# Patient Record
Sex: MALE | Race: WHITE | NOT HISPANIC OR LATINO | Employment: OTHER | URBAN - METROPOLITAN AREA
[De-identification: names, ages, dates, MRNs, and addresses within clinical notes are randomized per-mention and may not be internally consistent; named-entity substitution may affect disease eponyms.]

---

## 2017-01-13 ENCOUNTER — CLINICAL SUPPORT (OUTPATIENT)
Dept: INTERNAL MEDICINE | Facility: CLINIC | Age: 72
End: 2017-01-13
Payer: MEDICARE

## 2017-01-13 DIAGNOSIS — E29.1 HYPOGONADISM IN MALE: ICD-10-CM

## 2017-01-13 DIAGNOSIS — R79.89 LOW TESTOSTERONE: Primary | ICD-10-CM

## 2017-01-13 PROCEDURE — 96372 THER/PROPH/DIAG INJ SC/IM: CPT | Mod: PBBFAC,PO

## 2017-01-13 RX ORDER — TESTOSTERONE CYPIONATE 200 MG/ML
100 INJECTION, SOLUTION INTRAMUSCULAR
Status: COMPLETED | OUTPATIENT
Start: 2017-01-13 | End: 2017-01-13

## 2017-01-13 RX ORDER — TESTOSTERONE CYPIONATE 200 MG/ML
150 INJECTION, SOLUTION INTRAMUSCULAR
Status: DISCONTINUED | OUTPATIENT
Start: 2017-01-13 | End: 2017-01-13

## 2017-01-13 RX ADMIN — TESTOSTERONE CYPIONATE 100 MG: 200 INJECTION, SOLUTION INTRAMUSCULAR at 08:01

## 2017-01-13 NOTE — PROGRESS NOTES
Patient identified by name and  with verbal feedback. Depo-Testosterone 100mg administered IM to Right upper outer quadrant using aseptic technique. Patient tolerated well, no adverse reactions noted/reported. Next injection due 17 and has been scheduled. /mp

## 2017-01-17 ENCOUNTER — TELEPHONE (OUTPATIENT)
Dept: ENDOCRINOLOGY | Facility: CLINIC | Age: 72
End: 2017-01-17

## 2017-01-17 DIAGNOSIS — E29.1 HYPOGONADISM IN MALE: Primary | ICD-10-CM

## 2017-01-17 RX ORDER — TESTOSTERONE CYPIONATE 200 MG/ML
100 INJECTION, SOLUTION INTRAMUSCULAR
Status: COMPLETED | OUTPATIENT
Start: 2017-01-18 | End: 2017-04-21

## 2017-01-17 NOTE — TELEPHONE ENCOUNTER
Patient needs testosterone injection clinic administer entered in the MAR. u He take 100 mg every 14 days

## 2017-01-20 ENCOUNTER — HOSPITAL ENCOUNTER (OUTPATIENT)
Dept: RADIOLOGY | Facility: CLINIC | Age: 72
Discharge: HOME | End: 2017-01-20
Attending: INTERNAL MEDICINE
Payer: MEDICARE

## 2017-01-20 DIAGNOSIS — E04.2 MULTINODULAR GOITER: ICD-10-CM

## 2017-01-20 DIAGNOSIS — I10 ESSENTIAL HYPERTENSION: ICD-10-CM

## 2017-01-20 DIAGNOSIS — E55.9 HYPOVITAMINOSIS D: ICD-10-CM

## 2017-01-20 PROCEDURE — 76536 US EXAM OF HEAD AND NECK: CPT | Mod: 26,,, | Performed by: RADIOLOGY

## 2017-01-20 PROCEDURE — 76536 US EXAM OF HEAD AND NECK: CPT | Mod: TC,PO

## 2017-01-27 ENCOUNTER — CLINICAL SUPPORT (OUTPATIENT)
Dept: INTERNAL MEDICINE | Facility: CLINIC | Age: 72
End: 2017-01-27
Payer: MEDICARE

## 2017-01-27 DIAGNOSIS — E29.1 HYPOGONADISM IN MALE: Primary | ICD-10-CM

## 2017-01-27 PROCEDURE — 96372 THER/PROPH/DIAG INJ SC/IM: CPT | Mod: PBBFAC,PO

## 2017-01-27 RX ADMIN — TESTOSTERONE CYPIONATE 100 MG: 200 INJECTION, SOLUTION INTRAMUSCULAR at 09:01

## 2017-01-27 NOTE — PROGRESS NOTES
Patient identified by name and  with verbal feedback. Depo-Testosterone 100mg administered IM to left upper outer quadrant using aseptic technique. Patient tolerated well, no adverse reactions noted/reported. Next injection due 2/10/17 and has been scheduled./mp

## 2017-02-10 ENCOUNTER — CLINICAL SUPPORT (OUTPATIENT)
Dept: INTERNAL MEDICINE | Facility: CLINIC | Age: 72
End: 2017-02-10
Payer: MEDICARE

## 2017-02-10 DIAGNOSIS — E29.1 HYPOGONADISM IN MALE: Primary | ICD-10-CM

## 2017-02-10 PROCEDURE — 96372 THER/PROPH/DIAG INJ SC/IM: CPT | Mod: PBBFAC,PO

## 2017-02-10 RX ADMIN — TESTOSTERONE CYPIONATE 100 MG: 200 INJECTION, SOLUTION INTRAMUSCULAR at 08:02

## 2017-02-24 ENCOUNTER — CLINICAL SUPPORT (OUTPATIENT)
Dept: INTERNAL MEDICINE | Facility: CLINIC | Age: 72
End: 2017-02-24
Payer: MEDICARE

## 2017-02-24 DIAGNOSIS — E29.1 HYPOGONADISM IN MALE: Primary | ICD-10-CM

## 2017-02-24 PROCEDURE — 96372 THER/PROPH/DIAG INJ SC/IM: CPT | Mod: PBBFAC,PO

## 2017-02-24 RX ADMIN — TESTOSTERONE CYPIONATE 100 MG: 200 INJECTION, SOLUTION INTRAMUSCULAR at 08:02

## 2017-02-24 NOTE — PROGRESS NOTES
Patient identified by name and  with verbal feedback. Depo-Testosterone 100 mg administered IM to left upper outer quadrant using aseptic technique. Patient tolerated well, no adverse reactions noted/reported. Next injection due 3/10/2017 and has been scheduled./mp

## 2017-02-26 RX ORDER — EZETIMIBE AND SIMVASTATIN 10; 40 MG/1; MG/1
TABLET ORAL
Qty: 90 TABLET | Refills: 2 | Status: SHIPPED | OUTPATIENT
Start: 2017-02-26 | End: 2018-07-18

## 2017-03-10 ENCOUNTER — CLINICAL SUPPORT (OUTPATIENT)
Dept: INTERNAL MEDICINE | Facility: CLINIC | Age: 72
End: 2017-03-10
Payer: MEDICARE

## 2017-03-10 DIAGNOSIS — E29.1 HYPOGONADISM IN MALE: Primary | ICD-10-CM

## 2017-03-10 PROCEDURE — 96372 THER/PROPH/DIAG INJ SC/IM: CPT | Mod: PBBFAC,PO

## 2017-03-10 RX ADMIN — TESTOSTERONE CYPIONATE 100 MG: 200 INJECTION, SOLUTION INTRAMUSCULAR at 08:03

## 2017-03-10 NOTE — PROGRESS NOTES
Patient identified by name and  with verbal feedback. Depo-Testosterone 100mg administered IM to left upper outer quadrant using aseptic technique. Patient tolerated well, no adverse reactions noted/reported. Next injection due 3/24 and has been scheduled./mp

## 2017-03-20 ENCOUNTER — TELEPHONE (OUTPATIENT)
Dept: FAMILY MEDICINE | Facility: CLINIC | Age: 72
End: 2017-03-20

## 2017-03-20 DIAGNOSIS — R79.89 ELEVATED LFTS: Primary | ICD-10-CM

## 2017-03-20 NOTE — TELEPHONE ENCOUNTER
Called pt to reschedule appt with Dr. Meier on 4/6/17 (Dr. Meier will not be in clinic). Rescheduled to Ms. Ofe PA-C for 4/5/17 at 940 am. Pt also had a question on wether or not he is to re-start Vytorin? Pt states that he was told to be off of it by Dr. Winter (there is also a telephone encounter from Dr. Meier from 12/12/16 stating he was to hold it and repeat CMP in 1 month.) Please advise. Thanks, Stephanie

## 2017-03-21 NOTE — TELEPHONE ENCOUNTER
Repeat lfts done in January demonstrated that one of his LFTs was still elevated, I wanted to recheck this in 3 months. please schedule CMP for before his visit with trisha so that she will have the results. If this has normalized we can add back low dose vytorin or another statin with close monitoring of LFTs. He is following up with Dr. Winter next week. Maybe he can have it drawn that day, does not need to be fasting.

## 2017-03-24 ENCOUNTER — CLINICAL SUPPORT (OUTPATIENT)
Dept: INTERNAL MEDICINE | Facility: CLINIC | Age: 72
End: 2017-03-24
Payer: MEDICARE

## 2017-03-24 PROCEDURE — 96372 THER/PROPH/DIAG INJ SC/IM: CPT | Mod: PBBFAC,PO

## 2017-03-24 RX ADMIN — TESTOSTERONE CYPIONATE 100 MG: 200 INJECTION, SOLUTION INTRAMUSCULAR at 08:03

## 2017-03-24 NOTE — PROGRESS NOTES
Patient identified by name and  with verbal feedback. Depo-Testosterone 100mg administered IM to left upper outer quadrant using aseptic technique. Patient tolerated well, no adverse reactions noted/reported. Next injection due 2017 and has been scheduled./mp

## 2017-03-28 ENCOUNTER — OFFICE VISIT (OUTPATIENT)
Dept: ENDOCRINOLOGY | Facility: CLINIC | Age: 72
End: 2017-03-28
Payer: MEDICARE

## 2017-03-28 VITALS
HEART RATE: 69 BPM | RESPIRATION RATE: 18 BRPM | HEIGHT: 72 IN | BODY MASS INDEX: 29.89 KG/M2 | SYSTOLIC BLOOD PRESSURE: 149 MMHG | WEIGHT: 220.69 LBS | DIASTOLIC BLOOD PRESSURE: 81 MMHG

## 2017-03-28 DIAGNOSIS — E83.52 FHH (FAMILIAL HYPOCALCIURIC HYPERCALCEMIA): ICD-10-CM

## 2017-03-28 DIAGNOSIS — E78.5 HYPERLIPIDEMIA, UNSPECIFIED HYPERLIPIDEMIA TYPE: ICD-10-CM

## 2017-03-28 DIAGNOSIS — E55.9 HYPOVITAMINOSIS D: ICD-10-CM

## 2017-03-28 DIAGNOSIS — I10 ESSENTIAL HYPERTENSION: ICD-10-CM

## 2017-03-28 DIAGNOSIS — K21.9 GASTROESOPHAGEAL REFLUX DISEASE WITHOUT ESOPHAGITIS: ICD-10-CM

## 2017-03-28 DIAGNOSIS — R79.89 LOW TESTOSTERONE: ICD-10-CM

## 2017-03-28 DIAGNOSIS — E83.52 HYPERCALCEMIA: Primary | ICD-10-CM

## 2017-03-28 DIAGNOSIS — E78.00 HYPERCHOLESTEROLEMIA: ICD-10-CM

## 2017-03-28 DIAGNOSIS — N40.0 BENIGN PROSTATIC HYPERPLASIA, PRESENCE OF LOWER URINARY TRACT SYMPTOMS UNSPECIFIED, UNSPECIFIED MORPHOLOGY: ICD-10-CM

## 2017-03-28 DIAGNOSIS — E04.2 MULTINODULAR GOITER: ICD-10-CM

## 2017-03-28 PROCEDURE — 99213 OFFICE O/P EST LOW 20 MIN: CPT | Mod: PBBFAC,PO | Performed by: INTERNAL MEDICINE

## 2017-03-28 PROCEDURE — 99214 OFFICE O/P EST MOD 30 MIN: CPT | Mod: S$PBB,,, | Performed by: INTERNAL MEDICINE

## 2017-03-28 PROCEDURE — 99999 PR PBB SHADOW E&M-EST. PATIENT-LVL III: CPT | Mod: PBBFAC,,, | Performed by: INTERNAL MEDICINE

## 2017-03-28 RX ORDER — ATORVASTATIN CALCIUM 10 MG/1
10 TABLET, FILM COATED ORAL DAILY
Qty: 90 TABLET | Refills: 3 | Status: SHIPPED | OUTPATIENT
Start: 2017-03-28 | End: 2017-09-02 | Stop reason: SDUPTHER

## 2017-03-28 NOTE — MR AVS SNAPSHOT
[place] [facility]  [address]  [place] [address]  Phone: [phone]                  Papito Hutton   3/28/2017 4:30 PM   Office Visit    Description:  Male : 1945   Provider:  Nadir Winter MD   Department:  [facility]           Diagnoses this Visit        Comments    Hypercalcemia    -  Primary     Hypercholesterolemia         Hyperlipidemia, unspecified hyperlipidemia type         Hypovitaminosis D         FHH (familial hypocalciuric hypercalcemia)         Gastroesophageal reflux disease without esophagitis         Multinodular goiter         Essential hypertension         Low testosterone         Benign prostatic hyperplasia, presence of lower urinary tract symptoms unspecified, unspecified morphology                To Do List           Future Appointments        Provider Department Dept Phone    3/29/2017 10:00 AM LABJOVANNY [facility] [phone]    2017 9:40 AM MELLISSA Jaffe [place] [facility] [phone]    2017 9:00 AM SLIDEKAILYN, INT MED INJ [place] [facility] [phone]    2017 9:00 AM SLIDEKAILYN, INT MED INJ [place] [facility] [phone]      Goals (5 Years of Data)     None      Follow-Up and Disposition     Return in about 4 months (around 2017).    Follow-up and Disposition History       These Medications        Disp Refills Start End    atorvastatin (LIPITOR) 10 MG tablet 90 tablet 3 3/28/2017 3/28/2018    Take 1 tablet (10 mg total) by mouth once daily. - Oral    Pharmacy: [facility] - [address] #: [phone]         [facility] On Call     [facility] On Call Nurse Care Line -  Assistance  Registered nurses in the [facility] provide clinical advisement, health education, appointment booking, and other advisory services.  Call for this free service at [phone].             Medications           Message regarding  Medications     Verify the changes and/or additions to your medication regime listed below are the same as discussed with your clinician today.  If any of these changes or additions are incorrect, please notify your healthcare provider.        START taking these NEW medications        Refills    atorvastatin (LIPITOR) 10 MG tablet 3    Sig: Take 1 tablet (10 mg total) by mouth once daily.    Class: Normal    Route: Oral           Verify that the below list of medications is an accurate representation of the medications you are currently taking.  If none reported, the list may be blank. If incorrect, please contact your healthcare provider. Carry this list with you in case of emergency.           Current Medications     amlodipine-benazepril (LOTREL) 10-40 mg per capsule TAKE 1 CAPSULE DAILY    atorvastatin (LIPITOR) 10 MG tablet Take 1 tablet (10 mg total) by mouth once daily.    CHOLECALCIFEROL, VITAMIN D3, (VITAMIN D3 ORAL) Take 1 capsule by mouth once daily.    cyanocobalamin (VITAMIN B-12) 1000 MCG tablet Take 100 mcg by mouth once daily.    diclofenac (VOLTAREN) 75 MG EC tablet Take 1 tablet (75 mg total) by mouth 2 (two) times daily as needed.    NEXIUM 40 mg capsule TAKE 1 CAPSULE BEFORE BREAKFAST    testosterone cypionate (DEPOTESTOTERONE CYPIONATE) 100 mg/mL injection Inject 1.5 mLs (150 mg total) into the muscle every 14 (fourteen) days.    TOPROL XL 25 mg 24 hr tablet TAKE 1 TABLET DAILY    VYTORIN 10-40 10-40 mg per tablet TAKE 1 TABLET EVERY EVENING           Clinical Reference Information           Your Vitals Were     BP Pulse Resp Height Weight BMI    149/81 69 18 6' (1.829 m) 100.1 kg (220 lb 10.9 oz) 29.93 kg/m2      Blood Pressure          Most Recent Value    BP  (!)  149/81      Allergies as of 3/28/2017     Vytorin 10-10 [Ezetimibe-simvastatin]      Immunizations Administered on Date of Encounter - 3/28/2017     None      Orders Placed During Today's Visit     Future Labs/Procedures Expected by  Expires    Calcitriol  3/28/2017 5/27/2018    Calcium, ionized  3/28/2017 5/27/2018    Calcium, urine, random  3/28/2017 5/27/2018    CBC auto differential  3/28/2017 5/27/2018    Comprehensive metabolic panel  3/28/2017 5/27/2018    Creatinine, urine, random  3/28/2017 5/27/2018    PSA, total and free  3/28/2017 5/27/2018    PTH, intact  3/28/2017 5/27/2018    T3  3/28/2017 5/27/2018    T4, free  3/28/2017 5/27/2018    Testosterone Panel  3/28/2017 5/27/2018    Testosterone Panel  3/28/2017 5/27/2018    Testosterone Panel  3/28/2017 5/27/2018    TSH  3/28/2017 5/27/2018    US Soft Tissue Head Neck Thyroid  3/28/2017 3/28/2018    Vitamin D  3/28/2017 5/27/2018    Comprehensive metabolic panel  4/28/2017 (Approximate) 5/27/2018    Phosphorus, urine, random  As directed 3/28/2018      Language Assistance Services     ATTENTION: Language assistance services are available, free of charge. Please call [phone].      ATENCIÓN: Si habla español, tiene a suarez disposición servicios gratuitos de asistencia lingüística. Llame al [phone].     CHÚ Ý: N?u b?n nói Ti?ng Vi?t, có các d?ch v? h? tr? ngôn ng? mi?n phí dành cho b?n. G?i s? [phone].         [facility] complies with applicable Federal civil rights laws and does not discriminate on the basis of race, color, national origin, age, disability, or sex.

## 2017-03-28 NOTE — PROGRESS NOTES
Subjective:      Patient ID: Papito Hutton is a 71 y.o. male.    Chief Complaint:    71 yr old gentleman with hypogonadism and hypercalcemia presumed secondary to FHH seen in [facility] today.    History of Present Illness    Patient is a 71yr old gentleman with hypercalcemia and hypogonadism seen in [facility] today. He in addition has background comorbidities of hypertension, GERD, Vit B12 deficiency and hyperlipidemia.   The work up on account of the hypercalcemia suggests that this is due to FHH.   The patient was also found to have a multinodular goiter as noted on his most recent thyroid Uss from 01/16 though all the identified nodules are subcm in size with no worrisome sonographic features. His next [facility] USS to re-evaluate the nodules should for for ~ 01/17.  He was recently found to have asymptomatic transaminitis of unclear etiology.  Patients baseline Solomon score is 6. Patient had a sleep study in the past prior to have a nasal deviated septum fixed.  No major snoring problems.  Patient had been on testosterone injections ; testosterone cypionate 150mg every 14 days for androgen repletion. Patient apparently had a brain MRI ~ 2 yrs ago which was reportedly normal.  Patient reports that he had always had high calcium levels while he was in the [employer] since over 20 yrs ago.  Patient has never had kidney stones but he does have GERD.No known family history of hypercalcemia, no history of severe dyspepsia nor severe peptic ulcer disease.  He is on Vytorin 10/40mg Qd for hyperlipidemia with hypercholesterolemia.  Patient has never had a fracture of note and his screening DEXA was essentially -ve.  He is in excellent spirits today and has no fresh complaints    Review of Systems   Constitutional: Negative for diaphoresis and fatigue.   HENT: Negative for facial swelling and trouble swallowing.    Eyes: Negative for visual disturbance.   Respiratory: Negative for cough and shortness of breath.    Cardiovascular:  Negative for chest pain and palpitations.   Gastrointestinal: Negative for abdominal distention, abdominal pain, diarrhea and vomiting.   Endocrine: Negative for polyuria.   Genitourinary: Negative for dysuria and frequency.   Musculoskeletal: Negative for arthralgias and back pain.   Skin: Negative for color change, pallor and rash.   Neurological: Negative for dizziness, light-headedness and headaches.   Hematological: Does not bruise/bleed easily.   Psychiatric/Behavioral: Negative for confusion. The patient is not nervous/anxious.        Objective:  BP (!) 149/81  Pulse 69  Resp 18  Ht 6' (1.829 m)  Wt 100.1 kg (220 lb 10.9 oz)  BMI 29.93 kg/m2     Physical Exam   Constitutional: He is oriented to person, place, and time. He appears well-developed and well-nourished. No distress.   Pleasant elderly gentleman. Clinically comfortable. Not in any apparent distress. Not pale, anicteric and afebrile. Patient is in excellent spirits and looks younger than stated chronologic age.  Well hydrated.   HENT:   Head: Normocephalic and atraumatic.   Eyes: Conjunctivae and EOM are normal. Pupils are equal, round, and reactive to light.   Neck: Normal range of motion. Neck supple. No JVD present.   Cardiovascular: Normal rate, regular rhythm and normal heart sounds.    Pulmonary/Chest: Effort normal and breath sounds normal. No respiratory distress. He has no wheezes.   Abdominal: Soft. There is no tenderness.   Musculoskeletal: Normal range of motion. He exhibits no tenderness.   Neurological: He is alert and oriented to person, place, and time. No cranial nerve deficit.   Skin: Skin is warm and dry. No rash noted. He is not diaphoretic. No erythema. No pallor.   Psychiatric: He has a normal mood and affect. His behavior is normal. Judgment and thought content normal.   Vitals reviewed.      Lab Review:     Results for DEEDEE THOMAS (MRN [id number]) as of 3/28/2017 17:08   Ref. Range 11/29/2016 09:13 1/20/2017 08:06  1/20/2017 08:35   Vitamin B-12 Latest Ref Range: 210 - 950 pg/mL  547    Sodium Latest Ref Range: 136 - 145 mmol/L 140 138    Potassium Latest Ref Range: 3.5 - 5.1 mmol/L 4.3 4.3    Chloride Latest Ref Range: 95 - 110 mmol/L 108 105    CO2 Latest Ref Range: 23 - 29 mmol/L 22 (L) 26    Anion Gap Latest Ref Range: 8 - 16 mmol/L 10 7 (L)    BUN, Bld Latest Ref Range: 8 - 23 mg/dL 17 17    Creatinine Latest Ref Range: 0.5 - 1.4 mg/dL 0.9 1.0    eGFR if non African American Latest Ref Range: >60 mL/min/1.73 m^2 >60.0 >60.0    eGFR if African American Latest Ref Range: >60 mL/min/1.73 m^2 >60.0 >60.0    Glucose Latest Ref Range: 70 - 110 mg/dL 110 115 (H)    Calcium Latest Ref Range: 8.7 - 10.5 mg/dL 10.4 10.4    Alkaline Phosphatase Latest Ref Range: 55 - 135 U/L 107 104    Total Protein Latest Ref Range: 6.0 - 8.4 g/dL 7.4 7.1    Albumin Latest Ref Range: 3.5 - 5.2 g/dL 3.9 3.7    Total Bilirubin Latest Ref Range: 0.1 - 1.0 mg/dL 0.5 0.6    AST Latest Ref Range: 10 - 40 U/L 39 35    ALT Latest Ref Range: 10 - 44 U/L 103 (H) 66 (H)    GGT Latest Ref Range: 8 - 55 U/L 357 (H)     Hep A IgM Unknown Negative     Hep B C IgM Unknown Negative     Hepatitis B Surface Ag Unknown Negative     Hepatitis C Ab Unknown Negative     US SOFT TISSUE HEAD NECK THYROID Unknown   Rpt   Hepatitis A Antibody IgG Unknown Negative         Assessment:     1. Hypercalcemia  Calcium, ionized    Calcitriol    PTH, intact    Vitamin D    Calcium, urine, random    Creatinine, urine, random    Phosphorus, urine, random   2. Hypercholesterolemia  Comprehensive metabolic panel    atorvastatin (LIPITOR) 10 MG tablet    Comprehensive metabolic panel   3. Hyperlipidemia, unspecified hyperlipidemia type  Comprehensive metabolic panel    atorvastatin (LIPITOR) 10 MG tablet    Comprehensive metabolic panel   4. Hypovitaminosis D  Vitamin D    Calcium, urine, random    Creatinine, urine, random    Phosphorus, urine, random   5. FHH (familial hypocalciuric  hypercalcemia)  Calcium, ionized    Vitamin D    Calcium, urine, random    Creatinine, urine, random   6. Gastroesophageal reflux disease without esophagitis     7. Multinodular goiter  TSH    T4, free    T3    US Soft Tissue Head Neck Thyroid   8. Essential hypertension     9. Low testosterone  PSA, total and free    Testosterone Panel    Testosterone Panel    Testosterone Panel    CBC auto differential   10. Benign prostatic hyperplasia, presence of lower urinary tract symptoms unspecified, unspecified morphology  PSA, total and free        Regarding hypercalcemia. This appears due to [facility]. Will continue to monitor calcium trends but no need for any active intervaentiojn at the present time. To continue to encourage copious free water intake.  Regarding hypogonadism; to continue testosterone injections; testosterone cypionate 150mg every 14 days. Will recheck peak and trough levels as well as mid cycle levels  Regarding hypertension; BP profile well controlled. To continue present antihypertensive regimen and to continue serial BP tracking.  Regarding hyperlipidemia with hypercholesterolemia; to commence trial of low dose lipitor 10mg QD and will recheck CMP in ~ 1mth after starting to see if transaminitis relapses. If it does then it would mean he has a class sensitivity to statins and we will then have to change him to a non statin antilipidemic.  Regarding hypovitaminosis D; will recheck 25 OH vit D levels today.  Regarding multinodular goiter; to have repeat USS of thyroid ~ 01/17.  Regarding GERD; symptomatically stable. To continue PPI therapy as before    Plan:     FFup in ~ 4mths

## 2017-03-29 ENCOUNTER — LAB VISIT (OUTPATIENT)
Dept: LAB | Facility: HOSPITAL | Age: 72
End: 2017-03-29
Attending: FAMILY MEDICINE
Payer: MEDICARE

## 2017-03-29 DIAGNOSIS — E83.52 FHH (FAMILIAL HYPOCALCIURIC HYPERCALCEMIA): ICD-10-CM

## 2017-03-29 DIAGNOSIS — E55.9 HYPOVITAMINOSIS D: ICD-10-CM

## 2017-03-29 DIAGNOSIS — E83.52 HYPERCALCEMIA: ICD-10-CM

## 2017-03-29 DIAGNOSIS — E04.2 MULTINODULAR GOITER: ICD-10-CM

## 2017-03-29 DIAGNOSIS — R79.89 ELEVATED LFTS: ICD-10-CM

## 2017-03-29 DIAGNOSIS — N40.0 BENIGN PROSTATIC HYPERPLASIA, PRESENCE OF LOWER URINARY TRACT SYMPTOMS UNSPECIFIED, UNSPECIFIED MORPHOLOGY: ICD-10-CM

## 2017-03-29 DIAGNOSIS — R79.89 LOW TESTOSTERONE: ICD-10-CM

## 2017-03-29 LAB
25(OH)D3+25(OH)D2 SERPL-MCNC: 32 NG/ML
ALBUMIN SERPL BCP-MCNC: 3.7 G/DL
ALP SERPL-CCNC: 85 U/L
ALT SERPL W/O P-5'-P-CCNC: 45 U/L
ANION GAP SERPL CALC-SCNC: 8 MMOL/L
AST SERPL-CCNC: 31 U/L
BASOPHILS # BLD AUTO: 0.04 K/UL
BASOPHILS NFR BLD: 0.7 %
BILIRUB SERPL-MCNC: 0.6 MG/DL
BUN SERPL-MCNC: 10 MG/DL
CA-I BLDV-SCNC: 1.44 MMOL/L
CALCIUM SERPL-MCNC: 10.4 MG/DL
CHLORIDE SERPL-SCNC: 106 MMOL/L
CO2 SERPL-SCNC: 25 MMOL/L
CREAT SERPL-MCNC: 1 MG/DL
DIFFERENTIAL METHOD: ABNORMAL
EOSINOPHIL # BLD AUTO: 0.1 K/UL
EOSINOPHIL NFR BLD: 1.8 %
ERYTHROCYTE [DISTWIDTH] IN BLOOD BY AUTOMATED COUNT: 13.9 %
EST. GFR  (AFRICAN AMERICAN): >60 ML/MIN/1.73 M^2
EST. GFR  (NON AFRICAN AMERICAN): >60 ML/MIN/1.73 M^2
GLUCOSE SERPL-MCNC: 133 MG/DL
HCT VFR BLD AUTO: 46.5 %
HGB BLD-MCNC: 16.1 G/DL
LYMPHOCYTES # BLD AUTO: 2.9 K/UL
LYMPHOCYTES NFR BLD: 52.6 %
MCH RBC QN AUTO: 30.4 PG
MCHC RBC AUTO-ENTMCNC: 34.6 %
MCV RBC AUTO: 88 FL
MONOCYTES # BLD AUTO: 0.3 K/UL
MONOCYTES NFR BLD: 6 %
NEUTROPHILS # BLD AUTO: 2.1 K/UL
NEUTROPHILS NFR BLD: 38.7 %
PLATELET # BLD AUTO: 269 K/UL
PMV BLD AUTO: 10.7 FL
POTASSIUM SERPL-SCNC: 4.3 MMOL/L
PROSTATE SPECIFIC ANTIGEN, TOTAL: 0.85 NG/ML
PROT SERPL-MCNC: 7.2 G/DL
PSA FREE MFR SERPL: 24.71 %
PSA FREE SERPL-MCNC: 0.21 NG/ML
PTH-INTACT SERPL-MCNC: 116 PG/ML
RBC # BLD AUTO: 5.3 M/UL
SODIUM SERPL-SCNC: 139 MMOL/L
T3 SERPL-MCNC: 76 NG/DL
T4 FREE SERPL-MCNC: 1.14 NG/DL
TSH SERPL DL<=0.005 MIU/L-ACNC: 0.61 UIU/ML
WBC # BLD AUTO: 5.49 K/UL

## 2017-03-29 PROCEDURE — 82652 VIT D 1 25-DIHYDROXY: CPT

## 2017-03-29 PROCEDURE — 84439 ASSAY OF FREE THYROXINE: CPT

## 2017-03-29 PROCEDURE — 84153 ASSAY OF PSA TOTAL: CPT

## 2017-03-29 PROCEDURE — 84443 ASSAY THYROID STIM HORMONE: CPT

## 2017-03-29 PROCEDURE — 80053 COMPREHEN METABOLIC PANEL: CPT

## 2017-03-29 PROCEDURE — 84270 ASSAY OF SEX HORMONE GLOBUL: CPT

## 2017-03-29 PROCEDURE — 84480 ASSAY TRIIODOTHYRONINE (T3): CPT

## 2017-03-29 PROCEDURE — 85025 COMPLETE CBC W/AUTO DIFF WBC: CPT

## 2017-03-29 PROCEDURE — 82306 VITAMIN D 25 HYDROXY: CPT

## 2017-03-29 PROCEDURE — 83970 ASSAY OF PARATHORMONE: CPT

## 2017-03-29 PROCEDURE — 82330 ASSAY OF CALCIUM: CPT

## 2017-03-29 PROCEDURE — 36415 COLL VENOUS BLD VENIPUNCTURE: CPT | Mod: PO

## 2017-03-31 LAB — 1,25(OH)2D3 SERPL-MCNC: 85 PG/ML

## 2017-04-05 ENCOUNTER — DOCUMENTATION ONLY (OUTPATIENT)
Dept: FAMILY MEDICINE | Facility: CLINIC | Age: 72
End: 2017-04-05

## 2017-04-05 ENCOUNTER — OFFICE VISIT (OUTPATIENT)
Dept: FAMILY MEDICINE | Facility: CLINIC | Age: 72
End: 2017-04-05
Payer: MEDICARE

## 2017-04-05 VITALS
HEIGHT: 72 IN | DIASTOLIC BLOOD PRESSURE: 60 MMHG | BODY MASS INDEX: 29.35 KG/M2 | TEMPERATURE: 98 F | HEART RATE: 60 BPM | SYSTOLIC BLOOD PRESSURE: 114 MMHG | WEIGHT: 216.69 LBS | OXYGEN SATURATION: 95 %

## 2017-04-05 DIAGNOSIS — R79.89 ELEVATED LIVER FUNCTION TESTS: ICD-10-CM

## 2017-04-05 DIAGNOSIS — R73.09 ELEVATED GLUCOSE: ICD-10-CM

## 2017-04-05 DIAGNOSIS — I10 ESSENTIAL HYPERTENSION: Primary | ICD-10-CM

## 2017-04-05 DIAGNOSIS — E78.5 HYPERLIPIDEMIA, UNSPECIFIED HYPERLIPIDEMIA TYPE: ICD-10-CM

## 2017-04-05 PROCEDURE — 99213 OFFICE O/P EST LOW 20 MIN: CPT | Mod: S$PBB,,, | Performed by: PHYSICIAN ASSISTANT

## 2017-04-05 PROCEDURE — 99999 PR PBB SHADOW E&M-EST. PATIENT-LVL III: CPT | Mod: PBBFAC,,, | Performed by: PHYSICIAN ASSISTANT

## 2017-04-05 PROCEDURE — 99213 OFFICE O/P EST LOW 20 MIN: CPT | Mod: PBBFAC,PO | Performed by: PHYSICIAN ASSISTANT

## 2017-04-05 RX ORDER — DICLOFENAC SODIUM 75 MG/1
75 TABLET, DELAYED RELEASE ORAL 2 TIMES DAILY PRN
Qty: 120 TABLET | Refills: 1 | Status: SHIPPED | OUTPATIENT
Start: 2017-04-05 | End: 2017-07-16 | Stop reason: SDUPTHER

## 2017-04-05 NOTE — PROGRESS NOTES
Subjective:       Patient ID: Papito Hutton is a 71 y.o. male.    Chief Complaint: follow up hypertension    HPI   Patient is a 71 year old  male HTN, HLD presenting to the clinic for routine 6 month f/u. He sees Dr. Winter whom recently started him back on atorvastatin which patient is happy with. We will a CMP in 3 months to check on his liver function numbers with starting this medication. He reports he is doing well. He is wanting to increased dose of voltaren 75mg, but reports he has only been taking it once daily vs. Twice daily. He will try this. HTN is well controlled on current medication.   Review of Systems   Constitutional: Negative for activity change, appetite change, chills, fatigue and fever.   HENT: Negative for congestion, ear pain, postnasal drip, rhinorrhea and sinus pressure.    Respiratory: Negative for cough, shortness of breath and wheezing.    Cardiovascular: Negative for chest pain and palpitations.   Gastrointestinal: Negative for abdominal pain, constipation, diarrhea, nausea and vomiting.   Genitourinary: Negative for frequency, hematuria and urgency.   Musculoskeletal: Negative for back pain and gait problem.   Skin: Negative for rash.   Neurological: Negative for syncope, weakness and headaches.   Psychiatric/Behavioral: Negative for agitation and confusion.       Objective:      Physical Exam   Constitutional: Vital signs are normal. He appears well-developed and well-nourished. No distress.   Cardiovascular: Normal rate, regular rhythm, S1 normal, S2 normal and normal heart sounds.  Exam reveals no gallop.    No murmur heard.  Pulses:       Radial pulses are 2+ on the right side, and 2+ on the left side.   <2sec cap refill fingers bilat     Pulmonary/Chest: Effort normal and breath sounds normal. No respiratory distress. He has no wheezes. He has no rhonchi.   Skin: Skin is warm and dry. He is not diaphoretic.   Appropriate skin turgor   Psychiatric: He has a normal  mood and affect. His speech is normal and behavior is normal. Judgment and thought content normal. Cognition and memory are normal.       Assessment:       1. Essential hypertension    2. Elevated glucose    3. Elevated liver function tests    4. Hyperlipidemia, unspecified hyperlipidemia type        Plan:       Papito was seen today for follow up hypertension.    Diagnoses and all orders for this visit:    Essential hypertension  Well controlled;  Continue current blood pressure medications    Elevated glucose  -     Hemoglobin A1c; Future    Elevated liver function tests  Resolving; Recently restarting atorvastin; recheck cmp in 3 months  -     Comprehensive metabolic panel; Future    Hyperlipidemia, unspecified hyperlipidemia type  Recently restarted on atorvastatin 10mg daily    Other orders  -     diclofenac (VOLTAREN) 75 MG EC tablet; Take 1 tablet (75 mg total) by mouth 2 (two) times daily as needed.      Patient readiness: acceptance and barriers:none    During the course of the visit the patient was educated and counseled about the following:     Hypertension:   Medication: no change.    Goals: Hypertension: Reduce Blood Pressure    Did patient meet goals/outcomes: No    The following self management tools provided: declined    Patient Instructions (the written plan) was given to the patient/family.     Time spent with patient: 20 minutes

## 2017-04-05 NOTE — PROGRESS NOTES
Pre-Visit Chart Review  For Appointment Scheduled on 4/5/17    Health Maintenance Due   Topic Date Due    TETANUS VACCINE  08/04/1964    Colonoscopy  08/04/1996

## 2017-04-05 NOTE — MR AVS SNAPSHOT
[facility]  [address]  [place] [address]  Phone: [phone]  Fax: [phone]                  Papito Hutton   2017 9:40 AM   Office Visit    Description:  Male : 1945   Provider:  MELLISSA Jaffe   Department:  [facility]           Reason for Visit     follow up hypertension           Diagnoses this Visit        Comments    Elevated glucose    -  Primary     Elevated liver function tests                To Do List           Future Appointments        Provider Department Dept Phone    2017 9:00 AM [facility], INT MED [facility] [phone]    2017 10:30 AM LAB, [facility] [phone]    2017 9:00 AM [facility], INT MED [facility] [phone]    2017 8:00 AM LAB, [facility] [phone]    2017 8:00 AM Nadir Winter MD [facility] [phone]      Goals (5 Years of Data)     None      Follow-Up and Disposition     Return for a1c friday; CMP in 3 months.       These Medications        Disp Refills Start End    diclofenac (VOLTAREN) 75 MG EC tablet 120 tablet 1 2017     Take 1 tablet (75 mg total) by mouth 2 (two) times daily as needed. - Oral    Pharmacy: [facility] - [address] Ph #: [phone]         [facility]     [facility] -  Assistance  Unless otherwise directed by your provider, please contact [facility], our nurse care line that is available for  assistance.     Registered nurses in the [facility] provide: appointment scheduling, clinical advisement, health education, and other advisory services.  Call: [phone] (toll free)               Medications           Message regarding Medications     Verify the changes and/or additions to your medication regime listed below are the same as discussed with your  clinician today.  If any of these changes or additions are incorrect, please notify your healthcare provider.             Verify that the below list of medications is an accurate representation of the medications you are currently taking.  If none reported, the list may be blank. If incorrect, please contact your healthcare provider. Carry this list with you in case of emergency.           Current Medications     amlodipine-benazepril (LOTREL) 10-40 mg per capsule TAKE 1 CAPSULE DAILY    atorvastatin (LIPITOR) 10 MG tablet Take 1 tablet (10 mg total) by mouth once daily.    CHOLECALCIFEROL, VITAMIN D3, (VITAMIN D3 ORAL) Take 1 capsule by mouth once daily.    cyanocobalamin (VITAMIN B-12) 1000 MCG tablet Take 100 mcg by mouth once daily.    diclofenac (VOLTAREN) 75 MG EC tablet Take 1 tablet (75 mg total) by mouth 2 (two) times daily as needed.    NEXIUM 40 mg capsule TAKE 1 CAPSULE BEFORE BREAKFAST    testosterone cypionate (DEPOTESTOTERONE CYPIONATE) 100 mg/mL injection Inject 1.5 mLs (150 mg total) into the muscle every 14 (fourteen) days.    TOPROL XL 25 mg 24 hr tablet TAKE 1 TABLET DAILY    VYTORIN 10-40 10-40 mg per tablet TAKE 1 TABLET EVERY EVENING           Clinical Reference Information           Your Vitals Were     BP Pulse Temp Height Weight SpO2    114/60 (BP Location: Right arm, Patient Position: Sitting, BP Method: Automatic) 60 98 °F (36.7 °C) (Oral) 6' (1.829 m) 98.3 kg (216 lb 11.4 oz) 95%    BMI                29.39 kg/m2          Blood Pressure          Most Recent Value    BP  114/60      Allergies as of 4/5/2017     Vytorin 10-10 [Ezetimibe-simvastatin]      Immunizations Administered on Date of Encounter - 4/5/2017     None      Orders Placed During Today's Visit     Future Labs/Procedures Expected by Expires    Comprehensive metabolic panel  4/5/2017 6/4/2018    Hemoglobin A1c  4/5/2017 6/4/2018      Instructions      Established High Blood Pressure    High blood pressure (hypertension) is a  chronic disease. Often health care providers dont know what causes it. But it can be caused by certain health conditions and medicines.  If you have high blood pressure, you may not have any symptoms. If you do have symptoms, they may include headache, dizziness, changes in your vision, chest pain, and shortness of breath. But even without symptoms, high blood pressure thats not treated raises your risk for heart attack and stroke. High blood pressure is a serious health risk and shouldnt be ignored.  A blood pressure reading is made up of two numbers: a higher number over a lower number. The top number is the systolic pressure. The bottom number is the diastolic pressure. A normal blood pressure is less than 120 over less than 80.  High blood pressure is when either the top number is 140 or higher, or the bottom number is 90 or higher. This must be the result when taking your blood pressure a number of times. The blood pressures between normal and high are called prehypertension.  Home care  If you have high blood pressure, you should do what is listed below to lower your blood pressure. If you are taking medicines for high blood pressure, these methods may reduce or end your need for medicines in the future.  · Begin a weight-loss program if you are overweight.  · Cut back on how much salt you get in your diet. Heres how to do this:  ¨ Dont eat foods that have a lot of salt. These include olives, pickles, smoked meats, and salted potato chips.  ¨ Dont add salt to your food at the table.  ¨ Use only small amounts of salt when cooking.  · Begin an exercise program. Talk with your health care provider about the type of exercise program that would be best for you. It doesn't have to be hard. Even brisk walking for 20 minutes 3 times a week is a good form of exercise.  · Dont take medicines that have heart stimulants. This includes many cold and sinus decongestant pills and sprays, as well as diet pills. Check  the warnings about hypertension on the label. Stimulants such as amphetamine or cocaine could be lethal for someone with high blood pressure. Never take these.  · Limit how much caffeine you get in your diet. Switch to caffeine-free products.  · Stop smoking. If you are a long-time smoker, this can be hard. Enroll in a stop-smoking program to make it more likely that you will quit for good.  · Learn how to handle stress. This is an important part of any program to lower blood pressure. Learn about relaxation methods like meditation, yoga, or biofeedback.  · If your provider prescribed medicines, take them exactly as directed. Missing doses may cause your blood pressure get out of control.  · Consider buying an automatic blood pressure machine. You can get one of these at most pharmacies. Use this to watch your blood pressure at home. Give the results to your provider.  Follow-up care  You will need to make regular visits to your health care provider. This is to check your blood pressure and to make changes to your medicines. Make a follow-up appointment as directed.  When to seek medical advice  Call your health care provider right away if any of these occur:  · Chest pain or shortness of breath  · Severe headache  · Throbbing or rushing sound in the ears  · Nosebleed  · Sudden severe pain in your belly (abdomen)  · Extreme drowsiness, confusion, or fainting  · Dizziness or dizziness with a spinning sensation (vertigo)  · Weakness of an arm or leg or one side of the face  · You have problems speaking or seeing   Date Last Reviewed: 11/25/2014  © 7742-6819 [employer]. [address] [place] [address], [place] [address]. All rights reserved. This information is not intended as a substitute for professional medical care. Always follow your healthcare professional's instructions.             Language Assistance Services     ATTENTION: Language assistance services are available, free of charge. Please call  [phone].      ATENCIÓN: Si habla español, tiene a suarez disposición servicios gratuitos de asistencia lingüística. Llame al [phone].     CHÚ Ý: N?u b?n nói Ti?ng Vi?t, có các d?ch v? h? tr? ngôn ng? mi?n phí dành cho b?n. G?i s? [phone].         [facility] complies with applicable Federal civil rights laws and does not discriminate on the basis of race, color, national origin, age, disability, or sex.

## 2017-04-05 NOTE — PATIENT INSTRUCTIONS
Established High Blood Pressure    High blood pressure (hypertension) is a chronic disease. Often health care providers dont know what causes it. But it can be caused by certain health conditions and medicines.  If you have high blood pressure, you may not have any symptoms. If you do have symptoms, they may include headache, dizziness, changes in your vision, chest pain, and shortness of breath. But even without symptoms, high blood pressure thats not treated raises your risk for heart attack and stroke. High blood pressure is a serious health risk and shouldnt be ignored.  A blood pressure reading is made up of two numbers: a higher number over a lower number. The top number is the systolic pressure. The bottom number is the diastolic pressure. A normal blood pressure is less than 120 over less than 80.  High blood pressure is when either the top number is 140 or higher, or the bottom number is 90 or higher. This must be the result when taking your blood pressure a number of times. The blood pressures between normal and high are called prehypertension.  Home care  If you have high blood pressure, you should do what is listed below to lower your blood pressure. If you are taking medicines for high blood pressure, these methods may reduce or end your need for medicines in the future.  · Begin a weight-loss program if you are overweight.  · Cut back on how much salt you get in your diet. Heres how to do this:  ¨ Dont eat foods that have a lot of salt. These include olives, pickles, smoked meats, and salted potato chips.  ¨ Dont add salt to your food at the table.  ¨ Use only small amounts of salt when cooking.  · Begin an exercise program. Talk with your health care provider about the type of exercise program that would be best for you. It doesn't have to be hard. Even brisk walking for 20 minutes 3 times a week is a good form of exercise.  · Dont take medicines that have heart stimulants. This includes many  cold and sinus decongestant pills and sprays, as well as diet pills. Check the warnings about hypertension on the label. Stimulants such as amphetamine or cocaine could be lethal for someone with high blood pressure. Never take these.  · Limit how much caffeine you get in your diet. Switch to caffeine-free products.  · Stop smoking. If you are a long-time smoker, this can be hard. Enroll in a stop-smoking program to make it more likely that you will quit for good.  · Learn how to handle stress. This is an important part of any program to lower blood pressure. Learn about relaxation methods like meditation, yoga, or biofeedback.  · If your provider prescribed medicines, take them exactly as directed. Missing doses may cause your blood pressure get out of control.  · Consider buying an automatic blood pressure machine. You can get one of these at most pharmacies. Use this to watch your blood pressure at home. Give the results to your provider.  Follow-up care  You will need to make regular visits to your health care provider. This is to check your blood pressure and to make changes to your medicines. Make a follow-up appointment as directed.  When to seek medical advice  Call your health care provider right away if any of these occur:  · Chest pain or shortness of breath  · Severe headache  · Throbbing or rushing sound in the ears  · Nosebleed  · Sudden severe pain in your belly (abdomen)  · Extreme drowsiness, confusion, or fainting  · Dizziness or dizziness with a spinning sensation (vertigo)  · Weakness of an arm or leg or one side of the face  · You have problems speaking or seeing   Date Last Reviewed: 11/25/2014  © 8104-1812 [employer]. [address] [place] [address], [place] [address]. All rights reserved. This information is not intended as a substitute for professional medical care. Always follow your healthcare professional's instructions.

## 2017-04-06 LAB
ALBUMIN SERPL-MCNC: 4.4 G/DL (ref 3.6–5.1)
SHBG SERPL-SCNC: 30 NMOL/L (ref 22–77)
TESTOST FREE SERPL-MCNC: 143.5 PG/ML (ref 6–73)
TESTOST SERPL-MCNC: 864 NG/DL (ref 250–1100)
TESTOSTERONE.FREE+WB SERPL-MCNC: 288.8 NG/DL (ref 15–150)

## 2017-04-07 ENCOUNTER — CLINICAL SUPPORT (OUTPATIENT)
Dept: INTERNAL MEDICINE | Facility: CLINIC | Age: 72
End: 2017-04-07
Payer: MEDICARE

## 2017-04-07 ENCOUNTER — LAB VISIT (OUTPATIENT)
Dept: LAB | Facility: HOSPITAL | Age: 72
End: 2017-04-07
Attending: FAMILY MEDICINE
Payer: MEDICARE

## 2017-04-07 DIAGNOSIS — R73.09 ELEVATED GLUCOSE: ICD-10-CM

## 2017-04-07 DIAGNOSIS — E29.1 HYPOGONADISM IN MALE: ICD-10-CM

## 2017-04-07 LAB
ESTIMATED AVG GLUCOSE: 117 MG/DL
HBA1C MFR BLD HPLC: 5.7 %

## 2017-04-07 PROCEDURE — 83036 HEMOGLOBIN GLYCOSYLATED A1C: CPT

## 2017-04-07 PROCEDURE — 36415 COLL VENOUS BLD VENIPUNCTURE: CPT | Mod: PO

## 2017-04-07 RX ORDER — TESTOSTERONE CYPIONATE 1000 MG/10ML
100 INJECTION, SOLUTION INTRAMUSCULAR
Qty: 6 ML | Refills: 3 | Status: SHIPPED | OUTPATIENT
Start: 2017-04-07 | End: 2017-07-28 | Stop reason: SDUPTHER

## 2017-04-07 RX ADMIN — TESTOSTERONE CYPIONATE 100 MG: 200 INJECTION, SOLUTION INTRAMUSCULAR at 08:04

## 2017-04-07 NOTE — PROGRESS NOTES
Patient identified by name and  with verbal feedback. Depo-Testosterone 100 mg administered IM to right upper outer quadrant using aseptic technique. Patient tolerated well, no adverse reactions noted/reported. Next injection due 2017 /and has been scheduled. /mp

## 2017-04-21 ENCOUNTER — CLINICAL SUPPORT (OUTPATIENT)
Dept: INTERNAL MEDICINE | Facility: CLINIC | Age: 72
End: 2017-04-21
Payer: MEDICARE

## 2017-04-21 DIAGNOSIS — R79.89 LOW TESTOSTERONE: Primary | ICD-10-CM

## 2017-04-21 PROCEDURE — 96372 THER/PROPH/DIAG INJ SC/IM: CPT | Mod: PBBFAC,PO

## 2017-04-21 RX ADMIN — TESTOSTERONE CYPIONATE 100 MG: 200 INJECTION, SOLUTION INTRAMUSCULAR at 08:04

## 2017-04-21 NOTE — PROGRESS NOTES
Administered 100 mg Testosterone IM. Patient tolerated well. No bleeding at insertion site noted. Pain scale 0/10 with injection. 2 patient identifiers used (name, ), aseptic technique maintained.

## 2017-05-05 ENCOUNTER — CLINICAL SUPPORT (OUTPATIENT)
Dept: INTERNAL MEDICINE | Facility: CLINIC | Age: 72
End: 2017-05-05
Payer: MEDICARE

## 2017-05-05 ENCOUNTER — TELEPHONE (OUTPATIENT)
Dept: ENDOCRINOLOGY | Facility: CLINIC | Age: 72
End: 2017-05-05

## 2017-05-05 DIAGNOSIS — R79.89 LOW TESTOSTERONE: Primary | ICD-10-CM

## 2017-05-05 DIAGNOSIS — R79.89 LOW TESTOSTERONE: ICD-10-CM

## 2017-05-05 PROCEDURE — 96372 THER/PROPH/DIAG INJ SC/IM: CPT | Mod: PBBFAC,PO

## 2017-05-05 RX ORDER — TESTOSTERONE CYPIONATE 200 MG/ML
100 INJECTION, SOLUTION INTRAMUSCULAR
Status: DISCONTINUED | OUTPATIENT
Start: 2017-05-06 | End: 2017-06-16

## 2017-05-05 RX ORDER — TESTOSTERONE CYPIONATE 200 MG/ML
100 INJECTION, SOLUTION INTRAMUSCULAR
Status: COMPLETED | OUTPATIENT
Start: 2017-05-05 | End: 2017-05-05

## 2017-05-05 RX ADMIN — TESTOSTERONE CYPIONATE 100 MG: 200 INJECTION, SOLUTION INTRAMUSCULAR at 08:05

## 2017-05-05 NOTE — PROGRESS NOTES
Patient identified by name and  with verbal feedback. Depo-Testosterone 100 mg administered IM to right upper outer quadrant using aseptic technique. Patient tolerated well, no adverse reactions noted/reported. Next injection due 17 /and has been scheduled. /mp   Patient will need orders/mp

## 2017-05-19 ENCOUNTER — CLINICAL SUPPORT (OUTPATIENT)
Dept: INTERNAL MEDICINE | Facility: CLINIC | Age: 72
End: 2017-05-19
Payer: MEDICARE

## 2017-05-19 DIAGNOSIS — R79.89 LOW TESTOSTERONE: ICD-10-CM

## 2017-05-19 PROCEDURE — 96372 THER/PROPH/DIAG INJ SC/IM: CPT | Mod: PBBFAC,PO

## 2017-05-19 RX ADMIN — TESTOSTERONE CYPIONATE 100 MG: 200 INJECTION, SOLUTION INTRAMUSCULAR at 08:05

## 2017-05-19 NOTE — PROGRESS NOTES
Patient identified by name and  with verbal feedback. Depo-Testosterone 100mg administered IM to left upper outer quadrant using aseptic technique. Patient tolerated well, no adverse reactions noted/reported. Next injection due 17 and has been scheduled.

## 2017-06-01 RX ORDER — AMLODIPINE AND BENAZEPRIL HYDROCHLORIDE 10; 40 MG/1; MG/1
CAPSULE ORAL
Qty: 90 CAPSULE | Refills: 3 | Status: SHIPPED | OUTPATIENT
Start: 2017-06-01 | End: 2018-05-27 | Stop reason: SDUPTHER

## 2017-06-02 ENCOUNTER — CLINICAL SUPPORT (OUTPATIENT)
Dept: INTERNAL MEDICINE | Facility: CLINIC | Age: 72
End: 2017-06-02
Payer: MEDICARE

## 2017-06-02 DIAGNOSIS — R79.89 LOW TESTOSTERONE: ICD-10-CM

## 2017-06-02 PROCEDURE — 96372 THER/PROPH/DIAG INJ SC/IM: CPT | Mod: PBBFAC,PO

## 2017-06-02 RX ADMIN — TESTOSTERONE CYPIONATE 100 MG: 200 INJECTION, SOLUTION INTRAMUSCULAR at 08:06

## 2017-06-16 ENCOUNTER — CLINICAL SUPPORT (OUTPATIENT)
Dept: INTERNAL MEDICINE | Facility: CLINIC | Age: 72
End: 2017-06-16
Payer: MEDICARE

## 2017-06-16 DIAGNOSIS — R79.89 LOW TESTOSTERONE: ICD-10-CM

## 2017-06-16 PROCEDURE — 96372 THER/PROPH/DIAG INJ SC/IM: CPT | Mod: PBBFAC,PO

## 2017-06-16 RX ORDER — TESTOSTERONE CYPIONATE 200 MG/ML
100 INJECTION, SOLUTION INTRAMUSCULAR
Status: COMPLETED | OUTPATIENT
Start: 2017-06-16 | End: 2017-07-14

## 2017-06-16 RX ADMIN — TESTOSTERONE CYPIONATE 100 MG: 200 INJECTION, SOLUTION INTRAMUSCULAR at 08:06

## 2017-06-16 NOTE — PROGRESS NOTES
Patient identified by name and  with verbal feedback. Depo-Testosterone 100 mg administered IM to left upper outer quadrant using aseptic technique. Patient tolerated well, no adverse reactions noted/reported. Next injection due 17 and has been scheduled./mp

## 2017-06-26 RX ORDER — METOPROLOL SUCCINATE 25 MG/1
25 TABLET, EXTENDED RELEASE ORAL DAILY
Qty: 90 TABLET | Refills: 3 | Status: SHIPPED | OUTPATIENT
Start: 2017-06-26 | End: 2018-06-24 | Stop reason: SDUPTHER

## 2017-06-30 ENCOUNTER — CLINICAL SUPPORT (OUTPATIENT)
Dept: INTERNAL MEDICINE | Facility: CLINIC | Age: 72
End: 2017-06-30
Payer: MEDICARE

## 2017-06-30 DIAGNOSIS — R79.89 LOW TESTOSTERONE: ICD-10-CM

## 2017-06-30 PROCEDURE — 96372 THER/PROPH/DIAG INJ SC/IM: CPT | Mod: PBBFAC,PO

## 2017-06-30 RX ADMIN — TESTOSTERONE CYPIONATE 100 MG: 200 INJECTION, SOLUTION INTRAMUSCULAR at 08:06

## 2017-07-07 ENCOUNTER — LAB VISIT (OUTPATIENT)
Dept: LAB | Facility: HOSPITAL | Age: 72
End: 2017-07-07
Attending: FAMILY MEDICINE
Payer: MEDICARE

## 2017-07-07 DIAGNOSIS — R79.89 ELEVATED LIVER FUNCTION TESTS: ICD-10-CM

## 2017-07-07 DIAGNOSIS — E83.52 FHH (FAMILIAL HYPOCALCIURIC HYPERCALCEMIA): ICD-10-CM

## 2017-07-07 DIAGNOSIS — R74.8 ELEVATED LIVER ENZYMES: Primary | ICD-10-CM

## 2017-07-07 DIAGNOSIS — E29.1 HYPOGONADISM IN MALE: ICD-10-CM

## 2017-07-07 DIAGNOSIS — R74.01 TRANSAMINITIS: ICD-10-CM

## 2017-07-07 DIAGNOSIS — E78.5 HYPERLIPIDEMIA, UNSPECIFIED HYPERLIPIDEMIA TYPE: ICD-10-CM

## 2017-07-07 LAB
ALBUMIN SERPL BCP-MCNC: 3.7 G/DL
ALP SERPL-CCNC: 95 U/L
ALT SERPL W/O P-5'-P-CCNC: 57 U/L
ANION GAP SERPL CALC-SCNC: 8 MMOL/L
AST SERPL-CCNC: 30 U/L
BILIRUB SERPL-MCNC: 0.7 MG/DL
BUN SERPL-MCNC: 21 MG/DL
CALCIUM SERPL-MCNC: 10.3 MG/DL
CHLORIDE SERPL-SCNC: 104 MMOL/L
CO2 SERPL-SCNC: 27 MMOL/L
CREAT SERPL-MCNC: 1 MG/DL
EST. GFR  (AFRICAN AMERICAN): >60 ML/MIN/1.73 M^2
EST. GFR  (NON AFRICAN AMERICAN): >60 ML/MIN/1.73 M^2
GLUCOSE SERPL-MCNC: 118 MG/DL
POTASSIUM SERPL-SCNC: 4.4 MMOL/L
PROT SERPL-MCNC: 7 G/DL
SODIUM SERPL-SCNC: 139 MMOL/L

## 2017-07-07 PROCEDURE — 36415 COLL VENOUS BLD VENIPUNCTURE: CPT | Mod: PO

## 2017-07-07 PROCEDURE — 80053 COMPREHEN METABOLIC PANEL: CPT

## 2017-07-10 RX ORDER — ESOMEPRAZOLE MAGNESIUM 40 MG/1
40 CAPSULE, DELAYED RELEASE ORAL
Qty: 90 CAPSULE | Refills: 3 | Status: SHIPPED | OUTPATIENT
Start: 2017-07-10 | End: 2017-07-17

## 2017-07-13 ENCOUNTER — TELEPHONE (OUTPATIENT)
Dept: FAMILY MEDICINE | Facility: CLINIC | Age: 72
End: 2017-07-13

## 2017-07-13 ENCOUNTER — PATIENT MESSAGE (OUTPATIENT)
Dept: FAMILY MEDICINE | Facility: CLINIC | Age: 72
End: 2017-07-13

## 2017-07-13 NOTE — TELEPHONE ENCOUNTER
----- Message from Marva Chowdary sent at 7/13/2017  8:28 AM CDT -----  Contact: [facility] [phone]   Ref# [id number]  The patient has had a formulary change, so the Nexium requires a prior auth.  Will you do that or prescribe an alternative?  Please call.  Thank you!

## 2017-07-14 ENCOUNTER — CLINICAL SUPPORT (OUTPATIENT)
Dept: INTERNAL MEDICINE | Facility: CLINIC | Age: 72
End: 2017-07-14
Payer: MEDICARE

## 2017-07-14 DIAGNOSIS — R79.89 LOW TESTOSTERONE: ICD-10-CM

## 2017-07-14 PROCEDURE — 96372 THER/PROPH/DIAG INJ SC/IM: CPT | Mod: PBBFAC,PO

## 2017-07-14 RX ADMIN — TESTOSTERONE CYPIONATE 100 MG: 200 INJECTION, SOLUTION INTRAMUSCULAR at 08:07

## 2017-07-14 NOTE — PROGRESS NOTES
Patient identified by name and  with verbal feedback. Depo-Testosterone 100 mg administered IM to left upper outer quadrant using aseptic technique. Patient tolerated well, no adverse reactions noted/reported. Next injection due 17 and has been scheduled.

## 2017-07-17 ENCOUNTER — TELEPHONE (OUTPATIENT)
Dept: FAMILY MEDICINE | Facility: CLINIC | Age: 72
End: 2017-07-17

## 2017-07-17 ENCOUNTER — HOSPITAL ENCOUNTER (OUTPATIENT)
Dept: RADIOLOGY | Facility: HOSPITAL | Age: 72
Discharge: HOME | End: 2017-07-17
Attending: FAMILY MEDICINE
Payer: MEDICARE

## 2017-07-17 DIAGNOSIS — R74.8 ELEVATED LIVER ENZYMES: ICD-10-CM

## 2017-07-17 PROCEDURE — 76705 ECHO EXAM OF ABDOMEN: CPT | Mod: 26,,, | Performed by: RADIOLOGY

## 2017-07-17 PROCEDURE — 76705 ECHO EXAM OF ABDOMEN: CPT | Mod: TC

## 2017-07-17 RX ORDER — DICLOFENAC SODIUM 75 MG/1
TABLET, DELAYED RELEASE ORAL
Qty: 120 TABLET | Refills: 0 | Status: SHIPPED | OUTPATIENT
Start: 2017-07-17 | End: 2017-09-15 | Stop reason: SDUPTHER

## 2017-07-17 RX ORDER — PANTOPRAZOLE SODIUM 40 MG/1
40 TABLET, DELAYED RELEASE ORAL DAILY
Qty: 90 TABLET | Refills: 3 | Status: SHIPPED | OUTPATIENT
Start: 2017-07-17 | End: 2018-01-12

## 2017-07-17 NOTE — TELEPHONE ENCOUNTER
Nexium coverage denied. Need to change to alternative-either Prilosec, Protonix, or Aciphex. Please advise. ThanksStephanie

## 2017-07-27 ENCOUNTER — TELEPHONE (OUTPATIENT)
Dept: FAMILY MEDICINE | Facility: CLINIC | Age: 72
End: 2017-07-27

## 2017-07-27 NOTE — TELEPHONE ENCOUNTER
Patient has testosterone 100mg injection for today7/28.   Patient  Has no   orders.    Thanks. /maritza

## 2017-07-28 ENCOUNTER — CLINICAL SUPPORT (OUTPATIENT)
Dept: INTERNAL MEDICINE | Facility: CLINIC | Age: 72
End: 2017-07-28
Payer: MEDICARE

## 2017-07-28 ENCOUNTER — TELEPHONE (OUTPATIENT)
Dept: ENDOCRINOLOGY | Facility: CLINIC | Age: 72
End: 2017-07-28

## 2017-07-28 DIAGNOSIS — R79.89 LOW TESTOSTERONE: ICD-10-CM

## 2017-07-28 PROCEDURE — 96372 THER/PROPH/DIAG INJ SC/IM: CPT | Mod: PBBFAC,PO

## 2017-07-28 RX ORDER — TESTOSTERONE CYPIONATE 200 MG/ML
100 INJECTION, SOLUTION INTRAMUSCULAR
Status: COMPLETED | OUTPATIENT
Start: 2017-07-28 | End: 2017-07-28

## 2017-07-28 RX ORDER — TESTOSTERONE CYPIONATE 200 MG/ML
100 INJECTION, SOLUTION INTRAMUSCULAR
Status: SHIPPED | OUTPATIENT
Start: 2017-07-29 | End: 2017-11-04

## 2017-07-28 RX ORDER — TESTOSTERONE CYPIONATE 1000 MG/10ML
100 INJECTION, SOLUTION INTRAMUSCULAR
Qty: 6 ML | Refills: 3 | Status: SHIPPED | OUTPATIENT
Start: 2017-07-28 | End: 2018-07-18

## 2017-07-28 RX ADMIN — TESTOSTERONE CYPIONATE 100 MG: 200 INJECTION, SOLUTION INTRAMUSCULAR at 08:07

## 2017-07-28 NOTE — PROGRESS NOTES
Patient identified by name and  with verbal feedback. Depo-Testosterone 100 mg administered IM to Right upper outer quadrant using aseptic technique. Patient tolerated well, no adverse reactions noted/reported. Next injection due 17 and has been scheduled. Patient needs new orders/mp

## 2017-07-28 NOTE — TELEPHONE ENCOUNTER
Dr guzmán has placed a one time order for this patient to get his injection today.    Dr linares can you please place a standing order for this patient next injection for the next 14 days and any labs if need. Thanks genera

## 2017-08-15 ENCOUNTER — TELEPHONE (OUTPATIENT)
Dept: PRIMARY CARE CLINIC | Facility: CLINIC | Age: 72
End: 2017-08-15

## 2017-08-15 ENCOUNTER — TELEPHONE (OUTPATIENT)
Dept: FAMILY MEDICINE | Facility: CLINIC | Age: 72
End: 2017-08-15

## 2017-08-15 NOTE — TELEPHONE ENCOUNTER
----- Message from José Miguel Duggan sent at 8/15/2017  8:57 AM CDT -----  Contact: Self  Calling to get injection scheduled for 08/18. Please call him back [phone] (home)   Thanks!

## 2017-08-15 NOTE — TELEPHONE ENCOUNTER
appt sched for Friday 18th @10:00 am.  Needs new orders for Testosterone 100mg every 14 days to get in clinic./  Thanks.

## 2017-08-15 NOTE — TELEPHONE ENCOUNTER
----- Message from José Miguel Duggan sent at 8/15/2017  8:48 AM CDT -----  Contact: Papito Menchaca and through will not a be a true reading because he missed a week. He thinks it may be in September. Please call him with any concerns [phone] (home)   Thanks!

## 2017-08-18 ENCOUNTER — CLINICAL SUPPORT (OUTPATIENT)
Dept: INTERNAL MEDICINE | Facility: CLINIC | Age: 72
End: 2017-08-18
Payer: MEDICARE

## 2017-08-18 DIAGNOSIS — R79.89 LOW TESTOSTERONE: ICD-10-CM

## 2017-08-18 PROCEDURE — 96372 THER/PROPH/DIAG INJ SC/IM: CPT | Mod: PBBFAC,PO

## 2017-08-18 RX ADMIN — TESTOSTERONE CYPIONATE 100 MG: 200 INJECTION, SOLUTION INTRAMUSCULAR at 08:08

## 2017-08-18 NOTE — PROGRESS NOTES
Patient identified by name and  with verbal feedback. Depo-Testosterone 100mg administered IM to left upper outer quadrant using aseptic technique. Patient tolerated well, no adverse reactions noted/reported. Next injection due 17 and has been scheduled. /mp

## 2017-08-30 ENCOUNTER — TELEPHONE (OUTPATIENT)
Dept: ENDOCRINOLOGY | Facility: CLINIC | Age: 72
End: 2017-08-30

## 2017-09-01 ENCOUNTER — CLINICAL SUPPORT (OUTPATIENT)
Dept: INTERNAL MEDICINE | Facility: CLINIC | Age: 72
End: 2017-09-01
Payer: MEDICARE

## 2017-09-01 ENCOUNTER — LAB VISIT (OUTPATIENT)
Dept: LAB | Facility: HOSPITAL | Age: 72
End: 2017-09-01
Attending: INTERNAL MEDICINE
Payer: MEDICARE

## 2017-09-01 DIAGNOSIS — R74.01 TRANSAMINITIS: ICD-10-CM

## 2017-09-01 DIAGNOSIS — R74.8 ELEVATED LIVER ENZYMES: ICD-10-CM

## 2017-09-01 DIAGNOSIS — E78.5 HYPERLIPIDEMIA, UNSPECIFIED HYPERLIPIDEMIA TYPE: ICD-10-CM

## 2017-09-01 DIAGNOSIS — E83.52 FHH (FAMILIAL HYPOCALCIURIC HYPERCALCEMIA): ICD-10-CM

## 2017-09-01 DIAGNOSIS — E29.1 HYPOGONADISM IN MALE: ICD-10-CM

## 2017-09-01 DIAGNOSIS — R79.89 LOW TESTOSTERONE: ICD-10-CM

## 2017-09-01 LAB
25(OH)D3+25(OH)D2 SERPL-MCNC: 37 NG/ML
ALBUMIN SERPL BCP-MCNC: 3.6 G/DL
ALP SERPL-CCNC: 88 U/L
ALT SERPL W/O P-5'-P-CCNC: 44 U/L
ANION GAP SERPL CALC-SCNC: 6 MMOL/L
AST SERPL-CCNC: 26 U/L
BILIRUB SERPL-MCNC: 0.7 MG/DL
BUN SERPL-MCNC: 15 MG/DL
CA-I BLDV-SCNC: 1.47 MMOL/L
CALCIUM SERPL-MCNC: 10.6 MG/DL
CHLORIDE SERPL-SCNC: 107 MMOL/L
CHOLEST SERPL-MCNC: 238 MG/DL
CHOLEST/HDLC SERPL: 5 {RATIO}
CO2 SERPL-SCNC: 27 MMOL/L
CREAT SERPL-MCNC: 0.9 MG/DL
EST. GFR  (AFRICAN AMERICAN): >60 ML/MIN/1.73 M^2
EST. GFR  (NON AFRICAN AMERICAN): >60 ML/MIN/1.73 M^2
GLUCOSE SERPL-MCNC: 110 MG/DL
HDLC SERPL-MCNC: 48 MG/DL
HDLC SERPL: 20.2 %
LDLC SERPL CALC-MCNC: 140.2 MG/DL
NONHDLC SERPL-MCNC: 190 MG/DL
POTASSIUM SERPL-SCNC: 4.2 MMOL/L
PROT SERPL-MCNC: 7 G/DL
PTH-INTACT SERPL-MCNC: 90 PG/ML
SODIUM SERPL-SCNC: 140 MMOL/L
TRIGL SERPL-MCNC: 249 MG/DL

## 2017-09-01 PROCEDURE — 96372 THER/PROPH/DIAG INJ SC/IM: CPT | Mod: PBBFAC,PO

## 2017-09-01 PROCEDURE — 82330 ASSAY OF CALCIUM: CPT

## 2017-09-01 PROCEDURE — 84270 ASSAY OF SEX HORMONE GLOBUL: CPT

## 2017-09-01 PROCEDURE — 80061 LIPID PANEL: CPT

## 2017-09-01 PROCEDURE — 83970 ASSAY OF PARATHORMONE: CPT

## 2017-09-01 PROCEDURE — 82306 VITAMIN D 25 HYDROXY: CPT

## 2017-09-01 PROCEDURE — 36415 COLL VENOUS BLD VENIPUNCTURE: CPT | Mod: PO

## 2017-09-01 PROCEDURE — 80053 COMPREHEN METABOLIC PANEL: CPT

## 2017-09-01 RX ADMIN — TESTOSTERONE CYPIONATE 100 MG: 200 INJECTION, SOLUTION INTRAMUSCULAR at 08:09

## 2017-09-01 NOTE — PROGRESS NOTES
Patient identified by name and  with verbal feedback. Depo-Testosterone 100mg administered IM to right upper outer quadrant using aseptic technique. Patient tolerated well, no adverse reactions noted/reported. Next injection due 9/15/17 and has been scheduled.

## 2017-09-02 DIAGNOSIS — E78.00 HYPERCHOLESTEROLEMIA: ICD-10-CM

## 2017-09-02 DIAGNOSIS — E78.5 HYPERLIPIDEMIA, UNSPECIFIED HYPERLIPIDEMIA TYPE: ICD-10-CM

## 2017-09-02 RX ORDER — ATORVASTATIN CALCIUM 20 MG/1
20 TABLET, FILM COATED ORAL DAILY
Qty: 90 TABLET | Refills: 0 | Status: SHIPPED | OUTPATIENT
Start: 2017-09-02 | End: 2017-11-13 | Stop reason: SDUPTHER

## 2017-09-05 ENCOUNTER — LAB VISIT (OUTPATIENT)
Dept: LAB | Facility: HOSPITAL | Age: 72
End: 2017-09-05
Attending: INTERNAL MEDICINE
Payer: MEDICARE

## 2017-09-05 DIAGNOSIS — R79.89 LOW TESTOSTERONE: ICD-10-CM

## 2017-09-05 PROCEDURE — 84270 ASSAY OF SEX HORMONE GLOBUL: CPT

## 2017-09-05 PROCEDURE — 36415 COLL VENOUS BLD VENIPUNCTURE: CPT | Mod: PO

## 2017-09-07 LAB
ALBUMIN SERPL-MCNC: 4.2 G/DL (ref 3.6–5.1)
ALBUMIN SERPL-MCNC: 4.2 G/DL (ref 3.6–5.1)
SHBG SERPL-SCNC: 29 NMOL/L (ref 22–77)
SHBG SERPL-SCNC: 29 NMOL/L (ref 22–77)
TESTOST FREE SERPL-MCNC: 30.4 PG/ML (ref 6–73)
TESTOST FREE SERPL-MCNC: 30.4 PG/ML (ref 6–73)
TESTOST SERPL-MCNC: 218 NG/DL (ref 250–1100)
TESTOST SERPL-MCNC: 218 NG/DL (ref 250–1100)
TESTOSTERONE.FREE+WB SERPL-MCNC: 58.5 NG/DL (ref 15–150)
TESTOSTERONE.FREE+WB SERPL-MCNC: 58.5 NG/DL (ref 15–150)

## 2017-09-09 LAB
ALBUMIN SERPL-MCNC: 4.1 G/DL (ref 3.6–5.1)
ALBUMIN SERPL-MCNC: 4.1 G/DL (ref 3.6–5.1)
SHBG SERPL-SCNC: 26 NMOL/L (ref 22–77)
SHBG SERPL-SCNC: 26 NMOL/L (ref 22–77)
TESTOST FREE SERPL-MCNC: 138.6 PG/ML (ref 6–73)
TESTOST FREE SERPL-MCNC: 138.6 PG/ML (ref 6–73)
TESTOST SERPL-MCNC: 748 NG/DL (ref 250–1100)
TESTOST SERPL-MCNC: 748 NG/DL (ref 250–1100)
TESTOSTERONE.FREE+WB SERPL-MCNC: 261 NG/DL (ref 15–150)
TESTOSTERONE.FREE+WB SERPL-MCNC: 261 NG/DL (ref 15–150)

## 2017-09-10 DIAGNOSIS — E29.1 HYPOGONADISM IN MALE: Primary | ICD-10-CM

## 2017-09-15 ENCOUNTER — CLINICAL SUPPORT (OUTPATIENT)
Dept: INTERNAL MEDICINE | Facility: CLINIC | Age: 72
End: 2017-09-15
Payer: MEDICARE

## 2017-09-15 DIAGNOSIS — R79.89 LOW TESTOSTERONE: ICD-10-CM

## 2017-09-15 PROCEDURE — 96372 THER/PROPH/DIAG INJ SC/IM: CPT | Mod: PBBFAC,PO

## 2017-09-15 RX ORDER — DICLOFENAC SODIUM 75 MG/1
TABLET, DELAYED RELEASE ORAL
Qty: 120 TABLET | Refills: 0 | Status: SHIPPED | OUTPATIENT
Start: 2017-09-15 | End: 2017-11-14 | Stop reason: SDUPTHER

## 2017-09-15 RX ADMIN — TESTOSTERONE CYPIONATE 100 MG: 200 INJECTION, SOLUTION INTRAMUSCULAR at 08:09

## 2017-09-16 ENCOUNTER — PATIENT MESSAGE (OUTPATIENT)
Dept: ENDOCRINOLOGY | Facility: CLINIC | Age: 72
End: 2017-09-16

## 2017-09-16 ENCOUNTER — PATIENT MESSAGE (OUTPATIENT)
Dept: FAMILY MEDICINE | Facility: CLINIC | Age: 72
End: 2017-09-16

## 2017-09-29 ENCOUNTER — CLINICAL SUPPORT (OUTPATIENT)
Dept: INTERNAL MEDICINE | Facility: CLINIC | Age: 72
End: 2017-09-29
Payer: MEDICARE

## 2017-09-29 DIAGNOSIS — E29.1 HYPOGONADISM IN MALE: ICD-10-CM

## 2017-09-29 PROCEDURE — 96372 THER/PROPH/DIAG INJ SC/IM: CPT | Mod: PBBFAC,PO

## 2017-09-29 RX ADMIN — TESTOSTERONE CYPIONATE 100 MG: 200 INJECTION, SOLUTION INTRAMUSCULAR at 08:09

## 2017-09-29 NOTE — PROGRESS NOTES
Patient identified by name and  with verbal feedback. Depo-Testosterone 100 mg administered IM to Right upper outer quadrant using aseptic technique. Patient tolerated well, no adverse reactions noted/reported. Next injection due 10/13/17 and has been scheduled.

## 2017-10-13 ENCOUNTER — CLINICAL SUPPORT (OUTPATIENT)
Dept: INTERNAL MEDICINE | Facility: CLINIC | Age: 72
End: 2017-10-13
Payer: MEDICARE

## 2017-10-13 DIAGNOSIS — R79.89 LOW TESTOSTERONE: ICD-10-CM

## 2017-10-13 PROCEDURE — 96372 THER/PROPH/DIAG INJ SC/IM: CPT | Mod: PBBFAC,PO

## 2017-10-13 RX ADMIN — TESTOSTERONE CYPIONATE 100 MG: 200 INJECTION, SOLUTION INTRAMUSCULAR at 08:10

## 2017-11-10 ENCOUNTER — CLINICAL SUPPORT (OUTPATIENT)
Dept: INTERNAL MEDICINE | Facility: CLINIC | Age: 72
End: 2017-11-10
Payer: MEDICARE

## 2017-11-10 DIAGNOSIS — R79.89 LOW TESTOSTERONE: ICD-10-CM

## 2017-11-10 PROCEDURE — 96372 THER/PROPH/DIAG INJ SC/IM: CPT | Mod: PBBFAC,PO

## 2017-11-10 RX ORDER — TESTOSTERONE CYPIONATE 200 MG/ML
75 INJECTION, SOLUTION INTRAMUSCULAR
Status: SHIPPED | OUTPATIENT
Start: 2017-11-10 | End: 2018-04-27

## 2017-11-10 RX ADMIN — TESTOSTERONE CYPIONATE 76 MG: 200 INJECTION, SOLUTION INTRAMUSCULAR at 09:11

## 2017-11-10 NOTE — PROGRESS NOTES
Patient identified by name and  with verbal feedback. Depo-Testosterone 76mg (.38ml) administered IM to Right upper outer quadrant using aseptic technique. Patient tolerated well, no adverse reactions noted/reported. Next injection due 2017 and has been scheduled.

## 2017-11-13 DIAGNOSIS — E78.00 HYPERCHOLESTEROLEMIA: ICD-10-CM

## 2017-11-13 DIAGNOSIS — E78.5 HYPERLIPIDEMIA, UNSPECIFIED HYPERLIPIDEMIA TYPE: ICD-10-CM

## 2017-11-13 RX ORDER — ATORVASTATIN CALCIUM 20 MG/1
TABLET, FILM COATED ORAL
Qty: 90 TABLET | Refills: 0 | Status: SHIPPED | OUTPATIENT
Start: 2017-11-13 | End: 2018-01-24 | Stop reason: SDUPTHER

## 2017-11-14 RX ORDER — DICLOFENAC SODIUM 75 MG/1
TABLET, DELAYED RELEASE ORAL
Qty: 120 TABLET | Refills: 0 | Status: SHIPPED | OUTPATIENT
Start: 2017-11-14 | End: 2019-01-10 | Stop reason: SDUPTHER

## 2017-11-27 ENCOUNTER — CLINICAL SUPPORT (OUTPATIENT)
Dept: INTERNAL MEDICINE | Facility: CLINIC | Age: 72
End: 2017-11-27
Payer: MEDICARE

## 2017-11-27 DIAGNOSIS — R79.89 LOW TESTOSTERONE: ICD-10-CM

## 2017-11-27 PROCEDURE — 96372 THER/PROPH/DIAG INJ SC/IM: CPT | Mod: PBBFAC,PO

## 2017-11-27 RX ADMIN — TESTOSTERONE CYPIONATE 76 MG: 200 INJECTION, SOLUTION INTRAMUSCULAR at 09:11

## 2017-11-27 NOTE — PROGRESS NOTES
Patient identified by name and  with verbal feedback. Depo-Testosterone 76mg administered IM to right upper outer quadrant using aseptic technique. Patient tolerated well, no adverse reactions noted/reported. Next injection due 2017and has been scheduled.

## 2017-12-11 ENCOUNTER — CLINICAL SUPPORT (OUTPATIENT)
Dept: INTERNAL MEDICINE | Facility: CLINIC | Age: 72
End: 2017-12-11
Payer: MEDICARE

## 2017-12-11 DIAGNOSIS — R79.89 LOW TESTOSTERONE: ICD-10-CM

## 2017-12-11 PROCEDURE — 96372 THER/PROPH/DIAG INJ SC/IM: CPT | Mod: PBBFAC,PO

## 2017-12-11 RX ADMIN — TESTOSTERONE CYPIONATE 76 MG: 200 INJECTION, SOLUTION INTRAMUSCULAR at 08:12

## 2017-12-11 NOTE — PROGRESS NOTES
Patient identified by name and  with verbal feedback. Depo-Testosterone 76mg mg administered IM to Right upper outer quadrant using aseptic technique. Patient tolerated well, no adverse reactions noted/reported. Next injection due 17 and has been scheduled.

## 2018-01-02 ENCOUNTER — CLINICAL SUPPORT (OUTPATIENT)
Dept: INTERNAL MEDICINE | Facility: CLINIC | Age: 73
End: 2018-01-02
Payer: MEDICARE

## 2018-01-02 DIAGNOSIS — R79.89 LOW TESTOSTERONE: Primary | ICD-10-CM

## 2018-01-02 PROCEDURE — 99211 OFF/OP EST MAY X REQ PHY/QHP: CPT | Mod: PBBFAC,PO

## 2018-01-02 PROCEDURE — 96372 THER/PROPH/DIAG INJ SC/IM: CPT | Mod: PBBFAC,PO

## 2018-01-02 PROCEDURE — 99999 PR PBB SHADOW E&M-EST. PATIENT-LVL I: CPT | Mod: PBBFAC,,,

## 2018-01-02 RX ADMIN — TESTOSTERONE CYPIONATE 76 MG: 200 INJECTION, SOLUTION INTRAMUSCULAR at 02:01

## 2018-01-02 NOTE — PROGRESS NOTES
Administered 76 mg Testosterone IM. Patient tolerated well. No bleeding at insertion site noted. Pain scale 0/10 with injection. 2 patient identifiers used (name, ), aseptic technique maintained.

## 2018-01-12 RX ORDER — DICLOFENAC SODIUM 75 MG/1
TABLET, DELAYED RELEASE ORAL
Qty: 120 TABLET | Refills: 1 | Status: SHIPPED | OUTPATIENT
Start: 2018-01-12 | End: 2018-07-17

## 2018-01-12 RX ORDER — ESOMEPRAZOLE MAGNESIUM 40 MG/1
40 CAPSULE, DELAYED RELEASE ORAL
Qty: 90 CAPSULE | Refills: 3 | Status: SHIPPED | OUTPATIENT
Start: 2018-01-12 | End: 2018-12-21 | Stop reason: SDUPTHER

## 2018-01-12 NOTE — TELEPHONE ENCOUNTER
----- Message from Noel Charles sent at 1/12/2018  9:58 AM CST -----  Contact: same  Patient called in and asked if Dr. Meier would resend a Rx to [facility] for his Nexium 40 mg (brand only as generic did not work).  Patient stated he has already spoken to [facility] and [facility].  This is a PA for the brand name.   Patient stated that he has listed a phone number because he was informed if Dr. Meier called he could get his Rx at a lower cost.    Phone number to call is: [phone] [facility].  Patient stated that Dr. Meier just needs to state that patient needs brand as generic did not work for him.    Patient call back number is [phone]

## 2018-01-12 NOTE — TELEPHONE ENCOUNTER
Unfortunately the prior authorization was denied again, it was also denied six months ago.  He can take generic nexium, pay out of pocket for brand name nexium, or can try one of the other PPIs other than protonix that his insurance will cover.

## 2018-01-12 NOTE — TELEPHONE ENCOUNTER
Called [facility] for prior auth on Nexium.  Per Sheri the generic has been approved case ID [id number] from 12/13/17 to 12/31/2099.  Brand name has been denied case ID [id number].  Patient has been notified.  He states he will contact [employer].    He states what should he do next?

## 2018-01-12 NOTE — TELEPHONE ENCOUNTER
Called pt regarding below message. Pt states this morning another nurse handled his PA for Nexium. The PA has been approved however pt is requesting we call [facility] again for a co pay reduction. Pt provided number. Informed pt I will contact [facility] and start the process. Pt verbalized understanding with no further questions.     ----- Message from Brissa Mcfarlane sent at 1/12/2018  1:19 PM CST -----  Patient states that he was speaking to office earlier regarding [facility], (he forgot the name of the person of who he was speaking to) he states he has straightened it out & to please call [phone] (home)

## 2018-01-15 ENCOUNTER — CLINICAL SUPPORT (OUTPATIENT)
Dept: INTERNAL MEDICINE | Facility: CLINIC | Age: 73
End: 2018-01-15
Payer: MEDICARE

## 2018-01-15 PROCEDURE — 96372 THER/PROPH/DIAG INJ SC/IM: CPT | Mod: PBBFAC,PO

## 2018-01-15 RX ADMIN — TESTOSTERONE CYPIONATE 76 MG: 200 INJECTION, SOLUTION INTRAMUSCULAR at 08:01

## 2018-01-24 DIAGNOSIS — E78.00 HYPERCHOLESTEROLEMIA: ICD-10-CM

## 2018-01-24 DIAGNOSIS — E78.5 HYPERLIPIDEMIA, UNSPECIFIED HYPERLIPIDEMIA TYPE: ICD-10-CM

## 2018-01-24 RX ORDER — ATORVASTATIN CALCIUM 20 MG/1
TABLET, FILM COATED ORAL
Qty: 90 TABLET | Refills: 3 | Status: SHIPPED | OUTPATIENT
Start: 2018-01-24 | End: 2019-01-19 | Stop reason: SDUPTHER

## 2018-01-29 ENCOUNTER — CLINICAL SUPPORT (OUTPATIENT)
Dept: INTERNAL MEDICINE | Facility: CLINIC | Age: 73
End: 2018-01-29
Payer: MEDICARE

## 2018-01-29 DIAGNOSIS — R79.89 LOW TESTOSTERONE: ICD-10-CM

## 2018-01-29 PROCEDURE — 96372 THER/PROPH/DIAG INJ SC/IM: CPT | Mod: PBBFAC,PO

## 2018-01-29 RX ADMIN — TESTOSTERONE CYPIONATE 76 MG: 200 INJECTION, SOLUTION INTRAMUSCULAR at 08:01

## 2018-02-09 ENCOUNTER — LAB VISIT (OUTPATIENT)
Dept: LAB | Facility: HOSPITAL | Age: 73
End: 2018-02-09
Attending: FAMILY MEDICINE
Payer: MEDICARE

## 2018-02-09 ENCOUNTER — OFFICE VISIT (OUTPATIENT)
Dept: ENDOCRINOLOGY | Facility: CLINIC | Age: 73
End: 2018-02-09
Payer: MEDICARE

## 2018-02-09 VITALS
SYSTOLIC BLOOD PRESSURE: 158 MMHG | DIASTOLIC BLOOD PRESSURE: 78 MMHG | HEART RATE: 65 BPM | RESPIRATION RATE: 20 BRPM | HEIGHT: 72 IN | WEIGHT: 220.44 LBS | BODY MASS INDEX: 29.86 KG/M2

## 2018-02-09 DIAGNOSIS — E83.52 FHH (FAMILIAL HYPOCALCIURIC HYPERCALCEMIA): ICD-10-CM

## 2018-02-09 DIAGNOSIS — E55.9 HYPOVITAMINOSIS D: ICD-10-CM

## 2018-02-09 DIAGNOSIS — E83.52 HYPERCALCEMIA: ICD-10-CM

## 2018-02-09 DIAGNOSIS — E04.2 MULTINODULAR GOITER: ICD-10-CM

## 2018-02-09 DIAGNOSIS — E78.5 HYPERLIPIDEMIA, UNSPECIFIED HYPERLIPIDEMIA TYPE: ICD-10-CM

## 2018-02-09 DIAGNOSIS — R79.89 LOW TESTOSTERONE: ICD-10-CM

## 2018-02-09 DIAGNOSIS — N40.0 BENIGN PROSTATIC HYPERPLASIA WITHOUT LOWER URINARY TRACT SYMPTOMS: ICD-10-CM

## 2018-02-09 DIAGNOSIS — I10 ESSENTIAL HYPERTENSION: ICD-10-CM

## 2018-02-09 DIAGNOSIS — K21.9 GASTROESOPHAGEAL REFLUX DISEASE WITHOUT ESOPHAGITIS: ICD-10-CM

## 2018-02-09 DIAGNOSIS — E78.00 HYPERCHOLESTEROLEMIA: ICD-10-CM

## 2018-02-09 DIAGNOSIS — R79.89 LOW TESTOSTERONE: Primary | ICD-10-CM

## 2018-02-09 LAB
25(OH)D3+25(OH)D2 SERPL-MCNC: 31 NG/ML
ALBUMIN SERPL BCP-MCNC: 4 G/DL
ALP SERPL-CCNC: 102 U/L
ALT SERPL W/O P-5'-P-CCNC: 57 U/L
ANION GAP SERPL CALC-SCNC: 6 MMOL/L
AST SERPL-CCNC: 39 U/L
BILIRUB SERPL-MCNC: 0.6 MG/DL
BUN SERPL-MCNC: 17 MG/DL
CA-I BLDV-SCNC: 1.49 MMOL/L
CALCIUM SERPL-MCNC: 10.7 MG/DL
CHLORIDE SERPL-SCNC: 105 MMOL/L
CHOLEST SERPL-MCNC: 214 MG/DL
CHOLEST/HDLC SERPL: 3.8 {RATIO}
CO2 SERPL-SCNC: 26 MMOL/L
CREAT SERPL-MCNC: 1 MG/DL
EST. GFR  (AFRICAN AMERICAN): >60 ML/MIN/1.73 M^2
EST. GFR  (NON AFRICAN AMERICAN): >60 ML/MIN/1.73 M^2
GLUCOSE SERPL-MCNC: 102 MG/DL
HDLC SERPL-MCNC: 57 MG/DL
HDLC SERPL: 26.6 %
LDLC SERPL CALC-MCNC: 125.4 MG/DL
NONHDLC SERPL-MCNC: 157 MG/DL
POTASSIUM SERPL-SCNC: 4.6 MMOL/L
PROSTATE SPECIFIC ANTIGEN, TOTAL: 1.4 NG/ML
PROT SERPL-MCNC: 7.9 G/DL
PSA FREE MFR SERPL: 25 %
PSA FREE SERPL-MCNC: 0.35 NG/ML
PTH-INTACT SERPL-MCNC: 119 PG/ML
SODIUM SERPL-SCNC: 137 MMOL/L
T3 SERPL-MCNC: 52 NG/DL
T4 FREE SERPL-MCNC: 1.09 NG/DL
TRIGL SERPL-MCNC: 158 MG/DL
TSH SERPL DL<=0.005 MIU/L-ACNC: 0.56 UIU/ML
URATE SERPL-MCNC: 7.6 MG/DL

## 2018-02-09 PROCEDURE — 84439 ASSAY OF FREE THYROXINE: CPT

## 2018-02-09 PROCEDURE — 84480 ASSAY TRIIODOTHYRONINE (T3): CPT

## 2018-02-09 PROCEDURE — 99999 PR PBB SHADOW E&M-EST. PATIENT-LVL IV: CPT | Mod: PBBFAC,,, | Performed by: INTERNAL MEDICINE

## 2018-02-09 PROCEDURE — 84443 ASSAY THYROID STIM HORMONE: CPT

## 2018-02-09 PROCEDURE — 99214 OFFICE O/P EST MOD 30 MIN: CPT | Mod: PBBFAC,PO | Performed by: INTERNAL MEDICINE

## 2018-02-09 PROCEDURE — 82306 VITAMIN D 25 HYDROXY: CPT

## 2018-02-09 PROCEDURE — 86316 IMMUNOASSAY TUMOR OTHER: CPT

## 2018-02-09 PROCEDURE — 82040 ASSAY OF SERUM ALBUMIN: CPT

## 2018-02-09 PROCEDURE — 82330 ASSAY OF CALCIUM: CPT

## 2018-02-09 PROCEDURE — 1159F MED LIST DOCD IN RCRD: CPT | Mod: ,,, | Performed by: INTERNAL MEDICINE

## 2018-02-09 PROCEDURE — 80053 COMPREHEN METABOLIC PANEL: CPT

## 2018-02-09 PROCEDURE — 83970 ASSAY OF PARATHORMONE: CPT

## 2018-02-09 PROCEDURE — 36415 COLL VENOUS BLD VENIPUNCTURE: CPT | Mod: PO

## 2018-02-09 PROCEDURE — 86800 THYROGLOBULIN ANTIBODY: CPT

## 2018-02-09 PROCEDURE — 80061 LIPID PANEL: CPT

## 2018-02-09 PROCEDURE — 1126F AMNT PAIN NOTED NONE PRSNT: CPT | Mod: ,,, | Performed by: INTERNAL MEDICINE

## 2018-02-09 PROCEDURE — 84154 ASSAY OF PSA FREE: CPT

## 2018-02-09 PROCEDURE — 84550 ASSAY OF BLOOD/URIC ACID: CPT

## 2018-02-09 PROCEDURE — 99214 OFFICE O/P EST MOD 30 MIN: CPT | Mod: S$PBB,,, | Performed by: INTERNAL MEDICINE

## 2018-02-09 RX ORDER — TESTOSTERONE 16.2 MG/G
20.25 GEL TRANSDERMAL DAILY
Qty: 3 BOTTLE | Refills: 3 | Status: SHIPPED | OUTPATIENT
Start: 2018-02-09 | End: 2018-05-10

## 2018-02-10 LAB — CALCIT SERPL-MCNC: 7.3 PG/ML

## 2018-02-12 ENCOUNTER — CLINICAL SUPPORT (OUTPATIENT)
Dept: INTERNAL MEDICINE | Facility: CLINIC | Age: 73
End: 2018-02-12
Payer: MEDICARE

## 2018-02-12 DIAGNOSIS — R79.89 LOW TESTOSTERONE: ICD-10-CM

## 2018-02-12 LAB
IODINE SERPL-MCNC: 66 NG/ML (ref 40–92)
THRYOGLOBULIN INTERPRETATION: ABNORMAL
THYROGLOB AB SERPL-ACNC: <1.8 IU/ML
THYROGLOB SERPL-MCNC: 15 NG/ML

## 2018-02-12 PROCEDURE — 96372 THER/PROPH/DIAG INJ SC/IM: CPT | Mod: PBBFAC,PO

## 2018-02-12 RX ADMIN — TESTOSTERONE CYPIONATE 76 MG: 200 INJECTION, SOLUTION INTRAMUSCULAR at 09:02

## 2018-02-12 NOTE — PROGRESS NOTES
Patient identified by name and  with verbal feedback. Depo-Testosterone 76 mg administered IM to left upper outer quadrant using aseptic technique. Patient tolerated well, no adverse reactions noted/reported. Next injection due two weeks.  States might start Androgel in a couple of weeks, instead of injections.   Will call to cancel injections if need too./mp

## 2018-02-14 ENCOUNTER — TELEPHONE (OUTPATIENT)
Dept: ENDOCRINOLOGY | Facility: CLINIC | Age: 73
End: 2018-02-14

## 2018-02-14 LAB — CGA SERPL-MCNC: 1704 NG/ML (ref 0–95)

## 2018-02-14 NOTE — TELEPHONE ENCOUNTER
----- Message from Quinten Puga sent at 2/14/2018  3:06 PM CST -----  Contact: pt  Pt is calling to see if he can get his PA called into [facility]  Call Back#[phone]  Thanks

## 2018-02-15 ENCOUNTER — HOSPITAL ENCOUNTER (OUTPATIENT)
Dept: RADIOLOGY | Facility: CLINIC | Age: 73
Discharge: HOME | End: 2018-02-15
Attending: INTERNAL MEDICINE
Payer: MEDICARE

## 2018-02-15 DIAGNOSIS — E04.2 MULTINODULAR GOITER: ICD-10-CM

## 2018-02-15 DIAGNOSIS — R68.89 ABNORMAL ENDOCRINE LABORATORY TEST FINDING: Primary | ICD-10-CM

## 2018-02-15 DIAGNOSIS — R73.03 PREDIABETES: ICD-10-CM

## 2018-02-15 PROCEDURE — 76536 US EXAM OF HEAD AND NECK: CPT | Mod: TC,PO

## 2018-02-15 PROCEDURE — 76536 US EXAM OF HEAD AND NECK: CPT | Mod: 26,,, | Performed by: RADIOLOGY

## 2018-02-16 LAB
ALBUMIN SERPL-MCNC: 4.7 G/DL (ref 3.6–5.1)
ALBUMIN SERPL-MCNC: 4.7 G/DL (ref 3.6–5.1)
SHBG SERPL-SCNC: 31 NMOL/L (ref 22–77)
SHBG SERPL-SCNC: 31 NMOL/L (ref 22–77)
TESTOST FREE SERPL-MCNC: 35.2 PG/ML (ref 6–73)
TESTOST FREE SERPL-MCNC: 35.2 PG/ML (ref 6–73)
TESTOST SERPL-MCNC: 270 NG/DL (ref 250–1100)
TESTOST SERPL-MCNC: 270 NG/DL (ref 250–1100)
TESTOSTERONE.FREE+WB SERPL-MCNC: 75.5 NG/DL (ref 15–150)
TESTOSTERONE.FREE+WB SERPL-MCNC: 75.5 NG/DL (ref 15–150)

## 2018-02-20 ENCOUNTER — TELEPHONE (OUTPATIENT)
Dept: ENDOCRINOLOGY | Facility: CLINIC | Age: 73
End: 2018-02-20

## 2018-02-20 NOTE — TELEPHONE ENCOUNTER
----- Message from Juliane Santos sent at 2/20/2018 11:18 AM CST -----  Contact: Self  Patient requesting a call back to get his prescriptions ordered.  Please call back at [phone] (axgz).  Thank you!

## 2018-02-23 RX ORDER — TESTOSTERONE 10 MG/.5G
10 GEL, METERED TOPICAL DAILY
Qty: 90 BOTTLE | Refills: 3 | Status: SHIPPED | OUTPATIENT
Start: 2018-02-23 | End: 2018-05-30 | Stop reason: SDUPTHER

## 2018-02-26 ENCOUNTER — TELEPHONE (OUTPATIENT)
Dept: ENDOCRINOLOGY | Facility: CLINIC | Age: 73
End: 2018-02-26

## 2018-02-26 NOTE — TELEPHONE ENCOUNTER
Advised testosterone is denied and fortesta is covered and [facility].. Verbalized understanding

## 2018-02-26 NOTE — TELEPHONE ENCOUNTER
----- Message from Katy Alcala sent at 2/26/2018 10:50 AM CST -----  Contact: Patient  Papito [phone], Calling again about Rx ANDROGEL 20.25 mg/1.25 gram (1.62 %) [facility] and Loni's approval. Please advise, thanks.

## 2018-03-05 ENCOUNTER — PATIENT MESSAGE (OUTPATIENT)
Dept: ENDOCRINOLOGY | Facility: CLINIC | Age: 73
End: 2018-03-05

## 2018-03-05 ENCOUNTER — TELEPHONE (OUTPATIENT)
Dept: ENDOCRINOLOGY | Facility: CLINIC | Age: 73
End: 2018-03-05

## 2018-03-05 NOTE — TELEPHONE ENCOUNTER
----- Message from Everardo Sawant sent at 3/5/2018  4:02 PM CST -----  Contact: self   Patient want to know why his androgel rx is being denied please call back at [phone]

## 2018-03-16 ENCOUNTER — PATIENT MESSAGE (OUTPATIENT)
Dept: ENDOCRINOLOGY | Facility: CLINIC | Age: 73
End: 2018-03-16

## 2018-03-16 DIAGNOSIS — E29.1 HYPOGONADISM IN MALE: Primary | ICD-10-CM

## 2018-04-15 RX ORDER — DICLOFENAC SODIUM 75 MG/1
TABLET, DELAYED RELEASE ORAL
Qty: 120 TABLET | Refills: 0 | OUTPATIENT
Start: 2018-04-15

## 2018-04-23 ENCOUNTER — PATIENT MESSAGE (OUTPATIENT)
Dept: ENDOCRINOLOGY | Facility: CLINIC | Age: 73
End: 2018-04-23

## 2018-04-29 ENCOUNTER — PATIENT MESSAGE (OUTPATIENT)
Dept: ENDOCRINOLOGY | Facility: CLINIC | Age: 73
End: 2018-04-29

## 2018-05-25 ENCOUNTER — LAB VISIT (OUTPATIENT)
Dept: LAB | Facility: HOSPITAL | Age: 73
End: 2018-05-25
Attending: INTERNAL MEDICINE
Payer: MEDICARE

## 2018-05-25 DIAGNOSIS — E55.9 HYPOVITAMINOSIS D: ICD-10-CM

## 2018-05-25 DIAGNOSIS — E83.52 HYPERCALCEMIA: ICD-10-CM

## 2018-05-25 DIAGNOSIS — E83.52 FHH (FAMILIAL HYPOCALCIURIC HYPERCALCEMIA): ICD-10-CM

## 2018-05-25 LAB
CALCIUM UR-MCNC: 7.6 MG/DL
CREAT UR-MCNC: >450 MG/DL

## 2018-05-25 PROCEDURE — 82340 ASSAY OF CALCIUM IN URINE: CPT

## 2018-05-25 PROCEDURE — 82570 ASSAY OF URINE CREATININE: CPT

## 2018-05-28 RX ORDER — AMLODIPINE AND BENAZEPRIL HYDROCHLORIDE 10; 40 MG/1; MG/1
CAPSULE ORAL
Qty: 90 CAPSULE | Refills: 3 | Status: SHIPPED | OUTPATIENT
Start: 2018-05-28 | End: 2019-05-22 | Stop reason: SDUPTHER

## 2018-05-30 ENCOUNTER — PATIENT MESSAGE (OUTPATIENT)
Dept: ENDOCRINOLOGY | Facility: CLINIC | Age: 73
End: 2018-05-30

## 2018-05-30 RX ORDER — TESTOSTERONE 10 MG/.5G
10 GEL, METERED TOPICAL DAILY
Qty: 90 BOTTLE | Refills: 3 | Status: SHIPPED | OUTPATIENT
Start: 2018-05-30 | End: 2018-10-08 | Stop reason: SDUPTHER

## 2018-06-11 ENCOUNTER — PATIENT MESSAGE (OUTPATIENT)
Dept: ENDOCRINOLOGY | Facility: CLINIC | Age: 73
End: 2018-06-11

## 2018-06-25 RX ORDER — METOPROLOL SUCCINATE 25 MG/1
TABLET, EXTENDED RELEASE ORAL
Qty: 90 TABLET | Refills: 3 | Status: SHIPPED | OUTPATIENT
Start: 2018-06-25 | End: 2019-06-19 | Stop reason: SDUPTHER

## 2018-07-17 ENCOUNTER — LAB VISIT (OUTPATIENT)
Dept: LAB | Facility: HOSPITAL | Age: 73
End: 2018-07-17
Attending: INTERNAL MEDICINE
Payer: MEDICARE

## 2018-07-17 ENCOUNTER — OFFICE VISIT (OUTPATIENT)
Dept: ENDOCRINOLOGY | Facility: CLINIC | Age: 73
End: 2018-07-17
Payer: MEDICARE

## 2018-07-17 VITALS
WEIGHT: 218.94 LBS | BODY MASS INDEX: 29.65 KG/M2 | DIASTOLIC BLOOD PRESSURE: 79 MMHG | SYSTOLIC BLOOD PRESSURE: 179 MMHG | HEIGHT: 72 IN | HEART RATE: 59 BPM

## 2018-07-17 DIAGNOSIS — E55.9 HYPOVITAMINOSIS D: ICD-10-CM

## 2018-07-17 DIAGNOSIS — K21.9 GASTROESOPHAGEAL REFLUX DISEASE WITHOUT ESOPHAGITIS: ICD-10-CM

## 2018-07-17 DIAGNOSIS — R73.03 PREDIABETES: ICD-10-CM

## 2018-07-17 DIAGNOSIS — R79.89 LOW TESTOSTERONE: ICD-10-CM

## 2018-07-17 DIAGNOSIS — E83.52 HYPERCALCEMIA: ICD-10-CM

## 2018-07-17 DIAGNOSIS — E78.00 HYPERCHOLESTEROLEMIA: ICD-10-CM

## 2018-07-17 DIAGNOSIS — I10 ESSENTIAL HYPERTENSION: ICD-10-CM

## 2018-07-17 DIAGNOSIS — E78.5 HYPERLIPIDEMIA, UNSPECIFIED HYPERLIPIDEMIA TYPE: ICD-10-CM

## 2018-07-17 DIAGNOSIS — E21.3 HYPERPARATHYROIDISM: Primary | ICD-10-CM

## 2018-07-17 DIAGNOSIS — E04.2 MULTINODULAR GOITER: ICD-10-CM

## 2018-07-17 DIAGNOSIS — E53.8 B12 DEFICIENCY: ICD-10-CM

## 2018-07-17 DIAGNOSIS — E21.3 HYPERPARATHYROIDISM: ICD-10-CM

## 2018-07-17 LAB
25(OH)D3+25(OH)D2 SERPL-MCNC: 32 NG/ML
CA-I BLDV-SCNC: 1.46 MMOL/L
ESTIMATED AVG GLUCOSE: 114 MG/DL
HBA1C MFR BLD HPLC: 5.6 %
MAGNESIUM SERPL-MCNC: 2.1 MG/DL
PHOSPHATE SERPL-MCNC: 2.5 MG/DL
PTH-INTACT SERPL-MCNC: 124 PG/ML
T3 SERPL-MCNC: 77 NG/DL
T4 FREE SERPL-MCNC: 1.02 NG/DL
TSH SERPL DL<=0.005 MIU/L-ACNC: 0.83 UIU/ML
URATE SERPL-MCNC: 5.5 MG/DL

## 2018-07-17 PROCEDURE — 83036 HEMOGLOBIN GLYCOSYLATED A1C: CPT

## 2018-07-17 PROCEDURE — 86316 IMMUNOASSAY TUMOR OTHER: CPT

## 2018-07-17 PROCEDURE — 82306 VITAMIN D 25 HYDROXY: CPT

## 2018-07-17 PROCEDURE — 84443 ASSAY THYROID STIM HORMONE: CPT

## 2018-07-17 PROCEDURE — 99214 OFFICE O/P EST MOD 30 MIN: CPT | Mod: S$PBB,,, | Performed by: INTERNAL MEDICINE

## 2018-07-17 PROCEDURE — 99999 PR PBB SHADOW E&M-EST. PATIENT-LVL III: CPT | Mod: PBBFAC,,, | Performed by: INTERNAL MEDICINE

## 2018-07-17 PROCEDURE — 82040 ASSAY OF SERUM ALBUMIN: CPT

## 2018-07-17 PROCEDURE — 84270 ASSAY OF SEX HORMONE GLOBUL: CPT

## 2018-07-17 PROCEDURE — 84100 ASSAY OF PHOSPHORUS: CPT

## 2018-07-17 PROCEDURE — 83735 ASSAY OF MAGNESIUM: CPT

## 2018-07-17 PROCEDURE — 36415 COLL VENOUS BLD VENIPUNCTURE: CPT | Mod: PO

## 2018-07-17 PROCEDURE — 82330 ASSAY OF CALCIUM: CPT

## 2018-07-17 PROCEDURE — 84480 ASSAY TRIIODOTHYRONINE (T3): CPT

## 2018-07-17 PROCEDURE — 84550 ASSAY OF BLOOD/URIC ACID: CPT

## 2018-07-17 PROCEDURE — 84439 ASSAY OF FREE THYROXINE: CPT

## 2018-07-17 PROCEDURE — 99213 OFFICE O/P EST LOW 20 MIN: CPT | Mod: PBBFAC,PO | Performed by: INTERNAL MEDICINE

## 2018-07-17 PROCEDURE — 83970 ASSAY OF PARATHORMONE: CPT

## 2018-07-17 PROCEDURE — 84432 ASSAY OF THYROGLOBULIN: CPT

## 2018-07-17 NOTE — PROGRESS NOTES
Subjective:      Patient ID: Papito Hutton is a 72 y.o. male.    Chief Complaint:    72 yr old gentleman with hypogonadism and hypercalcemia presumed secondary to mild primary hyperparathyroidism seen in [facility] today.    History of Present Illness    Patient is a 72yr old gentleman with hypercalcemia and hypogonadism seen in [facility] today. He in addition has background comorbidities of hypertension, GERD, Vit B12 deficiency and hyperlipidemia.   The work up on account of the hypercalcemia suggests that this is likely due to mild primary hyperparathyroidsm  The patient was also found to have a multinodular goiter as noted on his initial thyroid Uss from 01/16 though all the identified nodules are subcm in size with no worrisome sonographic features. His most recent USS from 02/18 showed no significant intervel change in the findin gs and his next USS to re-evaluate the nodules should for for ~ 02/19.  He was recently found to have asymptomatic transaminitis of unclear etiology.  Patients baseline Raleigh score is 6. Patient had a sleep study in the past prior to have a nasal deviated septum fixed.  No major snoring problems.  Patient had been on testosterone injections ; testosterone cypionate 150mg every 14 days for androgen repletion but at last visit was switched to toipcal testosterone; 01mg Qd of fortesta. Patient apparently had a brain MRI ~ 2 yrs ago which was reportedly normal.  Patient reports that he had always had high calcium levels while he was in the [employer] since over 20 yrs ago.  Patient has never had kidney stones but he does have GERD.No known family history of hypercalcemia, no history of severe dyspepsia nor severe peptic ulcer disease.  He is on Vytorin 10/40mg Qd for hyperlipidemia with hypercholesterolemia.  Patient has never had a fracture of note and his screening DEXA was essentially -ve.  He is in excellent spirits today and has no fresh complaints.    Review of Systems   Constitutional:  Negative for diaphoresis and fatigue.   HENT: Negative for facial swelling and trouble swallowing.    Eyes: Negative for visual disturbance.   Respiratory: Negative for cough and shortness of breath.    Cardiovascular: Negative for chest pain and palpitations.   Gastrointestinal: Negative for abdominal distention, abdominal pain, diarrhea and vomiting.   Endocrine: Negative for polyuria.   Genitourinary: Negative for dysuria and frequency.   Musculoskeletal: Negative for arthralgias and back pain.   Skin: Negative for color change, pallor and rash.   Neurological: Negative for dizziness, light-headedness and headaches.   Hematological: Does not bruise/bleed easily.   Psychiatric/Behavioral: Negative for confusion. The patient is not nervous/anxious.        Objective: BP (!) 179/79 (BP Location: Right arm, Patient Position: Sitting, BP Method: Large (Automatic))   Pulse (!) 59   Ht 6' (1.829 m)   Wt 99.3 kg (218 lb 14.7 oz)   BMI 29.69 kg/m²  Body surface area is 2.25 meters squared.         Physical Exam   Constitutional: He is oriented to person, place, and time. He appears well-developed and well-nourished. No distress.   Pleasant elderly gentleman. Clinically comfortable. Not in any apparent distress. Not pale, anicteric and afebrile. Patient is in excellent spirits and looks younger than stated chronologic age.  Well hydrated.   HENT:   Head: Normocephalic and atraumatic.   Eyes: Conjunctivae and EOM are normal. Pupils are equal, round, and reactive to light.   Neck: Normal range of motion. Neck supple. No JVD present.   Cardiovascular: Normal rate, regular rhythm and normal heart sounds.    No murmur heard.  Pulmonary/Chest: Effort normal and breath sounds normal. No respiratory distress. He has no wheezes. He has no rales.   Abdominal: Soft. There is no tenderness.   Musculoskeletal: Normal range of motion. He exhibits edema (Mild 1+ bipedal edema in ankles. no calf swelling nor tenderness). He exhibits no  tenderness.   Neurological: He is alert and oriented to person, place, and time. No cranial nerve deficit.   Skin: Skin is warm and dry. No rash noted. He is not diaphoretic. No erythema. No pallor.   Psychiatric: He has a normal mood and affect. His behavior is normal. Judgment and thought content normal.   Vitals reviewed.      Lab Review:     Results for DEEDEE THOMAS (MRN [id number]) as of 7/17/2018 15:54   Ref. Range 2/15/2018 07:10 2/15/2018 08:01 5/25/2018 09:42 5/25/2018 09:48 5/25/2018 09:48   Sodium Latest Ref Range: 136 - 145 mmol/L 139       Potassium Latest Ref Range: 3.5 - 5.1 mmol/L 4.0       Chloride Latest Ref Range: 95 - 110 mmol/L 108       CO2 Latest Ref Range: 23 - 29 mmol/L 25       Anion Gap Latest Ref Range: 8 - 16 mmol/L 6 (L)       BUN, Bld Latest Ref Range: 8 - 23 mg/dL 18       Creatinine Latest Ref Range: 0.5 - 1.4 mg/dL 0.9       eGFR if non African American Latest Ref Range: >60 mL/min/1.73 m^2 >60.0       eGFR if African American Latest Ref Range: >60 mL/min/1.73 m^2 >60.0       Glucose Latest Ref Range: 70 - 110 mg/dL 130 (H)       Calcium Latest Ref Range: 8.7 - 10.5 mg/dL 10.3       Alkaline Phosphatase Latest Ref Range: 55 - 135 U/L 82       Total Protein Latest Ref Range: 6.0 - 8.4 g/dL 6.6       Albumin Latest Ref Range: 3.5 - 5.2 g/dL 3.4 (L)       Total Bilirubin Latest Ref Range: 0.1 - 1.0 mg/dL 0.9       AST Latest Ref Range: 10 - 40 U/L 25       ALT Latest Ref Range: 10 - 44 U/L 38       Pancreastatin Latest Ref Range:  pg/mL     See Result Under ...    Hemoglobin A1C Latest Ref Range: 4.0 - 5.6 %    5.6    Estimated Avg Glucose Latest Ref Range: 68 - 131 mg/dL    114    Chromogranin A Latest Ref Range: 0 - 95 ng/mL    1985 (H)    Testosterone Latest Ref Range: 250 - 1100 ng/dL    259 259   Testosterone, Free Latest Ref Range: 6.0 - 73.0 pg/mL    38.2 38.2   Testosterone Testosterone Latest Ref Range: 15.0 - 150.0 ng/dL    77.0 77.0   Sex Hormone Binding Globulin  Latest Ref Range: 22 - 77 nmol/L    27 27   Albumin Latest Ref Range: 3.6 - 5.1 g/dL    4.4 4.4   Calcium, Ur Latest Ref Range: 0.0 - 15.0 mg/dL   7.6     US SOFT TISSUE HEAD NECK THYROID Unknown  Rpt      Creatinine, Random Ur Latest Ref Range: 23.0 - 375.0 mg/dL   >450.0 (H)         Assessment:     1. Hyperparathyroidism  Vitamin D    Calcium, ionized    PTH, intact    Phosphorus    Magnesium   2. Hypercalcemia  Vitamin D    Calcium, ionized    PTH, intact    Phosphorus    Magnesium   3. Hypovitaminosis D  Vitamin D    Calcium, ionized    PTH, intact    Phosphorus    Magnesium   4. Essential hypertension  Uric acid   5. Hyperlipidemia, unspecified hyperlipidemia type     6. Hypercholesterolemia     7. Prediabetes  Hemoglobin A1c   8. B12 deficiency     9. Gastroesophageal reflux disease without esophagitis  Calcitonin    Chromogranin A   10. Multinodular goiter  Calcitonin    Chromogranin A    Iodine, Serum    TSH    T4, free    Thyroglobulin    T3   11. Low testosterone  Testosterone Panel        Regarding hypercalcemia. This appears due to mild primary hyperparathyroidism. Will continue to monitor calcium trends but no need for any active intervaentiojn at the present time. To continue to encourage copious free water intake.  Regarding hypogonadism; Discussed with patient the pros and cons of alternative testosterone repletion strategies and nbased on his current age will switch to topical preparations. To continue androgel 20.25mg QD  And will recheck androgen levels today and adjust dose ad needed to get testosterone levels into the mid normal range.  Regarding hypertension; BP profile well controlled. To continue present antihypertensive regimen and to continue serial BP tracking.  Regarding hyperlipidemia with hypercholesterolemia; to commence trial of low dose lipitor 10mg QD and will recheck CMP in ~ 1mth after starting to see if transaminitis relapses. If it does then it would mean he has a class  sensitivity to statins and we will then have to change him to a non statin antilipidemic.  Regarding hypovitaminosis D; will recheck 25 OH vit D levels today.  Regarding multinodular goiter; to have repeat USS of thyroid ~ 02/19  Regarding GERD; symptomatically stable. To continue PPI therapy as before    Plan:       FFup in ~ 4mths

## 2018-07-18 ENCOUNTER — PATIENT MESSAGE (OUTPATIENT)
Dept: ENDOCRINOLOGY | Facility: CLINIC | Age: 73
End: 2018-07-18

## 2018-07-19 LAB
CALCIT SERPL-MCNC: 8.2 PG/ML
THRYOGLOBULIN INTERPRETATION: ABNORMAL
THYROGLOB AB SERPL-ACNC: <1.8 IU/ML
THYROGLOB SERPL-MCNC: 17 NG/ML

## 2018-07-20 LAB — IODINE SERPL-MCNC: 85 NG/ML (ref 40–92)

## 2018-07-21 LAB — CGA SERPL-MCNC: 58 NG/ML (ref 0–95)

## 2018-07-22 LAB
ALBUMIN SERPL-MCNC: 4.4 G/DL (ref 3.6–5.1)
SHBG SERPL-SCNC: 38 NMOL/L (ref 22–77)
TESTOST FREE SERPL-MCNC: 32.4 PG/ML (ref 6–73)
TESTOST SERPL-MCNC: 287 NG/DL (ref 250–1100)
TESTOSTERONE.FREE+WB SERPL-MCNC: 65.2 NG/DL (ref 15–150)

## 2018-10-08 RX ORDER — TESTOSTERONE 10 MG/.5G
10 GEL, METERED TOPICAL DAILY
Qty: 90 BOTTLE | Refills: 3 | Status: SHIPPED | OUTPATIENT
Start: 2018-10-08 | End: 2018-12-13 | Stop reason: SDUPTHER

## 2018-10-22 ENCOUNTER — PATIENT MESSAGE (OUTPATIENT)
Dept: ENDOCRINOLOGY | Facility: CLINIC | Age: 73
End: 2018-10-22

## 2018-10-29 ENCOUNTER — TELEPHONE (OUTPATIENT)
Dept: ENDOCRINOLOGY | Facility: CLINIC | Age: 73
End: 2018-10-29

## 2018-11-19 ENCOUNTER — OFFICE VISIT (OUTPATIENT)
Dept: ENDOCRINOLOGY | Facility: CLINIC | Age: 73
End: 2018-11-19
Payer: MEDICARE

## 2018-11-19 ENCOUNTER — LAB VISIT (OUTPATIENT)
Dept: LAB | Facility: HOSPITAL | Age: 73
End: 2018-11-19
Attending: PHYSICIAN ASSISTANT
Payer: MEDICARE

## 2018-11-19 VITALS
DIASTOLIC BLOOD PRESSURE: 82 MMHG | WEIGHT: 222.44 LBS | TEMPERATURE: 98 F | BODY MASS INDEX: 30.13 KG/M2 | HEIGHT: 72 IN | SYSTOLIC BLOOD PRESSURE: 180 MMHG | HEART RATE: 58 BPM

## 2018-11-19 DIAGNOSIS — E04.2 MULTINODULAR GOITER: ICD-10-CM

## 2018-11-19 DIAGNOSIS — E78.5 HYPERLIPIDEMIA, UNSPECIFIED HYPERLIPIDEMIA TYPE: ICD-10-CM

## 2018-11-19 DIAGNOSIS — E53.8 B12 DEFICIENCY: ICD-10-CM

## 2018-11-19 DIAGNOSIS — E29.1 HYPOGONADISM IN MALE: ICD-10-CM

## 2018-11-19 DIAGNOSIS — R73.03 PRE-DIABETES: ICD-10-CM

## 2018-11-19 DIAGNOSIS — E55.9 HYPOVITAMINOSIS D: ICD-10-CM

## 2018-11-19 DIAGNOSIS — E83.52 HYPERCALCEMIA: ICD-10-CM

## 2018-11-19 DIAGNOSIS — E83.52 HYPERCALCEMIA: Primary | ICD-10-CM

## 2018-11-19 DIAGNOSIS — I10 ESSENTIAL HYPERTENSION: ICD-10-CM

## 2018-11-19 LAB
25(OH)D3+25(OH)D2 SERPL-MCNC: 39 NG/ML
ALBUMIN SERPL BCP-MCNC: 3.8 G/DL
ALP SERPL-CCNC: 104 U/L
ALT SERPL W/O P-5'-P-CCNC: 47 U/L
ANION GAP SERPL CALC-SCNC: 10 MMOL/L
AST SERPL-CCNC: 28 U/L
BASOPHILS # BLD AUTO: 0.08 K/UL
BASOPHILS NFR BLD: 1 %
BILIRUB SERPL-MCNC: 0.5 MG/DL
BUN SERPL-MCNC: 17 MG/DL
CA-I BLDV-SCNC: 1.43 MMOL/L
CALCIUM SERPL-MCNC: 10.7 MG/DL
CHLORIDE SERPL-SCNC: 108 MMOL/L
CO2 SERPL-SCNC: 23 MMOL/L
CREAT SERPL-MCNC: 0.9 MG/DL
DIFFERENTIAL METHOD: ABNORMAL
EOSINOPHIL # BLD AUTO: 0.1 K/UL
EOSINOPHIL NFR BLD: 1.8 %
ERYTHROCYTE [DISTWIDTH] IN BLOOD BY AUTOMATED COUNT: 13.2 %
EST. GFR  (AFRICAN AMERICAN): >60 ML/MIN/1.73 M^2
EST. GFR  (NON AFRICAN AMERICAN): >60 ML/MIN/1.73 M^2
ESTIMATED AVG GLUCOSE: 111 MG/DL
GLUCOSE SERPL-MCNC: 110 MG/DL
HBA1C MFR BLD HPLC: 5.5 %
HCT VFR BLD AUTO: 40.3 %
HGB BLD-MCNC: 13.7 G/DL
IMM GRANULOCYTES # BLD AUTO: 0.01 K/UL
IMM GRANULOCYTES NFR BLD AUTO: 0.1 %
LYMPHOCYTES # BLD AUTO: 2.6 K/UL
LYMPHOCYTES NFR BLD: 33.5 %
MAGNESIUM SERPL-MCNC: 1.9 MG/DL
MCH RBC QN AUTO: 30 PG
MCHC RBC AUTO-ENTMCNC: 34 G/DL
MCV RBC AUTO: 88 FL
MONOCYTES # BLD AUTO: 0.5 K/UL
MONOCYTES NFR BLD: 6.1 %
NEUTROPHILS # BLD AUTO: 4.4 K/UL
NEUTROPHILS NFR BLD: 57.5 %
NRBC BLD-RTO: 0 /100 WBC
PHOSPHATE SERPL-MCNC: 2.5 MG/DL
PLATELET # BLD AUTO: 225 K/UL
PMV BLD AUTO: 11 FL
POTASSIUM SERPL-SCNC: 4 MMOL/L
PROT SERPL-MCNC: 6.9 G/DL
PTH-INTACT SERPL-MCNC: 108 PG/ML
RBC # BLD AUTO: 4.57 M/UL
SODIUM SERPL-SCNC: 141 MMOL/L
VIT B12 SERPL-MCNC: 578 PG/ML
WBC # BLD AUTO: 7.7 K/UL

## 2018-11-19 PROCEDURE — 82330 ASSAY OF CALCIUM: CPT

## 2018-11-19 PROCEDURE — 84270 ASSAY OF SEX HORMONE GLOBUL: CPT

## 2018-11-19 PROCEDURE — 85025 COMPLETE CBC W/AUTO DIFF WBC: CPT

## 2018-11-19 PROCEDURE — 80053 COMPREHEN METABOLIC PANEL: CPT

## 2018-11-19 PROCEDURE — 83735 ASSAY OF MAGNESIUM: CPT

## 2018-11-19 PROCEDURE — 99213 OFFICE O/P EST LOW 20 MIN: CPT | Mod: PBBFAC,PO | Performed by: PHYSICIAN ASSISTANT

## 2018-11-19 PROCEDURE — 99999 PR PBB SHADOW E&M-EST. PATIENT-LVL III: CPT | Mod: PBBFAC,,, | Performed by: PHYSICIAN ASSISTANT

## 2018-11-19 PROCEDURE — 83036 HEMOGLOBIN GLYCOSYLATED A1C: CPT

## 2018-11-19 PROCEDURE — 84100 ASSAY OF PHOSPHORUS: CPT

## 2018-11-19 PROCEDURE — 99213 OFFICE O/P EST LOW 20 MIN: CPT | Mod: S$PBB,,, | Performed by: PHYSICIAN ASSISTANT

## 2018-11-19 PROCEDURE — 82607 VITAMIN B-12: CPT

## 2018-11-19 PROCEDURE — 83970 ASSAY OF PARATHORMONE: CPT

## 2018-11-19 PROCEDURE — 82306 VITAMIN D 25 HYDROXY: CPT

## 2018-11-19 NOTE — PROGRESS NOTES
Subjective:      Patient ID: Papito Hutton is a 73 y.o. male.    Chief Complaint:  Hypogonadism and hypercalcemia presumed secondary to mild primary hyperparathyroidism seen in [facility] today.    History of Present Illness    Papito Hutton is a 73 y.o. male here for  with hypercalcemia and hypogonadism seen in [facility] today. No fhx of DM or  thyroid disease.     Has multinodular goiter as noted on his initial thyroid Uss from 01/16 though all the identified nodules are subcm in size with no worrisome sonographic features. His most recent USS from 02/18 showed no significant intervel change in the findin gs and his next USS to re-evaluate the nodules should for for ~ 02/19. No sob, dysphagia or voice changes.    He was recently found to have asymptomatic transaminitis of unclear etiology.    Using topical testosterone Fortesta 10 mg daily.  No breast tenderness, changes in muscle mass or sexual function.     Brain MRI ~ 2 yrs ago which was reportedly normal.    Reports that he had always had high calcium levels while he was in the [employer] since over 20 yrs ago.    Never had kidney stones. No n/v or abdominal pain or muscle weakness. Avoids calcium supplements. He has never had a fracture of note and his screening DEXA was essentially -ve.    Social Hx: He drinks 2 beers daily.    Review of Systems   Constitutional: Negative for diaphoresis and fatigue.   HENT: Negative for facial swelling and trouble swallowing.    Eyes: Negative for visual disturbance.   Respiratory: Negative for cough and shortness of breath.    Cardiovascular: Negative for chest pain and palpitations.   Gastrointestinal: Negative for abdominal distention, abdominal pain, diarrhea and vomiting.   Endocrine: Negative for polyuria.   Genitourinary: Negative for dysuria and frequency.   Musculoskeletal: Negative for arthralgias and back pain.   Skin: Negative for color change, pallor and rash.   Neurological: Negative for dizziness, light-headedness  and headaches.   Hematological: Does not bruise/bleed easily.   Psychiatric/Behavioral: Negative for confusion. The patient is not nervous/anxious.        Objective: BP (!) 180/82 (BP Location: Left arm, Patient Position: Sitting, BP Method: Large (Manual))   Pulse (!) 58   Temp 98.4 °F (36.9 °C) (Oral)   Ht 6' (1.829 m)   Wt 100.9 kg (222 lb 7.1 oz)   BMI 30.17 kg/m²  Body surface area is 2.26 meters squared.         Physical Exam   Constitutional: He is oriented to person, place, and time. He appears well-developed and well-nourished. No distress.   Elderly gentleman. Clinically comfortable. Not in any apparent distress. Not pale, anicteric and afebrile. Well hydrated.   HENT:   Head: Normocephalic and atraumatic.   Eyes: Conjunctivae and EOM are normal. Pupils are equal, round, and reactive to light.   Neck: Normal range of motion. Neck supple. No JVD present.   Cardiovascular: Normal rate, regular rhythm and normal heart sounds.   No murmur heard.  Pulmonary/Chest: Effort normal and breath sounds normal. No respiratory distress. He has no wheezes. He has no rales.   Abdominal: Soft. There is no tenderness.   Musculoskeletal: Normal range of motion. He exhibits edema (Mild 1+ bipedal edema in ankles. no calf swelling nor tenderness). He exhibits no tenderness.   Neurological: He is alert and oriented to person, place, and time. No cranial nerve deficit.   Skin: Skin is warm and dry. No rash noted. He is not diaphoretic. No erythema. No pallor.   Psychiatric: He has a normal mood and affect. His behavior is normal. Judgment and thought content normal.   Vitals reviewed.      Lab Review:   Personally reviewed labs below:  No visits with results within 4 Month(s) from this visit.   Latest known visit with results is:   Lab Visit on 07/17/2018   Component Date Value Ref Range Status    Testosterone 07/17/2018 287  250 - 1100 ng/dL Final    Comment: Adult Reference Ranges:  Testosterone, Total  Males:  > or = 18  years    250-1100 ng/dL  Females:  > or = 18 years      2-45 ng/dL  Males: Men with clinically significant hypogonadal  symptoms and testosterone values repeatedly in the  range of the 200-300 ng/dL or less, may benefit from  testosterone treatment after adequate risk and  benefits counseling.  Pediatric Reference Ranges:  Testosterone, Total  Age               Males             Females  (ng/dL)           (ng/dL)  **Cord Blood       17-61             16-44  **1-10 days        187 or less       24 or less  **1-3 months                   17 or less  **3-5 months       201 or less       12 or less  **5-7 months       59 or less        13 or less  **7-12 months      16 or less        11 or less  1-5.9 years        5 or less         8 or less  6-7.9 years        25 or less        20 or less  8-10.9 years       42 or less        35 or less  11-11.9 years      260 or less       40 or less  12-13.9 years      420 or less                                  40 or less  14-17.9 years      1000 or less      40 or less  **Data from J Clin Invest 1974 53:819-828 and  J Clin Endocrinol Metab 1973 36:2232-4820.  Pediatric Reference Ranges by Pubertal Stage  Testosterone, Total  Jesus Stage      Males             Females  (ng/dL)           (ng/dL)  Stage I           5 or less         8 or less  Stage II          167 or less       24 or less  Stage III                     28 or less  Stage IV                      31 or less  Stage V           110-975           33 or less  This test was developed and its analytical performance   characteristics have been determined by [facility]. It has not been cleared or approved by the [place]  Food and Drug Administration. This assay has been validated   pursuant to the CLIA regulations and is used for clinical   purposes.      Testosterone, Free 07/17/2018 32.4  6.0 - 73.0 pg/mL Final    Comment: Adult Reference Ranges:  Testosterone, Free  (Calculated)  Age               Males             Females  (pg/mL)           (pg/mL)  18-69 years       46.0-224.0        0.2-5.0  70-89 years       6.0-73.0          0.3-5.0  Pediatric Reference Ranges:  Testosterone, Free - Calculated  Age               Males             Females  (pg/mL)           (pg/mL)  <1 year                Not Established  1-10.9 year       1.3 or less       1.5 or less  11-11.9 years     1.3 or less       1.5 or less  12-13.9 years    64.0 or less       1.5 or less  14-17.9 years     4.0-100.0         3.6 or less      Testosterone, Bioavailable 07/17/2018 65.2  15.0 - 150.0 ng/dL Final    Comment: Adult Reference Ranges:  Bio-available Testosterone (Calculated)  Age               Males             Females  (ng/dL)           (ng/dL)  18-69 years       110.0-575.0       0.5-8.5  70-89 years        15.0-150.0       0.5-8.8  Pediatric Reference Ranges  Bio-available Testosterone (Calculated)  Age               Males             Females  (ng/dL)           (ng/dL)  <1 year                Not Established  1-11.9 years      5.4 or less       3.4 or less  12-13.9 years     140.0 or less     3.4 or less  14-17.9 years     8.0-210.0         7.8 or less      Sex Hormone Binding Globulin 07/17/2018 38  22 - 77 nmol/L Final    Comment: Jesus Stages      Male (nmol/L)     Female (nmol/L)  (7-17 Years)  Jesus I                            Jesus II                           Jesus III                          Jesus IV             21-79             30-86  Jesus V               9-49                   Albumin 07/17/2018 4.4  3.6 - 5.1 g/dL Final    Comment: @ Test Performed By:  [facility]  Wilfrido Will M.D., Ph.D.,   [address] [place] [address]  [place]  [id number]      Vit D, 25-Hydroxy 07/17/2018 32  30 - 96 ng/mL Final    Comment: Vitamin D deficiency.........<10 ng/mL                               Vitamin D insufficiency......10-29 ng/mL       Vitamin D sufficiency........> or equal to 30 ng/mL  Vitamin D toxicity............>100 ng/mL      Uric Acid 07/17/2018 5.5  3.4 - 7.0 mg/dL Final    Calcium, Ion 07/17/2018 1.46* 1.06 - 1.42 mmol/L Final    PTH, Intact 07/17/2018 124.0* 9.0 - 77.0 pg/mL Final    Hemoglobin A1C 07/17/2018 5.6  4.0 - 5.6 % Final    Comment: ADA Screening Guidelines:  5.7-6.4%  Consistent with prediabetes  >or=6.5%  Consistent with diabetes  High levels of fetal hemoglobin interfere with the HbA1C  assay. Heterozygous hemoglobin variants (HbS, HgC, etc)do  not significantly interfere with this assay.   However, presence of multiple variants may affect accuracy.      Estimated Avg Glucose 07/17/2018 114  68 - 131 mg/dL Final    Phosphorus 07/17/2018 2.5* 2.7 - 4.5 mg/dL Final    Magnesium 07/17/2018 2.1  1.6 - 2.6 mg/dL Final    Calcitonin 07/17/2018 8.2  <=14.3 pg/mL Final    Comment: -------------------ADDITIONAL INFORMATION-------------------  The testing method is an electrochemiluminescence   assay manufactured by [employer] and   performed on the [employer] system.   Values obtained with different assay methods or kits   may be different and cannot be used interchangeably.  Test results cannot be interpreted as absolute evidence   for the presence or absence of malignant disease.  Test Performed by:  [facility]  [address] [place] [address]      Chromogranin A 07/17/2018 58  0 - 95 ng/mL Final    Comment: INTERPRETIVE INFORMATION:  Chromogranin A  This test is performed using the [employer] [id number] kit.   Results obtained with different methods or kits cannot be   used interchangeably.  See Compliance Statement D: [email or web address]  Performed by [facility],                              [address] [place] [address] [phone]                    [email or web address], Neeraj Cardona MD - Lab. Director       Iodine, Serum 07/17/2018 85  40 - 92 ng/mL Final    Comment: -------------------ADDITIONAL INFORMATION-------------------  This test was developed and its performance characteristics   determined by [facility] in a manner consistent with CLIA   requirements. This test has not been cleared or approved by   the U.S. Food and Drug Administration.  Test Performed by:  [facility]  [address] [place] [address]      TSH 07/17/2018 0.828  0.400 - 4.000 uIU/mL Final    Free T4 07/17/2018 1.02  0.71 - 1.51 ng/dL Final    Thyroglobulin, Tumor Marker 07/17/2018 17* ng/mL Final    Comment: -------------------REFERENCE VALUE--------------------------  Athyrotic <0.1   Intact Thyroid <=33      Thyroglobulin Antibody Screen 07/17/2018 <1.8  <4.0 IU/mL Final    Thyroglobulin Interpretation 07/17/2018 SEE BELOW   Final    Comment: Thyroglobulin (Tg) levels must be interpreted in the context  of TSH levels, serial Tg measurements and radioiodine   ablation status.  Tg levels of > or = 10 ng/mL in athyrotic  individuals on suppressive therapy indicate a significant   (>25%) risk of clinically detectable recurrent   papillary/follicular thyroid cancer.  -------------------ADDITIONAL INFORMATION-------------------  PLEASE NOTE: Thyroglobulin flagging is based on athyrotic  reference values.  The thyroglobulin and thyroglobulin antibody testing   methods are immunoenzymatic assays manufactured by [employer] and performed on the [employer] 800.  Values obtained from different assay methods or kits  may be different and cannot be used interchangeably.  The results cannot be interpreted as absolute evidence  for the presence or absence of malignant disease.  Test Performed by:  [facility]  [address] [place] [address]      T3, Total 07/17/2018 77  60 - 180 ng/dL Final      Assessment:     1. Hypercalcemia   Magnesium    Phosphorus    Calcium, ionized    PTH, intact    Comprehensive metabolic panel   2. Hypogonadism in male  CBC auto differential    Testosterone Panel   3. Essential hypertension     4. Hyperlipidemia, unspecified hyperlipidemia type     5. Hypovitaminosis D  Vitamin D   6. Multinodular goiter     7. Pre-diabetes  Hemoglobin A1c   8. B12 deficiency  Vitamin B12        Hypercalcemia- labs today. Avoid calcium supplements. Increase water intake.  Hypogonadism-continue Fortesta.   Hypertension-elevated-send log in one week. Continue meds.  HLD with hypercholesterolemia-coninue lipitor 10mg QD   Hypovitaminosis D-continue vd  Multinodular goiter-repeat USS of thyroid ~ 02/19    Plan:   FFup in ~ 4 mths

## 2018-11-24 LAB
ALBUMIN SERPL-MCNC: 4.3 G/DL (ref 3.6–5.1)
SHBG SERPL-SCNC: 35 NMOL/L (ref 22–77)
TESTOST FREE SERPL-MCNC: 24.2 PG/ML (ref 6–73)
TESTOST SERPL-MCNC: 204 NG/DL (ref 250–1100)
TESTOSTERONE.FREE+WB SERPL-MCNC: 47.7 NG/DL (ref 15–150)

## 2018-12-13 DIAGNOSIS — E29.1 HYPOGONADISM IN MALE: Primary | ICD-10-CM

## 2018-12-14 RX ORDER — TESTOSTERONE 10 MG/.5G
20 GEL, METERED TOPICAL DAILY
Qty: 120 BOTTLE | Refills: 3 | Status: SHIPPED | OUTPATIENT
Start: 2018-12-14 | End: 2019-01-11 | Stop reason: SDUPTHER

## 2018-12-21 RX ORDER — ESOMEPRAZOLE MAGNESIUM 40 MG/1
CAPSULE, DELAYED RELEASE ORAL
Qty: 90 CAPSULE | Refills: 3 | Status: SHIPPED | OUTPATIENT
Start: 2018-12-21 | End: 2019-01-29

## 2019-01-10 ENCOUNTER — PATIENT MESSAGE (OUTPATIENT)
Dept: ENDOCRINOLOGY | Facility: CLINIC | Age: 74
End: 2019-01-10

## 2019-01-10 ENCOUNTER — PATIENT MESSAGE (OUTPATIENT)
Dept: FAMILY MEDICINE | Facility: CLINIC | Age: 74
End: 2019-01-10

## 2019-01-10 RX ORDER — DICLOFENAC SODIUM 75 MG/1
75 TABLET, DELAYED RELEASE ORAL 2 TIMES DAILY
Qty: 120 TABLET | Refills: 0 | Status: SHIPPED | OUTPATIENT
Start: 2019-01-10 | End: 2019-03-11 | Stop reason: SDUPTHER

## 2019-01-10 NOTE — TELEPHONE ENCOUNTER
Patient requesting refill on diclofenac via patient portal last seen 4/2017 notified patient he will need f/u in clinic

## 2019-01-11 ENCOUNTER — PATIENT MESSAGE (OUTPATIENT)
Dept: ENDOCRINOLOGY | Facility: CLINIC | Age: 74
End: 2019-01-11

## 2019-01-11 RX ORDER — TESTOSTERONE 10 MG/.5G
20 GEL, METERED TOPICAL DAILY
Qty: 120 BOTTLE | Refills: 3 | Status: SHIPPED | OUTPATIENT
Start: 2019-01-11 | End: 2019-08-16 | Stop reason: SDUPTHER

## 2019-01-19 DIAGNOSIS — E78.00 HYPERCHOLESTEROLEMIA: ICD-10-CM

## 2019-01-19 DIAGNOSIS — E78.5 HYPERLIPIDEMIA, UNSPECIFIED HYPERLIPIDEMIA TYPE: ICD-10-CM

## 2019-01-19 RX ORDER — ATORVASTATIN CALCIUM 20 MG/1
TABLET, FILM COATED ORAL
Qty: 90 TABLET | Refills: 3 | Status: SHIPPED | OUTPATIENT
Start: 2019-01-19 | End: 2019-05-10

## 2019-01-28 ENCOUNTER — DOCUMENTATION ONLY (OUTPATIENT)
Dept: FAMILY MEDICINE | Facility: CLINIC | Age: 74
End: 2019-01-28

## 2019-01-28 NOTE — PROGRESS NOTES
Pre-Visit Chart Review  For Appointment Scheduled on 01/29/19    Health Maintenance Due   Topic Date Due    TETANUS VACCINE  08/04/1963    Colonoscopy  06/24/2018    Influenza Vaccine  08/01/2018

## 2019-01-29 ENCOUNTER — LAB VISIT (OUTPATIENT)
Dept: LAB | Facility: HOSPITAL | Age: 74
End: 2019-01-29
Attending: PHYSICIAN ASSISTANT
Payer: MEDICARE

## 2019-01-29 ENCOUNTER — OFFICE VISIT (OUTPATIENT)
Dept: FAMILY MEDICINE | Facility: CLINIC | Age: 74
End: 2019-01-29
Payer: MEDICARE

## 2019-01-29 VITALS
BODY MASS INDEX: 30.01 KG/M2 | HEART RATE: 56 BPM | SYSTOLIC BLOOD PRESSURE: 171 MMHG | WEIGHT: 221.56 LBS | DIASTOLIC BLOOD PRESSURE: 74 MMHG | TEMPERATURE: 97 F | HEIGHT: 72 IN

## 2019-01-29 DIAGNOSIS — E21.3 HYPERPARATHYROIDISM: ICD-10-CM

## 2019-01-29 DIAGNOSIS — Z12.83 SKIN CANCER SCREENING: ICD-10-CM

## 2019-01-29 DIAGNOSIS — I10 ESSENTIAL HYPERTENSION: Primary | ICD-10-CM

## 2019-01-29 DIAGNOSIS — M19.90 ARTHRITIS: ICD-10-CM

## 2019-01-29 DIAGNOSIS — E78.5 HYPERLIPIDEMIA, UNSPECIFIED HYPERLIPIDEMIA TYPE: ICD-10-CM

## 2019-01-29 DIAGNOSIS — E83.52 HYPERCALCEMIA: ICD-10-CM

## 2019-01-29 DIAGNOSIS — Z12.5 PROSTATE CANCER SCREENING: ICD-10-CM

## 2019-01-29 DIAGNOSIS — K21.9 GASTROESOPHAGEAL REFLUX DISEASE WITHOUT ESOPHAGITIS: ICD-10-CM

## 2019-01-29 LAB
CHOLEST SERPL-MCNC: 229 MG/DL
CHOLEST/HDLC SERPL: 4 {RATIO}
COMPLEXED PSA SERPL-MCNC: 0.4 NG/ML
HDLC SERPL-MCNC: 57 MG/DL
HDLC SERPL: 24.9 %
LDLC SERPL CALC-MCNC: 111.2 MG/DL
NONHDLC SERPL-MCNC: 172 MG/DL
TRIGL SERPL-MCNC: 304 MG/DL

## 2019-01-29 PROCEDURE — 99999 PR PBB SHADOW E&M-EST. PATIENT-LVL IV: ICD-10-PCS | Mod: PBBFAC,,, | Performed by: PHYSICIAN ASSISTANT

## 2019-01-29 PROCEDURE — 99214 PR OFFICE/OUTPT VISIT, EST, LEVL IV, 30-39 MIN: ICD-10-PCS | Mod: S$PBB,,, | Performed by: PHYSICIAN ASSISTANT

## 2019-01-29 PROCEDURE — 99999 PR PBB SHADOW E&M-EST. PATIENT-LVL IV: CPT | Mod: PBBFAC,,, | Performed by: PHYSICIAN ASSISTANT

## 2019-01-29 PROCEDURE — 84153 ASSAY OF PSA TOTAL: CPT

## 2019-01-29 PROCEDURE — 99214 OFFICE O/P EST MOD 30 MIN: CPT | Mod: S$PBB,,, | Performed by: PHYSICIAN ASSISTANT

## 2019-01-29 PROCEDURE — 80061 LIPID PANEL: CPT

## 2019-01-29 PROCEDURE — 36415 COLL VENOUS BLD VENIPUNCTURE: CPT | Mod: PO

## 2019-01-29 PROCEDURE — 99214 OFFICE O/P EST MOD 30 MIN: CPT | Mod: PBBFAC,PO | Performed by: PHYSICIAN ASSISTANT

## 2019-01-29 RX ORDER — HYDROCHLOROTHIAZIDE 12.5 MG/1
12.5 TABLET ORAL DAILY
Qty: 90 TABLET | Refills: 0 | Status: SHIPPED | OUTPATIENT
Start: 2019-01-29 | End: 2019-04-11 | Stop reason: SDUPTHER

## 2019-01-29 RX ORDER — FLUTICASONE PROPIONATE 50 MCG
2 SPRAY, SUSPENSION (ML) NASAL DAILY
Qty: 48 G | Refills: 1 | Status: SHIPPED | OUTPATIENT
Start: 2019-01-29 | End: 2019-07-10 | Stop reason: SDUPTHER

## 2019-01-29 RX ORDER — PANTOPRAZOLE SODIUM 40 MG/1
40 TABLET, DELAYED RELEASE ORAL DAILY
Qty: 90 TABLET | Refills: 3 | Status: SHIPPED | OUTPATIENT
Start: 2019-01-29 | End: 2020-01-09 | Stop reason: SDUPTHER

## 2019-01-29 NOTE — PROGRESS NOTES
"Subjective:       Patient ID: Papito Hutton is a 73 y.o. male.    Chief Complaint: No chief complaint on file.    HPI     This is a 73 year old male who presents for a regular follow up. He is being treated for arthritis, HLD, and HTN. His States his arthritic pains in bilateral knees is well-controlled on his diclofenac. His BP today is 171/74 and 160/68 at recheck at the end of the visit. He is currently taking Lotrel 10-40mg and Toprol XL 25mg and is still not controlled. He is on atorvastatin 20mg for his cholesterol with no complaints of side effects.   Of note, he is due for a lipid panel and PSA today. He is also due for a colonoscopy but would not like a referral at this time. He wants to email us over portal when he knows his schedule.    Review of Systems   Constitutional: Negative for activity change, appetite change, chills, fatigue and fever.   HENT: Positive for congestion. Negative for ear pain, postnasal drip, rhinorrhea and sinus pressure.         Seasonal congestion 2/2 allergies   Respiratory: Negative for cough, shortness of breath and wheezing.    Cardiovascular: Negative for chest pain and palpitations.   Gastrointestinal: Negative for abdominal pain, constipation, diarrhea, nausea and vomiting.   Genitourinary: Negative for frequency, hematuria and urgency.   Musculoskeletal: Positive for arthralgias. Negative for back pain and gait problem.        Chronic joint pain in bilateral knees 2/2 arthritis   Skin: Negative for rash.        Red "bump" over the left hip   Neurological: Negative for syncope, weakness and headaches.   Psychiatric/Behavioral: Negative for agitation and confusion.       Objective:      Physical Exam   Constitutional: He is oriented to person, place, and time. Vital signs are normal. He appears well-developed and well-nourished. No distress.   HENT:   Head: Normocephalic and atraumatic.   Right Ear: External ear normal.   Left Ear: External ear normal.   Mouth/Throat: " No oropharyngeal exudate.   Eyes: Conjunctivae and EOM are normal. Pupils are equal, round, and reactive to light.   Neck: Normal range of motion. Neck supple. No thyromegaly present.   Cardiovascular: Normal rate, regular rhythm, S1 normal, S2 normal and normal heart sounds. Exam reveals no gallop and no friction rub.   No murmur heard.  Pulses:       Radial pulses are 2+ on the right side, and 2+ on the left side.   <2sec cap refill fingers bilat     Pulmonary/Chest: Effort normal and breath sounds normal. No respiratory distress. He has no wheezes. He has no rhonchi.   Abdominal: Soft. Bowel sounds are normal. He exhibits no distension and no mass. There is no tenderness. There is no rebound and no guarding. No hernia.   Musculoskeletal: He exhibits no edema, tenderness or deformity.   Lymphadenopathy:     He has no cervical adenopathy.   Neurological: He is alert and oriented to person, place, and time.   Skin: Skin is warm and dry. He is not diaphoretic. No pallor.        Appropriate skin turgor.    Psychiatric: He has a normal mood and affect. His speech is normal and behavior is normal. Judgment and thought content normal. Cognition and memory are normal.       Assessment:       1. Essential hypertension    2. Hyperlipidemia, unspecified hyperlipidemia type    3. BMI 30.0-30.9,adult    4. Gastroesophageal reflux disease without esophagitis    5. Arthritis    6. Hypercalcemia    7. Hyperparathyroidism    8. Prostate cancer screening    9. Skin cancer screening        Plan:       Diagnoses and all orders for this visit:    Essential hypertension    - Blood pressure at the beginning of visit was 171/74. Repeat with manual cuff is 160/68 at the end of the visit. Currently on Lotrel 10-40mg and Toprol XL 25mg 24hr.   - Continue Lotrel and Toprol XL. Begin taking Hydrodiuril 12.5mg by mouth once daily.    - Return to clinic in 2-4 weeks for a nurse blood pressure check.    - Return to clinic or ER if you experience  any HA, visual changes, or chest pain.     Hyperlipidemia, unspecified hyperlipidemia type  -     Lipid panel; Future  -     Controlled on atorvastatin 20mg. Will recheck lipid panel today.    BMI 30.0-30.9,adult   - Uncontrolled. Encouraged diet and exercise.     Gastroesophageal reflux disease without esophagitis   - Controlled on nexium 40mg at this time. Patient states that his insurance does not cover Nexium. He has tried protonix in the past which his insurance does cover. He wishes to try protonix again.    - Take Protonix 40mg by mouth once daily. Return to clinic if you experience any worsening or change in symptoms.    Arthritis   - Patient reports his bilateral knee arthalgias are well controlled on diclofenace.   - Continue diclofenac as prescribed.     Hypercalcemia   - Last calcium level 11/19/18 was slightly elevated at 1.43.    - This is controlled and followed by endocrinology. Continue regimen.     Hyperparathyroidism   - Last PTH lefel 11/19/19 was 108.    - This is followed by endocrinology. Continue regimen.    Prostate cancer screening  -     PSA, Screening; Future\  -     Last PSA 2/9/18 was 1.4    Skin cancer screening  -     Ambulatory referral to Dermatology  -     Patient with red scaly circular lesion to the left hip which is irritated by his belt. Referred to dermatology of skin cancer screening.    Other orders  -     fluticasone (FLONASE) 50 mcg/actuation nasal spray; 2 sprays (100 mcg total) by Each Nare route once daily.  -     hydroCHLOROthiazide (HYDRODIURIL) 12.5 MG Tab; Take 1 tablet (12.5 mg total) by mouth once daily.  -     pantoprazole (PROTONIX) 40 MG tablet; Take 1 tablet (40 mg total) by mouth once daily.      Patient readiness: acceptance and barriers:none    During the course of the visit the patient was educated and counseled about the following:     Hypertension:   Medication: begin HCTZ 12.5.  Obesity:   General weight loss/lifestyle modification strategies discussed  (elicit support from others; identify saboteurs; non-food rewards, etc).    Goals: Hypertension: Reduce Blood Pressure and Obesity: Reduce calorie intake and BMI    Did patient meet goals/outcomes: No    The following self management tools provided: blood pressure log  excercise log    Patient Instructions (the written plan) was given to the patient/family.     Time spent with patient: 45 minutes    Barriers to medications present (no )    Adverse reactions to current medications (no)    Over the counter medications reviewed (Yes)

## 2019-02-06 ENCOUNTER — PATIENT MESSAGE (OUTPATIENT)
Dept: FAMILY MEDICINE | Facility: CLINIC | Age: 74
End: 2019-02-06

## 2019-02-07 NOTE — PROGRESS NOTES
Physician Notification of Admission      To: YAJAIRA Forde    [address] [place] [id number]    From: Bindu Henderson M.D.    Re: Ethan Nicole, 2013    Admitted on: 2/7/2019  1:36 PM    Admitting Diagnosis:    Respiratory Failure  Bronchiolitis  Wheezing in pediatric patient over one year of age    Dear YAJAIRA Forde,      Our records indicate that we have admitted a patient to [facility] department who has listed you as their primary care provider, and we wanted to make sure you were aware of this admission. We strive to improve patient care by facilitating active communication with our medical colleagues from around the region.    To speak with a member of the patients care team, please contact the [facility] Pediatric department at [phone].   Thank you for allowing us to participate in the care of your patient.      Patient identified by name and  with verbal feedback. Depo-Testosterone 200 mg administered IM to left upper outer quadrant using aseptic technique. Patient tolerated well, no adverse reactions noted/reported. Next injection due 18 and has been scheduled.

## 2019-02-13 ENCOUNTER — INITIAL CONSULT (OUTPATIENT)
Dept: DERMATOLOGY | Facility: CLINIC | Age: 74
End: 2019-02-13
Payer: MEDICARE

## 2019-02-13 DIAGNOSIS — L82.0 INFLAMED SEBORRHEIC KERATOSIS: Primary | ICD-10-CM

## 2019-02-13 PROCEDURE — 99999 PR PBB SHADOW E&M-EST. PATIENT-LVL II: ICD-10-PCS | Mod: PBBFAC,,, | Performed by: DERMATOLOGY

## 2019-02-13 PROCEDURE — 99202 OFFICE O/P NEW SF 15 MIN: CPT | Mod: S$PBB,,, | Performed by: DERMATOLOGY

## 2019-02-13 PROCEDURE — 99202 PR OFFICE/OUTPT VISIT, NEW, LEVL II, 15-29 MIN: ICD-10-PCS | Mod: S$PBB,,, | Performed by: DERMATOLOGY

## 2019-02-13 PROCEDURE — 99212 OFFICE O/P EST SF 10 MIN: CPT | Mod: PBBFAC,PO | Performed by: DERMATOLOGY

## 2019-02-13 PROCEDURE — 99999 PR PBB SHADOW E&M-EST. PATIENT-LVL II: CPT | Mod: PBBFAC,,, | Performed by: DERMATOLOGY

## 2019-02-13 NOTE — PROGRESS NOTES
Subjective:       Patient ID:  Papito Hutton is a 73 y.o. male who presents for   Chief Complaint   Patient presents with    Skin Check     Present for initial visit. Desires UBSE  Lesion to left hip x years. Will occasionally itch. Not treating.     NO phx or fhx of skin cancer    Past Medical History:  No date: Arthritis  No date: B12 deficiency  No date: GERD (gastroesophageal reflux disease)  No date: Hyperlipidemia  No date: Hypertension  No date: Low testosterone      declines UBSE    Review of Systems   Constitutional: Negative for fever and chills.   HENT: Negative for sore throat.    Respiratory: Negative for cough.    Gastrointestinal: Negative for nausea and vomiting.   Skin: Positive for dry skin, activity-related sunscreen use and wears hat. Negative for itching and rash.        Objective:    Physical Exam   Skin:                 Diagram Legend     Erythematous scaling macule/papule c/w actinic keratosis       Vascular papule c/w angioma      Pigmented verrucoid papule/plaque c/w seborrheic keratosis      Yellow umbilicated papule c/w sebaceous hyperplasia      Irregularly shaped tan macule c/w lentigo     1-2 mm smooth white papules consistent with Milia      Movable subcutaneous cyst with punctum c/w epidermal inclusion cyst      Subcutaneous movable cyst c/w pilar cyst      Firm pink to brown papule c/w dermatofibroma      Pedunculated fleshy papule(s) c/w skin tag(s)      Evenly pigmented macule c/w junctional nevus     Mildly variegated pigmented, slightly irregular-bordered macule c/w mildly atypical nevus      Flesh colored to evenly pigmented papule c/w intradermal nevus       Pink pearly papule/plaque c/w basal cell carcinoma      Erythematous hyperkeratotic cursted plaque c/w SCC      Surgical scar with no sign of skin cancer recurrence      Open and closed comedones      Inflammatory papules and pustules      Verrucoid papule consistent consistent with wart     Erythematous  eczematous patches and plaques     Dystrophic onycholytic nail with subungual debris c/w onychomycosis     Umbilicated papule    Erythematous-base heme-crusted tan verrucoid plaque consistent with inflamed seborrheic keratosis     Erythematous Silvery Scaling Plaque c/w Psoriasis     See annotation      Assessment / Plan:        Inflamed seborrheic keratosis    These are benign inherited growths without a malignant potential. Reassurance given to patient. No treatment is necessary.   Declines UBSE         Follow-up in about 1 year (around 2/13/2020).

## 2019-02-13 NOTE — LETTER
February 13, 2019      MELLISSA Marin  [address]  [place] [address]           [place] [facility]  [address]  [place] [address]  Phone: [phone]          Patient: Papito Hutton   MR Number: [id number]   YOB: 1945   Date of Visit: 2/13/2019       Dear Melissa Hilton:    Thank you for referring Papito Hutton to me for evaluation. Attached you will find relevant portions of my assessment and plan of care.    If you have questions, please do not hesitate to call me. I look forward to following Papito Hutton along with you.    Sincerely,    Paulette Ferrera MD    Enclosure  CC:  No Recipients    If you would like to receive this communication electronically, please contact [email or web address] or [phone] to request more information on [employer] access.    For providers and/or their staff who would like to refer a patient to [facility], please contact us through our one-stop-shop provider referral line, [facility], at [phone].    If you feel you have received this communication in error or would no longer like to receive these types of communications, please e-mail [email or web address]

## 2019-02-26 ENCOUNTER — TELEPHONE (OUTPATIENT)
Dept: FAMILY MEDICINE | Facility: CLINIC | Age: 74
End: 2019-02-26

## 2019-02-26 NOTE — TELEPHONE ENCOUNTER
Patient's appointment letter stated Wednesday 3/6 but appt is actually Thursday 3/7 and patient said that day works fine for him.

## 2019-02-26 NOTE — TELEPHONE ENCOUNTER
----- Message from Maria Luisa Golden sent at 2/26/2019  9:17 AM CST -----  Contact: Patient  Type: Needs Medical Advice    Who Called: Patient  Best Call Back Number:   Additional Information: Calling to reschedule his bp check Nurse visit on 3/7/19. He would like to come on 3/8/19 in the morning, if possible. He will be out of town on 3/7/19. Please advise.

## 2019-03-07 ENCOUNTER — CLINICAL SUPPORT (OUTPATIENT)
Dept: FAMILY MEDICINE | Facility: CLINIC | Age: 74
End: 2019-03-07
Payer: MEDICARE

## 2019-03-07 VITALS — DIASTOLIC BLOOD PRESSURE: 62 MMHG | HEART RATE: 75 BPM | SYSTOLIC BLOOD PRESSURE: 118 MMHG

## 2019-03-07 PROCEDURE — 99999 PR PBB SHADOW E&M-EST. PATIENT-LVL I: ICD-10-PCS | Mod: PBBFAC,,, | Performed by: PHYSICIAN ASSISTANT

## 2019-03-07 PROCEDURE — 99211 OFF/OP EST MAY X REQ PHY/QHP: CPT | Mod: PBBFAC,PO | Performed by: PHYSICIAN ASSISTANT

## 2019-03-07 PROCEDURE — 99999 PR PBB SHADOW E&M-EST. PATIENT-LVL I: CPT | Mod: PBBFAC,,, | Performed by: PHYSICIAN ASSISTANT

## 2019-03-11 RX ORDER — DICLOFENAC SODIUM 75 MG/1
TABLET, DELAYED RELEASE ORAL
Qty: 120 TABLET | Refills: 0 | Status: SHIPPED | OUTPATIENT
Start: 2019-03-11 | End: 2019-05-10 | Stop reason: SDUPTHER

## 2019-04-11 RX ORDER — HYDROCHLOROTHIAZIDE 12.5 MG/1
TABLET ORAL
Qty: 90 TABLET | Refills: 3 | Status: SHIPPED | OUTPATIENT
Start: 2019-04-11 | End: 2020-04-06 | Stop reason: SDUPTHER

## 2019-05-10 ENCOUNTER — OFFICE VISIT (OUTPATIENT)
Dept: ENDOCRINOLOGY | Facility: CLINIC | Age: 74
End: 2019-05-10
Payer: MEDICARE

## 2019-05-10 VITALS
WEIGHT: 219.81 LBS | SYSTOLIC BLOOD PRESSURE: 119 MMHG | RESPIRATION RATE: 18 BRPM | TEMPERATURE: 98 F | BODY MASS INDEX: 29.77 KG/M2 | DIASTOLIC BLOOD PRESSURE: 67 MMHG | HEIGHT: 72 IN | HEART RATE: 55 BPM

## 2019-05-10 DIAGNOSIS — R79.89 LOW TESTOSTERONE: ICD-10-CM

## 2019-05-10 DIAGNOSIS — N40.0 BENIGN PROSTATIC HYPERPLASIA, UNSPECIFIED WHETHER LOWER URINARY TRACT SYMPTOMS PRESENT: ICD-10-CM

## 2019-05-10 DIAGNOSIS — E78.00 HYPERCHOLESTEROLEMIA: ICD-10-CM

## 2019-05-10 DIAGNOSIS — R73.03 PREDIABETES: ICD-10-CM

## 2019-05-10 DIAGNOSIS — E04.2 MULTINODULAR GOITER: ICD-10-CM

## 2019-05-10 DIAGNOSIS — E88.810 DYSMETABOLIC SYNDROME: ICD-10-CM

## 2019-05-10 DIAGNOSIS — E55.9 HYPOVITAMINOSIS D: ICD-10-CM

## 2019-05-10 DIAGNOSIS — E78.5 HYPERLIPIDEMIA, UNSPECIFIED HYPERLIPIDEMIA TYPE: ICD-10-CM

## 2019-05-10 DIAGNOSIS — E83.52 FHH (FAMILIAL HYPOCALCIURIC HYPERCALCEMIA): ICD-10-CM

## 2019-05-10 DIAGNOSIS — E83.52 HYPERCALCEMIA: Primary | ICD-10-CM

## 2019-05-10 DIAGNOSIS — E21.3 HYPERPARATHYROIDISM: ICD-10-CM

## 2019-05-10 DIAGNOSIS — E53.8 B12 DEFICIENCY: ICD-10-CM

## 2019-05-10 DIAGNOSIS — K21.9 GASTROESOPHAGEAL REFLUX DISEASE WITHOUT ESOPHAGITIS: ICD-10-CM

## 2019-05-10 DIAGNOSIS — E29.1 HYPOGONADISM IN MALE: ICD-10-CM

## 2019-05-10 DIAGNOSIS — I10 ESSENTIAL HYPERTENSION: ICD-10-CM

## 2019-05-10 PROCEDURE — 99214 OFFICE O/P EST MOD 30 MIN: CPT | Mod: S$PBB,,, | Performed by: INTERNAL MEDICINE

## 2019-05-10 PROCEDURE — 99999 PR PBB SHADOW E&M-EST. PATIENT-LVL III: ICD-10-PCS | Mod: PBBFAC,,, | Performed by: INTERNAL MEDICINE

## 2019-05-10 PROCEDURE — 99214 PR OFFICE/OUTPT VISIT, EST, LEVL IV, 30-39 MIN: ICD-10-PCS | Mod: S$PBB,,, | Performed by: INTERNAL MEDICINE

## 2019-05-10 PROCEDURE — 99213 OFFICE O/P EST LOW 20 MIN: CPT | Mod: PBBFAC,PO | Performed by: INTERNAL MEDICINE

## 2019-05-10 PROCEDURE — 99999 PR PBB SHADOW E&M-EST. PATIENT-LVL III: CPT | Mod: PBBFAC,,, | Performed by: INTERNAL MEDICINE

## 2019-05-10 RX ORDER — ATORVASTATIN CALCIUM 40 MG/1
40 TABLET, FILM COATED ORAL DAILY
Qty: 90 TABLET | Refills: 3 | Status: SHIPPED | OUTPATIENT
Start: 2019-05-10 | End: 2019-08-19 | Stop reason: ALTCHOICE

## 2019-05-10 RX ORDER — DICLOFENAC SODIUM 75 MG/1
TABLET, DELAYED RELEASE ORAL
Qty: 120 TABLET | Refills: 0 | Status: SHIPPED | OUTPATIENT
Start: 2019-05-10 | End: 2019-07-10 | Stop reason: SDUPTHER

## 2019-05-10 NOTE — PROGRESS NOTES
Subjective:      Patient ID: Papito Hutton is a 73 y.o. male.    Chief Complaint:  Hyperparathyroidism and Hypogonadism    73 yr old gentleman with hypogonadism and hypercalcemia presumed secondary to mild primary hyperparathyroidism seen in [facility] today.        History of Present Illness    Patient is a 73yr old gentleman with hypercalcemia and hypogonadism seen in [facility] today. He in addition has background comorbidities of hypertension, GERD, Vit B12 deficiency and hyperlipidemia.   The work up on account of the hypercalcemia suggests that this is likely due to mild primary hyperparathyroidsm  The patient was also found to have a multinodular goiter as noted on his initial thyroid Uss from 01/16 though all the identified nodules are subcm in size with no worrisome sonographic features. His most recent USS from 02/18 showed no significant intervel change in the findin gs and his next USS to re-evaluate the nodules should for for ~ 02/19.  He was recently found to have asymptomatic transaminitis of unclear etiology.  Patients baseline Pierpont score is 6. Patient had a sleep study in the past prior to have a nasal deviated septum fixed.  No major snoring problems.  Patient had been on testosterone injections ; testosterone cypionate 150mg every 14 days for androgen repletion but at last visit was switched to toipcal testosterone; 20mg Qd of fortesta. Patient apparently had a brain MRI ~ 2 yrs ago which was reportedly normal.  Patient reports that he had always had high calcium levels while he was in the [employer] since over 20 yrs ago.  Patient has never had kidney stones but he does have GERD.No known family history of hypercalcemia, no history of severe dyspepsia nor severe peptic ulcer disease.  He is on Vytorin 10/40mg Qd for hyperlipidemia with hypercholesterolemia.  Patient has never had a fracture of note and his screening DEXA was essentially -ve.  He is in excellent spirits today and has no fresh  complaints.           Review of Systems   Constitutional: Negative for diaphoresis and fatigue.   HENT: Negative for facial swelling and trouble swallowing.    Eyes: Negative for visual disturbance.   Respiratory: Negative for cough and shortness of breath.    Cardiovascular: Negative for chest pain and palpitations.   Gastrointestinal: Negative for abdominal distention, abdominal pain, diarrhea and vomiting.   Endocrine: Negative for polyuria.   Genitourinary: Negative for dysuria and frequency.   Musculoskeletal: Negative for arthralgias and back pain.   Skin: Negative for color change, pallor and rash.   Neurological: Negative for dizziness, light-headedness and headaches.   Hematological: Does not bruise/bleed easily.   Psychiatric/Behavioral: Negative for confusion. The patient is not nervous/anxious.        Objective: /67 (BP Location: Right arm, Patient Position: Sitting, BP Method: Large (Automatic))   Pulse (!) 55   Temp 97.7 °F (36.5 °C) (Oral)   Resp 18   Ht 6' (1.829 m)   Wt 99.7 kg (219 lb 12.8 oz)   BMI 29.81 kg/m²  Body surface area is 2.25 meters squared.         Physical Exam   Constitutional: He is oriented to person, place, and time. He appears well-developed and well-nourished. No distress.   Elderly gentleman. Clinically comfortable. Not in any apparent distress. Not pale, anicteric and afebrile. Well hydrated.   HENT:   Head: Normocephalic and atraumatic.   Eyes: Pupils are equal, round, and reactive to light. Conjunctivae and EOM are normal. No scleral icterus.   Neck: Normal range of motion. Neck supple. No JVD present. No tracheal deviation present. No thyromegaly present.   Cardiovascular: Normal rate, regular rhythm and normal heart sounds.   No murmur heard.  Pulmonary/Chest: Effort normal and breath sounds normal. No stridor. No respiratory distress. He has no wheezes. He has no rales.   Abdominal: Soft. He exhibits no distension. There is no tenderness.   Musculoskeletal:  Normal range of motion. He exhibits edema (Mild 1+ bipedal edema in ankles. no calf swelling nor tenderness). He exhibits no tenderness.   Neurological: He is alert and oriented to person, place, and time. No cranial nerve deficit.   Skin: Skin is warm and dry. No rash noted. He is not diaphoretic. No erythema. No pallor.   Psychiatric: He has a normal mood and affect. His behavior is normal. Judgment and thought content normal.   Vitals reviewed.      Lab Review:     Results for ROBBIE THOMAS (MRN [id number]) as of 5/10/2019 11:10   Ref. Range 7/17/2018 16:27 11/19/2018 17:00 1/29/2019 11:40   WBC Latest Ref Range: 3.90 - 12.70 K/uL  7.70    RBC Latest Ref Range: 4.60 - 6.20 M/uL  4.57 (L)    Hemoglobin Latest Ref Range: 14.0 - 18.0 g/dL  13.7 (L)    Hematocrit Latest Ref Range: 40.0 - 54.0 %  40.3    MCV Latest Ref Range: 82 - 98 fL  88    MCH Latest Ref Range: 27.0 - 31.0 pg  30.0    MCHC Latest Ref Range: 32.0 - 36.0 g/dL  34.0    RDW Latest Ref Range: 11.5 - 14.5 %  13.2    Platelets Latest Ref Range: 150 - 350 K/uL  225    MPV Latest Ref Range: 9.2 - 12.9 fL  11.0    Gran% Latest Ref Range: 38.0 - 73.0 %  57.5    Gran # (ANC) Latest Ref Range: 1.8 - 7.7 K/uL  4.4    Lymph% Latest Ref Range: 18.0 - 48.0 %  33.5    Lymph # Latest Ref Range: 1.0 - 4.8 K/uL  2.6    Mono% Latest Ref Range: 4.0 - 15.0 %  6.1    Mono # Latest Ref Range: 0.3 - 1.0 K/uL  0.5    Eosinophil% Latest Ref Range: 0.0 - 8.0 %  1.8    Eos # Latest Ref Range: 0.0 - 0.5 K/uL  0.1    Basophil% Latest Ref Range: 0.0 - 1.9 %  1.0    Baso # Latest Ref Range: 0.00 - 0.20 K/uL  0.08    nRBC Latest Ref Range: 0 /100 WBC  0    Differential Method Unknown  Automated    Immature Grans (Abs) Latest Ref Range: 0.00 - 0.04 K/uL  0.01    Immature Granulocytes Latest Ref Range: 0.0 - 0.5 %  0.1    Vitamin B-12 Latest Ref Range: 210 - 950 pg/mL  578    Sodium Latest Ref Range: 136 - 145 mmol/L  141    Potassium Latest Ref Range: 3.5 - 5.1 mmol/L  4.0     Chloride Latest Ref Range: 95 - 110 mmol/L  108    CO2 Latest Ref Range: 23 - 29 mmol/L  23    Anion Gap Latest Ref Range: 8 - 16 mmol/L  10    BUN, Bld Latest Ref Range: 8 - 23 mg/dL  17    Creatinine Latest Ref Range: 0.5 - 1.4 mg/dL  0.9    eGFR if non African American Latest Ref Range: >60 mL/min/1.73 m^2  >60.0    eGFR if African American Latest Ref Range: >60 mL/min/1.73 m^2  >60.0    Glucose Latest Ref Range: 70 - 110 mg/dL  110    Calcium Latest Ref Range: 8.7 - 10.5 mg/dL  10.7 (H)    Calcium, Ion Latest Ref Range: 1.06 - 1.42 mmol/L 1.46 (H) 1.43 (H)    Phosphorus Latest Ref Range: 2.7 - 4.5 mg/dL 2.5 (L) 2.5 (L)    Magnesium Latest Ref Range: 1.6 - 2.6 mg/dL 2.1 1.9    Alkaline Phosphatase Latest Ref Range: 55 - 135 U/L  104    PROTEIN TOTAL Latest Ref Range: 6.0 - 8.4 g/dL  6.9    Albumin Latest Ref Range: 3.5 - 5.2 g/dL  3.8    Uric Acid Latest Ref Range: 3.4 - 7.0 mg/dL 5.5     BILIRUBIN TOTAL Latest Ref Range: 0.1 - 1.0 mg/dL  0.5    AST Latest Ref Range: 10 - 40 U/L  28    ALT Latest Ref Range: 10 - 44 U/L  47 (H)    Triglycerides Latest Ref Range: 30 - 150 mg/dL   304 (H)   Cholesterol Latest Ref Range: 120 - 199 mg/dL   229 (H)   HDL Latest Ref Range: 40 - 75 mg/dL   57   Hdl/Cholesterol Ratio Latest Ref Range: 20.0 - 50.0 %   24.9   LDL Cholesterol Latest Ref Range: 63.0 - 159.0 mg/dL   111.2   Non-HDL Cholesterol Latest Units: mg/dL   172   Total Cholesterol/HDL Ratio Latest Ref Range: 2.0 - 5.0    4.0   Iodine, Serum Latest Ref Range: 40 - 92 ng/mL 85     Vit D, 25-Hydroxy Latest Ref Range: 30 - 96 ng/mL 32 39    Hemoglobin A1C External Latest Ref Range: 4.0 - 5.6 % 5.6 5.5    Estimated Avg Glucose Latest Ref Range: 68 - 131 mg/dL 114 111    TSH Latest Ref Range: 0.400 - 4.000 uIU/mL 0.828     T3, Total Latest Ref Range: 60 - 180 ng/dL 77     Free T4 Latest Ref Range: 0.71 - 1.51 ng/dL 1.02     Thyroglobulin Interpretation Unknown SEE BELOW     Thyroglobulin Antibody Screen Latest Ref Range:  <4.0 IU/mL <1.8     Thyroglobulin, Tumor Marker Latest Units: ng/mL 17 (H)     PTH Latest Ref Range: 9.0 - 77.0 pg/mL 124.0 (H) 108.0 (H)    Calcitonin Latest Ref Range: <=14.3 pg/mL 8.2     Chromogranin A Latest Ref Range: 0 - 95 ng/mL 58     PSA, SCREEN Latest Ref Range: 0.00 - 4.00 ng/mL   0.40   Albumin Latest Ref Range: 3.6 - 5.1 g/dL 4.4 4.3    Sex Hormone Binding Globulin Latest Ref Range: 22 - 77 nmol/L 38 35    Testosterone Latest Ref Range: 250 - 1100 ng/dL 287 204 (L)    Testosterone Testosterone Latest Ref Range: 15.0 - 150.0 ng/dL 65.2 47.7    Testosterone, Free Latest Ref Range: 6.0 - 73.0 pg/mL 32.4 24.2        Assessment:     1. Hypercalcemia     2. FHH (familial hypocalciuric hypercalcemia)     3. Essential hypertension  Comprehensive metabolic panel    Lipid panel    Microalbumin, Timed Urine 24 Hours    Urinalysis   4. Hyperlipidemia, unspecified hyperlipidemia type  TSH    T3    Uric acid    T4, free    Lipid panel   5. Hypercholesterolemia  TSH    T3    Uric acid    T4, free    Lipid panel   6. Prediabetes  Hemoglobin A1c    Uric acid   7. Hypogonadism in male  CBC auto differential    Testosterone Panel    Dihydrotestosterone    Estradiol    Estrogens, total   8. Low testosterone  CBC auto differential    Testosterone Panel    Dihydrotestosterone    Estradiol    Estrogens, total   9. Hypovitaminosis D  Vitamin D    PTH, intact    Calcium, ionized    Magnesium    Phosphorus   10. Multinodular goiter     11. Hyperparathyroidism     12. B12 deficiency  Vitamin B12 Deficiency Panel    Vitamin B12    Folate    Folate RBC   13. Gastroesophageal reflux disease without esophagitis     14. Dysmetabolic syndrome  Hemoglobin A1c    Comprehensive metabolic panel    Uric acid    Microalbumin, Timed Urine 24 Hours    Urinalysis   15. Benign prostatic hyperplasia, unspecified whether lower urinary tract symptoms present  PSA, total and free        Regarding hypercalcemia. This appears due to mild primary  hyperparathyroidism. Will continue to monitor calcium trends but no need for any active intervaentiojn at the present time. To continue to encourage copious free water intake.  Regarding hypogonadism;  To continue androgel 20.25mg QD  And will recheck androgen levels today and adjust dose ad needed to get testosterone levels into the mid normal range.  Regarding hypertension; BP profile well controlled. To continue present antihypertensive regimen and to continue serial BP tracking.  Regarding hyperlipidemia with hypercholesterolemia; to commence trial of low dose lipitor 10mg QD and will recheck CMP in ~ 1mth after starting to see if transaminitis relapses. If it does then it would mean he has a class sensitivity to statins and we will then have to change him to a non statin antilipidemic.  Regarding hypovitaminosis D; will recheck 25 [place] vit D levels today.  Regarding multinodular goiter; to have repeat USS of thyroid.  Regarding GERD; symptomatically stable. To continue PPI therapy as before        Plan:       FFup in ~ 4mths

## 2019-05-23 RX ORDER — AMLODIPINE AND BENAZEPRIL HYDROCHLORIDE 10; 40 MG/1; MG/1
CAPSULE ORAL
Qty: 90 CAPSULE | Refills: 3 | Status: SHIPPED | OUTPATIENT
Start: 2019-05-23 | End: 2020-06-08 | Stop reason: SDUPTHER

## 2019-07-03 RX ORDER — METOPROLOL SUCCINATE 25 MG/1
TABLET, EXTENDED RELEASE ORAL
Qty: 90 TABLET | Refills: 3 | Status: SHIPPED | OUTPATIENT
Start: 2019-07-03 | End: 2020-07-10

## 2019-07-10 RX ORDER — DICLOFENAC SODIUM 75 MG/1
TABLET, DELAYED RELEASE ORAL
Qty: 120 TABLET | Refills: 0 | Status: SHIPPED | OUTPATIENT
Start: 2019-07-10 | End: 2019-09-08 | Stop reason: SDUPTHER

## 2019-07-10 RX ORDER — FLUTICASONE PROPIONATE 50 MCG
2 SPRAY, SUSPENSION (ML) NASAL DAILY
Qty: 48 G | Refills: 0 | Status: SHIPPED | OUTPATIENT
Start: 2019-07-10 | End: 2019-10-10 | Stop reason: SDUPTHER

## 2019-07-15 ENCOUNTER — PATIENT OUTREACH (OUTPATIENT)
Dept: ADMINISTRATIVE | Facility: HOSPITAL | Age: 74
End: 2019-07-15

## 2019-07-15 NOTE — LETTER
July 23, 2019    Papito Hutton  [address]  [place] [address]             [facility]  [address]  [facility] [address]  Phone: [phone] Papito Hutton   [address]   [place] [address]         Dear, Papito Hutton       [facility].  To help you get the most out of each of your visits, we will review your information to make sure you are up to date on all of your recommended tests and/or procedures.       Dr. Meggan Ramachandran has found that your chart shows you may be due for      COLONOSCOPY     If you have had any of the above done at another facility, please bring the records or information with you so that your record at [facility] will be complete.  If you would like to schedule any of these, please contact the clinic at [phone].     If you are currently taking medication, please bring it with you to your appointment for review.     Also, if you have any type of Advanced Directives, please bring them with you to your office visit so we may scan them into your chart.     Thank You,     Your [facility] Team,   Dr Marietta TRAORE,  Panel Care Coordinator   [facility] [address]   [place] [address]   Phone [phone]   Fax [phone]

## 2019-07-29 ENCOUNTER — OFFICE VISIT (OUTPATIENT)
Dept: FAMILY MEDICINE | Facility: CLINIC | Age: 74
End: 2019-07-29
Payer: MEDICARE

## 2019-07-29 VITALS
HEIGHT: 72 IN | DIASTOLIC BLOOD PRESSURE: 58 MMHG | WEIGHT: 220.69 LBS | TEMPERATURE: 98 F | SYSTOLIC BLOOD PRESSURE: 138 MMHG | HEART RATE: 57 BPM | BODY MASS INDEX: 29.89 KG/M2 | OXYGEN SATURATION: 95 %

## 2019-07-29 DIAGNOSIS — R09.81 NASAL CONGESTION: Primary | ICD-10-CM

## 2019-07-29 PROCEDURE — 99999 PR PBB SHADOW E&M-EST. PATIENT-LVL III: CPT | Mod: PBBFAC,,, | Performed by: FAMILY MEDICINE

## 2019-07-29 PROCEDURE — 99999 PR PBB SHADOW E&M-EST. PATIENT-LVL III: ICD-10-PCS | Mod: PBBFAC,,, | Performed by: FAMILY MEDICINE

## 2019-07-29 PROCEDURE — 99213 OFFICE O/P EST LOW 20 MIN: CPT | Mod: S$PBB,,, | Performed by: FAMILY MEDICINE

## 2019-07-29 PROCEDURE — 99213 OFFICE O/P EST LOW 20 MIN: CPT | Mod: PBBFAC,PO | Performed by: FAMILY MEDICINE

## 2019-07-29 PROCEDURE — 99213 PR OFFICE/OUTPT VISIT, EST, LEVL III, 20-29 MIN: ICD-10-PCS | Mod: S$PBB,,, | Performed by: FAMILY MEDICINE

## 2019-07-29 RX ORDER — AZELASTINE 1 MG/ML
1 SPRAY, METERED NASAL 2 TIMES DAILY
Qty: 30 ML | Refills: 2 | Status: SHIPPED | OUTPATIENT
Start: 2019-07-29 | End: 2020-02-04

## 2019-07-29 NOTE — PROGRESS NOTES
Subjective:       Patient ID: Papito Hutton is a 73 y.o. male.    Chief Complaint: Establish Care    HPI    Presents to clinic to establish care.     Complains that his sinuses can act up. Was placed on flonase which works some of the times. Has had nasal surgery. Runny nose isn't constant.     [facility]      Past Medical History:   Diagnosis Date    Arthritis     B12 deficiency     GERD (gastroesophageal reflux disease)     Hyperlipidemia     Hypertension     Low testosterone        Past Surgical History:   Procedure Laterality Date    NASAL SEPTUM SURGERY      right hand surgery         Family History   Problem Relation Age of Onset    Cancer Neg Hx     Diabetes Neg Hx     Heart disease Neg Hx        Social History     Tobacco Use    Smoking status: Current Some Day Smoker     Types: Cigars    Smokeless tobacco: Former User    Tobacco comment: smoke 1 cigar every couple of months   Substance Use Topics    Alcohol use: Yes     Alcohol/week: 1.2 oz     Types: 2 Cans of beer per week    Drug use: No       Social History     Substance and Sexual Activity   Sexual Activity Yes    Partners: Female          Current Outpatient Medications:     amlodipine-benazepril (LOTREL) 10-40 mg per capsule, TAKE 1 CAPSULE DAILY, Disp: 90 capsule, Rfl: 3    atorvastatin (LIPITOR) 40 MG tablet, Take 1 tablet (40 mg total) by mouth once daily., Disp: 90 tablet, Rfl: 3    CHOLECALCIFEROL, VITAMIN D3, (VITAMIN D3 ORAL), Take 1 capsule by mouth once daily., Disp: , Rfl:     cyanocobalamin (VITAMIN B-12) 1000 MCG tablet, Take 100 mcg by mouth once daily., Disp: , Rfl:     diclofenac (VOLTAREN) 75 MG EC tablet, TAKE 1 TABLET TWICE A DAY, Disp: 120 tablet, Rfl: 0    fluticasone propionate (FLONASE) 50 mcg/actuation nasal spray, 2 sprays (100 mcg total) by Each Nare route once daily., Disp: 48 g, Rfl: 0    hydroCHLOROthiazide (HYDRODIURIL) 12.5 MG Tab, TAKE 1 TABLET DAILY, Disp: 90  tablet, Rfl: 3    pantoprazole (PROTONIX) 40 MG tablet, Take 1 tablet (40 mg total) by mouth once daily., Disp: 90 tablet, Rfl: 3    testosterone (FORTESTA) 10 mg/0.5 gram /actuation GlPm, Place 20 mg onto the skin once daily., Disp: 120 Bottle, Rfl: 3    TOPROL XL 25 mg 24 hr tablet, TAKE 1 TABLET DAILY, Disp: 90 tablet, Rfl: 3     Review of patient's allergies indicates:   Allergen Reactions    Vytorin 10-10 [ezetimibe-simvastatin] Other (See Comments)     Transaminitis            Review of Systems   Constitutional: Negative for chills and fever.   HENT: Negative for congestion and sore throat.    Eyes: Negative for visual disturbance.   Respiratory: Negative for cough and shortness of breath.    Cardiovascular: Negative for chest pain.   Gastrointestinal: Negative for abdominal pain, constipation, diarrhea, nausea and vomiting.   Genitourinary: Negative for dysuria.   Musculoskeletal: Negative for joint swelling.   Skin: Negative for rash and wound.   Neurological: Negative for dizziness and headaches.   Hematological: Does not bruise/bleed easily.           Objective:          Vitals:    07/29/19 0846   BP: (!) 138/58   Pulse: (!) 57   Temp: 98 °F (36.7 °C)   SpO2: 95%   Weight: 100.1 kg (220 lb 10.9 oz)   Height: 6' (1.829 m)       Physical Exam   Constitutional: He appears well-developed and well-nourished. He is cooperative. No distress.   HENT:   Head: Normocephalic and atraumatic.   Right Ear: Hearing normal.   Left Ear: Hearing normal.   Nose: Nose normal.   Eyes: Conjunctivae and lids are normal.   Cardiovascular: Normal rate, regular rhythm, normal heart sounds and normal pulses.   Pulmonary/Chest: Effort normal and breath sounds normal.   Abdominal: Soft. Normal appearance and bowel sounds are normal. There is no tenderness.   Musculoskeletal: Normal range of motion.   Neurological: He is alert.   Skin: Skin is warm. No rash noted. No cyanosis.   Psychiatric: He has a normal mood and affect.  His speech is normal and behavior is normal. Cognition and memory are normal.   Vitals reviewed.              Assessment/Plan     Papito was seen today for establish care.    Diagnoses and all orders for this visit:    Nasal congestion  -     azelastine (ASTELIN) 137 mcg (0.1 %) nasal spray; 1 spray (137 mcg total) by Nasal route 2 (two) times daily.      Advised to get shingles and tetanus vaccine at his local pharmacy. He v/u    Follow up in about 6 months (around 1/29/2020) for htn.    Future Appointments   Date Time Provider Department Center   9/27/2019  8:00 AM MD [facility] [place]   1/27/2020  8:40 AM MD [facility]IROSLA[facility] [place]       Patient readiness: acceptance and barriers:none    During the course of the visit the patient was educated and counseled about the following:     Hypertension:   Dietary sodium restriction.  Regular aerobic exercise.    Goals: Hypertension: Reduce Blood Pressure    Did patient meet goals/outcomes: Yes    Patient Instructions (the written plan) was given to the patient/family.     Time spent with patient: 15 minutes    Barriers to medications present (no )    Adverse reactions to current medications (no)    Over the counter medications reviewed (No)    [facility]

## 2019-07-29 NOTE — PATIENT INSTRUCTIONS
Chondroitin; Glucosamine tablets or capsules  What is this medicine?  CHONDROITIN; GLUCOSAMINE (anna DROI tin; gloo KOH shanda elliotten) is a dietary supplement. It is promoted for its ability to reduce the symptoms of osteoarthritis by maintaining healthy joint cartilage. The FDA has not approved this supplement for any medical use.  How should I use this medicine?  Take this supplement by mouth with a glass of water. Follow the directions on the package labeling, or take as directed by your health care professional. Take your medicine at regular intervals. Do not take this supplement more often than directed.  Talk to your pediatrician regarding the use of this medicine in children. Special care may be needed.  What side effects may I notice from receiving this medicine?  Side effects that you should report to your doctor or health care professional as soon as possible:  · allergic reactions like skin rash, itching or hives, swelling of the face, lips, or tongue  · breathing problems  · constipation  · diarrhea  · difficulty sleeping  · drowsiness  · hair loss  · headache  · loss of appetite  · stomach pain  · swelling of the ankles or feet  Side effects that usually do not require medical attention (report to your doctor or health care professional if they continue or are bothersome):  · gas  · nausea  · upset stomach  What may interact with this medicine?  Check with your doctor or healthcare professional if you are taking any of the following medications:  · warfarin  What if I miss a dose?  If you miss a dose, take it as soon as you can. If it is almost time for your next dose, take only that dose. Do not take double or extra doses.  Where should I keep my medicine?  Keep out of the reach of children.  Store at room temperature between 15 and 30 degrees C (59 and 86 degrees F) or as directed on the package label. Protect from moisture. Throw away any unused supplement after the expiration date.  What should I tell  my health care provider before I take this medicine?  They need to know if you have any of these conditions:  · diabetes  · heart disease  · kidney disease  · liver disease  · stomach or intestinal problems  · an unusual or allergic reaction to chondroitin, glucosamine, sulfonamides, other medicines, foods, dyes, or preservatives  · pregnant or trying to get pregnant  · breast-feeding  What should I watch for while using this medicine?  Tell your doctor or healthcare professional if your symptoms do not start to get better or if they get worse. This supplement may take several weeks to work for you.  If you are scheduled for any medical or dental procedure, tell your healthcare provider that you are taking this supplement. You may need to stop taking this supplement before the procedure.  This supplement may affect blood sugar levels. If you have diabetes, check with your doctor or health care professional before you change your diet or the dose of your diabetic medicine.  Herbal or dietary supplements are not regulated like medicines. Rigid  standards are not required for dietary supplements. The purity and strength of these products can vary. The safety and effect of this dietary supplement for a certain disease or illness is not well known. This product is not intended to diagnose, treat, cure or prevent any disease.  The Food and Drug Administration suggests the following to help consumers protect themselves:  · Always read product labels and follow directions.  · Natural does not mean a product is safe for humans to take.  · Look for products that include USP after the ingredient name. This means that the  followed the standards of the [place] Pharmacopoeia.  · Supplements made or sold by a nationally known food or drug company are more likely to be made under tight controls. You can write to the company for more information about how the product was made.  NOTE:This sheet is a summary. It  may not cover all possible information. If you have questions about this medicine, talk to your doctor, pharmacist, or health care provider. Copyright© 2017 Gold Standard      Shingles Vaccination    Shingles vaccination is the only way to protect against shingles (herpes zoster), a painful rash of blisters most often wrapping around the side of the body. It is caused by the same virus that causes chickenpox. Shingrix is the preferred vaccine to prevent shingles and its complications. You are recommended to receive this vaccine at the age of 50 years. Anyone who received the previous shingles vaccine should be revaccinated with Shingrix. It is a 2-dose series  by 2 to 6 months.

## 2019-08-16 DIAGNOSIS — E29.1 HYPOGONADISM IN MALE: ICD-10-CM

## 2019-08-16 RX ORDER — TESTOSTERONE 10 MG/.5G
20 GEL, METERED TOPICAL DAILY
Qty: 120 BOTTLE | Refills: 3 | Status: SHIPPED | OUTPATIENT
Start: 2019-08-16 | End: 2019-08-19 | Stop reason: SDUPTHER

## 2019-08-19 ENCOUNTER — OFFICE VISIT (OUTPATIENT)
Dept: ENDOCRINOLOGY | Facility: CLINIC | Age: 74
End: 2019-08-19
Payer: MEDICARE

## 2019-08-19 ENCOUNTER — LAB VISIT (OUTPATIENT)
Dept: LAB | Facility: HOSPITAL | Age: 74
End: 2019-08-19
Attending: INTERNAL MEDICINE
Payer: MEDICARE

## 2019-08-19 VITALS
HEIGHT: 72 IN | SYSTOLIC BLOOD PRESSURE: 130 MMHG | HEART RATE: 67 BPM | BODY MASS INDEX: 30.34 KG/M2 | TEMPERATURE: 98 F | WEIGHT: 224 LBS | DIASTOLIC BLOOD PRESSURE: 60 MMHG

## 2019-08-19 DIAGNOSIS — E78.5 HYPERLIPIDEMIA, UNSPECIFIED HYPERLIPIDEMIA TYPE: ICD-10-CM

## 2019-08-19 DIAGNOSIS — E79.0 HYPERURICEMIA: ICD-10-CM

## 2019-08-19 DIAGNOSIS — E04.2 MULTINODULAR GOITER: ICD-10-CM

## 2019-08-19 DIAGNOSIS — E88.810 DYSMETABOLIC SYNDROME: ICD-10-CM

## 2019-08-19 DIAGNOSIS — K21.9 GASTROESOPHAGEAL REFLUX DISEASE WITHOUT ESOPHAGITIS: ICD-10-CM

## 2019-08-19 DIAGNOSIS — E83.52 HYPERCALCEMIA: Primary | ICD-10-CM

## 2019-08-19 DIAGNOSIS — E78.00 HYPERCHOLESTEROLEMIA: ICD-10-CM

## 2019-08-19 DIAGNOSIS — E21.3 HYPERPARATHYROIDISM: ICD-10-CM

## 2019-08-19 DIAGNOSIS — Z91.89 AT RISK FOR HEART DISEASE: ICD-10-CM

## 2019-08-19 DIAGNOSIS — R79.89 LOW TESTOSTERONE: ICD-10-CM

## 2019-08-19 DIAGNOSIS — E83.52 FHH (FAMILIAL HYPOCALCIURIC HYPERCALCEMIA): ICD-10-CM

## 2019-08-19 DIAGNOSIS — E78.00 HYPERCHOLESTEROLEMIA: Primary | ICD-10-CM

## 2019-08-19 DIAGNOSIS — E29.1 HYPOGONADISM IN MALE: ICD-10-CM

## 2019-08-19 DIAGNOSIS — E66.9 OBESITY, CLASS I, BMI 30-34.9: ICD-10-CM

## 2019-08-19 DIAGNOSIS — R73.03 PREDIABETES: ICD-10-CM

## 2019-08-19 DIAGNOSIS — E83.52 HYPERCALCEMIA: ICD-10-CM

## 2019-08-19 DIAGNOSIS — E55.9 HYPOVITAMINOSIS D: ICD-10-CM

## 2019-08-19 DIAGNOSIS — I10 ESSENTIAL HYPERTENSION: ICD-10-CM

## 2019-08-19 LAB
CA-I BLDV-SCNC: 1.53 MMOL/L (ref 1.06–1.42)
CHOLEST SERPL-MCNC: 225 MG/DL (ref 120–199)
CHOLEST/HDLC SERPL: 4.8 {RATIO} (ref 2–5)
HDLC SERPL-MCNC: 47 MG/DL (ref 40–75)
HDLC SERPL: 20.9 % (ref 20–50)
LDLC SERPL CALC-MCNC: ABNORMAL MG/DL (ref 63–159)
MAGNESIUM SERPL-MCNC: 1.9 MG/DL (ref 1.6–2.6)
NONHDLC SERPL-MCNC: 178 MG/DL
PHOSPHATE SERPL-MCNC: 2.4 MG/DL (ref 2.7–4.5)
PTH-INTACT SERPL-MCNC: 105 PG/ML (ref 9–77)
T3 SERPL-MCNC: 35 NG/DL (ref 60–180)
T4 FREE SERPL-MCNC: 1.05 NG/DL (ref 0.71–1.51)
TRIGL SERPL-MCNC: 526 MG/DL (ref 30–150)
TSH SERPL DL<=0.005 MIU/L-ACNC: 0.42 UIU/ML (ref 0.4–4)
URATE SERPL-MCNC: 8.7 MG/DL (ref 3.4–7)

## 2019-08-19 PROCEDURE — 99214 OFFICE O/P EST MOD 30 MIN: CPT | Mod: S$PBB,,, | Performed by: INTERNAL MEDICINE

## 2019-08-19 PROCEDURE — 99999 PR PBB SHADOW E&M-EST. PATIENT-LVL IV: CPT | Mod: PBBFAC,,, | Performed by: INTERNAL MEDICINE

## 2019-08-19 PROCEDURE — 84439 ASSAY OF FREE THYROXINE: CPT

## 2019-08-19 PROCEDURE — 84443 ASSAY THYROID STIM HORMONE: CPT

## 2019-08-19 PROCEDURE — 82330 ASSAY OF CALCIUM: CPT

## 2019-08-19 PROCEDURE — 84550 ASSAY OF BLOOD/URIC ACID: CPT

## 2019-08-19 PROCEDURE — 82043 UR ALBUMIN QUANTITATIVE: CPT

## 2019-08-19 PROCEDURE — 80061 LIPID PANEL: CPT

## 2019-08-19 PROCEDURE — 83735 ASSAY OF MAGNESIUM: CPT

## 2019-08-19 PROCEDURE — 84480 ASSAY TRIIODOTHYRONINE (T3): CPT

## 2019-08-19 PROCEDURE — 99999 PR PBB SHADOW E&M-EST. PATIENT-LVL IV: ICD-10-PCS | Mod: PBBFAC,,, | Performed by: INTERNAL MEDICINE

## 2019-08-19 PROCEDURE — 84100 ASSAY OF PHOSPHORUS: CPT

## 2019-08-19 PROCEDURE — 86316 IMMUNOASSAY TUMOR OTHER: CPT

## 2019-08-19 PROCEDURE — 83970 ASSAY OF PARATHORMONE: CPT

## 2019-08-19 PROCEDURE — 99214 OFFICE O/P EST MOD 30 MIN: CPT | Mod: PBBFAC,PO | Performed by: INTERNAL MEDICINE

## 2019-08-19 PROCEDURE — 99214 PR OFFICE/OUTPT VISIT, EST, LEVL IV, 30-39 MIN: ICD-10-PCS | Mod: S$PBB,,, | Performed by: INTERNAL MEDICINE

## 2019-08-19 RX ORDER — ROSUVASTATIN CALCIUM 20 MG/1
20 TABLET, COATED ORAL DAILY
Qty: 90 TABLET | Refills: 3 | Status: SHIPPED | OUTPATIENT
Start: 2019-08-19 | End: 2020-07-27

## 2019-08-19 RX ORDER — TESTOSTERONE 10 MG/.5G
20 GEL, METERED TOPICAL DAILY
Qty: 120 BOTTLE | Refills: 3 | Status: SHIPPED | OUTPATIENT
Start: 2019-08-19 | End: 2019-08-27 | Stop reason: SDUPTHER

## 2019-08-19 RX ORDER — NAPROXEN SODIUM 220 MG/1
81 TABLET, FILM COATED ORAL DAILY
Refills: 0 | Status: ON HOLD | COMMUNITY
Start: 2019-08-19 | End: 2023-09-12

## 2019-08-19 NOTE — PROGRESS NOTES
Subjective:      Patient ID: Papito Hutton is a 74 y.o. male.    Chief Complaint:    74 yr old gentleman with hypogonadism and hypercalcemia presumed secondary to mild primary hyperparathyroidism seen in [facility] today.    History of Present Illness    Patient is a 74yr old gentleman with hypercalcemia and hypogonadism seen in [facility] today. He in addition has background comorbidities of hypertension, GERD, Vit B12 deficiency and hyperlipidemia.   The work up on account of the hypercalcemia suggests that this is likely due to mild primary hyperparathyroidsm  The patient was also found to have a multinodular goiter as noted on his initial thyroid Uss from 01/16 though all the identified nodules are subcm in size with no worrisome sonographic features. His most recent USS from 02/18 showed no significant intervel change in the findin gs and his next USS to re-evaluate the nodules should for for ~ 02/19.  He was recently found to have asymptomatic transaminitis of unclear etiology.  Patients baseline Washington score is 6. Patient had a sleep study in the past prior to have a nasal deviated septum fixed.  No major snoring problems.  Patient had been on testosterone injections ; testosterone cypionate 150mg every 14 days for androgen repletion but at last visit was switched to toipcal testosterone; 20mg Qd of fortesta. Patient apparently had a brain MRI ~ 2 yrs ago which was reportedly normal.  Patient reports that he had always had high calcium levels while he was in the [employer] since over 20 yrs ago.  Patient has never had kidney stones but he does have GERD.No known family history of hypercalcemia, no history of severe dyspepsia nor severe peptic ulcer disease.  He is on Vytorin 10/40mg Qd for hyperlipidemia with hypercholesterolemia.  Patient has never had a fracture of note and his screening DEXA was essentially -ve.  He is in excellent spirits today and has no fresh complaints.    The 10-year ASCVD risk score (Az DC   et al., 2013) is: 31.5%    Values used to calculate the score:      Age: 74 years      Sex: Male      Is Non- : No      Diabetic: No      Tobacco smoker: Yes      Systolic Blood Pressure: 130 mmHg      Is BP treated: Yes      HDL Cholesterol: 49 mg/dL      Total Cholesterol: 249 mg/dL    Review of Systems   Constitutional: Negative for diaphoresis and fatigue.   HENT: Negative for facial swelling and trouble swallowing.    Eyes: Negative for visual disturbance.   Respiratory: Negative for cough and shortness of breath.    Cardiovascular: Negative for chest pain and palpitations.   Gastrointestinal: Negative for abdominal distention, abdominal pain, diarrhea and vomiting.   Endocrine: Negative for polyuria.   Genitourinary: Negative for dysuria and frequency.   Musculoskeletal: Negative for arthralgias and back pain.   Skin: Negative for color change, pallor and rash.   Neurological: Negative for dizziness, light-headedness and headaches.   Hematological: Does not bruise/bleed easily.   Psychiatric/Behavioral: Negative for confusion. The patient is not nervous/anxious.        Objective:   /60 (BP Location: Right arm, Patient Position: Sitting, BP Method: Medium (Manual))   Pulse 67   Temp 97.6 °F (36.4 °C) (Oral)   Ht 6' (1.829 m)   Wt 101.6 kg (223 lb 15.8 oz)   BMI 30.38 kg/m²  Body surface area is 2.27 meters squared.         Physical Exam   Constitutional: He is oriented to person, place, and time. He appears well-developed and well-nourished. No distress.   Elderly gentleman. Clinically comfortable. Not in any apparent distress. Not pale, anicteric and afebrile. Well hydrated.   HENT:   Head: Normocephalic and atraumatic.   Eyes: Pupils are equal, round, and reactive to light. Conjunctivae and EOM are normal. No scleral icterus.   Neck: Normal range of motion. Neck supple. No JVD present. No tracheal deviation present. No thyromegaly present.   Cardiovascular: Normal rate,  regular rhythm and normal heart sounds.   No murmur heard.  Pulmonary/Chest: Effort normal and breath sounds normal. No stridor. No respiratory distress. He has no wheezes. He has no rales.   Abdominal: Soft. He exhibits no distension. There is no tenderness.   Musculoskeletal: Normal range of motion. He exhibits edema (Mild 1+ bipedal edema in ankles. no calf swelling nor tenderness). He exhibits no tenderness.   Neurological: He is alert and oriented to person, place, and time. No cranial nerve deficit.   Skin: Skin is warm and dry. No rash noted. He is not diaphoretic. No erythema. No pallor.   Psychiatric: He has a normal mood and affect. His behavior is normal. Judgment and thought content normal.   Vitals reviewed.      Lab Review:     Results for ROBBIE THOMAS (MRN [id number]) as of 8/19/2019 10:55   Ref. Range 5/10/2019 11:33 5/10/2019 11:51 5/10/2019 11:51   WBC Latest Ref Range: 3.90 - 12.70 K/uL  6.29    RBC Latest Ref Range: 4.60 - 6.20 M/uL  5.03    Hemoglobin Latest Ref Range: 14.0 - 18.0 g/dL  15.1    Hematocrit Latest Ref Range: 40.0 - 54.0 %  45.7    MCV Latest Ref Range: 82 - 98 fL  91    MCH Latest Ref Range: 27.0 - 31.0 pg  30.0    MCHC Latest Ref Range: 32.0 - 36.0 g/dL  33.0    RDW Latest Ref Range: 11.5 - 14.5 %  13.4    Platelets Latest Ref Range: 150 - 350 K/uL  276    MPV Latest Ref Range: 9.2 - 12.9 fL  11.1    Gran% Latest Ref Range: 38.0 - 73.0 %  47.8    Gran # (ANC) Latest Ref Range: 1.8 - 7.7 K/uL  3.0    Lymph% Latest Ref Range: 18.0 - 48.0 %  39.3    Lymph # Latest Ref Range: 1.0 - 4.8 K/uL  2.5    Mono% Latest Ref Range: 4.0 - 15.0 %  7.6    Mono # Latest Ref Range: 0.3 - 1.0 K/uL  0.5    Eosinophil% Latest Ref Range: 0.0 - 8.0 %  3.7    Eos # Latest Ref Range: 0.0 - 0.5 K/uL  0.2    Basophil% Latest Ref Range: 0.0 - 1.9 %  1.3    Baso # Latest Ref Range: 0.00 - 0.20 K/uL  0.08    nRBC Latest Ref Range: 0 /100 WBC  0    Differential Method Unknown  Automated    Immature Grans  (Abs) Latest Ref Range: 0.00 - 0.04 K/uL  0.02    Immature Granulocytes Latest Ref Range: 0.0 - 0.5 %  0.3    Folate Latest Ref Range: 4.0 - 24.0 ng/mL  5.8    Vitamin B-12 Latest Ref Range: 180 - 914 ng/L  1479 (H) >1400 (H)   Sodium Latest Ref Range: 136 - 145 mmol/L  141    Potassium Latest Ref Range: 3.5 - 5.1 mmol/L  4.1    Chloride Latest Ref Range: 95 - 110 mmol/L  104    CO2 Latest Ref Range: 23 - 29 mmol/L  22 (L)    Anion Gap Latest Ref Range: 8 - 16 mmol/L  15    BUN, Bld Latest Ref Range: 8 - 23 mg/dL  23    Creatinine Latest Ref Range: 0.5 - 1.4 mg/dL  1.4    eGFR if non African American Latest Ref Range: >60 mL/min/1.73 m^2  49.8 (A)    eGFR if African American Latest Ref Range: >60 mL/min/1.73 m^2  57.6 (A)    Glucose Latest Ref Range: 70 - 110 mg/dL  99    Calcium Latest Ref Range: 8.7 - 10.5 mg/dL  11.0 (H)    Calcium, Ion Latest Ref Range: 1.06 - 1.42 mmol/L  1.44 (H)    Phosphorus Latest Ref Range: 2.7 - 4.5 mg/dL  2.9    Magnesium Latest Ref Range: 1.6 - 2.6 mg/dL  2.0    Alkaline Phosphatase Latest Ref Range: 55 - 135 U/L  102    PROTEIN TOTAL Latest Ref Range: 6.0 - 8.4 g/dL  7.9    Albumin Latest Ref Range: 3.5 - 5.2 g/dL  4.2    Uric Acid Latest Ref Range: 3.4 - 7.0 mg/dL  9.4 (H)    BILIRUBIN TOTAL Latest Ref Range: 0.1 - 1.0 mg/dL  0.6    AST Latest Ref Range: 10 - 40 U/L  30    ALT Latest Ref Range: 10 - 44 U/L  42    Triglycerides Latest Ref Range: 30 - 150 mg/dL  625 (H)    Cholesterol Latest Ref Range: 120 - 199 mg/dL  249 (H)    HDL Latest Ref Range: 40 - 75 mg/dL  49    Hdl/Cholesterol Ratio Latest Ref Range: 20.0 - 50.0 %  19.7 (L)    LDL Cholesterol External Latest Ref Range: 63.0 - 159.0 mg/dL  Invalid, Trig>400.0    Non-HDL Cholesterol Latest Units: mg/dL  200    Total Cholesterol/HDL Ratio Latest Ref Range: 2.0 - 5.0   5.1 (H)    RBC Folate Latest Ref Range: 280 - 791 ng/mL  726    Vit D, 25-Hydroxy Latest Ref Range: 30 - 96 ng/mL  45    Hemoglobin A1C External Latest Ref Range:  4.0 - 5.6 %  5.9 (H)    Estimated Avg Glucose Latest Ref Range: 68 - 131 mg/dL  123    TSH Latest Ref Range: 0.400 - 4.000 uIU/mL  0.548    T3, Total Latest Ref Range: 60 - 180 ng/dL  56 (L)    Free T4 Latest Ref Range: 0.71 - 1.51 ng/dL  1.22    PTH Latest Ref Range: 9.0 - 77.0 pg/mL  141.0 (H)    PSA Total Latest Ref Range: 0.00 - 4.00 ng/mL  0.45    PSA, Free Latest Ref Range: 0.01 - 1.50 ng/mL  0.22    PSA, Free Pct Latest Ref Range: Not established %  48.89    Albumin Latest Ref Range: 3.6 - 5.1 g/dL  4.7    Dihydrotestosterone Latest Ref Range: 112 - 955 pg/mL  395    Estradiol Latest Ref Range: 11 - 44 pg/mL  19    Estrogen Latest Units: pg/mL  147    Sex Hormone Binding Globulin Latest Ref Range: 22 - 77 nmol/L  33    Testosterone Latest Ref Range: 250 - 1100 ng/dL  302    Testosterone, Bioavailable Latest Ref Range: 15.0 - 150.0 ng/dL  81.1    Testosterone, Free Latest Ref Range: 6.0 - 73.0 pg/mL  37.8    Specimen UA Unknown Urine, Clean Catch     Color, UA Latest Ref Range: Yellow, Straw, Raeann  Raeann     Appearance, UA Latest Ref Range: Clear  Hazy (A)     Specific [place], UA Latest Ref Range: 1.005 - 1.030  1.025     pH, UA Latest Ref Range: 5.0 - 8.0  5.0     Protein, UA Latest Ref Range: Negative  Negative     Glucose, UA Latest Ref Range: Negative  Negative     Ketones, UA Latest Ref Range: Negative  Trace (A)     Occult Blood UA Latest Ref Range: Negative  Negative     NITRITE UA Latest Ref Range: Negative  Negative     Bilirubin (UA) Latest Ref Range: Negative  Negative     Leukocytes, UA Latest Ref Range: Negative  Negative         Assessment:     1. Hypercalcemia  Calcium, ionized    PTH, intact   2. FHH (familial hypocalciuric hypercalcemia)  PTH, intact   3. Prediabetes     4. Dysmetabolic syndrome  Uric acid    Lipid panel   5. Obesity, Class I, BMI 30-34.9     6. Multinodular goiter  US Soft Tissue Head Neck Thyroid    T4, free    T3    TSH    Iodine, Serum    Calcitonin    Chromogranin A    7. Low testosterone     8. Hypogonadism in male  testosterone (FORTESTA) 10 mg/0.5 gram /actuation GlPm   9. Hyperparathyroidism  US Soft Tissue Head Neck Thyroid    Calcium, ionized    PTH, intact    Magnesium    Phosphorus   10. Gastroesophageal reflux disease without esophagitis     11. Hypovitaminosis D     12. Essential hypertension  Lipid panel   13. Hyperlipidemia, unspecified hyperlipidemia type  Lipid panel    LDL Cholesterol, Direct Measurement   14. Hypercholesterolemia  Lipid panel    LDL Cholesterol, Direct Measurement   15. Hyperuricemia  Uric acid   16. At risk for heart disease          Regarding hypercalcemia. This appears due to mild primary hyperparathyroidism. Will continue to monitor calcium trends but no need for any active intervaentiojn at the present time. To continue to encourage copious free water intake.  Regarding hypogonadism;  To continue fortesta 20mg QD.  Will recheck androgen levels @ nex visit and adjust dose if  needed to get testosterone levels into the mid normal range (most recent androgen profile was at desired goal).  Regarding hypertension; BP profile well controlled. To continue present antihypertensive regimen and to continue serial BP tracking.  Regarding hyperlipidemia with hypercholesterolemia; to commence trial of lipitor 40mg QD and low dose asa 81m Qd. If hyperTG is found to persists on repeat lipid profile will initiate therapy with fish oil tabs.  Regarding hypovitaminosis D; will recheck 25 OH vit D levels today.  Regarding multinodular goiter; to have repeat USS of thyroid.  Regarding GERD; symptomatically stable. To continue PPI therapy as before    Plan:       FFup in 6mths

## 2019-08-21 LAB
ALBUMIN/CREAT UR: 9.1 UG/MG (ref 0–30)
CALCIT SERPL-MCNC: 13.4 PG/ML
CREAT UR-MCNC: 219 MG/DL (ref 23–375)
IODINE SERPL-MCNC: 69 NG/ML (ref 40–92)
MICROALBUMIN UR DL<=1MG/L-MCNC: 20 UG/ML

## 2019-08-22 LAB — LDLC SERPL-MCNC: 111 MG/DL

## 2019-08-23 LAB — CGA SERPL-MCNC: 1420 NG/ML (ref 0–160)

## 2019-08-24 ENCOUNTER — PATIENT MESSAGE (OUTPATIENT)
Dept: ENDOCRINOLOGY | Facility: CLINIC | Age: 74
End: 2019-08-24

## 2019-08-27 ENCOUNTER — HOSPITAL ENCOUNTER (OUTPATIENT)
Dept: RADIOLOGY | Facility: HOSPITAL | Age: 74
Discharge: HOME | End: 2019-08-27
Attending: INTERNAL MEDICINE
Payer: MEDICARE

## 2019-08-27 DIAGNOSIS — E04.2 MULTINODULAR GOITER: ICD-10-CM

## 2019-08-27 DIAGNOSIS — E29.1 HYPOGONADISM IN MALE: ICD-10-CM

## 2019-08-27 DIAGNOSIS — E21.3 HYPERPARATHYROIDISM: ICD-10-CM

## 2019-08-27 PROCEDURE — 76536 US SOFT TISSUE HEAD NECK THYROID: ICD-10-PCS | Mod: 26,,, | Performed by: RADIOLOGY

## 2019-08-27 PROCEDURE — 76536 US EXAM OF HEAD AND NECK: CPT | Mod: TC

## 2019-08-27 PROCEDURE — 76536 US EXAM OF HEAD AND NECK: CPT | Mod: 26,,, | Performed by: RADIOLOGY

## 2019-08-27 RX ORDER — TESTOSTERONE 10 MG/.5G
20 GEL, METERED TOPICAL DAILY
Qty: 120 BOTTLE | Refills: 3 | Status: SHIPPED | OUTPATIENT
Start: 2019-08-27 | End: 2019-09-06 | Stop reason: SDUPTHER

## 2019-09-06 DIAGNOSIS — E29.1 HYPOGONADISM IN MALE: ICD-10-CM

## 2019-09-06 RX ORDER — TESTOSTERONE 10 MG/.5G
20 GEL, METERED TOPICAL DAILY
Qty: 120 BOTTLE | Refills: 3 | Status: SHIPPED | OUTPATIENT
Start: 2019-09-06 | End: 2019-09-16 | Stop reason: SDUPTHER

## 2019-09-06 RX ORDER — TESTOSTERONE 10 MG/.5G
20 GEL, METERED TOPICAL DAILY
Qty: 120 BOTTLE | Refills: 3 | Status: SHIPPED | OUTPATIENT
Start: 2019-09-06 | End: 2019-09-06 | Stop reason: SDUPTHER

## 2019-09-16 ENCOUNTER — PATIENT MESSAGE (OUTPATIENT)
Dept: ENDOCRINOLOGY | Facility: CLINIC | Age: 74
End: 2019-09-16

## 2019-09-16 DIAGNOSIS — E29.1 HYPOGONADISM IN MALE: ICD-10-CM

## 2019-09-16 RX ORDER — TESTOSTERONE 10 MG/.5G
20 GEL, METERED TOPICAL DAILY
Qty: 120 BOTTLE | Refills: 3 | Status: SHIPPED | OUTPATIENT
Start: 2019-09-16 | End: 2020-07-23 | Stop reason: SDUPTHER

## 2019-09-19 ENCOUNTER — PATIENT MESSAGE (OUTPATIENT)
Dept: FAMILY MEDICINE | Facility: CLINIC | Age: 74
End: 2019-09-19

## 2019-09-19 NOTE — TELEPHONE ENCOUNTER
Patient needs a new RX sent in for this medication if you intend to keep him on it MELLISSA Vasquez started him on this medication Diclofenac 75mg one tab BID

## 2019-09-30 ENCOUNTER — PATIENT MESSAGE (OUTPATIENT)
Dept: FAMILY MEDICINE | Facility: CLINIC | Age: 74
End: 2019-09-30

## 2019-10-01 RX ORDER — DICLOFENAC SODIUM 75 MG/1
TABLET, DELAYED RELEASE ORAL
Qty: 120 TABLET | Refills: 6 | Status: SHIPPED | OUTPATIENT
Start: 2019-10-01 | End: 2020-08-07 | Stop reason: SDUPTHER

## 2019-10-02 ENCOUNTER — PES CALL (OUTPATIENT)
Dept: ADMINISTRATIVE | Facility: CLINIC | Age: 74
End: 2019-10-02

## 2019-10-03 ENCOUNTER — PATIENT MESSAGE (OUTPATIENT)
Dept: FAMILY MEDICINE | Facility: CLINIC | Age: 74
End: 2019-10-03

## 2019-10-03 DIAGNOSIS — M25.50 ARTHRALGIA, UNSPECIFIED JOINT: Primary | ICD-10-CM

## 2019-10-04 RX ORDER — DICLOFENAC SODIUM 10 MG/G
2 GEL TOPICAL DAILY
Qty: 100 G | Refills: 1 | Status: SHIPPED | OUTPATIENT
Start: 2019-10-04 | End: 2020-02-04 | Stop reason: SDUPTHER

## 2019-10-10 RX ORDER — FLUTICASONE PROPIONATE 50 MCG
2 SPRAY, SUSPENSION (ML) NASAL DAILY
Qty: 48 G | Refills: 0 | Status: SHIPPED | OUTPATIENT
Start: 2019-10-10 | End: 2020-01-07

## 2019-10-11 RX ORDER — FLUTICASONE PROPIONATE 50 MCG
SPRAY, SUSPENSION (ML) NASAL
Qty: 48 G | Refills: 4 | OUTPATIENT
Start: 2019-10-11

## 2020-01-07 RX ORDER — FLUTICASONE PROPIONATE 50 MCG
SPRAY, SUSPENSION (ML) NASAL
Qty: 48 G | Refills: 4 | Status: SHIPPED | OUTPATIENT
Start: 2020-01-07 | End: 2022-02-02 | Stop reason: SDUPTHER

## 2020-01-13 RX ORDER — PANTOPRAZOLE SODIUM 40 MG/1
40 TABLET, DELAYED RELEASE ORAL DAILY
Qty: 90 TABLET | Refills: 1 | Status: SHIPPED | OUTPATIENT
Start: 2020-01-13 | End: 2020-02-04

## 2020-01-31 ENCOUNTER — DOCUMENTATION ONLY (OUTPATIENT)
Dept: FAMILY MEDICINE | Facility: CLINIC | Age: 75
End: 2020-01-31

## 2020-01-31 NOTE — PROGRESS NOTES
Pre-Visit Chart Review  For Appointment Scheduled on 2/4/2020    Health Maintenance Due   Topic Date Due    TETANUS VACCINE  08/04/1963    Colonoscopy  06/04/2013

## 2020-02-03 ENCOUNTER — LAB VISIT (OUTPATIENT)
Dept: LAB | Facility: HOSPITAL | Age: 75
End: 2020-02-03
Attending: INTERNAL MEDICINE
Payer: MEDICARE

## 2020-02-03 ENCOUNTER — OFFICE VISIT (OUTPATIENT)
Dept: ENDOCRINOLOGY | Facility: CLINIC | Age: 75
End: 2020-02-03
Payer: MEDICARE

## 2020-02-03 VITALS
HEART RATE: 72 BPM | BODY MASS INDEX: 30.17 KG/M2 | HEIGHT: 72 IN | WEIGHT: 222.75 LBS | SYSTOLIC BLOOD PRESSURE: 118 MMHG | TEMPERATURE: 98 F | DIASTOLIC BLOOD PRESSURE: 70 MMHG

## 2020-02-03 DIAGNOSIS — E83.52 HYPERCALCEMIA: ICD-10-CM

## 2020-02-03 DIAGNOSIS — R73.03 PREDIABETES: ICD-10-CM

## 2020-02-03 DIAGNOSIS — E21.3 HYPERPARATHYROIDISM: ICD-10-CM

## 2020-02-03 DIAGNOSIS — E53.8 B12 DEFICIENCY: ICD-10-CM

## 2020-02-03 DIAGNOSIS — E29.1 HYPOGONADISM IN MALE: ICD-10-CM

## 2020-02-03 DIAGNOSIS — E78.00 HYPERCHOLESTEROLEMIA: ICD-10-CM

## 2020-02-03 DIAGNOSIS — N40.0 BENIGN PROSTATIC HYPERPLASIA WITHOUT LOWER URINARY TRACT SYMPTOMS: ICD-10-CM

## 2020-02-03 DIAGNOSIS — E88.810 DYSMETABOLIC SYNDROME: ICD-10-CM

## 2020-02-03 DIAGNOSIS — E78.5 HYPERLIPIDEMIA, UNSPECIFIED HYPERLIPIDEMIA TYPE: ICD-10-CM

## 2020-02-03 DIAGNOSIS — E04.2 MULTINODULAR GOITER: ICD-10-CM

## 2020-02-03 DIAGNOSIS — E55.9 HYPOVITAMINOSIS D: ICD-10-CM

## 2020-02-03 DIAGNOSIS — I10 ESSENTIAL HYPERTENSION: ICD-10-CM

## 2020-02-03 DIAGNOSIS — E66.9 OBESITY, CLASS I, BMI 30-34.9: ICD-10-CM

## 2020-02-03 DIAGNOSIS — E04.2 MULTINODULAR GOITER: Primary | ICD-10-CM

## 2020-02-03 DIAGNOSIS — K21.9 GASTROESOPHAGEAL REFLUX DISEASE WITHOUT ESOPHAGITIS: ICD-10-CM

## 2020-02-03 DIAGNOSIS — R73.03 PREDIABETES: Primary | ICD-10-CM

## 2020-02-03 LAB
ALBUMIN SERPL BCP-MCNC: 4.2 G/DL (ref 3.5–5.2)
ALP SERPL-CCNC: 92 U/L (ref 55–135)
ALT SERPL W/O P-5'-P-CCNC: 50 U/L (ref 10–44)
ANION GAP SERPL CALC-SCNC: 9 MMOL/L (ref 8–16)
AST SERPL-CCNC: 30 U/L (ref 10–40)
BILIRUB SERPL-MCNC: 0.5 MG/DL (ref 0.1–1)
BUN SERPL-MCNC: 20 MG/DL (ref 8–23)
CA-I BLDV-SCNC: 1.49 MMOL/L (ref 1.06–1.42)
CALCIUM SERPL-MCNC: 11.5 MG/DL (ref 8.7–10.5)
CHLORIDE SERPL-SCNC: 107 MMOL/L (ref 95–110)
CHOLEST SERPL-MCNC: 195 MG/DL (ref 120–199)
CHOLEST/HDLC SERPL: 4.2 {RATIO} (ref 2–5)
CO2 SERPL-SCNC: 26 MMOL/L (ref 23–29)
CREAT SERPL-MCNC: 1.6 MG/DL (ref 0.5–1.4)
EST. GFR  (AFRICAN AMERICAN): 48.7 ML/MIN/1.73 M^2
EST. GFR  (NON AFRICAN AMERICAN): 42.1 ML/MIN/1.73 M^2
ESTIMATED AVG GLUCOSE: 126 MG/DL (ref 68–131)
GLUCOSE SERPL-MCNC: 123 MG/DL (ref 70–110)
HBA1C MFR BLD HPLC: 6 % (ref 4–5.6)
HDLC SERPL-MCNC: 46 MG/DL (ref 40–75)
HDLC SERPL: 23.6 % (ref 20–50)
LDLC SERPL CALC-MCNC: 87.4 MG/DL (ref 63–159)
NONHDLC SERPL-MCNC: 149 MG/DL
POTASSIUM SERPL-SCNC: 4.5 MMOL/L (ref 3.5–5.1)
PROT SERPL-MCNC: 7.7 G/DL (ref 6–8.4)
SODIUM SERPL-SCNC: 142 MMOL/L (ref 136–145)
T3 SERPL-MCNC: 59 NG/DL (ref 60–180)
T4 FREE SERPL-MCNC: 1.13 NG/DL (ref 0.71–1.51)
TRIGL SERPL-MCNC: 308 MG/DL (ref 30–150)
TSH SERPL DL<=0.005 MIU/L-ACNC: 0.68 UIU/ML (ref 0.4–4)
URATE SERPL-MCNC: 7.9 MG/DL (ref 3.4–7)

## 2020-02-03 PROCEDURE — 84550 ASSAY OF BLOOD/URIC ACID: CPT

## 2020-02-03 PROCEDURE — 82330 ASSAY OF CALCIUM: CPT

## 2020-02-03 PROCEDURE — 82746 ASSAY OF FOLIC ACID SERUM: CPT

## 2020-02-03 PROCEDURE — 82747 ASSAY OF FOLIC ACID RBC: CPT

## 2020-02-03 PROCEDURE — 99213 OFFICE O/P EST LOW 20 MIN: CPT | Mod: PBBFAC,PO | Performed by: INTERNAL MEDICINE

## 2020-02-03 PROCEDURE — 84439 ASSAY OF FREE THYROXINE: CPT

## 2020-02-03 PROCEDURE — 83036 HEMOGLOBIN GLYCOSYLATED A1C: CPT

## 2020-02-03 PROCEDURE — 99999 PR PBB SHADOW E&M-EST. PATIENT-LVL III: ICD-10-PCS | Mod: PBBFAC,,, | Performed by: INTERNAL MEDICINE

## 2020-02-03 PROCEDURE — 82306 VITAMIN D 25 HYDROXY: CPT

## 2020-02-03 PROCEDURE — 99214 OFFICE O/P EST MOD 30 MIN: CPT | Mod: S$PBB,,, | Performed by: INTERNAL MEDICINE

## 2020-02-03 PROCEDURE — 84480 ASSAY TRIIODOTHYRONINE (T3): CPT

## 2020-02-03 PROCEDURE — 84443 ASSAY THYROID STIM HORMONE: CPT

## 2020-02-03 PROCEDURE — 86316 IMMUNOASSAY TUMOR OTHER: CPT

## 2020-02-03 PROCEDURE — 83970 ASSAY OF PARATHORMONE: CPT

## 2020-02-03 PROCEDURE — 82607 VITAMIN B-12: CPT

## 2020-02-03 PROCEDURE — 80061 LIPID PANEL: CPT

## 2020-02-03 PROCEDURE — 80053 COMPREHEN METABOLIC PANEL: CPT

## 2020-02-03 PROCEDURE — 99214 PR OFFICE/OUTPT VISIT, EST, LEVL IV, 30-39 MIN: ICD-10-PCS | Mod: S$PBB,,, | Performed by: INTERNAL MEDICINE

## 2020-02-03 PROCEDURE — 84154 ASSAY OF PSA FREE: CPT

## 2020-02-03 PROCEDURE — 99999 PR PBB SHADOW E&M-EST. PATIENT-LVL III: CPT | Mod: PBBFAC,,, | Performed by: INTERNAL MEDICINE

## 2020-02-03 RX ORDER — METFORMIN HYDROCHLORIDE 500 MG/1
500 TABLET ORAL 2 TIMES DAILY WITH MEALS
Qty: 180 TABLET | Refills: 3 | Status: SHIPPED | OUTPATIENT
Start: 2020-02-03 | End: 2021-01-11

## 2020-02-03 NOTE — PROGRESS NOTES
Subjective:      Patient ID: Papito Hutton is a 74 y.o. male.    Chief Complaint:  Hyperparathyroidism    74 yr old gentleman with hypogonadism and hypercalcemia presumed secondary to mild primary hyperparathyroidism seen in [facility] today.    History of Present Illness    Patient is a 74yr old gentleman with hypercalcemia and hypogonadism seen in [facility] today. He in addition has background comorbidities of hypertension, GERD, Vit B12 deficiency and hyperlipidemia.   The work up on account of the hypercalcemia suggests that this is likely due to mild primary hyperparathyroidsm  The patient was also found to have a multinodular goiter as noted on his initial thyroid Uss from 01/16 though all the identified nodules are subcm in size with no worrisome sonographic features. His recent USS from 02/18 showed no significant intervel change in the findings and his next USS which was from 8/19 showed sonoagrphic stability of the nodules and his next neck USS should be for ~ 08/20.  He was recently found to have asymptomatic transaminitis of unclear etiology.  Patients baseline [place] score is 6. Patient had a sleep study in the past prior to have a nasal deviated septum fixed.  No major snoring problems.  Patient had been on testosterone injections ; testosterone cypionate 150mg every 14 days for androgen repletion but at last visit was switched to toipcal testosterone; 20mg Qd of fortesta. Patient apparently had a brain MRI ~ 2 yrs ago which was reportedly normal.  Patient reports that he had always had high calcium levels while he was in the [employer] since over 20 yrs ago.  Patient has never had kidney stones but he does have GERD.No known family history of hypercalcemia, no history of severe dyspepsia nor severe peptic ulcer disease.  He is on Vytorin 10/40mg Qd for hyperlipidemia with hypercholesterolemia.  Patient has never had a fracture of note and his screening DEXA was essentially -ve.  He is in excellent spirits today  and has no fresh complaints.     The 10-year ASCVD risk score ([place] GRISELDA Matson., et al., 2013) is: 31.5%    Values used to calculate the score:      Age: 74 years      Sex: Male      Is Non- : No      Diabetic: No      Tobacco smoker: Yes      Systolic Blood Pressure: 130 mmHg      Is BP treated: Yes      HDL Cholesterol: 49 mg/dL      Total Cholesterol: 249 mg/dL.  Patient has noted some skin sensitivity intermittently     Review of Systems   Constitutional: Negative for diaphoresis and fatigue.   HENT: Negative for facial swelling and trouble swallowing.    Eyes: Negative for visual disturbance.   Respiratory: Negative for cough and shortness of breath.    Cardiovascular: Negative for chest pain and palpitations.   Gastrointestinal: Negative for abdominal distention, abdominal pain, diarrhea and vomiting.   Endocrine: Negative for polyuria.   Genitourinary: Negative for dysuria and frequency.   Musculoskeletal: Negative for arthralgias and back pain.   Skin: Negative for color change, pallor and rash.   Neurological: Negative for dizziness, light-headedness and headaches.   Hematological: Does not bruise/bleed easily.   Psychiatric/Behavioral: Negative for confusion. The patient is not nervous/anxious.        Objective: /70 (BP Location: Left arm, Patient Position: Sitting, BP Method: Medium (Manual))   Pulse 72   Temp 97.6 °F (36.4 °C) (Oral)   Ht 6' (1.829 m)   Wt 101.1 kg (222 lb 12.4 oz)   BMI 30.21 kg/m²  Body surface area is 2.27 meters squared.         Physical Exam   Constitutional: He is oriented to person, place, and time. He appears well-developed and well-nourished. No distress.   Elderly gentleman. Clinically comfortable. Not in any apparent distress. Not pale, anicteric and afebrile. Well hydrated.   HENT:   Head: Normocephalic and atraumatic.   Eyes: Pupils are equal, round, and reactive to light. Conjunctivae and EOM are normal. No scleral icterus.   Neck: Normal range  of motion. Neck supple. No JVD present. No tracheal deviation present. No thyromegaly present.   Cardiovascular: Normal rate, regular rhythm and normal heart sounds.   No murmur heard.  Pulmonary/Chest: Effort normal and breath sounds normal. No stridor. No respiratory distress. He has no wheezes. He has no rales.   Abdominal: Soft. He exhibits no distension. There is no tenderness.   Musculoskeletal: Normal range of motion. He exhibits edema (Mild 1+ bipedal edema in ankles. no calf swelling nor tenderness). He exhibits no tenderness.   Neurological: He is alert and oriented to person, place, and time. No cranial nerve deficit.   Skin: Skin is warm and dry. No rash noted. He is not diaphoretic. No erythema. No pallor.   Psychiatric: He has a normal mood and affect. His behavior is normal. Judgment and thought content normal.   Vitals reviewed.      Lab Review:     Results for ROBBIE THOMAS (MRN [id number]) as of 2/3/2020 11:16   Ref. Range 5/10/2019 11:51 5/10/2019 11:51 8/19/2019 11:47 8/19/2019 17:40 8/27/2019 10:53   WBC Latest Ref Range: 3.90 - 12.70 K/uL 6.29       RBC Latest Ref Range: 4.60 - 6.20 M/uL 5.03       Hemoglobin Latest Ref Range: 14.0 - 18.0 g/dL 15.1       Hematocrit Latest Ref Range: 40.0 - 54.0 % 45.7       MCV Latest Ref Range: 82 - 98 fL 91       MCH Latest Ref Range: 27.0 - 31.0 pg 30.0       MCHC Latest Ref Range: 32.0 - 36.0 g/dL 33.0       RDW Latest Ref Range: 11.5 - 14.5 % 13.4       Platelets Latest Ref Range: 150 - 350 K/uL 276       MPV Latest Ref Range: 9.2 - 12.9 fL 11.1       Gran% Latest Ref Range: 38.0 - 73.0 % 47.8       Gran # (ANC) Latest Ref Range: 1.8 - 7.7 K/uL 3.0       Lymph% Latest Ref Range: 18.0 - 48.0 % 39.3       Lymph # Latest Ref Range: 1.0 - 4.8 K/uL 2.5       Mono% Latest Ref Range: 4.0 - 15.0 % 7.6       Mono # Latest Ref Range: 0.3 - 1.0 K/uL 0.5       Eosinophil% Latest Ref Range: 0.0 - 8.0 % 3.7       Eos # Latest Ref Range: 0.0 - 0.5 K/uL 0.2        Basophil% Latest Ref Range: 0.0 - 1.9 % 1.3       Baso # Latest Ref Range: 0.00 - 0.20 K/uL 0.08       nRBC Latest Ref Range: 0 /100 WBC 0       Differential Method Unknown Automated       Immature Grans (Abs) Latest Ref Range: 0.00 - 0.04 K/uL 0.02       Immature Granulocytes Latest Ref Range: 0.0 - 0.5 % 0.3       Folate Latest Ref Range: 4.0 - 24.0 ng/mL 5.8       Vitamin B-12 Latest Ref Range: 180 - 914 ng/L 1479 (H) >1400 (H)      Sodium Latest Ref Range: 136 - 145 mmol/L 141       Potassium Latest Ref Range: 3.5 - 5.1 mmol/L 4.1       Chloride Latest Ref Range: 95 - 110 mmol/L 104       CO2 Latest Ref Range: 23 - 29 mmol/L 22 (L)       Anion Gap Latest Ref Range: 8 - 16 mmol/L 15       BUN, Bld Latest Ref Range: 8 - 23 mg/dL 23       Creatinine Latest Ref Range: 0.5 - 1.4 mg/dL 1.4       eGFR if non African American Latest Ref Range: >60 mL/min/1.73 m^2 49.8 (A)       eGFR if African American Latest Ref Range: >60 mL/min/1.73 m^2 57.6 (A)       Glucose Latest Ref Range: 70 - 110 mg/dL 99       Calcium Latest Ref Range: 8.7 - 10.5 mg/dL 11.0 (H)       Calcium, Ion Latest Ref Range: 1.06 - 1.42 mmol/L 1.44 (H)  1.53 (H)     Phosphorus Latest Ref Range: 2.7 - 4.5 mg/dL 2.9  2.4 (L)     Magnesium Latest Ref Range: 1.6 - 2.6 mg/dL 2.0  1.9     Alkaline Phosphatase Latest Ref Range: 55 - 135 U/L 102       PROTEIN TOTAL Latest Ref Range: 6.0 - 8.4 g/dL 7.9       Albumin Latest Ref Range: 3.5 - 5.2 g/dL 4.2       Uric Acid Latest Ref Range: 3.4 - 7.0 mg/dL 9.4 (H)  8.7 (H)     BILIRUBIN TOTAL Latest Ref Range: 0.1 - 1.0 mg/dL 0.6       AST Latest Ref Range: 10 - 40 U/L 30       ALT Latest Ref Range: 10 - 44 U/L 42       Triglycerides Latest Ref Range: 30 - 150 mg/dL 625 (H)  526 (H)     Cholesterol Latest Ref Range: 120 - 199 mg/dL 249 (H)  225 (H)     HDL Latest Ref Range: 40 - 75 mg/dL 49  47     Hdl/Cholesterol Ratio Latest Ref Range: 20.0 - 50.0 % 19.7 (L)  20.9     LDL Cholesterol External Latest Ref Range:  63.0 - 159.0 mg/dL Invalid, Trig>400.0  Invalid, Trig>400.0     LDL Cholesterol (Beta Quantification), Serum Latest Units: mg/dL   111     Non-HDL Cholesterol Latest Units: mg/dL 200  178     Total Cholesterol/HDL Ratio Latest Ref Range: 2.0 - 5.0  5.1 (H)  4.8     Iodine, Serum Latest Ref Range: 40 - 92 ng/mL   69     RBC Folate Latest Ref Range: 280 - 791 ng/mL 726       Vit D, 25-Hydroxy Latest Ref Range: 30 - 96 ng/mL 45       Hemoglobin A1C External Latest Ref Range: 4.0 - 5.6 % 5.9 (H)       Estimated Avg Glucose Latest Ref Range: 68 - 131 mg/dL 123       TSH Latest Ref Range: 0.400 - 4.000 uIU/mL 0.548  0.420     T3, Total Latest Ref Range: 60 - 180 ng/dL 56 (L)  35 (L)     Free T4 Latest Ref Range: 0.71 - 1.51 ng/dL 1.22  1.05     PTH Latest Ref Range: 9.0 - 77.0 pg/mL 141.0 (H)  105.0 (H)     Calcitonin Latest Ref Range: <=14.3 pg/mL   13.4     Chromogranin A Latest Ref Range: 0 - 160 ng/mL   1420 (H)     PSA Total Latest Ref Range: 0.00 - 4.00 ng/mL 0.45       PSA, Free Latest Ref Range: 0.01 - 1.50 ng/mL 0.22       PSA, Free Pct Latest Ref Range: Not established % 48.89       Albumin Latest Ref Range: 3.6 - 5.1 g/dL 4.7       Dihydrotestosterone Latest Ref Range: 112 - 955 pg/mL 395       Estradiol Latest Ref Range: 11 - 44 pg/mL 19       Estrogen Latest Units: pg/mL 147       Sex Hormone Binding Globulin Latest Ref Range: 22 - 77 nmol/L 33       Testosterone Latest Ref Range: 250 - 1100 ng/dL 302       Testosterone, Bioavailable Latest Ref Range: 15.0 - 150.0 ng/dL 81.1       Testosterone, Free Latest Ref Range: 6.0 - 73.0 pg/mL 37.8       Microalbum.,U,Random Latest Units: ug/mL    20.0    Creatinine, Random Ur Latest Ref Range: 23.0 - 375.0 mg/dL    219.0    MICROALB/CREAT RATIO Latest Ref Range: 0.0 - 30.0 ug/mg    9.1        Assessment:     1. Multinodular goiter  T4, free    T3    Thyroglobulin    TSH    Iodine, Serum    Chromogranin A    Calcitonin   2. B12 deficiency  Vitamin B12    Vitamin B12  Deficiency Panel    Folate    Folate RBC   3. Prediabetes  Lipid panel    Hemoglobin A1c    Comprehensive metabolic panel   4. Obesity, Class I, BMI 30-34.9     5. Hypovitaminosis D  Calcium, ionized    Vitamin D    PTH, intact   6. Hypogonadism in male  Lipid panel    Comprehensive metabolic panel   7. Hyperparathyroidism  Comprehensive metabolic panel   8. Dysmetabolic syndrome  Lipid panel    Comprehensive metabolic panel    Uric acid   9. Gastroesophageal reflux disease without esophagitis     10. Hypercalcemia     11. Essential hypertension  Lipid panel    PTH, intact    Microalbumin/creatinine urine ratio    Urinalysis   12. Hyperlipidemia, unspecified hyperlipidemia type  Lipid panel   13. Hypercholesterolemia  Lipid panel   14. Benign prostatic hyperplasia without lower urinary tract symptoms  PSA, total and free        Regarding hypercalcemia. This appears due to mild primary hyperparathyroidism. Will continue to monitor calcium trends but no need for any active intervaentiojn at the present time. To continue to encourage copious free water intake.  Regarding hypogonadism;  To continue fortesta 20mg QD.  Will recheck androgen levels @ nex visit and adjust dose if  needed to get testosterone levels into the mid normal range (most recent androgen profile was at desired goal).  Regarding hypertension; BP profile well controlled. To continue present antihypertensive regimen and to continue serial BP tracking.  Regarding hyperlipidemia with hypercholesterolemia; to commence trial of lipitor 40mg QD and low dose asa 81m Qd. If hyperTG is found to persists on repeat lipid profile will initiate therapy with fish oil tabs.  Regarding hypovitaminosis D; will recheck 25 OH vit D levels today.  Regarding multinodular goiter; to have repeat USS of thyroid.  Regarding GERD; symptomatically stable. To continue PPI therapy as before    Plan:     FFup in ~ 6mths.

## 2020-02-04 ENCOUNTER — OFFICE VISIT (OUTPATIENT)
Dept: FAMILY MEDICINE | Facility: CLINIC | Age: 75
End: 2020-02-04
Payer: MEDICARE

## 2020-02-04 VITALS
HEIGHT: 72 IN | OXYGEN SATURATION: 95 % | BODY MASS INDEX: 30.07 KG/M2 | SYSTOLIC BLOOD PRESSURE: 112 MMHG | HEART RATE: 66 BPM | WEIGHT: 222 LBS | TEMPERATURE: 98 F | DIASTOLIC BLOOD PRESSURE: 54 MMHG

## 2020-02-04 DIAGNOSIS — Z76.0 MEDICATION REFILL: ICD-10-CM

## 2020-02-04 DIAGNOSIS — M25.50 ARTHRALGIA, UNSPECIFIED JOINT: ICD-10-CM

## 2020-02-04 DIAGNOSIS — Z12.11 SCREEN FOR COLON CANCER: Primary | ICD-10-CM

## 2020-02-04 PROBLEM — R80.9 PROTEINURIA: Status: ACTIVE | Noted: 2020-02-04

## 2020-02-04 LAB
25(OH)D3+25(OH)D2 SERPL-MCNC: 49 NG/ML (ref 30–96)
FOLATE SERPL-MCNC: 8.5 NG/ML (ref 4–24)
PROSTATE SPECIFIC ANTIGEN, TOTAL: 0.51 NG/ML (ref 0–4)
PSA FREE MFR SERPL: 47.06 %
PSA FREE SERPL-MCNC: 0.24 NG/ML (ref 0.01–1.5)
PTH-INTACT SERPL-MCNC: 122 PG/ML (ref 9–77)
VIT B12 SERPL-MCNC: >2000 PG/ML (ref 210–950)

## 2020-02-04 PROCEDURE — 99214 OFFICE O/P EST MOD 30 MIN: CPT | Mod: S$PBB,,, | Performed by: FAMILY MEDICINE

## 2020-02-04 PROCEDURE — 99214 PR OFFICE/OUTPT VISIT, EST, LEVL IV, 30-39 MIN: ICD-10-PCS | Mod: S$PBB,,, | Performed by: FAMILY MEDICINE

## 2020-02-04 PROCEDURE — 99213 OFFICE O/P EST LOW 20 MIN: CPT | Mod: PBBFAC,PO | Performed by: FAMILY MEDICINE

## 2020-02-04 PROCEDURE — 99999 PR PBB SHADOW E&M-EST. PATIENT-LVL III: ICD-10-PCS | Mod: PBBFAC,,, | Performed by: FAMILY MEDICINE

## 2020-02-04 PROCEDURE — 99999 PR PBB SHADOW E&M-EST. PATIENT-LVL III: CPT | Mod: PBBFAC,,, | Performed by: FAMILY MEDICINE

## 2020-02-04 RX ORDER — DICLOFENAC SODIUM 10 MG/G
2 GEL TOPICAL DAILY
Qty: 100 G | Refills: 1 | Status: SHIPPED | OUTPATIENT
Start: 2020-02-04 | End: 2022-04-13 | Stop reason: SDUPTHER

## 2020-02-04 RX ORDER — ESOMEPRAZOLE MAGNESIUM 40 MG/1
40 CAPSULE, DELAYED RELEASE ORAL
Qty: 90 CAPSULE | Refills: 1 | Status: SHIPPED | OUTPATIENT
Start: 2020-02-04 | End: 2020-06-26

## 2020-02-04 NOTE — PROGRESS NOTES
Subjective:       Patient ID: Papito Hutton is a 74 y.o. male.    Chief Complaint: Follow-up    HPI     Mr. Hutton presents to clinic for follow up. Currently has no complaints.     Past Medical History:   Diagnosis Date    Arthritis     B12 deficiency     GERD (gastroesophageal reflux disease)     Hyperlipidemia     Hypertension     Low testosterone     Personal history of colonic polyps 2008    adenomatous polyp       Past Surgical History:   Procedure Laterality Date    NASAL SEPTUM SURGERY      right hand surgery         Family History   Problem Relation Age of Onset    Cancer Neg Hx     Diabetes Neg Hx     Heart disease Neg Hx        Social History     Tobacco Use    Smoking status: Current Some Day Smoker     Types: Cigars    Smokeless tobacco: Former User    Tobacco comment: smoke 1 cigar every couple of months   Substance Use Topics    Alcohol use: Yes     Alcohol/week: 2.0 standard drinks     Types: 2 Cans of beer per week    Drug use: No       Social History     Substance and Sexual Activity   Sexual Activity Yes    Partners: Female          Current Outpatient Medications:     amlodipine-benazepril (LOTREL) 10-40 mg per capsule, TAKE 1 CAPSULE DAILY, Disp: 90 capsule, Rfl: 3    aspirin 81 MG Chew, Take 1 tablet (81 mg total) by mouth once daily., Disp: , Rfl: 0    CHOLECALCIFEROL, VITAMIN D3, (VITAMIN D3 ORAL), Take 1 capsule by mouth once daily., Disp: , Rfl:     cyanocobalamin (VITAMIN B-12) 1000 MCG tablet, Take 100 mcg by mouth once daily., Disp: , Rfl:     diclofenac (VOLTAREN) 75 MG EC tablet, TAKE 1 TABLET TWICE A DAY (Patient taking differently: Taking 1 Tablet A Day), Disp: 120 tablet, Rfl: 6    diclofenac sodium (VOLTAREN) 1 % Gel, Apply 2 g topically once daily., Disp: 100 g, Rfl: 1    fluticasone propionate (FLONASE) 50 mcg/actuation nasal spray, USE 2 SPRAYS IN EACH NOSTRIL ONCE DAILY, Disp: 48 g, Rfl: 4    hydroCHLOROthiazide (HYDRODIURIL) 12.5 MG Tab, TAKE 1  TABLET DAILY, Disp: 90 tablet, Rfl: 3    metFORMIN (GLUCOPHAGE) 500 MG tablet, Take 1 tablet (500 mg total) by mouth 2 (two) times daily with meals., Disp: 180 tablet, Rfl: 3    pantoprazole (PROTONIX) 40 MG tablet, Take 1 tablet (40 mg total) by mouth once daily., Disp: 90 tablet, Rfl: 1    rosuvastatin (CRESTOR) 20 MG tablet, Take 1 tablet (20 mg total) by mouth once daily., Disp: 90 tablet, Rfl: 3    TOPROL XL 25 mg 24 hr tablet, TAKE 1 TABLET DAILY, Disp: 90 tablet, Rfl: 3    azelastine (ASTELIN) 137 mcg (0.1 %) nasal spray, 1 spray (137 mcg total) by Nasal route 2 (two) times daily. (Patient not taking: Reported on 2/4/2020), Disp: 30 mL, Rfl: 2    testosterone (FORTESTA) 10 mg/0.5 gram /actuation GlPm, Place 20 mg onto the skin once daily. Apply 2 pump actuations topically daily., Disp: 120 Bottle, Rfl: 3     Review of patient's allergies indicates:   Allergen Reactions    Vytorin 10-10 [ezetimibe-simvastatin] Other (See Comments)     Transaminitis            Review of Systems   Constitutional: Negative for chills and fever.   HENT: Negative for congestion and sore throat.    Eyes: Negative for visual disturbance.   Respiratory: Negative for cough and shortness of breath.    Cardiovascular: Negative for chest pain.   Gastrointestinal: Negative for abdominal pain, constipation, diarrhea, nausea and vomiting.   Genitourinary: Negative for dysuria.   Musculoskeletal: Negative for joint swelling.   Skin: Negative for rash and wound.   Neurological: Negative for dizziness and headaches.   Hematological: Does not bruise/bleed easily.           Objective:          Vitals:    02/04/20 0827   BP: (!) 112/54   Pulse: 66   Temp: 98.2 °F (36.8 °C)   SpO2: 95%   Weight: 100.7 kg (222 lb 0.1 oz)   Height: 6' (1.829 m)       Physical Exam   Constitutional: He appears well-developed and well-nourished.   HENT:   Head: Normocephalic and atraumatic.   Eyes: Conjunctivae are normal.   Cardiovascular: Normal rate,  regular rhythm and normal heart sounds.   Pulmonary/Chest: Effort normal and breath sounds normal.   Abdominal: Soft. Bowel sounds are normal.   Musculoskeletal: Normal range of motion.   Neurological: He is alert.   Skin: Skin is warm.   Psychiatric: He has a normal mood and affect. His behavior is normal.   Vitals reviewed.              Assessment/Plan     Papito was seen today for follow-up.    Diagnoses and all orders for this visit:    Screen for colon cancer  -     Ambulatory referral/consult to Gastroenterology; Future    Arthralgia, unspecified joint  -     diclofenac sodium (VOLTAREN) 1 % Gel; Apply 2 g topically once daily.    Medication refill  -     esomeprazole (NEXIUM) 40 MG capsule; Take 1 capsule (40 mg total) by mouth before breakfast.          Follow up in about 6 months (around 8/4/2020) for check up.    Future Appointments   Date Time Provider Department Center   7/24/2020  8:30 AM MD [facility] [place] [facility] [place]   8/7/2020  7:20 AM MD MIROSLAVA [facility] [place]         [facility]

## 2020-02-05 LAB
FOLATE RBC-MCNC: 469 NG/ML (ref 280–791)
THRYOGLOBULIN INTERPRETATION: ABNORMAL
THYROGLOB AB SERPL-ACNC: <1.8 IU/ML
THYROGLOB SERPL-MCNC: 16 NG/ML
VIT B12 SERPL-MCNC: >1400 NG/L (ref 180–914)

## 2020-02-06 PROBLEM — E34.9 ELEVATED CALCITONIN LEVEL: Status: ACTIVE | Noted: 2020-02-06

## 2020-02-06 LAB
CALCIT SERPL-MCNC: 15.8 PG/ML
IODINE SERPL-MCNC: 61 NG/ML (ref 40–92)

## 2020-02-07 LAB — CGA SERPL-MCNC: 699 NG/ML (ref 0–160)

## 2020-03-08 ENCOUNTER — PATIENT OUTREACH (OUTPATIENT)
Dept: ADMINISTRATIVE | Facility: OTHER | Age: 75
End: 2020-03-08

## 2020-03-09 ENCOUNTER — TELEPHONE (OUTPATIENT)
Dept: GASTROENTEROLOGY | Facility: CLINIC | Age: 75
End: 2020-03-09

## 2020-03-09 ENCOUNTER — OFFICE VISIT (OUTPATIENT)
Dept: GASTROENTEROLOGY | Facility: CLINIC | Age: 75
End: 2020-03-09
Payer: MEDICARE

## 2020-03-09 VITALS
RESPIRATION RATE: 18 BRPM | BODY MASS INDEX: 30.22 KG/M2 | WEIGHT: 223.13 LBS | DIASTOLIC BLOOD PRESSURE: 60 MMHG | HEART RATE: 62 BPM | SYSTOLIC BLOOD PRESSURE: 117 MMHG | HEIGHT: 72 IN

## 2020-03-09 DIAGNOSIS — K44.9 HIATAL HERNIA: ICD-10-CM

## 2020-03-09 DIAGNOSIS — K21.00 GERD WITH ESOPHAGITIS: ICD-10-CM

## 2020-03-09 DIAGNOSIS — Z86.010 HISTORY OF COLON POLYPS: Primary | ICD-10-CM

## 2020-03-09 DIAGNOSIS — Z12.11 SCREEN FOR COLON CANCER: ICD-10-CM

## 2020-03-09 PROCEDURE — 99215 OFFICE O/P EST HI 40 MIN: CPT | Mod: PBBFAC,PO | Performed by: NURSE PRACTITIONER

## 2020-03-09 PROCEDURE — 99999 PR PBB SHADOW E&M-EST. PATIENT-LVL V: ICD-10-PCS | Mod: PBBFAC,,, | Performed by: NURSE PRACTITIONER

## 2020-03-09 PROCEDURE — 99204 PR OFFICE/OUTPT VISIT, NEW, LEVL IV, 45-59 MIN: ICD-10-PCS | Mod: S$PBB,,, | Performed by: NURSE PRACTITIONER

## 2020-03-09 PROCEDURE — 99999 PR PBB SHADOW E&M-EST. PATIENT-LVL V: CPT | Mod: PBBFAC,,, | Performed by: NURSE PRACTITIONER

## 2020-03-09 PROCEDURE — 99204 OFFICE O/P NEW MOD 45 MIN: CPT | Mod: S$PBB,,, | Performed by: NURSE PRACTITIONER

## 2020-03-09 NOTE — LETTER
March 9, 2020      Meggan Ramachandran MD  [address] [place] [address]           [facility]  [address]  [facility] [address]  Phone: [phone]          Patient: Papito Hutton   MR Number: [id number]   YOB: 1945   Date of Visit: 3/9/2020       Dear Dr. Meggan Ramachandran:    Thank you for referring Papito Hutton to me for evaluation. Attached you will find relevant portions of my assessment and plan of care.    If you have questions, please do not hesitate to call me. I look forward to following Papito Hutton along with you.    Sincerely,    Paulette Jean Baptiste, NP    Enclosure  CC:  No Recipients    If you would like to receive this communication electronically, please contact [email or web address] or [phone] to request more information on [email or web address] Link access.    For providers and/or their staff who would like to refer a patient to [facility], please contact us through our one-stop-shop provider referral line, [facility] , at [phone].    If you feel you have received this communication in error or would no longer like to receive these types of communications, please e-mail [email or web address]

## 2020-03-09 NOTE — PROGRESS NOTES
Subjective:       Patient ID: Papito Hutton is a 74 y.o. male, Body mass index is 30.26 kg/m².    Chief Complaint: Colon Cancer Screening      Patient is new to me. Former patient of Dr. Garcia.  Referred by Dr. Ramachandran for screening colonoscopy.    Here for screening colonoscopy. Last c-scope done in 2008; diminutive polyps removed at cecum. Feeling well, no complaints. Denies abdominal pain, constipation, diarrhea, hematochezia, melena, nausea, vomiting, dysphagia, heartburn, or unexpected weight loss. Hx of GERD- well controlled on Nexium 40 mg once daily. Bowel movements are once per day.    Review of Systems   Constitutional: Negative for appetite change, fever and unexpected weight change.   HENT: Negative for trouble swallowing.    Respiratory: Negative for cough and shortness of breath.    Cardiovascular: Negative for chest pain.   Gastrointestinal: Negative for blood in stool, constipation, diarrhea, nausea and vomiting.   Genitourinary: Negative for difficulty urinating and dysuria.   Skin: Negative for rash.   Neurological: Negative for speech difficulty.   Psychiatric/Behavioral: Negative for confusion.       Past Medical History:   Diagnosis Date    Arthritis     B12 deficiency     Colon polyp     GERD (gastroesophageal reflux disease)     Hyperlipidemia     Hypertension     Low testosterone     Personal history of colonic polyps 2008    adenomatous polyp      Past Surgical History:   Procedure Laterality Date    COLONOSCOPY  2008    Dr. Garcia; polyps removed    NASAL SEPTUM SURGERY      right hand surgery      UPPER GASTROINTESTINAL ENDOSCOPY  2008    hiatal hernia; esophagitis      Family History   Problem Relation Age of Onset    Cancer Neg Hx     Diabetes Neg Hx     Heart disease Neg Hx       Wt Readings from Last 10 Encounters:   03/09/20 101.2 kg (223 lb 1.7 oz)   02/04/20 100.7 kg (222 lb 0.1 oz)   02/03/20 101.1 kg (222 lb 12.4 oz)   08/19/19 101.6 kg (223 lb 15.8 oz)    07/29/19 100.1 kg (220 lb 10.9 oz)   05/10/19 99.7 kg (219 lb 12.8 oz)   01/29/19 100.5 kg (221 lb 9 oz)   11/19/18 100.9 kg (222 lb 7.1 oz)   07/17/18 99.3 kg (218 lb 14.7 oz)   02/09/18 100 kg (220 lb 7.4 oz)     Lab Results   Component Value Date    WBC 6.29 05/10/2019    HGB 15.1 05/10/2019    HCT 45.7 05/10/2019    MCV 91 05/10/2019     05/10/2019     CMP  Sodium   Date Value Ref Range Status   02/03/2020 142 136 - 145 mmol/L Final     Potassium   Date Value Ref Range Status   02/03/2020 4.5 3.5 - 5.1 mmol/L Final     Chloride   Date Value Ref Range Status   02/03/2020 107 95 - 110 mmol/L Final     CO2   Date Value Ref Range Status   02/03/2020 26 23 - 29 mmol/L Final     Glucose   Date Value Ref Range Status   02/03/2020 123 (H) 70 - 110 mg/dL Final     BUN, Bld   Date Value Ref Range Status   02/03/2020 20 8 - 23 mg/dL Final     Creatinine   Date Value Ref Range Status   02/03/2020 1.6 (H) 0.5 - 1.4 mg/dL Final     Calcium   Date Value Ref Range Status   02/03/2020 11.5 (H) 8.7 - 10.5 mg/dL Final     Total Protein   Date Value Ref Range Status   02/03/2020 7.7 6.0 - 8.4 g/dL Final     Albumin   Date Value Ref Range Status   02/03/2020 4.2 3.5 - 5.2 g/dL Final   05/10/2019 4.7 3.6 - 5.1 g/dL Final     Comment:     **Data from J Clin Invest 1974:53:819-828 and J Clin Endocrinol   Metab 1973 36:4923-1752. Men with clinically significant   hypogonadal symptoms and testosterone values repeatedly in the   range of the 200-300 ng/dL or less, may benefit from testosterone  treatment after adequate risk and benefits counseling.  For additional information, please refer to   http://education.larala.com.[employer]/faq/SKV894 (This link is   being provided for informational/ educational purposes only.)  This test was developed and its analytical performance   characteristics have been determined by [facility]. It has not been cleared or approved by the [place]  Food and Drug  Administration. This assay has been validated   pursuant to the CLIA regulations and is used for clinical   purposes.  @ Test Performed By:  [facility]  Wilfrido Will M.D., Ph.D.,   [address] [place] [address]  [facility]  [id number]       Total Bilirubin   Date Value Ref Range Status   02/03/2020 0.5 0.1 - 1.0 mg/dL Final     Comment:     For infants and newborns, interpretation of results should be based  on gestational age, weight and in agreement with clinical  observations.  Premature Infant recommended reference ranges:  Up to 24 hours.............<8.0 mg/dL  Up to 48 hours............<12.0 mg/dL  3-5 days..................<15.0 mg/dL  6-29 days.................<15.0 mg/dL       Alkaline Phosphatase   Date Value Ref Range Status   02/03/2020 92 55 - 135 U/L Final     AST   Date Value Ref Range Status   02/03/2020 30 10 - 40 U/L Final     ALT   Date Value Ref Range Status   02/03/2020 50 (H) 10 - 44 U/L Final     Anion Gap   Date Value Ref Range Status   02/03/2020 9 8 - 16 mmol/L Final     eGFR if    Date Value Ref Range Status   02/03/2020 48.7 (A) >60 mL/min/1.73 m^2 Final     eGFR if non    Date Value Ref Range Status   02/03/2020 42.1 (A) >60 mL/min/1.73 m^2 Final     Comment:     Calculation used to obtain the estimated glomerular filtration  rate (eGFR) is the CKD-EPI equation.                 Reviewed prior medical records including radiology report of US of abdomen 7/17/17 & endoscopy history (see surgical history).     Objective:      Physical Exam   Constitutional: He is oriented to person, place, and time. He appears well-developed and well-nourished.   HENT:   Head: Normocephalic.   Eyes: Pupils are equal, round, and reactive to light.   Neck: Normal range of motion.   Cardiovascular: Normal rate, regular rhythm and normal heart sounds.   No murmur heard.  Pulmonary/Chest: Breath sounds normal. No respiratory  distress. He has no wheezes.   Abdominal: Soft. Bowel sounds are normal. He exhibits no distension and no mass. There is no tenderness. There is no guarding.   Musculoskeletal: Normal range of motion.   Neurological: He is alert and oriented to person, place, and time.   Skin: Skin is warm and dry. No rash noted.   Non jaundiced   Psychiatric: He has a normal mood and affect. His speech is normal.         Assessment:       1. History of colon polyps    2. Screen for colon cancer    3. GERD with esophagitis    4. Hiatal hernia           Plan:   All diagnoses and orders for this visit:    History of colon polyps & Screen for colon cancer  - Schedule Colonoscopy, discussed procedure with the patient, including risks and benefits, patient verbalized understanding    GERD with esophagitis   - Continue Nexium 40 mg once daily   - Take PPI in the morning 30 minutes before breakfast   - Recommend to avoid large meals, avoid eating within 3 hours of bedtime, elevate head of bed if nocturnal symptoms are present, smoking cessation (if current smoker), & weight loss (if overweight).    - Recommend minimize/avoid high-fat foods, chocolate, caffeine, citrus, alcohol, & tomato products.   - Advised to avoid/limit use of NSAID's, since they can cause GI upset, bleeding, and/or ulcers. If needed, take with food.     Hiatal hernia   - Discussed diagnosis with patient & that it is usually managed by controlling reflux symptoms, surgery is an option, but usually performed if reflux is uncontrolled by medication management and lifestyle/dietary modifications; if symptoms persist despite medication management and lifestyle/dietary modifications, we can refer to general surgery to consult about surgical options, patient verbalized understanding    If no improvement in symptoms or symptoms worsen, call/follow-up at clinic or go to ER

## 2020-03-09 NOTE — PATIENT INSTRUCTIONS
Colorectal Cancer Screening    Colorectal cancer (cancer in the colon or rectum) is a leading cause of cancer deaths in the [place] But it doesnt have to be. When this cancer is found and removed early, the chances of a full recovery are very good. Because colorectal cancer rarely causes symptoms in its early stages, screening for the disease is important. Its even more crucial if you have risk factors for the disease. Learn more about colorectal cancer and its risk factors. Then talk to your healthcare provider about being screened. You could be saving your own life.  Risk factors for colorectal cancer  Your risk of having colorectal cancer increases if you:  · Are 50 years of age or older  · Have a family history or personal history of colorectal cancer or polyps  · Have a personal history of type 2 diabetes, Crohns disease, or ulcerative colitis  · Have an inherited genetic syndrome like Graff syndrome (also known as HNPCC) or familial adenomatous polyposis (FAP)  · Are very overweight  · Are not physically active  · Smoke  · Drink a lot of alcohol  · Eat a lot of red or processed meat  The colon and rectum  Waste from food you eat enters the colon from the small intestine. As it travels through the colon, the waste (stool) loses water and becomes more solid. Intestinal muscles push it toward the sigmoid--the last section of the colon. Stool then moves into the rectum, where its stored until its ready to leave the body during a bowel movement.  How cancer develops  Polyps are growths that form on the inner lining of the colon or rectum. Most are benign, which means they arent cancerous. But over time, some polyps can become cancer (malignant). This happens when cells in these polyps begin growing abnormally. In time, malignant cells invade more and more of the colon and rectum. The cancer may also spread to nearby organs or lymph nodes or to other parts of the body. Finding and removing polyps can help  prevent cancer from ever forming.  Your screening  Screening means looking for a health problem before you have symptoms. During screening for colorectal cancer, your healthcare provider will ask about your health history, examine you, and do one or more tests.  History and exam  The history and exam involve the following:  · Health history. Your healthcare provider will ask about your health history. Mention if a family member has had colon cancer or polyps. Also mention any health problems you have had in the past.  · Digital rectal exam (DAVE). During a DAVE, the healthcare provider inserts a lubricated gloved finger into the rectum. The test is painless and takes less than a minute. Healthcare providers agree that this test alone is not enough to screen for colorectal cancer.  Screening test choices  Fecal occult blood test (FOBT) or fecal immunochemical test (FIT)  These tests check for occult blood in stool (blood you cant see). Hidden blood may be a sign of colon polyps or cancer. A small sample of stool is tested for blood in a laboratory. Most often, you collect this sample at home using a kit your healthcare provider gives you. Follow the instructions carefully for using this kit. You might need to avoid certain foods and medicines before the test, as directed.  Barium enema with contrast (double-contrast barium enema)  This test uses X-rays to provide images of the entire colon and rectum. The day before this test, you will need to do a bowel prep to clean out the colon and rectum. A bowel prep is a liquid diet plus strong laxatives or enemas. You will be awake for the test, but you may be given medicine to help you relax. At the start of the test, a radiologist (a healthcare provider who specializes in imaging tests) places a soft tube into the rectum. The tube is used to fill the colon with a contrast liquid (barium) and air. This can be uncomfortable for some people. The liquid helps the colon show up  clearly on the X-rays. Because the test uses X-rays, it exposes you to a small amount of radiation.  Virtual colonoscopy  This exam is also called a CT colonography. It uses a series of X-ray photographs to create a 3-D view of the colon and rectum. The day before the test, you will need to do a bowel prep to clean out your colon. Your healthcare provider will give you instructions on how to do this. During the procedure, you will lie on a table that is part of a special X-ray machine called a CT scanner. A small tube will be placed into your rectum to fill the colon and rectum with air. This can be uncomfortable for some people. Then, the table will move into the machine and pictures will be taken of your colon and rectum. A computer will combine these photos to create a 3-D picture. Because the test uses X-rays, it exposes you to a small amount of radiation.  Scope exams  Here are two types of scope exams:  · Colonoscopy. This test can be used to find and remove polyps anywhere in the colon or rectum. The day before the test, you will do a bowel prep. This is a liquid diet plus a strong laxative solution or an enema. The bowel prep will cleanse your colon. You will be given instructions for this. Just before the test, you are given a medicine to make you sleepy. Then, a long, flexible, lighted tube called a colonoscope is gently inserted into the rectum and guided through the entire colon. Images of the colon are viewed on a video screen. Any polyps that are found are removed and sent to a lab for testing. If a polyp cant be removed, a sample of tissue is taken and the polyp might be removed later during surgery. You will need to bring someone with you to drive you home after this test.   · Sigmoidoscopy. This test is similar to colonoscopy, but focuses only on the sigmoid colon and rectum. As with colonoscopy, bowel prep must be done the day before this test. It might not need to be as complete as the bowel prep  for a colonoscopy. You are awake during the procedure, but you may be given medicine to help you relax. During the test, the healthcare provider guides a thin, flexible, lighted tube called a sigmoidoscope through your rectum and lower colon. The images are displayed on a video screen. Polyps are removed, if possible, and sent to a lab for testing.  Colonoscopy is the only screening test that lets your healthcare provider see the entire colon and rectum. This test also lets your healthcare provider remove any pieces of tissue that need to be looked at by a lab. If something suspicious is found using any other tests, you will likely need a colonoscopy.     When to call your healthcare provider after a test  Call your healthcare provider if you have any of the following after any screening test:  · Bleeding  · Fever of 100.4°F (38°C) or higher, or as directed by your healthcare provider  · Abdominal pain  · Vomiting   Date Last Reviewed: 11/4/2015  © 7245-5484 [employer]. [address] [place] [address], [place] [address]. All rights reserved. This information is not intended as a substitute for professional medical care. Always follow your healthcare professional's instructions.

## 2020-03-27 ENCOUNTER — PATIENT MESSAGE (OUTPATIENT)
Dept: ENDOCRINOLOGY | Facility: CLINIC | Age: 75
End: 2020-03-27

## 2020-04-06 ENCOUNTER — TELEPHONE (OUTPATIENT)
Dept: GASTROENTEROLOGY | Facility: CLINIC | Age: 75
End: 2020-04-06

## 2020-04-06 RX ORDER — HYDROCHLOROTHIAZIDE 12.5 MG/1
12.5 TABLET ORAL DAILY
Qty: 90 TABLET | Refills: 1 | Status: SHIPPED | OUTPATIENT
Start: 2020-04-06 | End: 2020-07-23 | Stop reason: SDUPTHER

## 2020-04-06 NOTE — TELEPHONE ENCOUNTER
----- Message from Jose Hernadez sent at 4/6/2020 10:33 AM CDT -----  Contact: pt  Type: Needs Medical Advice    Who Called:  pt    Best Call Back Number: [phone]  Additional Information: pt wanting to know if the procedure is still scheduled. Please call to advise

## 2020-05-01 ENCOUNTER — TELEPHONE (OUTPATIENT)
Dept: GASTROENTEROLOGY | Facility: CLINIC | Age: 75
End: 2020-05-01

## 2020-05-01 NOTE — TELEPHONE ENCOUNTER
----- Message from Noel Charles sent at 5/1/2020  9:03 AM CDT -----  Contact: same  Patient called in and stated the date of 6/23/2020 for his colonoscopy is great but will need the first or second appointment of the morning due to transportation.    Patient call back number is [phone]

## 2020-05-05 ENCOUNTER — PATIENT MESSAGE (OUTPATIENT)
Dept: ADMINISTRATIVE | Facility: HOSPITAL | Age: 75
End: 2020-05-05

## 2020-06-08 ENCOUNTER — PATIENT MESSAGE (OUTPATIENT)
Dept: FAMILY MEDICINE | Facility: CLINIC | Age: 75
End: 2020-06-08

## 2020-06-08 RX ORDER — AMLODIPINE AND BENAZEPRIL HYDROCHLORIDE 10; 40 MG/1; MG/1
1 CAPSULE ORAL DAILY
Qty: 90 CAPSULE | Refills: 1 | Status: SHIPPED | OUTPATIENT
Start: 2020-06-08 | End: 2020-08-26

## 2020-06-10 ENCOUNTER — PATIENT MESSAGE (OUTPATIENT)
Dept: GASTROENTEROLOGY | Facility: CLINIC | Age: 75
End: 2020-06-10

## 2020-06-15 ENCOUNTER — TELEPHONE (OUTPATIENT)
Dept: GASTROENTEROLOGY | Facility: CLINIC | Age: 75
End: 2020-06-15

## 2020-06-15 NOTE — TELEPHONE ENCOUNTER
----- Message from Amy Amado sent at 6/15/2020  8:44 AM CDT -----  Contact: Patient  Type: Needs Medical Advice  Who Called:  Patient  Best Call Back Number:   Additional Information: Calling to reschedule his colonoscopy.   \

## 2020-07-10 ENCOUNTER — LAB VISIT (OUTPATIENT)
Dept: PRIMARY CARE CLINIC | Facility: CLINIC | Age: 75
End: 2020-07-10
Payer: MEDICARE

## 2020-07-10 PROCEDURE — U0003 INFECTIOUS AGENT DETECTION BY NUCLEIC ACID (DNA OR RNA); SEVERE ACUTE RESPIRATORY SYNDROME CORONAVIRUS 2 (SARS-COV-2) (CORONAVIRUS DISEASE [COVID-19]), AMPLIFIED PROBE TECHNIQUE, MAKING USE OF HIGH THROUGHPUT TECHNOLOGIES AS DESCRIBED BY CMS-2020-01-R: HCPCS

## 2020-07-10 RX ORDER — METOPROLOL SUCCINATE 25 MG/1
TABLET, EXTENDED RELEASE ORAL
Qty: 90 TABLET | Refills: 1 | Status: SHIPPED | OUTPATIENT
Start: 2020-07-10 | End: 2021-02-24 | Stop reason: SDUPTHER

## 2020-07-11 LAB — SARS-COV-2 RNA RESP QL NAA+PROBE: NOT DETECTED

## 2020-07-13 ENCOUNTER — ANESTHESIA EVENT (OUTPATIENT)
Dept: ENDOSCOPY | Facility: HOSPITAL | Age: 75
End: 2020-07-13
Payer: MEDICARE

## 2020-07-13 ENCOUNTER — HOSPITAL ENCOUNTER (OUTPATIENT)
Facility: HOSPITAL | Age: 75
Discharge: HOME | End: 2020-07-13
Attending: INTERNAL MEDICINE | Admitting: INTERNAL MEDICINE
Payer: MEDICARE

## 2020-07-13 ENCOUNTER — TELEPHONE (OUTPATIENT)
Dept: SURGERY | Facility: CLINIC | Age: 75
End: 2020-07-13

## 2020-07-13 ENCOUNTER — ANESTHESIA (OUTPATIENT)
Dept: ENDOSCOPY | Facility: HOSPITAL | Age: 75
End: 2020-07-13
Payer: MEDICARE

## 2020-07-13 DIAGNOSIS — Z86.010 HISTORY OF COLON POLYPS: Primary | ICD-10-CM

## 2020-07-13 PROBLEM — Z86.0100 HISTORY OF COLON POLYPS: Status: ACTIVE | Noted: 2020-07-13

## 2020-07-13 PROCEDURE — 27201028 HC NEEDLE, SCLERO: Performed by: INTERNAL MEDICINE

## 2020-07-13 PROCEDURE — 88305 TISSUE EXAM BY PATHOLOGIST: ICD-10-PCS | Mod: 26,,, | Performed by: PATHOLOGY

## 2020-07-13 PROCEDURE — 45380 PR COLONOSCOPY,BIOPSY: ICD-10-PCS | Mod: PT,,, | Performed by: INTERNAL MEDICINE

## 2020-07-13 PROCEDURE — 45380 COLONOSCOPY AND BIOPSY: CPT | Mod: PT,,, | Performed by: INTERNAL MEDICINE

## 2020-07-13 PROCEDURE — 27201012 HC FORCEPS, HOT/COLD, DISP: Performed by: INTERNAL MEDICINE

## 2020-07-13 PROCEDURE — 25000003 PHARM REV CODE 250: Performed by: NURSE ANESTHETIST, CERTIFIED REGISTERED

## 2020-07-13 PROCEDURE — 45380 COLONOSCOPY AND BIOPSY: CPT | Performed by: INTERNAL MEDICINE

## 2020-07-13 PROCEDURE — 45381 PR COLONOSCPY,FLEX,W/DIR SUBMUC INJECT: ICD-10-PCS | Mod: 51,,, | Performed by: INTERNAL MEDICINE

## 2020-07-13 PROCEDURE — 45381 COLONOSCOPY SUBMUCOUS NJX: CPT | Performed by: INTERNAL MEDICINE

## 2020-07-13 PROCEDURE — 37000009 HC ANESTHESIA EA ADD 15 MINS: Performed by: INTERNAL MEDICINE

## 2020-07-13 PROCEDURE — 45381 COLONOSCOPY SUBMUCOUS NJX: CPT | Mod: 51,,, | Performed by: INTERNAL MEDICINE

## 2020-07-13 PROCEDURE — D9220A PRA ANESTHESIA: Mod: PT,CRNA,, | Performed by: NURSE ANESTHETIST, CERTIFIED REGISTERED

## 2020-07-13 PROCEDURE — 63600175 PHARM REV CODE 636 W HCPCS: Performed by: NURSE ANESTHETIST, CERTIFIED REGISTERED

## 2020-07-13 PROCEDURE — 88305 TISSUE EXAM BY PATHOLOGIST: CPT | Mod: 26,,, | Performed by: PATHOLOGY

## 2020-07-13 PROCEDURE — D9220A PRA ANESTHESIA: ICD-10-PCS | Mod: PT,ANES,, | Performed by: ANESTHESIOLOGY

## 2020-07-13 PROCEDURE — 37000008 HC ANESTHESIA 1ST 15 MINUTES: Performed by: INTERNAL MEDICINE

## 2020-07-13 PROCEDURE — D9220A PRA ANESTHESIA: ICD-10-PCS | Mod: PT,CRNA,, | Performed by: NURSE ANESTHETIST, CERTIFIED REGISTERED

## 2020-07-13 PROCEDURE — D9220A PRA ANESTHESIA: Mod: PT,ANES,, | Performed by: ANESTHESIOLOGY

## 2020-07-13 PROCEDURE — 88305 TISSUE EXAM BY PATHOLOGIST: CPT | Mod: 59 | Performed by: PATHOLOGY

## 2020-07-13 RX ORDER — LIDOCAINE HCL/PF 100 MG/5ML
SYRINGE (ML) INTRAVENOUS
Status: DISCONTINUED | OUTPATIENT
Start: 2020-07-13 | End: 2020-07-13

## 2020-07-13 RX ORDER — PROPOFOL 10 MG/ML
VIAL (ML) INTRAVENOUS
Status: DISCONTINUED | OUTPATIENT
Start: 2020-07-13 | End: 2020-07-13

## 2020-07-13 RX ORDER — SODIUM CHLORIDE 9 MG/ML
INJECTION, SOLUTION INTRAVENOUS CONTINUOUS
Status: DISCONTINUED | OUTPATIENT
Start: 2020-07-13 | End: 2020-07-13 | Stop reason: HOSPADM

## 2020-07-13 RX ADMIN — PROPOFOL 100 MG: 10 INJECTION, EMULSION INTRAVENOUS at 08:07

## 2020-07-13 RX ADMIN — PROPOFOL 50 MG: 10 INJECTION, EMULSION INTRAVENOUS at 08:07

## 2020-07-13 RX ADMIN — LIDOCAINE HYDROCHLORIDE 100 MG: 20 INJECTION INTRAVENOUS at 08:07

## 2020-07-13 NOTE — ANESTHESIA POSTPROCEDURE EVALUATION
Anesthesia Post Evaluation    Patient: Papito Hutton    Procedure(s) Performed: Procedure(s) (LRB):  COLONOSCOPY (N/A)    Final Anesthesia Type: general    Patient location during evaluation: PACU  Patient participation: Yes- Able to Participate  Level of consciousness: awake and alert and oriented  Post-procedure vital signs: reviewed and stable  Pain management: adequate  Airway patency: patent    PONV status at discharge: No PONV  Anesthetic complications: no      Cardiovascular status: blood pressure returned to baseline and stable  Respiratory status: unassisted and spontaneous ventilation  Hydration status: euvolemic  Follow-up not needed.          Vitals Value Taken Time   /62 07/13/20 0910   Temp 36.7 °C (98 °F) 07/13/20 0910   Pulse 61 07/13/20 0910   Resp 16 07/13/20 0910   SpO2 95 % 07/13/20 0910         Event Time   Out of Recovery 09:25:09         Pain/Patty Score: Patty Score: 10 (7/13/2020  9:10 AM)

## 2020-07-13 NOTE — DISCHARGE INSTRUCTIONS

## 2020-07-13 NOTE — PLAN OF CARE
Vss, ramirez po fluids, denies pain, ambulates easily. IV removed, catheter intact. Discharge instructions provided and states understanding. States ready to go home.  Discharged from facility with family per wheelchair.

## 2020-07-13 NOTE — ANESTHESIA PREPROCEDURE EVALUATION
07/13/2020  Papito Hutton is a 74 y.o., male.    Anesthesia Evaluation    I have reviewed the Patient Summary Reports.    I have reviewed the Nursing Notes.    I have reviewed the Medications.     Review of Systems  Anesthesia Hx:  No problems with previous Anesthesia    Social:  Non-Smoker    Cardiovascular:   Hypertension, well controlled    Pulmonary:  Pulmonary Normal    Renal/:  Renal/ Normal     Hepatic/GI:   GERD, well controlled    Neurological:  Neurology Normal    Endocrine:  Endocrine Normal        Physical Exam  General:  Well nourished    Airway/Jaw/Neck:  Airway Findings: Mouth Opening: Normal Tongue: Normal  General Airway Assessment: Adult  Oropharynx Findings:  Mallampati: II  Jaw/Neck Findings:  Neck ROM: Normal ROM     Eyes/Ears/Nose:  Eyes/Ears/Nose Findings:    Dental:  Dental Findings:   Chest/Lungs:  Chest/Lungs Findings: Normal Respiratory Rate     Heart/Vascular:  Heart Findings: Rate: Normal  Rhythm: Regular Rhythm        Mental Status:  Mental Status Findings:  Cooperative, Alert and Oriented         Anesthesia Plan  Type of Anesthesia, risks & benefits discussed:  Anesthesia Type:  general  Patient's Preference:   Intra-op Monitoring Plan: standard ASA monitors  Intra-op Monitoring Plan Comments:   Post Op Pain Control Plan: multimodal analgesia  Post Op Pain Control Plan Comments:   Induction:   IV  Beta Blocker:  Patient is not currently on a Beta-Blocker (No further documentation required).       Informed Consent: Patient understands risks and agrees with Anesthesia plan.  Questions answered. Anesthesia consent signed with patient.  ASA Score: 2     Day of Surgery Review of History & Physical:            Ready For Surgery From Anesthesia Perspective.

## 2020-07-13 NOTE — TRANSFER OF CARE
Anesthesia Transfer of Care Note    Patient: Papito uHtton    Procedure(s) Performed: Procedure(s) (LRB):  COLONOSCOPY (N/A)    Patient location: PACU    Anesthesia Type: general    Transport from OR: Transported from OR on 2-3 L/min O2 by NC with adequate spontaneous ventilation    Post pain: adequate analgesia    Post assessment: no apparent anesthetic complications and tolerated procedure well    Post vital signs: stable    Level of consciousness: awake, alert and oriented    Nausea/Vomiting: no nausea/vomiting    Complications: none    Transfer of care protocol was followed      Last vitals:   Visit Vitals  BP (!) 152/71 (BP Location: Left arm, Patient Position: Lying)   Pulse 61   Temp 36.5 °C (97.7 °F) (Skin)   Resp 16   Ht 6' (1.829 m)   Wt 98 kg (216 lb)   SpO2 95%   BMI 29.29 kg/m²

## 2020-07-13 NOTE — H&P
History & Physical - Short Stay  Gastroenterology      SUBJECTIVE:     Procedure: Colonoscopy    Chief Complaint/Indication for Procedure: Previous Polyps    PTA Medications   Medication Sig    amLODIPine-benazepriL (LOTREL) 10-40 mg per capsule Take 1 capsule by mouth once daily.    aspirin 81 MG Chew Take 1 tablet (81 mg total) by mouth once daily.    CHOLECALCIFEROL, VITAMIN D3, (VITAMIN D3 ORAL) Take 1 capsule by mouth once daily.    cyanocobalamin (VITAMIN B-12) 1000 MCG tablet Take 100 mcg by mouth once daily.    esomeprazole (NEXIUM) 40 MG capsule TAKE 1 CAPSULE BEFORE BREAKFAST    hydroCHLOROthiazide (HYDRODIURIL) 12.5 MG Tab Take 1 tablet (12.5 mg total) by mouth once daily.    metFORMIN (GLUCOPHAGE) 500 MG tablet Take 1 tablet (500 mg total) by mouth 2 (two) times daily with meals.    rosuvastatin (CRESTOR) 20 MG tablet Take 1 tablet (20 mg total) by mouth once daily.    TOPROL XL 25 mg 24 hr tablet TAKE 1 TABLET DAILY    diclofenac (VOLTAREN) 75 MG EC tablet TAKE 1 TABLET TWICE A DAY (Patient taking differently: Taking 1 Tablet A Day)    diclofenac sodium (VOLTAREN) 1 % Gel Apply 2 g topically once daily.    fluticasone propionate (FLONASE) 50 mcg/actuation nasal spray USE 2 SPRAYS IN EACH NOSTRIL ONCE DAILY    testosterone (FORTESTA) 10 mg/0.5 gram /actuation GlPm Place 20 mg onto the skin once daily. Apply 2 pump actuations topically daily.       Review of patient's allergies indicates:   Allergen Reactions    Vytorin 10-10 [ezetimibe-simvastatin] Other (See Comments)     Transaminitis        Past Medical History:   Diagnosis Date    Arthritis     B12 deficiency     Colon polyp     GERD (gastroesophageal reflux disease)     Hyperlipidemia     Hypertension     Low testosterone     Personal history of colonic polyps 2008    adenomatous polyp     Past Surgical History:   Procedure Laterality Date    COLONOSCOPY  2008    Dr. Garcia; polyps removed    NASAL SEPTUM SURGERY      right  hand surgery      UPPER GASTROINTESTINAL ENDOSCOPY  2008    hiatal hernia; esophagitis     Family History   Problem Relation Age of Onset    Cancer Neg Hx     Diabetes Neg Hx     Heart disease Neg Hx      Social History     Tobacco Use    Smoking status: Current Some Day Smoker     Types: Cigars    Smokeless tobacco: Former User    Tobacco comment: smoke 1 cigar every couple of months   Substance Use Topics    Alcohol use: Yes     Alcohol/week: 2.0 standard drinks     Types: 2 Cans of beer per week    Drug use: No         OBJECTIVE:     Vital Signs (Most Recent)  Temp: 97.7 °F (36.5 °C) (07/13/20 0801)  Pulse: 61 (07/13/20 0801)  Resp: 16 (07/13/20 0801)  BP: (!) 152/71 (07/13/20 0801)  SpO2: 95 % (07/13/20 0801)    Physical Exam:                                                       GENERAL:  Comfortable, in no acute distress.                                 HEENT EXAM:  Nonicteric.  No adenopathy.  Oropharynx is clear.               NECK:  Supple.                                                               LUNGS:  Clear.                                                               CARDIAC:  Regular rate and rhythm.  S1, S2.  No murmur.                      ABDOMEN:  Soft, positive bowel sounds, nontender.  No hepatosplenomegaly or masses.  No rebound or guarding.                                             EXTREMITIES:  No edema.     MENTAL STATUS:  Normal, alert and oriented.      ASSESSMENT/PLAN:     Assessment: Previous Polyps    Plan: Colonoscopy    Anesthesia Plan: General    ASA rdGrdrrdarddrderd:rd rd3rd MALLAMPATI SCORE:  II (hard and soft palate, upper portion of tonsils anduvula visible)

## 2020-07-13 NOTE — PROVATION PATIENT INSTRUCTIONS
Discharge Summary/Instructions after an Endoscopic Procedure  Patient Name: Papito Hutton  Patient MRN: [id number]  Patient YOB: 1945 Monday, July 13, 2020  Jj Fried MD  RESTRICTIONS:  During your procedure today, you received medications for sedation.  These   medications may affect your judgment, balance and coordination.  Therefore,   for 24 hours, you have the following restrictions:   - DO NOT drive a car, operate machinery, make legal/financial decisions,   sign important papers or drink alcohol.    ACTIVITY:  Today: no heavy lifting, straining or running due to procedural   sedation/anesthesia.  The following day: return to full activity including work.  DIET:  Eat and drink normally unless instructed otherwise.     TREATMENT FOR COMMON SIDE EFFECTS:  - Mild abdominal pain, nausea, belching, bloating or excessive gas:  rest,   eat lightly and use a heating pad.  - Sore Throat: treat with throat lozenges and/or gargle with warm salt   water.  - Because air was used during the procedure, expelling large amounts of air   from your rectum or belching is normal.  - If a bowel prep was taken, you may not have a bowel movement for 1-3 days.    This is normal.  SYMPTOMS TO WATCH FOR AND REPORT TO YOUR PHYSICIAN:  1. Abdominal pain or bloating, other than gas cramps.  2. Chest pain.  3. Back pain.  4. Signs of infection such as: chills or fever occurring within 24 hours   after the procedure.  5. Rectal bleeding, which would show as bright red, maroon, or black stools.   (A tablespoon of blood from the rectum is not serious, especially if   hemorrhoids are present.)  6. Vomiting.  7. Weakness or dizziness.  GO DIRECTLY TO THE NEAREST EMERGENCY ROOM IF YOU HAVE ANY OF THE FOLLOWING:      Difficulty breathing              Chills and/or fever over 101 F   Persistent vomiting and/or vomiting blood   Severe abdominal pain   Severe chest pain   Black, tarry stools   Bleeding- more than one  tablespoon   Any other symptom or condition that you feel may need urgent attention  Your doctor recommends these additional instructions:  If any biopsies were taken, your doctors clinic will contact you in 1 to 2   weeks with any results.  - Repeat colonoscopy in 1 year for surveillance. - Refer to a colo-rectal   surgeon at the next available appointment for removal of polyp  - Discharge patient to home.   - Advance diet as tolerated.   - Continue present medications.   - Await pathology results.  - Written discharge instructions were provided to the patient.  For questions, problems or results please call your physician - Jj Fried MD at Work:  [phone].  [facility], EMERGENCY ROOM PHONE NUMBER: [phone]  IF A COMPLICATION OR EMERGENCY SITUATION ARISES AND YOU ARE UNABLE TO REACH   YOUR PHYSICIAN - GO DIRECTLY TO THE EMERGENCY ROOM.  Jj Fried MD  7/13/2020 8:56:32 AM  This report has been verified and signed electronically.  [employer]ATION

## 2020-07-14 ENCOUNTER — TELEPHONE (OUTPATIENT)
Dept: ENDOCRINOLOGY | Facility: CLINIC | Age: 75
End: 2020-07-14

## 2020-07-14 VITALS
BODY MASS INDEX: 29.26 KG/M2 | SYSTOLIC BLOOD PRESSURE: 122 MMHG | RESPIRATION RATE: 16 BRPM | TEMPERATURE: 98 F | HEIGHT: 72 IN | HEART RATE: 61 BPM | WEIGHT: 216 LBS | OXYGEN SATURATION: 95 % | DIASTOLIC BLOOD PRESSURE: 62 MMHG

## 2020-07-14 DIAGNOSIS — R73.03 PREDIABETES: Primary | ICD-10-CM

## 2020-07-14 DIAGNOSIS — E34.9 ELEVATED CALCITONIN LEVEL: ICD-10-CM

## 2020-07-14 DIAGNOSIS — E29.1 HYPOGONADISM IN MALE: ICD-10-CM

## 2020-07-14 DIAGNOSIS — E55.9 HYPOVITAMINOSIS D: ICD-10-CM

## 2020-07-14 DIAGNOSIS — E83.52 FHH (FAMILIAL HYPOCALCIURIC HYPERCALCEMIA): ICD-10-CM

## 2020-07-14 DIAGNOSIS — R79.89 LOW TESTOSTERONE: ICD-10-CM

## 2020-07-14 DIAGNOSIS — E21.3 HYPERPARATHYROIDISM: ICD-10-CM

## 2020-07-14 NOTE — TELEPHONE ENCOUNTER
Patient had to r/s his f/u but only available was until couple months out, are their any labs he should get in the meantime?

## 2020-07-16 ENCOUNTER — LAB VISIT (OUTPATIENT)
Dept: LAB | Facility: HOSPITAL | Age: 75
End: 2020-07-16
Attending: INTERNAL MEDICINE
Payer: MEDICARE

## 2020-07-16 DIAGNOSIS — E55.9 HYPOVITAMINOSIS D: ICD-10-CM

## 2020-07-16 DIAGNOSIS — E83.52 FHH (FAMILIAL HYPOCALCIURIC HYPERCALCEMIA): ICD-10-CM

## 2020-07-16 DIAGNOSIS — E34.9 ELEVATED CALCITONIN LEVEL: ICD-10-CM

## 2020-07-16 DIAGNOSIS — E21.3 HYPERPARATHYROIDISM: ICD-10-CM

## 2020-07-16 DIAGNOSIS — R79.89 LOW TESTOSTERONE: ICD-10-CM

## 2020-07-16 DIAGNOSIS — E29.1 HYPOGONADISM IN MALE: ICD-10-CM

## 2020-07-16 DIAGNOSIS — R73.03 PREDIABETES: ICD-10-CM

## 2020-07-16 DIAGNOSIS — E83.39 HYPOPHOSPHATEMIA: ICD-10-CM

## 2020-07-16 DIAGNOSIS — E21.3 HYPERPARATHYROIDISM: Primary | ICD-10-CM

## 2020-07-16 LAB
25(OH)D3+25(OH)D2 SERPL-MCNC: 63 NG/ML (ref 30–96)
ALBUMIN SERPL BCP-MCNC: 4.1 G/DL (ref 3.5–5.2)
ALP SERPL-CCNC: 81 U/L (ref 55–135)
ALT SERPL W/O P-5'-P-CCNC: 40 U/L (ref 10–44)
ANION GAP SERPL CALC-SCNC: 8 MMOL/L (ref 8–16)
AST SERPL-CCNC: 33 U/L (ref 10–40)
BASOPHILS # BLD AUTO: 0.06 K/UL (ref 0–0.2)
BASOPHILS NFR BLD: 1 % (ref 0–1.9)
BILIRUB SERPL-MCNC: 0.5 MG/DL (ref 0.1–1)
BUN SERPL-MCNC: 22 MG/DL (ref 8–23)
CA-I BLDV-SCNC: 1.47 MMOL/L (ref 1.06–1.42)
CALCIUM SERPL-MCNC: 11.1 MG/DL (ref 8.7–10.5)
CHLORIDE SERPL-SCNC: 105 MMOL/L (ref 95–110)
CO2 SERPL-SCNC: 25 MMOL/L (ref 23–29)
CREAT SERPL-MCNC: 1.5 MG/DL (ref 0.5–1.4)
DIFFERENTIAL METHOD: NORMAL
EOSINOPHIL # BLD AUTO: 0.2 K/UL (ref 0–0.5)
EOSINOPHIL NFR BLD: 3.1 % (ref 0–8)
ERYTHROCYTE [DISTWIDTH] IN BLOOD BY AUTOMATED COUNT: 13.2 % (ref 11.5–14.5)
EST. GFR  (AFRICAN AMERICAN): 52.6 ML/MIN/1.73 M^2
EST. GFR  (NON AFRICAN AMERICAN): 45.5 ML/MIN/1.73 M^2
ESTIMATED AVG GLUCOSE: 123 MG/DL (ref 68–131)
ESTRADIOL SERPL-MCNC: 18 PG/ML (ref 11–44)
FINAL PATHOLOGIC DIAGNOSIS: NORMAL
GLUCOSE SERPL-MCNC: 124 MG/DL (ref 70–110)
GROSS: NORMAL
HBA1C MFR BLD HPLC: 5.9 % (ref 4–5.6)
HCT VFR BLD AUTO: 44.2 % (ref 40–54)
HGB BLD-MCNC: 14.3 G/DL (ref 14–18)
IMM GRANULOCYTES # BLD AUTO: 0.01 K/UL (ref 0–0.04)
IMM GRANULOCYTES NFR BLD AUTO: 0.2 % (ref 0–0.5)
LYMPHOCYTES # BLD AUTO: 2.5 K/UL (ref 1–4.8)
LYMPHOCYTES NFR BLD: 43.7 % (ref 18–48)
MAGNESIUM SERPL-MCNC: 1.6 MG/DL (ref 1.6–2.6)
MCH RBC QN AUTO: 28.8 PG (ref 27–31)
MCHC RBC AUTO-ENTMCNC: 32.4 G/DL (ref 32–36)
MCV RBC AUTO: 89 FL (ref 82–98)
MONOCYTES # BLD AUTO: 0.4 K/UL (ref 0.3–1)
MONOCYTES NFR BLD: 7.5 % (ref 4–15)
NEUTROPHILS # BLD AUTO: 2.6 K/UL (ref 1.8–7.7)
NEUTROPHILS NFR BLD: 44.5 % (ref 38–73)
NRBC BLD-RTO: 0 /100 WBC
PHOSPHATE SERPL-MCNC: 2.2 MG/DL (ref 2.7–4.5)
PLATELET # BLD AUTO: 262 K/UL (ref 150–350)
PMV BLD AUTO: 11.2 FL (ref 9.2–12.9)
POTASSIUM SERPL-SCNC: 4.1 MMOL/L (ref 3.5–5.1)
PROT SERPL-MCNC: 7.5 G/DL (ref 6–8.4)
PTH-INTACT SERPL-MCNC: 107 PG/ML (ref 9–77)
RBC # BLD AUTO: 4.96 M/UL (ref 4.6–6.2)
SODIUM SERPL-SCNC: 138 MMOL/L (ref 136–145)
WBC # BLD AUTO: 5.75 K/UL (ref 3.9–12.7)

## 2020-07-16 PROCEDURE — 82330 ASSAY OF CALCIUM: CPT

## 2020-07-16 PROCEDURE — 84100 ASSAY OF PHOSPHORUS: CPT

## 2020-07-16 PROCEDURE — 82306 VITAMIN D 25 HYDROXY: CPT

## 2020-07-16 PROCEDURE — 80053 COMPREHEN METABOLIC PANEL: CPT

## 2020-07-16 PROCEDURE — 82672 ASSAY OF ESTROGEN: CPT

## 2020-07-16 PROCEDURE — 82652 VIT D 1 25-DIHYDROXY: CPT

## 2020-07-16 PROCEDURE — 82040 ASSAY OF SERUM ALBUMIN: CPT | Mod: 59

## 2020-07-16 PROCEDURE — 36415 COLL VENOUS BLD VENIPUNCTURE: CPT | Mod: PO

## 2020-07-16 PROCEDURE — 83036 HEMOGLOBIN GLYCOSYLATED A1C: CPT

## 2020-07-16 PROCEDURE — 85025 COMPLETE CBC W/AUTO DIFF WBC: CPT

## 2020-07-16 PROCEDURE — 83970 ASSAY OF PARATHORMONE: CPT

## 2020-07-16 PROCEDURE — 82670 ASSAY OF TOTAL ESTRADIOL: CPT

## 2020-07-16 PROCEDURE — 83735 ASSAY OF MAGNESIUM: CPT

## 2020-07-16 RX ORDER — SODIUM,POTASSIUM PHOSPHATES 280-250MG
1 POWDER IN PACKET (EA) ORAL ONCE
Qty: 90 PACKET | Refills: 3 | Status: SHIPPED | OUTPATIENT
Start: 2020-07-16 | End: 2020-07-16

## 2020-07-17 ENCOUNTER — TELEPHONE (OUTPATIENT)
Dept: ENDOCRINOLOGY | Facility: CLINIC | Age: 75
End: 2020-07-17

## 2020-07-17 DIAGNOSIS — E34.9 ELEVATED CALCITONIN LEVEL: Primary | ICD-10-CM

## 2020-07-17 NOTE — TELEPHONE ENCOUNTER
Spoke with Ms. Ross with [facility], she stated the order for Calcitonin  was not put on ice and not aliquated when received. Pt had labs at [facility]. Please advise.

## 2020-07-17 NOTE — TELEPHONE ENCOUNTER
----- Message from Karen Orona sent at 7/17/2020  6:07 AM CDT -----  Regarding: Lab Client Services  Contact: [phone]  Good Morning,    My name is Karen Orona I work in the Lab Client Services. We had a problem with some lab work on this patient. If someone from your office could call us at [phone] or ext. [phone] that would be great. Anyone in my department can help.     Thank you

## 2020-07-19 ENCOUNTER — PATIENT OUTREACH (OUTPATIENT)
Dept: ADMINISTRATIVE | Facility: OTHER | Age: 75
End: 2020-07-19

## 2020-07-19 NOTE — PROGRESS NOTES
Requested updates within Care Everywhere.  Patient's chart was reviewed for overdue JAIR topics.  Immunizations reconciled.

## 2020-07-20 LAB
1,25(OH)2D3 SERPL-MCNC: 57 PG/ML (ref 20–79)
ESTROGEN SERPL-MCNC: 123 PG/ML

## 2020-07-21 ENCOUNTER — OFFICE VISIT (OUTPATIENT)
Dept: SURGERY | Facility: CLINIC | Age: 75
End: 2020-07-21
Payer: MEDICARE

## 2020-07-21 VITALS — HEIGHT: 72 IN | BODY MASS INDEX: 29.29 KG/M2

## 2020-07-21 DIAGNOSIS — Z01.818 PREOP EXAMINATION: ICD-10-CM

## 2020-07-21 DIAGNOSIS — D12.6 ADENOMATOUS POLYP OF COLON, UNSPECIFIED PART OF COLON: Primary | ICD-10-CM

## 2020-07-21 PROCEDURE — 99999 PR PBB SHADOW E&M-EST. PATIENT-LVL III: CPT | Mod: PBBFAC,,, | Performed by: SURGERY

## 2020-07-21 PROCEDURE — 99204 PR OFFICE/OUTPT VISIT, NEW, LEVL IV, 45-59 MIN: ICD-10-PCS | Mod: S$PBB,,, | Performed by: SURGERY

## 2020-07-21 PROCEDURE — 99999 PR PBB SHADOW E&M-EST. PATIENT-LVL III: ICD-10-PCS | Mod: PBBFAC,,, | Performed by: SURGERY

## 2020-07-21 PROCEDURE — 99204 OFFICE O/P NEW MOD 45 MIN: CPT | Mod: S$PBB,,, | Performed by: SURGERY

## 2020-07-21 PROCEDURE — 99213 OFFICE O/P EST LOW 20 MIN: CPT | Mod: PBBFAC,PO | Performed by: SURGERY

## 2020-07-21 NOTE — H&P (VIEW-ONLY)
Subjective:       Patient ID: Papito Hutton is a 74 y.o. male.    Chief Complaint: Consult (Ileocecal valve polyp)    HPI  this is a pleasant 74-year-old male who was referred to me in consultation from Dr Fried for evaluation of an ileocecal polyp.  Patient notes that he had screening colonoscopy performed approximately 1 week ago.  At colonoscopy it was discovered he had a flat polyp of the ileocecal valve.  This was biopsied and confirmed to be adenomatous.  Given the location and size of the polyp this was not amenable to endoscopic removal and he is referred to me for surgical removal.  Patient denies any abdominal pain.  He denies any changes in bowel habits.  He reports he has had no blood per rectum.  Patient has a history of hypertension hyperlipidemia.  He has had no significant abdominal surgical history.  Review of Systems   Constitutional: Negative for activity change, appetite change, diaphoresis, fever and unexpected weight change.   Respiratory: Negative for cough, chest tightness and wheezing.    Cardiovascular: Negative for chest pain and palpitations.   Gastrointestinal: Negative for abdominal distention, abdominal pain, anal bleeding, blood in stool, constipation, diarrhea, nausea, rectal pain and vomiting.   Genitourinary: Negative for difficulty urinating, dysuria and hematuria.   Musculoskeletal: Negative for back pain and gait problem.   Neurological: Negative for tremors and seizures.         Objective:      Physical Exam  Vitals signs reviewed.   Constitutional:       Appearance: He is well-developed.   HENT:      Head: Normocephalic and atraumatic.   Eyes:      General:         Right eye: No discharge.         Left eye: No discharge.      Pupils: Pupils are equal, round, and reactive to light.   Neck:      Musculoskeletal: Normal range of motion.      Thyroid: No thyromegaly.      Trachea: No tracheal deviation.   Cardiovascular:      Rate and Rhythm: Normal rate and regular  rhythm.      Heart sounds: Normal heart sounds. No murmur. No friction rub. No gallop.    Pulmonary:      Effort: Pulmonary effort is normal.      Breath sounds: Normal breath sounds. No wheezing.   Chest:      Chest wall: No tenderness.   Abdominal:      General: There is no distension.      Palpations: There is no mass.      Tenderness: There is no abdominal tenderness. There is no guarding or rebound.   Musculoskeletal: Normal range of motion.         General: No swelling or tenderness.   Skin:     General: Skin is warm.      Coloration: Skin is not jaundiced.   Neurological:      Mental Status: He is alert and oriented to person, place, and time.      Coordination: Coordination normal.   Psychiatric:         Mood and Affect: Mood normal.         Behavior: Behavior normal.                       Path: 1.  Colon, ileocecal valve, polyp, biopsy:   - Tubulovillous adenoma  Assessment:       Tubulovillous adenoma of ileocecal valve  Plan:       Lengthy discussion with the patient.  Patient has a flat villous polyp associated with the ileocecal valve.  This has been biopsied and confirmed to be adenomatous.  I agree that endoscopic removal is unlikely to be performed and would recommend surgical resection.  I have offered a laparoscopic ileocecectomy with ileocolic anastomosis.  I have had a lengthy discussion with the patient regarding the risk and the benefits of such procedure.  Risks include but are not limited to bleeding, anastomotic leak, hernia, ileostomy and need for further surgery.  Patient aware of these risks and desires to proceed with surgical intervention.

## 2020-07-21 NOTE — LETTER
July 21, 2020      Jj Fried MD  [address] [facility] [address] [place] [address]           Critical access hospital General Surgery  [address]  [facility] [address]  Phone: [phone]          Patient: Papito Hutton   MR Number: [id number]   YOB: 1945   Date of Visit: 7/21/2020       Dear Dr. Jj Fried:    Thank you for referring Papito Hutton to me for evaluation. Attached you will find relevant portions of my assessment and plan of care.    If you have questions, please do not hesitate to call me. I look forward to following Papito Hutton along with you.    Sincerely,    Noble Kaye MD    Enclosure  CC:  No Recipients    If you would like to receive this communication electronically, please contact [email or web address] or [phone] to request more information on [employer] Link access.    For providers and/or their staff who would like to refer a patient to [facility], please contact us through our one-stop-shop provider referral line, [facility] [place], at [phone].    If you feel you have received this communication in error or would no longer like to receive these types of communications, please e-mail [email or web address]

## 2020-07-21 NOTE — PATIENT INSTRUCTIONS
Surgery is scheduled for 08/10/20 arrival time will be given by the the preop nurse.  The preop nurse will call you from [phone]  Nothing to eat or drink after midnight.  Someone to drive you home.    THE PREOP NURSE WILL CALL, SOMETIMES AS LATE AS 4 or 5 PM IN THE AFTERNOON THE DAY BEFORE SURGERY.    Bathe the night before and the morning of your procedure with a Chlorhexidine wash such as Hibiclens, can be purchased at most Pharmacy's no prescription needed.    Special Instruction:  Your surgery is scheduled at the [facility] in [address] [place].    Here is instruction for your colon cleanse the day before your procedure.    Medication to purchase at your pharmacy no need for Prescription:  2 bottles of Magnesium Citrate 10 or 12 ounce bottle will do.  Dulcolax tablets you will need 4 tablets in total.    On the day before the procedure you are on clear liquids all day, no solid food.   You can take your morning medications if you are allowed by your Doctor.  Clear liquid means any nonalcoholic  liquids including Jello and popcicles, juice with no pulp, soft drinks, tea, coffee no creamer, water Gatorade, chicken and/or beef broth.    You can start taking your first laxative starting as early as 8am but try not to start later than 12:00 noon.  First dose will be two Dulcolax tabs followed by 8 ounces clear liquid.  Take 2 more Dulcolax 2 hours after the first dose followed by 8 ounces of clear liquids.  Remember that the laxatives work best when you drink plenty of fluids.    Drink your first bottle of Magnesium Citrate at 5:00pm followed by 5-8ounce glasses of liquid over a 3 hour period.  At 9:00 pm take your 2nd bottle magnesium citrate followed by 3 more 8ounce glasses of clear liquid.  After Midnight nothing else to eat or drink.    Contact the office if you have questions.

## 2020-07-23 DIAGNOSIS — E29.1 HYPOGONADISM IN MALE: ICD-10-CM

## 2020-07-23 LAB
ALBUMIN SERPL-MCNC: 4.3 G/DL (ref 3.6–5.1)
SHBG SERPL-SCNC: 42 NMOL/L (ref 22–77)
TESTOST FREE SERPL-MCNC: 28.4 PG/ML (ref 6–73)
TESTOST SERPL-MCNC: 273 NG/DL (ref 250–1100)
TESTOSTERONE.FREE+WB SERPL-MCNC: 55.9 NG/DL (ref 15–150)

## 2020-07-23 RX ORDER — TESTOSTERONE 10 MG/.5G
20 GEL, METERED TOPICAL DAILY
Qty: 120 BOTTLE | Refills: 3 | Status: SHIPPED | OUTPATIENT
Start: 2020-07-23 | End: 2020-07-24 | Stop reason: SDUPTHER

## 2020-07-23 RX ORDER — SODIUM CHLORIDE 9 MG/ML
INJECTION, SOLUTION INTRAVENOUS CONTINUOUS
Status: CANCELLED | OUTPATIENT
Start: 2020-07-23

## 2020-07-23 RX ORDER — METRONIDAZOLE 500 MG/100ML
500 INJECTION, SOLUTION INTRAVENOUS
Status: CANCELLED | OUTPATIENT
Start: 2020-07-23

## 2020-07-23 RX ORDER — HYDROCHLOROTHIAZIDE 12.5 MG/1
12.5 TABLET ORAL DAILY
Qty: 90 TABLET | Refills: 1 | Status: ON HOLD | OUTPATIENT
Start: 2020-07-23 | End: 2020-08-12 | Stop reason: SDUPTHER

## 2020-07-24 LAB — ANDROSTANOLONE SERPL-MCNC: 322 PG/ML (ref 112–955)

## 2020-08-07 ENCOUNTER — TELEPHONE (OUTPATIENT)
Dept: FAMILY MEDICINE | Facility: CLINIC | Age: 75
End: 2020-08-07

## 2020-08-07 ENCOUNTER — OFFICE VISIT (OUTPATIENT)
Dept: FAMILY MEDICINE | Facility: CLINIC | Age: 75
End: 2020-08-07
Payer: MEDICARE

## 2020-08-07 ENCOUNTER — LAB VISIT (OUTPATIENT)
Dept: PRIMARY CARE CLINIC | Facility: CLINIC | Age: 75
End: 2020-08-07
Payer: MEDICARE

## 2020-08-07 ENCOUNTER — HOSPITAL ENCOUNTER (OUTPATIENT)
Dept: PREADMISSION TESTING | Facility: HOSPITAL | Age: 75
Discharge: HOME | DRG: 331 | End: 2020-08-07
Attending: SURGERY
Payer: MEDICARE

## 2020-08-07 ENCOUNTER — ANESTHESIA EVENT (OUTPATIENT)
Dept: SURGERY | Facility: HOSPITAL | Age: 75
DRG: 331 | End: 2020-08-07
Payer: MEDICARE

## 2020-08-07 VITALS
WEIGHT: 210.75 LBS | BODY MASS INDEX: 28.54 KG/M2 | OXYGEN SATURATION: 96 % | SYSTOLIC BLOOD PRESSURE: 132 MMHG | HEIGHT: 72 IN | TEMPERATURE: 98 F | HEART RATE: 68 BPM | DIASTOLIC BLOOD PRESSURE: 62 MMHG

## 2020-08-07 DIAGNOSIS — M19.90 ARTHRITIS PAIN: ICD-10-CM

## 2020-08-07 DIAGNOSIS — Z01.818 PREOP EXAMINATION: ICD-10-CM

## 2020-08-07 DIAGNOSIS — K21.9 GASTROESOPHAGEAL REFLUX DISEASE, ESOPHAGITIS PRESENCE NOT SPECIFIED: Primary | ICD-10-CM

## 2020-08-07 DIAGNOSIS — D12.6 ADENOMATOUS POLYP OF COLON, UNSPECIFIED PART OF COLON: ICD-10-CM

## 2020-08-07 LAB
ABO + RH BLD: NORMAL
BLD GP AB SCN CELLS X3 SERPL QL: NORMAL

## 2020-08-07 PROCEDURE — 99214 PR OFFICE/OUTPT VISIT, EST, LEVL IV, 30-39 MIN: ICD-10-PCS | Mod: S$PBB,,, | Performed by: FAMILY MEDICINE

## 2020-08-07 PROCEDURE — 36415 COLL VENOUS BLD VENIPUNCTURE: CPT

## 2020-08-07 PROCEDURE — 99900104 DSU ONLY-NO CHARGE-EA ADD'L HR (STAT)

## 2020-08-07 PROCEDURE — 99213 OFFICE O/P EST LOW 20 MIN: CPT | Mod: PBBFAC,25,PO | Performed by: FAMILY MEDICINE

## 2020-08-07 PROCEDURE — 99999 PR PBB SHADOW E&M-EST. PATIENT-LVL III: CPT | Mod: PBBFAC,,, | Performed by: FAMILY MEDICINE

## 2020-08-07 PROCEDURE — 99900103 DSU ONLY-NO CHARGE-INITIAL HR (STAT)

## 2020-08-07 PROCEDURE — 99999 PR PBB SHADOW E&M-EST. PATIENT-LVL III: ICD-10-PCS | Mod: PBBFAC,,, | Performed by: FAMILY MEDICINE

## 2020-08-07 PROCEDURE — 86901 BLOOD TYPING SEROLOGIC RH(D): CPT

## 2020-08-07 PROCEDURE — 99214 OFFICE O/P EST MOD 30 MIN: CPT | Mod: S$PBB,,, | Performed by: FAMILY MEDICINE

## 2020-08-07 PROCEDURE — 93005 ELECTROCARDIOGRAM TRACING: CPT

## 2020-08-07 PROCEDURE — U0003 INFECTIOUS AGENT DETECTION BY NUCLEIC ACID (DNA OR RNA); SEVERE ACUTE RESPIRATORY SYNDROME CORONAVIRUS 2 (SARS-COV-2) (CORONAVIRUS DISEASE [COVID-19]), AMPLIFIED PROBE TECHNIQUE, MAKING USE OF HIGH THROUGHPUT TECHNOLOGIES AS DESCRIBED BY CMS-2020-01-R: HCPCS

## 2020-08-07 RX ORDER — DICLOFENAC SODIUM 75 MG/1
75 TABLET, DELAYED RELEASE ORAL DAILY PRN
Qty: 90 TABLET | Refills: 1 | Status: SHIPPED | OUTPATIENT
Start: 2020-08-07 | End: 2021-02-15

## 2020-08-07 RX ORDER — ESOMEPRAZOLE MAGNESIUM 40 MG/1
40 CAPSULE, DELAYED RELEASE ORAL
Qty: 90 CAPSULE | Refills: 1 | Status: SHIPPED | OUTPATIENT
Start: 2020-08-07 | End: 2020-08-07 | Stop reason: SDUPTHER

## 2020-08-07 RX ORDER — DICLOFENAC SODIUM 75 MG/1
75 TABLET, DELAYED RELEASE ORAL DAILY PRN
Qty: 90 TABLET | Refills: 1 | Status: SHIPPED | OUTPATIENT
Start: 2020-08-07 | End: 2020-08-07 | Stop reason: SDUPTHER

## 2020-08-07 RX ORDER — DEXTROMETHORPHAN HYDROBROMIDE, GUAIFENESIN 5; 100 MG/5ML; MG/5ML
650 LIQUID ORAL EVERY 8 HOURS PRN
Qty: 90 TABLET | Refills: 1 | Status: ON HOLD | OUTPATIENT
Start: 2020-08-07 | End: 2020-08-12 | Stop reason: HOSPADM

## 2020-08-07 RX ORDER — ESOMEPRAZOLE MAGNESIUM 40 MG/1
40 CAPSULE, DELAYED RELEASE ORAL
Qty: 90 CAPSULE | Refills: 1 | Status: SHIPPED | OUTPATIENT
Start: 2020-08-07 | End: 2021-05-04 | Stop reason: SDUPTHER

## 2020-08-07 RX ORDER — DEXTROMETHORPHAN HYDROBROMIDE, GUAIFENESIN 5; 100 MG/5ML; MG/5ML
650 LIQUID ORAL EVERY 8 HOURS PRN
Qty: 90 TABLET | Refills: 1 | Status: SHIPPED | OUTPATIENT
Start: 2020-08-07 | End: 2020-08-07 | Stop reason: SDUPTHER

## 2020-08-07 NOTE — TELEPHONE ENCOUNTER
----- Message from Chance Segundo sent at 8/7/2020  8:26 AM CDT -----  Type: Needs Medical Advice  Who Called:  Patient    Pharmacy name and phone #:   [facility] - [place] [id number]  Phone: [phone] Fax: [phone]    [id number] [place] - [address]  [address] [address]  Phone: [phone] Fax: [phone]    [facility] - [place] [address]  Phone: [phone] Fax: [phone]      Best Call Back Number: [phone]  Additional Information: Patient states that [facility] would like [facility]   Please call to advise

## 2020-08-07 NOTE — DISCHARGE INSTRUCTIONS
To confirm, Your doctor has instructed you that surgery is scheduled for:     Please report to [facility], Registration the morning of surgery. You must check-in and receive a wristband before going to your procedure.    Pre-Op will call the afternoon prior to surgery between 1:00 and 6:00 PM with the final arrival time.  Phone number: [phone]    PLEASE NOTE:  The surgery schedule has many variables which may affect the time of your surgery case.  Family members should be available if your surgery time changes.  Plan to be here the day of your procedure between 4-6 hours.    COVID  8-7-20    MEDICATIONS:  TAKE ONLY THESE MEDICATIONS WITH A SMALL SIP OF WATER THE MORNING OF YOUR PROCEDURE:  ESOMEPRAZOLE, TOPROL, FLONASE    DO NOT TAKE THESE MEDICATIONS 5-7 DAYS PRIOR to your procedure or per your surgeon's request: ASPIRIN, ALEVE, ADVIL, IBUPROFEN, FISH OIL VITAMIN E, HERBALS  (May take Tylenol)  ASPIRIN AND DICLOFENAC - LD 8-7-20  METFORMIN - HOLD DOSE THE NIGHT BEFORE SURGERY    ONLY if you are prescribed any types of blood thinners such as:  Aspirin, Coumadin, Plavix, Pradaxa, Xarelto, Aggrenox, Effient, Eliquis, Savasya, Brilinta, or any other, ask your surgeon whether you should stop taking them and how long before surgery you should stop.  You may also need to verify with the prescribing physician if it is ok to stop your medication.      INSTRUCTIONS IMPORTANT!!  · Do not eat or drink anything between midnight and the time of your procedure- this includes gum, mints, and candy.  · Do not smoke or drink alcoholic beverages 24 hours prior to your procedure.  · Shower the night before AND the morning of your procedure with a Chlorhexidine wash such as Hibiclens or Dial antibacterial soap from the neck down.  Do not get it on your face or in your eyes.  You may use your own shampoo and face wash. This helps your skin to be as bacteria free as possible.    · If you wear contact lenses,  dentures, hearing aids or glasses, bring a container to put them in during surgery and give to a family member for safe keeping.  Please leave all jewelry, piercing's and valuables at home.   · DO NOT remove hair from the surgery site.  Do not shave the incision site unless you are given specific instructions to do so.    · ONLY if you have been diagnosed with sleep apnea please bring your C-PAP machine.  · ONLY if you wear home oxygen please bring your portable oxygen tank the day of your procedure.  · ONLY if you have a history of OPEN HEART SURGERY you will need a clearance from your Cardiologist per Anesthesia.      · ONLY for patients requiring bowel prep, written instructions will be given by your doctor's office.  · ONLY if you have a neuro stimulator, please bring the controller with you the morning of surgery  · ONLY if a type and screen test is needed before surgery, please return:  · If your doctor has scheduled you for an overnight stay, bring a small overnight bag with any personal items you need.  · Make arrangements in advance for transportation home by a responsible adult.  It is not safe to drive a vehicle during the 24 hours after anesthesia.     · ONLY ONE VISITOR PER PATIENT IS ALLOWED TO COME IN THE HOSPITAL THE DAY OF PROCEDURE.   · Visiting hours are 8:00AM-6:00PM. For the safety of all patients, visitors under the age of 12 are not allowed above the first floor of the hospital.    · All [facility] facilities and properties are tobacco free.  Smoking is NOT allowed.       If you have any questions about these instructions, call Pre-Op Admit  Nursing at [phone] or the Pre-Op Day Surgery Unit at [phone].

## 2020-08-08 LAB — SARS-COV-2 RNA RESP QL NAA+PROBE: NOT DETECTED

## 2020-08-08 NOTE — PROGRESS NOTES
Subjective:       Patient ID: Papito Hutton is a 75 y.o. male.    Chief Complaint: Follow-up    HPI    Mr. Hutton presents to clinic for follow-up.    Would like a brand name nexium.  States that this medication is only thing that helps with his heartburn.    Admits that he has been using oral diclofenac for his joint pain.  Voltaren gel helps but not as much. GFR has slightly improved based on labs from July 16.  States that he does not use diclofenac twice a day. Tries to use it only once a day.  [facility]    Past Medical History:   Diagnosis Date    Arthritis     B12 deficiency     Colon polyp     Diabetes mellitus     BORDERLINE    GERD (gastroesophageal reflux disease)     [place] (hard of hearing)     BILAT AIDS    Hyperlipidemia     Hypertension     Low testosterone     Personal history of colonic polyps 2008    adenomatous polyp    Sinus disorder     Sleep apnea     DOES NOT USE MACHINE    Wears glasses        Past Surgical History:   Procedure Laterality Date    COLONOSCOPY  2008    Dr. Garcia; polyps removed    COLONOSCOPY N/A 7/13/2020    Procedure: COLONOSCOPY;  Surgeon: Jj Fried MD;  Location: [facility];  Service: Endoscopy;  Laterality: N/A;    NASAL SEPTUM SURGERY      right hand surgery      UPPER GASTROINTESTINAL ENDOSCOPY  2008    hiatal hernia; esophagitis       Family History   Problem Relation Age of Onset    Cancer Neg Hx     Diabetes Neg Hx     Heart disease Neg Hx        Social History     Tobacco Use    Smoking status: Current Some Day Smoker     Types: Cigars    Smokeless tobacco: Former User    Tobacco comment: smoke 1 cigar every couple of months   Substance Use Topics    Alcohol use: Yes     Alcohol/week: 2.0 standard drinks     Types: 2 Cans of beer per week    Drug use: No       Social History     Substance and Sexual Activity   Sexual Activity Yes    Partners: Female          Current Outpatient Medications:      amLODIPine-benazepriL (LOTREL) 10-40 mg per capsule, Take 1 capsule by mouth once daily., Disp: 90 capsule, Rfl: 1    aspirin 81 MG Chew, Take 1 tablet (81 mg total) by mouth once daily., Disp: , Rfl: 0    CHOLECALCIFEROL, VITAMIN D3, (VITAMIN D3 ORAL), Take 1 capsule by mouth once daily., Disp: , Rfl:     cyanocobalamin (VITAMIN B-12) 1000 MCG tablet, Take 100 mcg by mouth once daily., Disp: , Rfl:     diclofenac (VOLTAREN) 75 MG EC tablet, Take 1 tablet (75 mg total) by mouth daily as needed (pain)., Disp: 90 tablet, Rfl: 1    diclofenac sodium (VOLTAREN) 1 % Gel, Apply 2 g topically once daily., Disp: 100 g, Rfl: 1    fluticasone propionate (FLONASE) 50 mcg/actuation nasal spray, USE 2 SPRAYS IN EACH NOSTRIL ONCE DAILY (Patient taking differently: PRN), Disp: 48 g, Rfl: 4    hydroCHLOROthiazide (HYDRODIURIL) 12.5 MG Tab, Take 1 tablet (12.5 mg total) by mouth once daily., Disp: 90 tablet, Rfl: 1    metFORMIN (GLUCOPHAGE) 500 MG tablet, Take 1 tablet (500 mg total) by mouth 2 (two) times daily with meals., Disp: 180 tablet, Rfl: 3    rosuvastatin (CRESTOR) 20 MG tablet, TAKE 1 TABLET DAILY, Disp: 90 tablet, Rfl: 3    testosterone (FORTESTA) 10 mg/0.5 gram /actuation GlPm, Place 20 mg onto the skin once daily. Apply 2 pump actuations topically daily., Disp: 120 Bottle, Rfl: 3    TOPROL XL 25 mg 24 hr tablet, TAKE 1 TABLET DAILY, Disp: 90 tablet, Rfl: 1    acetaminophen (TYLENOL ARTHRITIS PAIN) 650 MG TbSR, Take 1 tablet (650 mg total) by mouth every 8 (eight) hours as needed., Disp: 90 tablet, Rfl: 1    esomeprazole (NEXIUM) 40 MG capsule, Take 1 capsule (40 mg total) by mouth before breakfast., Disp: 90 capsule, Rfl: 1     Review of patient's allergies indicates:   Allergen Reactions    Vytorin 10-10 [ezetimibe-simvastatin] Other (See Comments)     Transaminitis            Review of Systems   Constitutional: Negative for chills and fever.   HENT: Negative for congestion and sore throat.     Eyes: Negative for visual disturbance.   Respiratory: Negative for cough and shortness of breath.    Cardiovascular: Negative for chest pain.   Gastrointestinal: Negative for abdominal pain, constipation, diarrhea, nausea and vomiting.   Genitourinary: Negative for dysuria.   Musculoskeletal: Positive for arthralgias. Negative for joint swelling.   Skin: Negative for rash and wound.   Neurological: Negative for dizziness and headaches.   Hematological: Does not bruise/bleed easily.           Objective:          Vitals:    08/07/20 0726   BP: 132/62   Pulse: 68   Temp: 97.8 °F (36.6 °C)   SpO2: 96%   Weight: 95.6 kg (210 lb 12.2 oz)   Height: 6' (1.829 m)       Physical Exam  Vitals signs reviewed.   Constitutional:       General: He is not in acute distress.     Appearance: Normal appearance. He is well-developed.   HENT:      Head: Normocephalic and atraumatic.      Right Ear: External ear normal.      Left Ear: External ear normal.   Eyes:      Conjunctiva/sclera: Conjunctivae normal.   Cardiovascular:      Rate and Rhythm: Normal rate and regular rhythm.      Pulses: Normal pulses.      Heart sounds: Normal heart sounds.   Pulmonary:      Effort: Pulmonary effort is normal.      Breath sounds: Normal breath sounds.   Abdominal:      General: Bowel sounds are normal.      Palpations: Abdomen is soft.      Tenderness: There is no abdominal tenderness.   Musculoskeletal: Normal range of motion.   Skin:     General: Skin is warm.      Findings: No rash.   Neurological:      General: No focal deficit present.      Mental Status: He is alert.   Psychiatric:         Mood and Affect: Mood normal.         Speech: Speech normal.         Behavior: Behavior normal. Behavior is cooperative.                 Assessment/Plan     Papito was seen today for follow-up.    Diagnoses and all orders for this visit:    Gastroesophageal reflux disease, esophagitis presence not specified  -     esomeprazole (NEXIUM) 40 MG capsule; Take  1 capsule (40 mg total) by mouth before breakfast.    Arthritis pain  -     Told to try and alternate between nsaids an tylenol. He voiced understanding.   -     acetaminophen (TYLENOL ARTHRITIS PAIN) 650 MG TbSR; Take 1 tablet (650 mg total) by mouth every 8 (eight) hours as needed.  -     diclofenac (VOLTAREN) 75 MG EC tablet; Take 1 tablet (75 mg total) by mouth daily as needed (pain).          Follow up in about 6 months (around 2/7/2021) for check up.    Future Appointments   Date Time Provider Department Center   10/26/2020  4:30 PM MD MIROSLAVA [place] [facility]OCRN [place]   2/8/2021  7:20 AM MD MIROSLAVA [facility] MED [place]         [facility]

## 2020-08-10 ENCOUNTER — HOSPITAL ENCOUNTER (INPATIENT)
Facility: HOSPITAL | Age: 75
LOS: 2 days | Discharge: HOME | DRG: 331 | End: 2020-08-12
Attending: SURGERY | Admitting: SURGERY
Payer: MEDICARE

## 2020-08-10 ENCOUNTER — ANESTHESIA (OUTPATIENT)
Dept: SURGERY | Facility: HOSPITAL | Age: 75
DRG: 331 | End: 2020-08-10
Payer: MEDICARE

## 2020-08-10 DIAGNOSIS — E29.1 HYPOGONADISM IN MALE: ICD-10-CM

## 2020-08-10 DIAGNOSIS — D12.6 ADENOMATOUS POLYP OF COLON, UNSPECIFIED PART OF COLON: ICD-10-CM

## 2020-08-10 DIAGNOSIS — Z01.818 PREOP EXAMINATION: ICD-10-CM

## 2020-08-10 DIAGNOSIS — K63.5 POLYP OF COLON, UNSPECIFIED PART OF COLON, UNSPECIFIED TYPE: Primary | ICD-10-CM

## 2020-08-10 PROBLEM — E11.9 TYPE 2 DIABETES MELLITUS, WITHOUT LONG-TERM CURRENT USE OF INSULIN: Status: ACTIVE | Noted: 2020-08-10

## 2020-08-10 LAB
POCT GLUCOSE: 178 MG/DL (ref 70–110)
POCT GLUCOSE: 197 MG/DL (ref 70–110)

## 2020-08-10 PROCEDURE — 25000003 PHARM REV CODE 250: Performed by: ANESTHESIOLOGY

## 2020-08-10 PROCEDURE — 37000008 HC ANESTHESIA 1ST 15 MINUTES: Performed by: SURGERY

## 2020-08-10 PROCEDURE — 88307 TISSUE EXAM BY PATHOLOGIST: CPT | Mod: 26,,, | Performed by: PATHOLOGY

## 2020-08-10 PROCEDURE — 63600175 PHARM REV CODE 636 W HCPCS: Performed by: NURSE ANESTHETIST, CERTIFIED REGISTERED

## 2020-08-10 PROCEDURE — 88307 TISSUE EXAM BY PATHOLOGIST: CPT | Performed by: PATHOLOGY

## 2020-08-10 PROCEDURE — 36000711: Performed by: SURGERY

## 2020-08-10 PROCEDURE — 88307 PR  SURG PATH,LEVEL V: ICD-10-PCS | Mod: 26,,, | Performed by: PATHOLOGY

## 2020-08-10 PROCEDURE — D9220A PRA ANESTHESIA: Mod: ANES,,, | Performed by: ANESTHESIOLOGY

## 2020-08-10 PROCEDURE — S0030 INJECTION, METRONIDAZOLE: HCPCS | Performed by: SURGERY

## 2020-08-10 PROCEDURE — D9220A PRA ANESTHESIA: Mod: CRNA,,, | Performed by: NURSE ANESTHETIST, CERTIFIED REGISTERED

## 2020-08-10 PROCEDURE — D9220A PRA ANESTHESIA: ICD-10-PCS | Mod: ANES,,, | Performed by: ANESTHESIOLOGY

## 2020-08-10 PROCEDURE — 63600175 PHARM REV CODE 636 W HCPCS: Performed by: ANESTHESIOLOGY

## 2020-08-10 PROCEDURE — 12000002 HC ACUTE/MED SURGE SEMI-PRIVATE ROOM

## 2020-08-10 PROCEDURE — 25000003 PHARM REV CODE 250: Performed by: SURGERY

## 2020-08-10 PROCEDURE — C1765 ADHESION BARRIER: HCPCS | Performed by: SURGERY

## 2020-08-10 PROCEDURE — 36000710: Performed by: SURGERY

## 2020-08-10 PROCEDURE — 63600175 PHARM REV CODE 636 W HCPCS: Performed by: SURGERY

## 2020-08-10 PROCEDURE — D9220A PRA ANESTHESIA: ICD-10-PCS | Mod: CRNA,,, | Performed by: NURSE ANESTHETIST, CERTIFIED REGISTERED

## 2020-08-10 PROCEDURE — 71000039 HC RECOVERY, EACH ADD'L HOUR: Performed by: SURGERY

## 2020-08-10 PROCEDURE — 99900104 DSU ONLY-NO CHARGE-EA ADD'L HR (STAT): Performed by: SURGERY

## 2020-08-10 PROCEDURE — 44160 PR REMVL COLON & TERM ILEUM W/ILEOCOLOSTOMY: ICD-10-PCS | Mod: ,,, | Performed by: SURGERY

## 2020-08-10 PROCEDURE — 99900035 HC TECH TIME PER 15 MIN (STAT)

## 2020-08-10 PROCEDURE — 94799 UNLISTED PULMONARY SVC/PX: CPT

## 2020-08-10 PROCEDURE — 25000003 PHARM REV CODE 250: Performed by: NURSE PRACTITIONER

## 2020-08-10 PROCEDURE — 37000009 HC ANESTHESIA EA ADD 15 MINS: Performed by: SURGERY

## 2020-08-10 PROCEDURE — C9290 INJ, BUPIVACAINE LIPOSOME: HCPCS | Performed by: SURGERY

## 2020-08-10 PROCEDURE — 25000003 PHARM REV CODE 250: Performed by: NURSE ANESTHETIST, CERTIFIED REGISTERED

## 2020-08-10 PROCEDURE — 27201423 OPTIME MED/SURG SUP & DEVICES STERILE SUPPLY: Performed by: SURGERY

## 2020-08-10 PROCEDURE — 99900103 DSU ONLY-NO CHARGE-INITIAL HR (STAT): Performed by: SURGERY

## 2020-08-10 PROCEDURE — C1729 CATH, DRAINAGE: HCPCS | Performed by: SURGERY

## 2020-08-10 PROCEDURE — 44160 REMOVAL OF COLON: CPT | Mod: ,,, | Performed by: SURGERY

## 2020-08-10 PROCEDURE — 71000033 HC RECOVERY, INTIAL HOUR: Performed by: SURGERY

## 2020-08-10 DEVICE — MEMBRANE SEPRAFILM 5 X 6: Type: IMPLANTABLE DEVICE | Site: ABDOMEN | Status: FUNCTIONAL

## 2020-08-10 RX ORDER — ACETAMINOPHEN 10 MG/ML
INJECTION, SOLUTION INTRAVENOUS
Status: DISCONTINUED | OUTPATIENT
Start: 2020-08-10 | End: 2020-08-10

## 2020-08-10 RX ORDER — IBUPROFEN 200 MG
16 TABLET ORAL
Status: DISCONTINUED | OUTPATIENT
Start: 2020-08-10 | End: 2020-08-12 | Stop reason: HOSPADM

## 2020-08-10 RX ORDER — LORAZEPAM 2 MG/ML
0.25 INJECTION INTRAMUSCULAR EVERY 4 HOURS PRN
Status: DISCONTINUED | OUTPATIENT
Start: 2020-08-10 | End: 2020-08-12 | Stop reason: HOSPADM

## 2020-08-10 RX ORDER — ONDANSETRON 2 MG/ML
4 INJECTION INTRAMUSCULAR; INTRAVENOUS EVERY 12 HOURS PRN
Status: DISCONTINUED | OUTPATIENT
Start: 2020-08-10 | End: 2020-08-12 | Stop reason: HOSPADM

## 2020-08-10 RX ORDER — BISACODYL 5 MG
5 TABLET, DELAYED RELEASE (ENTERIC COATED) ORAL NIGHTLY
Status: DISCONTINUED | OUTPATIENT
Start: 2020-08-10 | End: 2020-08-12 | Stop reason: HOSPADM

## 2020-08-10 RX ORDER — IBUPROFEN 600 MG/1
600 TABLET ORAL 4 TIMES DAILY
Status: DISCONTINUED | OUTPATIENT
Start: 2020-08-10 | End: 2020-08-12 | Stop reason: HOSPADM

## 2020-08-10 RX ORDER — SUCCINYLCHOLINE CHLORIDE 20 MG/ML
INJECTION INTRAMUSCULAR; INTRAVENOUS
Status: DISCONTINUED | OUTPATIENT
Start: 2020-08-10 | End: 2020-08-10

## 2020-08-10 RX ORDER — GLYCOPYRROLATE 0.2 MG/ML
INJECTION INTRAMUSCULAR; INTRAVENOUS
Status: DISCONTINUED | OUTPATIENT
Start: 2020-08-10 | End: 2020-08-10

## 2020-08-10 RX ORDER — PANTOPRAZOLE SODIUM 40 MG/10ML
40 INJECTION, POWDER, LYOPHILIZED, FOR SOLUTION INTRAVENOUS DAILY
Status: DISCONTINUED | OUTPATIENT
Start: 2020-08-11 | End: 2020-08-12 | Stop reason: HOSPADM

## 2020-08-10 RX ORDER — ONDANSETRON 2 MG/ML
INJECTION INTRAMUSCULAR; INTRAVENOUS
Status: DISCONTINUED | OUTPATIENT
Start: 2020-08-10 | End: 2020-08-10

## 2020-08-10 RX ORDER — IBUPROFEN 200 MG
24 TABLET ORAL
Status: DISCONTINUED | OUTPATIENT
Start: 2020-08-10 | End: 2020-08-12 | Stop reason: HOSPADM

## 2020-08-10 RX ORDER — MUPIROCIN 20 MG/G
OINTMENT TOPICAL 2 TIMES DAILY
Status: DISCONTINUED | OUTPATIENT
Start: 2020-08-10 | End: 2020-08-12 | Stop reason: HOSPADM

## 2020-08-10 RX ORDER — PHENYLEPHRINE HYDROCHLORIDE 10 MG/ML
INJECTION INTRAVENOUS
Status: DISCONTINUED | OUTPATIENT
Start: 2020-08-10 | End: 2020-08-10

## 2020-08-10 RX ORDER — LIDOCAINE HYDROCHLORIDE 10 MG/ML
1 INJECTION, SOLUTION EPIDURAL; INFILTRATION; INTRACAUDAL; PERINEURAL ONCE
Status: DISCONTINUED | OUTPATIENT
Start: 2020-08-10 | End: 2020-08-10

## 2020-08-10 RX ORDER — DIPHENHYDRAMINE HYDROCHLORIDE 50 MG/ML
12.5 INJECTION INTRAMUSCULAR; INTRAVENOUS EVERY 6 HOURS PRN
Status: DISCONTINUED | OUTPATIENT
Start: 2020-08-10 | End: 2020-08-12 | Stop reason: HOSPADM

## 2020-08-10 RX ORDER — METOPROLOL SUCCINATE 25 MG/1
25 TABLET, EXTENDED RELEASE ORAL DAILY
Status: DISCONTINUED | OUTPATIENT
Start: 2020-08-11 | End: 2020-08-10

## 2020-08-10 RX ORDER — ONDANSETRON 2 MG/ML
4 INJECTION INTRAMUSCULAR; INTRAVENOUS ONCE AS NEEDED
Status: DISCONTINUED | OUTPATIENT
Start: 2020-08-10 | End: 2020-08-12 | Stop reason: HOSPADM

## 2020-08-10 RX ORDER — NAPROXEN SODIUM 220 MG/1
81 TABLET, FILM COATED ORAL DAILY
Status: DISCONTINUED | OUTPATIENT
Start: 2020-08-11 | End: 2020-08-12 | Stop reason: HOSPADM

## 2020-08-10 RX ORDER — CEFAZOLIN SODIUM 2 G/50ML
2 SOLUTION INTRAVENOUS
Status: COMPLETED | OUTPATIENT
Start: 2020-08-10 | End: 2020-08-11

## 2020-08-10 RX ORDER — GLUCAGON 1 MG
1 KIT INJECTION
Status: DISCONTINUED | OUTPATIENT
Start: 2020-08-10 | End: 2020-08-12 | Stop reason: HOSPADM

## 2020-08-10 RX ORDER — FENTANYL CITRATE 50 UG/ML
INJECTION, SOLUTION INTRAMUSCULAR; INTRAVENOUS
Status: DISCONTINUED | OUTPATIENT
Start: 2020-08-10 | End: 2020-08-10

## 2020-08-10 RX ORDER — DEXTROSE, SODIUM CHLORIDE, SODIUM LACTATE, POTASSIUM CHLORIDE, AND CALCIUM CHLORIDE 5; .6; .31; .03; .02 G/100ML; G/100ML; G/100ML; G/100ML; G/100ML
INJECTION, SOLUTION INTRAVENOUS CONTINUOUS
Status: DISCONTINUED | OUTPATIENT
Start: 2020-08-10 | End: 2020-08-11

## 2020-08-10 RX ORDER — PROPOFOL 10 MG/ML
VIAL (ML) INTRAVENOUS
Status: DISCONTINUED | OUTPATIENT
Start: 2020-08-10 | End: 2020-08-10

## 2020-08-10 RX ORDER — HYDROMORPHONE HYDROCHLORIDE 1 MG/ML
1 INJECTION, SOLUTION INTRAMUSCULAR; INTRAVENOUS; SUBCUTANEOUS EVERY 4 HOURS PRN
Status: DISCONTINUED | OUTPATIENT
Start: 2020-08-10 | End: 2020-08-12 | Stop reason: HOSPADM

## 2020-08-10 RX ORDER — METRONIDAZOLE 500 MG/100ML
500 INJECTION, SOLUTION INTRAVENOUS
Status: COMPLETED | OUTPATIENT
Start: 2020-08-10 | End: 2020-08-11

## 2020-08-10 RX ORDER — INSULIN ASPART 100 [IU]/ML
0-5 INJECTION, SOLUTION INTRAVENOUS; SUBCUTANEOUS
Status: DISCONTINUED | OUTPATIENT
Start: 2020-08-10 | End: 2020-08-12 | Stop reason: HOSPADM

## 2020-08-10 RX ORDER — DEXAMETHASONE SODIUM PHOSPHATE 4 MG/ML
INJECTION, SOLUTION INTRA-ARTICULAR; INTRALESIONAL; INTRAMUSCULAR; INTRAVENOUS; SOFT TISSUE
Status: DISCONTINUED | OUTPATIENT
Start: 2020-08-10 | End: 2020-08-10

## 2020-08-10 RX ORDER — TRAMADOL HYDROCHLORIDE 50 MG/1
50 TABLET ORAL EVERY 6 HOURS PRN
Status: DISCONTINUED | OUTPATIENT
Start: 2020-08-10 | End: 2020-08-12 | Stop reason: HOSPADM

## 2020-08-10 RX ORDER — GABAPENTIN 300 MG/1
300 CAPSULE ORAL 3 TIMES DAILY
Status: DISCONTINUED | OUTPATIENT
Start: 2020-08-10 | End: 2020-08-12 | Stop reason: HOSPADM

## 2020-08-10 RX ORDER — LIDOCAINE HYDROCHLORIDE 20 MG/ML
INJECTION INTRAVENOUS
Status: DISCONTINUED | OUTPATIENT
Start: 2020-08-10 | End: 2020-08-10

## 2020-08-10 RX ORDER — FENTANYL CITRATE 50 UG/ML
25 INJECTION, SOLUTION INTRAMUSCULAR; INTRAVENOUS EVERY 5 MIN PRN
Status: COMPLETED | OUTPATIENT
Start: 2020-08-10 | End: 2020-08-10

## 2020-08-10 RX ORDER — ROCURONIUM BROMIDE 10 MG/ML
INJECTION, SOLUTION INTRAVENOUS
Status: DISCONTINUED | OUTPATIENT
Start: 2020-08-10 | End: 2020-08-10

## 2020-08-10 RX ORDER — BUPIVACAINE HYDROCHLORIDE AND EPINEPHRINE 2.5; 5 MG/ML; UG/ML
INJECTION, SOLUTION EPIDURAL; INFILTRATION; INTRACAUDAL; PERINEURAL
Status: DISCONTINUED | OUTPATIENT
Start: 2020-08-10 | End: 2020-08-10 | Stop reason: HOSPADM

## 2020-08-10 RX ORDER — NEOSTIGMINE METHYLSULFATE 1 MG/ML
INJECTION, SOLUTION INTRAVENOUS
Status: DISCONTINUED | OUTPATIENT
Start: 2020-08-10 | End: 2020-08-10

## 2020-08-10 RX ORDER — ONDANSETRON 2 MG/ML
4 INJECTION INTRAMUSCULAR; INTRAVENOUS ONCE AS NEEDED
Status: DISCONTINUED | OUTPATIENT
Start: 2020-08-10 | End: 2020-08-10 | Stop reason: HOSPADM

## 2020-08-10 RX ORDER — METOPROLOL SUCCINATE 25 MG/1
25 TABLET, EXTENDED RELEASE ORAL DAILY
Status: DISCONTINUED | OUTPATIENT
Start: 2020-08-10 | End: 2020-08-12 | Stop reason: HOSPADM

## 2020-08-10 RX ORDER — FAMOTIDINE 20 MG/1
20 TABLET, FILM COATED ORAL 2 TIMES DAILY
Status: DISCONTINUED | OUTPATIENT
Start: 2020-08-10 | End: 2020-08-12 | Stop reason: HOSPADM

## 2020-08-10 RX ORDER — MIDAZOLAM HYDROCHLORIDE 1 MG/ML
INJECTION, SOLUTION INTRAMUSCULAR; INTRAVENOUS
Status: DISCONTINUED | OUTPATIENT
Start: 2020-08-10 | End: 2020-08-10

## 2020-08-10 RX ORDER — HYDROMORPHONE HYDROCHLORIDE 2 MG/ML
0.2 INJECTION, SOLUTION INTRAMUSCULAR; INTRAVENOUS; SUBCUTANEOUS EVERY 5 MIN PRN
Status: DISCONTINUED | OUTPATIENT
Start: 2020-08-10 | End: 2020-08-10 | Stop reason: HOSPADM

## 2020-08-10 RX ORDER — METRONIDAZOLE 500 MG/100ML
500 INJECTION, SOLUTION INTRAVENOUS
Status: COMPLETED | OUTPATIENT
Start: 2020-08-10 | End: 2020-08-10

## 2020-08-10 RX ORDER — OXYCODONE HYDROCHLORIDE 5 MG/1
5 TABLET ORAL ONCE AS NEEDED
Status: COMPLETED | OUTPATIENT
Start: 2020-08-10 | End: 2020-08-10

## 2020-08-10 RX ORDER — NALOXONE HCL 0.4 MG/ML
0.02 VIAL (ML) INJECTION
Status: DISCONTINUED | OUTPATIENT
Start: 2020-08-10 | End: 2020-08-12 | Stop reason: HOSPADM

## 2020-08-10 RX ORDER — ACETAMINOPHEN 10 MG/ML
1000 INJECTION, SOLUTION INTRAVENOUS EVERY 6 HOURS
Status: COMPLETED | OUTPATIENT
Start: 2020-08-10 | End: 2020-08-11

## 2020-08-10 RX ADMIN — OXYCODONE HYDROCHLORIDE 5 MG: 5 TABLET ORAL at 02:08

## 2020-08-10 RX ADMIN — METRONIDAZOLE 500 MG: 500 INJECTION, SOLUTION INTRAVENOUS at 01:08

## 2020-08-10 RX ADMIN — FENTANYL CITRATE 50 MCG: 50 INJECTION, SOLUTION INTRAMUSCULAR; INTRAVENOUS at 02:08

## 2020-08-10 RX ADMIN — ROCURONIUM BROMIDE 35 MG: 10 INJECTION, SOLUTION INTRAVENOUS at 01:08

## 2020-08-10 RX ADMIN — METOPROLOL SUCCINATE 25 MG: 25 TABLET, FILM COATED, EXTENDED RELEASE ORAL at 11:08

## 2020-08-10 RX ADMIN — GLYCOPYRROLATE 0.1 MG: 0.2 INJECTION, SOLUTION INTRAMUSCULAR; INTRAVENOUS at 01:08

## 2020-08-10 RX ADMIN — PROPOFOL 50 MG: 10 INJECTION, EMULSION INTRAVENOUS at 12:08

## 2020-08-10 RX ADMIN — IBUPROFEN 600 MG: 600 TABLET ORAL at 05:08

## 2020-08-10 RX ADMIN — TRAMADOL HYDROCHLORIDE 50 MG: 50 TABLET, FILM COATED ORAL at 08:08

## 2020-08-10 RX ADMIN — ACETAMINOPHEN 1000 MG: 10 INJECTION, SOLUTION INTRAVENOUS at 01:08

## 2020-08-10 RX ADMIN — FENTANYL CITRATE 25 MCG: 50 INJECTION, SOLUTION INTRAMUSCULAR; INTRAVENOUS at 03:08

## 2020-08-10 RX ADMIN — DEXTROSE, SODIUM CHLORIDE, SODIUM LACTATE, POTASSIUM CHLORIDE, AND CALCIUM CHLORIDE: 5; .6; .31; .03; .02 INJECTION, SOLUTION INTRAVENOUS at 03:08

## 2020-08-10 RX ADMIN — HYDROMORPHONE HYDROCHLORIDE 0.2 MG: 2 INJECTION INTRAMUSCULAR; INTRAVENOUS; SUBCUTANEOUS at 03:08

## 2020-08-10 RX ADMIN — CEFAZOLIN SODIUM 2 G: 2 SOLUTION INTRAVENOUS at 08:08

## 2020-08-10 RX ADMIN — ONDANSETRON 4 MG: 2 INJECTION, SOLUTION INTRAMUSCULAR; INTRAVENOUS at 01:08

## 2020-08-10 RX ADMIN — DEXAMETHASONE SODIUM PHOSPHATE 8 MG: 4 INJECTION, SOLUTION INTRA-ARTICULAR; INTRALESIONAL; INTRAMUSCULAR; INTRAVENOUS; SOFT TISSUE at 01:08

## 2020-08-10 RX ADMIN — ROCURONIUM BROMIDE 5 MG: 10 INJECTION, SOLUTION INTRAVENOUS at 12:08

## 2020-08-10 RX ADMIN — HYDROMORPHONE HYDROCHLORIDE 1 MG: 1 INJECTION, SOLUTION INTRAMUSCULAR; INTRAVENOUS; SUBCUTANEOUS at 11:08

## 2020-08-10 RX ADMIN — FENTANYL CITRATE 50 MCG: 50 INJECTION, SOLUTION INTRAMUSCULAR; INTRAVENOUS at 01:08

## 2020-08-10 RX ADMIN — ROCURONIUM BROMIDE 20 MG: 10 INJECTION, SOLUTION INTRAVENOUS at 01:08

## 2020-08-10 RX ADMIN — SUCCINYLCHOLINE CHLORIDE 140 MG: 20 INJECTION, SOLUTION INTRAMUSCULAR; INTRAVENOUS; PARENTERAL at 12:08

## 2020-08-10 RX ADMIN — GLYCOPYRROLATE 0.2 MG: 0.2 INJECTION, SOLUTION INTRAMUSCULAR; INTRAVENOUS at 01:08

## 2020-08-10 RX ADMIN — FENTANYL CITRATE 25 MCG: 50 INJECTION, SOLUTION INTRAMUSCULAR; INTRAVENOUS at 02:08

## 2020-08-10 RX ADMIN — SUCCINYLCHOLINE CHLORIDE 60 MG: 20 INJECTION, SOLUTION INTRAMUSCULAR; INTRAVENOUS; PARENTERAL at 12:08

## 2020-08-10 RX ADMIN — NEOSTIGMINE METHYLSULFATE 3 MG: 1 INJECTION INTRAVENOUS at 02:08

## 2020-08-10 RX ADMIN — MUPIROCIN: 20 OINTMENT TOPICAL at 09:08

## 2020-08-10 RX ADMIN — PROPOFOL 150 MG: 10 INJECTION, EMULSION INTRAVENOUS at 12:08

## 2020-08-10 RX ADMIN — LIDOCAINE HYDROCHLORIDE 100 MG: 20 INJECTION, SOLUTION INTRAVENOUS at 12:08

## 2020-08-10 RX ADMIN — ACETAMINOPHEN 1000 MG: 10 INJECTION, SOLUTION INTRAVENOUS at 08:08

## 2020-08-10 RX ADMIN — PHENYLEPHRINE HYDROCHLORIDE 100 MCG: 10 INJECTION INTRAVENOUS at 01:08

## 2020-08-10 RX ADMIN — GABAPENTIN 300 MG: 300 CAPSULE ORAL at 08:08

## 2020-08-10 RX ADMIN — FENTANYL CITRATE 100 MCG: 50 INJECTION, SOLUTION INTRAMUSCULAR; INTRAVENOUS at 12:08

## 2020-08-10 RX ADMIN — SODIUM CHLORIDE, SODIUM GLUCONATE, SODIUM ACETATE, POTASSIUM CHLORIDE, MAGNESIUM CHLORIDE, SODIUM PHOSPHATE, DIBASIC, AND POTASSIUM PHOSPHATE: .53; .5; .37; .037; .03; .012; .00082 INJECTION, SOLUTION INTRAVENOUS at 01:08

## 2020-08-10 RX ADMIN — MIDAZOLAM 2 MG: 1 INJECTION INTRAMUSCULAR; INTRAVENOUS at 12:08

## 2020-08-10 RX ADMIN — CEFTRIAXONE 2 G: 2 INJECTION, SOLUTION INTRAVENOUS at 12:08

## 2020-08-10 RX ADMIN — BISACODYL 5 MG: 5 TABLET, COATED ORAL at 08:08

## 2020-08-10 RX ADMIN — FAMOTIDINE 20 MG: 20 TABLET ORAL at 08:08

## 2020-08-10 RX ADMIN — GLYCOPYRROLATE 0.4 MG: 0.2 INJECTION, SOLUTION INTRAMUSCULAR; INTRAVENOUS at 02:08

## 2020-08-10 RX ADMIN — SODIUM CHLORIDE, SODIUM GLUCONATE, SODIUM ACETATE, POTASSIUM CHLORIDE, MAGNESIUM CHLORIDE, SODIUM PHOSPHATE, DIBASIC, AND POTASSIUM PHOSPHATE: .53; .5; .37; .037; .03; .012; .00082 INJECTION, SOLUTION INTRAVENOUS at 11:08

## 2020-08-10 RX ADMIN — METRONIDAZOLE 500 MG: 500 INJECTION, SOLUTION INTRAVENOUS at 08:08

## 2020-08-10 RX ADMIN — IBUPROFEN 600 MG: 600 TABLET ORAL at 08:08

## 2020-08-10 NOTE — OP NOTE
Surgeon:  Dr. Noble Kaye    Assistant:Kelsey Dillon RNFA    Preoperative diagnosis:  Polyp of ileocecal valve    Postoperative diagnosis:  The same    Procedure:  Laparoscopic with conversion to open right hemicolectomy.    Anesthesia:  General endotracheal anesthesia    Indication for procedure:  Pleasant 75-year-old gentleman presented for screening colonoscopy.  Was found have a large flat polyp of the ileocecal valve which was too large to be removed.  This was biopsied and confirmed to be adenomatous.  It referred to me for surgical resection.    Description of procedure:  Following signing informed consent is taken the operating room placed supine position.  General endotracheal anesthesia was administered without event.  Barton catheter was inserted.  The abdomen was prepped and draped standard fashion.  Appropriate time-out procedure was then performed.  Make a small vertical incision above the umbilicus carried down through deep dermal tissue.  I entered the abdominal cavity with an Optiview trocar after insufflation with a Veress needle.  I next evaluate for injury from the Veress needle.  No such injuries appreciated.  Patient is placed in Trendelenburg and does towards his left side.  I placed 2 5 mm trocars the left midclavicular line superiorly and inferiorly.  Cecum is identified identify ileocolic pedicle.  I dissected around the ileocolic artery and isolate this vessel.  Into the retroperitoneum the duodenum was swept posteriorly to keep this out of the way of dissection.  Having isolated the ileocolic vessel it is ligated with an EnSeal device.  I do encounter a moderate amount of bleeding during this portion of the case.  I then continued my dissection sweeping medial to laterally up to the white line of Toldt and up to the hepatic flexure.  I then used my EnSeal device to mobilize the terminal ileum and the right colon up along the line of Toldt.  Hepatic flexure is then taken down.  I next  make a vertical midline incision carried down the deep dermal tissue.  Anterior abdominal cavity I identified the mobilized colon.  I ligate the mesentery of the small bowel to a point of resection in the terminal ileum.  I stapled across the terminal ileum uneventfully.  The mobilized colon was then externalized.  I do have to ligate mesentery to fully free the hepatic flexure this is done with  ties uneventfully.  I picked a portion in the transverse colon adjacent to the takeoff of the right colic coming off the middle colic vessel.  I stapled across the transverse colon.  Specimen was sent off the field to pathology.  I irrigated sure adequate hemostasis.  I identified the ileocolic pedicle and suture ligate this with Vicryl suture.  I irrigated once again and ensure adequate hemostasis.  I next fashion create a stapled ileocolic side anastomosis in side-to-side fashion with the GI 75 device.  I stapled across the common enterotomy uneventfully.  I then used vascularized omentum to place over the anastomosis securing a vascularized flap over the anastomosis.  Seprafilm was then placed.  I inject the fascia with Exparel mixed with Marcaine.  I then closed the fascia with a running looped PDS suture.  Skin incision was then closed for Monocryl.  Patient was awakened taken recovery room stable condition.  There were no immediate complications.  Blood loss was 250 cc

## 2020-08-10 NOTE — TRANSFER OF CARE
Anesthesia Transfer of Care Note    Patient: Papito Hutton    Procedure(s) Performed: Procedure(s) (LRB):  COLECTOMY, RIGHT, LAPAROSCOPIC pooss conversion to open (Right)  HEMICOLECTOMY, RIGHT (N/A)    Patient location: PACU    Anesthesia Type: general    Transport from OR: Transported from OR on 2-3 L/min O2 by NC with adequate spontaneous ventilation    Post pain: adequate analgesia    Post assessment: no apparent anesthetic complications    Level of consciousness: awake    Nausea/Vomiting: no nausea/vomiting    Complications: none    Transfer of care protocol was followed      Last vitals:   Visit Vitals  BP (!) 167/79 (BP Location: Left arm, Patient Position: Lying)   Pulse (!) 58   Temp 36.6 °C (97.9 °F) (Temporal)   Resp 18   Ht 6' (1.829 m)   Wt 95.3 kg (210 lb)   SpO2 99%   BMI 28.48 kg/m²

## 2020-08-10 NOTE — ANESTHESIA PREPROCEDURE EVALUATION
08/10/2020  Papito Hutton is a 75 y.o., male.    Anesthesia Evaluation    I have reviewed the Patient Summary Reports.    I have reviewed the Nursing Notes. I have reviewed the NPO Status.   I have reviewed the Medications.     Review of Systems  Anesthesia Hx:  No problems with previous Anesthesia    Social:  Smoker    Hematology/Oncology:        Current/Recent Cancer. Other (see Oncology comments) Oncology Comments: Colon ca    EENT/Dental:EENT/Dental Normal   Cardiovascular:   Hypertension hyperlipidemia    Pulmonary:   Sleep Apnea    Hepatic/GI:   GERD    Musculoskeletal:   Arthritis     Neurological:  Neurology Normal    Endocrine:   Diabetes        Physical Exam  General:  Well nourished    Airway/Jaw/Neck:  Airway Findings: Mouth Opening: Normal Tongue: Normal  General Airway Assessment: Adult  Mallampati: II  TM Distance: Normal, at least 6 cm        Eyes/Ears/Nose:  EYES/EARS/NOSE FINDINGS: Normal   Dental:  Dental Findings: In tact   Chest/Lungs:  Chest/Lungs Findings: Clear to auscultation, Normal Respiratory Rate     Heart/Vascular:  Heart Findings: Rate: Normal  Rhythm: Regular Rhythm        Mental Status:  Mental Status Findings:  Cooperative, Alert and Oriented         Anesthesia Plan  Type of Anesthesia, risks & benefits discussed:  Anesthesia Type:  general  Patient's Preference:   Intra-op Monitoring Plan: standard ASA monitors  Intra-op Monitoring Plan Comments:   Post Op Pain Control Plan: multimodal analgesia and IV/PO Opioids PRN  Post Op Pain Control Plan Comments:   Induction:   IV  Beta Blocker:  Patient is on a Beta-Blocker and has received one dose within the past 24 hours (No further documentation required).       Informed Consent: Patient understands risks and agrees with Anesthesia plan.  Questions answered. Anesthesia consent signed with patient.  ASA Score: 3     Day of  Surgery Review of History & Physical: I have interviewed and examined the patient. I have reviewed the patient's H&P dated:    H&P update referred to the surgeon.         Ready For Surgery From Anesthesia Perspective.

## 2020-08-10 NOTE — H&P
[facility] Medicine  History & Physical    Patient Name: Papito Hutton  MRN: [id number]  Admission Date: 8/10/2020  Attending Physician: Gina Arriaza MD   Primary Care Provider: Meggan Ramachandran MD    Patient seen in recovery    Patient information was obtained from patient and Records available.     Subjective:     Principal Problem:Colon polyp    Chief Complaint:  Ileocecal valve polyp with confirmed adenomatous that was not amenable to endoscopic removal       HPI: This is a 75-year-old male who has a history of hypertension, hyperlipidemia, sleep apnea (does not use CPAP), low testosterone, vitamin B12 deficiency, and recently diagnosed ileocecal valve polyp with confirmed adenomatous that was not amenable to endoscopic removal. Today patient underwent surgical removal done by Dr. Kaye. Patient had a Laparoscopic Right hemicolectomy with conversion to open Right hemicolectomy with Ileocolic anastomosis performed.   Patient tolerated procedure well and currently post procedure.      Past Medical History:   Diagnosis Date    Arthritis     B12 deficiency     Colon polyp     Diabetes mellitus     BORDERLINE    GERD (gastroesophageal reflux disease)     [place] (hard of hearing)     BILAT AIDS    Hyperlipidemia     Hypertension     Low testosterone     Personal history of colonic polyps 2008    adenomatous polyp    Sinus disorder     Sleep apnea     DOES NOT USE MACHINE    Wears glasses        Past Surgical History:   Procedure Laterality Date    COLONOSCOPY  2008    Dr. Garcia; polyps removed    COLONOSCOPY N/A 7/13/2020    Procedure: COLONOSCOPY;  Surgeon: Jj Fried MD;  Location: [facility];  Service: Endoscopy;  Laterality: N/A;    NASAL SEPTUM SURGERY      right hand surgery      UPPER GASTROINTESTINAL ENDOSCOPY  2008    hiatal hernia; esophagitis       Review of patient's allergies indicates:  No Known Allergies    No current facility-administered  medications on file prior to encounter.      Current Outpatient Medications on File Prior to Encounter   Medication Sig    amLODIPine-benazepriL (LOTREL) 10-40 mg per capsule Take 1 capsule by mouth once daily.    aspirin 81 MG Chew Take 1 tablet (81 mg total) by mouth once daily.    CHOLECALCIFEROL, VITAMIN D3, (VITAMIN D3 ORAL) Take 1 capsule by mouth once daily.    cyanocobalamin (VITAMIN B-12) 1000 MCG tablet Take 100 mcg by mouth once daily.    diclofenac sodium (VOLTAREN) 1 % Gel Apply 2 g topically once daily.    fluticasone propionate (FLONASE) 50 mcg/actuation nasal spray USE 2 SPRAYS IN EACH NOSTRIL ONCE DAILY (Patient taking differently: PRN)    hydroCHLOROthiazide (HYDRODIURIL) 12.5 MG Tab Take 1 tablet (12.5 mg total) by mouth once daily.    metFORMIN (GLUCOPHAGE) 500 MG tablet Take 1 tablet (500 mg total) by mouth 2 (two) times daily with meals.    testosterone (FORTESTA) 10 mg/0.5 gram /actuation GlPm Place 20 mg onto the skin once daily. Apply 2 pump actuations topically daily.    TOPROL XL 25 mg 24 hr tablet TAKE 1 TABLET DAILY     Family History     None        Tobacco Use    Smoking status: Current Some Day Smoker     Types: Cigars    Smokeless tobacco: Former User    Tobacco comment: smoke 1 cigar every couple of months   Substance and Sexual Activity    Alcohol use: Yes     Alcohol/week: 2.0 standard drinks     Types: 2 Cans of beer per week    Drug use: No    Sexual activity: Yes     Partners: Female     Review of Systems   Constitutional: Negative for activity change, chills, fatigue and fever.   HENT: Negative for ear discharge, ear pain and facial swelling.    Eyes: Negative for pain and redness.   Respiratory: Negative for cough and shortness of breath.    Cardiovascular: Negative for chest pain, palpitations and leg swelling.   Gastrointestinal: Positive for abdominal pain. Negative for blood in stool, constipation, diarrhea, nausea and vomiting.   Endocrine: Negative for  polydipsia and polyphagia.   Genitourinary: Negative for difficulty urinating, dysuria, flank pain and hematuria.   Musculoskeletal: Negative for gait problem, neck pain and neck stiffness.   Skin: Positive for wound. Negative for color change.        Abdominal surgical wound   Allergic/Immunologic: Negative for food allergies.   Neurological: Negative for seizures, facial asymmetry, speech difficulty and weakness.   Hematological: Does not bruise/bleed easily.   Psychiatric/Behavioral: Negative for agitation, behavioral problems, confusion, hallucinations and suicidal ideas. The patient is not nervous/anxious.      Objective:     Vital Signs (Most Recent):  Temp: 98.2 °F (36.8 °C) (08/10/20 1550)  Pulse: 60 (08/10/20 1545)  Resp: 20 (08/10/20 1555)  BP: 126/62 (08/10/20 1545)  SpO2: 98 % (08/10/20 1545) Vital Signs (24h Range):  Temp:  [97.3 °F (36.3 °C)-98.2 °F (36.8 °C)] 98.2 °F (36.8 °C)  Pulse:  [58-82] 60  Resp:  [15-22] 20  SpO2:  [93 %-99 %] 98 %  BP: (123-167)/() 126/62     Weight: 95.3 kg (210 lb)  Body mass index is 28.48 kg/m².    Physical Exam  Constitutional:       General: He is not in acute distress.     Appearance: Normal appearance. He is not ill-appearing or diaphoretic.   HENT:      Head: Normocephalic and atraumatic.      Mouth/Throat:      Mouth: Mucous membranes are dry.   Eyes:      General:         Right eye: No discharge.         Left eye: No discharge.      Extraocular Movements: Extraocular movements intact.      Conjunctiva/sclera: Conjunctivae normal.      Pupils: Pupils are equal, round, and reactive to light.   Neck:      Musculoskeletal: Normal range of motion and neck supple. No neck rigidity or muscular tenderness.   Cardiovascular:      Rate and Rhythm: Normal rate and regular rhythm.      Pulses: Normal pulses.      Heart sounds: Normal heart sounds. No murmur.   Pulmonary:      Effort: Pulmonary effort is normal. No respiratory distress.      Breath sounds: Normal breath  sounds. No wheezing or rhonchi.   Abdominal:      General: There is no distension.      Tenderness: There is abdominal tenderness. There is no guarding.      Comments: BS noted   Genitourinary:     Comments: Barton draining clear mayur color urine  Musculoskeletal: Normal range of motion.         General: No swelling or tenderness.   Skin:     General: Skin is warm and dry.      Capillary Refill: Capillary refill takes less than 2 seconds.      Comments: Midline abdominal incision intact   Neurological:      General: No focal deficit present.      Mental Status: He is alert and oriented to person, place, and time. Mental status is at baseline.   Psychiatric:         Mood and Affect: Mood normal.         Behavior: Behavior normal.         Thought Content: Thought content normal.         Judgment: Judgment normal.           CRANIAL NERVES     CN III, IV, VI   Pupils are equal, round, and reactive to light.       Results for orders placed or performed during the hospital encounter of 08/07/20   Type & Screen   Result Value Ref Range    Group & Rh A POS     Indirect Yahaira NEG                Assessment/Plan:     * Colon polyp  Status post Laparoscopic Right  hemicolectomy with conversion to open Right hemicolectomy with  Ileocolic anastomosis 08/10/2020-  Orders as per surgeon- Dr. Kaye   Patient currently remains NPO  P.r.n. pain and antiemetic medication  Continue IV fluids      Type 2 diabetes mellitus, without long-term current use of insulin  Accu-Cheks with correctional sliding scale insulin  Home metformin currently on hold      GERD (gastroesophageal reflux disease)  Continue PPI-IV for now      Essential hypertension  Monitor      Hyperlipidemia  Continue home statin once tolerating p.o. intake        VTE Risk Mitigation (From admission, onward)    None           Time spent seeing patient( greater than 1/2 spent in direct contact) :  56 minutes    JOSÉ MIGUEL Marley  Department of Hospital Medicine   [facility]

## 2020-08-10 NOTE — ANESTHESIA POSTPROCEDURE EVALUATION
Anesthesia Post Evaluation    Patient: Papito Hutton    Procedure(s) Performed: Procedure(s) (LRB):  COLECTOMY, RIGHT, LAPAROSCOPIC pooss conversion to open (Right)  HEMICOLECTOMY, RIGHT (N/A)    Final Anesthesia Type: general    Patient location during evaluation: PACU  Patient participation: Yes- Able to Participate  Level of consciousness: awake and alert  Post-procedure vital signs: reviewed and stable  Pain management: adequate  Airway patency: patent    PONV status at discharge: No PONV  Anesthetic complications: no      Cardiovascular status: hemodynamically stable  Respiratory status: unassisted and room air  Hydration status: euvolemic  Follow-up not needed.          Vitals Value Taken Time   /61 08/10/20 1621   Temp 36.4 °C (97.5 °F) 08/10/20 1621   Pulse 75 08/10/20 1621   Resp 17 08/10/20 1621   SpO2 95 % 08/10/20 1621         Event Time   Out of Recovery 16:08:42         Pain/Patty Score: Pain Rating Prior to Med Admin: 6 (8/10/2020  5:55 PM)  Pain Rating Post Med Admin: 4 (8/10/2020  4:00 PM)  Patty Score: 10 (8/10/2020  4:00 PM)

## 2020-08-10 NOTE — NURSING
Arrives to room 405 S/P Right HemiColectomy,AAOx3 VSS afebrile Abdomne distended,  bandaid dressings and one Mid line 8 inch Island dressing,Seepage aresa to mid line dressing marked Bowel sounds hypoactive at this time.D5LR infusing at 125ML/HR to to 20 gauge peripheral to left hand SCD's on Bilateral.Tolerating clear liquids well.Denies pain No C/oN&V at this time

## 2020-08-10 NOTE — PLAN OF CARE
Patient is stable at this time.  VSS. Anesthesiologist at patient's bedside and is ok for patient to transfer to the floor.  Dressings marked with scant amount of drainage.  Pain is a 3/10.  No complaints of nausea or vomiting.  Patient tolerating po intake well. Barton cath remain in place and draining well.

## 2020-08-10 NOTE — INTERVAL H&P NOTE
The patient has been examined and the H&P has been reviewed:    I concur with the findings and no changes have occurred since H&P was written.    Surgery risks, benefits and alternative options discussed and understood by patient/family.          Active Hospital Problems    Diagnosis  POA    Preop examination [Z01.818]  Not Applicable      Resolved Hospital Problems   No resolved problems to display.

## 2020-08-10 NOTE — PLAN OF CARE
POC reviewed with Patient verbalizes understanding at this time Spouse present T bed side Midline abdominal dressing intact abdomen tender to touch Bowel sounds Hypoactive in all quadrants.

## 2020-08-10 NOTE — BRIEF OP NOTE
[facility]  Brief Operative Note    SUMMARY     Surgery Date: 8/10/2020     Surgeon(s) and Role:     * Noble Kaye MD - Primary    Assisting Surgeon: Kelsey JIMENEZ      Pre-op Diagnosis:  Adenomatous polyp of colon, unspecified part of colon [D12.6]    Post-op Diagnosis:  Post-Op Diagnosis Codes:     * Adenomatous polyp of colon, unspecified part of colon [D12.6]    Procedure(s) (LRB):  COLECTOMY, RIGHT, LAPAROSCOPIC pooss conversion to open (Right)  HEMICOLECTOMY, RIGHT (N/A)    Anesthesia: General    Description of Procedure: Laparoscopic R hemicolectomy with conversion to open R hemicolectomy.  Ileocolic anastomosis performed.      Description of the findings of the procedure: see above.     Estimated Blood Loss: 250 mL         Specimens:   Specimen (12h ago, onward)    None

## 2020-08-10 NOTE — HPI
This is a 75-year-old male who has a history of hypertension, hyperlipidemia, sleep apnea (does not use CPAP), low testosterone, vitamin B12 deficiency, and recently diagnosed ileocecal valve polyp with confirmed adenomatous that was not amenable to endoscopic removal. Today patient underwent surgical removal done by Dr. Kaye. Patient had a Laparoscopic Right hemicolectomy with conversion to open Right hemicolectomy with Ileocolic anastomosis performed.   Patient tolerated procedure well and currently post procedure.

## 2020-08-10 NOTE — ASSESSMENT & PLAN NOTE
Status post Laparoscopic Right  hemicolectomy with conversion to open Right hemicolectomy with  Ileocolic anastomosis 08/10/2020-  Orders as per surgeon- Dr. Kaye   Patient currently remains NPO  P.r.n. pain and antiemetic medication  Continue IV fluids

## 2020-08-10 NOTE — ANESTHESIA PROCEDURE NOTES
Intubation  Performed by: Gi Shah CRNA  Authorized by: Gi Shah CRNA     Intubation:     Induction:  Intravenous    Intubated:  Postinduction    Mask Ventilation:  Easy with oral airway    Attempts:  1    Attempted By:  Staff anesthesiologist    Method of Intubation:  Bougie    Blade:  Wallace 4    Laryngeal View Grade: Grade I - full view of chords      Difficult Airway Encountered?: No      Complications:  None    Airway Device:  Oral endotracheal tube    Airway Device Size:  7.5    Style/Cuff Inflation:  Cuffed (inflated to minimal occlusive pressure)    Tube secured:  21    Secured at:  The lips    Placement Verified By:  Capnometry    Complicating Factors:  None    Findings Post-Intubation:  BS equal bilateral and atraumatic/condition of teeth unchanged

## 2020-08-10 NOTE — SUBJECTIVE & OBJECTIVE
Past Medical History:   Diagnosis Date    Arthritis     B12 deficiency     Colon polyp     Diabetes mellitus     BORDERLINE    GERD (gastroesophageal reflux disease)     [place] (hard of hearing)     BILAT AIDS    Hyperlipidemia     Hypertension     Low testosterone     Personal history of colonic polyps 2008    adenomatous polyp    Sinus disorder     Sleep apnea     DOES NOT USE MACHINE    Wears glasses        Past Surgical History:   Procedure Laterality Date    COLONOSCOPY  2008    Dr. Garcia; polyps removed    COLONOSCOPY N/A 7/13/2020    Procedure: COLONOSCOPY;  Surgeon: Jj Fried MD;  Location: [facility];  Service: Endoscopy;  Laterality: N/A;    NASAL SEPTUM SURGERY      right hand surgery      UPPER GASTROINTESTINAL ENDOSCOPY  2008    hiatal hernia; esophagitis       Review of patient's allergies indicates:  No Known Allergies    No current facility-administered medications on file prior to encounter.      Current Outpatient Medications on File Prior to Encounter   Medication Sig    amLODIPine-benazepriL (LOTREL) 10-40 mg per capsule Take 1 capsule by mouth once daily.    aspirin 81 MG Chew Take 1 tablet (81 mg total) by mouth once daily.    CHOLECALCIFEROL, VITAMIN D3, (VITAMIN D3 ORAL) Take 1 capsule by mouth once daily.    cyanocobalamin (VITAMIN B-12) 1000 MCG tablet Take 100 mcg by mouth once daily.    diclofenac sodium (VOLTAREN) 1 % Gel Apply 2 g topically once daily.    fluticasone propionate (FLONASE) 50 mcg/actuation nasal spray USE 2 SPRAYS IN EACH NOSTRIL ONCE DAILY (Patient taking differently: PRN)    hydroCHLOROthiazide (HYDRODIURIL) 12.5 MG Tab Take 1 tablet (12.5 mg total) by mouth once daily.    metFORMIN (GLUCOPHAGE) 500 MG tablet Take 1 tablet (500 mg total) by mouth 2 (two) times daily with meals.    testosterone (FORTESTA) 10 mg/0.5 gram /actuation GlPm Place 20 mg onto the skin once daily. Apply 2 pump actuations topically daily.    TOPROL XL 25 mg 24  hr tablet TAKE 1 TABLET DAILY     Family History     None        Tobacco Use    Smoking status: Current Some Day Smoker     Types: Cigars    Smokeless tobacco: Former User    Tobacco comment: smoke 1 cigar every couple of months   Substance and Sexual Activity    Alcohol use: Yes     Alcohol/week: 2.0 standard drinks     Types: 2 Cans of beer per week    Drug use: No    Sexual activity: Yes     Partners: Female     Review of Systems   Constitutional: Negative for activity change, chills, fatigue and fever.   HENT: Negative for ear discharge, ear pain and facial swelling.    Eyes: Negative for pain and redness.   Respiratory: Negative for cough and shortness of breath.    Cardiovascular: Negative for chest pain, palpitations and leg swelling.   Gastrointestinal: Positive for abdominal pain. Negative for blood in stool, constipation, diarrhea, nausea and vomiting.   Endocrine: Negative for polydipsia and polyphagia.   Genitourinary: Negative for difficulty urinating, dysuria, flank pain and hematuria.   Musculoskeletal: Negative for gait problem, neck pain and neck stiffness.   Skin: Positive for wound. Negative for color change.        Abdominal surgical wound   Allergic/Immunologic: Negative for food allergies.   Neurological: Negative for seizures, facial asymmetry, speech difficulty and weakness.   Hematological: Does not bruise/bleed easily.   Psychiatric/Behavioral: Negative for agitation, behavioral problems, confusion, hallucinations and suicidal ideas. The patient is not nervous/anxious.      Objective:     Vital Signs (Most Recent):  Temp: 98.2 °F (36.8 °C) (08/10/20 1550)  Pulse: 60 (08/10/20 1545)  Resp: 20 (08/10/20 1555)  BP: 126/62 (08/10/20 1545)  SpO2: 98 % (08/10/20 1545) Vital Signs (24h Range):  Temp:  [97.3 °F (36.3 °C)-98.2 °F (36.8 °C)] 98.2 °F (36.8 °C)  Pulse:  [58-82] 60  Resp:  [15-22] 20  SpO2:  [93 %-99 %] 98 %  BP: (123-167)/() 126/62     Weight: 95.3 kg (210 lb)  Body mass  index is 28.48 kg/m².    Physical Exam  Constitutional:       General: He is not in acute distress.     Appearance: Normal appearance. He is not ill-appearing or diaphoretic.   HENT:      Head: Normocephalic and atraumatic.      Mouth/Throat:      Mouth: Mucous membranes are dry.   Eyes:      General:         Right eye: No discharge.         Left eye: No discharge.      Extraocular Movements: Extraocular movements intact.      Conjunctiva/sclera: Conjunctivae normal.      Pupils: Pupils are equal, round, and reactive to light.   Neck:      Musculoskeletal: Normal range of motion and neck supple. No neck rigidity or muscular tenderness.   Cardiovascular:      Rate and Rhythm: Normal rate and regular rhythm.      Pulses: Normal pulses.      Heart sounds: Normal heart sounds. No murmur.   Pulmonary:      Effort: Pulmonary effort is normal. No respiratory distress.      Breath sounds: Normal breath sounds. No wheezing or rhonchi.   Abdominal:      General: There is no distension.      Tenderness: There is abdominal tenderness. There is no guarding.      Comments: BS noted   Genitourinary:     Comments: Barton draining clear mayur color urine  Musculoskeletal: Normal range of motion.         General: No swelling or tenderness.   Skin:     General: Skin is warm and dry.      Capillary Refill: Capillary refill takes less than 2 seconds.      Comments: Midline abdominal incision intact   Neurological:      General: No focal deficit present.      Mental Status: He is alert and oriented to person, place, and time. Mental status is at baseline.   Psychiatric:         Mood and Affect: Mood normal.         Behavior: Behavior normal.         Thought Content: Thought content normal.         Judgment: Judgment normal.           CRANIAL NERVES     CN III, IV, VI   Pupils are equal, round, and reactive to light.       Results for orders placed or performed during the hospital encounter of 08/07/20   Type & Screen   Result Value Ref  Range    Group & Rh A POS     Indirect Yahaira NEG

## 2020-08-11 LAB
ANION GAP SERPL CALC-SCNC: 7 MMOL/L (ref 8–16)
BASOPHILS # BLD AUTO: 0.01 K/UL (ref 0–0.2)
BASOPHILS NFR BLD: 0.1 % (ref 0–1.9)
BUN SERPL-MCNC: 13 MG/DL (ref 8–23)
CALCIUM SERPL-MCNC: 9.2 MG/DL (ref 8.7–10.5)
CHLORIDE SERPL-SCNC: 106 MMOL/L (ref 95–110)
CO2 SERPL-SCNC: 23 MMOL/L (ref 23–29)
CREAT SERPL-MCNC: 0.9 MG/DL (ref 0.5–1.4)
DIFFERENTIAL METHOD: ABNORMAL
EOSINOPHIL # BLD AUTO: 0 K/UL (ref 0–0.5)
EOSINOPHIL NFR BLD: 0 % (ref 0–8)
ERYTHROCYTE [DISTWIDTH] IN BLOOD BY AUTOMATED COUNT: 13.7 % (ref 11.5–14.5)
EST. GFR  (AFRICAN AMERICAN): >60 ML/MIN/1.73 M^2
EST. GFR  (NON AFRICAN AMERICAN): >60 ML/MIN/1.73 M^2
GLUCOSE SERPL-MCNC: 187 MG/DL (ref 70–110)
HCT VFR BLD AUTO: 33.6 % (ref 40–54)
HGB BLD-MCNC: 11.1 G/DL (ref 14–18)
IMM GRANULOCYTES # BLD AUTO: 0.03 K/UL (ref 0–0.04)
IMM GRANULOCYTES NFR BLD AUTO: 0.3 % (ref 0–0.5)
LYMPHOCYTES # BLD AUTO: 0.8 K/UL (ref 1–4.8)
LYMPHOCYTES NFR BLD: 8.6 % (ref 18–48)
MAGNESIUM SERPL-MCNC: 2.1 MG/DL (ref 1.6–2.6)
MCH RBC QN AUTO: 29.8 PG (ref 27–31)
MCHC RBC AUTO-ENTMCNC: 33 G/DL (ref 32–36)
MCV RBC AUTO: 90 FL (ref 82–98)
MONOCYTES # BLD AUTO: 0.7 K/UL (ref 0.3–1)
MONOCYTES NFR BLD: 6.7 % (ref 4–15)
NEUTROPHILS # BLD AUTO: 8.3 K/UL (ref 1.8–7.7)
NEUTROPHILS NFR BLD: 84.3 % (ref 38–73)
NRBC BLD-RTO: 0 /100 WBC
PLATELET # BLD AUTO: 203 K/UL (ref 150–350)
PMV BLD AUTO: 10.4 FL (ref 9.2–12.9)
POCT GLUCOSE: 114 MG/DL (ref 70–110)
POCT GLUCOSE: 122 MG/DL (ref 70–110)
POCT GLUCOSE: 138 MG/DL (ref 70–110)
POCT GLUCOSE: 178 MG/DL (ref 70–110)
POTASSIUM SERPL-SCNC: 4.6 MMOL/L (ref 3.5–5.1)
RBC # BLD AUTO: 3.73 M/UL (ref 4.6–6.2)
SODIUM SERPL-SCNC: 136 MMOL/L (ref 136–145)
WBC # BLD AUTO: 9.81 K/UL (ref 3.9–12.7)

## 2020-08-11 PROCEDURE — 94761 N-INVAS EAR/PLS OXIMETRY MLT: CPT

## 2020-08-11 PROCEDURE — 80048 BASIC METABOLIC PNL TOTAL CA: CPT

## 2020-08-11 PROCEDURE — 25000003 PHARM REV CODE 250: Performed by: NURSE PRACTITIONER

## 2020-08-11 PROCEDURE — S0030 INJECTION, METRONIDAZOLE: HCPCS | Performed by: SURGERY

## 2020-08-11 PROCEDURE — C9113 INJ PANTOPRAZOLE SODIUM, VIA: HCPCS | Performed by: NURSE PRACTITIONER

## 2020-08-11 PROCEDURE — 85025 COMPLETE CBC W/AUTO DIFF WBC: CPT

## 2020-08-11 PROCEDURE — 63600175 PHARM REV CODE 636 W HCPCS: Performed by: NURSE PRACTITIONER

## 2020-08-11 PROCEDURE — 83735 ASSAY OF MAGNESIUM: CPT

## 2020-08-11 PROCEDURE — 94799 UNLISTED PULMONARY SVC/PX: CPT

## 2020-08-11 PROCEDURE — 25000003 PHARM REV CODE 250: Performed by: SURGERY

## 2020-08-11 PROCEDURE — 12000002 HC ACUTE/MED SURGE SEMI-PRIVATE ROOM

## 2020-08-11 PROCEDURE — 63600175 PHARM REV CODE 636 W HCPCS: Performed by: SURGERY

## 2020-08-11 RX ORDER — ACETAMINOPHEN 10 MG/ML
1000 INJECTION, SOLUTION INTRAVENOUS EVERY 8 HOURS
Status: DISPENSED | OUTPATIENT
Start: 2020-08-11 | End: 2020-08-12

## 2020-08-11 RX ORDER — METOCLOPRAMIDE HYDROCHLORIDE 5 MG/ML
10 INJECTION INTRAMUSCULAR; INTRAVENOUS EVERY 6 HOURS
Status: DISCONTINUED | OUTPATIENT
Start: 2020-08-11 | End: 2020-08-12 | Stop reason: HOSPADM

## 2020-08-11 RX ORDER — SODIUM CHLORIDE, SODIUM LACTATE, POTASSIUM CHLORIDE, CALCIUM CHLORIDE 600; 310; 30; 20 MG/100ML; MG/100ML; MG/100ML; MG/100ML
INJECTION, SOLUTION INTRAVENOUS CONTINUOUS
Status: DISCONTINUED | OUTPATIENT
Start: 2020-08-11 | End: 2020-08-12 | Stop reason: HOSPADM

## 2020-08-11 RX ADMIN — PANTOPRAZOLE SODIUM 40 MG: 40 INJECTION, POWDER, LYOPHILIZED, FOR SOLUTION INTRAVENOUS at 08:08

## 2020-08-11 RX ADMIN — MUPIROCIN: 20 OINTMENT TOPICAL at 08:08

## 2020-08-11 RX ADMIN — METOPROLOL SUCCINATE 25 MG: 25 TABLET, FILM COATED, EXTENDED RELEASE ORAL at 08:08

## 2020-08-11 RX ADMIN — IBUPROFEN 600 MG: 600 TABLET ORAL at 08:08

## 2020-08-11 RX ADMIN — ASPIRIN 81 MG 81 MG: 81 TABLET ORAL at 08:08

## 2020-08-11 RX ADMIN — MUPIROCIN: 20 OINTMENT TOPICAL at 09:08

## 2020-08-11 RX ADMIN — METOCLOPRAMIDE HYDROCHLORIDE 10 MG: 10 INJECTION, SOLUTION INTRAMUSCULAR; INTRAVENOUS at 11:08

## 2020-08-11 RX ADMIN — METOCLOPRAMIDE HYDROCHLORIDE 10 MG: 10 INJECTION, SOLUTION INTRAMUSCULAR; INTRAVENOUS at 06:08

## 2020-08-11 RX ADMIN — GABAPENTIN 300 MG: 300 CAPSULE ORAL at 08:08

## 2020-08-11 RX ADMIN — ACETAMINOPHEN 1000 MG: 10 INJECTION, SOLUTION INTRAVENOUS at 07:08

## 2020-08-11 RX ADMIN — DEXTROSE, SODIUM CHLORIDE, SODIUM LACTATE, POTASSIUM CHLORIDE, AND CALCIUM CHLORIDE: 5; .6; .31; .03; .02 INJECTION, SOLUTION INTRAVENOUS at 01:08

## 2020-08-11 RX ADMIN — CEFAZOLIN SODIUM 2 G: 2 SOLUTION INTRAVENOUS at 05:08

## 2020-08-11 RX ADMIN — TRAMADOL HYDROCHLORIDE 50 MG: 50 TABLET, FILM COATED ORAL at 05:08

## 2020-08-11 RX ADMIN — ACETAMINOPHEN 1000 MG: 10 INJECTION, SOLUTION INTRAVENOUS at 09:08

## 2020-08-11 RX ADMIN — IBUPROFEN 600 MG: 600 TABLET ORAL at 01:08

## 2020-08-11 RX ADMIN — FAMOTIDINE 20 MG: 20 TABLET ORAL at 09:08

## 2020-08-11 RX ADMIN — FAMOTIDINE 20 MG: 20 TABLET ORAL at 08:08

## 2020-08-11 RX ADMIN — METRONIDAZOLE 500 MG: 500 INJECTION, SOLUTION INTRAVENOUS at 05:08

## 2020-08-11 RX ADMIN — IBUPROFEN 600 MG: 600 TABLET ORAL at 04:08

## 2020-08-11 RX ADMIN — ACETAMINOPHEN 1000 MG: 10 INJECTION, SOLUTION INTRAVENOUS at 01:08

## 2020-08-11 RX ADMIN — METOCLOPRAMIDE HYDROCHLORIDE 10 MG: 10 INJECTION, SOLUTION INTRAMUSCULAR; INTRAVENOUS at 01:08

## 2020-08-11 RX ADMIN — IBUPROFEN 600 MG: 600 TABLET ORAL at 09:08

## 2020-08-11 RX ADMIN — SODIUM CHLORIDE, SODIUM LACTATE, POTASSIUM CHLORIDE, AND CALCIUM CHLORIDE: .6; .31; .03; .02 INJECTION, SOLUTION INTRAVENOUS at 01:08

## 2020-08-11 NOTE — PROGRESS NOTES
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 Today patient underwent surgical removal done by Dr. Kaye. Patient had a Laparoscopic Right hemicolectomy with conversion to open Right hemicolectomy with Ileocolic anastomosis performed.   Patient tolerated procedure well and currently post procedure.         Overview/Hospital Course:  The patient was monitored closely during his stay.  He was placed on clear liquid diet.  Patient was encouraged to increase activity as tolerated.    Interval History:  Stable.  Tolerating clear liquid diet well     Review of Systems   Constitutional: Negative for activity change, chills, fatigue and fever.   HENT: Negative for ear discharge, ear pain and facial swelling.    Eyes: Negative for pain and redness.   Respiratory: Negative for cough and shortness of breath.    Cardiovascular: Negative for chest pain, palpitations and leg swelling.   Gastrointestinal: Positive for abdominal pain. Negative for blood in stool, constipation, diarrhea, nausea and vomiting.   Endocrine: Negative for polydipsia and polyphagia.   Genitourinary: Negative for difficulty urinating, dysuria, flank pain and hematuria.   Musculoskeletal: Negative for gait problem, neck pain and neck stiffness.   Skin: Positive for wound. Negative for color change.        Abdominal surgical wound   Allergic/Immunologic: Negative for food  allergies.   Neurological: Negative for seizures, facial asymmetry, speech difficulty and weakness.   Hematological: Does not bruise/bleed easily.   Psychiatric/Behavioral: Negative for agitation, behavioral problems, confusion, hallucinations and suicidal ideas. The patient is not nervous/anxious.      Objective:     Vital Signs (Most Recent):  Temp: 97.6 °F (36.4 °C) (08/11/20 1603)  Pulse: (!) 56 (08/11/20 1603)  Resp: 20 (08/11/20 1603)  BP: (!) 123/56 (08/11/20 1603)  SpO2: 97 % (08/11/20 1603) Vital Signs (24h Range):  Temp:  [96.3 °F (35.7 °C)-98.7 °F (37.1 °C)] 97.6 °F (36.4 °C)  Pulse:  [56-89] 56  Resp:  [16-20] 20  SpO2:  [90 %-97 %] 97 %  BP: (123-194)/(56-84) 123/56     Weight: 95.3 kg (210 lb)  Body mass index is 28.48 kg/m².    Physical Exam  Constitutional:       General: He is not in acute distress.     Appearance: Normal appearance. He is not ill-appearing or diaphoretic.   HENT:      Head: Normocephalic and atraumatic.      Mouth/Throat:      Mouth: Mucous membranes are moist.   Eyes:      General:         Right eye: No discharge.         Left eye: No discharge.      Extraocular Movements: Extraocular movements intact.      Conjunctiva/sclera: Conjunctivae normal.      Pupils: Pupils are equal, round, and reactive to light.   Neck:      Musculoskeletal: Normal range of motion and neck supple. No neck rigidity or muscular tenderness.   Cardiovascular:      Rate and Rhythm: Normal rate and regular rhythm.      Pulses: Normal pulses.      Heart sounds: Normal heart sounds. No murmur.   Pulmonary:      Effort: Pulmonary effort is normal. No respiratory distress.      Breath sounds: Normal breath sounds. No wheezing or rhonchi.   Abdominal:      General: There is no distension.      Tenderness: There is abdominal tenderness. There is no guarding.      Comments: BS noted   Genitourinary:     Comments:  Not examined  Musculoskeletal: Normal range of motion.         General: No swelling or tenderness.    Skin:     General: Skin is warm and dry.      Capillary Refill: Capillary refill takes less than 2 seconds.      Comments: Midline abdominal incision intact   Neurological:      General: No focal deficit present.      Mental Status: He is alert and oriented to person, place, and time. Mental status is at baseline.   Psychiatric:         Mood and Affect: Mood normal.         Behavior: Behavior normal.         Thought Content: Thought content normal.         Judgment: Judgment normal.           CRANIAL NERVES     CN III, IV, VI   Pupils are equal, round, and reactive to light.     Labs reviewed      Assessment/Plan:      * Colon polyp  Status post Laparoscopic Right  hemicolectomy with conversion to open Right hemicolectomy with  Ileocolic anastomosis 08/10/2020-  Orders as per surgeon- Dr. Kaye   Patient currently on clear liquid diet  P.r.n. pain and antiemetic medication  Continue IV fluids      Type 2 diabetes mellitus, without long-term current use of insulin  Accu-Cheks with correctional sliding scale insulin  Home metformin currently on hold  Blood sugars running 138-197      GERD (gastroesophageal reflux disease)  Continue PPI-IV for now      Essential hypertension  Monitor      Hyperlipidemia  Continue home statin once tolerating p.o. intake        VTE Risk Mitigation (From admission, onward)         Ordered     Place BEKA hose  Until discontinued      08/10/20 6394              Time spent seeing patient( greater than 1/2 spent in direct contact) :  34 minutes    JOSÉ MIGUEL Marley  Department of Hospital Medicine   [facility]

## 2020-08-11 NOTE — CARE UPDATE
08/11/20 1110   PRE-TX-O2   O2 Device (Oxygen Therapy) room air   SpO2 (!) 90 %   Pulse Oximetry Type Intermittent   $ Pulse Oximetry - Multiple Charge Pulse Oximetry - Multiple   Incentive Spirometer   $ Incentive Spirometer Charges done with encouragement   Administration (IS) instruction provided, follow-up   Number of Repetitions (IS) 10   Level Incentive Spirometer (mL) 1250   Patient Tolerance (IS) good

## 2020-08-11 NOTE — HOSPITAL COURSE
The patient was monitored closely during his stay.  He was placed on clear liquid diet.  Patient was encouraged to increase activity as tolerated.  The patient tolerated clear Liquid diet well and his diet was advanced as tolerated. His overall condition remained stable and improved and he was discharge to home once cleared by surgeon.

## 2020-08-11 NOTE — PROGRESS NOTES
POD 1 s/p R jcaky  Pt resting comfortably.   NOtes pain he had last night has improved  Deneis n/v.  No fever/chills    .lastvitlas    Wt Readings from Last 3 Encounters:   08/10/20 95.3 kg (210 lb)   08/07/20 95.6 kg (210 lb 12.2 oz)   07/13/20 98 kg (216 lb)     Temp Readings from Last 3 Encounters:   08/11/20 98.2 °F (36.8 °C)   08/07/20 97.8 °F (36.6 °C)   07/13/20 98 °F (36.7 °C)     BP Readings from Last 3 Encounters:   08/11/20 (!) 168/76   08/07/20 132/62   07/13/20 122/62     Pulse Readings from Last 3 Encounters:   08/11/20 69   08/07/20 68   07/13/20 61     AAox3  CTA B  Sinus  Soft/nd/nt  2+ pulses B    Lab Results   Component Value Date    WBC 9.81 08/11/2020    HGB 11.1 (L) 08/11/2020    HCT 33.6 (L) 08/11/2020    MCV 90 08/11/2020     08/11/2020       BMP  Lab Results   Component Value Date     08/11/2020    K 4.6 08/11/2020     08/11/2020    CO2 23 08/11/2020    BUN 13 08/11/2020    CREATININE 0.9 08/11/2020    CALCIUM 9.2 08/11/2020    ANIONGAP 7 (L) 08/11/2020    ESTGFRAFRICA >60 08/11/2020    EGFRNONAA >60 08/11/2020     A/P: s/p R jacky  Ambulate  Full liquid diet  Await bowel function

## 2020-08-11 NOTE — PLAN OF CARE
Problem: Adult Inpatient Plan of Care  Goal: Plan of Care Review  Outcome: Ongoing, Progressing  Goal: Patient-Specific Goal (Individualization)  Outcome: Ongoing, Progressing  Goal: Absence of Hospital-Acquired Illness or Injury  Outcome: Ongoing, Progressing  Goal: Optimal Comfort and Wellbeing  Outcome: Ongoing, Progressing  Goal: Readiness for Transition of Care  Outcome: Ongoing, Progressing  Goal: Rounds/Family Conference  Outcome: Ongoing, Progressing     Problem: Fall Injury Risk  Goal: Absence of Fall and Fall-Related Injury  Outcome: Ongoing, Progressing  Intervention: Identify and Manage Contributors to Fall Injury Risk  Flowsheets (Taken 8/10/2020 2239)  Self-Care Promotion: BADL personal objects within reach  Medication Review/Management: medications reviewed  Intervention: Promote Injury-Free Environment  Flowsheets (Taken 8/10/2020 2239)  Safety Promotion/Fall Prevention:   assistive device/personal item within reach   bed alarm set  Environmental Safety Modification:   assistive device/personal items within reach   clutter free environment maintained     Problem: Infection  Goal: Infection Symptom Resolution  Outcome: Ongoing, Progressing  Intervention: Prevent or Manage Infection  Flowsheets (Taken 8/10/2020 2239)  Fever Reduction/Comfort Measures: lightweight bedding  Infection Management: aseptic technique maintained     Problem: Diabetes Comorbidity  Goal: Blood Glucose Level Within Desired Range  Outcome: Ongoing, Progressing  Intervention: Maintain Glycemic Control  Flowsheets (Taken 8/10/2020 2239)  Glycemic Management: blood glucose monitoring     Problem: Bleeding (Bowel Resection)  Goal: Absence of Bleeding  Outcome: Ongoing, Progressing  Intervention: Monitor and Manage Bleeding  Flowsheets (Taken 8/10/2020 2239)  Bleeding Management: dressing monitored     Problem: Bowel Motility Impaired (Bowel Resection)  Goal: Effective Bowel Motility and Elimination  Outcome: Ongoing,  Progressing  Intervention: Enhance Bowel Motility and Elimination  Flowsheets (Taken 8/10/2020 2239)  Bowel Motility Enhancement:   ambulation promoted   fluid intake encouraged     Problem: Infection (Bowel Resection)  Goal: Absence of Infection Signs/Symptoms  Outcome: Ongoing, Progressing  Intervention: Prevent or Manage Infection  Flowsheets (Taken 8/10/2020 2239)  Fever Reduction/Comfort Measures: lightweight bedding  Infection Management: aseptic technique maintained     Problem: Malabsorption (Bowel Resection)  Goal: Fluid and Electrolyte Balance  Outcome: Ongoing, Progressing     Problem: Ongoing Anesthesia Effects (Bowel Resection)  Goal: Anesthesia/Sedation Recovery  Outcome: Ongoing, Progressing     Problem: Pain (Bowel Resection)  Goal: Acceptable Pain Control  Outcome: Ongoing, Progressing  Intervention: Prevent or Manage Pain  Flowsheets (Taken 8/10/2020 2239)  Diversional Activities: television  Pain Management Interventions:   care clustered   pain management plan reviewed with patient/caregiver   position adjusted   quiet environment facilitated     Problem: Postoperative Nausea and Vomiting (Bowel Resection)  Goal: Nausea and Vomiting Relief  Outcome: Ongoing, Progressing     Problem: Postoperative Urinary Retention (Bowel Resection)  Goal: Effective Urinary Elimination  Outcome: Ongoing, Progressing     Problem: Respiratory Compromise (Bowel Resection)  Goal: Effective Oxygenation and Ventilation  Outcome: Ongoing, Progressing

## 2020-08-11 NOTE — CARE UPDATE
"   08/10/20 1900   Patient Assessment/Suction   Level of Consciousness (AVPU) alert   Respiratory Effort Unlabored   Expansion/Accessory Muscles/Retractions no use of accessory muscles;no retractions   All Lung Fields Breath Sounds clear   Rhythm/Pattern, Respiratory shallow  ("belly" pain)   PRE-TX-O2   O2 Device (Oxygen Therapy) room air   SpO2 95 %   Pulse 72   Resp 18   Incentive Spirometer   $ Incentive Spirometer Charges done with encouragement;postop instruction;ready for self-administration;proper technique demonstrated   Incentive Spirometer Predicted Level (mL) 2500   Administration (IS) instruction provided, initial;mouthpiece;proper technique demonstrated   Number of Repetitions (IS) 10   Level Incentive Spirometer (mL) 1750   Patient Tolerance (IS) good     "

## 2020-08-11 NOTE — PLAN OF CARE
Purposeful rounding every two hours this shift to ensure patient comfort and safety.Remains alert and oriented. Declines SCDs, does have sánchez hose on. Up to chair x2 hours this shift. Tolerating liquid diet

## 2020-08-11 NOTE — SUBJECTIVE & OBJECTIVE
Interval History:  Stable.  Tolerating clear liquid diet well     Review of Systems   Constitutional: Negative for activity change, chills, fatigue and fever.   HENT: Negative for ear discharge, ear pain and facial swelling.    Eyes: Negative for pain and redness.   Respiratory: Negative for cough and shortness of breath.    Cardiovascular: Negative for chest pain, palpitations and leg swelling.   Gastrointestinal: Positive for abdominal pain. Negative for blood in stool, constipation, diarrhea, nausea and vomiting.   Endocrine: Negative for polydipsia and polyphagia.   Genitourinary: Negative for difficulty urinating, dysuria, flank pain and hematuria.   Musculoskeletal: Negative for gait problem, neck pain and neck stiffness.   Skin: Positive for wound. Negative for color change.        Abdominal surgical wound   Allergic/Immunologic: Negative for food allergies.   Neurological: Negative for seizures, facial asymmetry, speech difficulty and weakness.   Hematological: Does not bruise/bleed easily.   Psychiatric/Behavioral: Negative for agitation, behavioral problems, confusion, hallucinations and suicidal ideas. The patient is not nervous/anxious.      Objective:     Vital Signs (Most Recent):  Temp: 97.6 °F (36.4 °C) (08/11/20 1603)  Pulse: (!) 56 (08/11/20 1603)  Resp: 20 (08/11/20 1603)  BP: (!) 123/56 (08/11/20 1603)  SpO2: 97 % (08/11/20 1603) Vital Signs (24h Range):  Temp:  [96.3 °F (35.7 °C)-98.7 °F (37.1 °C)] 97.6 °F (36.4 °C)  Pulse:  [56-89] 56  Resp:  [16-20] 20  SpO2:  [90 %-97 %] 97 %  BP: (123-194)/(56-84) 123/56     Weight: 95.3 kg (210 lb)  Body mass index is 28.48 kg/m².    Physical Exam  Constitutional:       General: He is not in acute distress.     Appearance: Normal appearance. He is not ill-appearing or diaphoretic.   HENT:      Head: Normocephalic and atraumatic.      Mouth/Throat:      Mouth: Mucous membranes are moist.   Eyes:      General:         Right eye: No discharge.         Left  eye: No discharge.      Extraocular Movements: Extraocular movements intact.      Conjunctiva/sclera: Conjunctivae normal.      Pupils: Pupils are equal, round, and reactive to light.   Neck:      Musculoskeletal: Normal range of motion and neck supple. No neck rigidity or muscular tenderness.   Cardiovascular:      Rate and Rhythm: Normal rate and regular rhythm.      Pulses: Normal pulses.      Heart sounds: Normal heart sounds. No murmur.   Pulmonary:      Effort: Pulmonary effort is normal. No respiratory distress.      Breath sounds: Normal breath sounds. No wheezing or rhonchi.   Abdominal:      General: There is no distension.      Tenderness: There is abdominal tenderness. There is no guarding.      Comments: BS noted   Genitourinary:     Comments:  Not examined  Musculoskeletal: Normal range of motion.         General: No swelling or tenderness.   Skin:     General: Skin is warm and dry.      Capillary Refill: Capillary refill takes less than 2 seconds.      Comments: Midline abdominal incision intact   Neurological:      General: No focal deficit present.      Mental Status: He is alert and oriented to person, place, and time. Mental status is at baseline.   Psychiatric:         Mood and Affect: Mood normal.         Behavior: Behavior normal.         Thought Content: Thought content normal.         Judgment: Judgment normal.           CRANIAL NERVES     CN III, IV, VI   Pupils are equal, round, and reactive to light.     Labs reviewed

## 2020-08-11 NOTE — NURSING
Patient alert and oriented x4. Pain managed effectively with PRN Tramadol and dilaudid. Midline abdominal incision and two lap sites intact, minimal strike through noted. Refusing SCDs, agreed to BEKA stockings. Barton in place draining clear yellow urine, removed at 0600. Due to void by 1200.  L PIV intact and patent. BP high at 194/84, NP notified, first dose metoprolol administered overnight. Now 164/70

## 2020-08-11 NOTE — PROGRESS NOTES
Patient alert and oriented throughout shift. Denies n/v, pain managed with PO pain meds. Voided x three this shift. Tolerating full liquid diet. Ambulated in hallway with assistance, tolerated well.

## 2020-08-11 NOTE — PLAN OF CARE
SW met with patient at bedside to complete discharge planning assessment.  Patient alert and oriented xs 4.  Patient verified all demographic information on facesheet is correct.  Patient verified PCP is Dr. Meggan Ramachandran.  Patient verified primary health insurance is Medicare and secondary is .  Patient with NO home health or DME.  Patient with POA (Papito Hutton, son) and Living Will.  Patient not on dialysis or medication coumadin.  Patient with no 30 day admission.  Patient with no financial issues at this time.  Patient family will provide transportation upon discharge from facility.  Patient independent with ADLs, live with significant other Anny Knott, drives self.         08/11/20 4457   Discharge Assessment   Assessment Type Discharge Planning Assessment   Confirmed/corrected address and phone number on facesheet? Yes   Assessment information obtained from? Patient   Communicated expected length of stay with patient/caregiver yes   Prior to hospitilization cognitive status: Alert/Oriented   Prior to hospitalization functional status: Independent   Current cognitive status: Alert/Oriented   Current Functional Status: Independent   Lives With significant other   Able to Return to Prior Arrangements yes   Is patient able to care for self after discharge? Yes   Patient's perception of discharge disposition home or selfcare   Readmission Within the Last 30 Days no previous admission in last 30 days   Patient currently being followed by outpatient case management? No   Patient currently receives any other outside agency services? No   Equipment Currently Used at Home none   Do you have any problems affording any of your prescribed medications? No   Does the patient have transportation home? Yes   Does the patient receive services at the [facility]? No   Discharge Plan A Home   Discharge Plan B Home with family   DME Needed Upon Discharge  none   Patient/Family in Agreement with Plan yes

## 2020-08-12 ENCOUNTER — TELEPHONE (OUTPATIENT)
Dept: SURGERY | Facility: CLINIC | Age: 75
End: 2020-08-12

## 2020-08-12 VITALS
WEIGHT: 210 LBS | BODY MASS INDEX: 28.44 KG/M2 | SYSTOLIC BLOOD PRESSURE: 159 MMHG | OXYGEN SATURATION: 92 % | TEMPERATURE: 98 F | RESPIRATION RATE: 18 BRPM | HEART RATE: 67 BPM | DIASTOLIC BLOOD PRESSURE: 70 MMHG | HEIGHT: 72 IN

## 2020-08-12 LAB
ANION GAP SERPL CALC-SCNC: 8 MMOL/L (ref 8–16)
BASOPHILS # BLD AUTO: 0.04 K/UL (ref 0–0.2)
BASOPHILS NFR BLD: 0.4 % (ref 0–1.9)
BUN SERPL-MCNC: 13 MG/DL (ref 8–23)
CALCIUM SERPL-MCNC: 10.2 MG/DL (ref 8.7–10.5)
CHLORIDE SERPL-SCNC: 107 MMOL/L (ref 95–110)
CO2 SERPL-SCNC: 24 MMOL/L (ref 23–29)
CREAT SERPL-MCNC: 1 MG/DL (ref 0.5–1.4)
CRP SERPL-MCNC: 226.07 MG/L (ref 0–3.19)
DIFFERENTIAL METHOD: ABNORMAL
EOSINOPHIL # BLD AUTO: 0.1 K/UL (ref 0–0.5)
EOSINOPHIL NFR BLD: 0.5 % (ref 0–8)
ERYTHROCYTE [DISTWIDTH] IN BLOOD BY AUTOMATED COUNT: 14.5 % (ref 11.5–14.5)
EST. GFR  (AFRICAN AMERICAN): >60 ML/MIN/1.73 M^2
EST. GFR  (NON AFRICAN AMERICAN): >60 ML/MIN/1.73 M^2
GLUCOSE SERPL-MCNC: 109 MG/DL (ref 70–110)
HCT VFR BLD AUTO: 31.8 % (ref 40–54)
HGB BLD-MCNC: 10.5 G/DL (ref 14–18)
IMM GRANULOCYTES # BLD AUTO: 0.05 K/UL (ref 0–0.04)
IMM GRANULOCYTES NFR BLD AUTO: 0.5 % (ref 0–0.5)
LYMPHOCYTES # BLD AUTO: 1.7 K/UL (ref 1–4.8)
LYMPHOCYTES NFR BLD: 16.3 % (ref 18–48)
MCH RBC QN AUTO: 29.8 PG (ref 27–31)
MCHC RBC AUTO-ENTMCNC: 33 G/DL (ref 32–36)
MCV RBC AUTO: 90 FL (ref 82–98)
MONOCYTES # BLD AUTO: 0.6 K/UL (ref 0.3–1)
MONOCYTES NFR BLD: 6.2 % (ref 4–15)
NEUTROPHILS # BLD AUTO: 7.9 K/UL (ref 1.8–7.7)
NEUTROPHILS NFR BLD: 76.1 % (ref 38–73)
NRBC BLD-RTO: 0 /100 WBC
PLATELET # BLD AUTO: 195 K/UL (ref 150–350)
PMV BLD AUTO: 10.7 FL (ref 9.2–12.9)
POCT GLUCOSE: 112 MG/DL (ref 70–110)
POCT GLUCOSE: 125 MG/DL (ref 70–110)
POTASSIUM SERPL-SCNC: 4.4 MMOL/L (ref 3.5–5.1)
RBC # BLD AUTO: 3.52 M/UL (ref 4.6–6.2)
SODIUM SERPL-SCNC: 139 MMOL/L (ref 136–145)
WBC # BLD AUTO: 10.38 K/UL (ref 3.9–12.7)

## 2020-08-12 PROCEDURE — C9113 INJ PANTOPRAZOLE SODIUM, VIA: HCPCS | Performed by: NURSE PRACTITIONER

## 2020-08-12 PROCEDURE — 25000242 PHARM REV CODE 250 ALT 637 W/ HCPCS: Performed by: NURSE PRACTITIONER

## 2020-08-12 PROCEDURE — 25000003 PHARM REV CODE 250: Performed by: NURSE PRACTITIONER

## 2020-08-12 PROCEDURE — 94799 UNLISTED PULMONARY SVC/PX: CPT

## 2020-08-12 PROCEDURE — 63600175 PHARM REV CODE 636 W HCPCS: Performed by: SURGERY

## 2020-08-12 PROCEDURE — 63600175 PHARM REV CODE 636 W HCPCS: Performed by: NURSE PRACTITIONER

## 2020-08-12 PROCEDURE — 86141 C-REACTIVE PROTEIN HS: CPT

## 2020-08-12 PROCEDURE — 80048 BASIC METABOLIC PNL TOTAL CA: CPT

## 2020-08-12 PROCEDURE — 25000003 PHARM REV CODE 250: Performed by: SURGERY

## 2020-08-12 PROCEDURE — 94761 N-INVAS EAR/PLS OXIMETRY MLT: CPT

## 2020-08-12 PROCEDURE — 85025 COMPLETE CBC W/AUTO DIFF WBC: CPT

## 2020-08-12 RX ORDER — HYDROCHLOROTHIAZIDE 12.5 MG/1
12.5 TABLET ORAL EVERY OTHER DAY
Qty: 90 TABLET | Refills: 1 | Status: SHIPPED | OUTPATIENT
Start: 2020-08-12 | End: 2020-09-17 | Stop reason: ALTCHOICE

## 2020-08-12 RX ORDER — TESTOSTERONE 10 MG/.5G
20 GEL, METERED TOPICAL DAILY
Qty: 120 BOTTLE | Refills: 3
Start: 2020-08-19 | End: 2021-02-05 | Stop reason: SDUPTHER

## 2020-08-12 RX ORDER — MUPIROCIN 20 MG/G
OINTMENT TOPICAL 2 TIMES DAILY
Qty: 1 TUBE | Refills: 0 | Status: SHIPPED | OUTPATIENT
Start: 2020-08-12 | End: 2020-08-17

## 2020-08-12 RX ORDER — OXYCODONE AND ACETAMINOPHEN 5; 325 MG/1; MG/1
1 TABLET ORAL EVERY 4 HOURS PRN
Qty: 30 TABLET | Refills: 0 | Status: SHIPPED | OUTPATIENT
Start: 2020-08-12 | End: 2021-05-04

## 2020-08-12 RX ORDER — METOCLOPRAMIDE 10 MG/1
10 TABLET ORAL
Qty: 10 TABLET | Refills: 0 | Status: SHIPPED | OUTPATIENT
Start: 2020-08-12 | End: 2021-05-04

## 2020-08-12 RX ORDER — FLUTICASONE PROPIONATE 50 MCG
2 SPRAY, SUSPENSION (ML) NASAL DAILY
Status: DISCONTINUED | OUTPATIENT
Start: 2020-08-12 | End: 2020-08-12 | Stop reason: HOSPADM

## 2020-08-12 RX ORDER — FAMOTIDINE 20 MG/1
20 TABLET, FILM COATED ORAL 2 TIMES DAILY
Qty: 60 TABLET | Refills: 0 | Status: SHIPPED | OUTPATIENT
Start: 2020-08-12 | End: 2021-05-04

## 2020-08-12 RX ORDER — DEXTROMETHORPHAN HYDROBROMIDE, GUAIFENESIN 5; 100 MG/5ML; MG/5ML
650 LIQUID ORAL EVERY 8 HOURS PRN
Refills: 0
Start: 2020-08-12

## 2020-08-12 RX ORDER — IBUPROFEN 200 MG
600 TABLET ORAL EVERY 8 HOURS PRN
Start: 2020-08-12 | End: 2021-02-15

## 2020-08-12 RX ORDER — GABAPENTIN 300 MG/1
300 CAPSULE ORAL 3 TIMES DAILY
Qty: 90 CAPSULE | Refills: 0 | Status: SHIPPED | OUTPATIENT
Start: 2020-08-12 | End: 2022-07-06

## 2020-08-12 RX ADMIN — FLUTICASONE PROPIONATE 100 MCG: 50 SPRAY, METERED NASAL at 07:08

## 2020-08-12 RX ADMIN — SODIUM CHLORIDE, SODIUM LACTATE, POTASSIUM CHLORIDE, AND CALCIUM CHLORIDE: .6; .31; .03; .02 INJECTION, SOLUTION INTRAVENOUS at 05:08

## 2020-08-12 RX ADMIN — METOCLOPRAMIDE HYDROCHLORIDE 10 MG: 10 INJECTION, SOLUTION INTRAMUSCULAR; INTRAVENOUS at 05:08

## 2020-08-12 RX ADMIN — IBUPROFEN 600 MG: 600 TABLET ORAL at 12:08

## 2020-08-12 RX ADMIN — IBUPROFEN 600 MG: 600 TABLET ORAL at 08:08

## 2020-08-12 RX ADMIN — FAMOTIDINE 20 MG: 20 TABLET ORAL at 08:08

## 2020-08-12 RX ADMIN — MUPIROCIN: 20 OINTMENT TOPICAL at 08:08

## 2020-08-12 RX ADMIN — METOCLOPRAMIDE HYDROCHLORIDE 10 MG: 10 INJECTION, SOLUTION INTRAMUSCULAR; INTRAVENOUS at 12:08

## 2020-08-12 RX ADMIN — ACETAMINOPHEN 1000 MG: 10 INJECTION, SOLUTION INTRAVENOUS at 05:08

## 2020-08-12 RX ADMIN — ASPIRIN 81 MG 81 MG: 81 TABLET ORAL at 08:08

## 2020-08-12 RX ADMIN — METOPROLOL SUCCINATE 25 MG: 25 TABLET, FILM COATED, EXTENDED RELEASE ORAL at 08:08

## 2020-08-12 RX ADMIN — GABAPENTIN 300 MG: 300 CAPSULE ORAL at 08:08

## 2020-08-12 RX ADMIN — PANTOPRAZOLE SODIUM 40 MG: 40 INJECTION, POWDER, LYOPHILIZED, FOR SOLUTION INTRAVENOUS at 08:08

## 2020-08-12 NOTE — CARE UPDATE
08/11/20 1852   Patient Assessment/Suction   Level of Consciousness (AVPU) alert   PRE-TX-O2   O2 Device (Oxygen Therapy) room air   SpO2 95 %   Pulse Oximetry Type Intermittent   Pulse 60   Resp 18   Incentive Spirometer   $ Incentive Spirometer Charges done with encouragement   Administration (IS) proper technique demonstrated   Number of Repetitions (IS) 10   Level Incentive Spirometer (mL) 1250   Patient Tolerance (IS) good

## 2020-08-12 NOTE — NURSING
PIV removed, catheter intact. AVS reviewed and discussed. All questions answered.  Pt verbalized understanding. No redness, no swelling noted to surgical site. Steri strips in place. Island border placed over site. Pt reports pain 2/10 at this time. Pt ambulating in room without difficulty. No distress noted. Pt left floor via wheelchair with Jazmyn plascencia.

## 2020-08-12 NOTE — TELEPHONE ENCOUNTER
----- Message from Maria Teresa Ghosh sent at 8/12/2020 12:30 PM CDT -----  Contact: Patient  Requesting a follow up appt after surgery.    Please call and advise.    Thank You

## 2020-08-12 NOTE — PLAN OF CARE
POC discussed with pt, pt verbalized understanding. Oriented x4. PIV cdi, infusing LR @75. Incision to the abdomen moderate dried bloody drainage, no new drainage. 2 lap sites covered with bandages. Pt tolerating full liquid diet with no complaints. Teds in place, SCD's refused. Blood glucose monitored, no insulin needed. Pt states that pain remains at a 2. Pt is passing gas. Urinal within reach. Call light in reach, bed alarm set, safety maintained. No complaints or requests at this time, will continue to monitor.

## 2020-08-12 NOTE — TELEPHONE ENCOUNTER
I called patient and scheduled him on Tuesday, 8/18/20 at 1:15pm in [facility].  Juan

## 2020-08-12 NOTE — PLAN OF CARE
Pt is cleared from  for D/C.       08/12/20 2788   Final Note   Assessment Type Final Discharge Note   Anticipated Discharge Disposition Home   Hospital Follow Up  Appt(s) scheduled? Yes

## 2020-08-13 LAB
FINAL PATHOLOGIC DIAGNOSIS: NORMAL
GROSS: NORMAL

## 2020-08-13 NOTE — DISCHARGE SUMMARY
[facility] Hospital Medicine  Discharge Summary    Patient Name: Papito Hutton  MRN: [id number]  Admission Date: 8/10/2020  Hospital Length of Stay: 2 days  Discharge Date and Time: 8/12/2020  2:26 PM  Attending Physician: Nancy att. 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 Today patient underwent surgical removal done by Dr. Kaye. Patient had a Laparoscopic Right hemicolectomy with conversion to open Right hemicolectomy with Ileocolic anastomosis performed.   Patient tolerated procedure well and currently post procedure.    Procedure(s) (LRB):  COLECTOMY, RIGHT, LAPAROSCOPIC pooss conversion to open (Right)  HEMICOLECTOMY, RIGHT (N/A)      Hospital Course:   The patient was monitored closely during his stay.  He was placed on clear liquid diet.  Patient was encouraged to increase activity as tolerated.  The patient tolerated clear Liquid diet well and his diet was advanced as tolerated. His overall condition remained stable and improved and he was discharge to home once cleared by surgeon.     Consults: Surgeon- Dr. Kaye    Service: Hospital Medicine    Final Active Diagnoses:    Diagnosis Date Noted POA    PRINCIPAL PROBLEM:  Colon polyp [K63.5] 08/10/2020 Yes    Type 2 diabetes mellitus, without long-term current use of insulin [E11.9] 08/10/2020 Yes    Hyperlipidemia [E78.5]  Yes    Essential hypertension [I10]  Yes    GERD (gastroesophageal reflux disease) [K21.9]  Yes      Problems Resolved During this Admission:     Discharged Condition: stable    Disposition: Home-Health Care [facility]    Follow Up:  Follow-up Information     Noble Kaye MD In  2 weeks.    Specialties: General Surgery, Colon and Rectal Surgery, Surgery  Why: Cm spoke with Maria Teresa in scheduling. She sent a message to the nurse to schedule and notify pt of appointment  Contact information:  [address]  [address]  [place] [address] [phone]             Rosemary Curry DNP In 2 weeks.    Specialties: Family Medicine, Emergency Medicine  Why: hosp f/u apt in avs  Contact information:  [address] [place] [address] [phone]                 Patient Instructions:      Diet Cardiac   Order Comments: Soft     Notify your health care provider if you experience any of the following:  redness, tenderness, or signs of infection (pain, swelling, redness, odor or green/yellow discharge around incision site)     Notify your health care provider if you experience any of the following:  severe uncontrolled pain     Notify your health care provider if you experience any of the following:  persistent nausea and vomiting or diarrhea     Notify your health care provider if you experience any of the following:  temperature >100.4     Notify your health care provider if you experience any of the following:   Order Comments: Any decline in condition     Change dressing (specify)   Order Comments: Wound care as per surgeon     Activity as tolerated   Order Comments: No heavy lifting, no strenuous activity    No driving till cleared by Surgeon       Significant Diagnostic Studies:    CMP   Recent Labs   Lab 08/11/20  0558 08/12/20  0447    139   K 4.6 4.4    107   CO2 23 24   * 109   BUN 13 13   CREATININE 0.9 1.0   CALCIUM 9.2 10.2   ANIONGAP 7* 8   ESTGFRAFRICA >60 >60   EGFRNONAA >60 >60   CBC   Recent Labs   Lab 08/11/20  0558 08/12/20  0447   WBC 9.81 10.38   HGB 11.1* 10.5*   HCT 33.6* 31.8*    195       Pending Diagnostic Studies:     Procedure Component Value Units Date/Time    Specimen to Pathology, Surgery General Surgery [[id number]] Collected: 08/10/20 0267     Order Status: Sent Lab Status: In process Updated: 08/10/20 1782         Medications:  Reconciled Home Medications:      Medication List      START taking these medications    famotidine 20 MG tablet  Commonly known as: PEPCID  Take 1 tablet (20 mg total) by mouth 2 (two) times daily.     gabapentin 300 MG capsule  Commonly known as: NEURONTIN  Take 1 capsule (300 mg total) by mouth 3 (three) times daily.     ibuprofen 200 MG tablet  Commonly known as: ADVIL,MOTRIN  Take 3 tablets (600 mg total) by mouth every 8 (eight) hours as needed for Pain (Take with food).     metoclopramide HCl 10 MG tablet  Commonly known as: REGLAN  Take 1 tablet (10 mg total) by mouth 3 (three) times daily before meals.     mupirocin 2 % ointment  Commonly known as: BACTROBAN  by Nasal route 2 (two) times daily. for 5 days     oxyCODONE-acetaminophen 5-325 mg per tablet  Commonly known as: PERCOCET  Take 1 tablet by mouth every 4 (four) hours as needed for Pain.        CHANGE how you take these medications    acetaminophen 650 MG Tbsr  Commonly known as: TYLENOL  Take 1 tablet (650 mg total) by mouth every 8 (eight) hours as needed (do not take with any other tylenol or acetaminophen).  What changed: reasons to take this     fluticasone propionate 50 mcg/actuation nasal spray  Commonly known as: FLONASE  USE 2 SPRAYS IN EACH NOSTRIL ONCE DAILY  What changed:   · how much to take  · how to take this  · when to take this  · additional instructions     hydroCHLOROthiazide 12.5 MG Tab  Commonly known as: HYDRODIURIL  Take 1 tablet (12.5 mg total) by mouth every other day.  What changed: when to take this     testosterone 10 mg/0.5 gram /actuation Glpm  Commonly known as: FORTESTA  Place 20 mg onto the skin once daily. Apply 2 pump actuations topically daily.  Start taking on: August 19, 2020  What changed: These instructions start on August 19, 2020. If you are unsure what to do until then, ask your doctor or other care provider.         CONTINUE taking these medications    amLODIPine-benazepriL 10-40 mg per capsule  Commonly known as: LOTREL  Take 1 capsule by mouth once daily.     aspirin 81 MG Chew  Take 1 tablet (81 mg total) by mouth once daily.     * diclofenac sodium 1 % Gel  Commonly known as: VOLTAREN  Apply 2 g topically once daily.     * diclofenac 75 MG EC tablet  Commonly known as: VOLTAREN  Take 1 tablet (75 mg total) by mouth daily as needed (pain).     esomeprazole 40 MG capsule  Commonly known as: NexIUM  Take 1 capsule (40 mg total) by mouth before breakfast.     metFORMIN 500 MG tablet  Commonly known as: GLUCOPHAGE  Take 1 tablet (500 mg total) by mouth 2 (two) times daily with meals.     rosuvastatin 20 MG tablet  Commonly known as: CRESTOR  TAKE 1 TABLET DAILY     TOPROL XL 25 MG 24 hr tablet  Generic drug: metoprolol succinate  TAKE 1 TABLET DAILY     VITAMIN B-12 1000 MCG tablet  Generic drug: cyanocobalamin  Take 100 mcg by mouth once daily.     VITAMIN D3 ORAL  Take 1 capsule by mouth once daily.         * This list has 2 medication(s) that are the same as other medications prescribed for you. Read the directions carefully, and ask your doctor or other care provider to review them with you.                Indwelling Lines/Drains at time of discharge:   Lines/Drains/Airways     None                 Time spent on the discharge of patient: 54 minutes  Patient was seen and examined on the date of discharge and determined to be suitable for discharge.      Susanne Solares, JOSÉ MIGUEL  Department of Hospital Medicine  [facility]

## 2020-08-14 ENCOUNTER — HOSPITAL ENCOUNTER (INPATIENT)
Facility: HOSPITAL | Age: 75
LOS: 3 days | Discharge: HOME | DRG: 389 | End: 2020-08-17
Attending: EMERGENCY MEDICINE | Admitting: INTERNAL MEDICINE
Payer: MEDICARE

## 2020-08-14 ENCOUNTER — PATIENT OUTREACH (OUTPATIENT)
Dept: ADMINISTRATIVE | Facility: CLINIC | Age: 75
End: 2020-08-14

## 2020-08-14 ENCOUNTER — TELEPHONE (OUTPATIENT)
Dept: SURGERY | Facility: CLINIC | Age: 75
End: 2020-08-14

## 2020-08-14 DIAGNOSIS — K56.609 SBO (SMALL BOWEL OBSTRUCTION): Primary | ICD-10-CM

## 2020-08-14 LAB
ALBUMIN SERPL BCP-MCNC: 3 G/DL (ref 3.5–5.2)
ALP SERPL-CCNC: 87 U/L (ref 55–135)
ALT SERPL W/O P-5'-P-CCNC: 19 U/L (ref 10–44)
ANION GAP SERPL CALC-SCNC: 11 MMOL/L (ref 8–16)
AST SERPL-CCNC: 14 U/L (ref 10–40)
BASOPHILS # BLD AUTO: 0.04 K/UL (ref 0–0.2)
BASOPHILS NFR BLD: 0.5 % (ref 0–1.9)
BILIRUB SERPL-MCNC: 1 MG/DL (ref 0.1–1)
BUN SERPL-MCNC: 16 MG/DL (ref 8–23)
CALCIUM SERPL-MCNC: 11.2 MG/DL (ref 8.7–10.5)
CHLORIDE SERPL-SCNC: 100 MMOL/L (ref 95–110)
CO2 SERPL-SCNC: 30 MMOL/L (ref 23–29)
CREAT SERPL-MCNC: 1 MG/DL (ref 0.5–1.4)
DIFFERENTIAL METHOD: ABNORMAL
EOSINOPHIL # BLD AUTO: 0 K/UL (ref 0–0.5)
EOSINOPHIL NFR BLD: 0.1 % (ref 0–8)
ERYTHROCYTE [DISTWIDTH] IN BLOOD BY AUTOMATED COUNT: 13.6 % (ref 11.5–14.5)
EST. GFR  (AFRICAN AMERICAN): >60 ML/MIN/1.73 M^2
EST. GFR  (NON AFRICAN AMERICAN): >60 ML/MIN/1.73 M^2
GLUCOSE SERPL-MCNC: 137 MG/DL (ref 70–110)
HCT VFR BLD AUTO: 39.2 % (ref 40–54)
HGB BLD-MCNC: 12.9 G/DL (ref 14–18)
IMM GRANULOCYTES # BLD AUTO: 0.03 K/UL (ref 0–0.04)
IMM GRANULOCYTES NFR BLD AUTO: 0.3 % (ref 0–0.5)
LACTATE SERPL-SCNC: 1 MMOL/L (ref 0.5–2.2)
LIPASE SERPL-CCNC: 9 U/L (ref 4–60)
LYMPHOCYTES # BLD AUTO: 1.5 K/UL (ref 1–4.8)
LYMPHOCYTES NFR BLD: 17.4 % (ref 18–48)
MCH RBC QN AUTO: 29.4 PG (ref 27–31)
MCHC RBC AUTO-ENTMCNC: 32.9 G/DL (ref 32–36)
MCV RBC AUTO: 89 FL (ref 82–98)
MONOCYTES # BLD AUTO: 0.8 K/UL (ref 0.3–1)
MONOCYTES NFR BLD: 9 % (ref 4–15)
NEUTROPHILS # BLD AUTO: 6.3 K/UL (ref 1.8–7.7)
NEUTROPHILS NFR BLD: 72.7 % (ref 38–73)
NRBC BLD-RTO: 0 /100 WBC
PLATELET # BLD AUTO: 322 K/UL (ref 150–350)
PLATELET BLD QL SMEAR: ABNORMAL
PMV BLD AUTO: 11 FL (ref 9.2–12.9)
POCT GLUCOSE: 142 MG/DL (ref 70–110)
POTASSIUM SERPL-SCNC: 4.3 MMOL/L (ref 3.5–5.1)
PROT SERPL-MCNC: 6.6 G/DL (ref 6–8.4)
RBC # BLD AUTO: 4.39 M/UL (ref 4.6–6.2)
SARS-COV-2 RDRP RESP QL NAA+PROBE: NEGATIVE
SODIUM SERPL-SCNC: 141 MMOL/L (ref 136–145)
WBC # BLD AUTO: 8.66 K/UL (ref 3.9–12.7)

## 2020-08-14 PROCEDURE — 96374 THER/PROPH/DIAG INJ IV PUSH: CPT

## 2020-08-14 PROCEDURE — 85025 COMPLETE CBC W/AUTO DIFF WBC: CPT

## 2020-08-14 PROCEDURE — 12000002 HC ACUTE/MED SURGE SEMI-PRIVATE ROOM

## 2020-08-14 PROCEDURE — 36415 COLL VENOUS BLD VENIPUNCTURE: CPT

## 2020-08-14 PROCEDURE — 80053 COMPREHEN METABOLIC PANEL: CPT

## 2020-08-14 PROCEDURE — 25000003 PHARM REV CODE 250: Performed by: NURSE PRACTITIONER

## 2020-08-14 PROCEDURE — U0002 COVID-19 LAB TEST NON-CDC: HCPCS

## 2020-08-14 PROCEDURE — 83605 ASSAY OF LACTIC ACID: CPT

## 2020-08-14 PROCEDURE — 25000003 PHARM REV CODE 250: Performed by: EMERGENCY MEDICINE

## 2020-08-14 PROCEDURE — 83690 ASSAY OF LIPASE: CPT

## 2020-08-14 PROCEDURE — 99900035 HC TECH TIME PER 15 MIN (STAT)

## 2020-08-14 PROCEDURE — 63600175 PHARM REV CODE 636 W HCPCS: Performed by: EMERGENCY MEDICINE

## 2020-08-14 PROCEDURE — 99285 EMERGENCY DEPT VISIT HI MDM: CPT | Mod: 25

## 2020-08-14 PROCEDURE — 25500020 PHARM REV CODE 255: Performed by: EMERGENCY MEDICINE

## 2020-08-14 PROCEDURE — 63600175 PHARM REV CODE 636 W HCPCS: Performed by: NURSE PRACTITIONER

## 2020-08-14 PROCEDURE — 96376 TX/PRO/DX INJ SAME DRUG ADON: CPT

## 2020-08-14 RX ORDER — ONDANSETRON 2 MG/ML
4 INJECTION INTRAMUSCULAR; INTRAVENOUS EVERY 6 HOURS PRN
Status: DISCONTINUED | OUTPATIENT
Start: 2020-08-14 | End: 2020-08-17 | Stop reason: HOSPADM

## 2020-08-14 RX ORDER — SODIUM CHLORIDE 9 MG/ML
INJECTION, SOLUTION INTRAVENOUS CONTINUOUS
Status: DISCONTINUED | OUTPATIENT
Start: 2020-08-14 | End: 2020-08-17

## 2020-08-14 RX ORDER — ONDANSETRON 2 MG/ML
4 INJECTION INTRAMUSCULAR; INTRAVENOUS
Status: COMPLETED | OUTPATIENT
Start: 2020-08-14 | End: 2020-08-14

## 2020-08-14 RX ORDER — INSULIN ASPART 100 [IU]/ML
0-5 INJECTION, SOLUTION INTRAVENOUS; SUBCUTANEOUS
Status: DISCONTINUED | OUTPATIENT
Start: 2020-08-14 | End: 2020-08-17 | Stop reason: HOSPADM

## 2020-08-14 RX ORDER — IBUPROFEN 200 MG
24 TABLET ORAL
Status: DISCONTINUED | OUTPATIENT
Start: 2020-08-14 | End: 2020-08-17 | Stop reason: HOSPADM

## 2020-08-14 RX ORDER — ACETAMINOPHEN 325 MG/1
650 TABLET ORAL EVERY 4 HOURS PRN
Status: DISCONTINUED | OUTPATIENT
Start: 2020-08-14 | End: 2020-08-17 | Stop reason: HOSPADM

## 2020-08-14 RX ORDER — IPRATROPIUM BROMIDE AND ALBUTEROL SULFATE 2.5; .5 MG/3ML; MG/3ML
3 SOLUTION RESPIRATORY (INHALATION) EVERY 6 HOURS PRN
Status: DISCONTINUED | OUTPATIENT
Start: 2020-08-14 | End: 2020-08-17 | Stop reason: HOSPADM

## 2020-08-14 RX ORDER — SODIUM CHLORIDE 9 MG/ML
1000 INJECTION, SOLUTION INTRAVENOUS
Status: COMPLETED | OUTPATIENT
Start: 2020-08-14 | End: 2020-08-14

## 2020-08-14 RX ORDER — KETOROLAC TROMETHAMINE 30 MG/ML
15 INJECTION, SOLUTION INTRAMUSCULAR; INTRAVENOUS EVERY 6 HOURS PRN
Status: DISCONTINUED | OUTPATIENT
Start: 2020-08-14 | End: 2020-08-17 | Stop reason: HOSPADM

## 2020-08-14 RX ORDER — SODIUM CHLORIDE 0.9 % (FLUSH) 0.9 %
10 SYRINGE (ML) INJECTION
Status: DISCONTINUED | OUTPATIENT
Start: 2020-08-14 | End: 2020-08-17 | Stop reason: HOSPADM

## 2020-08-14 RX ORDER — IBUPROFEN 200 MG
16 TABLET ORAL
Status: DISCONTINUED | OUTPATIENT
Start: 2020-08-14 | End: 2020-08-17 | Stop reason: HOSPADM

## 2020-08-14 RX ORDER — GLUCAGON 1 MG
1 KIT INJECTION
Status: DISCONTINUED | OUTPATIENT
Start: 2020-08-14 | End: 2020-08-17 | Stop reason: HOSPADM

## 2020-08-14 RX ADMIN — SODIUM CHLORIDE: 0.9 INJECTION, SOLUTION INTRAVENOUS at 10:08

## 2020-08-14 RX ADMIN — ONDANSETRON 4 MG: 2 INJECTION INTRAMUSCULAR; INTRAVENOUS at 06:08

## 2020-08-14 RX ADMIN — SODIUM CHLORIDE 1000 ML: 0.9 INJECTION, SOLUTION INTRAVENOUS at 04:08

## 2020-08-14 RX ADMIN — PIPERACILLIN AND TAZOBACTAM 4.5 G: 4; .5 INJECTION, POWDER, FOR SOLUTION INTRAVENOUS at 08:08

## 2020-08-14 RX ADMIN — ONDANSETRON 4 MG: 2 INJECTION INTRAMUSCULAR; INTRAVENOUS at 04:08

## 2020-08-14 RX ADMIN — IOHEXOL 100 ML: 350 INJECTION, SOLUTION INTRAVENOUS at 05:08

## 2020-08-14 NOTE — TELEPHONE ENCOUNTER
Spoke to pt, c/o vomiting since last PM, no nausea, no fever, pain is well controlled. Has held off anything P.O.  for fear of vomiting. States he is going to try some soup this AM I instructed to take his Reglan as well. Notified Dr. Kaye.

## 2020-08-14 NOTE — PROGRESS NOTES
C3 nurse attempted to contact patient. The following occurred:   C3 nurse attempted to contact Papito for a TCC post hospital discharge follow up call. The patient is unable to conduct the call @ this time. The patient requested a callback.    The patient has a scheduled HOSFU appointment with Rosemary Curry DNP on 8/26/20 @ 10 am. Message sent to Physician staff.

## 2020-08-14 NOTE — ED PROVIDER NOTES
Encounter Date: 8/14/2020    SCRIBE #1 NOTE: I, Paulette Martínez, am scribing for, and in the presence of, Dr. Rodriguez.       History     Chief Complaint   Patient presents with    Abdominal Pain     Had right hemicolectomy Monday with Dr. Kaye    Emesis       Time seen by provider: 4:23 PM on 08/14/2020    Papito Hutton is a 75 y.o. male with PMHx of HLD, HTN, DM II and colonic polyps who presents to the ED with an onset of constant vomiting for 1 day.   Patient was last seen on 08/10/2020 for a right hemicolectomy.  He notes the swelling to his abdomen has improved since the surgery.  The patient denies diarrhea, fever, CP, coughing, being able to eat, passing gas or any other symptoms at this time.  PSHx includes right colon resection and right hemicolectomy.      The history is provided by the patient.     Review of patient's allergies indicates:  No Known Allergies  Past Medical History:   Diagnosis Date    Arthritis     B12 deficiency     Colon polyp     Diabetes mellitus     BORDERLINE    GERD (gastroesophageal reflux disease)     [place] (hard of hearing)     BILAT AIDS    Hyperlipidemia     Hypertension     Low testosterone     Personal history of colonic polyps 2008    adenomatous polyp    Sinus disorder     Sleep apnea     DOES NOT USE MACHINE    Wears glasses      Past Surgical History:   Procedure Laterality Date    COLONOSCOPY  2008    Dr. Garcia; polyps removed    COLONOSCOPY N/A 7/13/2020    Procedure: COLONOSCOPY;  Surgeon: Jj Fried MD;  [facility];  Service: Endoscopy;  Laterality: N/A;    LAPAROSCOPIC RIGHT COLON RESECTION Right 8/10/2020    Procedure: COLECTOMY, RIGHT, LAPAROSCOPIC pooss conversion to open;  Surgeon: Noble Kaye MD;  Location: [facility] OR;  Service: General;  Laterality: Right;    NASAL SEPTUM SURGERY      right hand surgery      RIGHT HEMICOLECTOMY N/A 8/10/2020    Procedure: HEMICOLECTOMY, RIGHT;  Surgeon: Noble Kaye MD;  Location:  [facility] OR;  Service: General;  Laterality: N/A;    UPPER GASTROINTESTINAL ENDOSCOPY  2008    hiatal hernia; esophagitis     Family History   Problem Relation Age of Onset    Cancer Neg Hx     Diabetes Neg Hx     Heart disease Neg Hx      Social History     Tobacco Use    Smoking status: Current Some Day Smoker     Types: Cigars    Smokeless tobacco: Former User    Tobacco comment: smoke 1 cigar every couple of months   Substance Use Topics    Alcohol use: Yes     Alcohol/week: 2.0 standard drinks     Types: 2 Cans of beer per week    Drug use: No     Review of Systems   Constitutional: Positive for appetite change. Negative for fever.   HENT: Negative for sore throat.    Respiratory: Negative for cough and shortness of breath.    Cardiovascular: Negative for chest pain.   Gastrointestinal: Positive for nausea and vomiting. Negative for diarrhea.   Genitourinary: Negative for dysuria.   Musculoskeletal: Negative for back pain.   Skin: Negative for rash.   Neurological: Negative for weakness.   Hematological: Does not bruise/bleed easily.       Physical Exam     Initial Vitals [08/14/20 1612]   BP Pulse Resp Temp SpO2   (!) 161/74 101 18 98.3 °F (36.8 °C) (!) 94 %      MAP       --         Physical Exam    Nursing note and vitals reviewed.  Constitutional: He appears well-developed and well-nourished. He is not diaphoretic. No distress.   HENT:   Head: Normocephalic and atraumatic.   Eyes: EOM are normal. Pupils are equal, round, and reactive to light.   Neck: Normal range of motion. Neck supple.   Cardiovascular: Normal rate, regular rhythm, normal heart sounds and intact distal pulses. Exam reveals no gallop and no friction rub.    No murmur heard.  Pulmonary/Chest: Breath sounds normal. No respiratory distress. He has no wheezes. He has no rhonchi. He has no rales.   Abdominal: Soft. Bowel sounds are normal. He exhibits distension. There is no abdominal tenderness. There is no rebound and no guarding.    Patient has a midline incision with ecchymosis, no drainage, non-tender.   Musculoskeletal: Normal range of motion.   Neurological: He is alert and oriented to person, place, and time.   Skin: Skin is warm.   Psychiatric: He has a normal mood and affect. His behavior is normal. Judgment and thought content normal.         ED Course   Procedures  Labs Reviewed   COMPREHENSIVE METABOLIC PANEL   CBC W/ AUTO DIFFERENTIAL   LIPASE   URINALYSIS, REFLEX TO URINE CULTURE          Imaging Results    None          Medical Decision Making:   History:   Old Medical Records: I decided to obtain old medical records.  Initial Assessment:   75-year-old male presented with vomiting and abdominal distension.  Differential Diagnosis:   My initial differential diagnoses include but are not limited to: ileus, bowel obstruction, postoperative vomiting.  Clinical Tests:   Lab Tests: Ordered and Reviewed  Radiological Study: Reviewed and Ordered  ED Management:  The patient was emergently evaluated in the emergency department, his evaluation was significant for an elderly male with a distended abdomen.  The patient's recent surgical site appears to be without infection at this time.  The patient's labs showed no acute abnormalities.  The patient's CT scan was significant for a high-grade small bowel obstruction and a possible abscess per Radiology.  I discussed the case with the patient's general surgeon, Dr. Kaye.  He recommended placing an NG tube in the patient and admitting the patient to the hospitalist service.  He reports the patient will be followed by general surgery in consultation as well.  The case was discussed with the hospitalist service.  They have accepted the patient for admission.            Scribe Attestation:   Scribe #1: I performed the above scribed service and the documentation accurately describes the services I performed. I attest to the accuracy of the note.               I, Dr. Noman Rodriguez, personally  performed the services described in this documentation. All medical record entries made by the scribe were at my direction and in my presence.  I have reviewed the chart and agree that the record reflects my personal performance and is accurate and complete. Noman Rodriguez MD.  6:48 PM 08/14/2020             Clinical Impression:       ICD-10-CM ICD-9-CM   1. SBO (small bowel obstruction)  K56.609 560.9         Disposition:   Disposition: Admitted                        Noman Rodriguez MD  08/14/20 8757

## 2020-08-14 NOTE — ED NOTES
S/p hemicolectomy on Monday. Started vomiting at 2200 last night, describes emesis yellow bile like. States he has vomited over 20 times since last night. Unable to tolerate PO. LBM 2 hrs ago, small amount, yellow in color. Abd visibly distended. Denies fever. Has midline abd incision with dressing in place. Unable to visualize at this time. States had procedure bc had a colonoscopy but they could not reach the polyp.

## 2020-08-14 NOTE — TELEPHONE ENCOUNTER
Spoke to pt, informed that Dr Kaye states if this vomiting with P.O. intake persists go to ER do not wait to f/u on Tuesday. He states that he will call me if this does not resolve or worsens and will go to ER today.

## 2020-08-15 PROBLEM — E87.6 HYPOKALEMIA: Status: ACTIVE | Noted: 2020-08-15

## 2020-08-15 LAB
ALBUMIN SERPL BCP-MCNC: 2.7 G/DL (ref 3.5–5.2)
ALP SERPL-CCNC: 96 U/L (ref 55–135)
ALT SERPL W/O P-5'-P-CCNC: 20 U/L (ref 10–44)
ANION GAP SERPL CALC-SCNC: 9 MMOL/L (ref 8–16)
AST SERPL-CCNC: 17 U/L (ref 10–40)
BACTERIA #/AREA URNS HPF: NORMAL /HPF
BASOPHILS # BLD AUTO: 0.03 K/UL (ref 0–0.2)
BASOPHILS NFR BLD: 0.5 % (ref 0–1.9)
BILIRUB SERPL-MCNC: 1.3 MG/DL (ref 0.1–1)
BILIRUB UR QL STRIP: ABNORMAL
BUN SERPL-MCNC: 19 MG/DL (ref 8–23)
CALCIUM SERPL-MCNC: 10.6 MG/DL (ref 8.7–10.5)
CHLORIDE SERPL-SCNC: 102 MMOL/L (ref 95–110)
CLARITY UR: CLEAR
CO2 SERPL-SCNC: 31 MMOL/L (ref 23–29)
COLOR UR: YELLOW
CREAT SERPL-MCNC: 1 MG/DL (ref 0.5–1.4)
DIFFERENTIAL METHOD: ABNORMAL
EOSINOPHIL # BLD AUTO: 0 K/UL (ref 0–0.5)
EOSINOPHIL NFR BLD: 0.3 % (ref 0–8)
ERYTHROCYTE [DISTWIDTH] IN BLOOD BY AUTOMATED COUNT: 13.8 % (ref 11.5–14.5)
EST. GFR  (AFRICAN AMERICAN): >60 ML/MIN/1.73 M^2
EST. GFR  (NON AFRICAN AMERICAN): >60 ML/MIN/1.73 M^2
GLUCOSE SERPL-MCNC: 148 MG/DL (ref 70–110)
GLUCOSE UR QL STRIP: NEGATIVE
HCT VFR BLD AUTO: 34.2 % (ref 40–54)
HGB BLD-MCNC: 10.7 G/DL (ref 14–18)
HGB UR QL STRIP: NEGATIVE
HYALINE CASTS #/AREA URNS LPF: 0 /LPF
IMM GRANULOCYTES # BLD AUTO: 0.01 K/UL (ref 0–0.04)
IMM GRANULOCYTES NFR BLD AUTO: 0.2 % (ref 0–0.5)
KETONES UR QL STRIP: ABNORMAL
LEUKOCYTE ESTERASE UR QL STRIP: NEGATIVE
LYMPHOCYTES # BLD AUTO: 1.1 K/UL (ref 1–4.8)
LYMPHOCYTES NFR BLD: 17.1 % (ref 18–48)
MAGNESIUM SERPL-MCNC: 2 MG/DL (ref 1.6–2.6)
MCH RBC QN AUTO: 28.5 PG (ref 27–31)
MCHC RBC AUTO-ENTMCNC: 31.3 G/DL (ref 32–36)
MCV RBC AUTO: 91 FL (ref 82–98)
MICROSCOPIC COMMENT: NORMAL
MONOCYTES # BLD AUTO: 0.8 K/UL (ref 0.3–1)
MONOCYTES NFR BLD: 12.8 % (ref 4–15)
NEUTROPHILS # BLD AUTO: 4.3 K/UL (ref 1.8–7.7)
NEUTROPHILS NFR BLD: 69.1 % (ref 38–73)
NITRITE UR QL STRIP: NEGATIVE
NRBC BLD-RTO: 0 /100 WBC
PH UR STRIP: 7 [PH] (ref 5–8)
PHOSPHATE SERPL-MCNC: 2.6 MG/DL (ref 2.7–4.5)
PLATELET # BLD AUTO: 271 K/UL (ref 150–350)
PMV BLD AUTO: 10 FL (ref 9.2–12.9)
POCT GLUCOSE: 102 MG/DL (ref 70–110)
POCT GLUCOSE: 125 MG/DL (ref 70–110)
POCT GLUCOSE: 129 MG/DL (ref 70–110)
POCT GLUCOSE: 90 MG/DL (ref 70–110)
POTASSIUM SERPL-SCNC: 3.3 MMOL/L (ref 3.5–5.1)
PROT SERPL-MCNC: 6 G/DL (ref 6–8.4)
PROT UR QL STRIP: ABNORMAL
RBC # BLD AUTO: 3.76 M/UL (ref 4.6–6.2)
RBC #/AREA URNS HPF: 1 /HPF (ref 0–4)
SODIUM SERPL-SCNC: 142 MMOL/L (ref 136–145)
SP GR UR STRIP: 1.02 (ref 1–1.03)
URN SPEC COLLECT METH UR: ABNORMAL
UROBILINOGEN UR STRIP-ACNC: NEGATIVE EU/DL
WBC # BLD AUTO: 6.25 K/UL (ref 3.9–12.7)
WBC #/AREA URNS HPF: 0 /HPF (ref 0–5)

## 2020-08-15 PROCEDURE — 85025 COMPLETE CBC W/AUTO DIFF WBC: CPT

## 2020-08-15 PROCEDURE — 36415 COLL VENOUS BLD VENIPUNCTURE: CPT

## 2020-08-15 PROCEDURE — C9113 INJ PANTOPRAZOLE SODIUM, VIA: HCPCS | Performed by: NURSE PRACTITIONER

## 2020-08-15 PROCEDURE — 27000221 HC OXYGEN, UP TO 24 HOURS

## 2020-08-15 PROCEDURE — 80053 COMPREHEN METABOLIC PANEL: CPT

## 2020-08-15 PROCEDURE — 63600175 PHARM REV CODE 636 W HCPCS: Performed by: NURSE PRACTITIONER

## 2020-08-15 PROCEDURE — 84100 ASSAY OF PHOSPHORUS: CPT

## 2020-08-15 PROCEDURE — 99900035 HC TECH TIME PER 15 MIN (STAT)

## 2020-08-15 PROCEDURE — 12000002 HC ACUTE/MED SURGE SEMI-PRIVATE ROOM

## 2020-08-15 PROCEDURE — 83735 ASSAY OF MAGNESIUM: CPT

## 2020-08-15 PROCEDURE — 94761 N-INVAS EAR/PLS OXIMETRY MLT: CPT

## 2020-08-15 PROCEDURE — 81000 URINALYSIS NONAUTO W/SCOPE: CPT

## 2020-08-15 PROCEDURE — 25000003 PHARM REV CODE 250: Performed by: NURSE PRACTITIONER

## 2020-08-15 PROCEDURE — 94799 UNLISTED PULMONARY SVC/PX: CPT

## 2020-08-15 RX ORDER — POTASSIUM CHLORIDE 7.45 MG/ML
10 INJECTION INTRAVENOUS ONCE
Status: COMPLETED | OUTPATIENT
Start: 2020-08-15 | End: 2020-08-15

## 2020-08-15 RX ORDER — PANTOPRAZOLE SODIUM 40 MG/10ML
40 INJECTION, POWDER, LYOPHILIZED, FOR SOLUTION INTRAVENOUS DAILY
Status: DISCONTINUED | OUTPATIENT
Start: 2020-08-15 | End: 2020-08-17 | Stop reason: HOSPADM

## 2020-08-15 RX ADMIN — POTASSIUM CHLORIDE 10 MEQ: 7.46 INJECTION, SOLUTION INTRAVENOUS at 01:08

## 2020-08-15 RX ADMIN — SODIUM CHLORIDE: 0.9 INJECTION, SOLUTION INTRAVENOUS at 09:08

## 2020-08-15 RX ADMIN — PIPERACILLIN AND TAZOBACTAM 4.5 G: 4; .5 INJECTION, POWDER, FOR SOLUTION INTRAVENOUS at 03:08

## 2020-08-15 RX ADMIN — PIPERACILLIN AND TAZOBACTAM 4.5 G: 4; .5 INJECTION, POWDER, FOR SOLUTION INTRAVENOUS at 09:08

## 2020-08-15 RX ADMIN — PIPERACILLIN AND TAZOBACTAM 4.5 G: 4; .5 INJECTION, POWDER, FOR SOLUTION INTRAVENOUS at 04:08

## 2020-08-15 RX ADMIN — PANTOPRAZOLE SODIUM 40 MG: 40 INJECTION, POWDER, LYOPHILIZED, FOR SOLUTION INTRAVENOUS at 09:08

## 2020-08-15 NOTE — HPI
Papito Hutton is a 75 y.o. male with a PMHx significant for HLD, HTN, type 2 DM and colonic polyps who presented to the ED with c/o vomiting x 1 day.  Pt is s/p right hemicolectomy on 8/10/20.  Pt notes the swelling to his abdomen has improved since the surgery. The patient denies fever, chest pain, SOB, diarrhea, or any other symptoms at this time.  CT abdomen/pelvis reveals findings consistent with high grade small-bowel obstruction.  The ED physician discussed the case with general surgery and recommendations are noted.  IV zosyn was initiated and an NGT was placed.  The patient will be admitted under hospital medicine for further work up and evaluation.

## 2020-08-15 NOTE — CARE UPDATE
08/15/20 0825   PRE-TX-O2   O2 Device (Oxygen Therapy) nasal cannula   $ Is the patient on Low Flow Oxygen? Yes   Flow (L/min) 2   Oxygen Concentration (%) 28   SpO2 (!) 94 %   Pulse Oximetry Type Intermittent   $ Pulse Oximetry - Multiple Charge Pulse Oximetry - Multiple   Aerosol Therapy   $ Aerosol Therapy Charges PRN treatment not required   Ready to Wean/Extubation Screen   FIO2<=50 (chart decimal) 0.28

## 2020-08-15 NOTE — ASSESSMENT & PLAN NOTE
Patient's FSGs are controlled on current hypoglycemics.   Last A1c reviewed-   Lab Results   Component Value Date    HGBA1C 5.9 (H) 07/16/2020     Most recent fingerstick glucose reviewed-   Recent Labs   Lab 08/14/20  2242   POCTGLUCOSE 142*     Current correctional scale  Low  Maintain anti-hyperglycemic dose as follows-   Antihyperglycemics (From admission, onward)    Start     Stop Route Frequency Ordered    08/14/20 2058  insulin aspart U-100 pen 0-5 Units      -- SubQ Before meals & nightly PRN 08/14/20 2047        Accuchecks AC/HS  Low dose sliding scale PRN.  Currently NPO, diabetic diet when able to tolerate.

## 2020-08-15 NOTE — PROGRESS NOTES
[facility] Hospital Medicine  Progress Note    Patient Name: Papito Hutton  MRN: [id number]  Patient Class: IP- Inpatient   Admission Date: 8/14/2020  Length of Stay: 1 days  Attending Physician: Phyllis Lerma MD  Primary Care Provider: Meggan Ramachandran MD        Subjective:     Principal Problem:SBO (small bowel obstruction)        HPI:  Papito Hutton is a 75 y.o. male with a PMHx significant for HLD, HTN, type 2 DM and colonic polyps who presented to the ED with c/o vomiting x 1 day.  Pt is s/p right hemicolectomy on 8/10/20.  Pt notes the swelling to his abdomen has improved since the surgery. The patient denies fever, chest pain, SOB, diarrhea, or any other symptoms at this time.  CT abdomen/pelvis reveals findings consistent with high grade small-bowel obstruction.  The ED physician discussed the case with general surgery and recommendations are noted.  IV zosyn was initiated and an NGT was placed.  The patient will be admitted under hospital medicine for further work up and evaluation.    Overview/Hospital Course:  No notes on file    Interval History: feeling better with NGT in place. Decreased abdominal distention and pain this am. Awaiting surgery evaluation.     Review of Systems   Constitutional: Positive for activity change and appetite change. Negative for chills and diaphoresis.   Respiratory: Negative for cough, shortness of breath and wheezing.    Cardiovascular: Negative for chest pain and palpitations.   Gastrointestinal: Positive for abdominal pain and nausea. Negative for blood in stool.   Genitourinary: Negative for dysuria and hematuria.   Musculoskeletal: Negative for arthralgias.   Neurological: Negative for syncope and light-headedness.   Psychiatric/Behavioral: Negative for agitation and confusion. The patient is not nervous/anxious.      Objective:     Vital Signs (Most Recent):  Temp: 98.1 °F (36.7 °C) (08/15/20 0752)  Pulse: 72 (08/15/20 0752)  Resp: 16  (08/15/20 0752)  BP: 135/66 (08/15/20 0752)  SpO2: (!) 94 % (08/15/20 0825) Vital Signs (24h Range):  Temp:  [96.4 °F (35.8 °C)-98.3 °F (36.8 °C)] 98.1 °F (36.7 °C)  Pulse:  [] 72  Resp:  [16-20] 16  SpO2:  [91 %-95 %] 94 %  BP: (135-167)/(65-74) 135/66     Weight: 90.3 kg (199 lb 1.6 oz)  Body mass index is 27 kg/m².    Intake/Output Summary (Last 24 hours) at 8/15/2020 1050  Last data filed at 8/15/2020 0700  Gross per 24 hour   Intake 0 ml   Output 1700 ml   Net -1700 ml      Physical Exam  Vitals signs and nursing note reviewed.   Constitutional:       Appearance: Normal appearance. He is well-developed.   HENT:      Head: Normocephalic and atraumatic.      Right Ear: External ear normal.      Left Ear: External ear normal.      Nose: Nose normal.      Mouth/Throat:      Mouth: Mucous membranes are moist.   Eyes:      Conjunctiva/sclera: Conjunctivae normal.      Pupils: Pupils are equal, round, and reactive to light.   Neck:      Musculoskeletal: Normal range of motion and neck supple.   Cardiovascular:      Rate and Rhythm: Normal rate and regular rhythm.      Heart sounds: Normal heart sounds.   Pulmonary:      Effort: Pulmonary effort is normal.      Breath sounds: Normal breath sounds. No wheezing.   Abdominal:      General: Bowel sounds are normal. There is distension (mild).      Palpations: Abdomen is soft.      Tenderness: There is abdominal tenderness (mid abdomen ).      Comments: NGT with green bile   Musculoskeletal: Normal range of motion.   Skin:     General: Skin is warm and dry.      Findings: No bruising.   Neurological:      Mental Status: He is alert and oriented to person, place, and time.   Psychiatric:         Behavior: Behavior normal.         Significant Labs:   CBC:   Recent Labs   Lab 08/14/20  1641 08/15/20  0456   WBC 8.66 6.25   HGB 12.9* 10.7*   HCT 39.2* 34.2*    271     CMP:   Recent Labs   Lab 08/14/20  1751 08/15/20  0456    142   K 4.3 3.3*    102    CO2 30* 31*   * 148*   BUN 16 19   CREATININE 1.0 1.0   CALCIUM 11.2* 10.6*   PROT 6.6 6.0   ALBUMIN 3.0* 2.7*   BILITOT 1.0 1.3*   ALKPHOS 87 96   AST 14 17   ALT 19 20   ANIONGAP 11 9   EGFRNONAA >60 >60       Significant Imaging: I have reviewed and interpreted all pertinent imaging results/findings within the past 24 hours.      Assessment/Plan:      * SBO (small bowel obstruction)  -CT abd/pelvis reviewed: Findings consistent with small-bowel obstruction appearing high-grade with severe distention of small bowel and transitional zone appearing distal small bowel near the anastomosis in this patient with right hemicolectomy.  Small amount of ascites.  Small bilateral pleural effusions and posterior bibasilar consolidation and atelectasis.  Mesenteric fat stranding which could be on a postoperative basis represent infectious/inflammatory process.  3.1 cm collection containing small amount of air right upper quadrant of the abdomen not seen to communicate with bowel and could represent small abscess or postsurgical collection.  -NPO  -Surgery consulted.  -Pain and nausea control.  -Started on IV zosyn.  -Continue IVF.  -NG tube to LIWS.    Check serial xr abdomen     Hypokalemia  Replace with IV supplementation       Type 2 diabetes mellitus, without long-term current use of insulin     Patient's FSGs are controlled on current hypoglycemics.   Last A1c reviewed-   Lab Results   Component Value Date    HGBA1C 5.9 (H) 07/16/2020     Most recent fingerstick glucose reviewed-   Recent Labs   Lab 08/14/20  2242   POCTGLUCOSE 142*     Current correctional scale  Low  Maintain anti-hyperglycemic dose as follows-   Antihyperglycemics (From admission, onward)    Start     Stop Route Frequency Ordered    08/14/20 2058  insulin aspart U-100 pen 0-5 Units      -- SubQ Before meals & nightly PRN 08/14/20 2047        Accuchecks AC/HS  Low dose sliding scale PRN.  Currently NPO, diabetic diet when able to  tolerate.    GERD (gastroesophageal reflux disease)  Chronic, stable. Continue PPI.    Essential hypertension  Chronic, controlled.  Latest blood pressure and vitals reviewed-   Temp:  [96.4 °F (35.8 °C)-98.3 °F (36.8 °C)]   Pulse:  []   Resp:  [18-20]   BP: (142-167)/(65-74)   SpO2:  [91 %-94 %] .   Home meds for hypertension were reviewed and noted below. Hospital anti-hypertensive changes were made as shown below.  Hypertension Medications             amLODIPine-benazepriL (LOTREL) 10-40 mg per capsule Take 1 capsule by mouth once daily.    hydroCHLOROthiazide (HYDRODIURIL) 12.5 MG Tab Take 1 tablet (12.5 mg total) by mouth every other day.    TOPROL XL 25 mg 24 hr tablet TAKE 1 TABLET DAILY      Patient currently NPO with SBO. Hold home anti-hypertensives and restart when clinically indicated.  Will utilize p.r.n. blood pressure medication only if patient's blood pressure greater than  180/110 and he develops symptoms such as worsening chest pain or shortness of breath.    Hyperlipidemia   Patient is chronically on statin.will not continue for now 2/2 SBO, restart when clinically indicated. Monitor clinically. Last LDL was   Lab Results   Component Value Date    LDLCALC 87.4 02/03/2020     VTE Risk Mitigation (From admission, onward)         Ordered     IP VTE HIGH RISK PATIENT  Once      08/14/20 2047     Place sequential compression device  Until discontinued      08/14/20 2047                Discharge Planning   ROCIO: 8/18/2020     Code Status: Full Code   Is the patient medically ready for discharge?:     Reason for patient still in hospital (select all that apply): Patient trending condition, Laboratory test and Treatment                     Elizabeth Lyons NP  Department of Hospital Medicine   [facility]

## 2020-08-15 NOTE — PLAN OF CARE
SW spoke with patient and significant other to complete a discharge planning assessment. Patient and Caregiver presents as alert and oriented x 4, pleasant, good eye contact, well groomed, recall good, concentration/judgement good, average intelligence, calm, communicative, cooperative, and asking and answering questions appropriately. SW verified all information on demographic sheet is correct. Patient reports living with significant other and that he is independent with ADLs at this time and does drive. Patient reports significant other will transport pt home.    Patient reports does not have home health. Patient reports that he is not receiving outside resources. Patient reports having no medical equipment. Patient reports Meggan Ramachandran MD as pt's PCP and has Medicare and [employer] as their insurance. Patient reports receiving medications from [facility] and [facility] in [place] and reports that he is able to afford medications at this time and at time of discharge. Patient reports that he is not on dialysis at this time. Patient reports that he is not receiving services with the coumadin clinic. Patient reports has been admitted to the hospital within the last 30 days.    Patient reports does have a Living Will or Healthcare Power of . Patient denies mental health issues.    Patient and Caregiver expressed no other needs at this time. SW remains available.         08/15/20 1216   Discharge Assessment   Assessment Type Discharge Planning Assessment   Confirmed/corrected address and phone number on facesheet? Yes   Assessment information obtained from? Patient;Caregiver   Communicated expected length of stay with patient/caregiver no   Prior to hospitilization cognitive status: Alert/Oriented   Prior to hospitalization functional status: Independent   Current cognitive status: Alert/Oriented   Current Functional Status: Independent   Lives With significant other   Able to Return to Prior  Arrangements yes   Is patient able to care for self after discharge? Yes   Who are your caregiver(s) and their phone number(s)? Shila Vic Blountlaurita (significant other) [phone]   Patient's perception of discharge disposition home or selfcare   Readmission Within the Last 30 Days previous discharge plan unsuccessful   If yes, most recent facility name: [facility]   Patient currently being followed by outpatient case management? No   Patient currently receives any other outside agency services? No   Equipment Currently Used at Home none   Part D Coverage Pt has [employer]   Do you have any problems affording any of your prescribed medications? No   Is the patient taking medications as prescribed? yes   Does the patient have transportation home? Yes   Transportation Anticipated car, drives self;family or friend will provide   Dialysis Name and Scheduled days N/A   Does the patient receive services at the Coumadin Clinic? No   Discharge Plan A Home;Home with family   Discharge Plan B Home;Home with family   DME Needed Upon Discharge  none   Patient/Family in Agreement with Plan yes   Readmission Questionnaire   At the time of your discharge, did someone talk to you about what your health problems were? Yes   At the time of discharge, did someone talk to you about what to watch out for regarding worsening of your health problem? Yes   At the time of discharge, did someone talk to you about what to do if you experienced worsening of your health problem? Yes   At the time of discharge, did someone talk to you about which medication to take when you left the hospital and which ones to stop taking? Yes   At the time of discharge, did someone talk to you about when and where to follow up with a doctor after you left the hospital? Yes   How often do you need to have someone help you when you read instructions, pamphlets, or other written material from your doctor or pharmacy? Rarely   Do you have problems  taking your medications as prescribed? No   Do you have any problems affording any of  your prescribed medications? No   Do you have problems obtaining/receiving your medications? No   Does the patient have transportation to healthcare appointments? Yes   Living Arrangements house   Does the patient have family/friends to help with healtcare needs after discharge? yes   Does your caregiver provide all the help you need? Yes

## 2020-08-15 NOTE — ASSESSMENT & PLAN NOTE
Chronic, controlled.  Latest blood pressure and vitals reviewed-   Temp:  [96.4 °F (35.8 °C)-98.3 °F (36.8 °C)]   Pulse:  []   Resp:  [18-20]   BP: (142-167)/(65-74)   SpO2:  [91 %-94 %] .   Home meds for hypertension were reviewed and noted below. Hospital anti-hypertensive changes were made as shown below.  Hypertension Medications             amLODIPine-benazepriL (LOTREL) 10-40 mg per capsule Take 1 capsule by mouth once daily.    hydroCHLOROthiazide (HYDRODIURIL) 12.5 MG Tab Take 1 tablet (12.5 mg total) by mouth every other day.    TOPROL XL 25 mg 24 hr tablet TAKE 1 TABLET DAILY      Patient currently NPO with SBO. Hold home anti-hypertensives and restart when clinically indicated.  Will utilize p.r.n. blood pressure medication only if patient's blood pressure greater than  180/110 and he develops symptoms such as worsening chest pain or shortness of breath.

## 2020-08-15 NOTE — PROGRESS NOTES
Patient is status post right hemicolectomy 8/10/20 by Dr. Kaye.  He went home on postop day 2.  That night he had some broth but the following day was not real hungry.  He then began having worsening reflux and started vomiting Thursday night.  He vomited throughout the night and then came to the emergency room yesterday.    Labs were relatively normal, WBC normal  No fever    CT scan showed dilated small bowel and a small fluid collection near the anastomosis, most likely postsurgical in nature, given afebrile and normal white count    NG tube was placed.  A large amount of output was gained initially and patient had almost immediate relief of his distension.  Today he feels well, denies abdominal pain.  He reports a tiny piece of stool and flatus earlier today.    AXR - dilated small bowel without significant change    I/P: POI  Continue bowel rest, NG tube, IV fluids for now  Await return of bowel function  AXR in AM    I do not see any surgical complications at this time do not think patient needs surgery.

## 2020-08-15 NOTE — PLAN OF CARE
"Plan of care review with pt, pt states "understanding," IVF infusing without difficulty, no redness/swelling noted to IV site, dressing remains dry/intact to midline incision, small amount of green bile noted in suction cannister from NG tube, hypoactive BS auscultated to all 4 quads, abdomen distented/firm, BM on today, family at bedside, will continue to monitor, observe and note any changes, safety maintain    "

## 2020-08-15 NOTE — SUBJECTIVE & OBJECTIVE
Interval History: feeling better with NGT in place. Decreased abdominal distention and pain this am. Awaiting surgery evaluation.     Review of Systems   Constitutional: Positive for activity change and appetite change. Negative for chills and diaphoresis.   Respiratory: Negative for cough, shortness of breath and wheezing.    Cardiovascular: Negative for chest pain and palpitations.   Gastrointestinal: Positive for abdominal pain and nausea. Negative for blood in stool.   Genitourinary: Negative for dysuria and hematuria.   Musculoskeletal: Negative for arthralgias.   Neurological: Negative for syncope and light-headedness.   Psychiatric/Behavioral: Negative for agitation and confusion. The patient is not nervous/anxious.      Objective:     Vital Signs (Most Recent):  Temp: 98.1 °F (36.7 °C) (08/15/20 0752)  Pulse: 72 (08/15/20 0752)  Resp: 16 (08/15/20 0752)  BP: 135/66 (08/15/20 0752)  SpO2: (!) 94 % (08/15/20 0825) Vital Signs (24h Range):  Temp:  [96.4 °F (35.8 °C)-98.3 °F (36.8 °C)] 98.1 °F (36.7 °C)  Pulse:  [] 72  Resp:  [16-20] 16  SpO2:  [91 %-95 %] 94 %  BP: (135-167)/(65-74) 135/66     Weight: 90.3 kg (199 lb 1.6 oz)  Body mass index is 27 kg/m².    Intake/Output Summary (Last 24 hours) at 8/15/2020 1050  Last data filed at 8/15/2020 0700  Gross per 24 hour   Intake 0 ml   Output 1700 ml   Net -1700 ml      Physical Exam  Vitals signs and nursing note reviewed.   Constitutional:       Appearance: Normal appearance. He is well-developed.   HENT:      Head: Normocephalic and atraumatic.      Right Ear: External ear normal.      Left Ear: External ear normal.      Nose: Nose normal.      Mouth/Throat:      Mouth: Mucous membranes are moist.   Eyes:      Conjunctiva/sclera: Conjunctivae normal.      Pupils: Pupils are equal, round, and reactive to light.   Neck:      Musculoskeletal: Normal range of motion and neck supple.   Cardiovascular:      Rate and Rhythm: Normal rate and regular rhythm.       Heart sounds: Normal heart sounds.   Pulmonary:      Effort: Pulmonary effort is normal.      Breath sounds: Normal breath sounds. No wheezing.   Abdominal:      General: Bowel sounds are normal. There is distension (mild).      Palpations: Abdomen is soft.      Tenderness: There is abdominal tenderness (mid abdomen ).      Comments: NGT with green bile   Musculoskeletal: Normal range of motion.   Skin:     General: Skin is warm and dry.      Findings: No bruising.   Neurological:      Mental Status: He is alert and oriented to person, place, and time.   Psychiatric:         Behavior: Behavior normal.         Significant Labs:   CBC:   Recent Labs   Lab 08/14/20  1641 08/15/20  0456   WBC 8.66 6.25   HGB 12.9* 10.7*   HCT 39.2* 34.2*    271     CMP:   Recent Labs   Lab 08/14/20  1751 08/15/20  0456    142   K 4.3 3.3*    102   CO2 30* 31*   * 148*   BUN 16 19   CREATININE 1.0 1.0   CALCIUM 11.2* 10.6*   PROT 6.6 6.0   ALBUMIN 3.0* 2.7*   BILITOT 1.0 1.3*   ALKPHOS 87 96   AST 14 17   ALT 19 20   ANIONGAP 11 9   EGFRNONAA >60 >60       Significant Imaging: I have reviewed and interpreted all pertinent imaging results/findings within the past 24 hours.

## 2020-08-15 NOTE — ASSESSMENT & PLAN NOTE
Patient is chronically on statin.will not continue for now 2/2 SBO, restart when clinically indicated. Monitor clinically. Last LDL was   Lab Results   Component Value Date    LDLCALC 87.4 02/03/2020

## 2020-08-15 NOTE — ASSESSMENT & PLAN NOTE
-CT abd/pelvis reviewed: Findings consistent with small-bowel obstruction appearing high-grade with severe distention of small bowel and transitional zone appearing distal small bowel near the anastomosis in this patient with right hemicolectomy.  Small amount of ascites.  Small bilateral pleural effusions and posterior bibasilar consolidation and atelectasis.  Mesenteric fat stranding which could be on a postoperative basis represent infectious/inflammatory process.  3.1 cm collection containing small amount of air right upper quadrant of the abdomen not seen to communicate with bowel and could represent small abscess or postsurgical collection.  -NPO  -Surgery consulted.  -Pain and nausea control.  -Started on IV zosyn.  -Continue IVF.  -NG tube to LIWS.

## 2020-08-15 NOTE — RESPIRATORY THERAPY
08/14/20 8433   Patient Assessment/Suction   Level of Consciousness (AVPU) responds to voice   Respiratory Effort Normal;Unlabored   Expansion/Accessory Muscles/Retractions expansion symmetric;no retractions;no use of accessory muscles   PRE-TX-O2   O2 Device (Oxygen Therapy) room air   SpO2 (!) 94 %   Aerosol Therapy   $ Aerosol Therapy Charges PRN treatment not required   Respiratory Treatment Status (SVN) PRN treatment not required

## 2020-08-15 NOTE — PLAN OF CARE
Plan of care reviewed with pt,verbalized understanding. IV infusing. NPO per order.  BG monitored. NG tube to left nares  on intermittent suction draining green liquid.  Call light kept within reach, bed in locked and lowest position, side rails up x2.

## 2020-08-15 NOTE — SUBJECTIVE & OBJECTIVE
Past Medical History:   Diagnosis Date    Arthritis     B12 deficiency     Colon polyp     Diabetes mellitus     BORDERLINE    GERD (gastroesophageal reflux disease)     [place] (hard of hearing)     BILAT AIDS    Hyperlipidemia     Hypertension     Low testosterone     Personal history of colonic polyps 2008    adenomatous polyp    Sinus disorder     Sleep apnea     DOES NOT USE MACHINE    Wears glasses        Past Surgical History:   Procedure Laterality Date    COLONOSCOPY  2008    Dr. Garcia; polyps removed    COLONOSCOPY N/A 7/13/2020    Procedure: COLONOSCOPY;  Surgeon: Jj Fried MD;  Location: [facility] ENDO;  Service: Endoscopy;  Laterality: N/A;    LAPAROSCOPIC RIGHT COLON RESECTION Right 8/10/2020    Procedure: COLECTOMY, RIGHT, LAPAROSCOPIC pooss conversion to open;  Surgeon: Noble Kaye MD;  Location: [facility] OR;  Service: General;  Laterality: Right;    NASAL SEPTUM SURGERY      right hand surgery      RIGHT HEMICOLECTOMY N/A 8/10/2020    Procedure: HEMICOLECTOMY, RIGHT;  Surgeon: Noble Kaye MD;  Location: [facility] OR;  Service: General;  Laterality: N/A;    UPPER GASTROINTESTINAL ENDOSCOPY  2008    hiatal hernia; esophagitis       Review of patient's allergies indicates:  No Known Allergies    No current facility-administered medications on file prior to encounter.      Current Outpatient Medications on File Prior to Encounter   Medication Sig    acetaminophen (TYLENOL) 650 MG TbSR Take 1 tablet (650 mg total) by mouth every 8 (eight) hours as needed (do not take with any other tylenol or acetaminophen).    amLODIPine-benazepriL (LOTREL) 10-40 mg per capsule Take 1 capsule by mouth once daily.    aspirin 81 MG Chew Take 1 tablet (81 mg total) by mouth once daily.    CHOLECALCIFEROL, VITAMIN D3, (VITAMIN D3 ORAL) Take 1 capsule by mouth once daily.    cyanocobalamin (VITAMIN B-12) 1000 MCG tablet Take 100 mcg by mouth once daily.    diclofenac (VOLTAREN) 75 MG EC tablet  Take 1 tablet (75 mg total) by mouth daily as needed (pain).    diclofenac sodium (VOLTAREN) 1 % Gel Apply 2 g topically once daily.    esomeprazole (NEXIUM) 40 MG capsule Take 1 capsule (40 mg total) by mouth before breakfast.    famotidine (PEPCID) 20 MG tablet Take 1 tablet (20 mg total) by mouth 2 (two) times daily.    fluticasone propionate (FLONASE) 50 mcg/actuation nasal spray USE 2 SPRAYS IN EACH NOSTRIL ONCE DAILY (Patient taking differently: PRN)    gabapentin (NEURONTIN) 300 MG capsule Take 1 capsule (300 mg total) by mouth 3 (three) times daily.    hydroCHLOROthiazide (HYDRODIURIL) 12.5 MG Tab Take 1 tablet (12.5 mg total) by mouth every other day.    ibuprofen (ADVIL,MOTRIN) 200 MG tablet Take 3 tablets (600 mg total) by mouth every 8 (eight) hours as needed for Pain (Take with food).    metFORMIN (GLUCOPHAGE) 500 MG tablet Take 1 tablet (500 mg total) by mouth 2 (two) times daily with meals.    metoclopramide HCl (REGLAN) 10 MG tablet Take 1 tablet (10 mg total) by mouth 3 (three) times daily before meals.    mupirocin (BACTROBAN) 2 % ointment by Nasal route 2 (two) times daily. for 5 days    oxyCODONE-acetaminophen (PERCOCET) 5-325 mg per tablet Take 1 tablet by mouth every 4 (four) hours as needed for Pain.    rosuvastatin (CRESTOR) 20 MG tablet TAKE 1 TABLET DAILY    [START ON 8/19/2020] testosterone (FORTESTA) 10 mg/0.5 gram /actuation GlPm Place 20 mg onto the skin once daily. Apply 2 pump actuations topically daily.    TOPROL XL 25 mg 24 hr tablet TAKE 1 TABLET DAILY     Family History     None        Tobacco Use    Smoking status: Current Some Day Smoker     Types: Cigars    Smokeless tobacco: Former User    Tobacco comment: smoke 1 cigar every couple of months   Substance and Sexual Activity    Alcohol use: Yes     Alcohol/week: 2.0 standard drinks     Types: 2 Cans of beer per week    Drug use: No    Sexual activity: Yes     Partners: Female     Review of Systems    Constitutional: Positive for appetite change. Negative for chills and fever.   HENT: Negative for congestion and postnasal drip.    Eyes: Negative for redness and visual disturbance.   Respiratory: Negative for cough and shortness of breath.    Cardiovascular: Negative for chest pain and palpitations.   Gastrointestinal: Positive for abdominal distention, abdominal pain, nausea and vomiting. Negative for diarrhea.   Endocrine: Negative for polyphagia and polyuria.   Genitourinary: Negative for difficulty urinating and dysuria.   Musculoskeletal: Negative for back pain and gait problem.   Skin: Negative for rash and wound.   Neurological: Negative for dizziness, weakness and headaches.   Hematological: Does not bruise/bleed easily.   Psychiatric/Behavioral: Negative for confusion. The patient is not nervous/anxious.    All other systems reviewed and are negative.    Objective:     Vital Signs (Most Recent):  Temp: 97.7 °F (36.5 °C) (08/15/20 0045)  Pulse: 78 (08/15/20 0045)  Resp: 20 (08/15/20 0045)  BP: (!) 167/74 (08/15/20 0045)  SpO2: (!) 91 % (08/15/20 0045) Vital Signs (24h Range):  Temp:  [96.4 °F (35.8 °C)-98.3 °F (36.8 °C)] 97.7 °F (36.5 °C)  Pulse:  [] 78  Resp:  [18-20] 20  SpO2:  [91 %-94 %] 91 %  BP: (142-167)/(65-74) 167/74     Weight: 90.3 kg (199 lb 1.6 oz)  Body mass index is 27 kg/m².    Physical Exam  Vitals signs and nursing note reviewed.   Constitutional:       Appearance: Normal appearance. He is well-developed.   HENT:      Head: Normocephalic and atraumatic.   Eyes:      Conjunctiva/sclera: Conjunctivae normal.      Pupils: Pupils are equal, round, and reactive to light.   Neck:      Musculoskeletal: Normal range of motion and neck supple.   Cardiovascular:      Rate and Rhythm: Normal rate and regular rhythm.      Pulses: Normal pulses.      Heart sounds: Normal heart sounds.   Pulmonary:      Effort: Pulmonary effort is normal. No respiratory distress.      Breath sounds: Normal  breath sounds.   Abdominal:      General: Bowel sounds are decreased. There is distension.      Palpations: Abdomen is soft.      Tenderness: There is abdominal tenderness. There is no guarding.   Musculoskeletal: Normal range of motion.      Right lower leg: No edema.      Left lower leg: No edema.   Skin:     General: Skin is warm and dry.      Capillary Refill: Capillary refill takes 2 to 3 seconds.   Neurological:      General: No focal deficit present.      Mental Status: He is alert and oriented to person, place, and time.   Psychiatric:         Mood and Affect: Mood normal.         Behavior: Behavior normal.         Thought Content: Thought content normal.         Judgment: Judgment normal.           CRANIAL NERVES     CN III, IV, VI   Pupils are equal, round, and reactive to light.       Significant Labs:   CBC:   Recent Labs   Lab 08/14/20  1641   WBC 8.66   HGB 12.9*   HCT 39.2*        CMP:   Recent Labs   Lab 08/14/20  1751      K 4.3      CO2 30*   *   BUN 16   CREATININE 1.0   CALCIUM 11.2*   PROT 6.6   ALBUMIN 3.0*   BILITOT 1.0   ALKPHOS 87   AST 14   ALT 19   ANIONGAP 11   EGFRNONAA >60       Significant Imaging: CT abd/pelvis: Findings consistent with small-bowel obstruction appearing high-grade with severe distention of small bowel and transitional zone appearing distal small bowel near the anastomosis in this patient with right hemicolectomy.  Small amount of ascites.  Small bilateral pleural effusions and posterior bibasilar consolidation and atelectasis.  Mesenteric fat stranding which could be on a postoperative basis represent infectious/inflammatory process.  3.1 cm collection containing small amount of air right upper quadrant of the abdomen not seen to communicate with bowel and could represent small abscess or postsurgical collection.

## 2020-08-15 NOTE — H&P
[facility] Hospital Medicine  History & Physical    Patient Name: Papito Hutton  MRN: [id number]  Admission Date: 8/14/2020  Attending Physician: Gina Arriaza MD   Primary Care Provider: Meggan Ramachandran MD         Patient information was obtained from patient, past medical records and ER records.     Subjective:     Principal Problem:SBO (small bowel obstruction)    Chief Complaint:   Chief Complaint   Patient presents with    Abdominal Pain     Had right hemicolectomy Monday with Dr. Kaye    Emesis        HPI: Papito Hutton is a 75 y.o. male with a PMHx significant for HLD, HTN, type 2 DM and colonic polyps who presented to the ED with c/o vomiting x 1 day.  Pt is s/p right hemicolectomy on 8/10/20.  Pt notes the swelling to his abdomen has improved since the surgery. The patient denies fever, chest pain, SOB, diarrhea, or any other symptoms at this time.  CT abdomen/pelvis reveals findings consistent with high grade small-bowel obstruction.  The ED physician discussed the case with general surgery and recommendations are noted.  IV zosyn was initiated and an NGT was placed.  The patient will be admitted under hospital medicine for further work up and evaluation.    Past Medical History:   Diagnosis Date    Arthritis     B12 deficiency     Colon polyp     Diabetes mellitus     BORDERLINE    GERD (gastroesophageal reflux disease)     [place] (hard of hearing)     BILAT AIDS    Hyperlipidemia     Hypertension     Low testosterone     Personal history of colonic polyps 2008    adenomatous polyp    Sinus disorder     Sleep apnea     DOES NOT USE MACHINE    Wears glasses        Past Surgical History:   Procedure Laterality Date    COLONOSCOPY  2008    Dr. Garcia; polyps removed    COLONOSCOPY N/A 7/13/2020    Procedure: COLONOSCOPY;  Surgeon: Jj Fried MD;  Location: [facility];  Service: Endoscopy;  Laterality: N/A;    LAPAROSCOPIC RIGHT COLON RESECTION Right  8/10/2020    Procedure: COLECTOMY, RIGHT, LAPAROSCOPIC pooss conversion to open;  Surgeon: Noble Kaye MD;  Location: [facility] OR;  Service: General;  Laterality: Right;    NASAL SEPTUM SURGERY      right hand surgery      RIGHT HEMICOLECTOMY N/A 8/10/2020    Procedure: HEMICOLECTOMY, RIGHT;  Surgeon: Noble Kaye MD;  Location: [facility] OR;  Service: General;  Laterality: N/A;    UPPER GASTROINTESTINAL ENDOSCOPY  2008    hiatal hernia; esophagitis       Review of patient's allergies indicates:  No Known Allergies    No current facility-administered medications on file prior to encounter.      Current Outpatient Medications on File Prior to Encounter   Medication Sig    acetaminophen (TYLENOL) 650 MG TbSR Take 1 tablet (650 mg total) by mouth every 8 (eight) hours as needed (do not take with any other tylenol or acetaminophen).    amLODIPine-benazepriL (LOTREL) 10-40 mg per capsule Take 1 capsule by mouth once daily.    aspirin 81 MG Chew Take 1 tablet (81 mg total) by mouth once daily.    CHOLECALCIFEROL, VITAMIN D3, (VITAMIN D3 ORAL) Take 1 capsule by mouth once daily.    cyanocobalamin (VITAMIN B-12) 1000 MCG tablet Take 100 mcg by mouth once daily.    diclofenac (VOLTAREN) 75 MG EC tablet Take 1 tablet (75 mg total) by mouth daily as needed (pain).    diclofenac sodium (VOLTAREN) 1 % Gel Apply 2 g topically once daily.    esomeprazole (NEXIUM) 40 MG capsule Take 1 capsule (40 mg total) by mouth before breakfast.    famotidine (PEPCID) 20 MG tablet Take 1 tablet (20 mg total) by mouth 2 (two) times daily.    fluticasone propionate (FLONASE) 50 mcg/actuation nasal spray USE 2 SPRAYS IN EACH NOSTRIL ONCE DAILY (Patient taking differently: PRN)    gabapentin (NEURONTIN) 300 MG capsule Take 1 capsule (300 mg total) by mouth 3 (three) times daily.    hydroCHLOROthiazide (HYDRODIURIL) 12.5 MG Tab Take 1 tablet (12.5 mg total) by mouth every other day.    ibuprofen (ADVIL,MOTRIN) 200 MG tablet Take 3  tablets (600 mg total) by mouth every 8 (eight) hours as needed for Pain (Take with food).    metFORMIN (GLUCOPHAGE) 500 MG tablet Take 1 tablet (500 mg total) by mouth 2 (two) times daily with meals.    metoclopramide HCl (REGLAN) 10 MG tablet Take 1 tablet (10 mg total) by mouth 3 (three) times daily before meals.    mupirocin (BACTROBAN) 2 % ointment by Nasal route 2 (two) times daily. for 5 days    oxyCODONE-acetaminophen (PERCOCET) 5-325 mg per tablet Take 1 tablet by mouth every 4 (four) hours as needed for Pain.    rosuvastatin (CRESTOR) 20 MG tablet TAKE 1 TABLET DAILY    [START ON 8/19/2020] testosterone (FORTESTA) 10 mg/0.5 gram /actuation GlPm Place 20 mg onto the skin once daily. Apply 2 pump actuations topically daily.    TOPROL XL 25 mg 24 hr tablet TAKE 1 TABLET DAILY     Family History     None        Tobacco Use    Smoking status: Current Some Day Smoker     Types: Cigars    Smokeless tobacco: Former User    Tobacco comment: smoke 1 cigar every couple of months   Substance and Sexual Activity    Alcohol use: Yes     Alcohol/week: 2.0 standard drinks     Types: 2 Cans of beer per week    Drug use: No    Sexual activity: Yes     Partners: Female     Review of Systems   Constitutional: Positive for appetite change. Negative for chills and fever.   HENT: Negative for congestion and postnasal drip.    Eyes: Negative for redness and visual disturbance.   Respiratory: Negative for cough and shortness of breath.    Cardiovascular: Negative for chest pain and palpitations.   Gastrointestinal: Positive for abdominal distention, abdominal pain, nausea and vomiting. Negative for diarrhea.   Endocrine: Negative for polyphagia and polyuria.   Genitourinary: Negative for difficulty urinating and dysuria.   Musculoskeletal: Negative for back pain and gait problem.   Skin: Negative for rash and wound.   Neurological: Negative for dizziness, weakness and headaches.   Hematological: Does not bruise/bleed  easily.   Psychiatric/Behavioral: Negative for confusion. The patient is not nervous/anxious.    All other systems reviewed and are negative.    Objective:     Vital Signs (Most Recent):  Temp: 97.7 °F (36.5 °C) (08/15/20 0045)  Pulse: 78 (08/15/20 0045)  Resp: 20 (08/15/20 0045)  BP: (!) 167/74 (08/15/20 0045)  SpO2: (!) 91 % (08/15/20 0045) Vital Signs (24h Range):  Temp:  [96.4 °F (35.8 °C)-98.3 °F (36.8 °C)] 97.7 °F (36.5 °C)  Pulse:  [] 78  Resp:  [18-20] 20  SpO2:  [91 %-94 %] 91 %  BP: (142-167)/(65-74) 167/74     Weight: 90.3 kg (199 lb 1.6 oz)  Body mass index is 27 kg/m².    Physical Exam  Vitals signs and nursing note reviewed.   Constitutional:       Appearance: Normal appearance. He is well-developed.   HENT:      Head: Normocephalic and atraumatic.   Eyes:      Conjunctiva/sclera: Conjunctivae normal.      Pupils: Pupils are equal, round, and reactive to light.   Neck:      Musculoskeletal: Normal range of motion and neck supple.   Cardiovascular:      Rate and Rhythm: Normal rate and regular rhythm.      Pulses: Normal pulses.      Heart sounds: Normal heart sounds.   Pulmonary:      Effort: Pulmonary effort is normal. No respiratory distress.      Breath sounds: Normal breath sounds.   Abdominal:      General: Bowel sounds are decreased. There is distension.      Palpations: Abdomen is soft.      Tenderness: There is abdominal tenderness. There is no guarding.   Musculoskeletal: Normal range of motion.      Right lower leg: No edema.      Left lower leg: No edema.   Skin:     General: Skin is warm and dry.      Capillary Refill: Capillary refill takes 2 to 3 seconds.   Neurological:      General: No focal deficit present.      Mental Status: He is alert and oriented to person, place, and time.   Psychiatric:         Mood and Affect: Mood normal.         Behavior: Behavior normal.         Thought Content: Thought content normal.         Judgment: Judgment normal.           CRANIAL NERVES     CN  III, IV, VI   Pupils are equal, round, and reactive to light.       Significant Labs:   CBC:   Recent Labs   Lab 08/14/20  1641   WBC 8.66   HGB 12.9*   HCT 39.2*        CMP:   Recent Labs   Lab 08/14/20  1751      K 4.3      CO2 30*   *   BUN 16   CREATININE 1.0   CALCIUM 11.2*   PROT 6.6   ALBUMIN 3.0*   BILITOT 1.0   ALKPHOS 87   AST 14   ALT 19   ANIONGAP 11   EGFRNONAA >60       Significant Imaging: CT abd/pelvis: Findings consistent with small-bowel obstruction appearing high-grade with severe distention of small bowel and transitional zone appearing distal small bowel near the anastomosis in this patient with right hemicolectomy.  Small amount of ascites.  Small bilateral pleural effusions and posterior bibasilar consolidation and atelectasis.  Mesenteric fat stranding which could be on a postoperative basis represent infectious/inflammatory process.  3.1 cm collection containing small amount of air right upper quadrant of the abdomen not seen to communicate with bowel and could represent small abscess or postsurgical collection.    Assessment/Plan:     * SBO (small bowel obstruction)  -CT abd/pelvis reviewed: Findings consistent with small-bowel obstruction appearing high-grade with severe distention of small bowel and transitional zone appearing distal small bowel near the anastomosis in this patient with right hemicolectomy.  Small amount of ascites.  Small bilateral pleural effusions and posterior bibasilar consolidation and atelectasis.  Mesenteric fat stranding which could be on a postoperative basis represent infectious/inflammatory process.  3.1 cm collection containing small amount of air right upper quadrant of the abdomen not seen to communicate with bowel and could represent small abscess or postsurgical collection.  -NPO  -Surgery consulted.  -Pain and nausea control.  -Started on IV zosyn.  -Continue IVF.  -NG tube to LIWS.    Type 2 diabetes mellitus, without long-term  current use of insulin     Patient's FSGs are controlled on current hypoglycemics.   Last A1c reviewed-   Lab Results   Component Value Date    HGBA1C 5.9 (H) 07/16/2020     Most recent fingerstick glucose reviewed-   Recent Labs   Lab 08/14/20  2242   POCTGLUCOSE 142*     Current correctional scale  Low  Maintain anti-hyperglycemic dose as follows-   Antihyperglycemics (From admission, onward)    Start     Stop Route Frequency Ordered    08/14/20 2058  insulin aspart U-100 pen 0-5 Units      -- SubQ Before meals & nightly PRN 08/14/20 2047        Accuchecks AC/HS  Low dose sliding scale PRN.  Currently NPO, diabetic diet when able to tolerate.    GERD (gastroesophageal reflux disease)  Chronic, stable. Continue PPI.    Essential hypertension  Chronic, controlled.  Latest blood pressure and vitals reviewed-   Temp:  [96.4 °F (35.8 °C)-98.3 °F (36.8 °C)]   Pulse:  []   Resp:  [18-20]   BP: (142-167)/(65-74)   SpO2:  [91 %-94 %] .   Home meds for hypertension were reviewed and noted below. Hospital anti-hypertensive changes were made as shown below.  Hypertension Medications             amLODIPine-benazepriL (LOTREL) 10-40 mg per capsule Take 1 capsule by mouth once daily.    hydroCHLOROthiazide (HYDRODIURIL) 12.5 MG Tab Take 1 tablet (12.5 mg total) by mouth every other day.    TOPROL XL 25 mg 24 hr tablet TAKE 1 TABLET DAILY      Patient currently NPO with SBO. Hold home anti-hypertensives and restart when clinically indicated.  Will utilize p.r.n. blood pressure medication only if patient's blood pressure greater than  180/110 and he develops symptoms such as worsening chest pain or shortness of breath.    Hyperlipidemia   Patient is chronically on statin.will not continue for now 2/2 SBO, restart when clinically indicated. Monitor clinically. Last LDL was   Lab Results   Component Value Date    LDLCALC 87.4 02/03/2020         VTE Risk Mitigation (From admission, onward)         Ordered     IP VTE HIGH RISK  PATIENT  Once      08/14/20 2047     Place sequential compression device  Until discontinued      08/14/20 2047                   JOSÉ MIGUEL Shell  Department of Hospital Medicine   [facility]

## 2020-08-16 LAB
ALBUMIN SERPL BCP-MCNC: 2.7 G/DL (ref 3.5–5.2)
ALP SERPL-CCNC: 115 U/L (ref 55–135)
ALT SERPL W/O P-5'-P-CCNC: 22 U/L (ref 10–44)
ANION GAP SERPL CALC-SCNC: 12 MMOL/L (ref 8–16)
AST SERPL-CCNC: 20 U/L (ref 10–40)
BASOPHILS # BLD AUTO: 0.02 K/UL (ref 0–0.2)
BASOPHILS NFR BLD: 0.3 % (ref 0–1.9)
BILIRUB SERPL-MCNC: 0.7 MG/DL (ref 0.1–1)
BUN SERPL-MCNC: 18 MG/DL (ref 8–23)
CALCIUM SERPL-MCNC: 10.7 MG/DL (ref 8.7–10.5)
CHLORIDE SERPL-SCNC: 107 MMOL/L (ref 95–110)
CO2 SERPL-SCNC: 26 MMOL/L (ref 23–29)
CREAT SERPL-MCNC: 0.9 MG/DL (ref 0.5–1.4)
DIFFERENTIAL METHOD: ABNORMAL
EOSINOPHIL # BLD AUTO: 0.1 K/UL (ref 0–0.5)
EOSINOPHIL NFR BLD: 2 % (ref 0–8)
ERYTHROCYTE [DISTWIDTH] IN BLOOD BY AUTOMATED COUNT: 13.6 % (ref 11.5–14.5)
EST. GFR  (AFRICAN AMERICAN): >60 ML/MIN/1.73 M^2
EST. GFR  (NON AFRICAN AMERICAN): >60 ML/MIN/1.73 M^2
GLUCOSE SERPL-MCNC: 88 MG/DL (ref 70–110)
HCT VFR BLD AUTO: 33.4 % (ref 40–54)
HGB BLD-MCNC: 10.7 G/DL (ref 14–18)
IMM GRANULOCYTES # BLD AUTO: 0.03 K/UL (ref 0–0.04)
IMM GRANULOCYTES NFR BLD AUTO: 0.5 % (ref 0–0.5)
LYMPHOCYTES # BLD AUTO: 1.5 K/UL (ref 1–4.8)
LYMPHOCYTES NFR BLD: 22.5 % (ref 18–48)
MAGNESIUM SERPL-MCNC: 2.2 MG/DL (ref 1.6–2.6)
MCH RBC QN AUTO: 29.9 PG (ref 27–31)
MCHC RBC AUTO-ENTMCNC: 32 G/DL (ref 32–36)
MCV RBC AUTO: 93 FL (ref 82–98)
MONOCYTES # BLD AUTO: 0.8 K/UL (ref 0.3–1)
MONOCYTES NFR BLD: 12.6 % (ref 4–15)
NEUTROPHILS # BLD AUTO: 4.1 K/UL (ref 1.8–7.7)
NEUTROPHILS NFR BLD: 62.1 % (ref 38–73)
NRBC BLD-RTO: 0 /100 WBC
PHOSPHATE SERPL-MCNC: 2.4 MG/DL (ref 2.7–4.5)
PLATELET # BLD AUTO: 266 K/UL (ref 150–350)
PMV BLD AUTO: 10.2 FL (ref 9.2–12.9)
POCT GLUCOSE: 104 MG/DL (ref 70–110)
POCT GLUCOSE: 105 MG/DL (ref 70–110)
POCT GLUCOSE: 89 MG/DL (ref 70–110)
POTASSIUM SERPL-SCNC: 3.8 MMOL/L (ref 3.5–5.1)
PROT SERPL-MCNC: 6.2 G/DL (ref 6–8.4)
RBC # BLD AUTO: 3.58 M/UL (ref 4.6–6.2)
SODIUM SERPL-SCNC: 145 MMOL/L (ref 136–145)
WBC # BLD AUTO: 6.53 K/UL (ref 3.9–12.7)

## 2020-08-16 PROCEDURE — 80053 COMPREHEN METABOLIC PANEL: CPT

## 2020-08-16 PROCEDURE — 83735 ASSAY OF MAGNESIUM: CPT

## 2020-08-16 PROCEDURE — 25000003 PHARM REV CODE 250: Performed by: NURSE PRACTITIONER

## 2020-08-16 PROCEDURE — 27000221 HC OXYGEN, UP TO 24 HOURS

## 2020-08-16 PROCEDURE — 36415 COLL VENOUS BLD VENIPUNCTURE: CPT

## 2020-08-16 PROCEDURE — 99900035 HC TECH TIME PER 15 MIN (STAT)

## 2020-08-16 PROCEDURE — 63600175 PHARM REV CODE 636 W HCPCS: Performed by: NURSE PRACTITIONER

## 2020-08-16 PROCEDURE — C9113 INJ PANTOPRAZOLE SODIUM, VIA: HCPCS | Performed by: NURSE PRACTITIONER

## 2020-08-16 PROCEDURE — 12000002 HC ACUTE/MED SURGE SEMI-PRIVATE ROOM

## 2020-08-16 PROCEDURE — 84100 ASSAY OF PHOSPHORUS: CPT

## 2020-08-16 PROCEDURE — 85025 COMPLETE CBC W/AUTO DIFF WBC: CPT

## 2020-08-16 PROCEDURE — 94761 N-INVAS EAR/PLS OXIMETRY MLT: CPT

## 2020-08-16 RX ADMIN — PIPERACILLIN AND TAZOBACTAM 4.5 G: 4; .5 INJECTION, POWDER, FOR SOLUTION INTRAVENOUS at 02:08

## 2020-08-16 RX ADMIN — PIPERACILLIN AND TAZOBACTAM 4.5 G: 4; .5 INJECTION, POWDER, FOR SOLUTION INTRAVENOUS at 03:08

## 2020-08-16 RX ADMIN — PANTOPRAZOLE SODIUM 40 MG: 40 INJECTION, POWDER, LYOPHILIZED, FOR SOLUTION INTRAVENOUS at 08:08

## 2020-08-16 RX ADMIN — PIPERACILLIN AND TAZOBACTAM 4.5 G: 4; .5 INJECTION, POWDER, FOR SOLUTION INTRAVENOUS at 08:08

## 2020-08-16 RX ADMIN — PIPERACILLIN AND TAZOBACTAM 4.5 G: 4; .5 INJECTION, POWDER, FOR SOLUTION INTRAVENOUS at 09:08

## 2020-08-16 RX ADMIN — SODIUM CHLORIDE: 0.9 INJECTION, SOLUTION INTRAVENOUS at 09:08

## 2020-08-16 NOTE — PROGRESS NOTES
Feeling better  Passing flatus and had 4 loose BMs since this AM AXR    NGT - min    Abd - S/NT/ND    AXR - improved with more gas in colon    I/P: POI, resolving  Clamp NGT  Trial of clears  If tolerates, will D/C NGT in AM

## 2020-08-16 NOTE — SUBJECTIVE & OBJECTIVE
Interval History:       NGT - 100cc      Medications:  Continuous Infusions:   sodium chloride 0.9% 100 mL/hr at 08/15/20 2121     Scheduled Meds:   pantoprazole  40 mg Intravenous Daily    piperacillin-tazobactam (ZOSYN) IVPB  4.5 g Intravenous Q6H     PRN Meds:acetaminophen, albuterol-ipratropium, dextrose 50%, dextrose 50%, glucagon (human recombinant), glucose, glucose, insulin aspart U-100, ketorolac, ondansetron, sodium chloride 0.9%     Review of patient's allergies indicates:  No Known Allergies  Objective:     Vital Signs (Most Recent):  Temp: 98.2 °F (36.8 °C) (08/16/20 0745)  Pulse: 60 (08/16/20 0745)  Resp: 18 (08/16/20 0745)  BP: (!) 196/79 (08/16/20 0745)  SpO2: 96 % (08/16/20 1017) Vital Signs (24h Range):  Temp:  [97.8 °F (36.6 °C)-98.4 °F (36.9 °C)] 98.2 °F (36.8 °C)  Pulse:  [60-68] 60  Resp:  [17-20] 18  SpO2:  [92 %-98 %] 96 %  BP: (145-196)/(64-79) 196/79     Weight: 90.3 kg (199 lb 1.6 oz)  Body mass index is 27 kg/m².    Intake/Output - Last 3 Shifts       08/14 0700 - 08/15 0659 08/15 0700 - 08/16 0659 08/16 0700 - 08/17 0659    P.O. 0 0     IV Piggyback  300     Total Intake(mL/kg) 0 (0) 300 (3.3)     Urine (mL/kg/hr)  500 (0.2)     Drains 800 1000     Stool  0     Total Output 800 1500     Net -800 -1200            Urine Occurrence 1 x 2 x 2 x    Stool Occurrence 0 x 2 x           Physical Exam    Significant Labs:  CBC:   Recent Labs   Lab 08/16/20 0347   WBC 6.53   RBC 3.58*   HGB 10.7*   HCT 33.4*      MCV 93   MCH 29.9   MCHC 32.0     BMP:   Recent Labs   Lab 08/16/20  0347   GLU 88      K 3.8      CO2 26   BUN 18   CREATININE 0.9   CALCIUM 10.7*   MG 2.2       Significant Diagnostics:  I have reviewed all pertinent imaging results/findings within the past 24 hours.       AXR -   FINDINGS:  The nasogastric tube remains in satisfactory position.  There are numerous external artifacts.  There is continued visualization of dilated small bowel loops in the upper  abdomen with air-fluid levels on the upright view.  There is more gas present distally in the colon on the current study.  There is no free air.     Impression:     Mild improvement in ileus versus partial small bowel obstruction.

## 2020-08-16 NOTE — PLAN OF CARE
A&Ox4. Pt educated to call for assistance. Plan of care discussed with patient, all questions answered. No c/o pain voiced at this time. Comfort level maintained. Pt remains free from fall/injury throughout shift. NG tube in place to (L) nare, patent on low intermittent suction putting out small amount of green/bile. (200ml noted in canister) IV fluids infusing per orders, pt tolerating well. Blood glucose levels monitored per orders. NPO status maintained per orders. Bowel sounds hypoactive x4 quads. Pt passing flatus. X1 small bowel movement during HS shift. Steri-strips to mid-line abdominal incision clean, dry and intact. 02 in place per NC, pt tolerating well. Non-skid socks in place when pt out of bed. Bed in lowest position, wheels locked, side rails up x2, call light within reach, bed alarm set and sounding. No distress noted at this time. Will Continue to observe.

## 2020-08-16 NOTE — PROGRESS NOTES
[facility] Hospital Medicine  Progress Note    Patient Name: Papito Hutton  MRN: [id number]  Patient Class: IP- Inpatient   Admission Date: 8/14/2020  Length of Stay: 2 days  Attending Physician: Phyllis Lerma MD  Primary Care Provider: Meggan Ramachandran MD        Subjective:     Principal Problem:SBO (small bowel obstruction)        HPI:  Papito Hutton is a 75 y.o. male with a PMHx significant for HLD, HTN, type 2 DM and colonic polyps who presented to the ED with c/o vomiting x 1 day.  Pt is s/p right hemicolectomy on 8/10/20.  Pt notes the swelling to his abdomen has improved since the surgery. The patient denies fever, chest pain, SOB, diarrhea, or any other symptoms at this time.  CT abdomen/pelvis reveals findings consistent with high grade small-bowel obstruction.  The ED physician discussed the case with general surgery and recommendations are noted.  IV zosyn was initiated and an NGT was placed.  The patient will be admitted under hospital medicine for further work up and evaluation.    Overview/Hospital Course:  No notes on file    Interval History: feeling better with NGT in place. Decreased abdominal distention and denies abdominal pain. + surgical pain only.  Sitting up in chair.   NPO     XR abd. Slightly improvement of SBO      Review of Systems   Constitutional: Positive for activity change and appetite change. Negative for chills and diaphoresis.   Respiratory: Negative for cough, shortness of breath and wheezing.    Cardiovascular: Negative for chest pain and palpitations.   Gastrointestinal: Positive for abdominal pain and nausea. Negative for blood in stool.   Genitourinary: Negative for dysuria and hematuria.   Musculoskeletal: Negative for arthralgias.   Skin:        Surgical site looks good.   Neurological: Negative for syncope and light-headedness.   Psychiatric/Behavioral: Negative for agitation and confusion. The patient is not nervous/anxious.       Objective:     Vital Signs (Most Recent):  Temp: 97.8 °F (36.6 °C) (08/16/20 0305)  Pulse: 60 (08/16/20 0745)  Resp: 18 (08/16/20 0745)  BP: (!) 196/79 (08/16/20 0745)  SpO2: 95 % (08/16/20 1017) Vital Signs (24h Range):  Temp:  [97.8 °F (36.6 °C)-98.6 °F (37 °C)] 97.8 °F (36.6 °C)  Pulse:  [60-68] 60  Resp:  [16-20] 18  SpO2:  [92 %-98 %] 95 %  BP: (145-196)/(64-79) 196/79     Weight: 90.3 kg (199 lb 1.6 oz)  Body mass index is 27 kg/m².    Intake/Output Summary (Last 24 hours) at 8/16/2020 1057  Last data filed at 8/15/2020 1800  Gross per 24 hour   Intake 300 ml   Output 600 ml   Net -300 ml      Physical Exam  Vitals signs and nursing note reviewed.   Constitutional:       Appearance: Normal appearance. He is well-developed.   HENT:      Head: Normocephalic and atraumatic.      Right Ear: External ear normal.      Left Ear: External ear normal.      Nose: Nose normal.      Mouth/Throat:      Mouth: Mucous membranes are moist.   Eyes:      Conjunctiva/sclera: Conjunctivae normal.      Pupils: Pupils are equal, round, and reactive to light.   Neck:      Musculoskeletal: Normal range of motion and neck supple.   Cardiovascular:      Rate and Rhythm: Normal rate and regular rhythm.      Heart sounds: Normal heart sounds.   Pulmonary:      Effort: Pulmonary effort is normal.      Breath sounds: Normal breath sounds. No wheezing.   Abdominal:      General: Bowel sounds are normal. There is distension (mild).      Palpations: Abdomen is soft.      Tenderness: There is abdominal tenderness (mid abdomen ).      Comments: NGT with green bile   Musculoskeletal: Normal range of motion.   Skin:     General: Skin is warm and dry.      Findings: No bruising.   Neurological:      Mental Status: He is alert and oriented to person, place, and time.   Psychiatric:         Behavior: Behavior normal.         Significant Labs:   CBC:   Recent Labs   Lab 08/14/20  1641 08/15/20  0456 08/16/20  0347   WBC 8.66 6.25 6.53   HGB 12.9*  10.7* 10.7*   HCT 39.2* 34.2* 33.4*    271 266     CMP:   Recent Labs   Lab 08/14/20  1751 08/15/20  0456 08/16/20  0347    142 145   K 4.3 3.3* 3.8    102 107   CO2 30* 31* 26   * 148* 88   BUN 16 19 18   CREATININE 1.0 1.0 0.9   CALCIUM 11.2* 10.6* 10.7*   PROT 6.6 6.0 6.2   ALBUMIN 3.0* 2.7* 2.7*   BILITOT 1.0 1.3* 0.7   ALKPHOS 87 96 115   AST 14 17 20   ALT 19 20 22   ANIONGAP 11 9 12   EGFRNONAA >60 >60 >60       Significant Imaging: I have reviewed and interpreted all pertinent imaging results/findings within the past 24 hours.      Assessment/Plan:      * SBO (small bowel obstruction)  -CT abd/pelvis reviewed: Findings consistent with small-bowel obstruction appearing high-grade with severe distention of small bowel and transitional zone appearing distal small bowel near the anastomosis in this patient with right hemicolectomy.  Small amount of ascites.  Small bilateral pleural effusions and posterior bibasilar consolidation and atelectasis.  Mesenteric fat stranding which could be on a postoperative basis represent infectious/inflammatory process.  3.1 cm collection containing small amount of air right upper quadrant of the abdomen not seen to communicate with bowel and could represent small abscess or postsurgical collection.  -NPO  -Surgery consulted.  -Pain and nausea control.  -Started on IV zosyn.  -Continue IVF.  -NG tube to LIWS.    Serial XR abdomen. Slowly improving.    Hypokalemia  Replace with IV supplementation. Trend K      Type 2 diabetes mellitus, without long-term current use of insulin     Patient's FSGs are controlled on current hypoglycemics.   Last A1c reviewed-   Lab Results   Component Value Date    HGBA1C 5.9 (H) 07/16/2020     Most recent fingerstick glucose reviewed-   Recent Labs   Lab 08/14/20  2242   POCTGLUCOSE 142*     Current correctional scale  Low  Maintain anti-hyperglycemic dose as follows-   Antihyperglycemics (From admission, onward)    Start      Stop Route Frequency Ordered    08/14/20 2058  insulin aspart U-100 pen 0-5 Units      -- SubQ Before meals & nightly PRN 08/14/20 2047        Accuchecks AC/HS  Low dose sliding scale PRN.  Currently NPO, diabetic diet when able to tolerate.    GERD (gastroesophageal reflux disease)  Chronic, stable. Continue PPI.    Essential hypertension  Chronic, controlled.  Latest blood pressure and vitals reviewed-   Temp:  [96.4 °F (35.8 °C)-98.3 °F (36.8 °C)]   Pulse:  []   Resp:  [18-20]   BP: (142-167)/(65-74)   SpO2:  [91 %-94 %] .   Home meds for hypertension were reviewed and noted below. Hospital anti-hypertensive changes were made as shown below.  Hypertension Medications             amLODIPine-benazepriL (LOTREL) 10-40 mg per capsule Take 1 capsule by mouth once daily.    hydroCHLOROthiazide (HYDRODIURIL) 12.5 MG Tab Take 1 tablet (12.5 mg total) by mouth every other day.    TOPROL XL 25 mg 24 hr tablet TAKE 1 TABLET DAILY      Patient currently NPO with SBO. Hold home anti-hypertensives and restart when clinically indicated.  Will utilize p.r.n. blood pressure medication only if patient's blood pressure greater than  180/110 and he develops symptoms such as worsening chest pain or shortness of breath.    Hyperlipidemia   Patient is chronically on statin.will not continue for now 2/2 SBO, restart when clinically indicated. Monitor clinically. Last LDL was   Lab Results   Component Value Date    LDLCALC 87.4 02/03/2020         VTE Risk Mitigation (From admission, onward)         Ordered     IP VTE HIGH RISK PATIENT  Once      08/14/20 2047     Place sequential compression device  Until discontinued      08/14/20 2047                Discharge Planning   ROCIO: 8/18/2020     Code Status: Full Code   Is the patient medically ready for discharge?:     Reason for patient still in hospital (select all that apply): Patient trending condition, Imaging and Consult recommendations  Discharge Plan A: Home, Home with family                   Elizabeth Lyons NP  Department of Hospital Medicine   [facility]

## 2020-08-16 NOTE — SUBJECTIVE & OBJECTIVE
Interval History: feeling better with NGT in place. Decreased abdominal distention and denies abdominal pain. + surgical pain only.  Sitting up in chair.   NPO     XR abd. Slightly improvement of SBO      Review of Systems   Constitutional: Positive for activity change and appetite change. Negative for chills and diaphoresis.   Respiratory: Negative for cough, shortness of breath and wheezing.    Cardiovascular: Negative for chest pain and palpitations.   Gastrointestinal: Positive for abdominal pain and nausea. Negative for blood in stool.   Genitourinary: Negative for dysuria and hematuria.   Musculoskeletal: Negative for arthralgias.   Skin:        Surgical site looks good.   Neurological: Negative for syncope and light-headedness.   Psychiatric/Behavioral: Negative for agitation and confusion. The patient is not nervous/anxious.      Objective:     Vital Signs (Most Recent):  Temp: 97.8 °F (36.6 °C) (08/16/20 0305)  Pulse: 60 (08/16/20 0745)  Resp: 18 (08/16/20 0745)  BP: (!) 196/79 (08/16/20 0745)  SpO2: 95 % (08/16/20 1017) Vital Signs (24h Range):  Temp:  [97.8 °F (36.6 °C)-98.6 °F (37 °C)] 97.8 °F (36.6 °C)  Pulse:  [60-68] 60  Resp:  [16-20] 18  SpO2:  [92 %-98 %] 95 %  BP: (145-196)/(64-79) 196/79     Weight: 90.3 kg (199 lb 1.6 oz)  Body mass index is 27 kg/m².    Intake/Output Summary (Last 24 hours) at 8/16/2020 1057  Last data filed at 8/15/2020 1800  Gross per 24 hour   Intake 300 ml   Output 600 ml   Net -300 ml      Physical Exam  Vitals signs and nursing note reviewed.   Constitutional:       Appearance: Normal appearance. He is well-developed.   HENT:      Head: Normocephalic and atraumatic.      Right Ear: External ear normal.      Left Ear: External ear normal.      Nose: Nose normal.      Mouth/Throat:      Mouth: Mucous membranes are moist.   Eyes:      Conjunctiva/sclera: Conjunctivae normal.      Pupils: Pupils are equal, round, and reactive to light.   Neck:      Musculoskeletal: Normal  range of motion and neck supple.   Cardiovascular:      Rate and Rhythm: Normal rate and regular rhythm.      Heart sounds: Normal heart sounds.   Pulmonary:      Effort: Pulmonary effort is normal.      Breath sounds: Normal breath sounds. No wheezing.   Abdominal:      General: Bowel sounds are normal. There is distension (mild).      Palpations: Abdomen is soft.      Tenderness: There is abdominal tenderness (mid abdomen ).      Comments: NGT with green bile   Musculoskeletal: Normal range of motion.   Skin:     General: Skin is warm and dry.      Findings: No bruising.   Neurological:      Mental Status: He is alert and oriented to person, place, and time.   Psychiatric:         Behavior: Behavior normal.         Significant Labs:   CBC:   Recent Labs   Lab 08/14/20  1641 08/15/20  0456 08/16/20  0347   WBC 8.66 6.25 6.53   HGB 12.9* 10.7* 10.7*   HCT 39.2* 34.2* 33.4*    271 266     CMP:   Recent Labs   Lab 08/14/20  1751 08/15/20  0456 08/16/20  0347    142 145   K 4.3 3.3* 3.8    102 107   CO2 30* 31* 26   * 148* 88   BUN 16 19 18   CREATININE 1.0 1.0 0.9   CALCIUM 11.2* 10.6* 10.7*   PROT 6.6 6.0 6.2   ALBUMIN 3.0* 2.7* 2.7*   BILITOT 1.0 1.3* 0.7   ALKPHOS 87 96 115   AST 14 17 20   ALT 19 20 22   ANIONGAP 11 9 12   EGFRNONAA >60 >60 >60       Significant Imaging: I have reviewed and interpreted all pertinent imaging results/findings within the past 24 hours.

## 2020-08-16 NOTE — RESPIRATORY THERAPY
08/15/20 2017   Patient Assessment/Suction   Level of Consciousness (AVPU) alert   Respiratory Effort Normal;Unlabored   Expansion/Accessory Muscles/Retractions expansion symmetric;no retractions;no use of accessory muscles   All Lung Fields Breath Sounds diminished   PRE-TX-O2   O2 Device (Oxygen Therapy) nasal cannula   Flow (L/min) 2   Oxygen Concentration (%) 28   SpO2 96 %   Aerosol Therapy   $ Aerosol Therapy Charges PRN treatment not required   Respiratory Treatment Status (SVN) PRN treatment not required   Incentive Spirometer   $ Incentive Spirometer Charges done independently per patient   Ready to Wean/Extubation Screen   FIO2<=50 (chart decimal) 0.28

## 2020-08-16 NOTE — ASSESSMENT & PLAN NOTE
-CT abd/pelvis reviewed: Findings consistent with small-bowel obstruction appearing high-grade with severe distention of small bowel and transitional zone appearing distal small bowel near the anastomosis in this patient with right hemicolectomy.  Small amount of ascites.  Small bilateral pleural effusions and posterior bibasilar consolidation and atelectasis.  Mesenteric fat stranding which could be on a postoperative basis represent infectious/inflammatory process.  3.1 cm collection containing small amount of air right upper quadrant of the abdomen not seen to communicate with bowel and could represent small abscess or postsurgical collection.  -NPO  -Surgery consulted.  -Pain and nausea control.  -Started on IV zosyn.  -Continue IVF.  -NG tube to LIWS.    Serial XR abdomen. Slowly improving.

## 2020-08-16 NOTE — NURSING
NGT clamped per Dr. James. Started clear liquid diet, educated pt to take it slow and sip the liquids so as not to cause any stomach irritation. Pt voiced understanding.

## 2020-08-16 NOTE — CARE UPDATE
08/16/20 1017   PRE-TX-O2   O2 Device (Oxygen Therapy) nasal cannula   $ Is the patient on Low Flow Oxygen? Yes   SpO2 96 %   Pulse Oximetry Type Intermittent   $ Pulse Oximetry - Multiple Charge Pulse Oximetry - Multiple   Aerosol Therapy   $ Aerosol Therapy Charges PRN treatment not required   Incentive Spirometer   $ Incentive Spirometer Charges done independently per patient;ready for self-administration;proper technique demonstrated   Administration (IS) self-administered   Number of Repetitions (IS) 10   Level Incentive Spirometer (mL) 1750   Patient Tolerance (IS) good

## 2020-08-16 NOTE — PLAN OF CARE
Pt is  AAOX4. POC reviewed with pt. Pt verbalized understanding. VSS. Remains afebrile. Pt ambulating in room and rose safely. Blood glucose monitoring per MD orders.  IVF infusing per order. IV abx infused per order. Pain controlled with PRN medications. Remains free of injury. Bed low, breaks locked, call light in reach. Will monitor.

## 2020-08-17 VITALS
RESPIRATION RATE: 18 BRPM | HEART RATE: 57 BPM | TEMPERATURE: 99 F | OXYGEN SATURATION: 96 % | BODY MASS INDEX: 26.97 KG/M2 | SYSTOLIC BLOOD PRESSURE: 155 MMHG | HEIGHT: 72 IN | WEIGHT: 199.13 LBS | DIASTOLIC BLOOD PRESSURE: 70 MMHG

## 2020-08-17 LAB
ALBUMIN SERPL BCP-MCNC: 2.5 G/DL (ref 3.5–5.2)
ALP SERPL-CCNC: 126 U/L (ref 55–135)
ALT SERPL W/O P-5'-P-CCNC: 22 U/L (ref 10–44)
ANION GAP SERPL CALC-SCNC: 13 MMOL/L (ref 8–16)
AST SERPL-CCNC: 20 U/L (ref 10–40)
BASOPHILS # BLD AUTO: 0.03 K/UL (ref 0–0.2)
BASOPHILS NFR BLD: 0.4 % (ref 0–1.9)
BILIRUB SERPL-MCNC: 0.9 MG/DL (ref 0.1–1)
BUN SERPL-MCNC: 15 MG/DL (ref 8–23)
CALCIUM SERPL-MCNC: 9.8 MG/DL (ref 8.7–10.5)
CHLORIDE SERPL-SCNC: 109 MMOL/L (ref 95–110)
CO2 SERPL-SCNC: 21 MMOL/L (ref 23–29)
CREAT SERPL-MCNC: 0.8 MG/DL (ref 0.5–1.4)
CRP SERPL-MCNC: 69.3 MG/L (ref 0–8.2)
DIFFERENTIAL METHOD: ABNORMAL
EOSINOPHIL # BLD AUTO: 0.2 K/UL (ref 0–0.5)
EOSINOPHIL NFR BLD: 2.7 % (ref 0–8)
ERYTHROCYTE [DISTWIDTH] IN BLOOD BY AUTOMATED COUNT: 13.2 % (ref 11.5–14.5)
EST. GFR  (AFRICAN AMERICAN): >60 ML/MIN/1.73 M^2
EST. GFR  (NON AFRICAN AMERICAN): >60 ML/MIN/1.73 M^2
GLUCOSE SERPL-MCNC: 93 MG/DL (ref 70–110)
HCT VFR BLD AUTO: 30.1 % (ref 40–54)
HGB BLD-MCNC: 9.8 G/DL (ref 14–18)
IMM GRANULOCYTES # BLD AUTO: 0.04 K/UL (ref 0–0.04)
IMM GRANULOCYTES NFR BLD AUTO: 0.6 % (ref 0–0.5)
LYMPHOCYTES # BLD AUTO: 1.8 K/UL (ref 1–4.8)
LYMPHOCYTES NFR BLD: 25.7 % (ref 18–48)
MAGNESIUM SERPL-MCNC: 2 MG/DL (ref 1.6–2.6)
MCH RBC QN AUTO: 29.7 PG (ref 27–31)
MCHC RBC AUTO-ENTMCNC: 32.6 G/DL (ref 32–36)
MCV RBC AUTO: 91 FL (ref 82–98)
MONOCYTES # BLD AUTO: 0.7 K/UL (ref 0.3–1)
MONOCYTES NFR BLD: 9.4 % (ref 4–15)
NEUTROPHILS # BLD AUTO: 4.4 K/UL (ref 1.8–7.7)
NEUTROPHILS NFR BLD: 61.2 % (ref 38–73)
NRBC BLD-RTO: 0 /100 WBC
PHOSPHATE SERPL-MCNC: 2.2 MG/DL (ref 2.7–4.5)
PLATELET # BLD AUTO: 280 K/UL (ref 150–350)
PMV BLD AUTO: 9.9 FL (ref 9.2–12.9)
POCT GLUCOSE: 97 MG/DL (ref 70–110)
POTASSIUM SERPL-SCNC: 3.5 MMOL/L (ref 3.5–5.1)
PROT SERPL-MCNC: 5.7 G/DL (ref 6–8.4)
RBC # BLD AUTO: 3.3 M/UL (ref 4.6–6.2)
SODIUM SERPL-SCNC: 143 MMOL/L (ref 136–145)
WBC # BLD AUTO: 7.13 K/UL (ref 3.9–12.7)

## 2020-08-17 PROCEDURE — 85025 COMPLETE CBC W/AUTO DIFF WBC: CPT

## 2020-08-17 PROCEDURE — 86140 C-REACTIVE PROTEIN: CPT

## 2020-08-17 PROCEDURE — 80053 COMPREHEN METABOLIC PANEL: CPT

## 2020-08-17 PROCEDURE — 94761 N-INVAS EAR/PLS OXIMETRY MLT: CPT

## 2020-08-17 PROCEDURE — 99900035 HC TECH TIME PER 15 MIN (STAT)

## 2020-08-17 PROCEDURE — 63600175 PHARM REV CODE 636 W HCPCS: Performed by: NURSE PRACTITIONER

## 2020-08-17 PROCEDURE — C9113 INJ PANTOPRAZOLE SODIUM, VIA: HCPCS | Performed by: NURSE PRACTITIONER

## 2020-08-17 PROCEDURE — 83735 ASSAY OF MAGNESIUM: CPT

## 2020-08-17 PROCEDURE — 84100 ASSAY OF PHOSPHORUS: CPT

## 2020-08-17 PROCEDURE — 25000003 PHARM REV CODE 250: Performed by: NURSE PRACTITIONER

## 2020-08-17 PROCEDURE — 25000242 PHARM REV CODE 250 ALT 637 W/ HCPCS: Performed by: NURSE PRACTITIONER

## 2020-08-17 PROCEDURE — 36415 COLL VENOUS BLD VENIPUNCTURE: CPT

## 2020-08-17 RX ORDER — FLUTICASONE PROPIONATE 50 MCG
2 SPRAY, SUSPENSION (ML) NASAL DAILY
Status: DISCONTINUED | OUTPATIENT
Start: 2020-08-17 | End: 2020-08-17 | Stop reason: SDUPTHER

## 2020-08-17 RX ORDER — FLUTICASONE PROPIONATE 50 MCG
2 SPRAY, SUSPENSION (ML) NASAL DAILY
Status: DISCONTINUED | OUTPATIENT
Start: 2020-08-17 | End: 2020-08-17 | Stop reason: HOSPADM

## 2020-08-17 RX ORDER — HYDRALAZINE HYDROCHLORIDE 20 MG/ML
10 INJECTION INTRAMUSCULAR; INTRAVENOUS ONCE
Status: COMPLETED | OUTPATIENT
Start: 2020-08-17 | End: 2020-08-17

## 2020-08-17 RX ORDER — AMOXICILLIN AND CLAVULANATE POTASSIUM 875; 125 MG/1; MG/1
1 TABLET, FILM COATED ORAL 2 TIMES DAILY
Qty: 14 TABLET | Refills: 0 | Status: SHIPPED | OUTPATIENT
Start: 2020-08-17 | End: 2020-08-24

## 2020-08-17 RX ADMIN — HYDRALAZINE HYDROCHLORIDE 10 MG: 20 INJECTION INTRAMUSCULAR; INTRAVENOUS at 05:08

## 2020-08-17 RX ADMIN — PANTOPRAZOLE SODIUM 40 MG: 40 INJECTION, POWDER, LYOPHILIZED, FOR SOLUTION INTRAVENOUS at 10:08

## 2020-08-17 RX ADMIN — FLUTICASONE PROPIONATE 100 MCG: 50 SPRAY, METERED NASAL at 01:08

## 2020-08-17 RX ADMIN — PIPERACILLIN AND TAZOBACTAM 4.5 G: 4; .5 INJECTION, POWDER, FOR SOLUTION INTRAVENOUS at 03:08

## 2020-08-17 RX ADMIN — PIPERACILLIN AND TAZOBACTAM 4.5 G: 4; .5 INJECTION, POWDER, FOR SOLUTION INTRAVENOUS at 10:08

## 2020-08-17 NOTE — PROGRESS NOTES
Pt seen and examinex.   Resting comfortably.  Denies abd pain.   Reports flatus and BM  Wants to go home.  Tolerating clears.  NG removed this am    Wt Readings from Last 3 Encounters:   08/14/20 90.3 kg (199 lb 1.6 oz)   08/10/20 95.3 kg (210 lb)   08/07/20 95.6 kg (210 lb 12.2 oz)     Temp Readings from Last 3 Encounters:   08/17/20 96.1 °F (35.6 °C) (Oral)   08/12/20 97.5 °F (36.4 °C) (Oral)   08/07/20 97.8 °F (36.6 °C)     BP Readings from Last 3 Encounters:   08/17/20 (!) 159/70   08/12/20 (!) 159/70   08/07/20 132/62     Pulse Readings from Last 3 Encounters:   08/17/20 (!) 57   08/12/20 67   08/07/20 68     AAox3  CTA B  Sinus  Soft/nd/nt BS present  2+ pulses B    Lab Results   Component Value Date    WBC 7.13 08/17/2020    HGB 9.8 (L) 08/17/2020    HCT 30.1 (L) 08/17/2020    MCV 91 08/17/2020     08/17/2020       BMP  Lab Results   Component Value Date     08/17/2020    K 3.5 08/17/2020     08/17/2020    CO2 21 (L) 08/17/2020    BUN 15 08/17/2020    CREATININE 0.8 08/17/2020    CALCIUM 9.8 08/17/2020    ANIONGAP 13 08/17/2020    ESTGFRAFRICA >60 08/17/2020    EGFRNONAA >60 08/17/2020     A/P: s/p R jacky  Ambulate  Adv to full liquid diet  If tolerates advancement with no n/v ok to d/c home later this evening

## 2020-08-17 NOTE — PLAN OF CARE
A&Ox4. Pt educated to call for assistance. Plan of care discussed with patient, all questions answered. No c/o pain voiced at this time. Comfort level maintained. Pt remains free from fall/injury throughout shift. NG tube in place to (L) nare and clamped. Pt tolerating well. No s/s of skin breakdown noted at this time. IV fluids infusing per orders, pt tolerating well. Blood glucose levels monitored per orders. Clear liquid diet maintained per orders. Pt tolerating well. No Nausea and/or vomiting. Steri-strips to mid-line abdominal incision clean, dry and intact. 02 in place per NC, pt tolerating well. Blood glucose levels monitored per orders. Pt continues IV ABT per orders and tolerates well, no s/s of adverse reactions noted at this time. Non-skid socks in place when pt out of bed. Bed in lowest position, wheels locked, side rails up x2, call light within reach, bed alarm set and sounding. No distress noted at this time. Will Continue to observe.

## 2020-08-17 NOTE — NURSING
Discharge instructions given to patient.  Instructed on med regimen and f/u appoint.  Iv's and tele removed per policy, caths intact.  Pt v/u instructions.  Discharged to home.  Waiting on his ride.

## 2020-08-17 NOTE — PLAN OF CARE
POC reviewed with Patient Verbalizes understanding,Denies abdominal pain no c/o nausea or emesis BM x 3 this shift tolerating po intake well VSS afebrile no acute distress noted.

## 2020-08-17 NOTE — RESPIRATORY THERAPY
08/16/20 2033   Patient Assessment/Suction   Level of Consciousness (AVPU) alert   PRE-TX-O2   O2 Device (Oxygen Therapy) nasal cannula   Flow (L/min) 2   Oxygen Concentration (%) 28   SpO2 97 %   Aerosol Therapy   $ Aerosol Therapy Charges PRN treatment not required   Respiratory Treatment Status (SVN) PRN treatment not required   Incentive Spirometer   $ Incentive Spirometer Charges done independently per patient   Ready to Wean/Extubation Screen   FIO2<=50 (chart decimal) 0.28

## 2020-08-17 NOTE — CARE UPDATE
08/17/20 0830   Patient Assessment/Suction   Level of Consciousness (AVPU) alert   PRE-TX-O2   O2 Device (Oxygen Therapy) room air   SpO2 96 %   Pulse Oximetry Type Intermittent   $ Pulse Oximetry - Multiple Charge Pulse Oximetry - Multiple   Aerosol Therapy   $ Aerosol Therapy Charges PRN treatment not required   Incentive Spirometer   $ Incentive Spirometer Charges done independently per patient

## 2020-08-17 NOTE — PLAN OF CARE
Pt is cleared form  for D/C.       08/17/20 1014   Final Note   Assessment Type Final Discharge Note   Anticipated Discharge Disposition Home   Hospital Follow Up  Appt(s) scheduled? Yes

## 2020-08-17 NOTE — NURSING
Situation-   Who are you? Who is the patient? What is going on with the patient currently?          I am Nati Draper calling Sissy Soto from 4th floor Med. Surg in regards to Papito Hutton who is a 75 y.o. year old male admitted with SBO (small bowel obstruction) who increased BP   Background- What is the patient's significant medical history (CAD, ESRD, HIV positive, history of recent surgery/chemo.transfusion) and recent labs Has a past medical history of   Past Medical History:   Diagnosis Date    Arthritis     B12 deficiency     Colon polyp     Diabetes mellitus     BORDERLINE    GERD (gastroesophageal reflux disease)     [place]     BILAT AIDS    Hyperlipidemia     Hypertension     Low testosterone     Personal history of colonic polyps 2008    adenomatous polyp    Sinus disorder     Sleep apnea     DOES NOT USE MACHINE    Wears glasses    . Most recent vitals include  Temp:  [97.2 °F (36.2 °C)-98.7 °F (37.1 °C)]   Pulse:  [55-80]   Resp:  [18]   BP: (122-196)/(57-81)   SpO2:  [92 %-98 %]  and labs          Assessment-   Has the following issues (abnormal labs, abnormal vitals, change in status, uncontrolled symptoms, patient request) Who is having increased BP. BP has been running high since admission, but seems to be trending up higher. (189/81) Pt asymptomatic.    Recommendation- What is the change in the plan of care requested? Do you think we should- start pt on home meds to tx BP or prn order with parameters in place?   Plan-  What did we decide to do? This issue is considered     Plan was discussed and is as follows: Hydralazine 10mg IV x1 dose now

## 2020-08-18 ENCOUNTER — PATIENT OUTREACH (OUTPATIENT)
Dept: ADMINISTRATIVE | Facility: CLINIC | Age: 75
End: 2020-08-18

## 2020-08-18 ENCOUNTER — PATIENT MESSAGE (OUTPATIENT)
Dept: SURGERY | Facility: CLINIC | Age: 75
End: 2020-08-18

## 2020-08-18 ENCOUNTER — PATIENT MESSAGE (OUTPATIENT)
Dept: FAMILY MEDICINE | Facility: CLINIC | Age: 75
End: 2020-08-18

## 2020-08-18 NOTE — TELEPHONE ENCOUNTER
C3 nurse attempted to contact patient. No answer.  C3 nurse attempted to contact Papito Hutton  for a TCC post hospital discharge follow up call. No answer at phone number listed and no voicemail available. The patient has a HOSFU appointment with Rosemary Curry DNP on 8/26/2020 at 10:00 AM

## 2020-08-18 NOTE — HOSPITAL COURSE
Patient monitored closely during hospitalization He was initiated on IV fluids, IV antibiotics, and general surgery consulted. He was placed NPO and NGT inserted. Serial abd Xrays obtained. He his noted to have improvement of his SBO and diet was advanced as tolerated. He is doing well and is stable for DC from surgical standpoint.     PE: sitting up in chair.   Chest CTA heart RRR

## 2020-08-18 NOTE — DISCHARGE SUMMARY
[facility] Medicine  Discharge Summary      Patient Name: Papito Hutton  MRN: [id number]  Admission Date: 8/14/2020  Hospital Length of Stay: 3 days  Discharge Date and Time: 8/17/2020  3:55 PM  Attending Physician: No att. providers found   Discharging Provider: Elizabeth Lyons NP  Primary Care Provider: Meggan Ramachandran MD      HPI:   Papito Hutton is a 75 y.o. male with a PMHx significant for HLD, HTN, type 2 DM and colonic polyps who presented to the ED with c/o vomiting x 1 day.  Pt is s/p right hemicolectomy on 8/10/20.  Pt notes the swelling to his abdomen has improved since the surgery. The patient denies fever, chest pain, SOB, diarrhea, or any other symptoms at this time.  CT abdomen/pelvis reveals findings consistent with high grade small-bowel obstruction.  The ED physician discussed the case with general surgery and recommendations are noted.  IV zosyn was initiated and an NGT was placed.  The patient will be admitted under hospital medicine for further work up and evaluation.    * No surgery found *      Hospital Course:   Patient monitored closely during hospitalization He was initiated on IV fluids, IV antibiotics, and general surgery consulted. He was placed NPO and NGT inserted. Serial abd Xrays obtained. He his noted to have improvement of his SBO and diet was advanced as tolerated. He is doing well and is stable for DC from surgical standpoint.     PE: sitting up in chair.   Chest CTA heart RRR     Consults:   Consults (From admission, onward)        Status Ordering Provider     Case Management/  Once     Provider:  (Not yet assigned)    CLAUDIA Painter new Assessment & Plan notes have been filed under this hospital service since the last note was generated.  Service: Hospital Medicine    Final Active Diagnoses:    Diagnosis Date Noted POA    PRINCIPAL PROBLEM:  SBO (small bowel obstruction) [K56.609] 08/14/2020  Yes    Hypokalemia [E87.6] 08/15/2020 No    Type 2 diabetes mellitus, without long-term current use of insulin [E11.9] 08/10/2020 Yes    Hyperlipidemia [E78.5]  Yes    Essential hypertension [I10]  Yes    GERD (gastroesophageal reflux disease) [K21.9]  Yes      Problems Resolved During this Admission:       Discharged Condition: stable    Disposition: Home or Self Care    Follow Up:  Follow-up Information     Rosemary Curry DNP On 8/26/2020.    Specialties: Family Medicine, Emergency Medicine  Why: Hop f/u in s  Contact information:  [place] [address]  [phone]                 Patient Instructions:      Diet Cardiac     Notify your health care provider if you experience any of the following:  persistent dizziness, light-headedness, or visual disturbances     Notify your health care provider if you experience any of the following:  redness, tenderness, or signs of infection (pain, swelling, redness, odor or green/yellow discharge around incision site)     Notify your health care provider if you experience any of the following:  severe uncontrolled pain     Notify your health care provider if you experience any of the following:  persistent nausea and vomiting or diarrhea     Notify your health care provider if you experience any of the following:  temperature >100.4     Activity as tolerated       Significant Diagnostic Studies: Labs:   BMP:   Recent Labs   Lab 08/17/20  0524   GLU 93      K 3.5      CO2 21*   BUN 15   CREATININE 0.8   CALCIUM 9.8   MG 2.0    and CMP   Recent Labs   Lab 08/17/20  0524      K 3.5      CO2 21*   GLU 93   BUN 15   CREATININE 0.8   CALCIUM 9.8   PROT 5.7*   ALBUMIN 2.5*   BILITOT 0.9   ALKPHOS 126   AST 20   ALT 22   ANIONGAP 13   ESTGFRAFRICA >60   EGFRNONAA >60     Radiology: X-Ray: KUB: X-Ray Abdomen AP 1 View (KUB): No results found for this visit on 08/14/20.    Pending Diagnostic Studies:     None          Medications:  Reconciled Home Medications:      Medication List      START taking these medications    amoxicillin-clavulanate 875-125mg 875-125 mg per tablet  Commonly known as: AUGMENTIN  Take 1 tablet by mouth 2 (two) times daily. for 7 days        CHANGE how you take these medications    fluticasone propionate 50 mcg/actuation nasal spray  Commonly known as: FLONASE  USE 2 SPRAYS IN EACH NOSTRIL ONCE DAILY  What changed:   · how much to take  · how to take this  · when to take this  · additional instructions        CONTINUE taking these medications    acetaminophen 650 MG Tbsr  Commonly known as: TYLENOL  Take 1 tablet (650 mg total) by mouth every 8 (eight) hours as needed (do not take with any other tylenol or acetaminophen).     amLODIPine-benazepriL 10-40 mg per capsule  Commonly known as: LOTREL  Take 1 capsule by mouth once daily.     aspirin 81 MG Chew  Take 1 tablet (81 mg total) by mouth once daily.     * diclofenac sodium 1 % Gel  Commonly known as: VOLTAREN  Apply 2 g topically once daily.     * diclofenac 75 MG EC tablet  Commonly known as: VOLTAREN  Take 1 tablet (75 mg total) by mouth daily as needed (pain).     esomeprazole 40 MG capsule  Commonly known as: NexIUM  Take 1 capsule (40 mg total) by mouth before breakfast.     famotidine 20 MG tablet  Commonly known as: PEPCID  Take 1 tablet (20 mg total) by mouth 2 (two) times daily.     gabapentin 300 MG capsule  Commonly known as: NEURONTIN  Take 1 capsule (300 mg total) by mouth 3 (three) times daily.     hydroCHLOROthiazide 12.5 MG Tab  Commonly known as: HYDRODIURIL  Take 1 tablet (12.5 mg total) by mouth every other day.     ibuprofen 200 MG tablet  Commonly known as: ADVIL,MOTRIN  Take 3 tablets (600 mg total) by mouth every 8 (eight) hours as needed for Pain (Take with food).     metFORMIN 500 MG tablet  Commonly known as: GLUCOPHAGE  Take 1 tablet (500 mg total) by mouth 2 (two) times daily with meals.     metoclopramide HCl 10 MG  tablet  Commonly known as: REGLAN  Take 1 tablet (10 mg total) by mouth 3 (three) times daily before meals.     oxyCODONE-acetaminophen 5-325 mg per tablet  Commonly known as: PERCOCET  Take 1 tablet by mouth every 4 (four) hours as needed for Pain.     rosuvastatin 20 MG tablet  Commonly known as: CRESTOR  TAKE 1 TABLET DAILY     testosterone 10 mg/0.5 gram /actuation Glpm  Commonly known as: FORTESTA  Place 20 mg onto the skin once daily. Apply 2 pump actuations topically daily.  Start taking on: August 19, 2020     TOPROL XL 25 MG 24 hr tablet  Generic drug: metoprolol succinate  TAKE 1 TABLET DAILY     VITAMIN B-12 1000 MCG tablet  Generic drug: cyanocobalamin  Take 100 mcg by mouth once daily.     VITAMIN D3 ORAL  Take 1 capsule by mouth once daily.         * This list has 2 medication(s) that are the same as other medications prescribed for you. Read the directions carefully, and ask your doctor or other care provider to review them with you.            ASK your doctor about these medications    mupirocin 2 % ointment  Commonly known as: BACTROBAN  by Nasal route 2 (two) times daily. for 5 days  Ask about: Should I take this medication?            Indwelling Lines/Drains at time of discharge:   Lines/Drains/Airways     None                 Time spent on the discharge of patient: 50 minutes  Patient was seen and examined on the date of discharge and determined to be suitable for discharge.         Elizabeth Lyons NP  Department of Hospital Medicine  [facility]

## 2020-08-19 ENCOUNTER — TELEPHONE (OUTPATIENT)
Dept: MEDSURG UNIT | Facility: HOSPITAL | Age: 75
End: 2020-08-19

## 2020-08-20 NOTE — PHYSICIAN QUERY
PT Name: Papito Hutton  MR #: [id number]     Bowel Obstruction Clarification     CDS: Grace HARDING,RN        Contact information:[email or web address]  This form is a permanent document in the medical record.     Query Date: August 20, 2020    By submitting this query, we are merely seeking further clarification of documentation to reflect the severity of illness of your patient. Please utilize your independent clinical judgment when addressing the question(s) below.    The medical record reflects the following:     Indicators   Supporting Clinical Findings Location in Medical Record   x Bowel obstruction, intestinal obstruction, LBO or SBO documented SBO (small bowel obstruction)   8/14/20 [facility] Medicine H&P   x Radiology findings Persistent visualization of dilated small bowel loops noted in the upper abdomen, particularly in the left upper quadrant.  Very little distal colonic bowel gas is present. Persistent small bowel obstruction    The nasogastric tube remains in satisfactory position.  There are numerous external artifacts.  There is continued visualization of dilated small bowel loops in the upper abdomen with air-fluid levels on the upright view.  There is more gas present distally in the colon on the current study.  There is no free air.    Mild improvement in ileus versus partial small bowel obstruction.       8/15/20 Abdomen Portable X-Ray               8/16/20 Abdomen Flat and Erect X-Ray   x Treatment/Medication Continue bowel rest, NG tube, IV fluids for now. NG tube was placed.  8/14/20 [facility] Medicine H&P    Procedure/Surgery     x Other Papito Hutton is a 75 y.o. male with a PMHx significant for HLD, HTN, type 2 DM and colonic polyps who presented to the ED with c/o vomiting x 1 day.  Pt is s/p right hemicolectomy on 8/10/20.  Pt notes the swelling to his abdomen has improved since the surgery.       CT scan showed dilated small bowel and a small fluid collection  near the anastomosis, most likely postsurgical in nature, given afebrile and normal white count       8/14/20  [facility] Medicine H&P                    8/15/20 General Surgery Progress Notes                Provider, please further specify the type and etiology of the small bowel obstruction:  [   ] Partial or incomplete intestinal obstruction, due to adhesions   [   ] Partial or incomplete intestinal obstruction, due to other (please specify): ____________   [   ] Partial or incomplete intestinal obstruction, unknown or unspecified etiology   [   ] Postoperative partial or incomplete intestinal obstruction, a complication of procedure   [   ] Postoperative partial or incomplete intestinal obstruction, not a complication of procedure   [  x ] Other intestinal condition (please specify): ____Ileus.  Do not think he had obstruction_______________   [   ]  Clinically Undetermined     Present on admission (POA) status:    [ x  ] Yes (Y)                            [   ] No (N)                                  Please document in your progress notes daily for the duration of treatment until resolved, and include in your discharge summary.

## 2020-08-25 ENCOUNTER — OFFICE VISIT (OUTPATIENT)
Dept: SURGERY | Facility: CLINIC | Age: 75
End: 2020-08-25
Payer: MEDICARE

## 2020-08-25 VITALS — WEIGHT: 190.25 LBS | BODY MASS INDEX: 25.8 KG/M2

## 2020-08-25 DIAGNOSIS — Z09 POSTOP CHECK: Primary | ICD-10-CM

## 2020-08-25 PROCEDURE — 99999 PR PBB SHADOW E&M-EST. PATIENT-LVL IV: ICD-10-PCS | Mod: PBBFAC,,, | Performed by: SURGERY

## 2020-08-25 PROCEDURE — 99999 PR PBB SHADOW E&M-EST. PATIENT-LVL IV: CPT | Mod: PBBFAC,,, | Performed by: SURGERY

## 2020-08-25 PROCEDURE — 99024 POSTOP FOLLOW-UP VISIT: CPT | Mod: POP,,, | Performed by: SURGERY

## 2020-08-25 PROCEDURE — 99214 OFFICE O/P EST MOD 30 MIN: CPT | Mod: PBBFAC,PO | Performed by: SURGERY

## 2020-08-25 PROCEDURE — 99024 PR POST-OP FOLLOW-UP VISIT: ICD-10-PCS | Mod: POP,,, | Performed by: SURGERY

## 2020-08-25 NOTE — PROGRESS NOTES
Cc: post op    HPI: 75 y.o.  male  2 weeks s/p R jacky.   Pt required hospitalization secondary to ileus.  He has been doing better.     PE: AFVSS    AAOx3  CTA  Soft/NT/nd  Inc: c/d/i        Path:   1.  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   F/U with me in 3 weeks

## 2020-08-26 ENCOUNTER — OFFICE VISIT (OUTPATIENT)
Dept: FAMILY MEDICINE | Facility: CLINIC | Age: 75
End: 2020-08-26
Payer: MEDICARE

## 2020-08-26 VITALS
SYSTOLIC BLOOD PRESSURE: 114 MMHG | BODY MASS INDEX: 25.86 KG/M2 | TEMPERATURE: 97 F | DIASTOLIC BLOOD PRESSURE: 72 MMHG | HEIGHT: 72 IN | HEART RATE: 81 BPM | WEIGHT: 190.94 LBS | OXYGEN SATURATION: 100 %

## 2020-08-26 DIAGNOSIS — E78.5 HYPERLIPIDEMIA, UNSPECIFIED HYPERLIPIDEMIA TYPE: ICD-10-CM

## 2020-08-26 DIAGNOSIS — Z09 HOSPITAL DISCHARGE FOLLOW-UP: ICD-10-CM

## 2020-08-26 DIAGNOSIS — R09.81 NASAL CONGESTION: ICD-10-CM

## 2020-08-26 DIAGNOSIS — K21.9 GASTROESOPHAGEAL REFLUX DISEASE WITHOUT ESOPHAGITIS: ICD-10-CM

## 2020-08-26 DIAGNOSIS — I10 ESSENTIAL HYPERTENSION: ICD-10-CM

## 2020-08-26 DIAGNOSIS — K56.609 SBO (SMALL BOWEL OBSTRUCTION): Primary | ICD-10-CM

## 2020-08-26 PROCEDURE — 99999 PR PBB SHADOW E&M-EST. PATIENT-LVL V: CPT | Mod: PBBFAC,,, | Performed by: NURSE PRACTITIONER

## 2020-08-26 PROCEDURE — 99215 OFFICE O/P EST HI 40 MIN: CPT | Mod: PBBFAC,PO | Performed by: NURSE PRACTITIONER

## 2020-08-26 PROCEDURE — 99495 TCM SERVICES (MODERATE COMPLEXITY): ICD-10-PCS | Mod: S$PBB,,, | Performed by: NURSE PRACTITIONER

## 2020-08-26 PROCEDURE — 99495 TRANSJ CARE MGMT MOD F2F 14D: CPT | Mod: S$PBB,,, | Performed by: NURSE PRACTITIONER

## 2020-08-26 PROCEDURE — 99999 PR PBB SHADOW E&M-EST. PATIENT-LVL V: ICD-10-PCS | Mod: PBBFAC,,, | Performed by: NURSE PRACTITIONER

## 2020-08-26 RX ORDER — AMLODIPINE AND BENAZEPRIL HYDROCHLORIDE 5; 20 MG/1; MG/1
1 CAPSULE ORAL DAILY
Qty: 90 CAPSULE | Refills: 3 | Status: SHIPPED | OUTPATIENT
Start: 2020-08-26 | End: 2020-10-02 | Stop reason: DRUGHIGH

## 2020-08-26 RX ORDER — CETIRIZINE HYDROCHLORIDE 10 MG/1
10 TABLET ORAL NIGHTLY
Qty: 90 TABLET | Refills: 3 | Status: SHIPPED | OUTPATIENT
Start: 2020-08-26 | End: 2023-08-14 | Stop reason: SDUPTHER

## 2020-08-26 NOTE — PATIENT INSTRUCTIONS
Small Bowel Obstruction     Small bowel obstruction can lead to tissue damage and even tissue death.   A small bowel obstruction occurs when part or all of the small intestine (bowel) is blocked. As a result, digestive contents cant move through the bowel properly and out of the body. Treatment is needed right away to remove the blockage. This can ease painful symptoms. It can also prevent serious problems, such as tissue death or bursting (rupture) of the small bowel. Without treatment, a small bowel obstruction can be fatal.  Causes of small bowel obstruction  A small bowel obstruction can be caused by:  · Scar tissue (adhesions). These may form after belly (abdominal) surgery or an infection.  · Hernia. A hernia is when an organ pushes through a weak spot or tear in the abdomen wall. Part of the small bowel can push out and be seen as a bulge under the belly. Hernias can also occur internally.  · Certain health problems. These include when part of the bowel slides inside another part (intussusception). Other causes include irritable bowel disease such as Crohns disease, and inflammation and sores in the intestine (ulcerative colitis).  · Abnormal tissue growths (tumors). These can form on the inside or outside of the small bowel. They are usually due to cancer.  Symptoms of small bowel obstruction  Common symptoms include:  · Belly cramping and pain  · Belly swelling and bloating  · Upset stomach (nausea) and vomiting  · Can't  pass gas  · Can't pass stool (constipation)  · Diarrhea  Diagnosing small bowel obstruction  Your provider will ask about your symptoms and health history. Youll also have a physical exam. Tests may also be done to confirm the problem. These can include:  · Imaging tests. These provide pictures of the small bowel. Common tests include X-rays and a CT scan.  · Blood tests. These check for infection and other problems, such as excess fluid loss (dehydration).  · Upper GI  (gastrointestinal) series with a small bowel follow-through. This test takes X-rays of the upper digestive tract from the mouth through the small bowel. An X-ray dye (contrast fluid) is used. The dye coats the inside of your upper digestive tract so it will show up clearly on X-rays.  Treating small bowel obstruction  Treatment takes place in a hospital. As part of your care, the following may be done:  · No food or drink is given by mouth. This allows your bowels to rest.  · An IV (intravenous) line is placed in a vein in your arm or hand. The IV line is used to give fluids. It may also be used to give medicines. These may be needed to ease pain, nausea, and other symptoms. They may also be needed to treat or prevent infections.  · A soft, thin, flexible tube (nasogastric tube) is inserted through your nose and into your stomach. The tube is used to remove extra gas and fluid in your stomach and bowels. This helps to ease symptoms such as pain and swelling.  · In severe cases, surgery is done. This may be needed if the small bowel is almost or totally blocked, or there is a hole in the bowel (bowel perforation). During surgery, the blockage is removed. Parts of the bowel may also be removed if there is tissue death. Other repair may be done as well, depending on what caused the blockage. Your healthcare provider will give you more information about surgery, if needed.  · Youll be watched closely in the hospital until your symptoms improve. Your provider will tell you when you can go home.  Long-term concerns   After treatment, most people recover with no lasting effects. If a long part of the bowel is removed, there is a greater chance for lifelong digestive problems. Bowel movements may become irregular. Work with your provider to learn the best ways to manage any symptoms you may have, and to protect your health.  When to call your healthcare provider  Call your provider right away if you have any of the  following:  · Severe pain (Call 911)  · Belly swelling or cramping that wont go away  · Cant pass stool or gas  · Nausea or vomiting (especially if the vomit looks or smells like stool)   Date Last Reviewed: 7/1/2016  © 8103-0899 [employer]. [address] [place] [address], [place] [address]. All rights reserved. This information is not intended as a substitute for professional medical care. Always follow your healthcare professional's instructions.

## 2020-08-26 NOTE — PROGRESS NOTES
Patient ID: Papito Hutton is a 75 y.o. male.    Chief Complaint:   Hospital Follow Up    HPI   Mr. Hutton presents to clinic today for a hospital follow up after being treated for a small bowel obstruction 12 days ago. Patient has a history of right colon resection on 8/10/20 as well as HTN, HLD, diabetes, and GERD. He denies nausea, vomiting, diarrhea, constipation, fever, and chills. He started eating and tolerating solid food yesterday. He is tolerating liquids fine. He states his blood pressure has been running low at home and that Dr. Kaye suggested a reduction in BP medication dosage. Patient did not bring BP logs to office visit today. No other complaints reported at this time.  Patient is new to me. Reviewed past medical and social history.    Past Medical History:   Diagnosis Date    Arthritis     B12 deficiency     Colon polyp     Diabetes mellitus     BORDERLINE    GERD (gastroesophageal reflux disease)     [place] (hard of hearing)     BILAT AIDS    Hyperlipidemia     Hypertension     Low testosterone     Personal history of colonic polyps 2008    adenomatous polyp    Sinus disorder     Sleep apnea     DOES NOT USE MACHINE    Wears glasses      Past Surgical History:   Procedure Laterality Date    COLONOSCOPY  2008    Dr. Garcia; polyps removed    COLONOSCOPY N/A 7/13/2020    Procedure: COLONOSCOPY;  Surgeon: Jj Fried MD;  Location: [facility];  Service: Endoscopy;  Laterality: N/A;    LAPAROSCOPIC RIGHT COLON RESECTION Right 8/10/2020    Procedure: COLECTOMY, RIGHT, LAPAROSCOPIC pooss conversion to open;  Surgeon: Noble Kaye MD;  Location: [facility] OR;  Service: General;  Laterality: Right;    NASAL SEPTUM SURGERY      right hand surgery      RIGHT HEMICOLECTOMY N/A 8/10/2020    Procedure: HEMICOLECTOMY, RIGHT;  Surgeon: Noble Kaye MD;  Location: [facility] OR;  Service: General;  Laterality: N/A;    UPPER GASTROINTESTINAL ENDOSCOPY  2008    hiatal hernia;  esophagitis     Current Outpatient Medications on File Prior to Visit   Medication Sig Dispense Refill    acetaminophen (TYLENOL) 650 MG TbSR Take 1 tablet (650 mg total) by mouth every 8 (eight) hours as needed (do not take with any other tylenol or acetaminophen).  0    aspirin 81 MG Chew Take 1 tablet (81 mg total) by mouth once daily.  0    CHOLECALCIFEROL, VITAMIN D3, (VITAMIN D3 ORAL) Take 1 capsule by mouth once daily.      cyanocobalamin (VITAMIN B-12) 1000 MCG tablet Take 100 mcg by mouth once daily.      diclofenac (VOLTAREN) 75 MG EC tablet Take 1 tablet (75 mg total) by mouth daily as needed (pain). 90 tablet 1    diclofenac sodium (VOLTAREN) 1 % Gel Apply 2 g topically once daily. 100 g 1    esomeprazole (NEXIUM) 40 MG capsule Take 1 capsule (40 mg total) by mouth before breakfast. 90 capsule 1    famotidine (PEPCID) 20 MG tablet Take 1 tablet (20 mg total) by mouth 2 (two) times daily. 60 tablet 0    fluticasone propionate (FLONASE) 50 mcg/actuation nasal spray USE 2 SPRAYS IN EACH NOSTRIL ONCE DAILY (Patient taking differently: PRN) 48 g 4    gabapentin (NEURONTIN) 300 MG capsule Take 1 capsule (300 mg total) by mouth 3 (three) times daily. 90 capsule 0    hydroCHLOROthiazide (HYDRODIURIL) 12.5 MG Tab Take 1 tablet (12.5 mg total) by mouth every other day. 90 tablet 1    ibuprofen (ADVIL,MOTRIN) 200 MG tablet Take 3 tablets (600 mg total) by mouth every 8 (eight) hours as needed for Pain (Take with food).      metFORMIN (GLUCOPHAGE) 500 MG tablet Take 1 tablet (500 mg total) by mouth 2 (two) times daily with meals. 180 tablet 3    metoclopramide HCl (REGLAN) 10 MG tablet Take 1 tablet (10 mg total) by mouth 3 (three) times daily before meals. 10 tablet 0    oxyCODONE-acetaminophen (PERCOCET) 5-325 mg per tablet Take 1 tablet by mouth every 4 (four) hours as needed for Pain. 30 tablet 0    rosuvastatin (CRESTOR) 20 MG tablet TAKE 1 TABLET DAILY 90 tablet 3    testosterone (FORTESTA) 10  mg/0.5 gram /actuation GlPm Place 20 mg onto the skin once daily. Apply 2 pump actuations topically daily. 120 Bottle 3    TOPROL XL 25 mg 24 hr tablet TAKE 1 TABLET DAILY 90 tablet 1    [DISCONTINUED] amLODIPine-benazepriL (LOTREL) 10-40 mg per capsule Take 1 capsule by mouth once daily. 90 capsule 1     No current facility-administered medications on file prior to visit.        Review of Systems   Constitutional: Negative for appetite change, chills, fever and unexpected weight change.   HENT: Negative for congestion, ear pain, sinus pain, sneezing and sore throat.    Eyes: Negative for pain, discharge and visual disturbance.   Respiratory: Negative for cough, shortness of breath and wheezing.    Cardiovascular: Negative for chest pain and palpitations.   Gastrointestinal: Negative for abdominal distention, abdominal pain, blood in stool, constipation, diarrhea, nausea and vomiting.   Endocrine: Negative for polydipsia, polyphagia and polyuria.   Genitourinary: Negative for difficulty urinating, hematuria, penile pain, testicular pain and urgency.   Musculoskeletal: Negative for gait problem.   Skin: Negative for rash and wound.        Healed abdominal incision   Neurological: Negative for dizziness, seizures, numbness and headaches.   Psychiatric/Behavioral: Negative for confusion, sleep disturbance and suicidal ideas.   All other systems reviewed and are negative.      Objective:      Physical Exam  Vitals signs reviewed.   Constitutional:       Appearance: Normal appearance. He is well-developed.   HENT:      Head: Normocephalic and atraumatic.      Mouth/Throat:      Pharynx: No oropharyngeal exudate.   Eyes:      Conjunctiva/sclera: Conjunctivae normal.   Neck:      Musculoskeletal: Normal range of motion.      Thyroid: No thyromegaly.      Vascular: No JVD.      Trachea: No tracheal deviation.   Cardiovascular:      Rate and Rhythm: Normal rate and regular rhythm.      Heart sounds: Normal heart sounds.    Pulmonary:      Effort: Pulmonary effort is normal.      Breath sounds: Normal breath sounds.   Abdominal:      General: A surgical scar is present. Bowel sounds are increased. There is no distension.      Palpations: Abdomen is soft.      Tenderness: There is no abdominal tenderness.   Musculoskeletal: Normal range of motion.   Skin:     General: Skin is warm and dry.      Capillary Refill: Capillary refill takes less than 2 seconds.   Neurological:      General: No focal deficit present.      Mental Status: He is alert and oriented to person, place, and time.   Psychiatric:         Mood and Affect: Mood normal.         Assessment:       1. SBO (small bowel obstruction)    2. Hospital discharge follow-up    3. Essential hypertension    4. Gastroesophageal reflux disease without esophagitis    5. Hyperlipidemia, unspecified hyperlipidemia type    6. Nasal congestion        Plan:       Papito was seen today for hospital follow up.    Diagnoses and all orders for this visit:    SBO (small bowel obstruction)    Hospital discharge follow-up    Essential hypertension  -     amlodipine-benazepril 5-20 mg (LOTREL) 5-20 mg per capsule; Take 1 capsule by mouth once daily.    Gastroesophageal reflux disease without esophagitis    Hyperlipidemia, unspecified hyperlipidemia type    Nasal congestion  -     cetirizine (ZYRTEC) 10 MG tablet; Take 1 tablet (10 mg total) by mouth every evening.      Transitional Care Note    Family and/or Caretaker present at visit?  No.  Diagnostic tests reviewed/disposition: No diagnosic tests pending after this hospitalization.  Disease/illness education: Small bowel obstruction education provided  Home health/community services discussion/referrals: Patient does not have home health established from hospital visit.  They do not need home health.  If needed, we will set up home health for the patient.   Establishment or re-establishment of referral orders for community resources: No other  necessary community resources.   Discussion with other health care providers: No discussion with other health care providers necessary.     Submit BP log to [facility] in 2 weeks  Keep follow up with Dr. Kaye as scheduled  Advance diet as tolerated  Patient education provided.  All questions and concerns addressed  RTC PRN and if symptoms worsen or fail to improve  Patient verbalizes understanding         Patient Instructions       Small Bowel Obstruction     Small bowel obstruction can lead to tissue damage and even tissue death.   A small bowel obstruction occurs when part or all of the small intestine (bowel) is blocked. As a result, digestive contents cant move through the bowel properly and out of the body. Treatment is needed right away to remove the blockage. This can ease painful symptoms. It can also prevent serious problems, such as tissue death or bursting (rupture) of the small bowel. Without treatment, a small bowel obstruction can be fatal.  Causes of small bowel obstruction  A small bowel obstruction can be caused by:  · Scar tissue (adhesions). These may form after belly (abdominal) surgery or an infection.  · Hernia. A hernia is when an organ pushes through a weak spot or tear in the abdomen wall. Part of the small bowel can push out and be seen as a bulge under the belly. Hernias can also occur internally.  · Certain health problems. These include when part of the bowel slides inside another part (intussusception). Other causes include irritable bowel disease such as Crohns disease, and inflammation and sores in the intestine (ulcerative colitis).  · Abnormal tissue growths (tumors). These can form on the inside or outside of the small bowel. They are usually due to cancer.  Symptoms of small bowel obstruction  Common symptoms include:  · Belly cramping and pain  · Belly swelling and bloating  · Upset stomach (nausea) and vomiting  · Can't  pass gas  · Can't pass stool  (constipation)  · Diarrhea  Diagnosing small bowel obstruction  Your provider will ask about your symptoms and health history. Youll also have a physical exam. Tests may also be done to confirm the problem. These can include:  · Imaging tests. These provide pictures of the small bowel. Common tests include X-rays and a CT scan.  · Blood tests. These check for infection and other problems, such as excess fluid loss (dehydration).  · Upper GI (gastrointestinal) series with a small bowel follow-through. This test takes X-rays of the upper digestive tract from the mouth through the small bowel. An X-ray dye (contrast fluid) is used. The dye coats the inside of your upper digestive tract so it will show up clearly on X-rays.  Treating small bowel obstruction  Treatment takes place in a hospital. As part of your care, the following may be done:  · No food or drink is given by mouth. This allows your bowels to rest.  · An IV (intravenous) line is placed in a vein in your arm or hand. The IV line is used to give fluids. It may also be used to give medicines. These may be needed to ease pain, nausea, and other symptoms. They may also be needed to treat or prevent infections.  · A soft, thin, flexible tube (nasogastric tube) is inserted through your nose and into your stomach. The tube is used to remove extra gas and fluid in your stomach and bowels. This helps to ease symptoms such as pain and swelling.  · In severe cases, surgery is done. This may be needed if the small bowel is almost or totally blocked, or there is a hole in the bowel (bowel perforation). During surgery, the blockage is removed. Parts of the bowel may also be removed if there is tissue death. Other repair may be done as well, depending on what caused the blockage. Your healthcare provider will give you more information about surgery, if needed.  · Youll be watched closely in the hospital until your symptoms improve. Your provider will tell you when you  can go home.  Long-term concerns   After treatment, most people recover with no lasting effects. If a long part of the bowel is removed, there is a greater chance for lifelong digestive problems. Bowel movements may become irregular. Work with your provider to learn the best ways to manage any symptoms you may have, and to protect your health.  When to call your healthcare provider  Call your provider right away if you have any of the following:  · Severe pain (Call 911)  · Belly swelling or cramping that wont go away  · Cant pass stool or gas  · Nausea or vomiting (especially if the vomit looks or smells like stool)   Date Last Reviewed: 7/1/2016  © 3139-5212 [employer]. [address] [place] [address], [place] [address]. All rights reserved. This information is not intended as a substitute for professional medical care. Always follow your healthcare professional's instructions.

## 2020-09-21 ENCOUNTER — PATIENT MESSAGE (OUTPATIENT)
Dept: FAMILY MEDICINE | Facility: CLINIC | Age: 75
End: 2020-09-21

## 2020-09-22 ENCOUNTER — OFFICE VISIT (OUTPATIENT)
Dept: SURGERY | Facility: CLINIC | Age: 75
End: 2020-09-22
Payer: MEDICARE

## 2020-09-22 VITALS
HEIGHT: 72 IN | DIASTOLIC BLOOD PRESSURE: 70 MMHG | WEIGHT: 195.75 LBS | HEART RATE: 54 BPM | BODY MASS INDEX: 26.51 KG/M2 | TEMPERATURE: 98 F | SYSTOLIC BLOOD PRESSURE: 164 MMHG

## 2020-09-22 DIAGNOSIS — Z09 POSTOP CHECK: Primary | ICD-10-CM

## 2020-09-22 PROCEDURE — 99214 OFFICE O/P EST MOD 30 MIN: CPT | Mod: PBBFAC,PO | Performed by: SURGERY

## 2020-09-22 PROCEDURE — 99999 PR PBB SHADOW E&M-EST. PATIENT-LVL IV: ICD-10-PCS | Mod: PBBFAC,,, | Performed by: SURGERY

## 2020-09-22 PROCEDURE — 99024 POSTOP FOLLOW-UP VISIT: CPT | Mod: POP,,, | Performed by: SURGERY

## 2020-09-22 PROCEDURE — 99024 PR POST-OP FOLLOW-UP VISIT: ICD-10-PCS | Mod: POP,,, | Performed by: SURGERY

## 2020-09-22 PROCEDURE — 99999 PR PBB SHADOW E&M-EST. PATIENT-LVL IV: CPT | Mod: PBBFAC,,, | Performed by: SURGERY

## 2020-09-22 NOTE — Clinical Note
Path:     1.  Right colon with attached appendix, right hemicolectomy:       -  Tubulovillous adenoma (2.2 x 1.6 cm)       -  Tubular adenoma (0.2 cm)       -  Twenty nine attached lymph nodes, all negative for metastatic   carcinoma (0/29)       -  Attached unremarkable appendix and segment of small bowel       -  Margins histologically viable and negative for dysplasia or malignancy       -  Negative for invasive carcinoma   T  A/P:   Pt doing well post surgery.   F/U ronit elliott prn.   Return to dr Fried for future endoscopy

## 2020-09-22 NOTE — PROGRESS NOTES
Cc: post op    HPI: 75 y.o.  male  6 weeks s/p lap R jacky.   Pt doing much better.  Pain controlled. Appetite much improved starting to gain weight and improving energy    PE: AFVSS    AAOx3  CTA  Soft/NT/nd  Inc: c/d/i        Path:     1.  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   F/U ronit elliott prn.   Return to dr Fried for future endoscopy

## 2020-09-25 ENCOUNTER — PATIENT MESSAGE (OUTPATIENT)
Dept: FAMILY MEDICINE | Facility: CLINIC | Age: 75
End: 2020-09-25

## 2020-09-29 ENCOUNTER — PATIENT MESSAGE (OUTPATIENT)
Dept: OTHER | Facility: OTHER | Age: 75
End: 2020-09-29

## 2020-10-02 ENCOUNTER — CLINICAL SUPPORT (OUTPATIENT)
Dept: FAMILY MEDICINE | Facility: CLINIC | Age: 75
End: 2020-10-02
Payer: MEDICARE

## 2020-10-02 ENCOUNTER — TELEPHONE (OUTPATIENT)
Dept: FAMILY MEDICINE | Facility: CLINIC | Age: 75
End: 2020-10-02

## 2020-10-02 VITALS — SYSTOLIC BLOOD PRESSURE: 138 MMHG | DIASTOLIC BLOOD PRESSURE: 64 MMHG

## 2020-10-02 DIAGNOSIS — I10 ESSENTIAL HYPERTENSION: Primary | ICD-10-CM

## 2020-10-02 PROCEDURE — 99999 PR PBB SHADOW E&M-EST. PATIENT-LVL II: CPT | Mod: PBBFAC,,,

## 2020-10-02 PROCEDURE — 99212 OFFICE O/P EST SF 10 MIN: CPT | Mod: PBBFAC,PO

## 2020-10-02 PROCEDURE — 99999 PR PBB SHADOW E&M-EST. PATIENT-LVL II: ICD-10-PCS | Mod: PBBFAC,,,

## 2020-10-02 RX ORDER — AMLODIPINE AND BENAZEPRIL HYDROCHLORIDE 10; 40 MG/1; MG/1
1 CAPSULE ORAL DAILY
Qty: 90 CAPSULE | Refills: 3 | Status: SHIPPED | OUTPATIENT
Start: 2020-10-02 | End: 2021-11-26

## 2020-10-02 RX ORDER — AMLODIPINE AND BENAZEPRIL HYDROCHLORIDE 10; 40 MG/1; MG/1
1 CAPSULE ORAL DAILY
Qty: 30 CAPSULE | Refills: 0 | Status: SHIPPED | OUTPATIENT
Start: 2020-10-02 | End: 2020-10-02 | Stop reason: HOSPADM

## 2020-10-02 NOTE — TELEPHONE ENCOUNTER
I have increased his Lotrel back to previous dose (amlodipine-benazepril 10-40 mg) and the [facility]. Please schedule another 2 week nurse visit for a blood pressure check. Ask patient to bring BP logs to nurse visit.

## 2020-10-02 NOTE — TELEPHONE ENCOUNTER
Patient came in today for a BP check.  His reading this morning was 148/70 and after sitting quietly for 5 minutes his reading was. 138/64. Patient states that he has been getting readings in the 130-140S at home.  Please advise.

## 2020-10-02 NOTE — PROGRESS NOTES
Patient came in today for a BP check.  His reading this morning was 148/70 and after sitting quietly for 5 minutes his reading was. 138/64. Patient states that he has been getting readings in the 130-140S at home.  A message has been sent to RHONDA Curry to advise of today's findings.

## 2020-10-16 ENCOUNTER — CLINICAL SUPPORT (OUTPATIENT)
Dept: FAMILY MEDICINE | Facility: CLINIC | Age: 75
End: 2020-10-16
Payer: MEDICARE

## 2020-10-16 VITALS — SYSTOLIC BLOOD PRESSURE: 136 MMHG | DIASTOLIC BLOOD PRESSURE: 70 MMHG

## 2020-10-16 DIAGNOSIS — I10 ESSENTIAL HYPERTENSION: Primary | ICD-10-CM

## 2020-10-16 PROCEDURE — 99211 OFF/OP EST MAY X REQ PHY/QHP: CPT | Mod: PBBFAC,PO

## 2020-10-16 PROCEDURE — 99999 PR PBB SHADOW E&M-EST. PATIENT-LVL I: ICD-10-PCS | Mod: PBBFAC,,,

## 2020-10-16 PROCEDURE — 99999 PR PBB SHADOW E&M-EST. PATIENT-LVL I: CPT | Mod: PBBFAC,,,

## 2020-10-16 NOTE — PROGRESS NOTES
Patient came in for a BP check following a medication increase 2 weeks ago. Lotrel was increased to 10/40 mg. He reports that he has been taking his medications daily as prescribed and has had no adverse effects from the increase in dosage.  States that his readings at home have been good but did not provide a written log.  BP reading this AM was 146/72 and after sitting quietly for 5 minutes was 136/70. Patient was advised to continue current medication regimen and keep previously scheduled f/u appointment. Patient verbalized understanding.

## 2020-10-18 ENCOUNTER — PATIENT MESSAGE (OUTPATIENT)
Dept: ENDOCRINOLOGY | Facility: CLINIC | Age: 75
End: 2020-10-18

## 2020-12-11 ENCOUNTER — PATIENT MESSAGE (OUTPATIENT)
Dept: OTHER | Facility: OTHER | Age: 75
End: 2020-12-11

## 2021-02-03 DIAGNOSIS — M19.90 ARTHRITIS PAIN: ICD-10-CM

## 2021-02-05 ENCOUNTER — PATIENT MESSAGE (OUTPATIENT)
Dept: ENDOCRINOLOGY | Facility: CLINIC | Age: 76
End: 2021-02-05

## 2021-02-05 DIAGNOSIS — E11.9 TYPE 2 DIABETES MELLITUS WITHOUT COMPLICATION: ICD-10-CM

## 2021-02-05 DIAGNOSIS — E29.1 HYPOGONADISM IN MALE: ICD-10-CM

## 2021-02-05 RX ORDER — TESTOSTERONE 10 MG/.5G
20 GEL, METERED TOPICAL DAILY
Qty: 120 BOTTLE | Refills: 3
Start: 2021-02-05 | End: 2022-02-04 | Stop reason: SDUPTHER

## 2021-02-11 ENCOUNTER — PES CALL (OUTPATIENT)
Dept: ADMINISTRATIVE | Facility: CLINIC | Age: 76
End: 2021-02-11

## 2021-02-15 RX ORDER — DICLOFENAC SODIUM 75 MG/1
TABLET, DELAYED RELEASE ORAL
Qty: 90 TABLET | Refills: 3 | Status: SHIPPED | OUTPATIENT
Start: 2021-02-15 | End: 2022-02-01

## 2021-02-19 ENCOUNTER — TELEPHONE (OUTPATIENT)
Dept: ENDOCRINOLOGY | Facility: CLINIC | Age: 76
End: 2021-02-19

## 2021-02-19 ENCOUNTER — PATIENT MESSAGE (OUTPATIENT)
Dept: ENDOCRINOLOGY | Facility: CLINIC | Age: 76
End: 2021-02-19

## 2021-02-19 DIAGNOSIS — E29.1 HYPOGONADISM IN MALE: ICD-10-CM

## 2021-02-24 RX ORDER — METOPROLOL SUCCINATE 25 MG/1
25 TABLET, EXTENDED RELEASE ORAL DAILY
Qty: 90 TABLET | Refills: 1 | Status: SHIPPED | OUTPATIENT
Start: 2021-02-24 | End: 2021-08-26

## 2021-03-25 ENCOUNTER — PATIENT MESSAGE (OUTPATIENT)
Dept: ENDOCRINOLOGY | Facility: CLINIC | Age: 76
End: 2021-03-25

## 2021-04-04 ENCOUNTER — PATIENT MESSAGE (OUTPATIENT)
Dept: ENDOCRINOLOGY | Facility: CLINIC | Age: 76
End: 2021-04-04

## 2021-04-05 ENCOUNTER — PATIENT MESSAGE (OUTPATIENT)
Dept: ADMINISTRATIVE | Facility: HOSPITAL | Age: 76
End: 2021-04-05

## 2021-04-16 ENCOUNTER — PES CALL (OUTPATIENT)
Dept: ADMINISTRATIVE | Facility: CLINIC | Age: 76
End: 2021-04-16

## 2021-05-04 ENCOUNTER — OFFICE VISIT (OUTPATIENT)
Dept: FAMILY MEDICINE | Facility: CLINIC | Age: 76
End: 2021-05-04
Payer: MEDICARE

## 2021-05-04 VITALS
RESPIRATION RATE: 18 BRPM | HEART RATE: 69 BPM | SYSTOLIC BLOOD PRESSURE: 134 MMHG | HEIGHT: 72 IN | DIASTOLIC BLOOD PRESSURE: 74 MMHG | WEIGHT: 206.13 LBS | OXYGEN SATURATION: 95 % | BODY MASS INDEX: 27.92 KG/M2

## 2021-05-04 DIAGNOSIS — E29.1 TESTOSTERONE DEFICIENCY IN MALE: ICD-10-CM

## 2021-05-04 DIAGNOSIS — M19.90 ARTHRITIS PAIN: ICD-10-CM

## 2021-05-04 DIAGNOSIS — K21.9 GASTROESOPHAGEAL REFLUX DISEASE: ICD-10-CM

## 2021-05-04 DIAGNOSIS — R73.9 HYPERGLYCEMIA: ICD-10-CM

## 2021-05-04 DIAGNOSIS — Z00.00 ROUTINE GENERAL MEDICAL EXAMINATION AT A HEALTH CARE FACILITY: Primary | ICD-10-CM

## 2021-05-04 DIAGNOSIS — E83.52 HYPERCALCEMIA: ICD-10-CM

## 2021-05-04 DIAGNOSIS — I10 ESSENTIAL HYPERTENSION: ICD-10-CM

## 2021-05-04 DIAGNOSIS — E78.5 HYPERLIPIDEMIA, UNSPECIFIED HYPERLIPIDEMIA TYPE: ICD-10-CM

## 2021-05-04 PROCEDURE — 99999 PR PBB SHADOW E&M-EST. PATIENT-LVL IV: ICD-10-PCS | Mod: PBBFAC,,, | Performed by: STUDENT IN AN ORGANIZED HEALTH CARE EDUCATION/TRAINING PROGRAM

## 2021-05-04 PROCEDURE — 99214 OFFICE O/P EST MOD 30 MIN: CPT | Mod: PBBFAC,PO | Performed by: STUDENT IN AN ORGANIZED HEALTH CARE EDUCATION/TRAINING PROGRAM

## 2021-05-04 PROCEDURE — 99214 PR OFFICE/OUTPT VISIT, EST, LEVL IV, 30-39 MIN: ICD-10-PCS | Mod: S$PBB,,, | Performed by: STUDENT IN AN ORGANIZED HEALTH CARE EDUCATION/TRAINING PROGRAM

## 2021-05-04 PROCEDURE — 99999 PR PBB SHADOW E&M-EST. PATIENT-LVL IV: CPT | Mod: PBBFAC,,, | Performed by: STUDENT IN AN ORGANIZED HEALTH CARE EDUCATION/TRAINING PROGRAM

## 2021-05-04 PROCEDURE — 99214 OFFICE O/P EST MOD 30 MIN: CPT | Mod: S$PBB,,, | Performed by: STUDENT IN AN ORGANIZED HEALTH CARE EDUCATION/TRAINING PROGRAM

## 2021-05-04 RX ORDER — ESOMEPRAZOLE MAGNESIUM 40 MG/1
40 CAPSULE, DELAYED RELEASE ORAL
Qty: 90 CAPSULE | Refills: 1 | Status: SHIPPED | OUTPATIENT
Start: 2021-05-04 | End: 2021-11-26

## 2021-07-07 ENCOUNTER — PATIENT MESSAGE (OUTPATIENT)
Dept: ADMINISTRATIVE | Facility: HOSPITAL | Age: 76
End: 2021-07-07

## 2021-07-12 ENCOUNTER — PES CALL (OUTPATIENT)
Dept: ADMINISTRATIVE | Facility: CLINIC | Age: 76
End: 2021-07-12

## 2021-07-22 ENCOUNTER — PATIENT MESSAGE (OUTPATIENT)
Dept: ENDOCRINOLOGY | Facility: CLINIC | Age: 76
End: 2021-07-22

## 2021-08-04 ENCOUNTER — PATIENT MESSAGE (OUTPATIENT)
Dept: ADMINISTRATIVE | Facility: HOSPITAL | Age: 76
End: 2021-08-04

## 2021-08-26 ENCOUNTER — PATIENT MESSAGE (OUTPATIENT)
Dept: PRIMARY CARE CLINIC | Facility: CLINIC | Age: 76
End: 2021-08-26

## 2021-08-26 RX ORDER — METOPROLOL SUCCINATE 25 MG/1
TABLET, EXTENDED RELEASE ORAL
Qty: 90 TABLET | Refills: 3 | Status: SHIPPED | OUTPATIENT
Start: 2021-08-26 | End: 2022-08-03

## 2021-09-29 ENCOUNTER — PATIENT OUTREACH (OUTPATIENT)
Dept: ADMINISTRATIVE | Facility: OTHER | Age: 76
End: 2021-09-29

## 2021-10-01 ENCOUNTER — LAB VISIT (OUTPATIENT)
Dept: LAB | Facility: HOSPITAL | Age: 76
End: 2021-10-01
Attending: INTERNAL MEDICINE
Payer: MEDICARE

## 2021-10-01 ENCOUNTER — OFFICE VISIT (OUTPATIENT)
Dept: ENDOCRINOLOGY | Facility: CLINIC | Age: 76
End: 2021-10-01
Payer: MEDICARE

## 2021-10-01 VITALS
OXYGEN SATURATION: 96 % | BODY MASS INDEX: 28.77 KG/M2 | WEIGHT: 212.44 LBS | DIASTOLIC BLOOD PRESSURE: 82 MMHG | HEART RATE: 60 BPM | TEMPERATURE: 98 F | HEIGHT: 72 IN | SYSTOLIC BLOOD PRESSURE: 130 MMHG

## 2021-10-01 DIAGNOSIS — E34.9 ELEVATED CALCITONIN LEVEL: ICD-10-CM

## 2021-10-01 DIAGNOSIS — E88.810 DYSMETABOLIC SYNDROME: ICD-10-CM

## 2021-10-01 DIAGNOSIS — E83.52 FHH (FAMILIAL HYPOCALCIURIC HYPERCALCEMIA): ICD-10-CM

## 2021-10-01 DIAGNOSIS — R73.03 PREDIABETES: ICD-10-CM

## 2021-10-01 DIAGNOSIS — Z12.5 ENCOUNTER FOR SCREENING FOR MALIGNANT NEOPLASM OF PROSTATE: ICD-10-CM

## 2021-10-01 DIAGNOSIS — E55.9 HYPOVITAMINOSIS D: ICD-10-CM

## 2021-10-01 DIAGNOSIS — Z86.010 HISTORY OF COLON POLYPS: ICD-10-CM

## 2021-10-01 DIAGNOSIS — R80.9 PROTEINURIA, UNSPECIFIED TYPE: ICD-10-CM

## 2021-10-01 DIAGNOSIS — N40.0 BENIGN PROSTATIC HYPERPLASIA, UNSPECIFIED WHETHER LOWER URINARY TRACT SYMPTOMS PRESENT: ICD-10-CM

## 2021-10-01 DIAGNOSIS — E21.3 HYPERPARATHYROIDISM: Primary | ICD-10-CM

## 2021-10-01 DIAGNOSIS — E29.1 TESTOSTERONE DEFICIENCY IN MALE: ICD-10-CM

## 2021-10-01 DIAGNOSIS — E29.1 HYPOGONADISM IN MALE: ICD-10-CM

## 2021-10-01 DIAGNOSIS — I10 ESSENTIAL HYPERTENSION: ICD-10-CM

## 2021-10-01 DIAGNOSIS — R73.9 HYPERGLYCEMIA: ICD-10-CM

## 2021-10-01 DIAGNOSIS — E21.3 HYPERPARATHYROIDISM: ICD-10-CM

## 2021-10-01 DIAGNOSIS — K21.9 GASTROESOPHAGEAL REFLUX DISEASE WITHOUT ESOPHAGITIS: ICD-10-CM

## 2021-10-01 DIAGNOSIS — E53.8 B12 DEFICIENCY: ICD-10-CM

## 2021-10-01 DIAGNOSIS — E78.00 HYPERCHOLESTEROLEMIA: ICD-10-CM

## 2021-10-01 DIAGNOSIS — E11.9 TYPE 2 DIABETES MELLITUS WITHOUT COMPLICATION: ICD-10-CM

## 2021-10-01 DIAGNOSIS — E04.2 MULTINODULAR GOITER: ICD-10-CM

## 2021-10-01 DIAGNOSIS — E83.52 HYPERCALCEMIA: ICD-10-CM

## 2021-10-01 DIAGNOSIS — E78.5 HYPERLIPIDEMIA, UNSPECIFIED HYPERLIPIDEMIA TYPE: ICD-10-CM

## 2021-10-01 PROBLEM — R74.01 TRANSAMINITIS: Status: ACTIVE | Noted: 2021-10-01

## 2021-10-01 LAB
25(OH)D3+25(OH)D2 SERPL-MCNC: 42 NG/ML (ref 30–96)
ALBUMIN SERPL BCP-MCNC: 4.1 G/DL (ref 3.5–5.2)
ALBUMIN SERPL BCP-MCNC: 4.1 G/DL (ref 3.5–5.2)
ALP SERPL-CCNC: 105 U/L (ref 55–135)
ALP SERPL-CCNC: 105 U/L (ref 55–135)
ALT SERPL W/O P-5'-P-CCNC: 68 U/L (ref 10–44)
ALT SERPL W/O P-5'-P-CCNC: 68 U/L (ref 10–44)
ANION GAP SERPL CALC-SCNC: 14 MMOL/L (ref 8–16)
ANION GAP SERPL CALC-SCNC: 14 MMOL/L (ref 8–16)
AST SERPL-CCNC: 43 U/L (ref 10–40)
AST SERPL-CCNC: 43 U/L (ref 10–40)
BASOPHILS # BLD AUTO: 0.05 K/UL (ref 0–0.2)
BASOPHILS NFR BLD: 0.9 % (ref 0–1.9)
BILIRUB SERPL-MCNC: 0.5 MG/DL (ref 0.1–1)
BILIRUB SERPL-MCNC: 0.5 MG/DL (ref 0.1–1)
BUN SERPL-MCNC: 19 MG/DL (ref 8–23)
BUN SERPL-MCNC: 19 MG/DL (ref 8–23)
CALCIUM SERPL-MCNC: 10.6 MG/DL (ref 8.7–10.5)
CALCIUM SERPL-MCNC: 10.6 MG/DL (ref 8.7–10.5)
CHLORIDE SERPL-SCNC: 106 MMOL/L (ref 95–110)
CHLORIDE SERPL-SCNC: 106 MMOL/L (ref 95–110)
CHOLEST SERPL-MCNC: 186 MG/DL (ref 120–199)
CHOLEST SERPL-MCNC: 186 MG/DL (ref 120–199)
CHOLEST/HDLC SERPL: 3.2 {RATIO} (ref 2–5)
CHOLEST/HDLC SERPL: 3.2 {RATIO} (ref 2–5)
CO2 SERPL-SCNC: 19 MMOL/L (ref 23–29)
CO2 SERPL-SCNC: 19 MMOL/L (ref 23–29)
CREAT SERPL-MCNC: 0.9 MG/DL (ref 0.5–1.4)
CREAT SERPL-MCNC: 0.9 MG/DL (ref 0.5–1.4)
DIFFERENTIAL METHOD: NORMAL
EOSINOPHIL # BLD AUTO: 0.1 K/UL (ref 0–0.5)
EOSINOPHIL NFR BLD: 2 % (ref 0–8)
ERYTHROCYTE [DISTWIDTH] IN BLOOD BY AUTOMATED COUNT: 13.5 % (ref 11.5–14.5)
EST. GFR  (AFRICAN AMERICAN): >60 ML/MIN/1.73 M^2
EST. GFR  (AFRICAN AMERICAN): >60 ML/MIN/1.73 M^2
EST. GFR  (NON AFRICAN AMERICAN): >60 ML/MIN/1.73 M^2
EST. GFR  (NON AFRICAN AMERICAN): >60 ML/MIN/1.73 M^2
ESTIMATED AVG GLUCOSE: 111 MG/DL (ref 68–131)
ESTIMATED AVG GLUCOSE: 111 MG/DL (ref 68–131)
ESTRADIOL SERPL-MCNC: 16 PG/ML (ref 11–44)
GLUCOSE SERPL-MCNC: 99 MG/DL (ref 70–110)
GLUCOSE SERPL-MCNC: 99 MG/DL (ref 70–110)
HBA1C MFR BLD: 5.5 % (ref 4–5.6)
HBA1C MFR BLD: 5.5 % (ref 4–5.6)
HCT VFR BLD AUTO: 44.9 % (ref 40–54)
HDLC SERPL-MCNC: 58 MG/DL (ref 40–75)
HDLC SERPL-MCNC: 58 MG/DL (ref 40–75)
HDLC SERPL: 31.2 % (ref 20–50)
HDLC SERPL: 31.2 % (ref 20–50)
HGB BLD-MCNC: 15.2 G/DL (ref 14–18)
IMM GRANULOCYTES # BLD AUTO: 0.01 K/UL (ref 0–0.04)
IMM GRANULOCYTES NFR BLD AUTO: 0.2 % (ref 0–0.5)
LDLC SERPL CALC-MCNC: 86 MG/DL (ref 63–159)
LDLC SERPL CALC-MCNC: 86 MG/DL (ref 63–159)
LYMPHOCYTES # BLD AUTO: 2.4 K/UL (ref 1–4.8)
LYMPHOCYTES NFR BLD: 42.5 % (ref 18–48)
MCH RBC QN AUTO: 30.1 PG (ref 27–31)
MCHC RBC AUTO-ENTMCNC: 33.9 G/DL (ref 32–36)
MCV RBC AUTO: 89 FL (ref 82–98)
MONOCYTES # BLD AUTO: 0.4 K/UL (ref 0.3–1)
MONOCYTES NFR BLD: 7 % (ref 4–15)
NEUTROPHILS # BLD AUTO: 2.7 K/UL (ref 1.8–7.7)
NEUTROPHILS NFR BLD: 47.4 % (ref 38–73)
NONHDLC SERPL-MCNC: 128 MG/DL
NONHDLC SERPL-MCNC: 128 MG/DL
NRBC BLD-RTO: 0 /100 WBC
PLATELET # BLD AUTO: 237 K/UL (ref 150–450)
PMV BLD AUTO: 10.5 FL (ref 9.2–12.9)
POTASSIUM SERPL-SCNC: 4.6 MMOL/L (ref 3.5–5.1)
POTASSIUM SERPL-SCNC: 4.6 MMOL/L (ref 3.5–5.1)
PROT SERPL-MCNC: 7.3 G/DL (ref 6–8.4)
PROT SERPL-MCNC: 7.3 G/DL (ref 6–8.4)
PTH-INTACT SERPL-MCNC: 129.1 PG/ML (ref 9–77)
RBC # BLD AUTO: 5.05 M/UL (ref 4.6–6.2)
SODIUM SERPL-SCNC: 139 MMOL/L (ref 136–145)
SODIUM SERPL-SCNC: 139 MMOL/L (ref 136–145)
TRIGL SERPL-MCNC: 210 MG/DL (ref 30–150)
TRIGL SERPL-MCNC: 210 MG/DL (ref 30–150)
TSH SERPL DL<=0.005 MIU/L-ACNC: 0.71 UIU/ML (ref 0.4–4)
WBC # BLD AUTO: 5.58 K/UL (ref 3.9–12.7)

## 2021-10-01 PROCEDURE — 84403 ASSAY OF TOTAL TESTOSTERONE: CPT | Performed by: INTERNAL MEDICINE

## 2021-10-01 PROCEDURE — 84153 ASSAY OF PSA TOTAL: CPT | Performed by: INTERNAL MEDICINE

## 2021-10-01 PROCEDURE — 83970 ASSAY OF PARATHORMONE: CPT | Performed by: INTERNAL MEDICINE

## 2021-10-01 PROCEDURE — 99214 OFFICE O/P EST MOD 30 MIN: CPT | Mod: S$PBB,,, | Performed by: INTERNAL MEDICINE

## 2021-10-01 PROCEDURE — 99999 PR PBB SHADOW E&M-EST. PATIENT-LVL V: ICD-10-PCS | Mod: PBBFAC,,, | Performed by: INTERNAL MEDICINE

## 2021-10-01 PROCEDURE — 99215 OFFICE O/P EST HI 40 MIN: CPT | Mod: PBBFAC,PO | Performed by: INTERNAL MEDICINE

## 2021-10-01 PROCEDURE — 84443 ASSAY THYROID STIM HORMONE: CPT | Performed by: STUDENT IN AN ORGANIZED HEALTH CARE EDUCATION/TRAINING PROGRAM

## 2021-10-01 PROCEDURE — 36415 COLL VENOUS BLD VENIPUNCTURE: CPT | Mod: PO | Performed by: INTERNAL MEDICINE

## 2021-10-01 PROCEDURE — 99999 PR PBB SHADOW E&M-EST. PATIENT-LVL V: CPT | Mod: PBBFAC,,, | Performed by: INTERNAL MEDICINE

## 2021-10-01 PROCEDURE — 82670 ASSAY OF TOTAL ESTRADIOL: CPT | Performed by: INTERNAL MEDICINE

## 2021-10-01 PROCEDURE — 85025 COMPLETE CBC W/AUTO DIFF WBC: CPT | Performed by: STUDENT IN AN ORGANIZED HEALTH CARE EDUCATION/TRAINING PROGRAM

## 2021-10-01 PROCEDURE — 83036 HEMOGLOBIN GLYCOSYLATED A1C: CPT | Performed by: FAMILY MEDICINE

## 2021-10-01 PROCEDURE — 80053 COMPREHEN METABOLIC PANEL: CPT | Performed by: STUDENT IN AN ORGANIZED HEALTH CARE EDUCATION/TRAINING PROGRAM

## 2021-10-01 PROCEDURE — 82672 ASSAY OF ESTROGEN: CPT | Performed by: INTERNAL MEDICINE

## 2021-10-01 PROCEDURE — 82747 ASSAY OF FOLIC ACID RBC: CPT | Performed by: INTERNAL MEDICINE

## 2021-10-01 PROCEDURE — 80061 LIPID PANEL: CPT | Performed by: FAMILY MEDICINE

## 2021-10-01 PROCEDURE — 99214 PR OFFICE/OUTPT VISIT, EST, LEVL IV, 30-39 MIN: ICD-10-PCS | Mod: S$PBB,,, | Performed by: INTERNAL MEDICINE

## 2021-10-01 PROCEDURE — 82306 VITAMIN D 25 HYDROXY: CPT | Performed by: INTERNAL MEDICINE

## 2021-10-05 ENCOUNTER — LAB VISIT (OUTPATIENT)
Dept: LAB | Facility: HOSPITAL | Age: 76
End: 2021-10-05
Attending: INTERNAL MEDICINE
Payer: MEDICARE

## 2021-10-05 DIAGNOSIS — E53.8 B12 DEFICIENCY: ICD-10-CM

## 2021-10-05 DIAGNOSIS — I10 ESSENTIAL HYPERTENSION: ICD-10-CM

## 2021-10-05 DIAGNOSIS — E78.00 HYPERCHOLESTEROLEMIA: ICD-10-CM

## 2021-10-05 DIAGNOSIS — E83.39 HYPOPHOSPHATEMIA: Primary | ICD-10-CM

## 2021-10-05 DIAGNOSIS — E21.3 HYPERPARATHYROIDISM: ICD-10-CM

## 2021-10-05 DIAGNOSIS — E83.52 HYPERCALCEMIA: ICD-10-CM

## 2021-10-05 DIAGNOSIS — E29.1 HYPOGONADISM IN MALE: ICD-10-CM

## 2021-10-05 DIAGNOSIS — E04.2 MULTINODULAR GOITER: ICD-10-CM

## 2021-10-05 DIAGNOSIS — E88.810 DYSMETABOLIC SYNDROME: ICD-10-CM

## 2021-10-05 DIAGNOSIS — R73.9 HYPERGLYCEMIA: ICD-10-CM

## 2021-10-05 DIAGNOSIS — E55.9 HYPOVITAMINOSIS D: ICD-10-CM

## 2021-10-05 LAB
CA-I BLDV-SCNC: 1.43 MMOL/L (ref 1.06–1.42)
CHOLEST SERPL-MCNC: 172 MG/DL (ref 120–199)
CHOLEST/HDLC SERPL: 3.1 {RATIO} (ref 2–5)
ESTIMATED AVG GLUCOSE: 111 MG/DL (ref 68–131)
ESTROGEN SERPL-MCNC: 191 PG/ML
FOLATE SERPL-MCNC: 14.4 NG/ML (ref 4–24)
HBA1C MFR BLD: 5.5 % (ref 4–5.6)
HDLC SERPL-MCNC: 56 MG/DL (ref 40–75)
HDLC SERPL: 32.6 % (ref 20–50)
LDLC SERPL CALC-MCNC: 78.6 MG/DL (ref 63–159)
MAGNESIUM SERPL-MCNC: 1.9 MG/DL (ref 1.6–2.6)
NONHDLC SERPL-MCNC: 116 MG/DL
PHOSPHATE SERPL-MCNC: 2.1 MG/DL (ref 2.7–4.5)
PROSTATE SPECIFIC ANTIGEN, TOTAL: 0.66 NG/ML (ref 0–4)
PSA FREE MFR SERPL: 40.91 %
PSA FREE SERPL-MCNC: 0.27 NG/ML (ref 0–1.5)
T3 SERPL-MCNC: 83 NG/DL (ref 60–180)
T4 FREE SERPL-MCNC: 0.92 NG/DL (ref 0.71–1.51)
TRIGL SERPL-MCNC: 187 MG/DL (ref 30–150)
TSH SERPL DL<=0.005 MIU/L-ACNC: 1 UIU/ML (ref 0.4–4)
URATE SERPL-MCNC: 6.1 MG/DL (ref 3.4–7)
VIT B12 SERPL-MCNC: 491 PG/ML (ref 210–950)

## 2021-10-05 PROCEDURE — 83735 ASSAY OF MAGNESIUM: CPT | Performed by: INTERNAL MEDICINE

## 2021-10-05 PROCEDURE — 36415 COLL VENOUS BLD VENIPUNCTURE: CPT | Mod: PO | Performed by: INTERNAL MEDICINE

## 2021-10-05 PROCEDURE — 84100 ASSAY OF PHOSPHORUS: CPT | Performed by: INTERNAL MEDICINE

## 2021-10-05 PROCEDURE — 84439 ASSAY OF FREE THYROXINE: CPT | Performed by: INTERNAL MEDICINE

## 2021-10-05 PROCEDURE — 82652 VIT D 1 25-DIHYDROXY: CPT | Performed by: INTERNAL MEDICINE

## 2021-10-05 PROCEDURE — 86316 IMMUNOASSAY TUMOR OTHER: CPT | Performed by: INTERNAL MEDICINE

## 2021-10-05 PROCEDURE — 82746 ASSAY OF FOLIC ACID SERUM: CPT | Performed by: INTERNAL MEDICINE

## 2021-10-05 PROCEDURE — 84480 ASSAY TRIIODOTHYRONINE (T3): CPT | Performed by: INTERNAL MEDICINE

## 2021-10-05 PROCEDURE — 80061 LIPID PANEL: CPT | Performed by: INTERNAL MEDICINE

## 2021-10-05 PROCEDURE — 82607 VITAMIN B-12: CPT | Performed by: INTERNAL MEDICINE

## 2021-10-05 PROCEDURE — 83036 HEMOGLOBIN GLYCOSYLATED A1C: CPT | Performed by: INTERNAL MEDICINE

## 2021-10-05 PROCEDURE — 84443 ASSAY THYROID STIM HORMONE: CPT | Performed by: INTERNAL MEDICINE

## 2021-10-05 PROCEDURE — 82330 ASSAY OF CALCIUM: CPT | Performed by: INTERNAL MEDICINE

## 2021-10-05 PROCEDURE — 84550 ASSAY OF BLOOD/URIC ACID: CPT | Performed by: INTERNAL MEDICINE

## 2021-10-05 RX ORDER — SODIUM,POTASSIUM PHOSPHATES 280-250MG
1 POWDER IN PACKET (EA) ORAL 2 TIMES DAILY
Qty: 180 PACKET | Refills: 3 | Status: SHIPPED | OUTPATIENT
Start: 2021-10-05 | End: 2022-01-03

## 2021-10-06 DIAGNOSIS — E53.8 B12 DEFICIENCY: Primary | ICD-10-CM

## 2021-10-07 LAB
CALCIT SERPL-MCNC: 5.7 PG/ML
CGA SERPL-MCNC: 584 NG/ML
THRYOGLOBULIN INTERPRETATION: ABNORMAL
THYROGLOB AB SERPL-ACNC: <1.8 IU/ML
THYROGLOB SERPL-MCNC: 19 NG/ML
VIT B12 SERPL-MCNC: 410 NG/L (ref 180–914)

## 2021-10-08 LAB
ALBUMIN SERPL-MCNC: 4.2 G/DL (ref 3.6–5.1)
ANDROSTANOLONE SERPL-MCNC: 248 PG/ML (ref 112–955)
IODINE SERPL-MCNC: 59 NG/ML (ref 40–92)
SHBG SERPL-SCNC: 31 NMOL/L (ref 22–77)
TESTOST FREE SERPL-MCNC: 42.1 PG/ML (ref 6–73)
TESTOST SERPL-MCNC: 309 NG/DL (ref 250–1100)
TESTOSTERONE.FREE+WB SERPL-MCNC: 81.1 NG/DL (ref 15–150)

## 2021-10-11 LAB — 1,25(OH)2D3 SERPL-MCNC: 61 PG/ML (ref 20–79)

## 2021-10-15 ENCOUNTER — HOSPITAL ENCOUNTER (OUTPATIENT)
Dept: RADIOLOGY | Facility: HOSPITAL | Age: 76
Discharge: HOME | End: 2021-10-15
Attending: INTERNAL MEDICINE
Payer: MEDICARE

## 2021-10-15 DIAGNOSIS — E21.3 HYPERPARATHYROIDISM: ICD-10-CM

## 2021-10-15 DIAGNOSIS — E04.2 MULTINODULAR GOITER: ICD-10-CM

## 2021-10-15 PROCEDURE — 76536 US EXAM OF HEAD AND NECK: CPT | Mod: TC

## 2021-10-15 PROCEDURE — 76536 US SOFT TISSUE HEAD NECK THYROID: ICD-10-PCS | Mod: 26,,, | Performed by: RADIOLOGY

## 2021-10-15 PROCEDURE — 76536 US EXAM OF HEAD AND NECK: CPT | Mod: 26,,, | Performed by: RADIOLOGY

## 2021-10-18 ENCOUNTER — PATIENT MESSAGE (OUTPATIENT)
Dept: ADMINISTRATIVE | Facility: HOSPITAL | Age: 76
End: 2021-10-18
Payer: MEDICARE

## 2021-10-20 ENCOUNTER — PATIENT MESSAGE (OUTPATIENT)
Dept: ENDOCRINOLOGY | Facility: CLINIC | Age: 76
End: 2021-10-20
Payer: MEDICARE

## 2021-10-28 DIAGNOSIS — R73.03 PREDIABETES: ICD-10-CM

## 2021-11-27 ENCOUNTER — PATIENT MESSAGE (OUTPATIENT)
Dept: FAMILY MEDICINE | Facility: CLINIC | Age: 76
End: 2021-11-27
Payer: MEDICARE

## 2021-11-30 ENCOUNTER — PATIENT MESSAGE (OUTPATIENT)
Dept: FAMILY MEDICINE | Facility: CLINIC | Age: 76
End: 2021-11-30
Payer: MEDICARE

## 2022-02-01 DIAGNOSIS — M19.90 ARTHRITIS PAIN: ICD-10-CM

## 2022-02-01 RX ORDER — DICLOFENAC SODIUM 75 MG/1
TABLET, DELAYED RELEASE ORAL
Qty: 90 TABLET | Refills: 3 | Status: SHIPPED | OUTPATIENT
Start: 2022-02-01 | End: 2022-07-06

## 2022-02-02 ENCOUNTER — PATIENT MESSAGE (OUTPATIENT)
Dept: ENDOCRINOLOGY | Facility: CLINIC | Age: 77
End: 2022-02-02
Payer: MEDICARE

## 2022-02-02 ENCOUNTER — PATIENT MESSAGE (OUTPATIENT)
Dept: FAMILY MEDICINE | Facility: CLINIC | Age: 77
End: 2022-02-02
Payer: MEDICARE

## 2022-02-02 RX ORDER — FLUTICASONE PROPIONATE 50 MCG
2 SPRAY, SUSPENSION (ML) NASAL DAILY
Qty: 48 G | Refills: 4 | Status: SHIPPED | OUTPATIENT
Start: 2022-02-02 | End: 2023-02-22

## 2022-02-02 NOTE — TELEPHONE ENCOUNTER
No new care gaps identified.  Powered by [employer]. Reference number: [id number].   2/02/2022 9:43:49 AM CST

## 2022-02-04 ENCOUNTER — PATIENT MESSAGE (OUTPATIENT)
Dept: ENDOCRINOLOGY | Facility: CLINIC | Age: 77
End: 2022-02-04
Payer: MEDICARE

## 2022-02-04 DIAGNOSIS — E29.1 HYPOGONADISM IN MALE: ICD-10-CM

## 2022-02-04 RX ORDER — TESTOSTERONE 10 MG/.5G
20 GEL, METERED TOPICAL DAILY
Qty: 120 EACH | Refills: 3 | Status: SHIPPED | OUTPATIENT
Start: 2022-02-04 | End: 2022-05-05

## 2022-04-13 ENCOUNTER — PATIENT MESSAGE (OUTPATIENT)
Dept: FAMILY MEDICINE | Facility: CLINIC | Age: 77
End: 2022-04-13
Payer: MEDICARE

## 2022-04-13 DIAGNOSIS — M25.50 ARTHRALGIA, UNSPECIFIED JOINT: ICD-10-CM

## 2022-04-13 RX ORDER — DICLOFENAC SODIUM 10 MG/G
2 GEL TOPICAL DAILY
Qty: 100 G | Refills: 1 | Status: SHIPPED | OUTPATIENT
Start: 2022-04-13 | End: 2022-11-04

## 2022-04-20 DIAGNOSIS — E11.9 TYPE 2 DIABETES MELLITUS WITHOUT COMPLICATION: ICD-10-CM

## 2022-04-25 ENCOUNTER — PATIENT MESSAGE (OUTPATIENT)
Dept: ADMINISTRATIVE | Facility: HOSPITAL | Age: 77
End: 2022-04-25
Payer: MEDICARE

## 2022-05-31 ENCOUNTER — PATIENT MESSAGE (OUTPATIENT)
Dept: ADMINISTRATIVE | Facility: HOSPITAL | Age: 77
End: 2022-05-31
Payer: MEDICARE

## 2022-07-06 ENCOUNTER — OFFICE VISIT (OUTPATIENT)
Dept: FAMILY MEDICINE | Facility: CLINIC | Age: 77
End: 2022-07-06
Payer: MEDICARE

## 2022-07-06 ENCOUNTER — LAB VISIT (OUTPATIENT)
Dept: LAB | Facility: HOSPITAL | Age: 77
End: 2022-07-06
Attending: STUDENT IN AN ORGANIZED HEALTH CARE EDUCATION/TRAINING PROGRAM
Payer: MEDICARE

## 2022-07-06 VITALS
OXYGEN SATURATION: 96 % | BODY MASS INDEX: 28.82 KG/M2 | SYSTOLIC BLOOD PRESSURE: 134 MMHG | HEART RATE: 65 BPM | RESPIRATION RATE: 18 BRPM | HEIGHT: 72 IN | WEIGHT: 212.75 LBS | DIASTOLIC BLOOD PRESSURE: 74 MMHG

## 2022-07-06 DIAGNOSIS — E11.9 TYPE 2 DIABETES MELLITUS WITHOUT COMPLICATION, WITHOUT LONG-TERM CURRENT USE OF INSULIN: ICD-10-CM

## 2022-07-06 DIAGNOSIS — E83.52 HYPERCALCEMIA: ICD-10-CM

## 2022-07-06 DIAGNOSIS — M19.90 ARTHRITIS PAIN: ICD-10-CM

## 2022-07-06 DIAGNOSIS — E21.3 HYPERPARATHYROIDISM: ICD-10-CM

## 2022-07-06 DIAGNOSIS — E83.52 HYPERCALCEMIA: Primary | ICD-10-CM

## 2022-07-06 DIAGNOSIS — E11.9 TYPE 2 DIABETES MELLITUS WITHOUT COMPLICATION: ICD-10-CM

## 2022-07-06 DIAGNOSIS — R73.9 HYPERGLYCEMIA: ICD-10-CM

## 2022-07-06 DIAGNOSIS — E29.1 HYPOGONADISM IN MALE: ICD-10-CM

## 2022-07-06 LAB
ALBUMIN SERPL BCP-MCNC: 4 G/DL (ref 3.5–5.2)
ALP SERPL-CCNC: 99 U/L (ref 55–135)
ALT SERPL W/O P-5'-P-CCNC: 45 U/L (ref 10–44)
ANION GAP SERPL CALC-SCNC: 9 MMOL/L (ref 8–16)
AST SERPL-CCNC: 27 U/L (ref 10–40)
BASOPHILS # BLD AUTO: 0.06 K/UL (ref 0–0.2)
BASOPHILS NFR BLD: 1.1 % (ref 0–1.9)
BILIRUB SERPL-MCNC: 0.6 MG/DL (ref 0.1–1)
BUN SERPL-MCNC: 13 MG/DL (ref 8–23)
CALCIUM SERPL-MCNC: 11 MG/DL (ref 8.7–10.5)
CHLORIDE SERPL-SCNC: 106 MMOL/L (ref 95–110)
CHOLEST SERPL-MCNC: 176 MG/DL (ref 120–199)
CHOLEST/HDLC SERPL: 2.7 {RATIO} (ref 2–5)
CO2 SERPL-SCNC: 24 MMOL/L (ref 23–29)
CREAT SERPL-MCNC: 1.1 MG/DL (ref 0.5–1.4)
DIFFERENTIAL METHOD: NORMAL
EOSINOPHIL # BLD AUTO: 0.1 K/UL (ref 0–0.5)
EOSINOPHIL NFR BLD: 1.5 % (ref 0–8)
ERYTHROCYTE [DISTWIDTH] IN BLOOD BY AUTOMATED COUNT: 13.6 % (ref 11.5–14.5)
EST. GFR  (AFRICAN AMERICAN): >60 ML/MIN/1.73 M^2
EST. GFR  (NON AFRICAN AMERICAN): >60 ML/MIN/1.73 M^2
ESTIMATED AVG GLUCOSE: 111 MG/DL (ref 68–131)
ESTIMATED AVG GLUCOSE: 111 MG/DL (ref 68–131)
GLUCOSE SERPL-MCNC: 105 MG/DL (ref 70–110)
HBA1C MFR BLD: 5.5 % (ref 4–5.6)
HBA1C MFR BLD: 5.5 % (ref 4–5.6)
HCT VFR BLD AUTO: 46.5 % (ref 40–54)
HDLC SERPL-MCNC: 66 MG/DL (ref 40–75)
HDLC SERPL: 37.5 % (ref 20–50)
HGB BLD-MCNC: 14.9 G/DL (ref 14–18)
IMM GRANULOCYTES # BLD AUTO: 0.01 K/UL (ref 0–0.04)
IMM GRANULOCYTES NFR BLD AUTO: 0.2 % (ref 0–0.5)
LDLC SERPL CALC-MCNC: 54.6 MG/DL (ref 63–159)
LYMPHOCYTES # BLD AUTO: 2.1 K/UL (ref 1–4.8)
LYMPHOCYTES NFR BLD: 39.7 % (ref 18–48)
MCH RBC QN AUTO: 29.6 PG (ref 27–31)
MCHC RBC AUTO-ENTMCNC: 32 G/DL (ref 32–36)
MCV RBC AUTO: 92 FL (ref 82–98)
MONOCYTES # BLD AUTO: 0.4 K/UL (ref 0.3–1)
MONOCYTES NFR BLD: 7.1 % (ref 4–15)
NEUTROPHILS # BLD AUTO: 2.7 K/UL (ref 1.8–7.7)
NEUTROPHILS NFR BLD: 50.4 % (ref 38–73)
NONHDLC SERPL-MCNC: 110 MG/DL
NRBC BLD-RTO: 0 /100 WBC
PLATELET # BLD AUTO: 246 K/UL (ref 150–450)
PMV BLD AUTO: 10.5 FL (ref 9.2–12.9)
POTASSIUM SERPL-SCNC: 4.9 MMOL/L (ref 3.5–5.1)
PROT SERPL-MCNC: 7.4 G/DL (ref 6–8.4)
RBC # BLD AUTO: 5.03 M/UL (ref 4.6–6.2)
SODIUM SERPL-SCNC: 139 MMOL/L (ref 136–145)
TRIGL SERPL-MCNC: 277 MG/DL (ref 30–150)
WBC # BLD AUTO: 5.34 K/UL (ref 3.9–12.7)

## 2022-07-06 PROCEDURE — 80061 LIPID PANEL: CPT | Performed by: STUDENT IN AN ORGANIZED HEALTH CARE EDUCATION/TRAINING PROGRAM

## 2022-07-06 PROCEDURE — 83036 HEMOGLOBIN GLYCOSYLATED A1C: CPT | Performed by: STUDENT IN AN ORGANIZED HEALTH CARE EDUCATION/TRAINING PROGRAM

## 2022-07-06 PROCEDURE — 80053 COMPREHEN METABOLIC PANEL: CPT | Performed by: STUDENT IN AN ORGANIZED HEALTH CARE EDUCATION/TRAINING PROGRAM

## 2022-07-06 PROCEDURE — 99999 PR PBB SHADOW E&M-EST. PATIENT-LVL IV: CPT | Mod: PBBFAC,,, | Performed by: STUDENT IN AN ORGANIZED HEALTH CARE EDUCATION/TRAINING PROGRAM

## 2022-07-06 PROCEDURE — 36415 COLL VENOUS BLD VENIPUNCTURE: CPT | Mod: PO | Performed by: STUDENT IN AN ORGANIZED HEALTH CARE EDUCATION/TRAINING PROGRAM

## 2022-07-06 PROCEDURE — 99214 OFFICE O/P EST MOD 30 MIN: CPT | Mod: PBBFAC,PO | Performed by: STUDENT IN AN ORGANIZED HEALTH CARE EDUCATION/TRAINING PROGRAM

## 2022-07-06 PROCEDURE — 85025 COMPLETE CBC W/AUTO DIFF WBC: CPT | Performed by: STUDENT IN AN ORGANIZED HEALTH CARE EDUCATION/TRAINING PROGRAM

## 2022-07-06 PROCEDURE — 99999 PR PBB SHADOW E&M-EST. PATIENT-LVL IV: ICD-10-PCS | Mod: PBBFAC,,, | Performed by: STUDENT IN AN ORGANIZED HEALTH CARE EDUCATION/TRAINING PROGRAM

## 2022-07-06 PROCEDURE — 99214 PR OFFICE/OUTPT VISIT, EST, LEVL IV, 30-39 MIN: ICD-10-PCS | Mod: S$PBB,,, | Performed by: STUDENT IN AN ORGANIZED HEALTH CARE EDUCATION/TRAINING PROGRAM

## 2022-07-06 PROCEDURE — 99214 OFFICE O/P EST MOD 30 MIN: CPT | Mod: S$PBB,,, | Performed by: STUDENT IN AN ORGANIZED HEALTH CARE EDUCATION/TRAINING PROGRAM

## 2022-07-06 RX ORDER — DICLOFENAC SODIUM 75 MG/1
75 TABLET, DELAYED RELEASE ORAL 2 TIMES DAILY
Qty: 180 TABLET | Refills: 2 | Status: SHIPPED | OUTPATIENT
Start: 2022-07-06 | End: 2023-03-16

## 2022-07-06 RX ORDER — TESTOSTERONE 10 MG/.5G
GEL, METERED TOPICAL
COMMUNITY
Start: 2022-02-04 | End: 2023-07-20

## 2022-07-06 NOTE — PROGRESS NOTES
[facility] CLINIC NOTE    Patient Name: Papito Hutton  YOB: 1945    PRESENTING HISTORY   Chief Complaint:   Chief Complaint   Patient presents with    Annual Exam        History of Present Illness:  Mr. Papito Hutton is a 76 y.o. male here for annual.     Moved to [place], came back.      Can't sleep- both falling and staying asleep. Tea/beer worsens.    Doesn't feel run down/tired. Never needed much sleep.    Doesn't want to try any medicine.     Sinus issues- on Flonase. Does well with OTC antihistamines. I advised that he just continue if this works.     HTN is well controlled.     T2DM- ? Well controlled if present. On 500mg metformin once daily.     Mild hypercalcemia going back decades. No sequelae.     Chronic testsosterone replacement. He doesn't know why he needs it. Sometimes misses doses for 1 week. Open to trying to come off of it.     Chronic pains- on NSAIDs. Also takes BC powder.     PPI.     Mult kidney risk factors.                                            Review of Systems   All other systems reviewed and are negative.        PAST HISTORY:     Past Medical History:   Diagnosis Date    Arthritis     B12 deficiency     Colon polyp     Diabetes mellitus     BORDERLINE    GERD (gastroesophageal reflux disease)     [place] (hard of hearing)     BILAT AIDS    Hyperlipidemia     Hypertension     Low testosterone     Personal history of colonic polyps 2008    adenomatous polyp    Sinus disorder     Sleep apnea     DOES NOT USE MACHINE    Wears glasses        Past Surgical History:   Procedure Laterality Date    COLONOSCOPY  2008    Dr. Garcia; polyps removed    COLONOSCOPY N/A 7/13/2020    Procedure: COLONOSCOPY;  Surgeon: Jj Fried MD;  [facility];  Service: Endoscopy;  Laterality: N/A;    LAPAROSCOPIC RIGHT COLON RESECTION Right 8/10/2020    Procedure: COLECTOMY, RIGHT, LAPAROSCOPIC pooss conversion to open;  Surgeon: Noble  MARIO Kaye MD;  Location: [facility] OR;  Service: General;  Laterality: Right;    NASAL SEPTUM SURGERY      right hand surgery      RIGHT HEMICOLECTOMY N/A 8/10/2020    Procedure: HEMICOLECTOMY, RIGHT;  Surgeon: Noble Kaye MD;  Location: [facility] OR;  Service: General;  Laterality: N/A;    UPPER GASTROINTESTINAL ENDOSCOPY  2008    hiatal hernia; esophagitis       Family History   Problem Relation Age of Onset    Cancer Neg Hx     Diabetes Neg Hx     Heart disease Neg Hx        Social History     Socioeconomic History    Marital status:    Tobacco Use    Smoking status: Former Smoker     Types: Cigars    Smokeless tobacco: Former User   Substance and Sexual Activity    Alcohol use: Yes     Alcohol/week: 2.0 standard drinks     Types: 2 Cans of beer per week    Drug use: No    Sexual activity: Yes     Partners: Female       MEDICATIONS & ALLERGIES:     Current Outpatient Medications on File Prior to Visit   Medication Sig    acetaminophen (TYLENOL) 650 MG TbSR Take 1 tablet (650 mg total) by mouth every 8 (eight) hours as needed (do not take with any other tylenol or acetaminophen).    amLODIPine-benazepriL (LOTREL) 10-40 mg per capsule TAKE 1 CAPSULE DAILY    CHOLECALCIFEROL, VITAMIN D3, (VITAMIN D3 ORAL) Take 1 capsule by mouth once daily.    cyanocobalamin (VITAMIN B-12) 1000 MCG tablet Take 100 mcg by mouth once daily.    diclofenac (VOLTAREN) 75 MG EC tablet TAKE 1 TABLET DAILY AS NEEDED FOR PAIN    diclofenac sodium (VOLTAREN) 1 % Gel Apply 2 g topically once daily.    esomeprazole (NEXIUM) 40 MG capsule TAKE 1 CAPSULE BEFORE BREAKFAST    fluticasone propionate (FLONASE) 50 mcg/actuation nasal spray 2 sprays (100 mcg total) by Each Nostril route once daily.    metFORMIN (GLUCOPHAGE) 500 MG tablet TAKE 1 TABLET TWICE A DAY WITH MEALS    metoprolol succinate (TOPROL-XL) 25 MG 24 hr tablet TAKE 1 TABLET DAILY    rosuvastatin (CRESTOR) 20 MG tablet TAKE 1 TABLET DAILY    testosterone 10  mg/0.5 gram /actuation GlPm     aspirin 81 MG Chew Take 1 tablet (81 mg total) by mouth once daily.    cetirizine (ZYRTEC) 10 MG tablet Take 1 tablet (10 mg total) by mouth every evening.    gabapentin (NEURONTIN) 300 MG capsule Take 1 capsule (300 mg total) by mouth 3 (three) times daily.    hydroCHLOROthiazide (HYDRODIURIL) 12.5 MG Tab TAKE 1 TABLET DAILY (Patient not taking: Reported on 7/6/2022)     No current facility-administered medications on file prior to visit.       Review of patient's allergies indicates:  No Known Allergies    OBJECTIVE:   Vital Signs:  Vitals:    07/06/22 0948   BP: 134/74   Pulse: 65   Resp: 18   SpO2: 96%   Weight: 96.5 kg (212 lb 11.9 oz)   Height: 6' (1.829 m)       No results found for this or any previous visit (from the past 24 hour(s)).      Physical Exam  Vitals and nursing note reviewed.   Constitutional:       Appearance: He is not toxic-appearing or diaphoretic.   HENT:      Head: Normocephalic and atraumatic.      Right Ear: External ear normal.      Left Ear: External ear normal.   Eyes:      General: No scleral icterus.     Conjunctiva/sclera: Conjunctivae normal.      Pupils: Pupils are equal, round, and reactive to light.   Neck:      Thyroid: No thyromegaly.   Cardiovascular:      Rate and Rhythm: Normal rate and regular rhythm.      Heart sounds: Normal heart sounds. No murmur heard.  Pulmonary:      Effort: Pulmonary effort is normal. No respiratory distress.      Breath sounds: Normal breath sounds. No wheezing or rales.   Musculoskeletal:         General: No tenderness or deformity. Normal range of motion.      Cervical back: Normal range of motion and neck supple.   Lymphadenopathy:      Cervical: No cervical adenopathy.   Skin:     General: Skin is warm and dry.      Findings: No erythema or rash.   Neurological:      General: No focal deficit present.      Mental Status: He is alert and oriented to person, place, and time.      Gait: Gait is intact.    Psychiatric:         Mood and Affect: Mood and affect normal.         Cognition and Memory: Memory normal.         Judgment: Judgment normal.         ASSESSMENT & PLAN:     Hypercalcemia  -     CBC Auto Differential; Future; Expected date: 07/06/2022  -     Comprehensive Metabolic Panel; Future; Expected date: 07/06/2022  -     Hemoglobin A1C; Future; Expected date: 07/06/2022    Arthritis pain  -     diclofenac (VOLTAREN) 75 MG EC tablet; Take 1 tablet (75 mg total) by mouth 2 (two) times daily.  Dispense: 180 tablet; Refill: 2    Hyperglycemia  -     CBC Auto Differential; Future; Expected date: 07/06/2022  -     Comprehensive Metabolic Panel; Future; Expected date: 07/06/2022  -     Hemoglobin A1C; Future; Expected date: 07/06/2022    Hypogonadism in male    Type 2 diabetes mellitus without complication, without long-term current use of insulin  -     Lipid Panel; Future; Expected date: 07/06/2022    Hyperparathyroidism      Stop BC, up NSAID if needed. Watch kidneys closely. Cont PPI for stomach protection.     I will monitor mild hypercalcemia, hyperglycemia.     Stop test replacement and see what happens.     No other med changes.          Rafi Martin MD   Internal Medicine    This note was created using Dragon voice recognition software that occasionally misinterprets phrases or words.

## 2022-07-06 NOTE — PATIENT INSTRUCTIONS
Generic sleep advice:  Limit use of screens, especially before bed. No television, computer or phone 2 hours before bed.     Reduce or stop drinking caffeine, especially at night. This can worsen sleep.     No alcohol.     Bed is for sleeping. Don't get in bed and do other activities like watch tv, listen to music or anything else.     Don't keep a clock right by your bed. If you wake up and look at the clock it can be stimulating.     Keep room dark, quiet and cool. You will sleep better in a cool dark room.     If you wake up, get out of bed. Don't lay in bed awake. Do a relaxing activity without screens like reading. Try to minimize the number of lights you turn on.     Avoid exercise within 2 hours of bed time.     Try using meditation or relaxation techniques. Can try CBT-I сергей (free сергей available in usual сергей stores) for ideas.             Try stopping the testosterone.       Edwardo Cobos,     If you are due for any health screening(s) below please notify me so we can arrange them to be ordered and scheduled to maintain your health. Most healthy patients complete it. Don't lose out on improving your health.     Health Maintenance   Topic Date Due    Foot Exam  Never done    TETANUS VACCINE  Never done    Hemoglobin A1c  04/05/2022    Eye Exam  06/08/2022    Lipid Panel  10/05/2022    Low Dose Statin  07/06/2023    Hepatitis C Screening  Completed          Colon Cancer Screening    Of cancers affecting both men and women, colorectal cancer is the third leading cancer killer in the [place]. But it doesnt have to be. Screening can prevent colorectal cancer or find it at an early stage when treatment often leads to a cure.    A colonoscopy is the preferred test for detecting colon cancer. It is needed only once every 10 years if results are negative. While sedated, a flexible, lighted tube with a tiny camera is inserted into the rectum and advanced through the colon to look for cancers. An alternative  screening test that is used at home and returned to the lab may also be used. It detects hidden blood in bowel movements which could indicate cancer in the colon. If results are positive, you will need a colonoscopy to determine if the blood is a sign of cancer. This type of follow up (diagnostic) colonoscopy usually requires additional copays as required by your insurance provider. Please contact your PCP if you have any questions.    Although you are still overdue for this important screening, due to the COVID-19 pandemic, we recommend scheduling it in the near future.       Diabetic Retinal Eye Exam    Diabetes is the #1 cause of blindness in the [place] - early detection before signs or symptoms develop can prevent debilitating blindness.    Once-a-year screening is recommended for all diabetic patients. This exam can prevent and treat diabetes complications in the eye before developing symptoms. This can be done with a special camera is used to take photographs of the back of your eye without having to dilate them, or you can see an eye doctor for a full dilated exam.    Although you are still overdue for this important screening, due to the COVID-19 pandemic, we recommend scheduling it in the near future.  Diabetic A1c Testing    Your chart identifies you as having diabetes. It is recommended that all diabetes patients have an A1c test done at least once a year, but [facility] recommends twice a year for most patients. This helps your doctor to better help you manage your diabetes. This is a non-fasting lab test which can be completed at any time. Your result will be sent to your Primary Care Provider for review and that office will contact you with the results.

## 2022-10-13 ENCOUNTER — PATIENT MESSAGE (OUTPATIENT)
Dept: FAMILY MEDICINE | Facility: CLINIC | Age: 77
End: 2022-10-13
Payer: MEDICARE

## 2022-10-13 ENCOUNTER — TELEPHONE (OUTPATIENT)
Dept: FAMILY MEDICINE | Facility: CLINIC | Age: 77
End: 2022-10-13
Payer: MEDICARE

## 2022-12-05 DIAGNOSIS — K21.9 GASTROESOPHAGEAL REFLUX DISEASE: ICD-10-CM

## 2022-12-05 RX ORDER — ESOMEPRAZOLE MAGNESIUM 40 MG/1
40 CAPSULE, DELAYED RELEASE ORAL
Qty: 90 CAPSULE | Refills: 3 | Status: SHIPPED | OUTPATIENT
Start: 2022-12-05 | End: 2023-11-10

## 2022-12-05 NOTE — TELEPHONE ENCOUNTER
No new care gaps identified.  [facility] Embedded Care Gaps. Reference number: [id number]. 12/05/2022   10:19:56 AM CST

## 2023-01-10 ENCOUNTER — OFFICE VISIT (OUTPATIENT)
Dept: FAMILY MEDICINE | Facility: CLINIC | Age: 78
End: 2023-01-10
Payer: MEDICARE

## 2023-01-10 ENCOUNTER — LAB VISIT (OUTPATIENT)
Dept: LAB | Facility: HOSPITAL | Age: 78
End: 2023-01-10
Attending: STUDENT IN AN ORGANIZED HEALTH CARE EDUCATION/TRAINING PROGRAM
Payer: MEDICARE

## 2023-01-10 VITALS
BODY MASS INDEX: 29.48 KG/M2 | WEIGHT: 217.63 LBS | DIASTOLIC BLOOD PRESSURE: 74 MMHG | RESPIRATION RATE: 18 BRPM | HEART RATE: 63 BPM | OXYGEN SATURATION: 96 % | HEIGHT: 72 IN | SYSTOLIC BLOOD PRESSURE: 138 MMHG

## 2023-01-10 DIAGNOSIS — R06.00 DYSPNEA, UNSPECIFIED TYPE: ICD-10-CM

## 2023-01-10 DIAGNOSIS — E29.1 HYPOGONADISM IN MALE: ICD-10-CM

## 2023-01-10 DIAGNOSIS — E83.52 HYPERCALCEMIA: ICD-10-CM

## 2023-01-10 DIAGNOSIS — I10 ESSENTIAL HYPERTENSION: ICD-10-CM

## 2023-01-10 DIAGNOSIS — Z00.00 ROUTINE GENERAL MEDICAL EXAMINATION AT A HEALTH CARE FACILITY: Primary | ICD-10-CM

## 2023-01-10 DIAGNOSIS — R73.03 PREDIABETES: ICD-10-CM

## 2023-01-10 DIAGNOSIS — R06.02 SHORTNESS OF BREATH: ICD-10-CM

## 2023-01-10 PROBLEM — E11.9 TYPE 2 DIABETES MELLITUS, WITHOUT LONG-TERM CURRENT USE OF INSULIN: Status: RESOLVED | Noted: 2020-08-10 | Resolved: 2023-01-10

## 2023-01-10 PROBLEM — E21.3 HYPERPARATHYROIDISM: Status: RESOLVED | Noted: 2018-07-17 | Resolved: 2023-01-10

## 2023-01-10 LAB
ALBUMIN SERPL BCP-MCNC: 4.1 G/DL (ref 3.5–5.2)
ALP SERPL-CCNC: 99 U/L (ref 55–135)
ALT SERPL W/O P-5'-P-CCNC: 30 U/L (ref 10–44)
ANION GAP SERPL CALC-SCNC: 9 MMOL/L (ref 8–16)
AST SERPL-CCNC: 26 U/L (ref 10–40)
BASOPHILS # BLD AUTO: 0.06 K/UL (ref 0–0.2)
BASOPHILS NFR BLD: 1.1 % (ref 0–1.9)
BILIRUB SERPL-MCNC: 0.5 MG/DL (ref 0.1–1)
BUN SERPL-MCNC: 15 MG/DL (ref 8–23)
CALCIUM SERPL-MCNC: 11.6 MG/DL (ref 8.7–10.5)
CHLORIDE SERPL-SCNC: 106 MMOL/L (ref 95–110)
CO2 SERPL-SCNC: 26 MMOL/L (ref 23–29)
CREAT SERPL-MCNC: 0.9 MG/DL (ref 0.5–1.4)
DIFFERENTIAL METHOD: ABNORMAL
EOSINOPHIL # BLD AUTO: 0.1 K/UL (ref 0–0.5)
EOSINOPHIL NFR BLD: 1.3 % (ref 0–8)
ERYTHROCYTE [DISTWIDTH] IN BLOOD BY AUTOMATED COUNT: 13.1 % (ref 11.5–14.5)
EST. GFR  (NO RACE VARIABLE): >60 ML/MIN/1.73 M^2
ESTIMATED AVG GLUCOSE: 111 MG/DL (ref 68–131)
GLUCOSE SERPL-MCNC: 99 MG/DL (ref 70–110)
HBA1C MFR BLD: 5.5 % (ref 4–5.6)
HCT VFR BLD AUTO: 45.2 % (ref 40–54)
HGB BLD-MCNC: 15.8 G/DL (ref 14–18)
IMM GRANULOCYTES # BLD AUTO: 0.01 K/UL (ref 0–0.04)
IMM GRANULOCYTES NFR BLD AUTO: 0.2 % (ref 0–0.5)
LYMPHOCYTES # BLD AUTO: 2.5 K/UL (ref 1–4.8)
LYMPHOCYTES NFR BLD: 45.3 % (ref 18–48)
MCH RBC QN AUTO: 31.6 PG (ref 27–31)
MCHC RBC AUTO-ENTMCNC: 35 G/DL (ref 32–36)
MCV RBC AUTO: 90 FL (ref 82–98)
MONOCYTES # BLD AUTO: 0.4 K/UL (ref 0.3–1)
MONOCYTES NFR BLD: 7.2 % (ref 4–15)
NEUTROPHILS # BLD AUTO: 2.5 K/UL (ref 1.8–7.7)
NEUTROPHILS NFR BLD: 44.9 % (ref 38–73)
NRBC BLD-RTO: 0 /100 WBC
PLATELET # BLD AUTO: 278 K/UL (ref 150–450)
PMV BLD AUTO: 10.8 FL (ref 9.2–12.9)
POTASSIUM SERPL-SCNC: 4.7 MMOL/L (ref 3.5–5.1)
PROT SERPL-MCNC: 6.9 G/DL (ref 6–8.4)
RBC # BLD AUTO: 5 M/UL (ref 4.6–6.2)
SODIUM SERPL-SCNC: 141 MMOL/L (ref 136–145)
TSH SERPL DL<=0.005 MIU/L-ACNC: 0.81 UIU/ML (ref 0.4–4)
WBC # BLD AUTO: 5.54 K/UL (ref 3.9–12.7)

## 2023-01-10 PROCEDURE — 36415 COLL VENOUS BLD VENIPUNCTURE: CPT | Mod: PO | Performed by: STUDENT IN AN ORGANIZED HEALTH CARE EDUCATION/TRAINING PROGRAM

## 2023-01-10 PROCEDURE — 99214 OFFICE O/P EST MOD 30 MIN: CPT | Mod: PBBFAC,PO | Performed by: STUDENT IN AN ORGANIZED HEALTH CARE EDUCATION/TRAINING PROGRAM

## 2023-01-10 PROCEDURE — 84443 ASSAY THYROID STIM HORMONE: CPT | Performed by: STUDENT IN AN ORGANIZED HEALTH CARE EDUCATION/TRAINING PROGRAM

## 2023-01-10 PROCEDURE — 80053 COMPREHEN METABOLIC PANEL: CPT | Performed by: STUDENT IN AN ORGANIZED HEALTH CARE EDUCATION/TRAINING PROGRAM

## 2023-01-10 PROCEDURE — 99214 PR OFFICE/OUTPT VISIT, EST, LEVL IV, 30-39 MIN: ICD-10-PCS | Mod: S$PBB,,, | Performed by: STUDENT IN AN ORGANIZED HEALTH CARE EDUCATION/TRAINING PROGRAM

## 2023-01-10 PROCEDURE — 93010 EKG 12-LEAD: ICD-10-PCS | Mod: S$PBB,,, | Performed by: INTERNAL MEDICINE

## 2023-01-10 PROCEDURE — 84270 ASSAY OF SEX HORMONE GLOBUL: CPT | Performed by: STUDENT IN AN ORGANIZED HEALTH CARE EDUCATION/TRAINING PROGRAM

## 2023-01-10 PROCEDURE — 84403 ASSAY OF TOTAL TESTOSTERONE: CPT | Performed by: STUDENT IN AN ORGANIZED HEALTH CARE EDUCATION/TRAINING PROGRAM

## 2023-01-10 PROCEDURE — 93005 ELECTROCARDIOGRAM TRACING: CPT | Mod: PBBFAC,PO | Performed by: INTERNAL MEDICINE

## 2023-01-10 PROCEDURE — 85025 COMPLETE CBC W/AUTO DIFF WBC: CPT | Performed by: STUDENT IN AN ORGANIZED HEALTH CARE EDUCATION/TRAINING PROGRAM

## 2023-01-10 PROCEDURE — 99214 OFFICE O/P EST MOD 30 MIN: CPT | Mod: S$PBB,,, | Performed by: STUDENT IN AN ORGANIZED HEALTH CARE EDUCATION/TRAINING PROGRAM

## 2023-01-10 PROCEDURE — 83970 ASSAY OF PARATHORMONE: CPT | Performed by: STUDENT IN AN ORGANIZED HEALTH CARE EDUCATION/TRAINING PROGRAM

## 2023-01-10 PROCEDURE — 99999 PR PBB SHADOW E&M-EST. PATIENT-LVL IV: ICD-10-PCS | Mod: PBBFAC,,, | Performed by: STUDENT IN AN ORGANIZED HEALTH CARE EDUCATION/TRAINING PROGRAM

## 2023-01-10 PROCEDURE — 99999 PR PBB SHADOW E&M-EST. PATIENT-LVL IV: CPT | Mod: PBBFAC,,, | Performed by: STUDENT IN AN ORGANIZED HEALTH CARE EDUCATION/TRAINING PROGRAM

## 2023-01-10 PROCEDURE — 93010 ELECTROCARDIOGRAM REPORT: CPT | Mod: S$PBB,,, | Performed by: INTERNAL MEDICINE

## 2023-01-10 PROCEDURE — 83036 HEMOGLOBIN GLYCOSYLATED A1C: CPT | Performed by: STUDENT IN AN ORGANIZED HEALTH CARE EDUCATION/TRAINING PROGRAM

## 2023-01-10 NOTE — PROGRESS NOTES
[facility] CLINIC NOTE    Patient Name: Papito Hutton  YOB: 1945    PRESENTING HISTORY     History of Present Illness:  Mr. Papito Hutton is a 77 y.o. male here for scheduled f/u.     Feeling mildly fatigued. Otherwise no sig complaints.     Later in visit tells me he is getting more winded over hte last few months walking up hills. No chest tightness or pain.    Never had stress test.     Off test replacement for 6 months per my recommendation, will recheck level.     REtesting PTH levels.     [facility]. Still able ot golf but bothering him. Voltaren gel helps.       ROS      OBJECTIVE:   Vital Signs:  Vitals:    01/10/23 0920   BP: 138/74   Pulse: 63   Resp: 18   SpO2: 96%   Weight: 98.7 kg (217 lb 9.5 oz)   Height: 6' (1.829 m)          Physical Exam: Normal, no change.     Physical Exam    ASSESSMENT & PLAN:     Routine general medical examination at a health care facility    Hypogonadism in male  -     TESTOSTERONE PANEL; Future; Expected date: 01/10/2023  -     CBC Auto Differential; Future; Expected date: 01/10/2023    Prediabetes  -     Hemoglobin A1C; Future; Expected date: 01/10/2023  -     TSH; Future; Expected date: 01/10/2023  Continue current medications    Hypercalcemia  -     Comprehensive Metabolic Panel; Future; Expected date: 01/10/2023  -     TSH; Future; Expected date: 01/10/2023  -     PTH, intact; Future; Expected date: 01/10/2023    Dyspnea, unspecified type  -     IN OFFICE EKG 12-LEAD (to Muse)  -     Stress Echo Which stress agent will be used? Pharmacological; Color Flow Doppler? No; Future    Essential hypertension  Continue current medications    Shortness of breath  -     Stress Echo Which stress agent will be used? Pharmacological; Color Flow Doppler? No; Future             Rafi Martin MD   Internal Medicine

## 2023-01-11 LAB — PTH-INTACT SERPL-MCNC: 114.1 PG/ML (ref 9–77)

## 2023-01-13 ENCOUNTER — PATIENT MESSAGE (OUTPATIENT)
Dept: FAMILY MEDICINE | Facility: CLINIC | Age: 78
End: 2023-01-13
Payer: MEDICARE

## 2023-01-16 LAB
ALBUMIN SERPL-MCNC: 4.3 G/DL (ref 3.6–5.1)
SHBG SERPL-SCNC: 37 NMOL/L (ref 22–77)
TESTOST FREE SERPL-MCNC: 34 PG/ML (ref 6–73)
TESTOST SERPL-MCNC: 292 NG/DL (ref 250–1100)
TESTOSTERONE.FREE+WB SERPL-MCNC: 67 NG/DL (ref 15–150)

## 2023-01-24 ENCOUNTER — TELEPHONE (OUTPATIENT)
Dept: CARDIOLOGY | Facility: HOSPITAL | Age: 78
End: 2023-01-24

## 2023-01-25 ENCOUNTER — CLINICAL SUPPORT (OUTPATIENT)
Dept: CARDIOLOGY | Facility: HOSPITAL | Age: 78
End: 2023-01-25
Attending: STUDENT IN AN ORGANIZED HEALTH CARE EDUCATION/TRAINING PROGRAM
Payer: MEDICARE

## 2023-01-25 DIAGNOSIS — R06.00 DYSPNEA, UNSPECIFIED TYPE: ICD-10-CM

## 2023-01-25 DIAGNOSIS — R06.02 SHORTNESS OF BREATH: ICD-10-CM

## 2023-01-25 LAB
CV STRESS BASE HR: 56 BPM
DIASTOLIC BLOOD PRESSURE: 58 MMHG
EJECTION FRACTION: 65 %
OHS CV CPX 1 MINUTE RECOVERY HEART RATE: 117 BPM
OHS CV CPX 85 PERCENT MAX PREDICTED HEART RATE MALE: 122
OHS CV CPX MAX PREDICTED HEART RATE: 144
OHS CV CPX PATIENT IS FEMALE: 0
OHS CV CPX PATIENT IS MALE: 1
OHS CV CPX PEAK DIASTOLIC BLOOD PRESSURE: 131 MMHG
OHS CV CPX PEAK HEAR RATE: 155 BPM
OHS CV CPX PEAK RATE PRESSURE PRODUCT: NORMAL
OHS CV CPX PEAK SYSTOLIC BLOOD PRESSURE: 212 MMHG
OHS CV CPX PERCENT MAX PREDICTED HEART RATE ACHIEVED: 108
OHS CV CPX RATE PRESSURE PRODUCT PRESENTING: 9688
STRESS ECHO POST EXERCISE DUR MIN: 9 MINUTES
STRESS ECHO POST EXERCISE DUR SEC: 0 SECONDS
SYSTOLIC BLOOD PRESSURE: 173 MMHG

## 2023-01-25 PROCEDURE — 93351 STRESS TTE COMPLETE: CPT

## 2023-01-25 PROCEDURE — 93351 STRESS TTE COMPLETE: CPT | Mod: 26,,, | Performed by: INTERNAL MEDICINE

## 2023-01-25 PROCEDURE — 63600150 PHARM REV CODE 636: Performed by: STUDENT IN AN ORGANIZED HEALTH CARE EDUCATION/TRAINING PROGRAM

## 2023-01-25 PROCEDURE — 93351 STRESS ECHO (CUPID ONLY): ICD-10-PCS | Mod: 26,,, | Performed by: INTERNAL MEDICINE

## 2023-01-25 RX ORDER — DOBUTAMINE HYDROCHLORIDE 100 MG/100ML
10 INJECTION INTRAVENOUS CONTINUOUS
Status: DISCONTINUED | OUTPATIENT
Start: 2023-01-25 | End: 2023-01-26 | Stop reason: HOSPADM

## 2023-01-25 RX ADMIN — DOBUTAMINE HYDROCHLORIDE 10 MCG/KG/MIN: 100 INJECTION INTRAVENOUS at 08:01

## 2023-04-02 NOTE — PROGRESS NOTES
Patient identified by name and  with verbal feedback. Depo-Testosterone 76mg mg administered IM to Right upper outer quadrant using aseptic technique. Patient tolerated well, no adverse reactions noted/reported. Next injection due in 14 days.   Scribe Attestation (For Scribes USE Only)... Attending Attestation (For Attendings USE Only).../Scribe Attestation (For Scribes USE Only)...

## 2023-04-29 ENCOUNTER — PATIENT MESSAGE (OUTPATIENT)
Dept: FAMILY MEDICINE | Facility: CLINIC | Age: 78
End: 2023-04-29
Payer: MEDICARE

## 2023-05-02 ENCOUNTER — TELEPHONE (OUTPATIENT)
Dept: FAMILY MEDICINE | Facility: CLINIC | Age: 78
End: 2023-05-02
Payer: MEDICARE

## 2023-05-02 NOTE — TELEPHONE ENCOUNTER
Angelic Mckee Staff  Caller: pt [phone] (Today, 10:01 AM)    ----- Message from Angelic Leon sent at 5/2/2023 10:01 AM CDT -----  Contact: pt [phone]  Type:  Patient Returning Call    Who Called:  Pt   Who Left Message for Patient:  NA   Does the patient know what this is regarding?:  No   Best Call Back Number:  [phone]    Additional Information:  Pt stated he missed a phone call from [facility] this morning. He is not sure who called.

## 2023-05-02 NOTE — TELEPHONE ENCOUNTER
Patient complaining of shoulder pain. Requesting same day appointment for evaluation. Appointment scheduled for the date of 5-3-23. Patient agreed to appointment date, time, and location.

## 2023-05-03 ENCOUNTER — OFFICE VISIT (OUTPATIENT)
Dept: FAMILY MEDICINE | Facility: CLINIC | Age: 78
End: 2023-05-03
Payer: MEDICARE

## 2023-05-03 VITALS
DIASTOLIC BLOOD PRESSURE: 80 MMHG | OXYGEN SATURATION: 98 % | SYSTOLIC BLOOD PRESSURE: 150 MMHG | BODY MASS INDEX: 29.41 KG/M2 | RESPIRATION RATE: 16 BRPM | WEIGHT: 217.13 LBS | HEIGHT: 72 IN | TEMPERATURE: 99 F | HEART RATE: 57 BPM

## 2023-05-03 DIAGNOSIS — M25.812 IMPINGEMENT OF LEFT SHOULDER: Primary | ICD-10-CM

## 2023-05-03 PROCEDURE — 99214 OFFICE O/P EST MOD 30 MIN: CPT | Mod: PBBFAC,PO | Performed by: FAMILY MEDICINE

## 2023-05-03 PROCEDURE — 99213 OFFICE O/P EST LOW 20 MIN: CPT | Mod: S$PBB,,, | Performed by: FAMILY MEDICINE

## 2023-05-03 PROCEDURE — 99213 PR OFFICE/OUTPT VISIT, EST, LEVL III, 20-29 MIN: ICD-10-PCS | Mod: S$PBB,,, | Performed by: FAMILY MEDICINE

## 2023-05-03 PROCEDURE — 99999 PR PBB SHADOW E&M-EST. PATIENT-LVL IV: CPT | Mod: PBBFAC,,, | Performed by: FAMILY MEDICINE

## 2023-05-03 PROCEDURE — 99999 PR PBB SHADOW E&M-EST. PATIENT-LVL IV: ICD-10-PCS | Mod: PBBFAC,,, | Performed by: FAMILY MEDICINE

## 2023-05-03 NOTE — PROGRESS NOTES
Subjective:       Patient ID: Papito Hutton is a 77 y.o. male.    Chief Complaint: No chief complaint on file.    New to me patient here for UC visit.  1 month of gradual onset of left shoulder pain.  No trauma event.  Hurts mostly with reaching around back>upward>forward.  No numbness or weakness in hand/fingers.  On oral and topical [place] and using icy-hot type patches all sans relief.    Review of Systems   Constitutional:  Negative for fever.   Respiratory:  Negative for shortness of breath.    Cardiovascular:  Negative for chest pain.   Gastrointestinal:  Negative for abdominal pain and nausea.   Musculoskeletal:  Positive for arthralgias (also has knee pains).   Skin:  Negative for rash.   Neurological:  Negative for numbness.   All other systems reviewed and are negative.    Objective:      Physical Exam  Vitals reviewed.   Constitutional:       General: He is not in acute distress.     Appearance: Normal appearance. He is well-developed. He is not ill-appearing.   HENT:      Mouth/Throat:      Mouth: Mucous membranes are moist.      Pharynx: No posterior oropharyngeal erythema.   Cardiovascular:      Rate and Rhythm: Normal rate and regular rhythm.      Heart sounds: No murmur heard.  Pulmonary:      Effort: Pulmonary effort is normal.      Breath sounds: Normal breath sounds.   Musculoskeletal:      Left shoulder: No swelling, tenderness or bony tenderness. Decreased range of motion (2T pain). Normal strength. Normal pulse.      Cervical back: Neck supple.      Comments: Pain reported with rotation and when he reaches up or around to back   Lymphadenopathy:      Cervical: No cervical adenopathy.   Skin:     General: Skin is warm and dry.       Assessment:       1. Impingement of left shoulder        Plan:       Impingement of left shoulder  -     Ambulatory referral/consult to Orthopedics; Future; Expected date: 05/10/2023  -     X-Ray Shoulder 2 or More Views Left

## 2023-05-09 DIAGNOSIS — M25.512 LEFT SHOULDER PAIN, UNSPECIFIED CHRONICITY: Primary | ICD-10-CM

## 2023-05-18 ENCOUNTER — HOSPITAL ENCOUNTER (OUTPATIENT)
Dept: RADIOLOGY | Facility: HOSPITAL | Age: 78
Discharge: HOME | End: 2023-05-18
Attending: ORTHOPAEDIC SURGERY
Payer: MEDICARE

## 2023-05-18 ENCOUNTER — OFFICE VISIT (OUTPATIENT)
Dept: ORTHOPEDICS | Facility: CLINIC | Age: 78
End: 2023-05-18
Payer: MEDICARE

## 2023-05-18 DIAGNOSIS — M25.812 IMPINGEMENT OF LEFT SHOULDER: Primary | ICD-10-CM

## 2023-05-18 DIAGNOSIS — M25.511 RIGHT SHOULDER PAIN, UNSPECIFIED CHRONICITY: ICD-10-CM

## 2023-05-18 DIAGNOSIS — M25.812 IMPINGEMENT OF LEFT SHOULDER: ICD-10-CM

## 2023-05-18 DIAGNOSIS — M25.512 LEFT SHOULDER PAIN, UNSPECIFIED CHRONICITY: ICD-10-CM

## 2023-05-18 PROCEDURE — 99204 OFFICE O/P NEW MOD 45 MIN: CPT | Mod: 25,S$PBB,, | Performed by: ORTHOPAEDIC SURGERY

## 2023-05-18 PROCEDURE — 99999 PR PBB SHADOW E&M-EST. PATIENT-LVL II: CPT | Mod: PBBFAC,,, | Performed by: ORTHOPAEDIC SURGERY

## 2023-05-18 PROCEDURE — 99204 PR OFFICE/OUTPT VISIT, NEW, LEVL IV, 45-59 MIN: ICD-10-PCS | Mod: 25,S$PBB,, | Performed by: ORTHOPAEDIC SURGERY

## 2023-05-18 PROCEDURE — 73030 XR SHOULDER TRAUMA 3 VIEW LEFT: ICD-10-PCS | Mod: 26,LT,, | Performed by: RADIOLOGY

## 2023-05-18 PROCEDURE — 20610 LARGE JOINT ASPIRATION/INJECTION: L SUBACROMIAL BURSA: ICD-10-PCS | Mod: S$PBB,LT,, | Performed by: ORTHOPAEDIC SURGERY

## 2023-05-18 PROCEDURE — 20610 DRAIN/INJ JOINT/BURSA W/O US: CPT | Mod: PBBFAC,PO,LT | Performed by: ORTHOPAEDIC SURGERY

## 2023-05-18 PROCEDURE — 99999 PR PBB SHADOW E&M-EST. PATIENT-LVL II: ICD-10-PCS | Mod: PBBFAC,,, | Performed by: ORTHOPAEDIC SURGERY

## 2023-05-18 PROCEDURE — 99212 OFFICE O/P EST SF 10 MIN: CPT | Mod: PBBFAC,PO,25 | Performed by: ORTHOPAEDIC SURGERY

## 2023-05-18 PROCEDURE — 73030 X-RAY EXAM OF SHOULDER: CPT | Mod: TC,PO,LT

## 2023-05-18 PROCEDURE — 73030 X-RAY EXAM OF SHOULDER: CPT | Mod: 26,LT,, | Performed by: RADIOLOGY

## 2023-05-18 RX ORDER — TRIAMCINOLONE ACETONIDE 40 MG/ML
40 INJECTION, SUSPENSION INTRA-ARTICULAR; INTRAMUSCULAR
Status: DISCONTINUED | OUTPATIENT
Start: 2023-05-18 | End: 2023-05-18 | Stop reason: HOSPADM

## 2023-05-18 RX ADMIN — TRIAMCINOLONE ACETONIDE 40 MG: 40 INJECTION, SUSPENSION INTRA-ARTICULAR; INTRAMUSCULAR at 10:05

## 2023-05-18 NOTE — PATIENT INSTRUCTIONS
Rotator Cuff Tears    A rotator cuff tear is a common cause of pain and disability among adults. In 2008, close to 2 million people in the [place] went to their doctors because of a rotator cuff problem.   A torn rotator cuff will weaken your shoulder. This means that many daily activities, like combing your hair or getting dressed, may become painful and difficult to do.     Anatomy     Normal anatomy of the shoulder.     Your shoulder is made up of three bones: your upper arm bone (humerus), your shoulder blade (scapula), and your collarbone (clavicle). The shoulder is a ball-and-socket joint: The ball, or head, of your upper arm bone fits into a shallow socket in your shoulder blade.    Your arm is kept in your shoulder socket by your rotator cuff. The rotator cuff is a network of four muscles that come together as tendons to form a covering around the head of the humerus. The rotator cuff attaches the humerus to the shoulder blade and helps to lift and rotate your arm.    There is a lubricating sac called a bursa between the rotator cuff and the bone on top of your shoulder (acromion). The bursa allows the rotator cuff tendons to glide freely when you move your arm. When the rotator cuff tendons are injured or damaged, this bursa can also become inflamed and painful.    This illustration more clearly shows the four muscles and their tendons that form the rotator cuff and stabilize the shoulder joint.     Description   When one or more of the rotator cuff tendons is torn, the tendon no longer fully attaches to the head of the humerus. Most tears occur in the supraspinatus muscle and tendon, but other parts of the rotator cuff may also be involved.    In many cases, torn tendons begin by fraying. As the damage progresses, the tendon can completely tear, sometimes with lifting a heavy object.    There are different types of tears.  Partial Tear. This type of tear damages the soft tissue, but does not  completely sever it.   Full-Thickness Tear. This type of tear is also called a complete tear. It splits the soft tissue into two pieces. In many cases, tendons tear off where they attach to the head of the humerus. With a full-thickness tear, there is basically a hole in the tendon.    A rotator cuff tear most often occurs within the tendon.     Cause   There are two main causes of rotator cuff tears: injury and degeneration.  Acute Tear  If you fall down on your outstretched arm or lift something too heavy with a jerking motion, you can tear your rotator cuff. This type of tear can occur with other shoulder injuries, such as a broken collarbone or dislocated shoulder.  Degenerative Tear  Most tears are the result of a wearing down of the tendon that occurs slowly over time. This degeneration naturally occurs as we age. Rotator cuff tears are more common in the dominant arm. If you have a degenerative tear in one shoulder, there is a greater risk for a rotator cuff tear in the opposite shoulder -- even if you have no pain in that shoulder.    Several factors contribute to degenerative, or chronic, rotator cuff tears.  Repetitive stress. Repeating the same shoulder motions again and again can stress your rotator cuff muscles and tendons. Baseball, tennis, rowing, and weightlifting are examples of sports activities that can put you at risk for overuse tears. Many jobs and routine chores can cause overuse tears, as well.   Lack of blood supply. As we get older, the blood supply in our rotator cuff tendons lessens. Without a good blood supply, the body's natural ability to repair tendon damage is impaired. This can ultimately lead to a tendon tear.   Bone spurs. As we age, bone spurs (bone overgrowth) often develop on the underside of the acromion bone. When we lift our arms, the spurs rub on the rotator cuff tendon. This condition is called shoulder impingement, and over time will weaken the tendon and make it more  likely to tear.  Risk Factors  Because most rotator cuff tears are largely caused by the normal wear and tear that goes along with aging, people over 40 are at greater risk.    People who do repetitive lifting or overhead activities are also at risk for rotator cuff tears. Athletes are especially vulnerable to overuse tears, particularly tennis players and baseball pitchers. Painters, , and others who do overhead work also have a greater chance for tears.    Although overuse tears caused by sports activity or overhead work also occur in younger people, most tears in young adults are caused by a traumatic injury, like a fall.    Symptoms     The most common symptoms of a rotator cuff tear include:  Pain at rest and at night, particularly if lying on the affected shoulder   Pain when lifting and lowering your arm or with specific movements   Weakness when lifting or rotating your arm   Crepitus or crackling sensation when moving your shoulder in certain positions  Tears that happen suddenly, such as from a fall, usually cause intense pain. There may be a snapping sensation and immediate weakness in your upper arm.    A rotator cuff injury can make it painful to lift your arm out to the side.     Tears that develop slowly due to overuse also cause pain and arm weakness. You may have pain in the shoulder when you lift your arm to the side, or pain that moves down your arm. At first, the pain may be mild and only present when lifting your arm over your head, such as reaching into a cupboard. Over-the-counter medication, such as aspirin or ibuprofen, may relieve the pain at first.    Over time, the pain may become more noticeable at rest, and no longer goes away with medications. You may have pain when you lie on the painful side at night. The pain and weakness in the shoulder may make routine activities such as combing your hair or reaching behind your back more difficult.    Doctor Examination   Medical  "History and Physical Examination    Your doctor will test your range of motion by having you move your arm in different directions.     After discussing your symptoms and medical history, your doctor will examine your shoulder. He or she will check to see whether it is tender in any area or whether there is a deformity. To measure the range of motion of your shoulder, your doctor will have you move your arm in several different directions. He or she will also test your arm strength.    Your doctor will check for other problems with your shoulder joint. He or she may also examine your neck to make sure that the pain is not coming from a "pinched nerve," and to rule out other conditions, such as arthritis.  Imaging Tests  Other tests which may help your doctor confirm your diagnosis include:  X-rays. The first imaging tests performed are usually x-rays. Because x-rays do not show the soft tissues of your shoulder like the rotator cuff, plain x-rays of a shoulder with rotator cuff pain are usually normal or may show a small bone spur.   Magnetic resonance imaging (MRI) or ultrasound. These studies can better show soft tissues like the rotator cuff tendons. They can show the rotator cuff tear, as well as where the tear is located within the tendon and the size of the tear. An MRI can also give your doctor a better idea of how "old" or "new" a tear is because it can show the quality of the rotator cuff muscles.    Treatment   If you have a rotator cuff tear and you keep using it despite increasing pain, you may cause further damage. A rotator cuff tear can get larger over time.    Chronic shoulder and arm pain are good reasons to see your doctor. Early treatment can prevent your symptoms from getting worse. It will also get you back to your normal routine that much quicker.    The goal of any treatment is to reduce pain and restore function. There are several treatment options for a rotator cuff tear, and the best option " is different for every person. In planning your treatment, your doctor will consider your age, activity level, general health, and the type of tear you have.    There is no evidence of better results from surgery performed near the time of injury versus later on. For this reason, many doctors first recommend nonsurgical management of rotator cuff tears.  Nonsurgical Treatment  In about 50% of patients, nonsurgical treatment relieves pain and improves function in the shoulder. Shoulder strength, however, does not usually improve without surgery.    Nonsurgical treatment options may include:  Rest. Your doctor may suggest rest and and limiting overhead activities. He or she may also prescribe a sling to help protect your shoulder and keep it still.   Activity modification. Avoid activities that cause shoulder pain.   Non-steroidal anti-inflammatory medication. Drugs like ibuprofen and naproxen reduce pain and swelling.   Strengthening exercises and physical therapy. Specific exercises will restore movement and strengthen your shoulder. Your exercise program will include stretches to improve flexibility and range of motion. Strengthening the muscles that support your shoulder can relieve pain and prevent further injury.   Steroid injection. If rest, medications, and physical therapy do not relieve your pain, an injection of a local anesthetic and a cortisone preparation may be helpful. Cortisone is a very effective anti-inflammatory medicine.    A cortisone injection may relieve painful symptoms.     The chief advantage of nonsurgical treatment is that it avoids the major risks of surgery, such as:  Infection   Permanent stiffness   Anesthesia complications   Sometimes lengthy recovery time  The disadvantages of nonsurgical treatment are:  No improvements in strength   Size of tear may increase over time   Activities may need to be limited  Surgical Treatment  Your doctor may recommend surgery if your pain does not  "improve with nonsurgical methods. Continued pain is the main indication for surgery. If you are very active and use your arms for overhead work or sports, your doctor may also suggest surgery.  Other signs that surgery may be a good option for you include:  Your symptoms have lasted 6 to 12 months   You have a large tear (more than 3 cm)   You have significant weakness and loss of function in your shoulder   Your tear was caused by a recent, acute injury  Surgery to repair a torn rotator cuff most often involves re-attaching the tendon to the head of humerus (upper arm bone). There are a few options for repairing rotator cuff tears. Your orthopaedic surgeon will discuss with you the best procedure to meet your individual health needs.    Rotator Cuff Tears: Frequently Asked Questions     What is the rotator cuff and what does it do?   The rotator cuff is a large tendon comprised of four muscles which combine to form a "cuff" over the upper end of the arm, the head of the humerus. The four muscles--supraspinatus, infraspinatus, subscapularis and teres minor--originate from the "wing bone," the scapula, and together form a single tendon unit that inserts on the greater tuberosity of the humerus.    The rotator cuff helps to lift and rotate the arm and to stabilize the ball of the shoulder within the joint.    What causes a rotator cuff tear and how would I know if I have one?   A rotator cuff tear may result from an acute injury such as a fall or may be caused by chronic wear and tear with degeneration of the tendon. Impingement of the front of the scapula, the acromion, on the tendon is believed to be a major cause of cuff tears in individuals older than 40 years.    Typically, you will feel pain in the front of your shoulder that radiates down the side of your arm. It may be present with overhead activities such as lifting or reaching. You may feel pain when you try to sleep on the affected side. You may note " weakness of your arm and difficulty with routine activities such as combing your hair or reaching behind your back.    If the tear occurs with injury you may experience acute pain, a snapping sensation, and immediate weakness of the arm.    If I have a painful rotator cuff and keep using it, will this cause further damage?   A rotator cuff tear can extend or get larger over time. This can occur with repetitive use or a re-injury. It is common for patients with known rotator cuff disease to have acute pain and weakness following a minor injury. This likely represents extension of an existing tear.    If you know you have a rotator cuff tear, then worsening pain and decreasing strength may mean the tear is getting larger.    When should I see a doctor for a rotator cuff tear?   If you have injured your shoulder or have chronic shoulder and arm pain, it is best to see your orthopaedic surgeon. He or she can then make a diagnosis and begin treatment. The doctor may recommend a diagnostic study such as MRI (magnetic resonance imaging) to confirm the diagnosis.    Early diagnosis and treatment of a rotator cuff tear may prevent symptoms such as loss of strength and loss of motion from setting in.    If your primary physician has already made the diagnosis, an orthopaedic surgeon can review both surgical and nonsurgical options and start treatment.    Can a rotator cuff tear be healed or strengthened without surgery?   Many rotator cuff tears can be treated nonsurgically. Anti-inflammatory medication, steroid injections, and physical therapy may all be of benefit in treating symptoms of a cuff tear. The goals of treatment are to relieve pain and restore strength to the involved shoulder.    Even though most tears cannot heal on their own, satisfactory function can often be achieved without surgery.    If, however, you are active and use your arm for overhead work or sports, then surgery is most often recommended because  many tears will not heal without surgery.    At what point does a rotator cuff tear require surgery to fix it?   Surgery is recommended if you have persistent pain or weakness in your shoulder that does not improve with nonsurgical treatment. Frequently, patients who require surgery will report pain at night and difficulty using the arm for lifting and reaching. Many will report ongoing symptoms despite several months of medication and limited use of the arm.    Surgery is also indicated in active individuals who use the arm for overhead work or sports. Pitchers, swimmers, and tennis players are common examples.    What options are available for surgical repair?   The type of repair performed is based on the findings at surgery. A partial tear may require only a trimming or smoothing procedure called a débridement. A full-thickness tear within the substance of the tendon can be repaired side to side. If the tendon is torn from its insertion on the humerus, it is repaired directly to bone.    Three techniques are used for rotator cuff repair: traditional open repair, mini-open repair, and arthroscopic repair.    Your orthopaedic surgeon can recommend which technique is best for you.    How important is rehabilitation in the treatment of a rotator cuff tear?   Rehabilitation plays a critical role in both the nonsurgical and surgical treatment of a rotator cuff tear.    When a tear occurs, there is frequently atrophy of the muscles around the arm and loss of motion of the shoulder. An exercise or physical therapy program is necessary to regain strength and improve function in the shoulder.    Even though surgery repairs the defect in the tendon, the muscles around the arm remain weak, and a strong effort at rehabilitation is necessary for the procedure to succeed. Complete rehabilitation after surgery may take several months.    Your orthopaedic surgeon can prescribe an appropriate program based on your needs and the  findings at surgery.

## 2023-05-18 NOTE — PROCEDURES
Large Joint Aspiration/Injection: L subacromial bursa    Date/Time: 5/18/2023 10:30 AM  Performed by: Ernie Cox MD  Authorized by: Ernie Cox MD     Consent Done?:  Yes (Verbal)  Indications:  Pain  Site marked: the procedure site was marked    Timeout: prior to procedure the correct patient, procedure, and site was verified      Local anesthesia used?: Yes    Local anesthetic: Ropivicaine.  Anesthetic total (ml):  3      Details:  Needle Size:  20 G  Ultrasonic Guidance for needle placement?: No    Approach:  Posterior  Location:  Shoulder  Site:  L subacromial bursa  Medications:  40 mg triamcinolone acetonide 40 mg/mL  Patient tolerance:  Patient tolerated the procedure well with no immediate complications

## 2023-05-18 NOTE — PROGRESS NOTES
Past Medical History:   Diagnosis Date    Arthritis     B12 deficiency     Colon polyp     Diabetes mellitus     BORDERLINE    GERD (gastroesophageal reflux disease)     [place] (hard of hearing)     BILAT AIDS    Hyperlipidemia     Hypertension     Low testosterone     Personal history of colonic polyps 2008    adenomatous polyp    Sinus disorder     Sleep apnea     DOES NOT USE MACHINE    Wears glasses        Past Surgical History:   Procedure Laterality Date    COLONOSCOPY  2008    Dr. Garcia; polyps removed    COLONOSCOPY N/A 7/13/2020    Procedure: COLONOSCOPY;  Surgeon: Jj Fried MD;  Location: [facility] ENDO;  Service: Endoscopy;  Laterality: N/A;    LAPAROSCOPIC RIGHT COLON RESECTION Right 8/10/2020    Procedure: COLECTOMY, RIGHT, LAPAROSCOPIC pooss conversion to open;  Surgeon: Noble Kaye MD;  Location: [facility] OR;  Service: General;  Laterality: Right;    NASAL SEPTUM SURGERY      right hand surgery      RIGHT HEMICOLECTOMY N/A 8/10/2020    Procedure: HEMICOLECTOMY, RIGHT;  Surgeon: Noble Kaye MD;  Location: [facility] OR;  Service: General;  Laterality: N/A;    UPPER GASTROINTESTINAL ENDOSCOPY  2008    hiatal hernia; esophagitis       Current Outpatient Medications   Medication Sig    acetaminophen (TYLENOL) 650 MG TbSR Take 1 tablet (650 mg total) by mouth every 8 (eight) hours as needed (do not take with any other tylenol or acetaminophen).    amLODIPine-benazepriL (LOTREL) 10-40 mg per capsule Take 1 capsule by mouth once daily.    aspirin 81 MG Chew Take 1 tablet (81 mg total) by mouth once daily.    cetirizine (ZYRTEC) 10 MG tablet Take 1 tablet (10 mg total) by mouth every evening.    CHOLECALCIFEROL, VITAMIN D3, (VITAMIN D3 ORAL) Take 1 capsule by mouth once daily.    cyanocobalamin (VITAMIN B-12) 1000 MCG tablet Take 100 mcg by mouth once daily.    diclofenac (VOLTAREN) 75 MG EC tablet TAKE 1 TABLET TWICE A DAY    diclofenac sodium (VOLTAREN) 1 % Gel APPLY 2 GRAMS TOPICALLY ONCE DAILY     esomeprazole (NEXIUM) 40 MG capsule Take 1 capsule (40 mg total) by mouth before breakfast.    fluticasone propionate (FLONASE) 50 mcg/actuation nasal spray USE 2 SPRAYS IN EACH NOSTRIL ONCE DAILY    metoprolol succinate (TOPROL-XL) 25 MG 24 hr tablet TAKE 1 TABLET DAILY    rosuvastatin (CRESTOR) 20 MG tablet TAKE 1 TABLET DAILY    testosterone 10 mg/0.5 gram /actuation [facility]      No current facility-administered medications for this visit.       Review of patient's allergies indicates:  No Known Allergies    Family History   Problem Relation Age of Onset    Cancer Neg Hx     Diabetes Neg Hx     Heart disease Neg Hx        Social History     Socioeconomic History    Marital status:    Tobacco Use    Smoking status: Former     Types: Cigars    Smokeless tobacco: Former   Substance and Sexual Activity    Alcohol use: Yes     Alcohol/week: 2.0 standard drinks     Types: 2 Cans of beer per week    Drug use: No    Sexual activity: Yes     Partners: Female       Chief Complaint: No chief complaint on file.      History of present illness:  77-year-old left-hand dominant male seen for left shoulder pain.  Patient has had pain since about February.  No injury or trauma.  The patient has pain all the time but significant difficulty raising his arm up above his head or sleeping at night.  Right arm is starting to hurt as well.  Pain can be as high as a 10/10.  No previous treatment.      Review of Systems:    Constitution: Negative for chills, fever, and sweats.  Negative for unexplained weight loss.    HENT:  Negative for headaches and blurry vision.    Cardiovascular:Negative for chest pain or irregular heart beat. Negative for hypertension.    Respiratory:  Negative for cough and shortness of breath.    Gastrointestinal: Negative for abdominal pain, heartburn, melena, nausea, and vomitting.    Genitourinary:  Negative bladder incontinence and dysuria.    Musculoskeletal:  See HPI    Neurological: Negative for  numbness.    Psychiatric/Behavioral: Negative for depression.  The patient is not nervous/anxious.      Endocrine: Negative for polyuria    Hematologic/Lymphatic: Negative for bleeding problem.  Does not bruise/bleed easily.    Skin: Negative for poor would healing and rash      Physical Examination:    Vital Signs:  There were no vitals filed for this visit.    There is no height or weight on file to calculate BMI.    This a well-developed, well nourished patient in no acute distress.  They are alert and oriented and cooperative to examination.  Pt. walks without an antalgic gait.      Examination of the left shoulder shows no rashes or erythema. There are no masses, ecchymosis, or atrophy. The patient has significantly limited active range of motion in forward flexion, external rotation, and internal rotation to the mid T-spine. The patient has positive Robles and Neer test- Olyphant's test. - Speeds test. Nontender to palpation over a.c. joint. Normal stability anteriorly, posteriorly, and negative sulcus sign. Passive range of motion: Forward flexion of 180°, external rotation at 90° of 90°, internal rotation of 50°, and external rotation at 0° of 50°. 2+ radial pulse. Intact axillary, radial, median and ulnar sensation. 4 out of 5 resisted forward flexion, external rotation, and negative lift off test.      X-rays:  Four views of the left shoulder ordered and reviewed which show some AC arthritis but otherwise healthy-appearing shoulder     Assessment::  Left rotator cuff syndrome    Plan:  Reviewed the findings with him today.  Gave her information about rotator cuff disease.  Recommended physical therapy on both of his shoulders since they both hurt him.  Agreed to try an injection.  We talked about getting an MRI to further evaluate the rotator cuff if it does not get better or if it keeps recurring.    All previous pertinent notes including ER visits, physical therapy visits, other orthopedic visits as well  as other care for the same musculoskeletal problem were reviewed.  All pertinent lab values and previous imaging was reviewed pertinent to the current visit.    This note was created using [employer] voice recognition software that occasionally misinterpreted phrases or words.    Consult note is delivered via Epic messaging service.

## 2023-06-20 DIAGNOSIS — Z91.89 AT RISK FOR HEART DISEASE: ICD-10-CM

## 2023-06-20 DIAGNOSIS — E78.00 HYPERCHOLESTEROLEMIA: ICD-10-CM

## 2023-06-20 RX ORDER — ROSUVASTATIN CALCIUM 20 MG/1
TABLET, COATED ORAL
Qty: 90 TABLET | Refills: 3 | Status: ON HOLD | OUTPATIENT
Start: 2023-06-20 | End: 2023-09-12 | Stop reason: HOSPADM

## 2023-07-20 ENCOUNTER — OFFICE VISIT (OUTPATIENT)
Dept: FAMILY MEDICINE | Facility: CLINIC | Age: 78
End: 2023-07-20
Payer: MEDICARE

## 2023-07-20 ENCOUNTER — HOSPITAL ENCOUNTER (OUTPATIENT)
Dept: RADIOLOGY | Facility: CLINIC | Age: 78
Discharge: HOME | End: 2023-07-20
Attending: STUDENT IN AN ORGANIZED HEALTH CARE EDUCATION/TRAINING PROGRAM
Payer: MEDICARE

## 2023-07-20 VITALS
SYSTOLIC BLOOD PRESSURE: 144 MMHG | BODY MASS INDEX: 28.76 KG/M2 | HEART RATE: 68 BPM | HEIGHT: 72 IN | OXYGEN SATURATION: 97 % | RESPIRATION RATE: 18 BRPM | WEIGHT: 212.31 LBS | DIASTOLIC BLOOD PRESSURE: 70 MMHG

## 2023-07-20 DIAGNOSIS — M17.0 OSTEOARTHRITIS OF BOTH KNEES, UNSPECIFIED OSTEOARTHRITIS TYPE: ICD-10-CM

## 2023-07-20 DIAGNOSIS — E01.0 THYROMEGALY: ICD-10-CM

## 2023-07-20 DIAGNOSIS — M79.646 PAIN OF FINGER, UNSPECIFIED LATERALITY: ICD-10-CM

## 2023-07-20 DIAGNOSIS — I10 ESSENTIAL HYPERTENSION: ICD-10-CM

## 2023-07-20 DIAGNOSIS — R79.9 ABNORMAL FINDING OF BLOOD CHEMISTRY, UNSPECIFIED: ICD-10-CM

## 2023-07-20 DIAGNOSIS — R25.2 LEG CRAMPS: ICD-10-CM

## 2023-07-20 DIAGNOSIS — Z00.00 ROUTINE GENERAL MEDICAL EXAMINATION AT A HEALTH CARE FACILITY: Primary | ICD-10-CM

## 2023-07-20 DIAGNOSIS — R73.03 PREDIABETES: ICD-10-CM

## 2023-07-20 DIAGNOSIS — R27.9 UNSPECIFIED LACK OF COORDINATION: ICD-10-CM

## 2023-07-20 DIAGNOSIS — R13.10 DYSPHAGIA, UNSPECIFIED TYPE: ICD-10-CM

## 2023-07-20 DIAGNOSIS — J31.0 CHRONIC RHINITIS: ICD-10-CM

## 2023-07-20 DIAGNOSIS — Z13.220 ENCOUNTER FOR LIPID SCREENING FOR CARDIOVASCULAR DISEASE: ICD-10-CM

## 2023-07-20 DIAGNOSIS — Z13.6 ENCOUNTER FOR LIPID SCREENING FOR CARDIOVASCULAR DISEASE: ICD-10-CM

## 2023-07-20 PROCEDURE — 99214 OFFICE O/P EST MOD 30 MIN: CPT | Mod: S$PBB,,, | Performed by: STUDENT IN AN ORGANIZED HEALTH CARE EDUCATION/TRAINING PROGRAM

## 2023-07-20 PROCEDURE — 99999 PR PBB SHADOW E&M-EST. PATIENT-LVL V: CPT | Mod: PBBFAC,,, | Performed by: STUDENT IN AN ORGANIZED HEALTH CARE EDUCATION/TRAINING PROGRAM

## 2023-07-20 PROCEDURE — 73562 X-RAY EXAM OF KNEE 3: CPT | Mod: 26,50,S$GLB, | Performed by: RADIOLOGY

## 2023-07-20 PROCEDURE — 99215 OFFICE O/P EST HI 40 MIN: CPT | Mod: PBBFAC,PO | Performed by: STUDENT IN AN ORGANIZED HEALTH CARE EDUCATION/TRAINING PROGRAM

## 2023-07-20 PROCEDURE — 99214 PR OFFICE/OUTPT VISIT, EST, LEVL IV, 30-39 MIN: ICD-10-PCS | Mod: S$PBB,,, | Performed by: STUDENT IN AN ORGANIZED HEALTH CARE EDUCATION/TRAINING PROGRAM

## 2023-07-20 PROCEDURE — 73562 XR KNEE ORTHO BILAT: ICD-10-PCS | Mod: 26,50,S$GLB, | Performed by: RADIOLOGY

## 2023-07-20 PROCEDURE — 99999 PR PBB SHADOW E&M-EST. PATIENT-LVL V: ICD-10-PCS | Mod: PBBFAC,,, | Performed by: STUDENT IN AN ORGANIZED HEALTH CARE EDUCATION/TRAINING PROGRAM

## 2023-07-20 PROCEDURE — 73562 X-RAY EXAM OF KNEE 3: CPT | Mod: TC,50,FY,PO

## 2023-07-20 RX ORDER — VERAPAMIL HYDROCHLORIDE 120 MG/1
120 CAPSULE, EXTENDED RELEASE ORAL DAILY
Qty: 30 CAPSULE | Refills: 11 | Status: SHIPPED | OUTPATIENT
Start: 2023-07-20 | End: 2023-08-01 | Stop reason: SDUPTHER

## 2023-07-20 RX ORDER — BENAZEPRIL HYDROCHLORIDE 40 MG/1
40 TABLET ORAL DAILY
Qty: 90 TABLET | Refills: 3 | Status: SHIPPED | OUTPATIENT
Start: 2023-07-20 | End: 2024-02-07 | Stop reason: ALTCHOICE

## 2023-07-20 RX ORDER — AZELASTINE 1 MG/ML
1 SPRAY, METERED NASAL 2 TIMES DAILY
Qty: 30 ML | Refills: 2 | Status: SHIPPED | OUTPATIENT
Start: 2023-07-20 | End: 2023-08-01 | Stop reason: SDUPTHER

## 2023-07-20 NOTE — PROGRESS NOTES
[facility] CLINIC NOTE    Patient Name: Papito Hutton  YOB: 1945    PRESENTING HISTORY     History of Present Illness:  Mr. Papito Hutton is a 77 y.o. male     Left shoulder pain- worse with sleeping on it. Seeing ortho. S/p injection. Tolerable. Doesn't want to see PT for it.     C/o dysphagia.    Large pills are an issue.    Chronic GERD and hiatal hernia on nexium.    No serious alarm features at this time.     Routine leg cramps. Worse in night. No pain with walking.   No leg jumping. Does report sleep walking.       OA knees bilateral. [facility], can't do [facility]. Topical therapy helps a little.     Sinuses draining constantly.   Causes cough.   Taking OTC atnihistamine + flonase without sig relief.   Worsening.       ROS      OBJECTIVE:   Vital Signs:  Vitals:    07/20/23 0853 07/20/23 0914   BP: (!) 172/76 (!) 144/70   Pulse: 68    Resp: 18    SpO2: 97%    Weight: 96.3 kg (212 lb 4.9 oz)    Height: 6' (1.829 m)          Physical Exam  Vitals and nursing note reviewed.   Constitutional:       Appearance: He is not diaphoretic.   HENT:      Head: Normocephalic and atraumatic.      Right Ear: External ear normal.      Left Ear: External ear normal.   Eyes:      General: No scleral icterus.     Conjunctiva/sclera: Conjunctivae normal.      Pupils: Pupils are equal, round, and reactive to light.   Neck:      Thyroid: No thyromegaly.   Cardiovascular:      Rate and Rhythm: Normal rate and regular rhythm.      Heart sounds: Normal heart sounds. No murmur heard.  Pulmonary:      Effort: Pulmonary effort is normal. No respiratory distress.      Breath sounds: Normal breath sounds. No wheezing or rales.   Musculoskeletal:         General: No tenderness or deformity. Normal range of motion.      Cervical back: Normal range of motion and neck supple.      Right lower leg: No edema.      Left lower leg: No edema.   Lymphadenopathy:      Cervical: No cervical adenopathy.   Skin:      General: Skin is warm and dry.      Findings: No erythema or rash.   Neurological:      Mental Status: He is alert and oriented to person, place, and time.      Gait: Gait is intact.   Psychiatric:         Mood and Affect: Mood and affect normal.         Cognition and Memory: Memory normal.         Judgment: Judgment normal.       ASSESSMENT & PLAN:     Routine general medical examination at a health care facility    Thyromegaly  -     US Soft Tissue Head Neck Thyroid; Future; Expected date: 07/20/2023  -     TSH; Future; Expected date: 07/20/2023    Osteoarthritis of both knees, unspecified osteoarthritis type  -     X-ray Knee Ortho Bilateral; Future; Expected date: 07/20/2023    Dysphagia, unspecified type  -     Ambulatory referral/consult to Gastroenterology; Future; Expected date: 07/27/2023  -     CBC W/ AUTO DIFFERENTIAL; Future; Expected date: 07/20/2023  -     VITAMIN B12; Future; Expected date: 07/20/2023  -     FOLATE; Future; Expected date: 07/20/2023  -     FERRITIN; Future; Expected date: 07/20/2023  I don't think he is having compressive symptoms from thyroid. It is not palpable on exam.   Given hernia, chronic PPI use I think there is a small chance of esophageal pathology, investigate.     Leg cramps  -     verapamiL (VERELAN) 120 MG C24P; Take 1 capsule (120 mg total) by mouth once daily.  Dispense: 30 capsule; Refill: 11  -     COMPREHENSIVE METABOLIC PANEL; Future; Expected date: 07/20/2023  -     Magnesium; Future; Expected date: 07/20/2023  -     CK; Future; Expected date: 07/20/2023  -     C-REACTIVE PROTEIN; Future; Expected date: 07/20/2023  Try alternate CCB.   Check lytes    Essential hypertension  -     benazepriL (LOTENSIN) 40 MG tablet; Take 1 tablet (40 mg total) by mouth once daily.  Dispense: 90 tablet; Refill: 3    Encounter for lipid screening for cardiovascular disease  -     LIPID PANEL; Future; Expected date: 07/20/2023    Prediabetes  -     HEMOGLOBIN A1C; Future; Expected  date: 07/20/2023    Chronic rhinitis  -     azelastine (ASTELIN) 137 mcg (0.1 %) nasal spray; 1 spray (137 mcg total) by Nasal route 2 (two) times daily.  Dispense: 30 mL; Refill: 2  Try switching nasal spray.     Abnormal finding of blood chemistry, unspecified  -     CBC W/ AUTO DIFFERENTIAL; Future; Expected date: 07/20/2023    Unspecified lack of coordination  -     VITAMIN B12; Future; Expected date: 07/20/2023  -     FOLATE; Future; Expected date: 07/20/2023    Pain of finger, unspecified laterality  -     FERRITIN; Future; Expected date: 07/20/2023             Rafi Martin MD   Internal Medicine

## 2023-07-20 NOTE — PATIENT INSTRUCTIONS
"Medicine changes-  Change the blood pressure medicine from combination pill to benazepril only.   Add verapamil at night for the leg cramps.     Switch Flonase to astelin nose spray. If no improvement, go see the ENT.     Xrays knees.     Ultrasound thyroid.     I think getting the esophagus examined is a good idea. Go see the GI doctors.       Edwardo Cobos,     If you are due for any health screening(s) below please notify me so we can arrange them to be ordered and scheduled to maintain your health. Most healthy patients complete it. Don't lose out on improving your health.     Tests to Keep You Healthy    Last Blood Pressure <= 139/89 (7/20/2023): NO                          Patient Education       Checking Your Blood Pressure at Home   The Basics   Written by the doctors and editors at [employer] [facility]   How is blood pressure measured? -- Blood pressure is usually measured with a device that goes around your upper arm. This is often done in a doctor's office. But some people also check their blood pressure themselves, at home or at work.  Blood pressure is explained with 2 numbers. For instance, your blood pressure might be "140 over 90." The first (top) number is the pressure inside your arteries when your heart is kim. The second (bottom) number is the pressure inside your arteries when your heart is relaxed. The table shows how doctors and nurses define high and normal blood pressure (table 1).  If your blood pressure gets too high, it puts you at risk for heart attack, stroke, and kidney disease. High blood pressure does not usually cause symptoms. But it can be serious.  What is a home blood pressure meter? -- A home blood pressure meter (or "monitor") is a device you can use to check your blood pressure yourself. It has a cuff that goes around your upper arm (figure 1). Some devices have a cuff that goes around your wrist instead. But doctors aren't sure if these work as well. The meter also has a small " screen, or dial, that shows your blood pressure numbers.  There are also special meters you can wear for a day or 2. These are different because they automatically check your blood pressure throughout the day and night, even while you are sleeping. If your doctor thinks you should use one of these devices, they will talk to you about how to wear it.  Why do I need to check my blood pressure at home? -- If your doctor knows or suspects that you have high blood pressure, they might want you to check it at home. There are a few reasons for this. Your doctor might want to look at:  Whether your blood pressure measures the same at home as it did in the doctor's office  How well your blood pressure medicines are working  Changes in your blood pressure, for example, if it goes up and down  People who check their own blood pressure at home usually do better at keeping it low.  How do I choose a home blood pressure meter? -- When choosing a home blood pressure meter, you will probably want to think about:  Cost - Some devices cost more than others. You should also check to see if your insurance will help pay for your device.  Size - It's important to make sure the cuff fits your arm comfortably. Your doctor or nurse can help you with this.  How easy it is to use - You should make sure you understand how to use the device. You also need to be able to read the numbers on the screen.  You do not need a prescription to buy a home blood pressure meter. You can buy them at most pharmacies or over the internet. Your doctor or nurse can help you choose the right device for you.  How do I check my blood pressure at home? -- Once you have a home blood pressure meter, your doctor or nurse should check it to make sure it fits you and works correctly.  When it's time to check your blood pressure:  Go to the bathroom and empty your bladder first. Having a full bladder can temporarily increase your blood pressure, making the results  inaccurate.  Sit in a chair with your feet flat on the ground.  Try to breathe normally and stay calm.  Attach the cuff to your arm. Place the cuff directly on your skin, not over your clothing. The cuff should be tight enough to not slip down, but not uncomfortably tight.  Sit and relax for about 3 to 5 minutes with the cuff on.  Follow the directions that came with your device to start measuring your blood pressure. This might involve squeezing the bulb at the end of the tube to inflate the cuff (fill it with air). With some monitors, you just need to press a button to inflate the cuff. When the cuff fills with air, it feels like someone is squeezing your arm, but it should not hurt. Then you will slowly deflate the cuff (let the air out of it), or it will deflate by itself. The screen or dial will show your blood pressure numbers.  Stay seated and relax for 1 minute, then measure your blood pressure again.  How often should I check my blood pressure? -- It depends. Different people need to follow different schedules. Your doctor or nurse will tell you how often to check your blood pressure, and when. Some people need to check their blood pressure twice a day, in the morning and evening.  Your doctor or nurse will probably tell you to keep track of your blood pressure for at least a few days (table 2). Then they will look at the numbers. The reason for this is that it's normal for your blood pressure to change a bit from day to day. For example, the numbers might change depending on whether you recently had caffeine, just exercised, or feel stressed. Checking your blood pressure over several days - or longer - will give your doctor or nurse a better idea of what is average for you.  How should I keep track of my blood pressure? -- Some blood pressure meters will record your numbers for you, or send them to your computer or smartphone. If yours does not do this, you will need to write them down. Your doctor or  "nurse can help you figure out the best way to keep track of the numbers.  What if my blood pressure is high? -- Your doctor or nurse will tell you what to do if your blood pressure is high when you check it at home. If you get a number that is higher than normal, measure it again to see if it is still high. If it is very high (above a certain number, which your doctor or nurse will tell you to watch out for), you should call your doctor right away.  If your blood pressure is only a little high, your doctor or nurse might tell you to keep checking it for a few more days or weeks, and then call if it does not go back down. Then they can help you decide what to do next.  All topics are updated as new evidence becomes available and our peer review process is complete.  This topic retrieved from [employer] on: Sep 21, 2021.  Topic 235293 Version 4.0  Release: 29.4.2 - C29.263  © 2021 Up[employer]Date, Inc. and/or its affiliates. All rights reserved.  table 1: Definition of normal and high blood pressure  Level  Top number  Bottom number    High 130 or above 80 or above   Elevated 120 to 129 79 or below   Normal 119 or below 79 or below   These definitions are from the American College of Cardiology/American Heart Association. Other expert groups might use slightly different definitions.  "Elevated blood pressure" is a term doctor or nurses use as a warning. It means you do not yet have high blood pressure, but your blood pressure is not as low as it should be for good health.  Graphic 78553 Version 6.0  figure 1: Using a home blood pressure meter     This is an example of a person using a home blood pressure meter.  Graphic 516502 Version 1.0    table 2: 7-day diary for checking blood pressure at home  Day 1  Day 2  Day 3  Day 4  Day 5  Day 6  Day 7    Morning  1st read Morning  1st read Morning  1st read Morning  1st read Morning  1st read Morning  1st read Morning  1st read   Systolic: __________ Systolic: __________ Systolic: " __________ Systolic: __________ Systolic: __________ Systolic: __________ Systolic: __________   Diastolic: __________ Diastolic: __________ Diastolic: __________ Diastolic: __________ Diastolic: __________ Diastolic: __________ Diastolic: __________   Pulse: __________ Pulse: __________ Pulse: __________ Pulse: __________ Pulse: __________ Pulse: __________ Pulse: __________   Morning  2nd read Morning  2nd read Morning  2nd read Morning  2nd read Morning  2nd read Morning  2nd read Morning  2nd read   Systolic: __________ Systolic: __________ Systolic: __________ Systolic: __________ Systolic: __________ Systolic: __________ Systolic: __________   Diastolic: __________ Diastolic: __________ Diastolic: __________ Diastolic: __________ Diastolic: __________ Diastolic: __________ Diastolic: __________   Pulse: __________ Pulse: __________ Pulse: __________ Pulse: __________ Pulse: __________ Pulse: __________ Pulse: __________   Evening  1st read Evening  1st read Evening  1st read Evening  1st read Evening  1st read Evening  1st read Evening  1st read   Systolic: __________ Systolic: __________ Systolic: __________ Systolic: __________ Systolic: __________ Systolic: __________ Systolic: __________   Diastolic: __________ Diastolic: __________ Diastolic: __________ Diastolic: __________ Diastolic: __________ Diastolic: __________ Diastolic: __________   Pulse: __________ Pulse: __________ Pulse: __________ Pulse: __________ Pulse: __________ Pulse: __________ Pulse: __________   Evening  2nd read Evening  2nd read Evening  2nd read Evening  2nd read Evening  2nd read Evening  2nd read Evening  2nd read   Systolic: __________ Systolic: __________ Systolic: __________ Systolic: __________ Systolic: __________ Systolic: __________ Systolic: __________   Diastolic: __________ Diastolic: __________ Diastolic: __________ Diastolic: __________ Diastolic: __________ Diastolic: __________ Diastolic: __________   Pulse:  __________ Pulse: __________ Pulse: __________ Pulse: __________ Pulse: __________ Pulse: __________ Pulse: __________   Notes    Notes    Notes    Notes    Notes    Notes    Notes      ____________________ ____________________ ____________________ ____________________ ____________________ ____________________ ____________________   ____________________ ____________________ ____________________ ____________________ ____________________ ____________________ ____________________   ____________________ ____________________ ____________________ ____________________ ____________________ ____________________ ____________________   Patient name: ______________________________     Patient ID: ________________________________    Primary care provider: _______________________    Average BP: _______________________________    Graphic 944840 Version 1.0  Consumer Information Use and Disclaimer   This information is not specific medical advice and does not replace information you receive from your health care provider. This is only a brief summary of general information. It does NOT include all information about conditions, illnesses, injuries, tests, procedures, treatments, therapies, discharge instructions or life-style choices that may apply to you. You must talk with your health care provider for complete information about your health and treatment options. This information should not be used to decide whether or not to accept your health care provider's advice, instructions or recommendations. Only your health care provider has the knowledge and training to provide advice that is right for you. The use of this information is governed by the [employer] User License Agreement, available at https://[email or web address]/solutions/[employer]/about/[email or web address].The use of [employer] content is governed by the [employer] Terms of Use. ©2021 [employer]. All rights reserved.  Copyright   © 2021 Up[employer]Date, Inc. and/or its affiliates. All  rights reserved.

## 2023-07-21 DIAGNOSIS — E21.3 HYPERPARATHYROIDISM: Primary | ICD-10-CM

## 2023-07-25 ENCOUNTER — HOSPITAL ENCOUNTER (OUTPATIENT)
Dept: RADIOLOGY | Facility: HOSPITAL | Age: 78
Discharge: HOME | End: 2023-07-25
Attending: STUDENT IN AN ORGANIZED HEALTH CARE EDUCATION/TRAINING PROGRAM
Payer: MEDICARE

## 2023-07-25 DIAGNOSIS — E01.0 THYROMEGALY: ICD-10-CM

## 2023-07-25 PROCEDURE — 76536 US EXAM OF HEAD AND NECK: CPT | Mod: TC

## 2023-07-28 ENCOUNTER — OFFICE VISIT (OUTPATIENT)
Dept: GASTROENTEROLOGY | Facility: CLINIC | Age: 78
End: 2023-07-28
Payer: MEDICARE

## 2023-07-28 ENCOUNTER — TELEPHONE (OUTPATIENT)
Dept: FAMILY MEDICINE | Facility: CLINIC | Age: 78
End: 2023-07-28
Payer: MEDICARE

## 2023-07-28 VITALS
DIASTOLIC BLOOD PRESSURE: 81 MMHG | HEIGHT: 72 IN | SYSTOLIC BLOOD PRESSURE: 152 MMHG | BODY MASS INDEX: 28.84 KG/M2 | HEART RATE: 72 BPM | WEIGHT: 212.94 LBS

## 2023-07-28 DIAGNOSIS — Z86.010 HISTORY OF COLON POLYPS: ICD-10-CM

## 2023-07-28 DIAGNOSIS — K21.9 GASTROESOPHAGEAL REFLUX DISEASE, UNSPECIFIED WHETHER ESOPHAGITIS PRESENT: ICD-10-CM

## 2023-07-28 DIAGNOSIS — M19.90 OSTEOARTHRITIS, UNSPECIFIED OSTEOARTHRITIS TYPE, UNSPECIFIED SITE: Primary | ICD-10-CM

## 2023-07-28 DIAGNOSIS — R12 HEARTBURN: ICD-10-CM

## 2023-07-28 DIAGNOSIS — Z90.49 HISTORY OF COLECTOMY: ICD-10-CM

## 2023-07-28 DIAGNOSIS — Z87.19 HX OF SMALL BOWEL OBSTRUCTION: ICD-10-CM

## 2023-07-28 DIAGNOSIS — R13.10 DYSPHAGIA, UNSPECIFIED TYPE: Primary | ICD-10-CM

## 2023-07-28 PROCEDURE — 99999 PR PBB SHADOW E&M-EST. PATIENT-LVL V: CPT | Mod: PBBFAC,,,

## 2023-07-28 PROCEDURE — 99214 PR OFFICE/OUTPT VISIT, EST, LEVL IV, 30-39 MIN: ICD-10-PCS | Mod: S$PBB,,,

## 2023-07-28 PROCEDURE — 99999 PR PBB SHADOW E&M-EST. PATIENT-LVL V: ICD-10-PCS | Mod: PBBFAC,,,

## 2023-07-28 PROCEDURE — 99215 OFFICE O/P EST HI 40 MIN: CPT | Mod: PBBFAC,PN

## 2023-07-28 PROCEDURE — 99214 OFFICE O/P EST MOD 30 MIN: CPT | Mod: S$PBB,,,

## 2023-07-28 NOTE — PROGRESS NOTES
Subjective:       Patient ID: Papito Hutton is a 77 y.o. male Body mass index is 28.88 kg/m².    Chief Complaint: Dysphagia    This patient is new to me.  Referring Provider: Dr. Rafi Martin for dysphagia.  Established patient of Dr. Fried.    Gastroesophageal Reflux  He complains of coughing (productive cough; Hx of PND), dysphagia (chief complaint: chronic, but worsened; occurs intermittent after solid solid food (bread) & pills; feels as though items get hung up in throat having to drink coffee to help improve symptom; chews food well), heartburn (rarely occurrs) and a hoarse voice. He reports no abdominal pain, no belching, no chest pain, no choking (denies use of Heimlich maneuver), no early satiety, no globus sensation, no nausea, no sore throat or no water brash. This is a chronic problem. The current episode started more than 1 year ago. The problem occurs occasionally. The problem has been resolved. The heartburn duration is several minutes. The heartburn is located in the substernum. The heartburn is of mild intensity. The heartburn does not wake him from sleep. The heartburn does not limit his activity. The heartburn doesn't change with position. The symptoms are aggravated by certain foods (eats a lot of hot sauce). Pertinent negatives include no anemia, fatigue, melena, muscle weakness or weight loss. Colonoscopy 07/13/20 - The examined portion of the ileum was normal. One 30 mm polyp at the ileocecal valve. Biopsied. Tattooed. Three 3 mm polyps in the sigmoid colon, in the transverse colon and in the ascending colon, removed with a jumbo cold forceps. Resected and retrieved. Non-bleeding internal hemorrhoids. Diverticulosis in the sigmoid colon and in the descending colon; patho: Tubulovillous adenoma. Risk factors include caffeine use, hiatal hernia and NSAIDs (drinks 2 cups of coffee daily; patient reports hx of hiatal hernia; takes diclofenac daily). He has tried a PPI (Currently  taking Nexium 40 mg once daily) for the symptoms. The treatment provided significant relief. Past procedures do not include an abdominal ultrasound, an EGD, esophageal manometry, esophageal pH monitoring, H. pylori antibody titer or a UGI. S/p Laparoscopic with conversion to open right hemicolectomy 08/10/20 by Dr. Kaye r/t polyp of ileocecal valve. Past invasive treatments do not include gastroplasty, gastroplication or reflux surgery.     Review of Systems   Constitutional:  Negative for activity change, appetite change, chills, diaphoresis, fatigue, fever, unexpected weight change and weight loss.   HENT:  Positive for hoarse voice and trouble swallowing. Negative for sore throat.    Respiratory:  Positive for cough (productive cough; Hx of PND). Negative for choking (denies use of Heimlich maneuver) and shortness of breath.    Cardiovascular:  Negative for chest pain.   Gastrointestinal:  Positive for dysphagia (chief complaint: chronic, but worsened; occurs intermittent after solid solid food (bread) & pills; feels as though items get hung up in throat having to drink coffee to help improve symptom; chews food well) and heartburn (rarely occurrs). Negative for abdominal distention, abdominal pain, anal bleeding, blood in stool, constipation (Typically has 2 BMs daily; reports stools are slightly hard initially then get soft), diarrhea, melena, nausea, rectal pain and vomiting.   Musculoskeletal:  Negative for muscle weakness.       No LMP for male patient.  Past Medical History:   Diagnosis Date    Arthritis     B12 deficiency     Colon polyp     Diabetes mellitus     BORDERLINE    GERD (gastroesophageal reflux disease)     [place] (hard of hearing)     BILAT AIDS    Hyperlipidemia     Hypertension     Low testosterone     Personal history of colonic polyps 2008    adenomatous polyp    Sinus disorder     Sleep apnea     DOES NOT USE MACHINE    Wears glasses      Past Surgical History:   Procedure Laterality Date     COLON SURGERY      COLONOSCOPY  2008    Dr. Garcia; polyps removed    COLONOSCOPY N/A 07/13/2020    Procedure: COLONOSCOPY;  Surgeon: Jj Fried MD;  Location: [facility] ENDO;  Service: Endoscopy;  Laterality: N/A;    LAPAROSCOPIC RIGHT COLON RESECTION Right 08/10/2020    Procedure: COLECTOMY, RIGHT, LAPAROSCOPIC pooss conversion to open;  Surgeon: Noble Kaye MD;  Location: [facility] OR;  Service: General;  Laterality: Right;    NASAL SEPTUM SURGERY      right hand surgery      RIGHT HEMICOLECTOMY N/A 08/10/2020    Procedure: HEMICOLECTOMY, RIGHT;  Surgeon: Noble Kaye MD;  Location: [facility] OR;  Service: General;  Laterality: N/A;    UPPER GASTROINTESTINAL ENDOSCOPY  2008    hiatal hernia; esophagitis     Family History   Problem Relation Age of Onset    Cancer Neg Hx     Diabetes Neg Hx     Heart disease Neg Hx     Colon cancer Neg Hx     Colon polyps Neg Hx     Esophageal cancer Neg Hx     Stomach cancer Neg Hx      Social History     Tobacco Use    Smoking status: Former     Types: Cigars    Smokeless tobacco: Former   Substance Use Topics    Alcohol use: Yes     Alcohol/week: 2.0 standard drinks     Types: 2 Cans of beer per week     Comment: occ    Drug use: No     Wt Readings from Last 10 Encounters:   07/28/23 96.6 kg (212 lb 15.4 oz)   07/20/23 96.3 kg (212 lb 4.9 oz)   05/03/23 98.5 kg (217 lb 2.5 oz)   01/10/23 98.7 kg (217 lb 9.5 oz)   07/06/22 96.5 kg (212 lb 11.9 oz)   10/01/21 96.3 kg (212 lb 6.6 oz)   05/04/21 93.5 kg (206 lb 2.1 oz)   09/22/20 88.8 kg (195 lb 12.3 oz)   08/26/20 86.6 kg (190 lb 14.7 oz)   08/25/20 86.3 kg (190 lb 4.1 oz)     Lab Results   Component Value Date    WBC 6.88 07/20/2023    HGB 15.9 07/20/2023    HCT 47.8 07/20/2023    MCV 89 07/20/2023     07/20/2023     CMP  Sodium   Date Value Ref Range Status   07/20/2023 139 136 - 145 mmol/L Final     Potassium   Date Value Ref Range Status   07/20/2023 4.1 3.5 - 5.1 mmol/L Final     Chloride   Date Value Ref Range  Status   07/20/2023 106 95 - 110 mmol/L Final     CO2   Date Value Ref Range Status   07/20/2023 23 23 - 29 mmol/L Final     Glucose   Date Value Ref Range Status   07/20/2023 95 70 - 110 mg/dL Final     BUN   Date Value Ref Range Status   07/20/2023 15 8 - 23 mg/dL Final     Creatinine   Date Value Ref Range Status   07/20/2023 1.0 0.5 - 1.4 mg/dL Final     Calcium   Date Value Ref Range Status   07/20/2023 11.3 (H) 8.7 - 10.5 mg/dL Final     Total Protein   Date Value Ref Range Status   07/20/2023 7.5 6.0 - 8.4 g/dL Final     Albumin   Date Value Ref Range Status   07/20/2023 4.1 3.5 - 5.2 g/dL Final   01/10/2023 4.3 3.6 - 5.1 g/dL Final     Total Bilirubin   Date Value Ref Range Status   07/20/2023 0.6 0.1 - 1.0 mg/dL Final     Comment:     For infants and newborns, interpretation of results should be based  on gestational age, weight and in agreement with clinical  observations.    Premature Infant recommended reference ranges:  Up to 24 hours.............<8.0 mg/dL  Up to 48 hours............<12.0 mg/dL  3-5 days..................<15.0 mg/dL  6-29 days.................<15.0 mg/dL       Alkaline Phosphatase   Date Value Ref Range Status   07/20/2023 104 55 - 135 U/L Final     AST   Date Value Ref Range Status   07/20/2023 26 10 - 40 U/L Final     ALT   Date Value Ref Range Status   07/20/2023 38 10 - 44 U/L Final     Anion Gap   Date Value Ref Range Status   07/20/2023 10 8 - 16 mmol/L Final     eGFR if    Date Value Ref Range Status   07/06/2022 >60.0 >60 mL/min/1.73 m^2 Final     eGFR if non    Date Value Ref Range Status   07/06/2022 >60.0 >60 mL/min/1.73 m^2 Final     Comment:     Calculation used to obtain the estimated glomerular filtration  rate (eGFR) is the CKD-EPI equation.        Lab Results   Component Value Date    LIPASE 9 08/14/2020       Lab Results   Component Value Date    TSH 0.870 07/20/2023     Reviewed prior medical records including radiology report of EFRAIN  08/16/2020, KUB 08/15/2020, CT abdomen and pelvis 08/14/2020 & endoscopy history (see surgical history).    Objective:      Physical Exam  Vitals and nursing note reviewed.   Constitutional:       General: He is not in acute distress.     Appearance: Normal appearance. He is not ill-appearing.   HENT:      Mouth/Throat:      Lips: Pink. No lesions.   Cardiovascular:      Rate and Rhythm: Normal rate and regular rhythm.      Pulses: Normal pulses.      Heart sounds: Normal heart sounds.   Pulmonary:      Effort: Pulmonary effort is normal. No respiratory distress.      Breath sounds: Normal breath sounds.   Abdominal:      General: Abdomen is protuberant. Bowel sounds are normal. There is no distension or abdominal bruit. There are no signs of injury.      Palpations: Abdomen is soft. There is no shifting dullness, fluid wave, hepatomegaly, splenomegaly or mass.      Tenderness: There is no abdominal tenderness. There is no guarding or rebound. Negative signs include Koch's sign, Rovsing's sign and McBurney's sign.   Skin:     General: Skin is warm and dry.      Coloration: Skin is not jaundiced or pale.   Neurological:      Mental Status: He is alert and oriented to person, place, and time.   Psychiatric:         Attention and Perception: Attention normal.         Mood and Affect: Mood normal.         Speech: Speech normal.         Behavior: Behavior normal.       Assessment:       1. Dysphagia, unspecified type    2. Gastroesophageal reflux disease, unspecified whether esophagitis present    3. Heartburn    4. History of colon polyps    5. History of colectomy    6. Hx of small bowel obstruction        Plan:       Dysphagia, unspecified type  - schedule EGD, discussed procedure with patient and possible esophageal dilation may be performed during procedure if indicated, patient verbalized understanding  - educated patient to eat smaller more frequent meals and to eat slowly and advised to eat a soft diet.  -  possible UGI/esophagram/esophageal manometry if symptoms persist  -     Case Request Endoscopy: EGD (ESOPHAGOGASTRODUODENOSCOPY)    Gastroesophageal reflux disease, unspecified whether esophagitis present & Heartburn  - schedule EGD, discussed procedure with patient, including risks and benefits, patient verbalized understanding  -Avoid large meals, avoid eating within 2-3 hours of bedtime (avoid late night eating & lying down soon after eating), elevate head of bed if nocturnal symptoms are present, smoking cessation (if current smoker), & weight loss (if overweight).   -Avoid known foods which trigger reflux symptoms & to minimize/avoid high-fat foods, chocolate, caffeine, citrus, alcohol, & tomato products.  -Avoid/limit use of NSAID's, since they can cause GI upset, bleeding, and/or ulcers. If needed, take with food.  -continue Nexium 40 mg once daily 30 minutes to an hour before breakfast  -     Case Request Endoscopy: EGD (ESOPHAGOGASTRODUODENOSCOPY)    History of colon polyps  - schedule Colonoscopy, discussed procedure with the patient, including risks and benefits, patient verbalized understanding  -     Case Request Endoscopy: COLONOSCOPY    History of colectomy  - schedule Colonoscopy, discussed procedure with the patient, including risks and benefits, patient verbalized understanding  -     Case Request Endoscopy: COLONOSCOPY    Hx of small bowel obstruction\    Follow up in about 4 weeks (around 8/25/2023), or if symptoms worsen or fail to improve.      If no improvement in symptoms or symptoms worsen, call/follow-up at clinic or go to ER.        45 minutes of total time spent on the encounter, which includes face to face time and non-face to face time preparing to see the patient (eg, review of tests), Obtaining and/or reviewing separately obtained history, Documenting clinical information in the electronic or other health record, Independently interpreting results (not separately reported) and  communicating results to the patient/family/caregiver, or Care coordination (not separately reported).     A dictation software program was used for this note. Please expect some simple typographical  errors in this note.

## 2023-07-28 NOTE — TELEPHONE ENCOUNTER
Janna Mckee Staff    ----- Message from Janna Jackman sent at 7/28/2023  8:52 AM CDT -----  Regarding: Return call  Type:  Patient Returning Call    Who Called:Pt    Who Left Message for Patient:None    Does the patient know what this is regarding?:N/a    Would the patient rather a call back or a response via [facility]? Callback    Best Call Back Number:[phone]    Additional Information: Please advise ---------------- Thank you

## 2023-07-28 NOTE — TELEPHONE ENCOUNTER
----- Message from Rafi Martin MD sent at 7/28/2023  8:36 AM CDT -----  Referral to ortho if needed.

## 2023-07-28 NOTE — TELEPHONE ENCOUNTER
Patient notified orthopedic referral placed. Number to orthopedic scheduling line provided to patient at time of call.

## 2023-07-31 ENCOUNTER — HOSPITAL ENCOUNTER (OUTPATIENT)
Facility: HOSPITAL | Age: 78
Discharge: HOME | End: 2023-07-31
Attending: INTERNAL MEDICINE | Admitting: STUDENT IN AN ORGANIZED HEALTH CARE EDUCATION/TRAINING PROGRAM
Payer: MEDICARE

## 2023-07-31 ENCOUNTER — ANESTHESIA (OUTPATIENT)
Dept: ENDOSCOPY | Facility: HOSPITAL | Age: 78
End: 2023-07-31
Payer: MEDICARE

## 2023-07-31 ENCOUNTER — ANESTHESIA EVENT (OUTPATIENT)
Dept: ENDOSCOPY | Facility: HOSPITAL | Age: 78
End: 2023-07-31
Payer: MEDICARE

## 2023-07-31 DIAGNOSIS — R13.10 DYSPHAGIA, UNSPECIFIED TYPE: Primary | ICD-10-CM

## 2023-07-31 PROCEDURE — 88305 TISSUE EXAM BY PATHOLOGIST: CPT | Mod: TC,59 | Performed by: PATHOLOGY

## 2023-07-31 PROCEDURE — 25000003 PHARM REV CODE 250: Performed by: NURSE ANESTHETIST, CERTIFIED REGISTERED

## 2023-07-31 PROCEDURE — 43248 EGD GUIDE WIRE INSERTION: CPT | Mod: ,,, | Performed by: INTERNAL MEDICINE

## 2023-07-31 PROCEDURE — 27201012 HC FORCEPS, HOT/COLD, DISP: Performed by: INTERNAL MEDICINE

## 2023-07-31 PROCEDURE — 37000008 HC ANESTHESIA 1ST 15 MINUTES: Performed by: INTERNAL MEDICINE

## 2023-07-31 PROCEDURE — 43248 EGD GUIDE WIRE INSERTION: CPT | Performed by: INTERNAL MEDICINE

## 2023-07-31 PROCEDURE — 43239 EGD BIOPSY SINGLE/MULTIPLE: CPT | Mod: 59,,, | Performed by: INTERNAL MEDICINE

## 2023-07-31 PROCEDURE — D9220A PRA ANESTHESIA: Mod: CRNA,,, | Performed by: NURSE ANESTHETIST, CERTIFIED REGISTERED

## 2023-07-31 PROCEDURE — D9220A PRA ANESTHESIA: ICD-10-PCS | Mod: CRNA,,, | Performed by: NURSE ANESTHETIST, CERTIFIED REGISTERED

## 2023-07-31 PROCEDURE — 43239 EGD BIOPSY SINGLE/MULTIPLE: CPT | Mod: 59 | Performed by: INTERNAL MEDICINE

## 2023-07-31 PROCEDURE — 25000003 PHARM REV CODE 250: Performed by: INTERNAL MEDICINE

## 2023-07-31 PROCEDURE — 63600175 PHARM REV CODE 636 W HCPCS: Performed by: NURSE ANESTHETIST, CERTIFIED REGISTERED

## 2023-07-31 PROCEDURE — 43248 PR EGD, FLEX, W/DILATION OVER GUIDEWIRE: ICD-10-PCS | Mod: ,,, | Performed by: INTERNAL MEDICINE

## 2023-07-31 PROCEDURE — D9220A PRA ANESTHESIA: ICD-10-PCS | Mod: ANES,,, | Performed by: ANESTHESIOLOGY

## 2023-07-31 PROCEDURE — 37000009 HC ANESTHESIA EA ADD 15 MINS: Performed by: INTERNAL MEDICINE

## 2023-07-31 PROCEDURE — D9220A PRA ANESTHESIA: Mod: ANES,,, | Performed by: ANESTHESIOLOGY

## 2023-07-31 PROCEDURE — 43239 PR EGD, FLEX, W/BIOPSY, SGL/MULTI: ICD-10-PCS | Mod: 59,,, | Performed by: INTERNAL MEDICINE

## 2023-07-31 RX ORDER — LIDOCAINE HYDROCHLORIDE 20 MG/ML
INJECTION INTRAVENOUS
Status: DISCONTINUED | OUTPATIENT
Start: 2023-07-31 | End: 2023-07-31

## 2023-07-31 RX ORDER — SODIUM CHLORIDE 9 MG/ML
INJECTION, SOLUTION INTRAVENOUS CONTINUOUS
Status: DISCONTINUED | OUTPATIENT
Start: 2023-07-31 | End: 2023-07-31 | Stop reason: HOSPADM

## 2023-07-31 RX ORDER — PROPOFOL 10 MG/ML
INJECTION, EMULSION INTRAVENOUS
Status: DISCONTINUED | OUTPATIENT
Start: 2023-07-31 | End: 2023-07-31

## 2023-07-31 RX ADMIN — PROPOFOL 50 MG: 10 INJECTION, EMULSION INTRAVENOUS at 11:07

## 2023-07-31 RX ADMIN — LIDOCAINE HYDROCHLORIDE 100 MG: 20 INJECTION, SOLUTION INTRAVENOUS at 10:07

## 2023-07-31 RX ADMIN — SODIUM CHLORIDE: 9 INJECTION, SOLUTION INTRAVENOUS at 09:07

## 2023-07-31 RX ADMIN — PROPOFOL 120 MG: 10 INJECTION, EMULSION INTRAVENOUS at 10:07

## 2023-07-31 RX ADMIN — PROPOFOL 50 MG: 10 INJECTION, EMULSION INTRAVENOUS at 10:07

## 2023-07-31 NOTE — H&P
History & Physical - Short Stay  Gastroenterology      SUBJECTIVE:     Procedure: EGD    Chief Complaint/Indication for Procedure: Dysphagia    PTA Medications   Medication Sig    acetaminophen (TYLENOL) 650 MG TbSR Take 1 tablet (650 mg total) by mouth every 8 (eight) hours as needed (do not take with any other tylenol or acetaminophen).    aspirin 81 MG Chew Take 1 tablet (81 mg total) by mouth once daily.    azelastine (ASTELIN) 137 mcg (0.1 %) nasal spray 1 spray (137 mcg total) by Nasal route 2 (two) times daily.    benazepriL (LOTENSIN) 40 MG tablet Take 1 tablet (40 mg total) by mouth once daily.    cetirizine (ZYRTEC) 10 MG tablet Take 1 tablet (10 mg total) by mouth every evening.    CHOLECALCIFEROL, VITAMIN D3, (VITAMIN D3 ORAL) Take 1 capsule by mouth once daily.    cyanocobalamin (VITAMIN B-12) 1000 MCG tablet Take 100 mcg by mouth once daily.    diclofenac (VOLTAREN) 75 MG EC tablet TAKE 1 TABLET TWICE A DAY    diclofenac sodium (VOLTAREN) 1 % Gel APPLY 2 GRAMS TOPICALLY ONCE DAILY    esomeprazole (NEXIUM) 40 MG capsule Take 1 capsule (40 mg total) by mouth before breakfast.    fluticasone propionate (FLONASE) 50 mcg/actuation nasal spray USE 2 SPRAYS IN EACH NOSTRIL ONCE DAILY    metoprolol succinate (TOPROL-XL) 25 MG 24 hr tablet TAKE 1 TABLET DAILY    rosuvastatin (CRESTOR) 20 MG tablet TAKE 1 TABLET DAILY    verapamiL (VERELAN) 120 MG C24P Take 1 capsule (120 mg total) by mouth once daily.       Review of patient's allergies indicates:  No Known Allergies     Past Medical History:   Diagnosis Date    Arthritis     B12 deficiency     Colon polyp     Diabetes mellitus     BORDERLINE    GERD (gastroesophageal reflux disease)     [place] (hard of hearing)     BILAT AIDS    Hyperlipidemia     Hypertension     Low testosterone     Personal history of colonic polyps 2008    adenomatous polyp    Sinus disorder     Sleep apnea     DOES NOT USE MACHINE    Wears glasses      Past Surgical History:   Procedure  Laterality Date    COLON SURGERY      COLONOSCOPY  2008    Dr. Garcia; polyps removed    COLONOSCOPY N/A 07/13/2020    Procedure: COLONOSCOPY;  Surgeon: Jj Fried MD;  Location: [facility] ENDO;  Service: Endoscopy;  Laterality: N/A;    LAPAROSCOPIC RIGHT COLON RESECTION Right 08/10/2020    Procedure: COLECTOMY, RIGHT, LAPAROSCOPIC pooss conversion to open;  Surgeon: Noble Kaye MD;  Location: [facility] OR;  Service: General;  Laterality: Right;    NASAL SEPTUM SURGERY      right hand surgery      RIGHT HEMICOLECTOMY N/A 08/10/2020    Procedure: HEMICOLECTOMY, RIGHT;  Surgeon: Noble Kaye MD;  Location: [facility] OR;  Service: General;  Laterality: N/A;    UPPER GASTROINTESTINAL ENDOSCOPY  2008    hiatal hernia; esophagitis     Family History   Problem Relation Age of Onset    Cancer Neg Hx     Diabetes Neg Hx     Heart disease Neg Hx     Colon cancer Neg Hx     Colon polyps Neg Hx     Esophageal cancer Neg Hx     Stomach cancer Neg Hx      Social History     Tobacco Use    Smoking status: Former     Current packs/day: 0.00     Types: Cigars    Smokeless tobacco: Former     Types: Chew   Substance Use Topics    Alcohol use: Yes     Alcohol/week: 2.0 standard drinks of alcohol     Types: 2 Cans of beer per week     Comment: occ    Drug use: No         OBJECTIVE:     Vital Signs (Most Recent)  Temp: 98.1 °F (36.7 °C) (07/31/23 0920)  Pulse: 60 (07/31/23 0920)  Resp: 16 (07/31/23 0920)  BP: (!) 198/87 (07/31/23 0920)  SpO2: 97 % (07/31/23 0920)    Physical Exam:                                                       GENERAL:  Comfortable, in no acute distress.                                 HEENT EXAM:  Nonicteric.  No adenopathy.  Oropharynx is clear.               NECK:  Supple.                                                               LUNGS:  Clear.                                                               CARDIAC:  Regular rate and rhythm.  S1, S2.  No murmur.                      ABDOMEN:  Soft,  positive bowel sounds, nontender.  No hepatosplenomegaly or masses.  No rebound or guarding.                                             EXTREMITIES:  No edema.     MENTAL STATUS:  Normal, alert and oriented.      ASSESSMENT/PLAN:     Assessment: Dysphagia    Plan: EGD

## 2023-07-31 NOTE — ANESTHESIA POSTPROCEDURE EVALUATION
Anesthesia Post Evaluation    Patient: Papito Hutton    Procedure(s) Performed: Procedure(s) (LRB):  EGD (ESOPHAGOGASTRODUODENOSCOPY) (N/A)    Final Anesthesia Type: general      Patient location during evaluation: PACU  Patient participation: Yes- Able to Participate  Level of consciousness: awake and alert  Post-procedure vital signs: reviewed and stable  Pain management: adequate  Airway patency: patent    PONV status at discharge: No PONV  Anesthetic complications: no      Cardiovascular status: blood pressure returned to baseline and hypertensive  Respiratory status: unassisted  Hydration status: euvolemic  Follow-up not needed.          Vitals Value Taken Time   /90 07/31/23 1126   Temp 36.7 °C (98 °F) 07/31/23 1118   Pulse 62 07/31/23 1118   Resp 16 07/31/23 1118   SpO2 96 % 07/31/23 1126         No case tracking events are documented in the log.      Pain/Patty Score: Patty Score: 10 (7/31/2023 11:39 AM)

## 2023-07-31 NOTE — PROVATION PATIENT INSTRUCTIONS
Discharge Summary/Instructions after an Endoscopic Procedure  Patient Name: Papito Hutton  Patient MRN: [id number]  Patient YOB: 1945 Monday, July 31, 2023  Jj Fried MD  Dear patient,  As a result of recent federal legislation (The Federal Cures Act), you may   receive lab or pathology results from your procedure in your [facility]   account before your physician is able to contact you. Your physician or   their representative will relay the results to you with their   recommendations at their soonest availability.  Thank you,  RESTRICTIONS:  During your procedure today, you received medications for sedation.  These   medications may affect your judgment, balance and coordination.  Therefore,   for 24 hours, you have the following restrictions:   - DO NOT drive a car, operate machinery, make legal/financial decisions,   sign important papers or drink alcohol.    ACTIVITY:  Today: no heavy lifting, straining or running due to procedural   sedation/anesthesia.  The following day: return to full activity including work.  DIET:  Eat and drink normally unless instructed otherwise.     TREATMENT FOR COMMON SIDE EFFECTS:  - Mild abdominal pain, nausea, belching, bloating or excessive gas:  rest,   eat lightly and use a heating pad.  - Sore Throat: treat with throat lozenges and/or gargle with warm salt   water.  - Because air was used during the procedure, expelling large amounts of air   from your rectum or belching is normal.  - If a bowel prep was taken, you may not have a bowel movement for 1-3 days.    This is normal.  SYMPTOMS TO WATCH FOR AND REPORT TO YOUR PHYSICIAN:  1. Abdominal pain or bloating, other than gas cramps.  2. Chest pain.  3. Back pain.  4. Signs of infection such as: chills or fever occurring within 24 hours   after the procedure.  5. Rectal bleeding, which would show as bright red, maroon, or black stools.   (A tablespoon of blood from the rectum is not serious, especially if    hemorrhoids are present.)  6. Vomiting.  7. Weakness or dizziness.  GO DIRECTLY TO THE NEAREST EMERGENCY ROOM IF YOU HAVE ANY OF THE FOLLOWING:      Difficulty breathing              Chills and/or fever over 101 F   Persistent vomiting and/or vomiting blood   Severe abdominal pain   Severe chest pain   Black, tarry stools   Bleeding- more than one tablespoon   Any other symptom or condition that you feel may need urgent attention  Your doctor recommends these additional instructions:  If any biopsies were taken, your doctors clinic will contact you in 1 to 2   weeks with any results.  - Discharge patient to home.   - Advance diet as tolerated.   - Continue present medications.   - Await pathology results.  For questions, problems or results please call your physician - Jj Fried MD at Work:  [phone].  [facility], EMERGENCY ROOM PHONE NUMBER: [phone]  IF A COMPLICATION OR EMERGENCY SITUATION ARISES AND YOU ARE UNABLE TO REACH   YOUR PHYSICIAN - GO DIRECTLY TO THE EMERGENCY ROOM.  Jj Fried MD  7/31/2023 11:15:05 AM  This report has been verified and signed electronically.  Dear patient,  As a result of recent federal legislation (The Federal Cures Act), you may   receive lab or pathology results from your procedure in your [facility]   account before your physician is able to contact you. Your physician or   their representative will relay the results to you with their   recommendations at their soonest availability.  Thank you,  PROVATION

## 2023-07-31 NOTE — PLAN OF CARE
Reviewed discharge instructions, patient states understanding, Educated patient when to notify MD, and post anesthesia precautions, reviewed medications administration with patient, IV removed as ordered, patient voiding without difficulty, post op appointment scheduled, no signs of distress at this time, patient states I'm ready for discharge.

## 2023-07-31 NOTE — ANESTHESIA PREPROCEDURE EVALUATION
07/31/2023  Papito Hutton is a 77 y.o., male.      Pre-op Assessment    I have reviewed the Patient Summary Reports.     I have reviewed the Nursing Notes. I have reviewed the NPO Status.   I have reviewed the Medications.     Review of Systems  Anesthesia Hx:  Denies Family Hx of Anesthesia complications.   Denies Personal Hx of Anesthesia complications.   Cardiovascular:   Hypertension hyperlipidemia    Pulmonary:   Sleep Apnea    Education provided regarding risk of obstructive sleep apnea     Hepatic/GI:   GERD    Endocrine:   Diabetes        Physical Exam  General: Well nourished, Cooperative, Alert and Oriented    Airway:  Mallampati: III / II  Mouth Opening: Normal  TM Distance: Normal          Anesthesia Plan  Type of Anesthesia, risks & benefits discussed:    Anesthesia Type: Gen Natural Airway  Intra-op Monitoring Plan: Standard ASA Monitors  Induction:  IV  Informed Consent: Informed consent signed with the Patient and all parties understand the risks and agree with anesthesia plan.  All questions answered.   ASA Score: 2    Ready For Surgery From Anesthesia Perspective.     .

## 2023-07-31 NOTE — TRANSFER OF CARE
Anesthesia Transfer of Care Note    Patient: Papito Hutton    Procedure(s) Performed: Procedure(s) (LRB):  EGD (ESOPHAGOGASTRODUODENOSCOPY) (N/A)    Patient location: GI    Anesthesia Type: general    Transport from OR: Transported from OR on room air with adequate spontaneous ventilation    Post pain: adequate analgesia    Post assessment: no apparent anesthetic complications and tolerated procedure well    Post vital signs: stable    Level of consciousness: sedated and responds to stimulation    Nausea/Vomiting: no nausea/vomiting    Complications: none    Transfer of care protocol was followed      Last vitals:   Visit Vitals  BP (!) 198/87 (BP Location: Left arm, Patient Position: Lying)   Pulse 60   Temp 36.7 °C (98.1 °F) (Temporal)   Resp 16   Ht 6' (1.829 m)   Wt 96.2 kg (212 lb)   SpO2 97%   BMI 28.75 kg/m²

## 2023-08-01 ENCOUNTER — TELEPHONE (OUTPATIENT)
Dept: FAMILY MEDICINE | Facility: CLINIC | Age: 78
End: 2023-08-01
Payer: MEDICARE

## 2023-08-01 VITALS
SYSTOLIC BLOOD PRESSURE: 166 MMHG | OXYGEN SATURATION: 96 % | RESPIRATION RATE: 16 BRPM | DIASTOLIC BLOOD PRESSURE: 90 MMHG | WEIGHT: 212 LBS | TEMPERATURE: 98 F | HEIGHT: 72 IN | HEART RATE: 62 BPM | BODY MASS INDEX: 28.71 KG/M2

## 2023-08-01 DIAGNOSIS — R25.2 LEG CRAMPS: ICD-10-CM

## 2023-08-01 DIAGNOSIS — J31.0 CHRONIC RHINITIS: ICD-10-CM

## 2023-08-01 RX ORDER — VERAPAMIL HYDROCHLORIDE 120 MG/1
120 CAPSULE, EXTENDED RELEASE ORAL DAILY
Qty: 90 CAPSULE | Refills: 3 | Status: ON HOLD | OUTPATIENT
Start: 2023-08-01 | End: 2023-09-12 | Stop reason: HOSPADM

## 2023-08-01 RX ORDER — AZELASTINE 1 MG/ML
1 SPRAY, METERED NASAL 2 TIMES DAILY
Qty: 120 ML | Refills: 2 | Status: SHIPPED | OUTPATIENT
Start: 2023-08-01 | End: 2024-02-23

## 2023-08-01 NOTE — TELEPHONE ENCOUNTER
Patient requesting clarification of recent orders.     --Patient states he has dealt with calcium issues for over 20 years. States Dr. Martin ordered Verapamil related to recent calcium level. Patient is requesting to confirm if Dr. Martin wants him to start this medication even though he has had an issue with his calcium levels for over 20 years.     --Patient states order for Verapamil was sent to mail order pharmacy with quantity of 30 and 11 refills. States mail order requires 90 day supply of medications, and he was charged $68 for a 30 day supply of medication. States this medication would not have cost that much if prescription would have been sent in as 90 day supply. Requesting for order to be corrected if he needs to continue taking it.     --Patient states he received one bottle of Azelastine Nasal Spray. States the cost was $12. States a 3 month supply would also be $12. States he would like mail order prescriptions sent in as 90 day supply due to cost.     --Patient states Amlodipine Benazepril was changed to Benazepril. Requesting to know if he is to take Benazepril and Metoprolol. Patient is unsure if he is to take both of these medications for blood pressure.     Patient states he has never had problems with mail order prescriptions in the past as far as receiving 90 day supplies, and would like this problem rectified.   Please advise. Thank you.

## 2023-08-01 NOTE — TELEPHONE ENCOUNTER
I spoke with pt via phone. I advised him that Dr. Martin would like to see him either in clinic or virtually to discuss the issues. Pt does not wish to book at the moment. Would just like a 90 day supply of nasal spray and verapamil. The message has been sent to Dr. Martin in a different encounter.

## 2023-08-01 NOTE — TELEPHONE ENCOUNTER
Radha Mckee Staff    ----- Message from Radha Barraza sent at 8/1/2023 10:07 AM CDT -----  Regarding: advice  Contact: Patient  Type: Needs Medical Advice  Who Called:  patient  Symptoms (please be specific):    How long has patient had these symptoms:    Pharmacy name and phone #:    Best Call Back Number: [phone] (home)   Additional Information: Patient has question  regarding the medication Calcium and the blood pressure medication and would like to a speak to doctor. Please call patient to discuss.Thanks!

## 2023-08-01 NOTE — TELEPHONE ENCOUNTER
No care due was identified.  [facility] Embedded Care Due Messages. Reference number: [id number].   8/01/2023 2:44:56 PM CDT

## 2023-08-14 ENCOUNTER — PATIENT MESSAGE (OUTPATIENT)
Dept: FAMILY MEDICINE | Facility: CLINIC | Age: 78
End: 2023-08-14
Payer: MEDICARE

## 2023-08-14 DIAGNOSIS — R09.81 NASAL CONGESTION: ICD-10-CM

## 2023-08-14 RX ORDER — CETIRIZINE HYDROCHLORIDE 10 MG/1
10 TABLET ORAL NIGHTLY
Qty: 90 TABLET | Refills: 3 | Status: ON HOLD | OUTPATIENT
Start: 2023-08-14 | End: 2024-02-22 | Stop reason: HOSPADM

## 2023-08-14 NOTE — TELEPHONE ENCOUNTER
No care due was identified.  [facility] Embedded Care Due Messages. Reference number: [id number].   8/14/2023 11:28:13 AM CDT

## 2023-08-15 ENCOUNTER — ANESTHESIA EVENT (OUTPATIENT)
Dept: ENDOSCOPY | Facility: HOSPITAL | Age: 78
End: 2023-08-15
Payer: MEDICARE

## 2023-08-15 ENCOUNTER — HOSPITAL ENCOUNTER (OUTPATIENT)
Facility: HOSPITAL | Age: 78
Discharge: HOME | End: 2023-08-15
Attending: INTERNAL MEDICINE | Admitting: STUDENT IN AN ORGANIZED HEALTH CARE EDUCATION/TRAINING PROGRAM
Payer: MEDICARE

## 2023-08-15 ENCOUNTER — ANESTHESIA (OUTPATIENT)
Dept: ENDOSCOPY | Facility: HOSPITAL | Age: 78
End: 2023-08-15
Payer: MEDICARE

## 2023-08-15 VITALS
OXYGEN SATURATION: 97 % | BODY MASS INDEX: 28.71 KG/M2 | DIASTOLIC BLOOD PRESSURE: 84 MMHG | TEMPERATURE: 98 F | HEART RATE: 61 BPM | RESPIRATION RATE: 19 BRPM | WEIGHT: 212 LBS | DIASTOLIC BLOOD PRESSURE: 72 MMHG | HEIGHT: 72 IN | SYSTOLIC BLOOD PRESSURE: 140 MMHG | HEART RATE: 58 BPM | SYSTOLIC BLOOD PRESSURE: 197 MMHG | OXYGEN SATURATION: 100 %

## 2023-08-15 DIAGNOSIS — Z86.010 HISTORY OF COLON POLYPS: Primary | ICD-10-CM

## 2023-08-15 PROCEDURE — 63600175 PHARM REV CODE 636 W HCPCS: Performed by: NURSE ANESTHETIST, CERTIFIED REGISTERED

## 2023-08-15 PROCEDURE — 25000003 PHARM REV CODE 250: Performed by: INTERNAL MEDICINE

## 2023-08-15 PROCEDURE — 45380 PR COLONOSCOPY,BIOPSY: ICD-10-PCS | Mod: PT,59,, | Performed by: INTERNAL MEDICINE

## 2023-08-15 PROCEDURE — D9220A PRA ANESTHESIA: Mod: PT,ANES,, | Performed by: ANESTHESIOLOGY

## 2023-08-15 PROCEDURE — D9220A PRA ANESTHESIA: Mod: PT,CRNA,, | Performed by: NURSE ANESTHETIST, CERTIFIED REGISTERED

## 2023-08-15 PROCEDURE — 27201089 HC SNARE, DISP (ANY): Performed by: INTERNAL MEDICINE

## 2023-08-15 PROCEDURE — 45385 COLONOSCOPY W/LESION REMOVAL: CPT | Mod: PT | Performed by: INTERNAL MEDICINE

## 2023-08-15 PROCEDURE — 37000009 HC ANESTHESIA EA ADD 15 MINS: Performed by: INTERNAL MEDICINE

## 2023-08-15 PROCEDURE — 88305 TISSUE EXAM BY PATHOLOGIST: CPT | Mod: TC,59 | Performed by: PATHOLOGY

## 2023-08-15 PROCEDURE — 45380 COLONOSCOPY AND BIOPSY: CPT | Mod: PT,59,, | Performed by: INTERNAL MEDICINE

## 2023-08-15 PROCEDURE — 25000003 PHARM REV CODE 250: Performed by: NURSE ANESTHETIST, CERTIFIED REGISTERED

## 2023-08-15 PROCEDURE — D9220A PRA ANESTHESIA: ICD-10-PCS | Mod: PT,ANES,, | Performed by: ANESTHESIOLOGY

## 2023-08-15 PROCEDURE — 45385 COLONOSCOPY W/LESION REMOVAL: CPT | Mod: PT,,, | Performed by: INTERNAL MEDICINE

## 2023-08-15 PROCEDURE — 45380 COLONOSCOPY AND BIOPSY: CPT | Mod: 59,PT | Performed by: INTERNAL MEDICINE

## 2023-08-15 PROCEDURE — 27201012 HC FORCEPS, HOT/COLD, DISP: Performed by: INTERNAL MEDICINE

## 2023-08-15 PROCEDURE — D9220A PRA ANESTHESIA: ICD-10-PCS | Mod: PT,CRNA,, | Performed by: NURSE ANESTHETIST, CERTIFIED REGISTERED

## 2023-08-15 PROCEDURE — 45385 PR COLONOSCOPY,REMV LESN,SNARE: ICD-10-PCS | Mod: PT,,, | Performed by: INTERNAL MEDICINE

## 2023-08-15 PROCEDURE — 37000008 HC ANESTHESIA 1ST 15 MINUTES: Performed by: INTERNAL MEDICINE

## 2023-08-15 RX ORDER — DEXTROMETHORPHAN/PSEUDOEPHED 2.5-7.5/.8
DROPS ORAL
Status: DISCONTINUED | OUTPATIENT
Start: 2023-08-15 | End: 2023-08-15 | Stop reason: HOSPADM

## 2023-08-15 RX ORDER — SODIUM CHLORIDE 9 MG/ML
INJECTION, SOLUTION INTRAVENOUS CONTINUOUS
Status: DISCONTINUED | OUTPATIENT
Start: 2023-08-15 | End: 2023-08-15 | Stop reason: HOSPADM

## 2023-08-15 RX ORDER — PROPOFOL 10 MG/ML
VIAL (ML) INTRAVENOUS
Status: DISCONTINUED | OUTPATIENT
Start: 2023-08-15 | End: 2023-08-15

## 2023-08-15 RX ORDER — LIDOCAINE HYDROCHLORIDE 20 MG/ML
INJECTION INTRAVENOUS
Status: DISCONTINUED | OUTPATIENT
Start: 2023-08-15 | End: 2023-08-15

## 2023-08-15 RX ADMIN — PROPOFOL 50 MG: 10 INJECTION, EMULSION INTRAVENOUS at 09:08

## 2023-08-15 RX ADMIN — LIDOCAINE HYDROCHLORIDE 50 MG: 20 INJECTION, SOLUTION INTRAVENOUS at 09:08

## 2023-08-15 RX ADMIN — PROPOFOL 20 MG: 10 INJECTION, EMULSION INTRAVENOUS at 09:08

## 2023-08-15 RX ADMIN — SODIUM CHLORIDE, SODIUM LACTATE, POTASSIUM CHLORIDE, AND CALCIUM CHLORIDE: .6; .31; .03; .02 INJECTION, SOLUTION INTRAVENOUS at 09:08

## 2023-08-15 NOTE — ADDENDUM NOTE
Addendum  created 08/15/23 1409 by Kathie Ponce CRNA    Intraprocedure Meds edited, Orders acknowledged in Narrator

## 2023-08-15 NOTE — H&P
History & Physical - Short Stay  Gastroenterology      SUBJECTIVE:     Procedure: Colonoscopy    Chief Complaint/Indication for Procedure: Previous Polyps    PTA Medications   Medication Sig    benazepriL (LOTENSIN) 40 MG tablet Take 1 tablet (40 mg total) by mouth once daily.    CHOLECALCIFEROL, VITAMIN D3, (VITAMIN D3 ORAL) Take 1 capsule by mouth once daily.    cyanocobalamin (VITAMIN B-12) 1000 MCG tablet Take 100 mcg by mouth once daily.    esomeprazole (NEXIUM) 40 MG capsule Take 1 capsule (40 mg total) by mouth before breakfast.    fluticasone propionate (FLONASE) 50 mcg/actuation nasal spray USE 2 SPRAYS IN EACH NOSTRIL ONCE DAILY    metoprolol succinate (TOPROL-XL) 25 MG 24 hr tablet TAKE 1 TABLET DAILY    rosuvastatin (CRESTOR) 20 MG tablet TAKE 1 TABLET DAILY    verapamiL (VERELAN) 120 MG C24P Take 1 capsule (120 mg total) by mouth once daily.    acetaminophen (TYLENOL) 650 MG TbSR Take 1 tablet (650 mg total) by mouth every 8 (eight) hours as needed (do not take with any other tylenol or acetaminophen).    aspirin 81 MG Chew Take 1 tablet (81 mg total) by mouth once daily.    azelastine (ASTELIN) 137 mcg (0.1 %) nasal spray 1 spray (137 mcg total) by Nasal route 2 (two) times daily. for 180 doses    cetirizine (ZYRTEC) 10 MG tablet Take 1 tablet (10 mg total) by mouth every evening.    diclofenac (VOLTAREN) 75 MG EC tablet TAKE 1 TABLET TWICE A DAY    diclofenac sodium (VOLTAREN) 1 % Gel APPLY 2 GRAMS TOPICALLY ONCE DAILY       Review of patient's allergies indicates:  No Known Allergies     Past Medical History:   Diagnosis Date    Arthritis     B12 deficiency     Colon polyp     Diabetes mellitus     BORDERLINE    GERD (gastroesophageal reflux disease)     [place] (hard of hearing)     BILAT AIDS    Hyperlipidemia     Hypertension     Low testosterone     Personal history of colonic polyps 2008    adenomatous polyp    Sinus disorder     Sleep apnea     DOES NOT USE MACHINE    Wears glasses      Past  Surgical History:   Procedure Laterality Date    COLON SURGERY      COLONOSCOPY  2008    Dr. Garcia; polyps removed    COLONOSCOPY N/A 07/13/2020    Procedure: COLONOSCOPY;  Surgeon: Jj Fried MD;  Location: [facility] ENDO;  Service: Endoscopy;  Laterality: N/A;    ESOPHAGOGASTRODUODENOSCOPY N/A 7/31/2023    Procedure: EGD (ESOPHAGOGASTRODUODENOSCOPY);  Surgeon: Jj Fried MD;  Location: [facility];  Service: Endoscopy;  Laterality: N/A;    LAPAROSCOPIC RIGHT COLON RESECTION Right 08/10/2020    Procedure: COLECTOMY, RIGHT, LAPAROSCOPIC pooss conversion to open;  Surgeon: Noble Kaye MD;  Location: [facility] OR;  Service: General;  Laterality: Right;    NASAL SEPTUM SURGERY      right hand surgery      RIGHT HEMICOLECTOMY N/A 08/10/2020    Procedure: HEMICOLECTOMY, RIGHT;  Surgeon: Noble Kaye MD;  Location: [facility] OR;  Service: General;  Laterality: N/A;    UPPER GASTROINTESTINAL ENDOSCOPY  2008    hiatal hernia; esophagitis     Family History   Problem Relation Age of Onset    Cancer Neg Hx     Diabetes Neg Hx     Heart disease Neg Hx     Colon cancer Neg Hx     Colon polyps Neg Hx     Esophageal cancer Neg Hx     Stomach cancer Neg Hx      Social History     Tobacco Use    Smoking status: Former     Current packs/day: 0.00     Types: Cigars    Smokeless tobacco: Former     Types: Chew   Substance Use Topics    Alcohol use: Yes     Alcohol/week: 2.0 standard drinks of alcohol     Types: 2 Cans of beer per week     Comment: occ    Drug use: No         OBJECTIVE:     Vital Signs (Most Recent)  Temp: 97.9 °F (36.6 °C) (08/15/23 0832)  Pulse: (!) 57 (08/15/23 0832)  Resp: 18 (08/15/23 0832)  BP: (!) 146/72 (08/15/23 0832)  SpO2: 98 % (08/15/23 0832)    Physical Exam:                                                       GENERAL:  Comfortable, in no acute distress.                                 HEENT EXAM:  Nonicteric.  No adenopathy.  Oropharynx is clear.               NECK:  Supple.                                                                LUNGS:  Clear.                                                               CARDIAC:  Regular rate and rhythm.  S1, S2.  No murmur.                      ABDOMEN:  Soft, positive bowel sounds, nontender.  No hepatosplenomegaly or masses.  No rebound or guarding.                                             EXTREMITIES:  No edema.     MENTAL STATUS:  Normal, alert and oriented.      ASSESSMENT/PLAN:     Assessment: Previous Polyps    Plan: Colonoscopy

## 2023-08-15 NOTE — PROVATION PATIENT INSTRUCTIONS
Discharge Summary/Instructions after an Endoscopic Procedure  Patient Name: Papito Hutton  Patient MRN: [id number]  Patient YOB: 1945  Tuesday, August 15, 2023  Jj Fried MD  Dear patient,  As a result of recent federal legislation (The Federal Cures Act), you may   receive lab or pathology results from your procedure in your [facility]   account before your physician is able to contact you. Your physician or   their representative will relay the results to you with their   recommendations at their soonest availability.  Thank you,  RESTRICTIONS:  During your procedure today, you received medications for sedation.  These   medications may affect your judgment, balance and coordination.  Therefore,   for 24 hours, you have the following restrictions:   - DO NOT drive a car, operate machinery, make legal/financial decisions,   sign important papers or drink alcohol.    ACTIVITY:  Today: no heavy lifting, straining or running due to procedural   sedation/anesthesia.  The following day: return to full activity including work.  DIET:  Eat and drink normally unless instructed otherwise.     TREATMENT FOR COMMON SIDE EFFECTS:  - Mild abdominal pain, nausea, belching, bloating or excessive gas:  rest,   eat lightly and use a heating pad.  - Sore Throat: treat with throat lozenges and/or gargle with warm salt   water.  - Because air was used during the procedure, expelling large amounts of air   from your rectum or belching is normal.  - If a bowel prep was taken, you may not have a bowel movement for 1-3 days.    This is normal.  SYMPTOMS TO WATCH FOR AND REPORT TO YOUR PHYSICIAN:  1. Abdominal pain or bloating, other than gas cramps.  2. Chest pain.  3. Back pain.  4. Signs of infection such as: chills or fever occurring within 24 hours   after the procedure.  5. Rectal bleeding, which would show as bright red, maroon, or black stools.   (A tablespoon of blood from the rectum is not serious, especially  if   hemorrhoids are present.)  6. Vomiting.  7. Weakness or dizziness.  GO DIRECTLY TO THE NEAREST EMERGENCY ROOM IF YOU HAVE ANY OF THE FOLLOWING:      Difficulty breathing              Chills and/or fever over 101 F   Persistent vomiting and/or vomiting blood   Severe abdominal pain   Severe chest pain   Black, tarry stools   Bleeding- more than one tablespoon   Any other symptom or condition that you feel may need urgent attention  Your doctor recommends these additional instructions:  If any biopsies were taken, your doctors clinic will contact you in 1 to 2   weeks with any results.  - Repeat colonoscopy in 3 years for surveillance due to history of TVA.   - Discharge patient to home.   - Advance diet as tolerated.   - Continue present medications.   - Await pathology results.   - Patient has a contact number available for emergencies.  The signs and   symptoms of potential delayed complications were discussed with the   patient.  Return to normal activities tomorrow.  Written discharge   instructions were provided to the patient.  For questions, problems or results please call your physician - Jj Fried MD at Work:  [phone].  [facility], EMERGENCY ROOM PHONE NUMBER: [phone]  IF A COMPLICATION OR EMERGENCY SITUATION ARISES AND YOU ARE UNABLE TO REACH   YOUR PHYSICIAN - GO DIRECTLY TO THE EMERGENCY ROOM.  Jj Fried MD  8/15/2023 9:26:41 AM  This report has been verified and signed electronically.  Dear patient,  As a result of recent federal legislation (The Federal Cures Act), you may   receive lab or pathology results from your procedure in your [facility]   account before your physician is able to contact you. Your physician or   their representative will relay the results to you with their   recommendations at their soonest availability.  Thank you,  PROVATION

## 2023-08-15 NOTE — ANESTHESIA POSTPROCEDURE EVALUATION
Anesthesia Post Evaluation    Patient: Papito Hutton    Procedure(s) Performed: Procedure(s) (LRB):  COLONOSCOPY (N/A)    Final Anesthesia Type: general      Patient location during evaluation: PACU  Patient participation: Yes- Able to Participate  Level of consciousness: awake and alert and oriented  Post-procedure vital signs: reviewed and stable  Pain management: adequate  Airway patency: patent    PONV status at discharge: No PONV  Anesthetic complications: no      Cardiovascular status: blood pressure returned to baseline  Respiratory status: unassisted, spontaneous ventilation and room air  Hydration status: euvolemic  Follow-up not needed.          Vitals Value Taken Time   /84 08/15/23 0947   Temp 36.6 °C (97.9 °F) 08/15/23 0935   Pulse 58 08/15/23 0947   Resp 19 08/15/23 0947   SpO2 97 % 08/15/23 0947         Event Time   Out of Recovery 09:56:18         Pain/Patty Score: Patty Score: 10 (8/15/2023  9:47 AM)

## 2023-08-15 NOTE — TRANSFER OF CARE
Anesthesia Transfer of Care Note    Patient: aPpito Hutton    Procedure(s) Performed: Procedure(s) (LRB):  COLONOSCOPY (N/A)    Patient location: GI    Anesthesia Type: general    Transport from OR: Transported from OR on room air with adequate spontaneous ventilation    Post pain: adequate analgesia    Post assessment: no apparent anesthetic complications    Post vital signs: stable    Level of consciousness: awake and alert    Nausea/Vomiting: no nausea/vomiting    Complications: none    Transfer of care protocol was followed      Last vitals:   Visit Vitals  BP (!) 146/72 (BP Location: Left arm, Patient Position: Lying)   Pulse (!) 57   Temp 36.6 °C (97.9 °F) (Skin)   Resp 18   Ht 6' (1.829 m)   Wt 96.2 kg (212 lb)   SpO2 98%   BMI 28.75 kg/m²

## 2023-08-15 NOTE — PLAN OF CARE
Patient awake, alert, and oriented.  No complaints of pain; abdomen soft and tender.  Patient passing flatus without difficulty.  Vital signs stable.  Patient tolerated po fluids well.  Dr. Fried spoke with patient and family member prior to discharge.  Patient and family verbalize understanding of all discharge instructions given.  Patient discharged to home accompanied by family

## 2023-08-15 NOTE — ANESTHESIA PREPROCEDURE EVALUATION
08/15/2023  Papito Hutton is a 78 y.o., male.      Pre-op Assessment    I have reviewed the Patient Summary Reports.     I have reviewed the Nursing Notes. I have reviewed the NPO Status.   I have reviewed the Medications.     Review of Systems  Anesthesia Hx:  Denies Family Hx of Anesthesia complications.   Denies Personal Hx of Anesthesia complications.   Cardiovascular:   Hypertension hyperlipidemia    Pulmonary:   Sleep Apnea    Education provided regarding risk of obstructive sleep apnea     Hepatic/GI:   Bowel Prep. GERD    Endocrine:   Diabetes        Physical Exam  General: Well nourished, Cooperative, Alert and Oriented    Airway:  Mallampati: III / II  Mouth Opening: Normal  TM Distance: Normal          Anesthesia Plan  Type of Anesthesia, risks & benefits discussed:    Anesthesia Type: Gen Natural Airway  Intra-op Monitoring Plan: Standard ASA Monitors  Induction:  IV  Informed Consent: Informed consent signed with the Patient and all parties understand the risks and agree with anesthesia plan.  All questions answered.   ASA Score: 2    Ready For Surgery From Anesthesia Perspective.     .

## 2023-09-05 ENCOUNTER — HOSPITAL ENCOUNTER (OUTPATIENT)
Dept: RADIOLOGY | Facility: HOSPITAL | Age: 78
Discharge: HOME | End: 2023-09-05
Attending: STUDENT IN AN ORGANIZED HEALTH CARE EDUCATION/TRAINING PROGRAM
Payer: MEDICARE

## 2023-09-05 DIAGNOSIS — E21.3 HYPERPARATHYROIDISM: ICD-10-CM

## 2023-09-05 PROCEDURE — 77080 DXA BONE DENSITY AXIAL: CPT | Mod: TC,PO

## 2023-09-09 ENCOUNTER — HOSPITAL ENCOUNTER (INPATIENT)
Facility: HOSPITAL | Age: 78
LOS: 3 days | Discharge: HOME | DRG: 305 | End: 2023-09-12
Attending: EMERGENCY MEDICINE | Admitting: INTERNAL MEDICINE
Payer: MEDICARE

## 2023-09-09 DIAGNOSIS — G45.9 TIA (TRANSIENT ISCHEMIC ATTACK): ICD-10-CM

## 2023-09-09 DIAGNOSIS — R07.9 CHEST PAIN: ICD-10-CM

## 2023-09-09 DIAGNOSIS — E78.00 HYPERCHOLESTEROLEMIA: ICD-10-CM

## 2023-09-09 DIAGNOSIS — R06.02 SHORTNESS OF BREATH: ICD-10-CM

## 2023-09-09 DIAGNOSIS — E66.9 OBESITY, CLASS I, BMI 30-34.9: ICD-10-CM

## 2023-09-09 DIAGNOSIS — I16.0 HYPERTENSIVE URGENCY: Primary | ICD-10-CM

## 2023-09-09 DIAGNOSIS — I10 HYPERTENSION, UNSPECIFIED TYPE: ICD-10-CM

## 2023-09-09 DIAGNOSIS — I10 HYPERTENSION: ICD-10-CM

## 2023-09-09 DIAGNOSIS — I16.1 HYPERTENSIVE EMERGENCY: ICD-10-CM

## 2023-09-09 DIAGNOSIS — R06.09 DYSPNEA ON EXERTION: ICD-10-CM

## 2023-09-09 DIAGNOSIS — R06.02 SOB (SHORTNESS OF BREATH): ICD-10-CM

## 2023-09-09 PROBLEM — R06.00 DYSPNEA: Status: ACTIVE | Noted: 2023-09-09

## 2023-09-09 PROBLEM — H91.90 HEARING LOSS: Status: ACTIVE | Noted: 2023-09-09

## 2023-09-09 LAB
ALBUMIN SERPL BCP-MCNC: 4.1 G/DL (ref 3.5–5.2)
ALP SERPL-CCNC: 86 U/L (ref 55–135)
ALT SERPL W/O P-5'-P-CCNC: 27 U/L (ref 10–44)
ANION GAP SERPL CALC-SCNC: 12 MMOL/L (ref 8–16)
AST SERPL-CCNC: 21 U/L (ref 10–40)
BASOPHILS # BLD AUTO: 0.05 K/UL (ref 0–0.2)
BASOPHILS NFR BLD: 0.9 % (ref 0–1.9)
BILIRUB SERPL-MCNC: 0.6 MG/DL (ref 0.1–1)
BNP SERPL-MCNC: 29 PG/ML (ref 0–99)
BUN SERPL-MCNC: 17 MG/DL (ref 8–23)
CALCIUM SERPL-MCNC: 10.9 MG/DL (ref 8.7–10.5)
CHLORIDE SERPL-SCNC: 110 MMOL/L (ref 95–110)
CO2 SERPL-SCNC: 20 MMOL/L (ref 23–29)
CREAT SERPL-MCNC: 0.9 MG/DL (ref 0.5–1.4)
DIFFERENTIAL METHOD: NORMAL
EOSINOPHIL # BLD AUTO: 0.1 K/UL (ref 0–0.5)
EOSINOPHIL NFR BLD: 1.2 % (ref 0–8)
ERYTHROCYTE [DISTWIDTH] IN BLOOD BY AUTOMATED COUNT: 13.2 % (ref 11.5–14.5)
EST. GFR  (NO RACE VARIABLE): >60 ML/MIN/1.73 M^2
GLUCOSE SERPL-MCNC: 91 MG/DL (ref 70–110)
HCT VFR BLD AUTO: 42.1 % (ref 40–54)
HGB BLD-MCNC: 14.2 G/DL (ref 14–18)
IMM GRANULOCYTES # BLD AUTO: 0.01 K/UL (ref 0–0.04)
IMM GRANULOCYTES NFR BLD AUTO: 0.2 % (ref 0–0.5)
LYMPHOCYTES # BLD AUTO: 2.1 K/UL (ref 1–4.8)
LYMPHOCYTES NFR BLD: 36.1 % (ref 18–48)
MCH RBC QN AUTO: 29.6 PG (ref 27–31)
MCHC RBC AUTO-ENTMCNC: 33.7 G/DL (ref 32–36)
MCV RBC AUTO: 88 FL (ref 82–98)
MONOCYTES # BLD AUTO: 0.4 K/UL (ref 0.3–1)
MONOCYTES NFR BLD: 7.5 % (ref 4–15)
NEUTROPHILS # BLD AUTO: 3.1 K/UL (ref 1.8–7.7)
NEUTROPHILS NFR BLD: 54.1 % (ref 38–73)
NRBC BLD-RTO: 0 /100 WBC
PLATELET # BLD AUTO: 185 K/UL (ref 150–450)
PMV BLD AUTO: 10 FL (ref 9.2–12.9)
POTASSIUM SERPL-SCNC: 3.9 MMOL/L (ref 3.5–5.1)
PROT SERPL-MCNC: 7.2 G/DL (ref 6–8.4)
RBC # BLD AUTO: 4.79 M/UL (ref 4.6–6.2)
SODIUM SERPL-SCNC: 142 MMOL/L (ref 136–145)
TROPONIN I SERPL DL<=0.01 NG/ML-MCNC: 0.01 NG/ML (ref 0–0.03)
TROPONIN I SERPL DL<=0.01 NG/ML-MCNC: 0.03 NG/ML (ref 0–0.03)
WBC # BLD AUTO: 5.71 K/UL (ref 3.9–12.7)

## 2023-09-09 PROCEDURE — 99285 EMERGENCY DEPT VISIT HI MDM: CPT | Mod: 25

## 2023-09-09 PROCEDURE — 83880 ASSAY OF NATRIURETIC PEPTIDE: CPT | Performed by: NURSE PRACTITIONER

## 2023-09-09 PROCEDURE — 25000003 PHARM REV CODE 250: Performed by: NURSE PRACTITIONER

## 2023-09-09 PROCEDURE — 93010 EKG 12-LEAD: ICD-10-PCS | Mod: ,,, | Performed by: GENERAL PRACTICE

## 2023-09-09 PROCEDURE — 94760 N-INVAS EAR/PLS OXIMETRY 1: CPT

## 2023-09-09 PROCEDURE — 80053 COMPREHEN METABOLIC PANEL: CPT | Performed by: NURSE PRACTITIONER

## 2023-09-09 PROCEDURE — 84484 ASSAY OF TROPONIN QUANT: CPT | Mod: 91 | Performed by: NURSE PRACTITIONER

## 2023-09-09 PROCEDURE — 84484 ASSAY OF TROPONIN QUANT: CPT | Performed by: NURSE PRACTITIONER

## 2023-09-09 PROCEDURE — 96374 THER/PROPH/DIAG INJ IV PUSH: CPT

## 2023-09-09 PROCEDURE — 93010 ELECTROCARDIOGRAM REPORT: CPT | Mod: ,,, | Performed by: GENERAL PRACTICE

## 2023-09-09 PROCEDURE — 85025 COMPLETE CBC W/AUTO DIFF WBC: CPT | Performed by: NURSE PRACTITIONER

## 2023-09-09 PROCEDURE — 63600175 PHARM REV CODE 636 W HCPCS: Performed by: NURSE PRACTITIONER

## 2023-09-09 PROCEDURE — 63600175 PHARM REV CODE 636 W HCPCS: Performed by: EMERGENCY MEDICINE

## 2023-09-09 PROCEDURE — 36415 COLL VENOUS BLD VENIPUNCTURE: CPT | Performed by: NURSE PRACTITIONER

## 2023-09-09 PROCEDURE — 93005 ELECTROCARDIOGRAM TRACING: CPT

## 2023-09-09 PROCEDURE — 11000001 HC ACUTE MED/SURG PRIVATE ROOM

## 2023-09-09 PROCEDURE — 20600001 HC STEP DOWN PRIVATE ROOM

## 2023-09-09 RX ORDER — LANOLIN ALCOHOL/MO/W.PET/CERES
800 CREAM (GRAM) TOPICAL
Status: DISCONTINUED | OUTPATIENT
Start: 2023-09-09 | End: 2023-09-12 | Stop reason: HOSPADM

## 2023-09-09 RX ORDER — IBUPROFEN 200 MG
16 TABLET ORAL
Status: DISCONTINUED | OUTPATIENT
Start: 2023-09-09 | End: 2023-09-12 | Stop reason: HOSPADM

## 2023-09-09 RX ORDER — ENOXAPARIN SODIUM 100 MG/ML
40 INJECTION SUBCUTANEOUS EVERY 24 HOURS
Status: DISCONTINUED | OUTPATIENT
Start: 2023-09-09 | End: 2023-09-12 | Stop reason: HOSPADM

## 2023-09-09 RX ORDER — SODIUM,POTASSIUM PHOSPHATES 280-250MG
2 POWDER IN PACKET (EA) ORAL
Status: DISCONTINUED | OUTPATIENT
Start: 2023-09-09 | End: 2023-09-12 | Stop reason: HOSPADM

## 2023-09-09 RX ORDER — GLUCAGON 1 MG
1 KIT INJECTION
Status: DISCONTINUED | OUTPATIENT
Start: 2023-09-09 | End: 2023-09-12 | Stop reason: HOSPADM

## 2023-09-09 RX ORDER — NAPROXEN SODIUM 220 MG/1
81 TABLET, FILM COATED ORAL DAILY
Status: DISCONTINUED | OUTPATIENT
Start: 2023-09-10 | End: 2023-09-12

## 2023-09-09 RX ORDER — AZELASTINE 1 MG/ML
1 SPRAY, METERED NASAL 2 TIMES DAILY
Status: DISCONTINUED | OUTPATIENT
Start: 2023-09-09 | End: 2023-09-12 | Stop reason: HOSPADM

## 2023-09-09 RX ORDER — IBUPROFEN 200 MG
24 TABLET ORAL
Status: DISCONTINUED | OUTPATIENT
Start: 2023-09-09 | End: 2023-09-12 | Stop reason: HOSPADM

## 2023-09-09 RX ORDER — SODIUM CHLORIDE 0.9 % (FLUSH) 0.9 %
10 SYRINGE (ML) INJECTION EVERY 8 HOURS PRN
Status: DISCONTINUED | OUTPATIENT
Start: 2023-09-09 | End: 2023-09-12 | Stop reason: HOSPADM

## 2023-09-09 RX ORDER — MONTELUKAST SODIUM 10 MG/1
10 TABLET ORAL NIGHTLY
COMMUNITY
Start: 2023-09-07

## 2023-09-09 RX ORDER — DICLOFENAC SODIUM 10 MG/G
2 GEL TOPICAL DAILY
Status: DISCONTINUED | OUTPATIENT
Start: 2023-09-10 | End: 2023-09-12 | Stop reason: HOSPADM

## 2023-09-09 RX ORDER — TALC
6 POWDER (GRAM) TOPICAL NIGHTLY PRN
Status: DISCONTINUED | OUTPATIENT
Start: 2023-09-09 | End: 2023-09-12 | Stop reason: HOSPADM

## 2023-09-09 RX ORDER — PANTOPRAZOLE SODIUM 40 MG/1
40 TABLET, DELAYED RELEASE ORAL DAILY
Status: DISCONTINUED | OUTPATIENT
Start: 2023-09-10 | End: 2023-09-12 | Stop reason: HOSPADM

## 2023-09-09 RX ORDER — HYDRALAZINE HYDROCHLORIDE 20 MG/ML
20 INJECTION INTRAMUSCULAR; INTRAVENOUS
Status: COMPLETED | OUTPATIENT
Start: 2023-09-09 | End: 2023-09-09

## 2023-09-09 RX ORDER — SIMETHICONE 80 MG
1 TABLET,CHEWABLE ORAL 4 TIMES DAILY PRN
Status: DISCONTINUED | OUTPATIENT
Start: 2023-09-09 | End: 2023-09-12 | Stop reason: HOSPADM

## 2023-09-09 RX ORDER — NICARDIPINE HYDROCHLORIDE 0.2 MG/ML
0-15 INJECTION INTRAVENOUS CONTINUOUS
Status: DISCONTINUED | OUTPATIENT
Start: 2023-09-09 | End: 2023-09-10

## 2023-09-09 RX ORDER — ATORVASTATIN CALCIUM 40 MG/1
80 TABLET, FILM COATED ORAL DAILY
Status: DISCONTINUED | OUTPATIENT
Start: 2023-09-10 | End: 2023-09-12 | Stop reason: HOSPADM

## 2023-09-09 RX ORDER — METOPROLOL SUCCINATE 25 MG/1
25 TABLET, EXTENDED RELEASE ORAL DAILY
Status: DISCONTINUED | OUTPATIENT
Start: 2023-09-10 | End: 2023-09-12 | Stop reason: HOSPADM

## 2023-09-09 RX ORDER — VERAPAMIL HYDROCHLORIDE 120 MG/1
120 TABLET, FILM COATED, EXTENDED RELEASE ORAL DAILY
Status: DISCONTINUED | OUTPATIENT
Start: 2023-09-10 | End: 2023-09-11

## 2023-09-09 RX ORDER — ACETAMINOPHEN 325 MG/1
650 TABLET ORAL EVERY 8 HOURS PRN
Status: DISCONTINUED | OUTPATIENT
Start: 2023-09-09 | End: 2023-09-12 | Stop reason: HOSPADM

## 2023-09-09 RX ORDER — MAG HYDROX/ALUMINUM HYD/SIMETH 200-200-20
30 SUSPENSION, ORAL (FINAL DOSE FORM) ORAL 4 TIMES DAILY PRN
Status: DISCONTINUED | OUTPATIENT
Start: 2023-09-09 | End: 2023-09-12 | Stop reason: HOSPADM

## 2023-09-09 RX ORDER — CETIRIZINE HYDROCHLORIDE 10 MG/1
10 TABLET ORAL NIGHTLY
Status: DISCONTINUED | OUTPATIENT
Start: 2023-09-09 | End: 2023-09-12 | Stop reason: HOSPADM

## 2023-09-09 RX ORDER — ONDANSETRON 2 MG/ML
4 INJECTION INTRAMUSCULAR; INTRAVENOUS EVERY 6 HOURS PRN
Status: DISCONTINUED | OUTPATIENT
Start: 2023-09-09 | End: 2023-09-12 | Stop reason: HOSPADM

## 2023-09-09 RX ORDER — NALOXONE HCL 0.4 MG/ML
0.02 VIAL (ML) INJECTION
Status: DISCONTINUED | OUTPATIENT
Start: 2023-09-09 | End: 2023-09-12 | Stop reason: HOSPADM

## 2023-09-09 RX ORDER — LISINOPRIL 10 MG/1
40 TABLET ORAL DAILY
Status: DISCONTINUED | OUTPATIENT
Start: 2023-09-10 | End: 2023-09-11

## 2023-09-09 RX ORDER — PROCHLORPERAZINE EDISYLATE 5 MG/ML
5 INJECTION INTRAMUSCULAR; INTRAVENOUS EVERY 6 HOURS PRN
Status: DISCONTINUED | OUTPATIENT
Start: 2023-09-09 | End: 2023-09-12 | Stop reason: HOSPADM

## 2023-09-09 RX ORDER — AMOXICILLIN 250 MG
1 CAPSULE ORAL 2 TIMES DAILY
Status: DISCONTINUED | OUTPATIENT
Start: 2023-09-09 | End: 2023-09-12 | Stop reason: HOSPADM

## 2023-09-09 RX ADMIN — NICARDIPINE HYDROCHLORIDE 2.5 MG/HR: 0.2 INJECTION, SOLUTION INTRAVENOUS at 07:09

## 2023-09-09 RX ADMIN — CETIRIZINE HYDROCHLORIDE 10 MG: 10 TABLET, FILM COATED ORAL at 10:09

## 2023-09-09 RX ADMIN — ACETAMINOPHEN 650 MG: 325 TABLET, FILM COATED ORAL at 10:09

## 2023-09-09 RX ADMIN — ENOXAPARIN SODIUM 40 MG: 40 INJECTION SUBCUTANEOUS at 10:09

## 2023-09-09 RX ADMIN — HYDRALAZINE HYDROCHLORIDE 20 MG: 20 INJECTION INTRAMUSCULAR; INTRAVENOUS at 05:09

## 2023-09-09 RX ADMIN — DOCUSATE SODIUM AND SENNOSIDES 1 TABLET: 8.6; 5 TABLET, FILM COATED ORAL at 10:09

## 2023-09-09 RX ADMIN — AZELASTINE 137 MCG: 1 SPRAY, METERED NASAL at 10:09

## 2023-09-09 NOTE — SUBJECTIVE & OBJECTIVE
Past Medical History:   Diagnosis Date    Arthritis     B12 deficiency     Colon polyp     Diabetes mellitus     BORDERLINE    GERD (gastroesophageal reflux disease)     [place] (hard of hearing)     BILAT AIDS    Hyperlipidemia     Hypertension     Low testosterone     Personal history of colonic polyps 2008    adenomatous polyp    Sinus disorder     Sleep apnea     DOES NOT USE MACHINE    Wears glasses        Past Surgical History:   Procedure Laterality Date    COLON SURGERY      COLONOSCOPY  2008    Dr. Garcia; polyps removed    COLONOSCOPY N/A 07/13/2020    Procedure: COLONOSCOPY;  Surgeon: Jj Fried MD;  Location: [facility];  Service: Endoscopy;  Laterality: N/A;    COLONOSCOPY N/A 8/15/2023    Procedure: COLONOSCOPY;  Surgeon: Jj Fried MD;  Location: [facility];  Service: Endoscopy;  Laterality: N/A;    ESOPHAGOGASTRODUODENOSCOPY N/A 7/31/2023    Procedure: EGD (ESOPHAGOGASTRODUODENOSCOPY);  Surgeon: Jj Fried MD;  Location: [facility];  Service: Endoscopy;  Laterality: N/A;    LAPAROSCOPIC RIGHT COLON RESECTION Right 08/10/2020    Procedure: COLECTOMY, RIGHT, LAPAROSCOPIC pooss conversion to open;  Surgeon: Noble Kaye MD;  Location[facility];  Service: General;  Laterality: Right;    NASAL SEPTUM SURGERY      right hand surgery      RIGHT HEMICOLECTOMY N/A 08/10/2020    Procedure: HEMICOLECTOMY, RIGHT;  Surgeon: Noble Kaye MD;  Location: [facility];  Service: General;  Laterality: N/A;    UPPER GASTROINTESTINAL ENDOSCOPY  2008    hiatal hernia; esophagitis       Review of patient's allergies indicates:  No Known Allergies    No current facility-administered medications on file prior to encounter.     Current Outpatient Medications on File Prior to Encounter   Medication Sig    aspirin 81 MG Chew Take 1 tablet (81 mg total) by mouth once daily.    azelastine (ASTELIN) 137 mcg (0.1 %) nasal spray 1 spray (137 mcg total) by Nasal route 2 (two) times daily. for 180 doses     benazepriL (LOTENSIN) 40 MG tablet Take 1 tablet (40 mg total) by mouth once daily.    cetirizine (ZYRTEC) 10 MG tablet Take 1 tablet (10 mg total) by mouth every evening.    CHOLECALCIFEROL, VITAMIN D3, (VITAMIN D3 ORAL) Take 1 capsule by mouth once daily.    cyanocobalamin (VITAMIN B-12) 1000 MCG tablet Take 100 mcg by mouth once daily.    diclofenac (VOLTAREN) 75 MG EC tablet TAKE 1 TABLET TWICE A DAY (Patient taking differently: Take 75 mg by mouth 2 (two) times daily.)    esomeprazole (NEXIUM) 40 MG capsule Take 1 capsule (40 mg total) by mouth before breakfast.    metoprolol succinate (TOPROL-XL) 25 MG 24 hr tablet TAKE 1 TABLET DAILY (Patient taking differently: Take 25 mg by mouth once daily.)    rosuvastatin (CRESTOR) 20 MG tablet TAKE 1 TABLET DAILY (Patient taking differently: Take 20 mg by mouth once daily.)    verapamiL (VERELAN) 120 MG C24P Take 1 capsule (120 mg total) by mouth once daily.    acetaminophen (TYLENOL) 650 MG TbSR Take 1 tablet (650 mg total) by mouth every 8 (eight) hours as needed (do not take with any other tylenol or acetaminophen).    diclofenac sodium (VOLTAREN) 1 % Gel APPLY 2 GRAMS TOPICALLY ONCE DAILY (Patient taking differently: Apply 2 g topically once daily.)    fluticasone propionate (FLONASE) 50 mcg/actuation nasal spray USE 2 SPRAYS IN EACH NOSTRIL ONCE DAILY (Patient taking differently: 1 spray by Each Nostril route once daily.)    montelukast (SINGULAIR) 10 mg tablet Take 10 mg by mouth every evening.     Family History    None       Tobacco Use    Smoking status: Former     Types: Cigars    Smokeless tobacco: Former     Types: Chew   Substance and Sexual Activity    Alcohol use: Yes     Alcohol/week: 2.0 standard drinks of alcohol     Types: 2 Cans of beer per week     Comment: occ    Drug use: No    Sexual activity: Yes     Partners: Female     Review of Systems   Constitutional:  Negative for activity change, appetite change, chills and fever.   HENT:  Negative for  congestion, sore throat and trouble swallowing.    Eyes:  Negative for photophobia and visual disturbance.   Respiratory:  Positive for shortness of breath. Negative for cough and chest tightness.    Cardiovascular:  Positive for leg swelling. Negative for chest pain and palpitations.   Gastrointestinal:  Negative for abdominal pain, diarrhea and nausea.   Genitourinary:  Negative for dysuria, flank pain and hematuria.   Musculoskeletal:  Negative for back pain.   Neurological:  Negative for dizziness, weakness and headaches.   Psychiatric/Behavioral:  Negative for confusion.      Objective:     Vital Signs (Most Recent):  Temp: 98 °F (36.7 °C) (09/09/23 1456)  Pulse: 71 (09/09/23 1820)  Resp: 20 (09/09/23 1820)  BP: (!) 219/93 (09/09/23 1820)  SpO2: 96 % (09/09/23 1820) Vital Signs (24h Range):  Temp:  [98 °F (36.7 °C)] 98 °F (36.7 °C)  Pulse:  [51-71] 71  Resp:  [18-20] 20  SpO2:  [93 %-98 %] 96 %  BP: (182-239)/() 219/93     Weight: 96.2 kg (212 lb)  Body mass index is 28.75 kg/m².     Physical Exam  Vitals reviewed.   Constitutional:       Appearance: Normal appearance. He is normal weight.   HENT:      Head: Normocephalic.      Mouth/Throat:      Mouth: Mucous membranes are moist.      Pharynx: Oropharynx is clear.   Eyes:      Pupils: Pupils are equal, round, and reactive to light.   Cardiovascular:      Rate and Rhythm: Normal rate.      Pulses: Normal pulses.   Pulmonary:      Effort: Pulmonary effort is normal.      Breath sounds: Normal breath sounds.   Abdominal:      General: Bowel sounds are normal.   Musculoskeletal:         General: Normal range of motion.      Cervical back: Normal range of motion.      Right lower leg: Edema (trace) present.      Left lower leg: Edema (trace) present.   Skin:     General: Skin is warm and dry.   Neurological:      Mental Status: He is alert and oriented to person, place, and time. Mental status is at baseline.   Psychiatric:         Mood and Affect: Mood  normal.              CRANIAL NERVES     CN III, IV, VI   Pupils are equal, round, and reactive to light.       Significant Labs: All pertinent labs within the past 24 hours have been reviewed.  CBC:   Recent Labs   Lab 09/09/23  1538   WBC 5.71   HGB 14.2   HCT 42.1        CMP:   Recent Labs   Lab 09/09/23  1538      K 3.9      CO2 20*   GLU 91   BUN 17   CREATININE 0.9   CALCIUM 10.9*   PROT 7.2   ALBUMIN 4.1   BILITOT 0.6   ALKPHOS 86   AST 21   ALT 27   ANIONGAP 12       Significant Imaging: I have reviewed all pertinent imaging results/findings within the past 24 hours.  Imaging Results              X-Ray Chest AP Portable (Final result)  Result time 09/09/23 15:17:04      Final result by Darion Taylor MD (09/09/23 15:17:04)                   Impression:      No acute cardiopulmonary process.      Electronically signed by: Darion Taylor  Date:    09/09/2023  Time:    15:17               Narrative:    EXAMINATION:  XR CHEST AP PORTABLE    CLINICAL HISTORY:  CHF;    TECHNIQUE:  Single frontal view of the chest was performed.    COMPARISON:  None    FINDINGS:  Cardiomediastinal silhouette is within normal limits.  Lungs are well expanded.  No consolidation, pneumothorax, or pleural effusion.  No acute bony changes identified.

## 2023-09-09 NOTE — HPI
Papito Hutton is a 78 year old male with a past medical history of KEITH, allergies, hypertension, hyperlipidemia, GERD and polyps who presented to the ED with dyspnea.  Patient states he experienced short episodes of dyspnea while falling asleep or awakening him from sleep.  Patient states he has been diagnosed with sleep apnea and does not wear CPAP.  Patient's wife states he does snore and has a chronic history of awakening from sleep.  Patient states shortness of breath worsens with activity.  Patient was seen in urgent care and noted to have some lower extremity edema.  He was referred to the ED from urgent care.  Upon arrival to the ED patient found to be hypertensive with SBP 180s that increased to 230s while in the emergency room.  Patient denies headache and vision changes.  He states he does take his blood pressure medication as prescribed and has never experienced difficulty in managing his blood pressure.  EKG showed sinus bradycardia and lab work performed in the ED was unremarkable.  Patient received 20 mg of hydralazine which decreased his blood pressure to 218 over 90s.  Patient was referred to Hospital Medicine and seen in the ED. He reports long history of awakening from sleep and was told by PCP to get sleep study. He states he started experiencing shortness of breath during the day two days ago that occurs with activity and when laying flat.  Patient did not become hypoxic after ambulating in the ED. He denies chest pain, nausea, vomiting, fever and chills.  Patient will be admitted to hospital medicine for further evaluation and management.

## 2023-09-09 NOTE — FIRST PROVIDER EVALUATION
Emergency Department TeleTriage Encounter Note      CHIEF COMPLAINT    Chief Complaint   Patient presents with    Shortness of Breath    Leg Swelling       VITAL SIGNS   Initial Vitals [09/09/23 1456]   BP Pulse Resp Temp SpO2   (!) 182/84 65 18 98 °F (36.7 °C) 98 %      MAP       --            ALLERGIES    Review of patient's allergies indicates:  No Known Allergies    PROVIDER TRIAGE NOTE  This is a teletriage evaluation of a 78 y.o. male presenting to the ED with c/o SOB and BLE edema.  Pt was seen at [facility] and concern for new onset CHF.  Pt states he has difficulty laying down at night.  Edema worsened over the last 2 days.      PE: VSS.  Speaking in full sentences.  Edema noted to BLE as per triage nurse.     Plan: labs, imaging, EKG    Limited physical exam via telehealth: The patient is awake, alert, answering questions appropriately and is not in respiratory distress. All ED beds are full at present; patient notified of this status.  Patient seen and medically screened by Nurse Practitioner via teletriage.      Initial orders will be placed and care will be transferred to an alternate provider when patient is roomed for a full evaluation. Any additional orders and the final disposition will be determined by that provider.         ORDERS  Labs Reviewed   CBC W/ AUTO DIFFERENTIAL   COMPREHENSIVE METABOLIC PANEL   TROPONIN I   B-TYPE NATRIURETIC PEPTIDE       ED Orders (720h ago, onward)      Start Ordered     Status Ordering Provider    09/09/23 1503 09/09/23 1502  Vital signs  Every 30 min         Ordered FLOR ELLER    09/09/23 1503 09/09/23 1502  Cardiac Monitoring - Adult  Continuous        Comments: Notify Physician If:    Ordered FLOR ELLER    09/09/23 1503 09/09/23 1502  Pulse Oximetry Continuous  Continuous         Ordered FLOR ELLER    09/09/23 1503 09/09/23 1502  Insert peripheral IV  Once         Ordered FLOR ELLER    09/09/23 1503 09/09/23 1502  CBC auto  differential  STAT         Pending Collection FLOR ELLER.    09/09/23 1503 09/09/23 1502  Comprehensive metabolic panel  STAT         Pending Collection FLOR ELLER.    09/09/23 1503 09/09/23 1502  Troponin I  STAT         Pending Collection FLOR ELLER.    09/09/23 1503 09/09/23 1502  Brain natriuretic peptide  STAT         Pending Collection FLOR ELLER.    09/09/23 1503 09/09/23 1502  EKG 12-lead  Once         Ordered FLOR ELLER.    09/09/23 1503 09/09/23 1502  X-Ray Chest AP Portable  1 time imaging         Ordered FLOR ELLERGaston              Virtual Visit Note: The provider triage portion of this emergency department evaluation and documentation was performed via [employer], a HIPAA-compliant telemedicine application, in concert with a tele-presenter in the room. A face to face patient evaluation with one of my colleagues will occur once the patient is placed in an emergency department room.      DISCLAIMER: This note was prepared with [employer] voice recognition transcription software. Garbled syntax, mangled pronouns, and other bizarre constructions may be attributed to that software system.

## 2023-09-09 NOTE — ED NOTES
Assumed care:  Papito Hutton is awake, alert and oriented x 3, skin warm and dry, in NAD with family at bedside.  Patient CO SOB x weeks and BLE.  States that he has an occasional cough.  Patient placed on cardiac monitor and continuous pulse ox    Patient identifiers for Papito Hutton checked and correct.  LOC:  Papito Hutton is awake, alert, and aware of environment with an appropriate affect. He is oriented x 3 and speaking appropriately.  APPEARANCE:  He is resting comfortably and in no acute distress. He is clean and well groomed, patient's clothing is properly fastened.  SKIN:  The skin is warm and dry. He has normal skin turgor and moist mucus membranes. Skin is intact; no bruising or breakdown noted.  MUSCULOSKELETAL:  He is moving all extremities well, no obvious deformities noted. Pulses intact.   RESPIRATORY:  Airway is open and patent. Respirations are spontaneous and non-labored with normal effort and rate.  SOB  CARDIAC:  He has a normal rate and rhythm. 1+ peripheral edema noted. Capillary refill < 3 seconds.  ABDOMEN:  No distention noted.  Soft and non-tender upon palpation.  NEUROLOGICAL:  PERRL. Facial expression is symmetrical. Hand grasps are equal bilaterally. Normal sensation in all extremities when touched with finger.  Allergies reported:  Review of patient's allergies indicates:  No Known Allergies

## 2023-09-09 NOTE — ED PROVIDER NOTES
Chief complaint:  Shortness of Breath and Leg Swelling      HPI:  Papito Hutton is a 78 y.o. male with hx KEITH, htn, GERD, dysphagia presenting with episodes of dyspnea short induration lasting minutes or less typically while falling asleep or awakening him from sleep.  He denies pursue NSAID of prior measures for previously diagnosed sleep apnea and does not wear a mask.  His wife endorses some snoring with chronic history of awakening.  He denies any dyspnea at present.  He was seen in urgent care with some lower extremity edema noted in conjunction with the symptoms and was referred to the emergency department.  Patient denies chest pain.  No new exertional dyspnea or positional dyspnea.  Patient was not aware of swelling until point I doubt at urgent care.  He denies change in urination.  He denies new medication other than montelukast for allergies.  No new cough, hemoptysis, fever.    ROS: As per HPI and below:  No headache, flank pain, blood in the stools, dark stools.    Review of patient's allergies indicates:  No Known Allergies    Patient's Medications   New Prescriptions    No medications on file   Previous Medications    ACETAMINOPHEN (TYLENOL) 650 MG TBSR    Take 1 tablet (650 mg total) by mouth every 8 (eight) hours as needed (do not take with any other tylenol or acetaminophen).    ASPIRIN 81 MG CHEW    Take 1 tablet (81 mg total) by mouth once daily.    AZELASTINE (ASTELIN) 137 MCG (0.1 %) NASAL SPRAY    1 spray (137 mcg total) by Nasal route 2 (two) times daily. for 180 doses    BENAZEPRIL (LOTENSIN) 40 MG TABLET    Take 1 tablet (40 mg total) by mouth once daily.    CETIRIZINE (ZYRTEC) 10 MG TABLET    Take 1 tablet (10 mg total) by mouth every evening.    CHOLECALCIFEROL, VITAMIN D3, (VITAMIN D3 ORAL)    Take 1 capsule by mouth once daily.    CYANOCOBALAMIN (VITAMIN B-12) 1000 MCG TABLET    Take 100 mcg by mouth once daily.    DICLOFENAC (VOLTAREN) 75 MG EC TABLET    TAKE 1 TABLET TWICE A DAY     DICLOFENAC SODIUM (VOLTAREN) 1 % GEL    APPLY 2 GRAMS TOPICALLY ONCE DAILY    ESOMEPRAZOLE (NEXIUM) 40 MG CAPSULE    Take 1 capsule (40 mg total) by mouth before breakfast.    FLUTICASONE PROPIONATE (FLONASE) 50 MCG/ACTUATION NASAL SPRAY    USE 2 SPRAYS IN EACH NOSTRIL ONCE DAILY    METOPROLOL SUCCINATE (TOPROL-XL) 25 MG 24 HR TABLET    TAKE 1 TABLET DAILY    MONTELUKAST (SINGULAIR) 10 MG TABLET    Take 10 mg by mouth every evening.    ROSUVASTATIN (CRESTOR) 20 MG TABLET    TAKE 1 TABLET DAILY    VERAPAMIL (VERELAN) 120 MG C24P    Take 1 capsule (120 mg total) by mouth once daily.   Modified Medications    No medications on file   Discontinued Medications    No medications on file       PMH:  As per HPI and below:  Past Medical History:   Diagnosis Date    Arthritis     B12 deficiency     Colon polyp     Diabetes mellitus     BORDERLINE    GERD (gastroesophageal reflux disease)     [place] (hard of hearing)     BILAT AIDS    Hyperlipidemia     Hypertension     Low testosterone     Personal history of colonic polyps 2008    adenomatous polyp    Sinus disorder     Sleep apnea     DOES NOT USE MACHINE    Wears glasses      Past Surgical History:   Procedure Laterality Date    COLON SURGERY      COLONOSCOPY  2008    Dr. Garcia; polyps removed    COLONOSCOPY N/A 07/13/2020    Procedure: COLONOSCOPY;  Surgeon: Jj Fried MD;  Location: [facility];  Service: Endoscopy;  Laterality: N/A;    COLONOSCOPY N/A 8/15/2023    Procedure: COLONOSCOPY;  Surgeon: Jj Fried MD;  Location: [facility];  Service: Endoscopy;  Laterality: N/A;    ESOPHAGOGASTRODUODENOSCOPY N/A 7/31/2023    Procedure: EGD (ESOPHAGOGASTRODUODENOSCOPY);  Surgeon: Jj Fried MD;  Location: [facility];  Service: Endoscopy;  Laterality: N/A;    LAPAROSCOPIC RIGHT COLON RESECTION Right 08/10/2020    Procedure: COLECTOMY, RIGHT, LAPAROSCOPIC pooss conversion to open;  Surgeon: Noble Kaye MD;  Location: [facility];  Service: General;   Laterality: Right;    NASAL SEPTUM SURGERY      right hand surgery      RIGHT HEMICOLECTOMY N/A 08/10/2020    Procedure: HEMICOLECTOMY, RIGHT;  Surgeon: Noble Kaye MD;  Location: [facility] OR;  Service: General;  Laterality: N/A;    UPPER GASTROINTESTINAL ENDOSCOPY  2008    hiatal hernia; esophagitis       Social History     Socioeconomic History    Marital status:    Tobacco Use    Smoking status: Former     Types: Cigars    Smokeless tobacco: Former     Types: Chew   Substance and Sexual Activity    Alcohol use: Yes     Alcohol/week: 2.0 standard drinks of alcohol     Types: 2 Cans of beer per week     Comment: occ    Drug use: No    Sexual activity: Yes     Partners: Female       Family History   Problem Relation Age of Onset    Cancer Neg Hx     Diabetes Neg Hx     Heart disease Neg Hx     Colon cancer Neg Hx     Colon polyps Neg Hx     Esophageal cancer Neg Hx     Stomach cancer Neg Hx        Physical Exam:    Vitals:    09/09/23 2032   BP: (!) 179/79   Pulse: 76   Resp: 18   Temp:      GENERAL:  No apparent distress.  Alert.    HEENT:  Moist mucous membranes.  Normocephalic and atraumatic.    NECK:  No swelling.  Midline trachea.  No stridor.  CARDIOVASCULAR:  Regular rate and rhythm.  2+ radial pulses.  No murmur auscultated.  PULMONARY:  Occasional scattered rhonchi with otherwise clear lungs.  No rales or wheezes.  No tachypnea or accessory muscle use.  Patient easily speaks in complete sentences.  ABDOMEN:  Non-tender and non-distended.    EXTREMITIES:  Warm and well perfused.  Brisk capillary refill.  Trace pitting pedal edema bilaterally.  Legs are symmetric and nontender to palpation.  NEUROLOGICAL:  Normal mental status.  Appropriate and conversant.    SKIN:  No rashes or ecchymoses.    BACK:  Atraumatic.  No CVA tenderness to palpation.      Labs Reviewed   COMPREHENSIVE METABOLIC PANEL - Abnormal; Notable for the following components:       Result Value    CO2 20 (*)     Calcium 10.9 (*)      All other components within normal limits   CBC W/ AUTO DIFFERENTIAL   TROPONIN I   B-TYPE NATRIURETIC PEPTIDE       Current Discharge Medication List        CONTINUE these medications which have NOT CHANGED    Details   aspirin 81 MG Chew Take 1 tablet (81 mg total) by mouth once daily.  Refills: 0      azelastine (ASTELIN) 137 mcg (0.1 %) nasal spray 1 spray (137 mcg total) by Nasal route 2 (two) times daily. for 180 doses  Qty: 120 mL, Refills: 2    Associated Diagnoses: Chronic rhinitis      benazepriL (LOTENSIN) 40 MG tablet Take 1 tablet (40 mg total) by mouth once daily.  Qty: 90 tablet, Refills: 3    Comments: .  Associated Diagnoses: Essential hypertension      cetirizine (ZYRTEC) 10 MG tablet Take 1 tablet (10 mg total) by mouth every evening.  Qty: 90 tablet, Refills: 3    Associated Diagnoses: Nasal congestion      CHOLECALCIFEROL, VITAMIN D3, (VITAMIN D3 ORAL) Take 1 capsule by mouth once daily.      cyanocobalamin (VITAMIN B-12) 1000 MCG tablet Take 100 mcg by mouth once daily.      diclofenac (VOLTAREN) 75 MG EC tablet TAKE 1 TABLET TWICE A DAY  Qty: 180 tablet, Refills: 3    Associated Diagnoses: Arthritis pain      esomeprazole (NEXIUM) 40 MG capsule Take 1 capsule (40 mg total) by mouth before breakfast.  Qty: 90 capsule, Refills: 3    Associated Diagnoses: Gastroesophageal reflux disease      metoprolol succinate (TOPROL-XL) 25 MG 24 hr tablet TAKE 1 TABLET DAILY  Qty: 90 tablet, Refills: 3      rosuvastatin (CRESTOR) 20 MG tablet TAKE 1 TABLET DAILY  Qty: 90 tablet, Refills: 3    Associated Diagnoses: Hypercholesterolemia; At risk for heart disease      verapamiL (VERELAN) 120 MG C24P Take 1 capsule (120 mg total) by mouth once daily.  Qty: 90 capsule, Refills: 3    Comments: .  Associated Diagnoses: Leg cramps      acetaminophen (TYLENOL) 650 MG TbSR Take 1 tablet (650 mg total) by mouth every 8 (eight) hours as needed (do not take with any other tylenol or acetaminophen).  Refills: 0       diclofenac sodium (VOLTAREN) 1 % Gel APPLY 2 GRAMS TOPICALLY ONCE DAILY  Qty: 100 g, Refills: 6    Associated Diagnoses: Arthralgia, unspecified joint      fluticasone propionate (FLONASE) 50 mcg/actuation nasal spray USE 2 SPRAYS IN EACH NOSTRIL ONCE DAILY  Qty: 48 g, Refills: 3      montelukast (SINGULAIR) 10 mg tablet Take 10 mg by mouth every evening.             Orders Placed This Encounter   Procedures    X-Ray Chest AP Portable    Xray Previous    CBC auto differential    Comprehensive metabolic panel    Troponin I    Brain natriuretic peptide    Vital signs    Cardiac Monitoring - Adult    Nursing communication    Inpatient consult to Cardiology    Pulse Oximetry Continuous    EKG 12-lead    Insert peripheral IV    Admit to Inpatient       Imaging Results              X-Ray Chest AP Portable (Final result)  Result time 09/09/23 15:17:04      Final result by Darion Taylor MD (09/09/23 15:17:04)                   Impression:      No acute cardiopulmonary process.      Electronically signed by: Darion Taylor  Date:    09/09/2023  Time:    15:17               Narrative:    EXAMINATION:  XR CHEST AP PORTABLE    CLINICAL HISTORY:  CHF;    TECHNIQUE:  Single frontal view of the chest was performed.    COMPARISON:  None    FINDINGS:  Cardiomediastinal silhouette is within normal limits.  Lungs are well expanded.  No consolidation, pneumothorax, or pleural effusion.  No acute bony changes identified.                                      ED Course as of 09/09/23 2127   Sat Sep 09, 2023   1536 EKG:  Sinus bradycardia, rate of 56, normal intervals and axis.  There are no acute ST or T wave changes suggestive of acute ischemia or infarction.  (Independently interpreted by me) [MR]   1715 Automatic blood pressure cuff in the room demonstrating blood pressure of 239/102.  Patient is currently asymptomatic but with markedly elevated pressure p.r.n. blood pressure medicine given here with plan to assess response.  He is  already mildly bradycardic with labetalol less preferred in this setting.  Dose of hydralazine ordered with plan to reassess. [MR]   1755 Repeat /92.  Patient sitting comfortably in bed. [MR]   1756 D/w [place] with hospital medicine who will initiate nicardipine gtt and admit pending further manipulation of chronic antihypertensives or improvement in BP. [MR]      ED Course User Index  [MR] Tomi Jimenez MD       MDM:    78 y.o. male with frequent awakening with some dyspnea at night.  He does have some mild lower extremity edema.  Lungs are clear on exam and furthermore chest x-ray shows no edema or pneumonia.  I doubt aspiration.  I doubt acute CHF.  Other labs sent for an end-organ dysfunction with EKG showing sinus bradycardia without sign of ischemia.  Have very low suspicion for PE.  I do not think D-dimer or CT angiography of the chest are indicated.  I doubt pneumonia.  Largely negative workup but with persistent significant hypertension ultimately improved somewhat with dose of IV hydralazine.  I will admit for improved blood pressure control and continued monitoring.  There is no end-organ dysfunction to classify hypertension as hypertensive emergency at this point. I have discussed with hospital medicine who will assume care.    Diagnoses:    1. Shortness of breath  2. Hypertension       Tomi Jimenez MD  09/09/23 2128

## 2023-09-10 PROBLEM — I16.1 HYPERTENSIVE EMERGENCY: Status: ACTIVE | Noted: 2023-09-10

## 2023-09-10 LAB
ALBUMIN SERPL BCP-MCNC: 3.8 G/DL (ref 3.5–5.2)
ALP SERPL-CCNC: 81 U/L (ref 55–135)
ALT SERPL W/O P-5'-P-CCNC: 24 U/L (ref 10–44)
ANION GAP SERPL CALC-SCNC: 8 MMOL/L (ref 8–16)
AST SERPL-CCNC: 18 U/L (ref 10–40)
BASOPHILS # BLD AUTO: 0.04 K/UL (ref 0–0.2)
BASOPHILS NFR BLD: 0.6 % (ref 0–1.9)
BILIRUB SERPL-MCNC: 0.6 MG/DL (ref 0.1–1)
BUN SERPL-MCNC: 12 MG/DL (ref 8–23)
CALCIUM SERPL-MCNC: 10.5 MG/DL (ref 8.7–10.5)
CHLORIDE SERPL-SCNC: 109 MMOL/L (ref 95–110)
CO2 SERPL-SCNC: 22 MMOL/L (ref 23–29)
CREAT SERPL-MCNC: 0.8 MG/DL (ref 0.5–1.4)
DIFFERENTIAL METHOD: NORMAL
EOSINOPHIL # BLD AUTO: 0 K/UL (ref 0–0.5)
EOSINOPHIL NFR BLD: 0.6 % (ref 0–8)
ERYTHROCYTE [DISTWIDTH] IN BLOOD BY AUTOMATED COUNT: 13.2 % (ref 11.5–14.5)
EST. GFR  (NO RACE VARIABLE): >60 ML/MIN/1.73 M^2
GLUCOSE SERPL-MCNC: 110 MG/DL (ref 70–110)
HCT VFR BLD AUTO: 41.2 % (ref 40–54)
HGB BLD-MCNC: 14.5 G/DL (ref 14–18)
IMM GRANULOCYTES # BLD AUTO: 0.02 K/UL (ref 0–0.04)
IMM GRANULOCYTES NFR BLD AUTO: 0.3 % (ref 0–0.5)
LYMPHOCYTES # BLD AUTO: 2.3 K/UL (ref 1–4.8)
LYMPHOCYTES NFR BLD: 36.9 % (ref 18–48)
MAGNESIUM SERPL-MCNC: 1.7 MG/DL (ref 1.6–2.6)
MCH RBC QN AUTO: 30.7 PG (ref 27–31)
MCHC RBC AUTO-ENTMCNC: 35.2 G/DL (ref 32–36)
MCV RBC AUTO: 87 FL (ref 82–98)
MONOCYTES # BLD AUTO: 0.5 K/UL (ref 0.3–1)
MONOCYTES NFR BLD: 7.2 % (ref 4–15)
NEUTROPHILS # BLD AUTO: 3.5 K/UL (ref 1.8–7.7)
NEUTROPHILS NFR BLD: 54.4 % (ref 38–73)
NRBC BLD-RTO: 0 /100 WBC
PHOSPHATE SERPL-MCNC: 2.4 MG/DL (ref 2.7–4.5)
PLATELET # BLD AUTO: 197 K/UL (ref 150–450)
PMV BLD AUTO: 10.4 FL (ref 9.2–12.9)
POTASSIUM SERPL-SCNC: 3.5 MMOL/L (ref 3.5–5.1)
POTASSIUM SERPL-SCNC: 4.9 MMOL/L (ref 3.5–5.1)
PROT SERPL-MCNC: 6.6 G/DL (ref 6–8.4)
RBC # BLD AUTO: 4.73 M/UL (ref 4.6–6.2)
SODIUM SERPL-SCNC: 139 MMOL/L (ref 136–145)
TROPONIN I SERPL DL<=0.01 NG/ML-MCNC: 0.01 NG/ML (ref 0–0.03)
TROPONIN I SERPL DL<=0.01 NG/ML-MCNC: 0.69 NG/ML (ref 0–0.03)
WBC # BLD AUTO: 6.35 K/UL (ref 3.9–12.7)

## 2023-09-10 PROCEDURE — 36415 COLL VENOUS BLD VENIPUNCTURE: CPT | Performed by: INTERNAL MEDICINE

## 2023-09-10 PROCEDURE — 93010 EKG 12-LEAD: ICD-10-PCS | Mod: ,,, | Performed by: GENERAL PRACTICE

## 2023-09-10 PROCEDURE — 99223 1ST HOSP IP/OBS HIGH 75: CPT | Mod: ,,, | Performed by: INTERNAL MEDICINE

## 2023-09-10 PROCEDURE — 94761 N-INVAS EAR/PLS OXIMETRY MLT: CPT

## 2023-09-10 PROCEDURE — 36415 COLL VENOUS BLD VENIPUNCTURE: CPT | Performed by: NURSE PRACTITIONER

## 2023-09-10 PROCEDURE — 80053 COMPREHEN METABOLIC PANEL: CPT | Performed by: NURSE PRACTITIONER

## 2023-09-10 PROCEDURE — 27000221 HC OXYGEN, UP TO 24 HOURS

## 2023-09-10 PROCEDURE — 84132 ASSAY OF SERUM POTASSIUM: CPT | Performed by: INTERNAL MEDICINE

## 2023-09-10 PROCEDURE — 20600001 HC STEP DOWN PRIVATE ROOM

## 2023-09-10 PROCEDURE — 99223 PR INITIAL HOSPITAL CARE,LEVL III: ICD-10-PCS | Mod: ,,, | Performed by: INTERNAL MEDICINE

## 2023-09-10 PROCEDURE — 84100 ASSAY OF PHOSPHORUS: CPT | Performed by: NURSE PRACTITIONER

## 2023-09-10 PROCEDURE — 85025 COMPLETE CBC W/AUTO DIFF WBC: CPT | Performed by: NURSE PRACTITIONER

## 2023-09-10 PROCEDURE — 25000003 PHARM REV CODE 250: Performed by: NURSE PRACTITIONER

## 2023-09-10 PROCEDURE — 84484 ASSAY OF TROPONIN QUANT: CPT | Performed by: INTERNAL MEDICINE

## 2023-09-10 PROCEDURE — 83735 ASSAY OF MAGNESIUM: CPT | Performed by: NURSE PRACTITIONER

## 2023-09-10 PROCEDURE — 93005 ELECTROCARDIOGRAM TRACING: CPT

## 2023-09-10 PROCEDURE — 93010 ELECTROCARDIOGRAM REPORT: CPT | Mod: ,,, | Performed by: GENERAL PRACTICE

## 2023-09-10 PROCEDURE — 63600175 PHARM REV CODE 636 W HCPCS: Performed by: NURSE PRACTITIONER

## 2023-09-10 RX ADMIN — POTASSIUM BICARBONATE 50 MEQ: 977.5 TABLET, EFFERVESCENT ORAL at 06:09

## 2023-09-10 RX ADMIN — POTASSIUM & SODIUM PHOSPHATES POWDER PACK 280-160-250 MG 2 PACKET: 280-160-250 PACK at 10:09

## 2023-09-10 RX ADMIN — DICLOFENAC SODIUM 2 G: 10 GEL TOPICAL at 08:09

## 2023-09-10 RX ADMIN — PANTOPRAZOLE SODIUM 40 MG: 40 TABLET, DELAYED RELEASE ORAL at 08:09

## 2023-09-10 RX ADMIN — ATORVASTATIN CALCIUM 80 MG: 40 TABLET, FILM COATED ORAL at 08:09

## 2023-09-10 RX ADMIN — METOPROLOL SUCCINATE 25 MG: 25 TABLET, EXTENDED RELEASE ORAL at 08:09

## 2023-09-10 RX ADMIN — MELATONIN TAB 3 MG 6 MG: 3 TAB at 08:09

## 2023-09-10 RX ADMIN — POTASSIUM & SODIUM PHOSPHATES POWDER PACK 280-160-250 MG 2 PACKET: 280-160-250 PACK at 06:09

## 2023-09-10 RX ADMIN — LISINOPRIL 40 MG: 10 TABLET ORAL at 08:09

## 2023-09-10 RX ADMIN — CETIRIZINE HYDROCHLORIDE 10 MG: 10 TABLET, FILM COATED ORAL at 08:09

## 2023-09-10 RX ADMIN — ASPIRIN 81 MG CHEWABLE TABLET 81 MG: 81 TABLET CHEWABLE at 08:09

## 2023-09-10 RX ADMIN — ENOXAPARIN SODIUM 40 MG: 40 INJECTION SUBCUTANEOUS at 04:09

## 2023-09-10 RX ADMIN — AZELASTINE 137 MCG: 1 SPRAY, METERED NASAL at 08:09

## 2023-09-10 RX ADMIN — VERAPAMIL HYDROCHLORIDE 120 MG: 120 TABLET, FILM COATED, EXTENDED RELEASE ORAL at 08:09

## 2023-09-10 RX ADMIN — Medication 800 MG: at 10:09

## 2023-09-10 RX ADMIN — NICARDIPINE HYDROCHLORIDE 5 MG/HR: 0.2 INJECTION, SOLUTION INTRAVENOUS at 05:09

## 2023-09-10 NOTE — ASSESSMENT & PLAN NOTE
New onset dyspnea with exertion and likely chronic KEITH    -Telemetry  -Continuous pulse ox  -sleep study outpatient  -Trend troponin

## 2023-09-10 NOTE — ASSESSMENT & PLAN NOTE
Chronic hypertension that is currently uncontrolled.     Temp:  [98 °F (36.7 °C)]   Pulse:  [51-71]   Resp:  [18-20]   BP: (182-239)/()   SpO2:  [93 %-98 %] .   Home meds for hypertension were reviewed and noted below-  Hypertension Medications             benazepriL (LOTENSIN) 40 MG tablet Take 1 tablet (40 mg total) by mouth once daily.    metoprolol succinate (TOPROL-XL) 25 MG 24 hr tablet TAKE 1 TABLET DAILY    verapamiL (VERELAN) 120 MG C24P Take 1 capsule (120 mg total) by mouth once daily.          While in the hospital, will manage blood pressure as follows:  Nicardipine infusion

## 2023-09-10 NOTE — CARE UPDATE
09/10/23 0810   Patient Assessment/Suction   Level of Consciousness (AVPU) alert   Respiratory Effort Unlabored;Short of breath   Expansion/Accessory Muscles/Retractions no use of accessory muscles;no retractions;expansion symmetric   All Lung Fields Breath Sounds Anterior:;Lateral:;diminished   Rhythm/Pattern, Respiratory unlabored;shortness of breath   Cough Frequency no cough   PRE-TX-O2   Device (Oxygen Therapy) nasal cannula with humidification   $ Is the patient on Low Flow Oxygen? Yes   Flow (L/min) 2   SpO2 97 %   Pulse Oximetry Type Continuous   $ Pulse Oximetry - Multiple Charge Pulse Oximetry - Multiple   Pulse 75   Resp 18

## 2023-09-10 NOTE — CONSULTS
[facility]  Department of Cardiology  Consult Note      PATIENT NAME: Papito Hutton    MRN: [id number]  TODAY'S DATE: 09/10/2023  ADMIT DATE: 9/9/2023                          CONSULT REQUESTED BY: Gina Arriaza MD    SUBJECTIVE     PRINCIPAL PROBLEM: Hypertensive emergency      REASON FOR CONSULT:  Dyspnea on exertion      HPI:  Mr. Hutton is a 78-year-old male with history of hypertension, hyperlipidemia, allergies.  He has been complaining on the past 2 weeks having progressive shortness of breath usually occurs at night when he is laying down.  He is having difficulty breathing.  He has been sleeping in the recliner for the past week or so.  He started taking significant amounts of Sudafed 3-4 pills a day.  The symptoms got worse and went to [place] [facility] [place].  His blood pressure was markedly elevated subsequently he was referred to the emergency room.  His blood pressure here in the emergency room was markedly increased he was started on a Cardene drip with improvement.  He does describe some dyspnea on exertion he denies any chest pain any chest tightness.  All the symptoms started in the past 2 months.  He has history of hypertension and he has been compliant with his medications.  He had a dobutamine echocardiogram stress test done in January of this year that was normal.        Review of patient's allergies indicates:  No Known Allergies    Past Medical History:   Diagnosis Date    Arthritis     B12 deficiency     Colon polyp     Diabetes mellitus     BORDERLINE    GERD (gastroesophageal reflux disease)     [place] (hard of hearing)     BILAT AIDS    Hyperlipidemia     Hypertension     Low testosterone     Personal history of colonic polyps 2008    adenomatous polyp    Sinus disorder     Sleep apnea     DOES NOT USE MACHINE    Wears glasses      Past Surgical History:   Procedure Laterality Date    COLON SURGERY      COLONOSCOPY  2008    Dr. Garcia; polyps removed    COLONOSCOPY N/A  07/13/2020    Procedure: COLONOSCOPY;  Surgeon: Jj Fried MD;  Location: [facility];  Service: Endoscopy;  Laterality: N/A;    COLONOSCOPY N/A 8/15/2023    Procedure: COLONOSCOPY;  Surgeon: jJ Fried MD;  Location: [facility];  Service: Endoscopy;  Laterality: N/A;    ESOPHAGOGASTRODUODENOSCOPY N/A 7/31/2023    Procedure: EGD (ESOPHAGOGASTRODUODENOSCOPY);  Surgeon: Jj Fried MD;  Location: [facility];  Service: Endoscopy;  Laterality: N/A;    LAPAROSCOPIC RIGHT COLON RESECTION Right 08/10/2020    Procedure: COLECTOMY, RIGHT, LAPAROSCOPIC pooss conversion to open;  Surgeon: Noble Kaye MD;  Location: [facility];  Service: General;  Laterality: Right;    NASAL SEPTUM SURGERY      right hand surgery      RIGHT HEMICOLECTOMY N/A 08/10/2020    Procedure: HEMICOLECTOMY, RIGHT;  Surgeon: Noble Kaye MD;  Location: [facility];  Service: General;  Laterality: N/A;    UPPER GASTROINTESTINAL ENDOSCOPY  2008    hiatal hernia; esophagitis     Social History     Tobacco Use    Smoking status: Former     Types: Cigars    Smokeless tobacco: Former     Types: Chew   Substance Use Topics    Alcohol use: Yes     Alcohol/week: 2.0 standard drinks of alcohol     Types: 2 Cans of beer per week     Comment: occ    Drug use: No        REVIEW OF SYSTEMS  CONSTITUTIONAL: Negative for chills, fatigue and fever.   EYES: No double vision, No blurred vision  NEURO: No headaches, No dizziness  RESPIRATORY:  Positive for shortness of breath negative for cough  CARDIOVASCULAR:  Negative for chest pains tightness palpitations leg edema  GI: Negative for abdominal pain, No melena, diarrhea, nausea and vomiting.   : Negative for dysuria and frequency, Negative for hematuria  SKIN: Negative for bruising, Negative for edema or discoloration noted.   ENDOCRINE: Negative for polyphagia, Negative for heat intolerance, Negative for cold intolerance  PSYCHIATRIC: Negative for depression, Negative for anxiety, Negative for  memory loss  MUSCULOSKELETAL: Negative for neck pain, Negative for muscle weakness, Negative for back pain     OBJECTIVE     VITAL SIGNS (Most Recent)  Temp: 97.7 °F (36.5 °C) (09/10/23 1143)  Pulse: 62 (09/10/23 1410)  Resp: 20 (09/10/23 1410)  BP: (!) 161/91 (09/10/23 1200)  SpO2: 97 % (09/10/23 1410)    VENTILATION STATUS  Resp: 20 (09/10/23 1410)  SpO2: 97 % (09/10/23 1410)           I & O (Last 24H):  Intake/Output Summary (Last 24 hours) at 9/10/2023 1420  Last data filed at 9/10/2023 0810  Gross per 24 hour   Intake 1254.3 ml   Output 850 ml   Net 404.3 ml       WEIGHTS  Wt Readings from Last 1 Encounters:   09/10/23 0600 94.2 kg (207 lb 10.8 oz)   09/09/23 2150 94.8 kg (208 lb 15.9 oz)   09/09/23 1456 96.2 kg (212 lb)       PHYSICAL EXAM  GENERAL: well built, well nourished, well-developed in no apparent distress alert and oriented.   HEENT: Normocephalic. Pupils normal and conjunctivae normal.  Mucous membranes normal, no cyanosis or icterus, trachea central,no pallor or icterus is noted..   NECK: No JVD. No bruit..   THYROID: Thyroid not enlarged. No nodules present..   CARDIAC: Regular rate and rhythm. S1 is normal.S2 is normal.No gallops, clicks or murmurs noted at this time.  CHEST ANATOMY: normal.   LUNGS: Clear to auscultation. No wheezing or rhonchi..   ABDOMEN: Soft no masses or organomegaly.  No abdomen pulsations or bruits.  Normal bowel sounds. No pulsations and no masses felt, No guarding or rebound.   URINARY: No bauman catheter   EXTREMITIES: No cyanosis, clubbing or edema noted at this time., no calf tenderness bilaterally.   PERIPHERAL VASCULAR SYSTEM: Good palpable distal pulses.   CENTRAL NERVOUS SYSTEM: No focal motor or sensory deficits noted.   SKIN: Skin without lesions, moist, well perfused.   MUSCLE STRENGTH & TONE: No noteable weakness, atrophy or abnormal movement.     HOME MEDICATIONS:  No current facility-administered medications on file prior to encounter.     Current Outpatient  Medications on File Prior to Encounter   Medication Sig Dispense Refill    aspirin 81 MG Chew Take 1 tablet (81 mg total) by mouth once daily.  0    azelastine (ASTELIN) 137 mcg (0.1 %) nasal spray 1 spray (137 mcg total) by Nasal route 2 (two) times daily. for 180 doses 120 mL 2    benazepriL (LOTENSIN) 40 MG tablet Take 1 tablet (40 mg total) by mouth once daily. 90 tablet 3    cetirizine (ZYRTEC) 10 MG tablet Take 1 tablet (10 mg total) by mouth every evening. 90 tablet 3    CHOLECALCIFEROL, VITAMIN D3, (VITAMIN D3 ORAL) Take 1 capsule by mouth once daily.      cyanocobalamin (VITAMIN B-12) 1000 MCG tablet Take 100 mcg by mouth once daily.      diclofenac (VOLTAREN) 75 MG EC tablet TAKE 1 TABLET TWICE A DAY (Patient taking differently: Take 75 mg by mouth 2 (two) times daily.) 180 tablet 3    esomeprazole (NEXIUM) 40 MG capsule Take 1 capsule (40 mg total) by mouth before breakfast. 90 capsule 3    metoprolol succinate (TOPROL-XL) 25 MG 24 hr tablet TAKE 1 TABLET DAILY (Patient taking differently: Take 25 mg by mouth once daily.) 90 tablet 3    rosuvastatin (CRESTOR) 20 MG tablet TAKE 1 TABLET DAILY (Patient taking differently: Take 20 mg by mouth once daily.) 90 tablet 3    verapamiL (VERELAN) 120 MG C24P Take 1 capsule (120 mg total) by mouth once daily. 90 capsule 3    acetaminophen (TYLENOL) 650 MG TbSR Take 1 tablet (650 mg total) by mouth every 8 (eight) hours as needed (do not take with any other tylenol or acetaminophen).  0    diclofenac sodium (VOLTAREN) 1 % Gel APPLY 2 GRAMS TOPICALLY ONCE DAILY (Patient taking differently: Apply 2 g topically once daily.) 100 g 6    fluticasone propionate (FLONASE) 50 mcg/actuation nasal spray USE 2 SPRAYS IN EACH NOSTRIL ONCE DAILY (Patient taking differently: 1 spray by Each Nostril route once daily.) 48 g 3    montelukast (SINGULAIR) 10 mg tablet Take 10 mg by mouth every evening.         SCHEDULED MEDS:   aspirin  81 mg Oral Daily    atorvastatin  80 mg Oral Daily  "   azelastine  1 spray Nasal BID    cetirizine  10 mg Oral QHS    diclofenac sodium  2 g Topical (Top) Daily    enoxparin  40 mg Subcutaneous Daily    lisinopriL  40 mg Oral Daily    metoprolol succinate  25 mg Oral Daily    pantoprazole  40 mg Oral Daily    senna-docusate 8.6-50 mg  1 tablet Oral BID    verapamiL  120 mg Oral Daily       CONTINUOUS INFUSIONS:    PRN MEDS:acetaminophen, aluminum-magnesium hydroxide-simethicone, dextrose 10%, dextrose 10%, glucagon (human recombinant), glucose, glucose, magnesium oxide, magnesium oxide, melatonin, naloxone, ondansetron, potassium bicarbonate, potassium bicarbonate, potassium bicarbonate, potassium, sodium phosphates, potassium, sodium phosphates, potassium, sodium phosphates, prochlorperazine, simethicone, sodium chloride 0.9%    LABS AND DIAGNOSTICS     CBC LAST 3 DAYS  Recent Labs   Lab 09/09/23  1538 09/10/23  0428   WBC 5.71 6.35   RBC 4.79 4.73   HGB 14.2 14.5   HCT 42.1 41.2   MCV 88 87   MCH 29.6 30.7   MCHC 33.7 35.2   RDW 13.2 13.2    197   MPV 10.0 10.4   GRAN 54.1  3.1 54.4  3.5   LYMPH 36.1  2.1 36.9  2.3   MONO 7.5  0.4 7.2  0.5   BASO 0.05 0.04   NRBC 0 0       COAGULATION LAST 3 DAYS  No results for input(s): "LABPT", "INR", "APTT" in the last 168 hours.    CHEMISTRY LAST 3 DAYS  Recent Labs   Lab 09/09/23  1538 09/10/23  0428 09/10/23  1133    139  --    K 3.9 3.5 4.9    109  --    CO2 20* 22*  --    ANIONGAP 12 8  --    BUN 17 12  --    CREATININE 0.9 0.8  --    GLU 91 110  --    CALCIUM 10.9* 10.5  --    MG  --  1.7  --    ALBUMIN 4.1 3.8  --    PROT 7.2 6.6  --    ALKPHOS 86 81  --    ALT 27 24  --    AST 21 18  --    BILITOT 0.6 0.6  --        CARDIAC PROFILE LAST 3 DAYS  Recent Labs   Lab 09/09/23  1538 09/09/23  2220 09/10/23  0428 09/10/23  1133   BNP 29  --   --   --    TROPONINI 0.008 0.027* 0.695* 0.011       ENDOCRINE LAST 3 DAYS  No results for input(s): "TSH", "PROCAL" in the last 168 hours.    LAST ARTERIAL BLOOD " "GAS  ABG  No results for input(s): "PH", "PO2", "PCO2", "HCO3", "BE" in the last 168 hours.    LAST 7 DAYS MICROBIOLOGY   Microbiology Results (last 7 days)       ** No results found for the last 168 hours. **            MOST RECENT IMAGING  X-Ray Chest AP Portable  Narrative: EXAMINATION:  XR CHEST AP PORTABLE    CLINICAL HISTORY:  CHF;    TECHNIQUE:  Single frontal view of the chest was performed.    COMPARISON:  None    FINDINGS:  Cardiomediastinal silhouette is within normal limits.  Lungs are well expanded.  No consolidation, pneumothorax, or pleural effusion.  No acute bony changes identified.  Impression: No acute cardiopulmonary process.    Electronically signed by: Darion Taylor  Date:    09/09/2023  Time:    15:17      ECHOCARDIOGRAM RESULTS (last 5)  Results for orders placed in visit on 01/25/23    Stress Echo Which stress agent will be used? Pharmacological; Color Flow Doppler? No    Interpretation Summary  · The left ventricle is normal in size with normal systolic function.  · Normal left ventricular diastolic function.  · The estimated ejection fraction is 65%.  · Normal right ventricular size with normal right ventricular systolic function.  · The stress echo portion of this study is negative for myocardial ischemia.  · During stress, the following significant arrhythmias were observed: occasional PVCs.  · The ECG portion of this study is negative for myocardial ischemia.      CURRENT/PREVIOUS VISIT EKG  Results for orders placed or performed in visit on 01/10/23   IN OFFICE EKG 12-LEAD (to [place])    Collection Time: 01/10/23  9:41 AM    Narrative    Test Reason : R06.00,    Vent. Rate : 057 BPM     Atrial Rate : 057 BPM     P-R Int : 186 ms          QRS Dur : 090 ms      QT Int : 412 ms       P-R-T Axes : 043 -04 015 degrees     QTc Int : 401 ms    Sinus bradycardia  Possible Inferior infarct ,age undetermined  Abnormal ECG  When compared with ECG of 07-AUG-2020 09:12,  Borderline criteria for Inferior " infarct are now Present  Confirmed by Thien Rosario MD ([id number]) on 1/10/2023 4:34:08 PM    Referred By: Rafi Martin           Confirmed By:Thien Rosario MD           ASSESSMENT/PLAN:     Active Hospital Problems    Diagnosis    *Hypertensive emergency    Dyspnea    Hypercalcemia    Hyperlipidemia    Hypertension    GERD (gastroesophageal reflux disease)       ASSESSMENT & PLAN:   Hypertensive emergency probably secondary to high doses of Sudafed.  He has been chronically on Lotensin 40 mg, metoprolol, verapamil    Dyspnea that has worsened in the past 2 weeks.  His BNP is within normal limits his troponin levels are mildly elevated.  He denies any chest pains.  The EKG does not reveal any acute changes.  Will obtain an echocardiogram to assess his left ventricular function and diastolic function.  Will obtain a pharmacological myocardial perfusion scan to rule out ischemia    Probably obstructive sleep apnea a sleep study needs to be performed as an outpatient        RECOMMENDATIONS:  Controlled his blood pressure  Perform a echocardiogram  Perform a pharmacological myocardial perfusion scan to rule out ischemia        Artur Sanches MD  Date of Service: 09/10/2023  2:20 PM

## 2023-09-10 NOTE — ASSESSMENT & PLAN NOTE
New onset dyspnea with exertion and likely chronic KEITH    -Telemetry  -Continuous pulse ox  -sleep study outpatient  -Trend troponin    Discussed with  who will assist with outpatient polysomnography appointment.

## 2023-09-10 NOTE — HOSPITAL COURSE
Patient admitted to medicine telemetry service where patient was initiated on intravenous Cardene infusion.  Blood pressure was closely monitored.  No arrhythmia noted.  Serum troponin level trended.  A cardiology consultation and 2D echocardiogram ordered.  Patient and his wife counseled importance of having polysomnography at earliest for evaluation of obstructive sleep apnea.  Patient was extensively counseled regarding dangers of using over-the-counter decongestants which might have contributed towards uncontrolled hypertension.

## 2023-09-10 NOTE — ASSESSMENT & PLAN NOTE
The patient has a history of chronic hypercalcemia that is currently controlled. The patient has the following symptoms due to their hypercalcemia: None (asymptomatic). The hypercalcemia is likely due to unknown. Their latest calcium has been reviewed and is listed below.  Ionized Calcium   Date Value Ref Range Status   10/05/2021 1.43 (H) 1.06 - 1.42 mmol/L Final       -Trend

## 2023-09-10 NOTE — ASSESSMENT & PLAN NOTE
Patient is chronically on statin.will continue for now. Monitor clinically. Last LDL was   Lab Results   Component Value Date    LDLCALC 80.4 07/20/2023

## 2023-09-10 NOTE — H&P
[facility] Medicine  History & Physical    Patient Name: Paptio Hutton  MRN: [id number]  Patient Class: IP- Inpatient  Admission Date: 9/9/2023  Attending Physician: Gina Arriaza MD   Primary Care Provider: Rafi Martin MD         Patient information was obtained from patient, spouse/SO, past medical records and ER records.     Subjective:     Principal Problem:Hypertension    Chief Complaint:   Chief Complaint   Patient presents with    Shortness of Breath    Leg Swelling        HPI: Papito Hutton is a 78 year old male with a past medical history of KEITH, allergies, hypertension, hyperlipidemia, GERD and polyps who presented to the ED with dyspnea.  Patient states he experienced short episodes of dyspnea while falling asleep or awakening him from sleep.  Patient states he has been diagnosed with sleep apnea and does not wear CPAP.  Patient's wife states he does snore and has a chronic history of awakening from sleep.  Patient states shortness of breath worsens with activity.  Patient was seen in urgent care and noted to have some lower extremity edema.  He was referred to the ED from urgent care.  Upon arrival to the ED patient found to be hypertensive with SBP 180s that increased to 230s while in the emergency room.  Patient denies headache and vision changes.  He states he does take his blood pressure medication as prescribed and has never experienced difficulty in managing his blood pressure.  EKG showed sinus bradycardia and lab work performed in the ED was unremarkable.  Patient received 20 mg of hydralazine which decreased his blood pressure to 218 over 90s.  Patient was referred to Hospital Medicine and seen in the ED. He reports long history of awakening from sleep and was told by PCP to get sleep study. He states he started experiencing shortness of breath during the day two days ago that occurs with activity and when laying flat.  Patient did not become hypoxic  after ambulating in the ED. He denies chest pain, nausea, vomiting, fever and chills.  Patient will be admitted to hospital medicine for further evaluation and management.           Past Medical History:   Diagnosis Date    Arthritis     B12 deficiency     Colon polyp     Diabetes mellitus     BORDERLINE    GERD (gastroesophageal reflux disease)     [place]     BILAT AIDS    Hyperlipidemia     Hypertension     Low testosterone     Personal history of colonic polyps 2008    adenomatous polyp    Sinus disorder     Sleep apnea     DOES NOT USE MACHINE    Wears glasses        Past Surgical History:   Procedure Laterality Date    COLON SURGERY      COLONOSCOPY  2008    Dr. Garcia; polyps removed    COLONOSCOPY N/A 07/13/2020    Procedure: COLONOSCOPY;  Surgeon: Jj Fried MD;  Location: [facility];  Service: Endoscopy;  Laterality: N/A;    COLONOSCOPY N/A 8/15/2023    Procedure: COLONOSCOPY;  Surgeon: Jj Fried MD;  Location: [facility];  Service: Endoscopy;  Laterality: N/A;    ESOPHAGOGASTRODUODENOSCOPY N/A 7/31/2023    Procedure: EGD (ESOPHAGOGASTRODUODENOSCOPY);  Surgeon: Jj Fried MD;  Location: [facility];  Service: Endoscopy;  Laterality: N/A;    LAPAROSCOPIC RIGHT COLON RESECTION Right 08/10/2020    Procedure: COLECTOMY, RIGHT, LAPAROSCOPIC pooss conversion to open;  Surgeon: Noble Kaye MD;  Location: [facility];  Service: General;  Laterality: Right;    NASAL SEPTUM SURGERY      right hand surgery      RIGHT HEMICOLECTOMY N/A 08/10/2020    Procedure: HEMICOLECTOMY, RIGHT;  Surgeon: Noble Kaye MD;  Location: [facility];  Service: General;  Laterality: N/A;    UPPER GASTROINTESTINAL ENDOSCOPY  2008    hiatal hernia; esophagitis       Review of patient's allergies indicates:  No Known Allergies    No current facility-administered medications on file prior to encounter.     Current Outpatient Medications on File Prior to Encounter   Medication Sig     aspirin 81 MG Chew Take 1 tablet (81 mg total) by mouth once daily.    azelastine (ASTELIN) 137 mcg (0.1 %) nasal spray 1 spray (137 mcg total) by Nasal route 2 (two) times daily. for 180 doses    benazepriL (LOTENSIN) 40 MG tablet Take 1 tablet (40 mg total) by mouth once daily.    cetirizine (ZYRTEC) 10 MG tablet Take 1 tablet (10 mg total) by mouth every evening.    CHOLECALCIFEROL, VITAMIN D3, (VITAMIN D3 ORAL) Take 1 capsule by mouth once daily.    cyanocobalamin (VITAMIN B-12) 1000 MCG tablet Take 100 mcg by mouth once daily.    diclofenac (VOLTAREN) 75 MG EC tablet TAKE 1 TABLET TWICE A DAY (Patient taking differently: Take 75 mg by mouth 2 (two) times daily.)    esomeprazole (NEXIUM) 40 MG capsule Take 1 capsule (40 mg total) by mouth before breakfast.    metoprolol succinate (TOPROL-XL) 25 MG 24 hr tablet TAKE 1 TABLET DAILY (Patient taking differently: Take 25 mg by mouth once daily.)    rosuvastatin (CRESTOR) 20 MG tablet TAKE 1 TABLET DAILY (Patient taking differently: Take 20 mg by mouth once daily.)    verapamiL (VERELAN) 120 MG C24P Take 1 capsule (120 mg total) by mouth once daily.    acetaminophen (TYLENOL) 650 MG TbSR Take 1 tablet (650 mg total) by mouth every 8 (eight) hours as needed (do not take with any other tylenol or acetaminophen).    diclofenac sodium (VOLTAREN) 1 % Gel APPLY 2 GRAMS TOPICALLY ONCE DAILY (Patient taking differently: Apply 2 g topically once daily.)    fluticasone propionate (FLONASE) 50 mcg/actuation nasal spray USE 2 SPRAYS IN EACH NOSTRIL ONCE DAILY (Patient taking differently: 1 spray by Each Nostril route once daily.)    montelukast (SINGULAIR) 10 mg tablet Take 10 mg by mouth every evening.     Family History    None       Tobacco Use    Smoking status: Former     Types: Cigars    Smokeless tobacco: Former     Types: Chew   Substance and Sexual Activity    Alcohol use: Yes     Alcohol/week: 2.0 standard drinks of alcohol     Types: 2 Cans of beer  per week     Comment: occ    Drug use: No    Sexual activity: Yes     Partners: Female     Review of Systems   Constitutional:  Negative for activity change, appetite change, chills and fever.   HENT:  Negative for congestion, sore throat and trouble swallowing.    Eyes:  Negative for photophobia and visual disturbance.   Respiratory:  Positive for shortness of breath. Negative for cough and chest tightness.    Cardiovascular:  Positive for leg swelling. Negative for chest pain and palpitations.   Gastrointestinal:  Negative for abdominal pain, diarrhea and nausea.   Genitourinary:  Negative for dysuria, flank pain and hematuria.   Musculoskeletal:  Negative for back pain.   Neurological:  Negative for dizziness, weakness and headaches.   Psychiatric/Behavioral:  Negative for confusion.      Objective:     Vital Signs (Most Recent):  Temp: 98 °F (36.7 °C) (09/09/23 1456)  Pulse: 71 (09/09/23 1820)  Resp: 20 (09/09/23 1820)  BP: (!) 219/93 (09/09/23 1820)  SpO2: 96 % (09/09/23 1820) Vital Signs (24h Range):  Temp:  [98 °F (36.7 °C)] 98 °F (36.7 °C)  Pulse:  [51-71] 71  Resp:  [18-20] 20  SpO2:  [93 %-98 %] 96 %  BP: (182-239)/() 219/93     Weight: 96.2 kg (212 lb)  Body mass index is 28.75 kg/m².     Physical Exam  Vitals reviewed.   Constitutional:       Appearance: Normal appearance. He is normal weight.   HENT:      Head: Normocephalic.      Mouth/Throat:      Mouth: Mucous membranes are moist.      Pharynx: Oropharynx is clear.   Eyes:      Pupils: Pupils are equal, round, and reactive to light.   Cardiovascular:      Rate and Rhythm: Normal rate.      Pulses: Normal pulses.   Pulmonary:      Effort: Pulmonary effort is normal.      Breath sounds: Normal breath sounds.   Abdominal:      General: Bowel sounds are normal.   Musculoskeletal:         General: Normal range of motion.      Cervical back: Normal range of motion.      Right lower leg: Edema (trace) present.      Left lower leg: Edema (trace)  present.   Skin:     General: Skin is warm and dry.   Neurological:      Mental Status: He is alert and oriented to person, place, and time. Mental status is at baseline.   Psychiatric:         Mood and Affect: Mood normal.              CRANIAL NERVES     CN III, IV, VI   Pupils are equal, round, and reactive to light.       Significant Labs: All pertinent labs within the past 24 hours have been reviewed.  CBC:   Recent Labs   Lab 09/09/23  1538   WBC 5.71   HGB 14.2   HCT 42.1        CMP:   Recent Labs   Lab 09/09/23  1538      K 3.9      CO2 20*   GLU 91   BUN 17   CREATININE 0.9   CALCIUM 10.9*   PROT 7.2   ALBUMIN 4.1   BILITOT 0.6   ALKPHOS 86   AST 21   ALT 27   ANIONGAP 12       Significant Imaging: I have reviewed all pertinent imaging results/findings within the past 24 hours.  Imaging Results              X-Ray Chest AP Portable (Final result)  Result time 09/09/23 15:17:04      Final result by Darion Taylor MD (09/09/23 15:17:04)                   Impression:      No acute cardiopulmonary process.      Electronically signed by: Darion Taylor  Date:    09/09/2023  Time:    15:17               Narrative:    EXAMINATION:  XR CHEST AP PORTABLE    CLINICAL HISTORY:  CHF;    TECHNIQUE:  Single frontal view of the chest was performed.    COMPARISON:  None    FINDINGS:  Cardiomediastinal silhouette is within normal limits.  Lungs are well expanded.  No consolidation, pneumothorax, or pleural effusion.  No acute bony changes identified.                                        Assessment/Plan:     * Hypertension  Chronic hypertension that is currently uncontrolled.     Temp:  [98 °F (36.7 °C)]   Pulse:  [51-71]   Resp:  [18-20]   BP: (182-239)/()   SpO2:  [93 %-98 %] .   Home meds for hypertension were reviewed and noted below-  Hypertension Medications             benazepriL (LOTENSIN) 40 MG tablet Take 1 tablet (40 mg total) by mouth once daily.    metoprolol succinate (TOPROL-XL) 25 MG 24 hr  tablet TAKE 1 TABLET DAILY    verapamiL (VERELAN) 120 MG C24P Take 1 capsule (120 mg total) by mouth once daily.          While in the hospital, will manage blood pressure as follows:  Nicardipine infusion    Dyspnea  New onset dyspnea with exertion and likely chronic KEITH    -Telemetry  -Continuous pulse ox  -sleep study outpatient  -Trend troponin      Hypercalcemia  The patient has a history of chronic hypercalcemia that is currently controlled. The patient has the following symptoms due to their hypercalcemia: None (asymptomatic). The hypercalcemia is likely due to unknown. Their latest calcium has been reviewed and is listed below.  Ionized Calcium   Date Value Ref Range Status   10/05/2021 1.43 (H) 1.06 - 1.42 mmol/L Final       -Trend    GERD (gastroesophageal reflux disease)  Chronic    -Continue PPI      Hyperlipidemia   Patient is chronically on statin.will continue for now. Monitor clinically. Last LDL was   Lab Results   Component Value Date    LDLCALC 80.4 07/20/2023              VTE Risk Mitigation (From admission, onward)    None                     Elizabeth Boudreaux NP  Department of Hospital Medicine  [facility]

## 2023-09-10 NOTE — SUBJECTIVE & OBJECTIVE
Interval History: On Cardene infusion for accelerated hypertension.  Patient confirms excessive use of over the counter Sudafed for chest congestion prescribed by local urgent care facility.  Patient and his wife confirm symptoms of sleep apnea for which patient is in need for polysomnography.  Patient reports compliance with medications.    Review of Systems   Constitutional:  Negative for activity change, appetite change, chills and fever.   HENT:  Negative for congestion, sore throat and trouble swallowing.    Eyes:  Negative for photophobia and visual disturbance.   Respiratory:  Positive for shortness of breath. Negative for cough and chest tightness.    Cardiovascular:  Positive for leg swelling. Negative for chest pain and palpitations.   Gastrointestinal:  Negative for abdominal pain, diarrhea and nausea.   Genitourinary:  Negative for dysuria, flank pain and hematuria.   Musculoskeletal:  Negative for back pain.   Neurological:  Negative for dizziness, weakness and headaches.   Psychiatric/Behavioral:  Negative for confusion.      Objective:     Vital Signs (Most Recent):  Temp: 98.5 °F (36.9 °C) (09/10/23 0756)  Pulse: 66 (09/10/23 0930)  Resp: 18 (09/10/23 0930)  BP: (!) 150/68 (09/10/23 0930)  SpO2: 98 % (09/10/23 0930) Vital Signs (24h Range):  Temp:  [96.5 °F (35.8 °C)-98.5 °F (36.9 °C)] 98.5 °F (36.9 °C)  Pulse:  [51-85] 66  Resp:  [13-27] 18  SpO2:  [93 %-99 %] 98 %  BP: (142-239)/() 150/68     Weight: 94.2 kg (207 lb 10.8 oz)  Body mass index is 28.17 kg/m².    Intake/Output Summary (Last 24 hours) at 9/10/2023 0945  Last data filed at 9/10/2023 0810  Gross per 24 hour   Intake 1129.3 ml   Output 850 ml   Net 279.3 ml         Physical Exam  Vitals reviewed.   Constitutional:       Appearance: Normal appearance. He is normal weight.   HENT:      Head: Normocephalic.      Mouth/Throat:      Mouth: Mucous membranes are moist.      Pharynx: Oropharynx is clear.   Eyes:      Pupils: Pupils are  "equal, round, and reactive to light.   Cardiovascular:      Rate and Rhythm: Normal rate.      Pulses: Normal pulses.   Pulmonary:      Effort: Pulmonary effort is normal.      Breath sounds: Normal breath sounds.   Abdominal:      General: Bowel sounds are normal.   Musculoskeletal:         General: Normal range of motion.      Cervical back: Normal range of motion.      Right lower leg: Edema (trace) present.      Left lower leg: Edema (trace) present.   Skin:     General: Skin is warm and dry.   Neurological:      Mental Status: He is alert and oriented to person, place, and time. Mental status is at baseline.   Psychiatric:         Mood and Affect: Mood normal.             Significant Labs: All pertinent labs within the past 24 hours have been reviewed.  CBC:   Recent Labs   Lab 09/09/23  1538 09/10/23  0428   WBC 5.71 6.35   HGB 14.2 14.5   HCT 42.1 41.2    197     CMP:   Recent Labs   Lab 09/09/23  1538 09/10/23  0428    139   K 3.9 3.5    109   CO2 20* 22*   GLU 91 110   BUN 17 12   CREATININE 0.9 0.8   CALCIUM 10.9* 10.5   PROT 7.2 6.6   ALBUMIN 4.1 3.8   BILITOT 0.6 0.6   ALKPHOS 86 81   AST 21 18   ALT 27 24   ANIONGAP 12 8     Troponin:   Recent Labs   Lab 09/09/23  1538 09/09/23  2220 09/10/23  0428   TROPONINI 0.008 0.027* 0.695*     TSH:   Recent Labs   Lab 07/20/23  0946   TSH 0.870   BNP: 29  Urine Studies: No results for input(s): "COLORU", "APPEARANCEUA", "PHUR", "SPECGRAV", "PROTEINUA", "GLUCUA", "KETONESU", "BILIRUBINUA", "OCCULTUA", "NITRITE", "UROBILINOGEN", "LEUKOCYTESUR", "RBCUA", "WBCUA", "BACTERIA", "SQUAMEPITHEL", "HYALINECASTS" in the last 48 hours.    Invalid input(s): "WRIGHTSUR"    Significant Imaging:   CXR: No acute cardiopulmonary process.    DSE ():  The left ventricle is normal in size with normal systolic function.  Normal left ventricular diastolic function.  The estimated ejection fraction is 65%.  Normal right ventricular size with normal right " ventricular systolic function.  The stress echo portion of this study is negative for myocardial ischemia.  During stress, the following significant arrhythmias were observed: occasional PVCs.  The ECG portion of this study is negative for myocardial ischemia.

## 2023-09-10 NOTE — ASSESSMENT & PLAN NOTE
Chronic hypertension that is currently uncontrolled.  Likely exacerbated by excessive use of pseudoephedrine over-the-counter.    Temp:  [96.5 °F (35.8 °C)-98.5 °F (36.9 °C)]   Pulse:  [51-85]   Resp:  [13-27]   BP: (142-239)/()   SpO2:  [93 %-99 %] .   Home meds for hypertension were reviewed and noted below-  Hypertension Medications             benazepriL (LOTENSIN) 40 MG tablet Take 1 tablet (40 mg total) by mouth once daily.    metoprolol succinate (TOPROL-XL) 25 MG 24 hr tablet TAKE 1 TABLET DAILY    verapamiL (VERELAN) 120 MG C24P Take 1 capsule (120 mg total) by mouth once daily.          While in the hospital, will manage blood pressure as follows:  Nicardipine infusion

## 2023-09-10 NOTE — PROGRESS NOTES
[facility]  Progress Note    Patient Name: Papito Hutton  MRN: [id number]  Patient Class: IP- Inpatient   Admission Date: 9/9/2023  Length of Stay: 1 days  Attending Physician: Gina Arriaza MD  Primary Care Provider: Rafi Martin MD        Subjective:     Principal Problem:Hypertensive emergency        HPI:  Papito Hutton is a 78 year old male with a past medical history of KEITH, allergies, hypertension, hyperlipidemia, GERD and polyps who presented to the ED with dyspnea.  Patient states he experienced short episodes of dyspnea while falling asleep or awakening him from sleep.  Patient states he has been diagnosed with sleep apnea and does not wear CPAP.  Patient's wife states he does snore and has a chronic history of awakening from sleep.  Patient states shortness of breath worsens with activity.  Patient was seen in urgent care and noted to have some lower extremity edema.  He was referred to the ED from urgent care.  Upon arrival to the ED patient found to be hypertensive with SBP 180s that increased to 230s while in the emergency room.  Patient denies headache and vision changes.  He states he does take his blood pressure medication as prescribed and has never experienced difficulty in managing his blood pressure.  EKG showed sinus bradycardia and lab work performed in the ED was unremarkable.  Patient received 20 mg of hydralazine which decreased his blood pressure to 218 over 90s.  Patient was referred to Hospital Medicine and seen in the ED. He reports long history of awakening from sleep and was told by PCP to get sleep study. He states he started experiencing shortness of breath during the day two days ago that occurs with activity and when laying flat.  Patient did not become hypoxic after ambulating in the ED. He denies chest pain, nausea, vomiting, fever and chills.  Patient will be admitted to hospital medicine for further evaluation and  management.           Overview/Hospital Course:  No notes on file    Interval History: On Cardene infusion for accelerated hypertension.  Patient confirms excessive use of over the counter Sudafed for chest congestion prescribed by local urgent care facility.  Patient and his wife confirm symptoms of sleep apnea for which patient is in need for polysomnography.  Patient reports compliance with medications.    Review of Systems   Constitutional:  Negative for activity change, appetite change, chills and fever.   HENT:  Negative for congestion, sore throat and trouble swallowing.    Eyes:  Negative for photophobia and visual disturbance.   Respiratory:  Positive for shortness of breath. Negative for cough and chest tightness.    Cardiovascular:  Positive for leg swelling. Negative for chest pain and palpitations.   Gastrointestinal:  Negative for abdominal pain, diarrhea and nausea.   Genitourinary:  Negative for dysuria, flank pain and hematuria.   Musculoskeletal:  Negative for back pain.   Neurological:  Negative for dizziness, weakness and headaches.   Psychiatric/Behavioral:  Negative for confusion.      Objective:     Vital Signs (Most Recent):  Temp: 98.5 °F (36.9 °C) (09/10/23 0756)  Pulse: 66 (09/10/23 0930)  Resp: 18 (09/10/23 0930)  BP: (!) 150/68 (09/10/23 0930)  SpO2: 98 % (09/10/23 0930) Vital Signs (24h Range):  Temp:  [96.5 °F (35.8 °C)-98.5 °F (36.9 °C)] 98.5 °F (36.9 °C)  Pulse:  [51-85] 66  Resp:  [13-27] 18  SpO2:  [93 %-99 %] 98 %  BP: (142-239)/() 150/68     Weight: 94.2 kg (207 lb 10.8 oz)  Body mass index is 28.17 kg/m².    Intake/Output Summary (Last 24 hours) at 9/10/2023 0903  Last data filed at 9/10/2023 0810  Gross per 24 hour   Intake 1129.3 ml   Output 850 ml   Net 279.3 ml         Physical Exam  Vitals reviewed.   Constitutional:       Appearance: Normal appearance. He is normal weight.   HENT:      Head: Normocephalic.      Mouth/Throat:      Mouth: Mucous membranes are moist.     "  Pharynx: Oropharynx is clear.   Eyes:      Pupils: Pupils are equal, round, and reactive to light.   Cardiovascular:      Rate and Rhythm: Normal rate.      Pulses: Normal pulses.   Pulmonary:      Effort: Pulmonary effort is normal.      Breath sounds: Normal breath sounds.   Abdominal:      General: Bowel sounds are normal.   Musculoskeletal:         General: Normal range of motion.      Cervical back: Normal range of motion.      Right lower leg: Edema (trace) present.      Left lower leg: Edema (trace) present.   Skin:     General: Skin is warm and dry.   Neurological:      Mental Status: He is alert and oriented to person, place, and time. Mental status is at baseline.   Psychiatric:         Mood and Affect: Mood normal.             Significant Labs: All pertinent labs within the past 24 hours have been reviewed.  CBC:   Recent Labs   Lab 09/09/23  1538 09/10/23  0428   WBC 5.71 6.35   HGB 14.2 14.5   HCT 42.1 41.2    197     CMP:   Recent Labs   Lab 09/09/23  1538 09/10/23  0428    139   K 3.9 3.5    109   CO2 20* 22*   GLU 91 110   BUN 17 12   CREATININE 0.9 0.8   CALCIUM 10.9* 10.5   PROT 7.2 6.6   ALBUMIN 4.1 3.8   BILITOT 0.6 0.6   ALKPHOS 86 81   AST 21 18   ALT 27 24   ANIONGAP 12 8     Troponin:   Recent Labs   Lab 09/09/23  1538 09/09/23  2220 09/10/23  0428   TROPONINI 0.008 0.027* 0.695*     TSH:   Recent Labs   Lab 07/20/23  0946   TSH 0.870   BNP: 29  Urine Studies: No results for input(s): "COLORU", "APPEARANCEUA", "PHUR", "SPECGRAV", "PROTEINUA", "GLUCUA", "KETONESU", "BILIRUBINUA", "OCCULTUA", "NITRITE", "UROBILINOGEN", "LEUKOCYTESUR", "RBCUA", "WBCUA", "BACTERIA", "SQUAMEPITHEL", "HYALINECASTS" in the last 48 hours.    Invalid input(s): "WRIGHTSUR"    Significant Imaging:   CXR: No acute cardiopulmonary process.    DSE ():   The left ventricle is normal in size with normal systolic function.   Normal left ventricular diastolic function.   The estimated ejection " fraction is 65%.   Normal right ventricular size with normal right ventricular systolic function.   The stress echo portion of this study is negative for myocardial ischemia.   During stress, the following significant arrhythmias were observed: occasional PVCs.   The ECG portion of this study is negative for myocardial ischemia.      Assessment/Plan:      * Hypertensive emergency  Chronic hypertension that is currently uncontrolled.  Likely exacerbated by excessive use of pseudoephedrine over-the-counter.    Temp:  [96.5 °F (35.8 °C)-98.5 °F (36.9 °C)]   Pulse:  [51-85]   Resp:  [13-27]   BP: (142-239)/()   SpO2:  [93 %-99 %] .   Home meds for hypertension were reviewed and noted below-  Hypertension Medications             benazepriL (LOTENSIN) 40 MG tablet Take 1 tablet (40 mg total) by mouth once daily.    metoprolol succinate (TOPROL-XL) 25 MG 24 hr tablet TAKE 1 TABLET DAILY    verapamiL (VERELAN) 120 MG C24P Take 1 capsule (120 mg total) by mouth once daily.          While in the hospital, will manage blood pressure as follows:  Nicardipine infusion      Dyspnea  New onset dyspnea with exertion and likely chronic KEITH    -Telemetry  -Continuous pulse ox  -sleep study outpatient  -Trend troponin    Discussed with  who will assist with outpatient polysomnography appointment.      Hypercalcemia  The patient has a history of chronic hypercalcemia that is currently controlled. The patient has the following symptoms due to their hypercalcemia: None (asymptomatic). The hypercalcemia is likely due to unknown. Their latest calcium has been reviewed and is listed below.  Ionized Calcium   Date Value Ref Range Status   10/05/2021 1.43 (H) 1.06 - 1.42 mmol/L Final       -Trend    GERD (gastroesophageal reflux disease)  Chronic    -Continue PPI      Hypertension  Chronic hypertension that is currently uncontrolled.     Temp:  [96.5 °F (35.8 °C)-98.5 °F (36.9 °C)]   Pulse:  [51-85]   Resp:  [13-27]    BP: (142-239)/()   SpO2:  [93 %-99 %] .   Home meds for hypertension were reviewed and noted below-  Hypertension Medications             benazepriL (LOTENSIN) 40 MG tablet Take 1 tablet (40 mg total) by mouth once daily.    metoprolol succinate (TOPROL-XL) 25 MG 24 hr tablet TAKE 1 TABLET DAILY    verapamiL (VERELAN) 120 MG C24P Take 1 capsule (120 mg total) by mouth once daily.          While in the hospital, will manage blood pressure as follows:  Nicardipine infusion    Hyperlipidemia   Patient is chronically on statin.will continue for now. Monitor clinically. Last LDL was   Lab Results   Component Value Date    LDLCALC 80.4 07/20/2023            Discussed with  and patient's wife in detail, answered all questions.  VTE Risk Mitigation (From admission, onward)         Ordered     enoxaparin injection 40 mg  Daily         09/09/23 2200     IP VTE HIGH RISK PATIENT  Once         09/09/23 2200     Place sequential compression device  Until discontinued         09/09/23 2200                Discharge Planning   ROCIO: 9/11/23    Code Status: Full Code   Is the patient medically ready for discharge?:     Reason for patient still in hospital (select all that apply): Patient trending condition and Consult recommendations  Discharge Plan A: Home with family                  Gina Arriaza MD  Department of Hospital Medicine   Formerly [facility]/Surg

## 2023-09-10 NOTE — PLAN OF CARE
[facility] - Med/Surg  Initial Discharge Assessment       Primary Care Provider: Rafi Martin MD    Admission Diagnosis: Hypertension, unspecified type [I10]    Admission Date: 9/9/2023  Expected Discharge Date:     Assessment completed with pt and Friend Anny [phone]. Pt denied HH and DME. Verified PCP , insurance and pharmacy as [facility]. Pts friend will provide transport home. Dr. Arriaza to order ambulatory sleep study at discharge. CM following.     Transition of Care Barriers: None    Payor: MEDICARE / Plan: MEDICARE PART A & B / Product Type: Government /     Extended Emergency Contact Information  Primary Emergency Contact: Anny Knott  Address: [address] [place] [address]  Mobile Phone: [phone]  Relation: Friend  Preferred language: English   needed? No    Discharge Plan A: Home with family  Discharge Plan B: Home      [facility] - [place] [address]  Phone: [phone] Fax: [phone]    [facility] - [place] [address]  Phone: [phone] Fax: [phone]    [facility] - [facility] [address] [facility] [address]  [address]  [place] [address]  Phone: [phone] Fax: [phone]      Initial Assessment (most recent)       Adult Discharge Assessment - 09/10/23 1125          Discharge Assessment    Assessment Type Discharge Planning Assessment     Confirmed/corrected address, phone number and insurance Yes     Confirmed Demographics Correct on Facesheet     Source of Information patient;family     Communicated ROCIO with patient/caregiver Yes     Reason For Admission Hypertension     People in Home significant other     Facility Arrived From: Home     Do you expect to return to your current living situation? Yes     Do you have  help at home or someone to help you manage your care at home? Yes     Who are your caregiver(s) and their phone number(s)? Friend Anny [phone]     Prior to hospitilization cognitive status: Alert/Oriented     Current cognitive status: Alert/Oriented     Home Layout Able to live on 1st floor     Equipment Currently Used at Home none     Readmission within 30 days? No     Patient currently being followed by outpatient case management? No     Do you currently have service(s) that help you manage your care at home? No     Do you take prescription medications? Yes     Do you have prescription coverage? Yes     Do you have any problems affording any of your prescribed medications? No     Is the patient taking medications as prescribed? yes     Who is going to help you get home at discharge? Friend Anny [phone]     How do you get to doctors appointments? car, drives self     Are you on dialysis? No     Do you take coumadin? No     DME Needed Upon Discharge  none     Discharge Plan discussed with: Patient;Friend     Name(s) and Number(s) Friend Anny [phone]     Transition of Care Barriers None     Discharge Plan A Home with family     Discharge Plan B Home        Physical Activity    On average, how many days per week do you engage in moderate to strenuous exercise (like a brisk walk)? Patient refused     On average, how many minutes do you engage in exercise at this level? Patient refused        Financial Resource Strain    How hard is it for you to pay for the very basics like food, housing, medical care, and heating? Patient refused        Housing Stability    In the last 12 months, was there a time when you were not able to pay the mortgage or rent on time? No     In the last 12 months, was there a time when you did not have a steady place to sleep or slept in a shelter (including now)? No        Transportation Needs    In the past 12 months, has lack of transportation kept you from medical  appointments or from getting medications? No     In the past 12 months, has lack of transportation kept you from meetings, work, or from getting things needed for daily living? No        Food Insecurity    Within the past 12 months, you worried that your food would run out before you got the money to buy more. Never true     Within the past 12 months, the food you bought just didn't last and you didn't have money to get more. Never true        Stress    Do you feel stress - tense, restless, nervous, or anxious, or unable to sleep at night because your mind is troubled all the time - these days? Patient refused        Social Connections    In a typical week, how many times do you talk on the phone with family, friends, or neighbors? Patient refused     How often do you get together with friends or relatives? Patient refused     How often do you attend Advent or Yazdanism services? Patient refused     Do you belong to any clubs or organizations such as Advent groups, unions, fraternal or athletic groups, or school groups? Patient refused     How often do you attend meetings of the clubs or organizations you belong to? Patient refused     Are you , , , , never , or living with a partner? Patient refused        Alcohol Use    Q1: How often do you have a drink containing alcohol? Patient refused     Q2: How many drinks containing alcohol do you have on a typical day when you are drinking? Patient refused     Q3: How often do you have six or more drinks on one occasion? Patient refused        OTHER    Name(s) of People in Home Friend Anny [phone]

## 2023-09-10 NOTE — ASSESSMENT & PLAN NOTE
Chronic hypertension that is currently uncontrolled.     Temp:  [96.5 °F (35.8 °C)-98.5 °F (36.9 °C)]   Pulse:  [51-85]   Resp:  [13-27]   BP: (142-239)/()   SpO2:  [93 %-99 %] .   Home meds for hypertension were reviewed and noted below-  Hypertension Medications             benazepriL (LOTENSIN) 40 MG tablet Take 1 tablet (40 mg total) by mouth once daily.    metoprolol succinate (TOPROL-XL) 25 MG 24 hr tablet TAKE 1 TABLET DAILY    verapamiL (VERELAN) 120 MG C24P Take 1 capsule (120 mg total) by mouth once daily.          While in the hospital, will manage blood pressure as follows:  Nicardipine infusion

## 2023-09-10 NOTE — PLAN OF CARE
Problem: Adult Inpatient Plan of Care  Goal: Plan of Care Review  Outcome: Ongoing, Progressing  Goal: Patient-Specific Goal (Individualized)  Outcome: Ongoing, Progressing  Goal: Absence of Hospital-Acquired Illness or Injury  Outcome: Ongoing, Progressing  Goal: Optimal Comfort and Wellbeing  Outcome: Ongoing, Progressing  Goal: Readiness for Transition of Care  Outcome: Ongoing, Progressing     Problem: Infection  Goal: Absence of Infection Signs and Symptoms  Outcome: Ongoing, Progressing     Plan of care reviewed with patient. Safety maintained with bed in lowest position, wheels locked, side rails up x2 and call button in reach. Patient instructed to call for any assistance.

## 2023-09-11 LAB
ALBUMIN SERPL BCP-MCNC: 3.5 G/DL (ref 3.5–5.2)
ALBUMIN SERPL BCP-MCNC: 3.6 G/DL (ref 3.5–5.2)
ALP SERPL-CCNC: 75 U/L (ref 55–135)
ALP SERPL-CCNC: 78 U/L (ref 55–135)
ALT SERPL W/O P-5'-P-CCNC: 26 U/L (ref 10–44)
ALT SERPL W/O P-5'-P-CCNC: 26 U/L (ref 10–44)
ANION GAP SERPL CALC-SCNC: 3 MMOL/L (ref 8–16)
ANION GAP SERPL CALC-SCNC: 9 MMOL/L (ref 8–16)
AORTIC ROOT ANNULUS: 3.57 CM
AORTIC VALVE CUSP SEPERATION: 2.34 CM
APTT PPP: 25 SEC (ref 21–32)
ASCENDING AORTA: 3.77 CM
AST SERPL-CCNC: 19 U/L (ref 10–40)
AST SERPL-CCNC: 20 U/L (ref 10–40)
AV INDEX (PROSTH): 1.02
AV MEAN GRADIENT: 2 MMHG
AV PEAK GRADIENT: 4 MMHG
AV VALVE AREA BY VELOCITY RATIO: 3.11 CM²
AV VALVE AREA: 3.31 CM²
AV VELOCITY RATIO: 0.96
BASOPHILS # BLD AUTO: 0.05 K/UL (ref 0–0.2)
BASOPHILS # BLD AUTO: 0.07 K/UL (ref 0–0.2)
BASOPHILS NFR BLD: 0.7 % (ref 0–1.9)
BASOPHILS NFR BLD: 1 % (ref 0–1.9)
BILIRUB SERPL-MCNC: 0.5 MG/DL (ref 0.1–1)
BILIRUB SERPL-MCNC: 0.8 MG/DL (ref 0.1–1)
BSA FOR ECHO PROCEDURE: 2.19 M2
BUN SERPL-MCNC: 15 MG/DL (ref 8–23)
BUN SERPL-MCNC: 18 MG/DL (ref 8–23)
CALCIUM SERPL-MCNC: 10.2 MG/DL (ref 8.7–10.5)
CALCIUM SERPL-MCNC: 10.4 MG/DL (ref 8.7–10.5)
CHLORIDE SERPL-SCNC: 108 MMOL/L (ref 95–110)
CHLORIDE SERPL-SCNC: 109 MMOL/L (ref 95–110)
CO2 SERPL-SCNC: 23 MMOL/L (ref 23–29)
CO2 SERPL-SCNC: 25 MMOL/L (ref 23–29)
CREAT SERPL-MCNC: 0.9 MG/DL (ref 0.5–1.4)
CREAT SERPL-MCNC: 1 MG/DL (ref 0.5–1.4)
CV ECHO LV RWT: 0.43 CM
CV PHARM DOSE: 0.4 MG
CV STRESS BASE HR: 49 BPM
DIASTOLIC BLOOD PRESSURE: 71 MMHG
DIFFERENTIAL METHOD: ABNORMAL
DIFFERENTIAL METHOD: ABNORMAL
DOP CALC AO PEAK VEL: 1.02 M/S
DOP CALC AO VTI: 22 CM
DOP CALC LVOT AREA: 3.2 CM2
DOP CALC LVOT DIAMETER: 2.03 CM
DOP CALC LVOT PEAK VEL: 0.98 M/S
DOP CALC LVOT STROKE VOLUME: 72.79 CM3
DOP CALC MV VTI: 27.2 CM
DOP CALCLVOT PEAK VEL VTI: 22.5 CM
E WAVE DECELERATION TIME: 266.07 MSEC
E/A RATIO: 0.9
E/E' RATIO: 9.23 M/S
ECHO LV POSTERIOR WALL: 1.04 CM (ref 0.6–1.1)
EOSINOPHIL # BLD AUTO: 0.1 K/UL (ref 0–0.5)
EOSINOPHIL # BLD AUTO: 0.1 K/UL (ref 0–0.5)
EOSINOPHIL NFR BLD: 0.7 % (ref 0–8)
EOSINOPHIL NFR BLD: 1.5 % (ref 0–8)
ERYTHROCYTE [DISTWIDTH] IN BLOOD BY AUTOMATED COUNT: 13.5 % (ref 11.5–14.5)
ERYTHROCYTE [DISTWIDTH] IN BLOOD BY AUTOMATED COUNT: 13.6 % (ref 11.5–14.5)
EST. GFR  (NO RACE VARIABLE): >60 ML/MIN/1.73 M^2
EST. GFR  (NO RACE VARIABLE): >60 ML/MIN/1.73 M^2
FRACTIONAL SHORTENING: 40 % (ref 28–44)
GLUCOSE SERPL-MCNC: 102 MG/DL (ref 70–110)
GLUCOSE SERPL-MCNC: 97 MG/DL (ref 70–110)
HCT VFR BLD AUTO: 39.9 % (ref 40–54)
HCT VFR BLD AUTO: 41.9 % (ref 40–54)
HGB BLD-MCNC: 13.8 G/DL (ref 14–18)
HGB BLD-MCNC: 13.9 G/DL (ref 14–18)
IMM GRANULOCYTES # BLD AUTO: 0.01 K/UL (ref 0–0.04)
IMM GRANULOCYTES # BLD AUTO: 0.02 K/UL (ref 0–0.04)
IMM GRANULOCYTES NFR BLD AUTO: 0.1 % (ref 0–0.5)
IMM GRANULOCYTES NFR BLD AUTO: 0.3 % (ref 0–0.5)
INR PPP: 1 (ref 0.8–1.2)
INTERVENTRICULAR SEPTUM: 1.04 CM (ref 0.6–1.1)
IVC DIAMETER: 1.56 CM
LEFT ATRIUM SIZE: 3.22 CM
LEFT ATRIUM VOLUME INDEX MOD: 16.9 ML/M2
LEFT ATRIUM VOLUME MOD: 36.59 CM3
LEFT INTERNAL DIMENSION IN SYSTOLE: 2.88 CM (ref 2.1–4)
LEFT VENTRICLE DIASTOLIC VOLUME INDEX: 50.48 ML/M2
LEFT VENTRICLE DIASTOLIC VOLUME: 109.54 ML
LEFT VENTRICLE MASS INDEX: 84 G/M2
LEFT VENTRICLE SYSTOLIC VOLUME INDEX: 14.6 ML/M2
LEFT VENTRICLE SYSTOLIC VOLUME: 31.69 ML
LEFT VENTRICULAR INTERNAL DIMENSION IN DIASTOLE: 4.84 CM (ref 3.5–6)
LEFT VENTRICULAR MASS: 181.97 G
LV LATERAL E/E' RATIO: 8.57 M/S
LV SEPTAL E/E' RATIO: 10 M/S
LVOT MG: 2.08 MMHG
LVOT MV: 0.68 CM/S
LYMPHOCYTES # BLD AUTO: 2.5 K/UL (ref 1–4.8)
LYMPHOCYTES # BLD AUTO: 2.7 K/UL (ref 1–4.8)
LYMPHOCYTES NFR BLD: 36.6 % (ref 18–48)
LYMPHOCYTES NFR BLD: 37.7 % (ref 18–48)
MAGNESIUM SERPL-MCNC: 1.9 MG/DL (ref 1.6–2.6)
MCH RBC QN AUTO: 30 PG (ref 27–31)
MCH RBC QN AUTO: 30.8 PG (ref 27–31)
MCHC RBC AUTO-ENTMCNC: 33.2 G/DL (ref 32–36)
MCHC RBC AUTO-ENTMCNC: 34.6 G/DL (ref 32–36)
MCV RBC AUTO: 89 FL (ref 82–98)
MCV RBC AUTO: 90 FL (ref 82–98)
MONOCYTES # BLD AUTO: 0.6 K/UL (ref 0.3–1)
MONOCYTES # BLD AUTO: 0.6 K/UL (ref 0.3–1)
MONOCYTES NFR BLD: 8.9 % (ref 4–15)
MONOCYTES NFR BLD: 9.5 % (ref 4–15)
MV MEAN GRADIENT: 1 MMHG
MV PEAK A VEL: 0.67 M/S
MV PEAK E VEL: 0.6 M/S
MV PEAK GRADIENT: 3 MMHG
MV STENOSIS PRESSURE HALF TIME: 80.39 MS
MV VALVE AREA BY CONTINUITY EQUATION: 2.68 CM2
MV VALVE AREA P 1/2 METHOD: 2.74 CM2
NEUTROPHILS # BLD AUTO: 3.5 K/UL (ref 1.8–7.7)
NEUTROPHILS # BLD AUTO: 3.7 K/UL (ref 1.8–7.7)
NEUTROPHILS NFR BLD: 51.4 % (ref 38–73)
NEUTROPHILS NFR BLD: 51.6 % (ref 38–73)
NRBC BLD-RTO: 0 /100 WBC
NRBC BLD-RTO: 0 /100 WBC
OHS CV CPX 1 MINUTE RECOVERY HEART RATE: 82 BPM
OHS CV CPX 85 PERCENT MAX PREDICTED HEART RATE MALE: 122
OHS CV CPX MAX PREDICTED HEART RATE: 143
OHS CV CPX PATIENT IS FEMALE: 0
OHS CV CPX PATIENT IS MALE: 1
OHS CV CPX PEAK DIASTOLIC BLOOD PRESSURE: 54 MMHG
OHS CV CPX PEAK HEAR RATE: 87 BPM
OHS CV CPX PEAK RATE PRESSURE PRODUCT: NORMAL
OHS CV CPX PEAK SYSTOLIC BLOOD PRESSURE: 167 MMHG
OHS CV CPX PERCENT MAX PREDICTED HEART RATE ACHIEVED: 61
OHS CV CPX RATE PRESSURE PRODUCT PRESENTING: 8918
PHOSPHATE SERPL-MCNC: 3 MG/DL (ref 2.7–4.5)
PISA TR MAX VEL: 2.11 M/S
PLATELET # BLD AUTO: 178 K/UL (ref 150–450)
PLATELET # BLD AUTO: 186 K/UL (ref 150–450)
PMV BLD AUTO: 10.3 FL (ref 9.2–12.9)
PMV BLD AUTO: 9.9 FL (ref 9.2–12.9)
POC PTINR: 1 (ref 0.9–1.2)
POCT GLUCOSE: 83 MG/DL (ref 70–110)
POTASSIUM SERPL-SCNC: 3.9 MMOL/L (ref 3.5–5.1)
POTASSIUM SERPL-SCNC: 4.2 MMOL/L (ref 3.5–5.1)
PROT SERPL-MCNC: 6.1 G/DL (ref 6–8.4)
PROT SERPL-MCNC: 6.4 G/DL (ref 6–8.4)
PROTHROMBIN TIME: 11.2 SEC (ref 9–12.5)
PV MV: 0.58 M/S
PV PEAK GRADIENT: 3 MMHG
PV PEAK VELOCITY: 0.84 M/S
RA PRESSURE ESTIMATED: 3 MMHG
RA VOL SYS: 49.51 ML
RBC # BLD AUTO: 4.48 M/UL (ref 4.6–6.2)
RBC # BLD AUTO: 4.64 M/UL (ref 4.6–6.2)
RIGHT ATRIAL AREA: 16.2 CM2
RIGHT VENTRICULAR END-DIASTOLIC DIMENSION: 3.25 CM
RV TB RVSP: 5 MMHG
RV TISSUE DOPPLER FREE WALL SYSTOLIC VELOCITY 1 (APICAL 4 CHAMBER VIEW): 10.29 CM/S
SAMPLE: NORMAL
SODIUM SERPL-SCNC: 136 MMOL/L (ref 136–145)
SODIUM SERPL-SCNC: 141 MMOL/L (ref 136–145)
STJ: 3.26 CM
SYSTOLIC BLOOD PRESSURE: 182 MMHG
TDI LATERAL: 0.07 M/S
TDI SEPTAL: 0.06 M/S
TDI: 0.07 M/S
TR MAX PG: 18 MMHG
TV REST PULMONARY ARTERY PRESSURE: 21 MMHG
WBC # BLD AUTO: 6.73 K/UL (ref 3.9–12.7)
WBC # BLD AUTO: 7.19 K/UL (ref 3.9–12.7)
Z-SCORE OF LEFT VENTRICULAR DIMENSION IN END DIASTOLE: -3.94
Z-SCORE OF LEFT VENTRICULAR DIMENSION IN END SYSTOLE: -3.3

## 2023-09-11 PROCEDURE — 85730 THROMBOPLASTIN TIME PARTIAL: CPT | Performed by: NURSE PRACTITIONER

## 2023-09-11 PROCEDURE — 99233 SBSQ HOSP IP/OBS HIGH 50: CPT | Mod: 25,,, | Performed by: INTERNAL MEDICINE

## 2023-09-11 PROCEDURE — 99233 PR SUBSEQUENT HOSPITAL CARE,LEVL III: ICD-10-PCS | Mod: 25,,, | Performed by: INTERNAL MEDICINE

## 2023-09-11 PROCEDURE — 25500020 PHARM REV CODE 255: Performed by: INTERNAL MEDICINE

## 2023-09-11 PROCEDURE — 36415 COLL VENOUS BLD VENIPUNCTURE: CPT | Performed by: NURSE PRACTITIONER

## 2023-09-11 PROCEDURE — 36416 COLLJ CAPILLARY BLOOD SPEC: CPT

## 2023-09-11 PROCEDURE — G0425 PR INPT TELEHEALTH CONSULT 30M: ICD-10-PCS | Mod: 95,,, | Performed by: PSYCHIATRY & NEUROLOGY

## 2023-09-11 PROCEDURE — 27000221 HC OXYGEN, UP TO 24 HOURS

## 2023-09-11 PROCEDURE — 85025 COMPLETE CBC W/AUTO DIFF WBC: CPT | Mod: 91 | Performed by: NURSE PRACTITIONER

## 2023-09-11 PROCEDURE — 94761 N-INVAS EAR/PLS OXIMETRY MLT: CPT

## 2023-09-11 PROCEDURE — 85610 PROTHROMBIN TIME: CPT | Performed by: NURSE PRACTITIONER

## 2023-09-11 PROCEDURE — 25000003 PHARM REV CODE 250: Performed by: INTERNAL MEDICINE

## 2023-09-11 PROCEDURE — G0425 INPT/ED TELECONSULT30: HCPCS | Mod: 95,,, | Performed by: PSYCHIATRY & NEUROLOGY

## 2023-09-11 PROCEDURE — 25000003 PHARM REV CODE 250: Performed by: NURSE PRACTITIONER

## 2023-09-11 PROCEDURE — 63600175 PHARM REV CODE 636 W HCPCS: Performed by: INTERNAL MEDICINE

## 2023-09-11 PROCEDURE — 84100 ASSAY OF PHOSPHORUS: CPT | Performed by: NURSE PRACTITIONER

## 2023-09-11 PROCEDURE — 80053 COMPREHEN METABOLIC PANEL: CPT | Performed by: NURSE PRACTITIONER

## 2023-09-11 PROCEDURE — 20600001 HC STEP DOWN PRIVATE ROOM

## 2023-09-11 PROCEDURE — 83735 ASSAY OF MAGNESIUM: CPT | Performed by: NURSE PRACTITIONER

## 2023-09-11 PROCEDURE — 85610 PROTHROMBIN TIME: CPT

## 2023-09-11 PROCEDURE — 63600175 PHARM REV CODE 636 W HCPCS: Performed by: NURSE PRACTITIONER

## 2023-09-11 RX ORDER — VERAPAMIL HYDROCHLORIDE 180 MG/1
180 TABLET, FILM COATED, EXTENDED RELEASE ORAL DAILY
Status: DISCONTINUED | OUTPATIENT
Start: 2023-09-12 | End: 2023-09-11

## 2023-09-11 RX ORDER — LORAZEPAM 2 MG/ML
0.5 INJECTION INTRAMUSCULAR ONCE
Status: COMPLETED | OUTPATIENT
Start: 2023-09-11 | End: 2023-09-11

## 2023-09-11 RX ORDER — REGADENOSON 0.08 MG/ML
0.4 INJECTION, SOLUTION INTRAVENOUS ONCE
Status: COMPLETED | OUTPATIENT
Start: 2023-09-11 | End: 2023-09-11

## 2023-09-11 RX ORDER — CLOPIDOGREL BISULFATE 75 MG/1
300 TABLET ORAL ONCE
Status: COMPLETED | OUTPATIENT
Start: 2023-09-11 | End: 2023-09-11

## 2023-09-11 RX ORDER — AMLODIPINE BESYLATE 5 MG/1
5 TABLET ORAL DAILY
Status: DISCONTINUED | OUTPATIENT
Start: 2023-09-11 | End: 2023-09-11

## 2023-09-11 RX ORDER — TRIAMTERENE/HYDROCHLOROTHIAZID 37.5-25 MG
1 TABLET ORAL DAILY
Status: DISCONTINUED | OUTPATIENT
Start: 2023-09-11 | End: 2023-09-12 | Stop reason: HOSPADM

## 2023-09-11 RX ORDER — HYDRALAZINE HYDROCHLORIDE 20 MG/ML
10 INJECTION INTRAMUSCULAR; INTRAVENOUS EVERY 4 HOURS PRN
Status: DISCONTINUED | OUTPATIENT
Start: 2023-09-11 | End: 2023-09-12 | Stop reason: HOSPADM

## 2023-09-11 RX ORDER — LISINOPRIL 10 MG/1
40 TABLET ORAL DAILY
Status: DISCONTINUED | OUTPATIENT
Start: 2023-09-12 | End: 2023-09-12 | Stop reason: HOSPADM

## 2023-09-11 RX ORDER — ASPIRIN 81 MG/1
81 TABLET ORAL DAILY
Status: DISCONTINUED | OUTPATIENT
Start: 2023-09-12 | End: 2023-09-11

## 2023-09-11 RX ORDER — ATORVASTATIN CALCIUM 40 MG/1
40 TABLET, FILM COATED ORAL DAILY
Status: DISCONTINUED | OUTPATIENT
Start: 2023-09-12 | End: 2023-09-11

## 2023-09-11 RX ORDER — SODIUM CHLORIDE 0.9 % (FLUSH) 0.9 %
10 SYRINGE (ML) INJECTION
Status: DISCONTINUED | OUTPATIENT
Start: 2023-09-11 | End: 2023-09-12 | Stop reason: HOSPADM

## 2023-09-11 RX ADMIN — AZELASTINE 137 MCG: 1 SPRAY, METERED NASAL at 10:09

## 2023-09-11 RX ADMIN — DICLOFENAC SODIUM 2 G: 10 GEL TOPICAL at 10:09

## 2023-09-11 RX ADMIN — REGADENOSON 0.4 MG: 0.08 INJECTION, SOLUTION INTRAVENOUS at 09:09

## 2023-09-11 RX ADMIN — HYDRALAZINE HYDROCHLORIDE 10 MG: 20 INJECTION INTRAMUSCULAR; INTRAVENOUS at 07:09

## 2023-09-11 RX ADMIN — LISINOPRIL 40 MG: 10 TABLET ORAL at 10:09

## 2023-09-11 RX ADMIN — AZELASTINE 137 MCG: 1 SPRAY, METERED NASAL at 09:09

## 2023-09-11 RX ADMIN — PANTOPRAZOLE SODIUM 40 MG: 40 TABLET, DELAYED RELEASE ORAL at 10:09

## 2023-09-11 RX ADMIN — ASPIRIN 81 MG CHEWABLE TABLET 81 MG: 81 TABLET CHEWABLE at 10:09

## 2023-09-11 RX ADMIN — AMLODIPINE BESYLATE 5 MG: 5 TABLET ORAL at 10:09

## 2023-09-11 RX ADMIN — TRIAMTERENE AND HYDROCHLOROTHIAZIDE 1 TABLET: 37.5; 25 TABLET ORAL at 09:09

## 2023-09-11 RX ADMIN — ATORVASTATIN CALCIUM 80 MG: 40 TABLET, FILM COATED ORAL at 10:09

## 2023-09-11 RX ADMIN — CETIRIZINE HYDROCHLORIDE 10 MG: 10 TABLET, FILM COATED ORAL at 09:09

## 2023-09-11 RX ADMIN — ENOXAPARIN SODIUM 40 MG: 40 INJECTION SUBCUTANEOUS at 09:09

## 2023-09-11 RX ADMIN — IOHEXOL 75 ML: 350 INJECTION, SOLUTION INTRAVENOUS at 02:09

## 2023-09-11 RX ADMIN — LORAZEPAM 0.5 MG: 2 INJECTION INTRAMUSCULAR; INTRAVENOUS at 05:09

## 2023-09-11 RX ADMIN — CLOPIDOGREL BISULFATE 300 MG: 75 TABLET, FILM COATED ORAL at 09:09

## 2023-09-11 NOTE — EICU
Intervention Initiated From:  [facility] / EICU    Tameka intervened regarding:  Rounding (Video assessment)    Nurse Notified:  No    Doctor Notified:  No    Comments: EICU rounding done. Pt awake in bed.  Chart and meds reviewed.  VS stable.

## 2023-09-11 NOTE — NURSING
"Called to pt's room by wife, she stated "he is not acting right and his mouth is drooping." Upon entering the room, pt has right sided facial droop, slurred speech when asked to state name, and right sided arm weakness and droop when performing NIHSS. Code stroke called.  "

## 2023-09-11 NOTE — PLAN OF CARE
Problem: Adult Inpatient Plan of Care  Goal: Plan of Care Review  Outcome: Ongoing, Progressing     Problem: Adult Inpatient Plan of Care  Goal: Absence of Hospital-Acquired Illness or Injury  Outcome: Ongoing, Progressing     Problem: Adult Inpatient Plan of Care  Goal: Optimal Comfort and Wellbeing  Outcome: Ongoing, Progressing     Problem: Infection  Goal: Absence of Infection Signs and Symptoms  Outcome: Ongoing, Progressing     Problem: Adjustment to Illness (Stroke, Ischemic/Transient Ischemic Attack)  Goal: Optimal Coping  Outcome: Ongoing, Progressing     Problem: Cerebral Tissue Perfusion (Stroke, Ischemic/Transient Ischemic Attack)  Goal: Optimal Cerebral Tissue Perfusion  Outcome: Ongoing, Progressing     Problem: Cognitive Impairment (Stroke, Ischemic/Transient Ischemic Attack)  Goal: Optimal Cognitive Function  Outcome: Ongoing, Progressing     Problem: Respiratory Compromise (Stroke, Ischemic/Transient Ischemic Attack)  Goal: Effective Oxygenation and Ventilation  Outcome: Ongoing, Progressing

## 2023-09-11 NOTE — SUBJECTIVE & OBJECTIVE
Interval History:  Patient is in good spirits.  Blood pressure is somewhat still elevated off intravenous Cardene.  Patient just returned from nuclear stress test.  Denies any chest pain or palpitations.  Patient still reports subjective sense of shortness of breath.  Anxious to go home.  Patient was re counseled regarding avoidance of over-the-counter decongestants.    Review of Systems   Constitutional:  Negative for activity change, appetite change, chills and fever.   HENT:  Negative for congestion, sore throat and trouble swallowing.    Eyes:  Negative for photophobia and visual disturbance.   Respiratory:  Positive for shortness of breath. Negative for cough and chest tightness.    Cardiovascular:  Positive for leg swelling. Negative for chest pain and palpitations.   Gastrointestinal:  Negative for abdominal pain, diarrhea and nausea.   Genitourinary:  Negative for dysuria, flank pain and hematuria.   Musculoskeletal:  Negative for back pain.   Neurological:  Negative for dizziness, weakness and headaches.   Psychiatric/Behavioral:  Negative for confusion.      Objective:     Vital Signs (Most Recent):  Temp: 97 °F (36.1 °C) (09/11/23 0310)  Pulse: (!) 49 (09/11/23 0906)  Resp: 18 (09/11/23 0800)  BP: (!) 182/71 (09/11/23 0906)  SpO2: 99 % (09/11/23 0800) Vital Signs (24h Range):  Temp:  [97 °F (36.1 °C)-98.6 °F (37 °C)] 97 °F (36.1 °C)  Pulse:  [43-78] 49  Resp:  [17-34] 18  SpO2:  [95 %-100 %] 99 %  BP: (122-201)/(58-91) 182/71     Weight: 94.3 kg (208 lb)  Body mass index is 28.21 kg/m².    Intake/Output Summary (Last 24 hours) at 9/11/2023 0942  Last data filed at 9/11/2023 0423  Gross per 24 hour   Intake 306.53 ml   Output 600 ml   Net -293.47 ml           Physical Exam  Vitals reviewed.   Constitutional:       Appearance: Normal appearance. He is normal weight.   HENT:      Head: Normocephalic.      Mouth/Throat:      Mouth: Mucous membranes are moist.      Pharynx: Oropharynx is clear.   Eyes:       "Pupils: Pupils are equal, round, and reactive to light.   Cardiovascular:      Rate and Rhythm: Normal rate.      Pulses: Normal pulses.   Pulmonary:      Effort: Pulmonary effort is normal.      Breath sounds: Normal breath sounds.   Abdominal:      General: Bowel sounds are normal.   Musculoskeletal:         General: Normal range of motion.      Cervical back: Normal range of motion.      Right lower leg: Edema (trace) present.      Left lower leg: Edema (trace) present.   Skin:     General: Skin is warm and dry.   Neurological:      Mental Status: He is alert and oriented to person, place, and time. Mental status is at baseline.   Psychiatric:         Mood and Affect: Mood normal.             Significant Labs: All pertinent labs within the past 24 hours have been reviewed.  CBC:   Recent Labs   Lab 09/09/23  1538 09/10/23  0428 09/11/23  0445   WBC 5.71 6.35 6.73   HGB 14.2 14.5 13.9*   HCT 42.1 41.2 41.9    197 186       CMP:   Recent Labs   Lab 09/09/23  1538 09/10/23  0428 09/10/23  1133 09/11/23  0445    139  --  141   K 3.9 3.5 4.9 4.2    109  --  109   CO2 20* 22*  --  23   GLU 91 110  --  97   BUN 17 12  --  15   CREATININE 0.9 0.8  --  1.0   CALCIUM 10.9* 10.5  --  10.4   PROT 7.2 6.6  --  6.4   ALBUMIN 4.1 3.8  --  3.6   BILITOT 0.6 0.6  --  0.8   ALKPHOS 86 81  --  78   AST 21 18  --  19   ALT 27 24  --  26   ANIONGAP 12 8  --  9       Troponin:   Recent Labs   Lab 09/09/23  2220 09/10/23  0428 09/10/23  1133   TROPONINI 0.027* 0.695* 0.011       TSH:   Recent Labs   Lab 07/20/23  0946   TSH 0.870     BNP: 29  Urine Studies: No results for input(s): "COLORU", "APPEARANCEUA", "PHUR", "SPECGRAV", "PROTEINUA", "GLUCUA", "KETONESU", "BILIRUBINUA", "OCCULTUA", "NITRITE", "UROBILINOGEN", "LEUKOCYTESUR", "RBCUA", "WBCUA", "BACTERIA", "SQUAMEPITHEL", "HYALINECASTS" in the last 48 hours.    Invalid input(s): "WRIGHTSUR"    Significant Imaging:   CXR: No acute cardiopulmonary process.    DSE " ():  The left ventricle is normal in size with normal systolic function.  Normal left ventricular diastolic function.  The estimated ejection fraction is 65%.  Normal right ventricular size with normal right ventricular systolic function.  The stress echo portion of this study is negative for myocardial ischemia.  During stress, the following significant arrhythmias were observed: occasional PVCs.  The ECG portion of this study is negative for myocardial ischemia.    ECHO: Pending.    Lexiscan: Pending.

## 2023-09-11 NOTE — SIGNIFICANT EVENT
Hospital Medicine Significant Event Note      Admit Date: 9/9/2023  LOS: 2  Code Status: Full Code   CC: Hypertensive emergency  Date of Consult: 09/11/2023  Attending Physician: Gina Arriaza MD  Primary Service: Networked reference to record PCT     SUBJECTIVE:     Interval history: Code stroke called for right sided facial weakness. Patient with right sided pronator drift and right facial droop.       OBJECTIVE:     Vital Signs (Most Recent):  Temp: 97 °F (36.1 °C) (09/11/23 0310)  Pulse: 62 (09/11/23 1300)  Resp: 16 (09/11/23 1300)  BP: (!) 142/67 (09/11/23 1300)  SpO2: 99 % (09/11/23 1300) Vital Signs (24h Range):  Temp:  [97 °F (36.1 °C)-98.6 °F (37 °C)] 97 °F (36.1 °C)  Pulse:  [43-78] 62  Resp:  [16-34] 16  SpO2:  [95 %-100 %] 99 %  BP: (122-201)/(58-91) 142/67     I & O (24h):    Intake/Output Summary (Last 24 hours) at 9/11/2023 1448  Last data filed at 9/11/2023 0423  Gross per 24 hour   Intake 156.53 ml   Output 600 ml   Net -443.47 ml        Physical Exam: Physical Exam   HENT:   Head: Normocephalic.   Cardiovascular: Normal rate, regular rhythm, normal heart sounds and normal pulses. Pulmonary:      Effort: Pulmonary effort is normal.     Musculoskeletal:      Cervical back: Neck supple.   Neurological: He is alert. He displays weakness (right sided) and facial asymmetry (right). He shows pronator drift (right sided).         Nutrition:  Current Diet Order   Procedures    Diet Cardiac         Labs/Imaging- All pertinent labs within the past 24 hours have been reviewed.  CBC:   Recent Labs   Lab 09/10/23  0428 09/11/23  0445 09/11/23  1451   WBC 6.35 6.73 7.19   HGB 14.5 13.9* 13.8*   HCT 41.2 41.9 39.9*    186 178     CMP:   Recent Labs   Lab 09/10/23  0428 09/10/23  1133 09/11/23  0445 09/11/23  1451     --  141 136   K 3.5 4.9 4.2 3.9     --  109 108   CO2 22*  --  23 25     --  97 102   BUN 12  --  15 18   CREATININE 0.8  --  1.0 0.9   CALCIUM 10.5  --  10.4 10.2   PROT 6.6   --  6.4 6.1   ALBUMIN 3.8  --  3.6 3.5   BILITOT 0.6  --  0.8 0.5   ALKPHOS 81  --  78 75   AST 18  --  19 20   ALT 24  --  26 26   ANIONGAP 8  --  9 3*         ASSESSMENT/PLAN:     Active Hospital Problems    Diagnosis    *Hypertensive emergency    Hypertension    Dyspnea    Hypercalcemia    Hyperlipidemia    GERD (gastroesophageal reflux disease)          -CT head  -Telestroke consult   -CBC, CMP, PTT, PT INR      Uninterrupted Critical Care time (not including procedures): 35 minutes

## 2023-09-11 NOTE — SUBJECTIVE & OBJECTIVE
Woke up with symptoms?: no    Recent bleeding noted: no  Does the patient take any Blood Thinners? no  Medications: Anticoagulants:  enoxaparin/Lovenox      Past Medical History: hypertension, hyperlipidemia, and KEITH    Past Surgical History: no major surgeries within the last 2 weeks    Family History: no relevant history    Social History: no smoking, no drinking, no drugs    Allergies: No Known Allergies     Review of Systems   Constitutional:  Negative for diaphoresis and fever.   HENT:  Negative for hearing loss, tinnitus and trouble swallowing.    Eyes:  Negative for visual disturbance.   Respiratory:  Positive for shortness of breath.    Cardiovascular:  Negative for chest pain and palpitations.   Gastrointestinal:  Negative for blood in stool and vomiting.   Endocrine: Negative for cold intolerance.   Genitourinary:  Negative for hematuria.   Musculoskeletal:  Negative for gait problem and neck pain.   Skin:  Negative for pallor and rash.   Allergic/Immunologic: Negative for immunocompromised state.   Neurological:  Positive for speech difficulty and weakness. Negative for dizziness, facial asymmetry, numbness and headaches.   Hematological:  Does not bruise/bleed easily.   Psychiatric/Behavioral:  Negative for agitation, behavioral problems and confusion.      Objective:   Vitals: Blood pressure (!) 149/90, pulse 64, temperature 97 °F (36.1 °C), resp. rate 20, height 6' (1.829 m), weight 94.3 kg (208 lb), SpO2 97 %.     CT READ: Yes  No hemmorhage. No mass effect. No early infarct signs.   CTA brain and neck: no LVO, high  grade stenosis, or significant carotid disease    Physical Exam  Vitals and nursing note reviewed.   Constitutional:       General: He is not in acute distress.     Appearance: Normal appearance. He is not diaphoretic.   HENT:      Head: Normocephalic and atraumatic.      Nose: Nose normal.   Eyes:      Extraocular Movements: Extraocular movements intact.   Cardiovascular:      Rate and  Rhythm: Normal rate and regular rhythm.   Pulmonary:      Effort: No respiratory distress.   Abdominal:      General: There is no distension.   Genitourinary:     Comments: na  Musculoskeletal:      Cervical back: Normal range of motion.      Right lower leg: No edema.      Left lower leg: No edema.   Skin:     Findings: No erythema or rash.   Neurological:      Mental Status: He is alert and oriented to person, place, and time.      Cranial Nerves: No cranial nerve deficit.      Sensory: No sensory deficit.      Motor: No weakness.      Coordination: Coordination normal.   Psychiatric:         Mood and Affect: Mood normal.         Behavior: Behavior normal.

## 2023-09-11 NOTE — PLAN OF CARE
Problem: Adult Inpatient Plan of Care  Goal: Readiness for Transition of Care  Outcome: Ongoing, Progressing     Problem: Adult Inpatient Plan of Care  Goal: Optimal Comfort and Wellbeing  Outcome: Ongoing, Progressing     Problem: Adult Inpatient Plan of Care  Goal: Patient-Specific Goal (Individualized)  Outcome: Ongoing, Progressing

## 2023-09-11 NOTE — CONSULTS
[facility]/Surg  Vascular Neurology  Comprehensive Stroke Center  Consult Note    Consults  Assessment/Plan:     Patient is a 78 y.o. year old male with: HTN, HLD, KEITH, admitted with hypertensive emergency, and noted to have R face droop, R sided weakness, and difficulty speaking by wife and nursing staff.  NIHSS 0 at this moment.  NCCT head without acute abnormality and vascular imaging unremarkable.  Possible L brain TIA.  Recommended MRI brain.  Load with  mg and Plavix 300 mg if no contraindications.  Neurology consult.      Diagnosis: TIA          No notes have been filed under this hospital service.  Service: Vascular Neurology      STROKE DOCUMENTATION     Acute Stroke Times   Last Known Normal Date: 09/11/23  Last Known Normal Time: 1435  Symptom Onset Date: 09/11/23  Symptom Onset Time: 1435  Stroke Team Called Date: 09/11/23  Stroke Team Called Time: 1508  Stroke Team Arrival Date: 09/11/23  Stroke Team Arrival Time: 1515  CT Interpretation Time: 1515  Thrombolytic Therapy Recommended: No  CTA Interpretation Time: 1520  Thrombectomy Recommended: No    NIH Scale:  Interval: baseline  1a. Level of Consciousness: 0-->Alert, keenly responsive  1b. LOC Questions: 0-->Answers both questions correctly  1c. LOC Commands: 0-->Performs both tasks correctly  2. Best Gaze: 0-->Normal  3. Visual: 0-->No visual loss  4. Facial Palsy: 0-->Normal symmetrical movements  5a. Motor Arm, Left: 0-->No drift, limb holds 90 (or 45) degrees for full 10 secs  5b. Motor Arm, Right: 0-->No drift, limb holds 90 (or 45) degrees for full 10 secs  6a. Motor Leg, Left: 0-->No drift, leg holds 30 degree position for full 5 secs  6b. Motor Leg, Right: 0-->No drift, leg holds 30 degree position for full 5 secs  7. Limb Ataxia: 0-->Absent  8. Sensory: 0-->Normal, no sensory loss  9. Best Language: 0-->No aphasia, normal  10. Dysarthria: 0-->Normal  11. Extinction and Inattention (formerly Neglect): 0-->No  abnormality  Total (NIH Stroke Scale): 0    Modified Lowman Score: 0  [place] Coma Scale:15   ABCD2 Score: 5  [id number] Score:   HAS -BLED Score:   ICH Score:   Hunt & Niño Classification:       Thrombolysis Candidate? No, Patient back to neurological baseline     Delays to Thrombolysis?  Not Applicable    Interventional Revascularization Candidate?   Is the patient eligible for mechanical endovascular reperfusion (RASHMI)?  No; No large vessel occlusion identified on imaging     Delays to Thrombectomy? Not Applicable    Hemorrhagic change of an Ischemic Stroke: Does this patient have an ischemic stroke with hemorrhagic changes? No     Subjective:     History of Present Illness: 77 y/o with HTN, HLD, KEITH, admitted with hypertensive emergency, and noted to have R face droop, R sided weakness, and difficulty speaking by wife and nursing staff.  Wife said that he has been in and out sleep earlier today and she first noticed the above mentioned symptoms.  Presently seems to be back to baseline.      No notes on file      Woke up with symptoms?: no    Recent bleeding noted: no  Does the patient take any Blood Thinners? no  Medications: Anticoagulants:  enoxaparin/Lovenox      Past Medical History: hypertension, hyperlipidemia, and KEITH    Past Surgical History: no major surgeries within the last 2 weeks    Family History: no relevant history    Social History: no smoking, no drinking, no drugs    Allergies: No Known Allergies     Review of Systems   Constitutional:  Negative for diaphoresis and fever.   HENT:  Negative for hearing loss, tinnitus and trouble swallowing.    Eyes:  Negative for visual disturbance.   Respiratory:  Positive for shortness of breath.    Cardiovascular:  Negative for chest pain and palpitations.   Gastrointestinal:  Negative for blood in stool and vomiting.   Endocrine: Negative for cold intolerance.   Genitourinary:  Negative for hematuria.   Musculoskeletal:  Negative for gait problem and neck  pain.   Skin:  Negative for pallor and rash.   Allergic/Immunologic: Negative for immunocompromised state.   Neurological:  Positive for speech difficulty and weakness. Negative for dizziness, facial asymmetry, numbness and headaches.   Hematological:  Does not bruise/bleed easily.   Psychiatric/Behavioral:  Negative for agitation, behavioral problems and confusion.      Objective:   Vitals: Blood pressure (!) 149/90, pulse 64, temperature 97 °F (36.1 °C), resp. rate 20, height 6' (1.829 m), weight 94.3 kg (208 lb), SpO2 97 %.     CT READ: Yes  No hemmorhage. No mass effect. No early infarct signs.   CTA brain and neck: no LVO, high  grade stenosis, or significant carotid disease    Physical Exam  Vitals and nursing note reviewed.   Constitutional:       General: He is not in acute distress.     Appearance: Normal appearance. He is not diaphoretic.   HENT:      Head: Normocephalic and atraumatic.      Nose: Nose normal.   Eyes:      Extraocular Movements: Extraocular movements intact.   Cardiovascular:      Rate and Rhythm: Normal rate and regular rhythm.   Pulmonary:      Effort: No respiratory distress.   Abdominal:      General: There is no distension.   Genitourinary:     Comments: na  Musculoskeletal:      Cervical back: Normal range of motion.      Right lower leg: No edema.      Left lower leg: No edema.   Skin:     Findings: No erythema or rash.   Neurological:      Mental Status: He is alert and oriented to person, place, and time.      Cranial Nerves: No cranial nerve deficit.      Sensory: No sensory deficit.      Motor: No weakness.      Coordination: Coordination normal.   Psychiatric:         Mood and Affect: Mood normal.         Behavior: Behavior normal.             Evan Tinsley MD  [facility]  Department of Vascular Neurology   [facility]/Surg

## 2023-09-11 NOTE — PROGRESS NOTES
[facility]  Department of Cardiology  Consult Note      PATIENT NAME: Papito Hutton    MRN: [id number]  TODAY'S DATE: 09/11/2023  ADMIT DATE: 9/9/2023                          CONSULT REQUESTED BY: Gina Arriaza MD    SUBJECTIVE     PRINCIPAL PROBLEM: Hypertensive emergency      REASON FOR CONSULT:  Dyspnea on exertion    INTERVAL HISTORY:  09/11/2023:  Patient is feeling better no chest pains are noted shortness of breath has improved.  There is no swelling in the lower extremities.  Events leading up to his admission is noted blood pressure remains elevated during this course  Patient has bradycardia secondary to verapamil.  Resting heart rate is about 48 beats per minute at this time      HPI:  Mr. Hutton is a 78-year-old male with history of hypertension, hyperlipidemia, allergies.  He has been complaining on the past 2 weeks having progressive shortness of breath usually occurs at night when he is laying down.  He is having difficulty breathing.  He has been sleeping in the recliner for the past week or so.  He started taking significant amounts of Sudafed 3-4 pills a day.  The symptoms got worse and went to the incident care.  His blood pressure was markedly elevated subsequently he was referred to the emergency room.  His blood pressure here in the emergency room was markedly increased he was started on a Cardene drip with improvement.  He does describe some dyspnea on exertion he denies any chest pain any chest tightness.  All the symptoms started in the past 2 months.  He has history of hypertension and he has been compliant with his medications.  He had a dobutamine echocardiogram stress test done in January of this year that was normal.        Review of patient's allergies indicates:  No Known Allergies    Past Medical History:   Diagnosis Date    Arthritis     B12 deficiency     Colon polyp     Diabetes mellitus     BORDERLINE    GERD (gastroesophageal reflux disease)     [facility]  (hard of hearing)     BILAT AIDS    Hyperlipidemia     Hypertension     Low testosterone     Personal history of colonic polyps 2008    adenomatous polyp    Sinus disorder     Sleep apnea     DOES NOT USE MACHINE    Wears glasses      Past Surgical History:   Procedure Laterality Date    COLON SURGERY      COLONOSCOPY  2008    Dr. Garcia; polyps removed    COLONOSCOPY N/A 07/13/2020    Procedure: COLONOSCOPY;  Surgeon: Jj Fried MD;  Location: [facility];  Service: Endoscopy;  Laterality: N/A;    COLONOSCOPY N/A 8/15/2023    Procedure: COLONOSCOPY;  Surgeon: Jj Fried MD;  Location: [facility];  Service: Endoscopy;  Laterality: N/A;    ESOPHAGOGASTRODUODENOSCOPY N/A 7/31/2023    Procedure: EGD (ESOPHAGOGASTRODUODENOSCOPY);  Surgeon: Jj Fried MD;  Location: [facility];  Service: Endoscopy;  Laterality: N/A;    LAPAROSCOPIC RIGHT COLON RESECTION Right 08/10/2020    Procedure: COLECTOMY, RIGHT, LAPAROSCOPIC pooss conversion to open;  Surgeon: Noble Kaye MD;  [facility];  Service: General;  Laterality: Right;    NASAL SEPTUM SURGERY      right hand surgery      RIGHT HEMICOLECTOMY N/A 08/10/2020    Procedure: HEMICOLECTOMY, RIGHT;  Surgeon: Noble Kaye MD;  [facility];  Service: General;  Laterality: N/A;    UPPER GASTROINTESTINAL ENDOSCOPY  2008    hiatal hernia; esophagitis     Social History     Tobacco Use    Smoking status: Former     Types: Cigars    Smokeless tobacco: Former     Types: Chew   Substance Use Topics    Alcohol use: Yes     Alcohol/week: 2.0 standard drinks of alcohol     Types: 2 Cans of beer per week     Comment: occ    Drug use: No        REVIEW OF SYSTEMS  CONSTITUTIONAL: Negative for chills, fatigue and fever.   EYES: No double vision, No blurred vision  NEURO: No headaches, No dizziness  RESPIRATORY:  Positive for shortness of breath negative for cough  CARDIOVASCULAR:  Negative for chest pains tightness palpitations leg edema  GI: Negative  for abdominal pain, No melena, diarrhea, nausea and vomiting.   : Negative for dysuria and frequency, Negative for hematuria  SKIN: Negative for bruising, Negative for edema or discoloration noted.   ENDOCRINE: Negative for polyphagia, Negative for heat intolerance, Negative for cold intolerance  PSYCHIATRIC: Negative for depression, Negative for anxiety, Negative for memory loss  MUSCULOSKELETAL: Negative for neck pain, Negative for muscle weakness, Negative for back pain     OBJECTIVE     VITAL SIGNS (Most Recent)  Temp: 97 °F (36.1 °C) (09/11/23 0310)  Pulse: (!) 49 (09/11/23 0906)  Resp: 18 (09/11/23 0800)  BP: (!) 182/71 (09/11/23 0906)  SpO2: 99 % (09/11/23 0800)    VENTILATION STATUS  Resp: 18 (09/11/23 0800)  SpO2: 99 % (09/11/23 0800)           I & O (Last 24H):  Intake/Output Summary (Last 24 hours) at 9/11/2023 1007  Last data filed at 9/11/2023 0423  Gross per 24 hour   Intake 306.53 ml   Output 600 ml   Net -293.47 ml       WEIGHTS  Wt Readings from Last 1 Encounters:   09/11/23 0800 94.3 kg (208 lb)   09/11/23 0519 94.5 kg (208 lb 5.4 oz)   09/10/23 0600 94.2 kg (207 lb 10.8 oz)   09/09/23 2150 94.8 kg (208 lb 15.9 oz)   09/09/23 1456 96.2 kg (212 lb)     Physical examination:      Constitutional:  Well-built well-nourished in no apparent distress, alert and oriented    Neck: no carotid bruit, no JVD, no masses    Lungs: diminished breath sounds bibasilar, but clear   Chest Wall: no tenderness  CARDIAC:  regular rate and rhythm, S1, S2 normal, no murmur, click, rub or gallop  Abdomen: soft, non-tender; bowel sounds normal; no masses,  no organomegaly, no guarding or rebound noted  Extremities: Extremities normal, atraumatic, no cyanosis, clubbing, or edema  Skin: Skin color, texture, turgor normal. No rashes or lesions  Neuro: Alert and responsive.  Moving all extremities      HOME MEDICATIONS:  No current facility-administered medications on file prior to encounter.     Current Outpatient  Medications on File Prior to Encounter   Medication Sig Dispense Refill    aspirin 81 MG Chew Take 1 tablet (81 mg total) by mouth once daily.  0    azelastine (ASTELIN) 137 mcg (0.1 %) nasal spray 1 spray (137 mcg total) by Nasal route 2 (two) times daily. for 180 doses 120 mL 2    benazepriL (LOTENSIN) 40 MG tablet Take 1 tablet (40 mg total) by mouth once daily. 90 tablet 3    cetirizine (ZYRTEC) 10 MG tablet Take 1 tablet (10 mg total) by mouth every evening. 90 tablet 3    CHOLECALCIFEROL, VITAMIN D3, (VITAMIN D3 ORAL) Take 1 capsule by mouth once daily.      cyanocobalamin (VITAMIN B-12) 1000 MCG tablet Take 100 mcg by mouth once daily.      diclofenac (VOLTAREN) 75 MG EC tablet TAKE 1 TABLET TWICE A DAY (Patient taking differently: Take 75 mg by mouth 2 (two) times daily.) 180 tablet 3    esomeprazole (NEXIUM) 40 MG capsule Take 1 capsule (40 mg total) by mouth before breakfast. 90 capsule 3    metoprolol succinate (TOPROL-XL) 25 MG 24 hr tablet TAKE 1 TABLET DAILY (Patient taking differently: Take 25 mg by mouth once daily.) 90 tablet 3    rosuvastatin (CRESTOR) 20 MG tablet TAKE 1 TABLET DAILY (Patient taking differently: Take 20 mg by mouth once daily.) 90 tablet 3    verapamiL (VERELAN) 120 MG C24P Take 1 capsule (120 mg total) by mouth once daily. 90 capsule 3    acetaminophen (TYLENOL) 650 MG TbSR Take 1 tablet (650 mg total) by mouth every 8 (eight) hours as needed (do not take with any other tylenol or acetaminophen).  0    diclofenac sodium (VOLTAREN) 1 % Gel APPLY 2 GRAMS TOPICALLY ONCE DAILY (Patient taking differently: Apply 2 g topically once daily.) 100 g 6    fluticasone propionate (FLONASE) 50 mcg/actuation nasal spray USE 2 SPRAYS IN EACH NOSTRIL ONCE DAILY (Patient taking differently: 1 spray by Each Nostril route once daily.) 48 g 3    montelukast (SINGULAIR) 10 mg tablet Take 10 mg by mouth every evening.         SCHEDULED MEDS:   amLODIPine  5 mg Oral Daily    aspirin  81 mg Oral Daily     "atorvastatin  80 mg Oral Daily    azelastine  1 spray Nasal BID    cetirizine  10 mg Oral QHS    diclofenac sodium  2 g Topical (Top) Daily    enoxparin  40 mg Subcutaneous Daily    lisinopriL  40 mg Oral Daily    metoprolol succinate  25 mg Oral Daily    pantoprazole  40 mg Oral Daily    senna-docusate 8.6-50 mg  1 tablet Oral BID       CONTINUOUS INFUSIONS:    PRN MEDS:acetaminophen, aluminum-magnesium hydroxide-simethicone, dextrose 10%, dextrose 10%, glucagon (human recombinant), glucose, glucose, magnesium oxide, magnesium oxide, melatonin, naloxone, ondansetron, potassium bicarbonate, potassium bicarbonate, potassium bicarbonate, potassium, sodium phosphates, potassium, sodium phosphates, potassium, sodium phosphates, prochlorperazine, simethicone, sodium chloride 0.9%    LABS AND DIAGNOSTICS     CBC LAST 3 DAYS  Recent Labs   Lab 09/09/23  1538 09/10/23  0428 09/11/23  0445   WBC 5.71 6.35 6.73   RBC 4.79 4.73 4.64   HGB 14.2 14.5 13.9*   HCT 42.1 41.2 41.9   MCV 88 87 90   MCH 29.6 30.7 30.0   MCHC 33.7 35.2 33.2   RDW 13.2 13.2 13.6    197 186   MPV 10.0 10.4 10.3   GRAN 54.1  3.1 54.4  3.5 51.6  3.5   LYMPH 36.1  2.1 36.9  2.3 36.6  2.5   MONO 7.5  0.4 7.2  0.5 9.5  0.6   BASO 0.05 0.04 0.05   NRBC 0 0 0       COAGULATION LAST 3 DAYS  No results for input(s): "LABPT", "INR", "APTT" in the last 168 hours.    CHEMISTRY LAST 3 DAYS  Recent Labs   Lab 09/09/23  1538 09/10/23  0428 09/10/23  1133 09/11/23  0445    139  --  141   K 3.9 3.5 4.9 4.2    109  --  109   CO2 20* 22*  --  23   ANIONGAP 12 8  --  9   BUN 17 12  --  15   CREATININE 0.9 0.8  --  1.0   GLU 91 110  --  97   CALCIUM 10.9* 10.5  --  10.4   MG  --  1.7  --  1.9   ALBUMIN 4.1 3.8  --  3.6   PROT 7.2 6.6  --  6.4   ALKPHOS 86 81  --  78   ALT 27 24  --  26   AST 21 18  --  19   BILITOT 0.6 0.6  --  0.8       CARDIAC PROFILE LAST 3 DAYS  Recent Labs   Lab 09/09/23  1538 09/09/23  2220 09/10/23  0428 09/10/23  1133 " "  BNP 29  --   --   --    TROPONINI 0.008 0.027* 0.695* 0.011       ENDOCRINE LAST 3 DAYS  No results for input(s): "TSH", "PROCAL" in the last 168 hours.    LAST ARTERIAL BLOOD GAS  ABG  No results for input(s): "PH", "PO2", "PCO2", "HCO3", "BE" in the last 168 hours.    LAST 7 DAYS MICROBIOLOGY   Microbiology Results (last 7 days)       ** No results found for the last 168 hours. **            MOST RECENT IMAGING  X-Ray Chest AP Portable  Narrative: EXAMINATION:  XR CHEST AP PORTABLE    CLINICAL HISTORY:  CHF;    TECHNIQUE:  Single frontal view of the chest was performed.    COMPARISON:  None    FINDINGS:  Cardiomediastinal silhouette is within normal limits.  Lungs are well expanded.  No consolidation, pneumothorax, or pleural effusion.  No acute bony changes identified.  Impression: No acute cardiopulmonary process.    Electronically signed by: Darion Taylor  Date:    09/09/2023  Time:    15:17      ECHOCARDIOGRAM RESULTS (last 5)  Results for orders placed in visit on 01/25/23    Stress Echo Which stress agent will be used? Pharmacological; Color Flow Doppler? No    Interpretation Summary  · The left ventricle is normal in size with normal systolic function.  · Normal left ventricular diastolic function.  · The estimated ejection fraction is 65%.  · Normal right ventricular size with normal right ventricular systolic function.  · The stress echo portion of this study is negative for myocardial ischemia.  · During stress, the following significant arrhythmias were observed: occasional PVCs.  · The ECG portion of this study is negative for myocardial ischemia.      CURRENT/PREVIOUS VISIT EKG  Results for orders placed or performed in visit on 01/10/23   IN OFFICE EKG 12-LEAD (to [employer])    Collection Time: 01/10/23  9:41 AM    Narrative    Test Reason : R06.00,    Vent. Rate : 057 BPM     Atrial Rate : 057 BPM     P-R Int : 186 ms          QRS Dur : 090 ms      QT Int : 412 ms       P-R-T Axes : 043 -04 015 degrees    "  QTc Int : 401 ms    Sinus bradycardia  Possible Inferior infarct ,age undetermined  Abnormal ECG  When compared with ECG of 07-AUG-2020 09:12,  Borderline criteria for Inferior infarct are now Present  Confirmed by Thien Rosario MD ([id number]) on 1/10/2023 4:34:08 PM    Referred By: Rafi Martin           Confirmed By:Thien Rosario MD     09/11/2023:   Sinus bradycardia rate of 49 beats per minute nonspecific ST T wave changes noted.      ASSESSMENT/PLAN:     Active Hospital Problems    Diagnosis    *Hypertensive emergency    Dyspnea    Hypercalcemia    Hyperlipidemia    Hypertension    GERD (gastroesophageal reflux disease)       ASSESSMENT & PLAN:   Hypertensive emergency probably secondary to high doses of Sudafed.  He has been chronically on Lotensin 40 mg, metoprolol, verapamil  It appears that patient has hypertensive emergency with demand ischemia resulted in abnormal troponin changes however since then it is trended normal.    1. Hypertensive emergency   2. Abnormal troponins secondary to demand ischemia   3.  Dyslipidemia  4. Seasonal allergies  5. Shortness of breath, multifactorial   6. Bradycardia     RECOMMENDATIONS:   1. Shortness of breath appears to be his predominant symptoms.  And this may be well be result of bradycardia medication induced by both metoprolol and verapamil at this time.     Recommend the following   1. Discontinue verapamil   2. Continue on low doses of metoprolol succinate  3. Blood pressure is suboptimally controlled start him on amlodipine at 5 mg daily and titrate this upwards  4. Continue on lisinopril at 40 mg daily  5. If his blood pressure is still becomes an issue may consider Maxzide 37.5/25 mg every other day as an outpatient.    6. Maintain on low-salt low-fat diet  7. Myocardial perfusion imaging study is in progress, EKG did not show any abnormalities and no chest pain occurred during the study.  8. If the perfusion imaging study is negative for ischemia he can be discharged home  with outpatient follow-up and advise him to keep track of his blood pressures after 5 minutes of comfortable seating about 4 recordings each week          RECOMMENDATIONS:  09/10/2023:  Dyspnea that has worsened in the past 2 weeks.  His BNP is within normal limits his troponin levels are mildly elevated.  He denies any chest pains.  The EKG does not reveal any acute changes.  Will obtain an echocardiogram to assess his left ventricular function and diastolic function.  Will obtain a pharmacological myocardial perfusion scan to rule out ischemia    Probably obstructive sleep apnea a sleep study needs to be performed as an outpatient    RECOMMENDATIONS:  Controlled his blood pressure  Perform a echocardiogram  Perform a pharmacological myocardial perfusion scan to rule out ischemia    Artur Shetty MD  09/10/2023      Thank you for the consultation    Ramirez Beasley MD  Date of Service: 09/11/2023  2:20 PM   DEPARTMENT OF CARDIOLOGY

## 2023-09-11 NOTE — PROGRESS NOTES
[facility] Medicine  Progress Note    Patient Name: Papito Hutton  MRN: [id number]  Patient Class: IP- Inpatient   Admission Date: 9/9/2023  Length of Stay: 2 days  Attending Physician: Gina Arriaza MD  Primary Care Provider: Rafi Martin MD        Subjective:     Principal Problem:Hypertensive emergency        HPI:  Papito Hutton is a 78 year old male with a past medical history of KEITH, allergies, hypertension, hyperlipidemia, GERD and polyps who presented to the ED with dyspnea.  Patient states he experienced short episodes of dyspnea while falling asleep or awakening him from sleep.  Patient states he has been diagnosed with sleep apnea and does not wear CPAP.  Patient's wife states he does snore and has a chronic history of awakening from sleep.  Patient states shortness of breath worsens with activity.  Patient was seen in urgent care and noted to have some lower extremity edema.  He was referred to the ED from urgent care.  Upon arrival to the ED patient found to be hypertensive with SBP 180s that increased to 230s while in the emergency room.  Patient denies headache and vision changes.  He states he does take his blood pressure medication as prescribed and has never experienced difficulty in managing his blood pressure.  EKG showed sinus bradycardia and lab work performed in the ED was unremarkable.  Patient received 20 mg of hydralazine which decreased his blood pressure to 218 over 90s.  Patient was referred to Hospital Medicine and seen in the ED. He reports long history of awakening from sleep and was told by PCP to get sleep study. He states he started experiencing shortness of breath during the day two days ago that occurs with activity and when laying flat.  Patient did not become hypoxic after ambulating in the ED. He denies chest pain, nausea, vomiting, fever and chills.  Patient will be admitted to hospital medicine for further evaluation and  management.           Overview/Hospital Course:  Patient admitted to medicine telemetry service where patient was initiated on intravenous Cardene infusion.  Blood pressure was closely monitored.  No arrhythmia noted.  Serum troponin level trended.  A cardiology consultation and 2D echocardiogram ordered.  Patient and his wife counseled importance of having polysomnography at earliest for evaluation of obstructive sleep apnea.  Patient was extensively counseled regarding dangers of using over-the-counter decongestants which might have contributed towards uncontrolled hypertension.      Interval History:  Patient is in good spirits.  Blood pressure is somewhat still elevated off intravenous Cardene.  Patient just returned from nuclear stress test.  Denies any chest pain or palpitations.  Patient still reports subjective sense of shortness of breath.  Anxious to go home.  Patient was re counseled regarding avoidance of over-the-counter decongestants.    Review of Systems   Constitutional:  Negative for activity change, appetite change, chills and fever.   HENT:  Negative for congestion, sore throat and trouble swallowing.    Eyes:  Negative for photophobia and visual disturbance.   Respiratory:  Positive for shortness of breath. Negative for cough and chest tightness.    Cardiovascular:  Positive for leg swelling. Negative for chest pain and palpitations.   Gastrointestinal:  Negative for abdominal pain, diarrhea and nausea.   Genitourinary:  Negative for dysuria, flank pain and hematuria.   Musculoskeletal:  Negative for back pain.   Neurological:  Negative for dizziness, weakness and headaches.   Psychiatric/Behavioral:  Negative for confusion.      Objective:     Vital Signs (Most Recent):  Temp: 97 °F (36.1 °C) (09/11/23 0310)  Pulse: (!) 49 (09/11/23 0906)  Resp: 18 (09/11/23 0800)  BP: (!) 182/71 (09/11/23 0906)  SpO2: 99 % (09/11/23 0800) Vital Signs (24h Range):  Temp:  [97 °F (36.1 °C)-98.6 °F (37 °C)] 97 °F  (36.1 °C)  Pulse:  [43-78] 49  Resp:  [17-34] 18  SpO2:  [95 %-100 %] 99 %  BP: (122-201)/(58-91) 182/71     Weight: 94.3 kg (208 lb)  Body mass index is 28.21 kg/m².    Intake/Output Summary (Last 24 hours) at 9/11/2023 0942  Last data filed at 9/11/2023 0423  Gross per 24 hour   Intake 306.53 ml   Output 600 ml   Net -293.47 ml           Physical Exam  Vitals reviewed.   Constitutional:       Appearance: Normal appearance. He is normal weight.   HENT:      Head: Normocephalic.      Mouth/Throat:      Mouth: Mucous membranes are moist.      Pharynx: Oropharynx is clear.   Eyes:      Pupils: Pupils are equal, round, and reactive to light.   Cardiovascular:      Rate and Rhythm: Normal rate.      Pulses: Normal pulses.   Pulmonary:      Effort: Pulmonary effort is normal.      Breath sounds: Normal breath sounds.   Abdominal:      General: Bowel sounds are normal.   Musculoskeletal:         General: Normal range of motion.      Cervical back: Normal range of motion.      Right lower leg: Edema (trace) present.      Left lower leg: Edema (trace) present.   Skin:     General: Skin is warm and dry.   Neurological:      Mental Status: He is alert and oriented to person, place, and time. Mental status is at baseline.   Psychiatric:         Mood and Affect: Mood normal.             Significant Labs: All pertinent labs within the past 24 hours have been reviewed.  CBC:   Recent Labs   Lab 09/09/23  1538 09/10/23  0428 09/11/23  0445   WBC 5.71 6.35 6.73   HGB 14.2 14.5 13.9*   HCT 42.1 41.2 41.9    197 186       CMP:   Recent Labs   Lab 09/09/23  1538 09/10/23  0428 09/10/23  1133 09/11/23  0445    139  --  141   K 3.9 3.5 4.9 4.2    109  --  109   CO2 20* 22*  --  23   GLU 91 110  --  97   BUN 17 12  --  15   CREATININE 0.9 0.8  --  1.0   CALCIUM 10.9* 10.5  --  10.4   PROT 7.2 6.6  --  6.4   ALBUMIN 4.1 3.8  --  3.6   BILITOT 0.6 0.6  --  0.8   ALKPHOS 86 81  --  78   AST 21 18  --  19   ALT 27 24  --   "26   ANIONGAP 12 8  --  9       Troponin:   Recent Labs   Lab 09/09/23  2220 09/10/23  0428 09/10/23  1133   TROPONINI 0.027* 0.695* 0.011       TSH:   Recent Labs   Lab 07/20/23  0946   TSH 0.870     BNP: 29  Urine Studies: No results for input(s): "COLORU", "APPEARANCEUA", "PHUR", "SPECGRAV", "PROTEINUA", "GLUCUA", "KETONESU", "BILIRUBINUA", "OCCULTUA", "NITRITE", "UROBILINOGEN", "LEUKOCYTESUR", "RBCUA", "WBCUA", "BACTERIA", "SQUAMEPITHEL", "HYALINECASTS" in the last 48 hours.    Invalid input(s): "WRIGHTSUR"    Significant Imaging:   CXR: No acute cardiopulmonary process.    DSE ():   The left ventricle is normal in size with normal systolic function.   Normal left ventricular diastolic function.   The estimated ejection fraction is 65%.   Normal right ventricular size with normal right ventricular systolic function.   The stress echo portion of this study is negative for myocardial ischemia.   During stress, the following significant arrhythmias were observed: occasional PVCs.   The ECG portion of this study is negative for myocardial ischemia.    ECHO: Pending.    Lexiscan: Pending.         Assessment/Plan:      * Hypertensive emergency  Chronic hypertension that is currently uncontrolled.  Likely exacerbated by excessive use of pseudoephedrine over-the-counter.    Temp:  [96.5 °F (35.8 °C)-98.5 °F (36.9 °C)]   Pulse:  [51-85]   Resp:  [13-27]   BP: (142-239)/()   SpO2:  [93 %-99 %] .   Home meds for hypertension were reviewed and noted below-  Hypertension Medications             benazepriL (LOTENSIN) 40 MG tablet Take 1 tablet (40 mg total) by mouth once daily.    metoprolol succinate (TOPROL-XL) 25 MG 24 hr tablet TAKE 1 TABLET DAILY    verapamiL (VERELAN) 120 MG C24P Take 1 capsule (120 mg total) by mouth once daily.          Off intravenous Cardene infusion.  Verapamil dose increased to 180 mg.  Follow nuclear stress test results and Cardiology recommendations.      Dyspnea  New onset " dyspnea with exertion and likely chronic KEITH    -Telemetry  -Continuous pulse ox  -sleep study outpatient  -Trend troponin    Discussed with  who will assist with outpatient polysomnography appointment.      Hypercalcemia  The patient has a history of chronic hypercalcemia that is currently controlled. The patient has the following symptoms due to their hypercalcemia: None (asymptomatic). The hypercalcemia is likely due to unknown. Their latest calcium has been reviewed and is listed below.  Ionized Calcium   Date Value Ref Range Status   10/05/2021 1.43 (H) 1.06 - 1.42 mmol/L Final       -Trend    GERD (gastroesophageal reflux disease)  Chronic    -Continue PPI      Hypertension  Chronic hypertension that is currently uncontrolled.     Temp:  [96.5 °F (35.8 °C)-98.5 °F (36.9 °C)]   Pulse:  [51-85]   Resp:  [13-27]   BP: (142-239)/()   SpO2:  [93 %-99 %] .   Home meds for hypertension were reviewed and noted below-  Hypertension Medications             benazepriL (LOTENSIN) 40 MG tablet Take 1 tablet (40 mg total) by mouth once daily.    metoprolol succinate (TOPROL-XL) 25 MG 24 hr tablet TAKE 1 TABLET DAILY    verapamiL (VERELAN) 120 MG C24P Take 1 capsule (120 mg total) by mouth once daily.          While in the hospital, will manage blood pressure as follows:  Nicardipine infusion    Hyperlipidemia   Patient is chronically on statin.will continue for now. Monitor clinically. Last LDL was   Lab Results   Component Value Date    LDLCALC 80.4 07/20/2023            Discussed with  regarding discharge planning.  VTE Risk Mitigation (From admission, onward)         Ordered     enoxaparin injection 40 mg  Daily         09/09/23 2200     IP VTE HIGH RISK PATIENT  Once         09/09/23 2200     Place sequential compression device  Until discontinued         09/09/23 2200                Discharge Planning   ROCIO: 9/11/2023     Code Status: Full Code   Is the patient medically ready for  discharge?:     Reason for patient still in hospital (select all that apply): Patient trending condition and Consult recommendations  Discharge Plan A: Home with family                  Gina Arriaza MD  Department of Hospital Medicine   [facility]/Surg

## 2023-09-11 NOTE — ASSESSMENT & PLAN NOTE
Chronic hypertension that is currently uncontrolled.  Likely exacerbated by excessive use of pseudoephedrine over-the-counter.    Temp:  [96.5 °F (35.8 °C)-98.5 °F (36.9 °C)]   Pulse:  [51-85]   Resp:  [13-27]   BP: (142-239)/()   SpO2:  [93 %-99 %] .   Home meds for hypertension were reviewed and noted below-  Hypertension Medications             benazepriL (LOTENSIN) 40 MG tablet Take 1 tablet (40 mg total) by mouth once daily.    metoprolol succinate (TOPROL-XL) 25 MG 24 hr tablet TAKE 1 TABLET DAILY    verapamiL (VERELAN) 120 MG C24P Take 1 capsule (120 mg total) by mouth once daily.          Off intravenous Cardene infusion.  Verapamil dose increased to 180 mg.  Follow nuclear stress test results and Cardiology recommendations.

## 2023-09-11 NOTE — NURSING
Patient sinus shahriar 44-48 bpm when asleep. Woke patient and he is asymptomatic. All VSS. Sats % on 2L oxygen via NC. NP notified that patient is sinus shahriar and asymptomatic. Patient remains on continuous cardiac monitoring and CPOX.

## 2023-09-12 ENCOUNTER — TELEPHONE (OUTPATIENT)
Dept: CARDIOLOGY | Facility: CLINIC | Age: 78
End: 2023-09-12
Payer: MEDICARE

## 2023-09-12 VITALS
SYSTOLIC BLOOD PRESSURE: 138 MMHG | BODY MASS INDEX: 28.37 KG/M2 | DIASTOLIC BLOOD PRESSURE: 63 MMHG | OXYGEN SATURATION: 96 % | RESPIRATION RATE: 27 BRPM | HEART RATE: 62 BPM | TEMPERATURE: 98 F | HEIGHT: 72 IN | WEIGHT: 209.44 LBS

## 2023-09-12 LAB
ALBUMIN SERPL BCP-MCNC: 3.7 G/DL (ref 3.5–5.2)
ALP SERPL-CCNC: 82 U/L (ref 55–135)
ALT SERPL W/O P-5'-P-CCNC: 25 U/L (ref 10–44)
ANION GAP SERPL CALC-SCNC: 9 MMOL/L (ref 8–16)
APTT PPP: 26.4 SEC (ref 21–32)
AST SERPL-CCNC: 18 U/L (ref 10–40)
BASOPHILS # BLD AUTO: 0.06 K/UL (ref 0–0.2)
BASOPHILS # BLD AUTO: 0.07 K/UL (ref 0–0.2)
BASOPHILS NFR BLD: 1 % (ref 0–1.9)
BASOPHILS NFR BLD: 1.1 % (ref 0–1.9)
BILIRUB SERPL-MCNC: 0.8 MG/DL (ref 0.1–1)
BUN SERPL-MCNC: 13 MG/DL (ref 8–23)
CALCIUM SERPL-MCNC: 10.6 MG/DL (ref 8.7–10.5)
CHLORIDE SERPL-SCNC: 106 MMOL/L (ref 95–110)
CHOLEST SERPL-MCNC: 137 MG/DL (ref 120–199)
CHOLEST/HDLC SERPL: 3 {RATIO} (ref 2–5)
CO2 SERPL-SCNC: 24 MMOL/L (ref 23–29)
CREAT SERPL-MCNC: 0.9 MG/DL (ref 0.5–1.4)
DIFFERENTIAL METHOD: NORMAL
DIFFERENTIAL METHOD: NORMAL
EOSINOPHIL # BLD AUTO: 0.1 K/UL (ref 0–0.5)
EOSINOPHIL # BLD AUTO: 0.1 K/UL (ref 0–0.5)
EOSINOPHIL NFR BLD: 1.5 % (ref 0–8)
EOSINOPHIL NFR BLD: 2 % (ref 0–8)
ERYTHROCYTE [DISTWIDTH] IN BLOOD BY AUTOMATED COUNT: 13.4 % (ref 11.5–14.5)
ERYTHROCYTE [DISTWIDTH] IN BLOOD BY AUTOMATED COUNT: 13.6 % (ref 11.5–14.5)
EST. GFR  (NO RACE VARIABLE): >60 ML/MIN/1.73 M^2
ESTIMATED AVG GLUCOSE: 120 MG/DL (ref 68–131)
GLUCOSE SERPL-MCNC: 106 MG/DL (ref 70–110)
HBA1C MFR BLD: 5.8 % (ref 4.5–6.2)
HCT VFR BLD AUTO: 42.3 % (ref 40–54)
HCT VFR BLD AUTO: 43.7 % (ref 40–54)
HDLC SERPL-MCNC: 46 MG/DL (ref 40–75)
HDLC SERPL: 33.6 % (ref 20–50)
HGB BLD-MCNC: 14.3 G/DL (ref 14–18)
HGB BLD-MCNC: 14.4 G/DL (ref 14–18)
IMM GRANULOCYTES # BLD AUTO: 0.01 K/UL (ref 0–0.04)
IMM GRANULOCYTES # BLD AUTO: 0.02 K/UL (ref 0–0.04)
IMM GRANULOCYTES NFR BLD AUTO: 0.2 % (ref 0–0.5)
IMM GRANULOCYTES NFR BLD AUTO: 0.3 % (ref 0–0.5)
INR PPP: 1 (ref 0.8–1.2)
LDLC SERPL CALC-MCNC: 63.6 MG/DL (ref 63–159)
LYMPHOCYTES # BLD AUTO: 2.1 K/UL (ref 1–4.8)
LYMPHOCYTES # BLD AUTO: 2.5 K/UL (ref 1–4.8)
LYMPHOCYTES NFR BLD: 36.8 % (ref 18–48)
LYMPHOCYTES NFR BLD: 37.7 % (ref 18–48)
MAGNESIUM SERPL-MCNC: 1.9 MG/DL (ref 1.6–2.6)
MCH RBC QN AUTO: 29.8 PG (ref 27–31)
MCH RBC QN AUTO: 30.6 PG (ref 27–31)
MCHC RBC AUTO-ENTMCNC: 33 G/DL (ref 32–36)
MCHC RBC AUTO-ENTMCNC: 33.8 G/DL (ref 32–36)
MCV RBC AUTO: 90 FL (ref 82–98)
MCV RBC AUTO: 90 FL (ref 82–98)
MONOCYTES # BLD AUTO: 0.5 K/UL (ref 0.3–1)
MONOCYTES # BLD AUTO: 0.7 K/UL (ref 0.3–1)
MONOCYTES NFR BLD: 9.1 % (ref 4–15)
MONOCYTES NFR BLD: 9.8 % (ref 4–15)
NEUTROPHILS # BLD AUTO: 2.8 K/UL (ref 1.8–7.7)
NEUTROPHILS # BLD AUTO: 3.5 K/UL (ref 1.8–7.7)
NEUTROPHILS NFR BLD: 49.9 % (ref 38–73)
NEUTROPHILS NFR BLD: 50.6 % (ref 38–73)
NONHDLC SERPL-MCNC: 91 MG/DL
NRBC BLD-RTO: 0 /100 WBC
NRBC BLD-RTO: 0 /100 WBC
PHOSPHATE SERPL-MCNC: 2.7 MG/DL (ref 2.7–4.5)
PLATELET # BLD AUTO: 190 K/UL (ref 150–450)
PLATELET # BLD AUTO: 192 K/UL (ref 150–450)
PMV BLD AUTO: 10 FL (ref 9.2–12.9)
PMV BLD AUTO: 10.4 FL (ref 9.2–12.9)
POTASSIUM SERPL-SCNC: 4.1 MMOL/L (ref 3.5–5.1)
PROT SERPL-MCNC: 6.7 G/DL (ref 6–8.4)
PROTHROMBIN TIME: 10.9 SEC (ref 9–12.5)
RBC # BLD AUTO: 4.68 M/UL (ref 4.6–6.2)
RBC # BLD AUTO: 4.84 M/UL (ref 4.6–6.2)
SODIUM SERPL-SCNC: 139 MMOL/L (ref 136–145)
TRIGL SERPL-MCNC: 137 MG/DL (ref 30–150)
TSH SERPL DL<=0.005 MIU/L-ACNC: 0.78 UIU/ML (ref 0.4–4)
WBC # BLD AUTO: 5.6 K/UL (ref 3.9–12.7)
WBC # BLD AUTO: 6.85 K/UL (ref 3.9–12.7)

## 2023-09-12 PROCEDURE — 84100 ASSAY OF PHOSPHORUS: CPT | Performed by: NURSE PRACTITIONER

## 2023-09-12 PROCEDURE — 97165 OT EVAL LOW COMPLEX 30 MIN: CPT

## 2023-09-12 PROCEDURE — 84443 ASSAY THYROID STIM HORMONE: CPT | Performed by: INTERNAL MEDICINE

## 2023-09-12 PROCEDURE — 25000003 PHARM REV CODE 250: Performed by: NURSE PRACTITIONER

## 2023-09-12 PROCEDURE — 27000221 HC OXYGEN, UP TO 24 HOURS

## 2023-09-12 PROCEDURE — 92523 SPEECH SOUND LANG COMPREHEN: CPT

## 2023-09-12 PROCEDURE — 85730 THROMBOPLASTIN TIME PARTIAL: CPT | Performed by: INTERNAL MEDICINE

## 2023-09-12 PROCEDURE — 85610 PROTHROMBIN TIME: CPT | Performed by: INTERNAL MEDICINE

## 2023-09-12 PROCEDURE — 80053 COMPREHEN METABOLIC PANEL: CPT | Performed by: NURSE PRACTITIONER

## 2023-09-12 PROCEDURE — 92610 EVALUATE SWALLOWING FUNCTION: CPT

## 2023-09-12 PROCEDURE — 80061 LIPID PANEL: CPT | Performed by: INTERNAL MEDICINE

## 2023-09-12 PROCEDURE — 97161 PT EVAL LOW COMPLEX 20 MIN: CPT

## 2023-09-12 PROCEDURE — 97116 GAIT TRAINING THERAPY: CPT

## 2023-09-12 PROCEDURE — 94761 N-INVAS EAR/PLS OXIMETRY MLT: CPT

## 2023-09-12 PROCEDURE — 83735 ASSAY OF MAGNESIUM: CPT | Performed by: NURSE PRACTITIONER

## 2023-09-12 PROCEDURE — 36415 COLL VENOUS BLD VENIPUNCTURE: CPT | Performed by: INTERNAL MEDICINE

## 2023-09-12 PROCEDURE — 25000003 PHARM REV CODE 250: Performed by: INTERNAL MEDICINE

## 2023-09-12 PROCEDURE — 83036 HEMOGLOBIN GLYCOSYLATED A1C: CPT | Performed by: INTERNAL MEDICINE

## 2023-09-12 PROCEDURE — 85025 COMPLETE CBC W/AUTO DIFF WBC: CPT | Performed by: NURSE PRACTITIONER

## 2023-09-12 PROCEDURE — 85025 COMPLETE CBC W/AUTO DIFF WBC: CPT | Mod: 91 | Performed by: INTERNAL MEDICINE

## 2023-09-12 RX ORDER — TRIAMTERENE/HYDROCHLOROTHIAZID 37.5-25 MG
1 TABLET ORAL DAILY
Qty: 30 TABLET | Refills: 0 | Status: SHIPPED | OUTPATIENT
Start: 2023-09-13 | End: 2023-09-26 | Stop reason: SDUPTHER

## 2023-09-12 RX ORDER — NAPROXEN SODIUM 220 MG/1
81 TABLET, FILM COATED ORAL DAILY
Refills: 0 | Status: ON HOLD
Start: 2023-09-12 | End: 2024-02-17

## 2023-09-12 RX ORDER — ATORVASTATIN CALCIUM 80 MG/1
80 TABLET, FILM COATED ORAL DAILY
Qty: 30 TABLET | Refills: 0 | Status: SHIPPED | OUTPATIENT
Start: 2023-09-13 | End: 2023-09-20

## 2023-09-12 RX ORDER — CLOPIDOGREL BISULFATE 75 MG/1
75 TABLET ORAL DAILY
Status: DISCONTINUED | OUTPATIENT
Start: 2023-09-12 | End: 2023-09-12 | Stop reason: HOSPADM

## 2023-09-12 RX ORDER — CLOPIDOGREL BISULFATE 75 MG/1
75 TABLET ORAL DAILY
Qty: 30 TABLET | Refills: 0 | Status: SHIPPED | OUTPATIENT
Start: 2023-09-13 | End: 2023-09-20

## 2023-09-12 RX ORDER — NAPROXEN SODIUM 220 MG/1
81 TABLET, FILM COATED ORAL DAILY
Status: DISCONTINUED | OUTPATIENT
Start: 2023-09-13 | End: 2023-09-12 | Stop reason: HOSPADM

## 2023-09-12 RX ADMIN — ATORVASTATIN CALCIUM 80 MG: 40 TABLET, FILM COATED ORAL at 08:09

## 2023-09-12 RX ADMIN — METOPROLOL SUCCINATE 25 MG: 25 TABLET, EXTENDED RELEASE ORAL at 08:09

## 2023-09-12 RX ADMIN — PANTOPRAZOLE SODIUM 40 MG: 40 TABLET, DELAYED RELEASE ORAL at 08:09

## 2023-09-12 RX ADMIN — AZELASTINE 137 MCG: 1 SPRAY, METERED NASAL at 08:09

## 2023-09-12 RX ADMIN — MELATONIN TAB 3 MG 6 MG: 3 TAB at 12:09

## 2023-09-12 RX ADMIN — ASPIRIN 81 MG CHEWABLE TABLET 81 MG: 81 TABLET CHEWABLE at 08:09

## 2023-09-12 RX ADMIN — LISINOPRIL 40 MG: 10 TABLET ORAL at 08:09

## 2023-09-12 RX ADMIN — CLOPIDOGREL BISULFATE 75 MG: 75 TABLET, FILM COATED ORAL at 12:09

## 2023-09-12 RX ADMIN — TRIAMTERENE AND HYDROCHLOROTHIAZIDE 1 TABLET: 37.5; 25 TABLET ORAL at 08:09

## 2023-09-12 RX ADMIN — DICLOFENAC SODIUM 2 G: 10 GEL TOPICAL at 08:09

## 2023-09-12 NOTE — PLAN OF CARE
Problem: Adult Inpatient Plan of Care  Goal: Plan of Care Review  Outcome: Ongoing, Progressing     Problem: Adult Inpatient Plan of Care  Goal: Absence of Hospital-Acquired Illness or Injury  Outcome: Ongoing, Progressing     Problem: Adult Inpatient Plan of Care  Goal: Optimal Comfort and Wellbeing  Outcome: Ongoing, Progressing     Problem: Adjustment to Illness (Stroke, Ischemic/Transient Ischemic Attack)  Goal: Optimal Coping  Outcome: Ongoing, Progressing     Problem: Cerebral Tissue Perfusion (Stroke, Ischemic/Transient Ischemic Attack)  Goal: Optimal Cerebral Tissue Perfusion  Outcome: Ongoing, Progressing     Problem: Fall Injury Risk  Goal: Absence of Fall and Fall-Related Injury  Outcome: Ongoing, Progressing

## 2023-09-12 NOTE — DISCHARGE INSTRUCTIONS
Please maintain daily blood pressure log.    Follow up with Study study as directed by  at earliest.

## 2023-09-12 NOTE — PLAN OF CARE
Problem: Adult Inpatient Plan of Care  Goal: Plan of Care Review  Outcome: Ongoing, Progressing  Goal: Patient-Specific Goal (Individualized)  Outcome: Ongoing, Progressing  Goal: Absence of Hospital-Acquired Illness or Injury  Outcome: Ongoing, Progressing  Goal: Optimal Comfort and Wellbeing  Outcome: Ongoing, Progressing  Goal: Readiness for Transition of Care  Outcome: Ongoing, Progressing     Problem: Adjustment to Illness (Stroke, Ischemic/Transient Ischemic Attack)  Goal: Optimal Coping  Outcome: Ongoing, Progressing     Problem: Bowel Elimination Impaired (Stroke, Ischemic/Transient Ischemic Attack)  Goal: Effective Bowel Elimination  Outcome: Ongoing, Progressing     Problem: Cerebral Tissue Perfusion (Stroke, Ischemic/Transient Ischemic Attack)  Goal: Optimal Cerebral Tissue Perfusion  Outcome: Ongoing, Progressing     Problem: Cognitive Impairment (Stroke, Ischemic/Transient Ischemic Attack)  Goal: Optimal Cognitive Function  Outcome: Ongoing, Progressing     Problem: Communication Impairment (Stroke, Ischemic/Transient Ischemic Attack)  Goal: Improved Communication Skills  Outcome: Ongoing, Progressing     Problem: Functional Ability Impaired (Stroke, Ischemic/Transient Ischemic Attack)  Goal: Optimal Functional Ability  Outcome: Ongoing, Progressing     Problem: Respiratory Compromise (Stroke, Ischemic/Transient Ischemic Attack)  Goal: Effective Oxygenation and Ventilation  Outcome: Ongoing, Progressing     Problem: Sensorimotor Impairment (Stroke, Ischemic/Transient Ischemic Attack)  Goal: Improved Sensorimotor Function  Outcome: Ongoing, Progressing     Problem: Swallowing Impairment (Stroke, Ischemic/Transient Ischemic Attack)  Goal: Optimal Eating and Swallowing without Aspiration  Outcome: Ongoing, Progressing

## 2023-09-12 NOTE — SUBJECTIVE & OBJECTIVE
Interval History:  Patient is in good spirits.  Blood pressure is somewhat still elevated off intravenous Cardene.  Patient just returned from nuclear stress test.  Denies any chest pain or palpitations.  Patient still reports subjective sense of shortness of breath.  Anxious to go home.  Patient was re counseled regarding avoidance of over-the-counter decongestants.    Review of Systems   Constitutional:  Negative for activity change, appetite change, chills and fever.   HENT:  Negative for congestion, sore throat and trouble swallowing.    Eyes:  Negative for photophobia and visual disturbance.   Respiratory:  Positive for shortness of breath. Negative for cough and chest tightness.    Cardiovascular:  Positive for leg swelling. Negative for chest pain and palpitations.   Gastrointestinal:  Negative for abdominal pain, diarrhea and nausea.   Genitourinary:  Negative for dysuria, flank pain and hematuria.   Musculoskeletal:  Negative for back pain.   Neurological:  Negative for dizziness, weakness and headaches.   Psychiatric/Behavioral:  Negative for confusion.      Objective:     Vital Signs (Most Recent):  Temp: 97.5 °F (36.4 °C) (09/12/23 0300)  Pulse: 66 (09/12/23 0829)  Resp: (!) 25 (09/12/23 0530)  BP: (!) 152/75 (09/12/23 0829)  SpO2: 99 % (09/12/23 0530) Vital Signs (24h Range):  Temp:  [97.5 °F (36.4 °C)-98.3 °F (36.8 °C)] 97.5 °F (36.4 °C)  Pulse:  [51-89] 66  Resp:  [15-45] 25  SpO2:  [95 %-100 %] 99 %  BP: (126-212)/() 152/75     Weight: 95 kg (209 lb 7 oz)  Body mass index is 28.4 kg/m².    Intake/Output Summary (Last 24 hours) at 9/12/2023 0920  Last data filed at 9/11/2023 1805  Gross per 24 hour   Intake 300 ml   Output 850 ml   Net -550 ml           Physical Exam  Vitals reviewed.   Constitutional:       Appearance: Normal appearance. He is normal weight.   HENT:      Head: Normocephalic.      Mouth/Throat:      Mouth: Mucous membranes are moist.      Pharynx: Oropharynx is clear.   Eyes:     "  Pupils: Pupils are equal, round, and reactive to light.   Cardiovascular:      Rate and Rhythm: Normal rate.      Pulses: Normal pulses.   Pulmonary:      Effort: Pulmonary effort is normal.      Breath sounds: Normal breath sounds.   Abdominal:      General: Bowel sounds are normal.   Musculoskeletal:         General: Normal range of motion.      Cervical back: Normal range of motion.      Right lower leg: Edema (trace) present.      Left lower leg: Edema (trace) present.   Skin:     General: Skin is warm and dry.   Neurological:      Mental Status: He is alert and oriented to person, place, and time. Mental status is at baseline.   Psychiatric:         Mood and Affect: Mood normal.             Significant Labs: All pertinent labs within the past 24 hours have been reviewed.  CBC:   Recent Labs   Lab 09/11/23  1451 09/12/23  0509 09/12/23  0711   WBC 7.19 6.85 5.60   HGB 13.8* 14.4 14.3   HCT 39.9* 43.7 42.3    190 192       CMP:   Recent Labs   Lab 09/11/23  0445 09/11/23  1451 09/12/23  0711    136 139   K 4.2 3.9 4.1    108 106   CO2 23 25 24   GLU 97 102 106   BUN 15 18 13   CREATININE 1.0 0.9 0.9   CALCIUM 10.4 10.2 10.6*   PROT 6.4 6.1 6.7   ALBUMIN 3.6 3.5 3.7   BILITOT 0.8 0.5 0.8   ALKPHOS 78 75 82   AST 19 20 18   ALT 26 26 25   ANIONGAP 9 3* 9       Troponin:   Recent Labs   Lab 09/10/23  1133   TROPONINI 0.011       TSH:   Recent Labs   Lab 09/12/23  0711   TSH 0.782     BNP: 29  Urine Studies: No results for input(s): "COLORU", "APPEARANCEUA", "PHUR", "SPECGRAV", "PROTEINUA", "GLUCUA", "KETONESU", "BILIRUBINUA", "OCCULTUA", "NITRITE", "UROBILINOGEN", "LEUKOCYTESUR", "RBCUA", "WBCUA", "BACTERIA", "SQUAMEPITHEL", "HYALINECASTS" in the last 48 hours.    Invalid input(s): "WRIGHTSUR"    Significant Imaging:   CXR: No acute cardiopulmonary process.    DSE ():  The left ventricle is normal in size with normal systolic function.  Normal left ventricular diastolic function.  The " estimated ejection fraction is 65%.  Normal right ventricular size with normal right ventricular systolic function.  The stress echo portion of this study is negative for myocardial ischemia.  During stress, the following significant arrhythmias were observed: occasional PVCs.  The ECG portion of this study is negative for myocardial ischemia.    ECHO:     Left Ventricle: The left ventricle is normal in size. Mildly increased wall thickness. There is mild concentric hypertrophy. Normal wall motion. There is normal systolic function with a visually estimated ejection fraction of 65 - 70%. There is normal diastolic function.    Right Ventricle: Normal right ventricular cavity size. Wall thickness is normal. Right ventricle wall motion  is normal. Systolic function is normal.    IVC/SVC: Normal venous pressure at 3 mmHg.    Lexiscan:   1.  Scintigraphically negative for ischemia or infarct.  2. the global left ventricular systolic function is good with an LV ejection fraction of 65 % and no evidence of LV dilatation. Wall motion is normal.    MRI Brain:  No acute intracranial abnormality

## 2023-09-12 NOTE — PROGRESS NOTES
09/11/23 1900   Vital Signs   Pulse 66   Resp (!) 26   SpO2 100 %   BP (!) 212/91  (prn bp given by day shift nurse)   MAP (mmHg) 131

## 2023-09-12 NOTE — CARE UPDATE
09/12/23 0806   Patient Assessment/Suction   Level of Consciousness (AVPU) alert   Respiratory Effort Unlabored   Expansion/Accessory Muscles/Retractions no use of accessory muscles;no retractions;expansion symmetric   All Lung Fields Breath Sounds Anterior:;Lateral:;diminished   Rhythm/Pattern, Respiratory unlabored;pattern regular;depth regular   PRE-TX-O2   Device (Oxygen Therapy) nasal cannula with humidification   $ Is the patient on Low Flow Oxygen? Yes   Flow (L/min) (S)  2.5  (decreased to 1 LPM)   SpO2 97 %   Pulse Oximetry Type Intermittent   $ Pulse Oximetry - Multiple Charge Pulse Oximetry - Multiple   Pulse 61   Resp 20

## 2023-09-12 NOTE — PT/OT/SLP EVAL
"Speech Language Pathology Evaluation  Cognitive/Bedside Swallow    Patient Name:  Papito Hutton   MRN:  [id number]  Admitting Diagnosis: Hypertensive emergency    Recommendations:                  General Recommendations:  Follow-up not indicated  Diet recommendations:  Regular Diet - IDDSI Level 7, Thin liquids - IDDSI Level 0   Aspiration Precautions: Alternating bites/sips, HOB to 90 degrees, Remain upright 30 minutes post meal, and Small bites/sips   General Precautions: Standard,    Communication strategies:   Speak Loudly, pt [place]    Assessment:     Papito Hutton is a 78 y.o. male with an admitting diagnosis of  Hypertensive emergency .  He presents with swallowing WFL, though he reports hx of globus sensation, and cognitive-linguistic and speech status WFL. No overt s/s aspiration or oropharyngeal dysphagia; rec regular diet w/ thin liquids. No further ST warranted.    History:   Per H&P:  "HPI: Papito Hutton is a 78 year old male with a past medical history of KEITH, allergies, hypertension, hyperlipidemia, GERD and polyps who presented to the ED with dyspnea.  Patient states he experienced short episodes of dyspnea while falling asleep or awakening him from sleep.  Patient states he has been diagnosed with sleep apnea and does not wear CPAP.  Patient's wife states he does snore and has a chronic history of awakening from sleep.  Patient states shortness of breath worsens with activity.  Patient was seen in urgent care and noted to have some lower extremity edema.  He was referred to the ED from urgent care.  Upon arrival to the ED patient found to be hypertensive with SBP 180s that increased to 230s while in the emergency room.  Patient denies headache and vision changes.  He states he does take his blood pressure medication as prescribed and has never experienced difficulty in managing his blood pressure.  EKG showed sinus bradycardia and lab work performed in the ED was unremarkable.  Patient " "received 20 mg of hydralazine which decreased his blood pressure to 218 over 90s.  Patient was referred to Hospital Medicine and seen in the ED. He reports long history of awakening from sleep and was told by PCP to get sleep study. He states he started experiencing shortness of breath during the day two days ago that occurs with activity and when laying flat.  Patient did not become hypoxic after ambulating in the ED. He denies chest pain, nausea, vomiting, fever and chills.  Patient will be admitted to hospital medicine for further evaluation and management. "  Past Medical History:   Diagnosis Date    Arthritis     B12 deficiency     Colon polyp     Diabetes mellitus     BORDERLINE    GERD (gastroesophageal reflux disease)     [place] (hard of hearing)     BILAT AIDS    Hyperlipidemia     Hypertension     Low testosterone     Personal history of colonic polyps 2008    adenomatous polyp    Sinus disorder     Sleep apnea     DOES NOT USE MACHINE    Wears glasses        Past Surgical History:   Procedure Laterality Date    COLON SURGERY      COLONOSCOPY  2008    Dr. Garcia; polyps removed    COLONOSCOPY N/A 07/13/2020    Procedure: COLONOSCOPY;  Surgeon: Jj Fried MD;  Location: [facility];  Service: Endoscopy;  Laterality: N/A;    COLONOSCOPY N/A 8/15/2023    Procedure: COLONOSCOPY;  Surgeon: Jj Fried MD;  Location: [facility];  Service: Endoscopy;  Laterality: N/A;    ESOPHAGOGASTRODUODENOSCOPY N/A 7/31/2023    Procedure: EGD (ESOPHAGOGASTRODUODENOSCOPY);  Surgeon: Jj Fried MD;  Location: [facility];  Service: Endoscopy;  Laterality: N/A;    LAPAROSCOPIC RIGHT COLON RESECTION Right 08/10/2020    Procedure: COLECTOMY, RIGHT, LAPAROSCOPIC pooss conversion to open;  Surgeon: Noble Kaye MD;  Location: [facility];  Service: General;  Laterality: Right;    NASAL SEPTUM SURGERY      right hand surgery      RIGHT HEMICOLECTOMY N/A 08/10/2020    Procedure: HEMICOLECTOMY, RIGHT;  Surgeon: Noble" "MARIO Kaye MD;  Location: [facility];  Service: General;  Laterality: N/A;    UPPER GASTROINTESTINAL ENDOSCOPY  2008    hiatal hernia; esophagitis       Social History: Patient lives with his girlfriend.    Prior Intubation HX:  none this admit    Modified Barium Swallow: none found in epic or reported by pt    Chest X-Rays:   Imaging Results              X-Ray Chest AP Portable (Final result)  Result time 09/09/23 15:17:04      Final result by Darion Taylor MD (09/09/23 15:17:04)                   Impression:      No acute cardiopulmonary process.      Electronically signed by: Darion Taylor  Date:    09/09/2023  Time:    15:17               Narrative:    EXAMINATION:  XR CHEST AP PORTABLE    CLINICAL HISTORY:  CHF;    TECHNIQUE:  Single frontal view of the chest was performed.    COMPARISON:  None    FINDINGS:  Cardiomediastinal silhouette is within normal limits.  Lungs are well expanded.  No consolidation, pneumothorax, or pleural effusion.  No acute bony changes identified.                                    EGD: 7/31/23  Impression:            - Moderate Schatzki ring. Dilated.                          - Small hiatal hernia.                          - Erythematous mucosa in the antrum. Biopsied.                          - A single gastric polyp. Resected and retrieved.                          - Normal duodenal bulb, first portion of the                          duodenum and second portion of the duodenum.    Pt reports GI recommended small bites to alleviate globus sensation; pt reports he has implemented.     Prior diet: Regular, thin.    Occupation/hobbies/homemaking: Pt is retired from the [employer]; enjoys car shows for his corvette.    Subjective     Pt awake reclined in bed. Agreeable to ST evaluation.  Patient goals: "I hope I get out of here in time to go to [place] tomorrow for the car show"     Pain/Comfort:       Respiratory Status: Room air; placed nasal canula in nose for brief moment    Objective:     Cognitive " Status:    Arousal/Alertness Appropriate response to stimuli  Attention No obvious deficits observed    Orientation Oriented x4  Memory Immediate Recall WFL, Delayedmildly impaired (2/5), and recall general information WFL  Problem Solving Sequencing WFL [employer] WFL  Safety awareness WFL       Receptive Language:   Comprehension:      Questions Simple yes/no WFL  Complex yes/no WFL  Open ended questions WFL  Commands  One step WFL  two step basic commands WFL  multistep basic commands WFL  Object identifications 5/5 in [place] environment  Conversation WFL    Pragmatics:    Appropriate    Expressive Language:  Verbal:    Automatic Speech  Counting WFL, Days of the week WFL, Months of the year WFL, and Phrase completion WFL  Initiation WFL  Repetition Words WFL, Phrases WFL, and Sentences WFL  Naming Confrontation WFL  Sentence formulation WFL  Conversational speech WFL  Defining WFL      Motor Speech:  WFL    Voice:   Low vocal intensity; quality was appropriate    Visual-Spatial:  WFL    Reading:   WFL     Written Expression:   Not assessed    Oral Musculature Evaluation  Oral Musculature: WFL  Dentition: present and adequate  Secretion Management: adequate  Mucosal Quality: adequate  Mandibular Strength and Mobility: WFL  Oral Labial Strength and Mobility: WFL  Lingual Strength and Mobility: WFL  Velar Elevation: WFL  Buccal Strength and Mobility: WFL  Volitional Cough: elicited; adequate  Volitional Swallow: palpated; adequate laryngeal movement  Voice Prior to PO Intake: clear    Bedside Swallow Eval:   Consistencies Assessed:  Thin liquids water via straw  Puree tsp bites applesauce  Mixed consistencies tsp bites diced peaches in thin juice  Solids dry cracker      Oral Phase:   WFL    Pharyngeal Phase:   no overt clinical signs/symptoms of aspiration  no overt clinical signs/symptoms of pharyngeal dysphagia    Compensatory Strategies  None    Treatment: Pt educated re purpose of evaluation, role of SLP,  results of evaluation, and recs. Pt expressed understanding of recs. Recs shared w/ nursing and MD.      Goals:   Multidisciplinary Problems       SLP Goals       Not on file                    Plan:     Patient to be seen:      Plan of Care expires:     Plan of Care reviewed with:  patient   SLP Follow-Up:  No       Discharge recommendations:  Discharge Facility/Level of Care Needs: home   Barriers to Discharge:  None    Time Tracking:     SLP Treatment Date:   09/12/23  Speech Start Time:  0932  Speech Stop Time:  1004     Speech Total Time (min):  32 min    Billable Minutes: Eval Speech, cognitive-linguistic: 17 min  and Eval Swallow and Oral Function 15 min    09/12/2023

## 2023-09-12 NOTE — NURSING
Discharge instructions given to patient and wife, novak verbalized understanding. PIV removed with catheter intact. Pt removed from telemonitor. Pt escorted to front lobby.

## 2023-09-12 NOTE — CONSULTS
[facility]  Neurology Consult    Patient Name: Papito Hutton   MRN: [id number]  : 1945  TODAY'S DATE: 2023  ADMIT DATE: 2023  3:19 PM                                          CONSULTED PROVIDER: Jahaira Angelo MD, Neurologist. On-call Phone: [phone]  CONSULT REQUESTED BY: Gina Arriaza MD     Chief Complaint   Patient presents with    Shortness of Breath    Leg Swelling        HPI per EMR:  Papito Hutton is a 78 year old male with a past medical history of KEITH, allergies, hypertension, hyperlipidemia, GERD and polyps who presented to the ED with dyspnea.  Patient states he experienced short episodes of dyspnea while falling asleep or awakening him from sleep.  Patient states he has been diagnosed with sleep apnea and does not wear CPAP.  Patient's wife states he does snore and has a chronic history of awakening from sleep.  Patient states shortness of breath worsens with activity.  Patient was seen in urgent care and noted to have some lower extremity edema.  He was referred to the ED from urgent care.  Upon arrival to the ED patient found to be hypertensive with SBP 180s that increased to 230s while in the emergency room.  Patient denies headache and vision changes.  He states he does take his blood pressure medication as prescribed and has never experienced difficulty in managing his blood pressure.  EKG showed sinus bradycardia and lab work performed in the ED was unremarkable.  Patient received 20 mg of hydralazine which decreased his blood pressure to 218 over 90s.  Patient was referred to Hospital Medicine and seen in the ED. He reports long history of awakening from sleep and was told by PCP to get sleep study. He states he started experiencing shortness of breath during the day two days ago that occurs with activity and when laying flat.  Patient did not become hypoxic after ambulating in the ED. He denies chest pain, nausea, vomiting, fever and chills.   Patient will be admitted to hospital medicine for further evaluation and management.      Overview/Hospital Course:  Patient admitted to medicine telemetry service where patient was initiated on intravenous Cardene infusion.  Blood pressure was closely monitored.  No arrhythmia noted.  Serum troponin level trended.  A cardiology consultation and 2D echocardiogram ordered.  Patient and his wife counseled importance of having polysomnography at earliest for evaluation of obstructive sleep apnea.  Patient was extensively counseled regarding dangers of using over-the-counter decongestants which might have contributed towards uncontrolled hypertension.    Neurology Consult:  Patient was seen and examined by me today. He is a 78 year old man with history of KEITH, allergies, hypertension, hyperlipidemia, GERD and polyps who initially presented to the ED with dyspnea and severe hypertension on arrival (systolic in the 220s, diastolic in the 110s). He was admitted for treatment of hypertensive urgency and cardiology consulted. Of note, prior to his admission, patient was self-medicating with nasal decongestants. During his admission, he underwent a nuclear stress test, and shortly after returning to his room, his girlfriend noticed that he had a right facial droop, could not get his words out and they were slurred, and she also noticed right arm weakness, so nursing was called at bedside and confirmed patient's neuro deficits. He had a right pronator drift, aphasia, dysarthria and facial droop, which resolved in less than an hour. CT brain was unremarkable and CTA head and neck showed no LVO. By the time he was back in his room, his symptoms had resolved. He denies any new neuro deficits today.     Review of Systems:    A comprehensive ROS was performed and all systems were negative except as noted above in HPI.    Past Medical History:  has a past medical history of Arthritis, B12 deficiency, Colon polyp, Diabetes mellitus,  GERD (gastroesophageal reflux disease), [place] (hard of hearing), Hyperlipidemia, Hypertension, Low testosterone, Personal history of colonic polyps (2008), Sinus disorder, Sleep apnea, and Wears glasses.    Past Surgical History:  has a past surgical history that includes right hand surgery; Nasal septum surgery; Colonoscopy (2008); Upper gastrointestinal endoscopy (2008); Colonoscopy (N/A, 07/13/2020); Laparoscopic right colon resection (Right, 08/10/2020); Right hemicolectomy (N/A, 08/10/2020); Colon surgery; Esophagogastroduodenoscopy (N/A, 7/31/2023); and Colonoscopy (N/A, 8/15/2023).    Family Medical History: family history is not on file.    Social History:  reports that he has quit smoking. His smoking use included cigars. He has quit using smokeless tobacco.  His smokeless tobacco use included chew. He reports current alcohol use of about 2.0 standard drinks of alcohol per week. He reports that he does not use drugs.    PCP: Rafi Martin MD    Allergies: Review of patient's allergies indicates:  No Known Allergies    Medications:   No current facility-administered medications on file prior to encounter.     Current Outpatient Medications on File Prior to Encounter   Medication Sig Dispense Refill    aspirin 81 MG Chew Take 1 tablet (81 mg total) by mouth once daily.  0    azelastine (ASTELIN) 137 mcg (0.1 %) nasal spray 1 spray (137 mcg total) by Nasal route 2 (two) times daily. for 180 doses 120 mL 2    benazepriL (LOTENSIN) 40 MG tablet Take 1 tablet (40 mg total) by mouth once daily. 90 tablet 3    cetirizine (ZYRTEC) 10 MG tablet Take 1 tablet (10 mg total) by mouth every evening. 90 tablet 3    CHOLECALCIFEROL, VITAMIN D3, (VITAMIN D3 ORAL) Take 1 capsule by mouth once daily.      cyanocobalamin (VITAMIN B-12) 1000 MCG tablet Take 100 mcg by mouth once daily.      diclofenac (VOLTAREN) 75 MG EC tablet TAKE 1 TABLET TWICE A DAY (Patient taking differently: Take 75 mg by mouth 2 (two) times daily.)  180 tablet 3    esomeprazole (NEXIUM) 40 MG capsule Take 1 capsule (40 mg total) by mouth before breakfast. 90 capsule 3    metoprolol succinate (TOPROL-XL) 25 MG 24 hr tablet TAKE 1 TABLET DAILY (Patient taking differently: Take 25 mg by mouth once daily.) 90 tablet 3    rosuvastatin (CRESTOR) 20 MG tablet TAKE 1 TABLET DAILY (Patient taking differently: Take 20 mg by mouth once daily.) 90 tablet 3    verapamiL (VERELAN) 120 MG C24P Take 1 capsule (120 mg total) by mouth once daily. 90 capsule 3    acetaminophen (TYLENOL) 650 MG TbSR Take 1 tablet (650 mg total) by mouth every 8 (eight) hours as needed (do not take with any other tylenol or acetaminophen).  0    diclofenac sodium (VOLTAREN) 1 % Gel APPLY 2 GRAMS TOPICALLY ONCE DAILY (Patient taking differently: Apply 2 g topically once daily.) 100 g 6    fluticasone propionate (FLONASE) 50 mcg/actuation nasal spray USE 2 SPRAYS IN EACH NOSTRIL ONCE DAILY (Patient taking differently: 1 spray by Each Nostril route once daily.) 48 g 3    montelukast (SINGULAIR) 10 mg tablet Take 10 mg by mouth every evening.       Scheduled Meds:   [START ON 9/13/2023] aspirin  81 mg Oral Daily    atorvastatin  80 mg Oral Daily    azelastine  1 spray Nasal BID    cetirizine  10 mg Oral QHS    clopidogreL  75 mg Oral Daily    diclofenac sodium  2 g Topical (Top) Daily    enoxparin  40 mg Subcutaneous Daily    lisinopriL  40 mg Oral Daily    metoprolol succinate  25 mg Oral Daily    pantoprazole  40 mg Oral Daily    senna-docusate 8.6-50 mg  1 tablet Oral BID    triamterene-hydrochlorothiazide 37.5-25 mg  1 tablet Oral Daily     Continuous Infusions:   sodium chloride 0.9%       PRN Meds:.acetaminophen, aluminum-magnesium hydroxide-simethicone, dextrose 10%, dextrose 10%, glucagon (human recombinant), glucose, glucose, hydrALAZINE, magnesium oxide, magnesium oxide, melatonin, naloxone, ondansetron, potassium bicarbonate, potassium bicarbonate, potassium bicarbonate, potassium, sodium  phosphates, potassium, sodium phosphates, potassium, sodium phosphates, prochlorperazine, simethicone, sodium chloride 0.9%, sodium chloride 0.9%, sodium chloride 0.9%      Physical Exam  Current Vitals:  Vitals:    09/12/23 1100   BP: 137/87   Pulse: (!) 54   Resp: (!) 21   Temp:        Physical Exam:  General: AO x4  HEENT: PERRL, EOMI  CV: RRR  Lungs: no respiratory distress, on O2 nasal cannula  Abdomen: soft, non tender    Neurological Exam    MENTAL STATUS EXAM:  Level of alertness: Alert  Level of attention: Attentive w/out deficit  Orientation/Awareness: intact to person, place, time, situation  Language: fluent. Comprehension/repetition/naming intact    CRANIAL NERVE EXAM:  II/III: fundoscopic exam deferred, PERRL; visual fields full to confrontation  III/IV/VI: EOMI w/out evidence of nystagmus, strabismus, or evoked diplopia  V: no deficits appreciated to light touch  VII: no facial asymmetry noted  VIII: mild hearing loss  IX/X: palate @ ML and raises symmetrically  XI: shoulder shrug 5/5 bilaterally; head turn 5/5 bilaterally  XII: tongue to midline w/out asymmetry  No dysarthria noted on exam.    MOTOR EXAM:  Bulk and Tone: normal throughout  Strength is 5/5 proximally and distally in upper and lower extremities without deficits except for right hand , which is 4+/5. He does have mild weakness on right hand  at baseline due to a traumatic injury on the fingers many years ago, but this is his baseline.  No pronator drift in bilateral UE. No tremor either at rest or with intention.     REFLEXES:  2+ in bilateral upper and absent in lower extremities, no Henley's, no clonus.    SENSORY EXAM  Light touch intact throughout in upper and lower extremities without deficits.    COORDINATION/CEREBELLAR EXAM:  FTN: no dysmetria or other signs of appendicular ataxia  HTS: No evidence of appendicular ataxia    GAIT:  Deferred for safety.    NIH Stroke Scale      Date: 09/12/2023  Time: 1145  Person  Administering Scale: Jahaira Angelo MD    Administer stroke scale items in the order listed. Record performance in each category after each subscale exam. Do not go back and change scores. Follow directions provided for each exam technique. Scores should reflect what the patient does, not what the clinician thinks the patient can do. The clinician should record answers while administering the exam and work quickly. Except where indicated, the patient should not be coached (i.e., repeated requests to patient to make a special effort).      1a  Level of consciousness: 0=alert; keenly responsive   1b. LOC questions:  0=Answers both tasks correctly   1c. LOC commands: 0=Answers both tasks correctly   2.  Best Gaze: 0=normal   3.  Visual: 0=No visual loss   4. Facial Palsy: 0=Normal symmetric movement   5a.  Motor left arm: 0=No drift, limb holds 90 (or 45) degrees for full 10 seconds   5b.  Motor right arm: 0=No drift, limb holds 90 (or 45) degrees for full 10 seconds   6a. motor left le=No drift, limb holds 90 (or 45) degrees for full 10 seconds   6b  Motor right le=No drift, limb holds 90 (or 45) degrees for full 10 seconds   7. Limb Ataxia: 0=Absent   8.  Sensory: 0=Normal; no sensory loss   9. Best Language:  0=No aphasia, normal   10. Dysarthria: 0=Normal   11. Extinction and Inattention: 0=No abnormality    Total:   0       Laboratory Data & Studies    Recent Labs   Lab 23  1451 23  0509 23  0711   WBC 7.19 6.85 5.60   HGB 13.8* 14.4 14.3    190 192   MCV 89 90 90       Recent Labs   Lab 09/10/23  0428 09/10/23  1133 23  0445 23  1451 23  0711     --  141 136 139   K 3.5   < > 4.2 3.9 4.1     --  109 108 106   CO2 22*  --  23 25 24   BUN 12  --  15 18 13     --  97 102 106   CALCIUM 10.5  --  10.4 10.2 10.6*   MG 1.7  --  1.9  --  1.9   PHOS 2.4*  --  3.0  --  2.7    < > = values in this interval not displayed.       Recent Labs   Lab  09/11/23  0445 09/11/23  1451 09/12/23  0711   PROT 6.4 6.1 6.7   ALBUMIN 3.6 3.5 3.7   BILITOT 0.8 0.5 0.8   AST 19 20 18   ALT 26 26 25   ALKPHOS 78 75 82       Recent Labs   Lab 09/11/23  1451 09/11/23  1501 09/12/23  0711   INR 1.0 1.0 1.0   APTT 25.0  --  26.4       Recent Labs   Lab 09/12/23  0509 09/12/23  0711   HGBA1C 5.8  --    CHOL  --  137   TRIG  --  137   LDLCALC  --  63.6   HDL  --  46   TSH  --  0.782       Microbiology:  Microbiology Results (last 7 days)       ** No results found for the last 168 hours. **            Imaging:  MRI BRAIN WITHOUT CONTRAST    Result Date: 9/11/2023  EXAMINATION: MRI BRAIN WITHOUT CONTRAST CLINICAL HISTORY: Transient ischemic attack (TIA);. TECHNIQUE: Multiplanar multisequence MR imaging of the brain was performed without contrast. COMPARISON: CTA head and neck 09/11/2020 FINDINGS: Intracranial compartment: Ventricles and sulci are normal in size for age without evidence of hydrocephalus. No extra-axial blood or fluid collections. Nonspecific mild periventricular white matter T2/FLAIR signal hyperintensity particularly along the left posterolateral ventricle, although nonspecific most in keeping with chronic microangiopathic ischemic changes.  No mass lesion, acute hemorrhage, edema or acute infarct. Normal vascular flow voids are preserved. Skull/extracranial contents (limited evaluation): Bone marrow signal intensity is normal.     No acute intracranial abnormality Electronically signed by: Johnna Fowler Date:    09/11/2023 Time:    17:58    CTA Head and Neck (xpd)    Result Date: 9/11/2023  EXAMINATION: CTA HEAD AND NECK (XPD) CLINICAL HISTORY: Neuro deficit, acute, stroke suspected; TECHNIQUE: Non contrast low dose axial images were obtained through the head. CT angiogram was performed from the level of the chaparrita to the top of the head following the IV administration of 75mL of Omnipaque 350.   Sagittal and coronal reconstructions and maximum intensity projection  reconstructions were performed. Arterial stenosis percentages are based on NASCET measurement criteria. COMPARISON: None FINDINGS: Somewhat motion degraded exam. CTA HEAD: Vasculature: Mild atherosclerotic narrowing of the bilateral cavernous carotid arteries.  Mild atherosclerotic narrowing of the distal right M1 MCA and right V4 segment vertebral artery.  No flow limiting stenosis or proximal large vessel occlusion.There is no suspicion of vascular malformation or saccular aneurysm. Variant anatomy: No variant anatomy is noted. Brain: There is no evidence of mass, mass effect, edema, midline shift, intracranial hemorrhage, or space-occupying extra-axial fluid collection. After the administration of contrast there is no abnormal enhancement. Ventricles/Sulci: The ventricles and sulci are appropriate in size for age. Osseous Structures: The osseous structures are unremarkable in appearance. Sinuses and orbits: The visualized portions of the paranasal sinuses and orbits are unremarkable. Other: Visualized portions of the mastoids are unremarkable. CTA NECK: Aorta: Conventional branching pattern. The great vessel origins are patent without flow limiting stenosis.  Mild scattered atherosclerotic changes. Vertebral Arteries: The origins of the vertebral arteries are patent.  No flow limiting stenosis, occlusion, or dissection. The arteries are code dominant. Right Carotid: No flow limiting stenosis, occlusion, or dissection of the common carotid or internal carotid arteries.  Mild plaque of the proximal ICA.  Right internal carotid artery: Less than 50 % stenosis by and NASCET. Left Carotid: No flow limiting stenosis, occlusion, or dissection of the common carotid or internal carotid arteries. Mild plaque of the proximal ICA.  Right internal carotid artery: Less than 50 % stenosis by and NASCET. Extravascular Anatomy: Scattered subcentimeter thyroid nodules.     1. No intracranial proximal large vessel occlusion or  flow-limiting stenosis. 2. Intracranial atherosclerotic changes producing up to moderate narrowing of the bilateral cavernous carotid arteries. 3. No hemodynamically significant stenosis within the neck. 4. No acute intracranial CT findings. Electronically signed by: Darion Taylor Date:    09/11/2023 Time:    15:06    NM Myocardial Perfusion Spect Multi Pharmacologic    Result Date: 9/11/2023  EXAMINATION: NM MYOCARDIAL PERFUSION SPECT MULTI PHARM CLINICAL HISTORY: CAD screening, intermediate CAD risk, not treadmill candidate; TECHNIQUE: SPECT images in short, vertical and horizontal long axis were acquired 30 minutes after the injection of 12.2 mCi of Tc-99m sestamibi/tetrofosmin at rest and 31.3 mCi during a cardiac stress. The clinical stress and ECG portion of the study is to be read separately. COMPARISON: None. FINDINGS: The quality of the study is good. Stress SPECT images demonstrate homogenous distribution of the tracer throughout the left ventricle. On the resting images, there is matched homogenous distribution of the tracer throughout the left ventricle. The gated post-stress images reveal normal wall motion and normal systolic wall thickening with an estimated LVEF of 65 %. The LV cavity is not dilated with an end-diastolic volume of 83 ml and an end-systolic volume of 29 ml.     1.  Scintigraphically negative for ischemia or infarct. 2. the global left ventricular systolic function is good with an LV ejection fraction of 65 % and no evidence of LV dilatation. Wall motion is normal. Electronically signed by: Triny Mcfarlane MD Date:    09/11/2023 Time:    14:15    Echo    Result Date: 9/11/2023    Left Ventricle: The left ventricle is normal in size. Mildly increased wall thickness. There is mild concentric hypertrophy. Normal wall motion. There is normal systolic function with a visually estimated ejection fraction of 65 - 70%. There is normal diastolic function.   Right Ventricle: Normal right ventricular  cavity size. Wall thickness is normal. Right ventricle wall motion  is normal. Systolic function is normal.   IVC/SVC: Normal venous pressure at 3 mmHg.     Nuclear Stress Test    Result Date: 9/11/2023    The ECG portion of the study is negative for ischemia.   The patient reported no chest pain during the stress test.   There were no arrhythmias during stress.   The nuclear portion of this study will be reported separately.     X-Ray Chest AP Portable    Result Date: 9/9/2023  EXAMINATION: XR CHEST AP PORTABLE CLINICAL HISTORY: CHF; TECHNIQUE: Single frontal view of the chest was performed. COMPARISON: None FINDINGS: Cardiomediastinal silhouette is within normal limits.  Lungs are well expanded.  No consolidation, pneumothorax, or pleural effusion.  No acute bony changes identified.     No acute cardiopulmonary process. Electronically signed by: Darion Taylor Date:    09/09/2023 Time:    15:17    DXA Bone Density Axial Skeleton 1 or more sites    Result Date: 9/5/2023  EXAMINATION: DXA BONE DENSITY AXIAL SKELETON 1 OR MORE SITES CLINICAL HISTORY: Hyperparathyroidism, unspecified COMPARISON: None FINDINGS: LUMBAR SPINE: Bone mineral density in the lumbar spine from L1 through L4 is 1.659 g/cm2. The T-score is 5.2 (standard deviations of Young Adult mean). The Z-score is 6.2 (standard deviations of Age Matched mean). The WHO classification is normal. LEFT HIP: Bone mineral density in the left femoral neck is 0.804 g/cm2. The T-score is -0.9 (standard deviations of Young Adult mean). The Z-score is 0.5 (standard deviations of Age Matched mean). The WHO classification is normal.     Normal bone mineral density lumbar spine and left hip. Electronically signed by: Stefano Bermudez MD Date:    09/05/2023 Time:    11:02      Assessment:    Transient Ischemic Attack  Hypertensive Urgency  78 year old man with history of KEITH, allergies, hypertension, hyperlipidemia, GERD and polyps who initially presented to the ED with dyspnea  and severe hypertension on arrival (systolic in the 220s, diastolic in the 110s), was admitted for treatment of hypertensive urgency and after undergoing nuclear stress test with cardiology, he developed a right pronator drift, aphasia, dysarthria and facial droop, which resolved in less than an hour. CT brain was unremarkable and CTA head and neck showed no LVO. MRI brain was negative for acute stroke, but showed mild to moderate microvascular disease. He was taking ASA 81 mg daily prior to the event, so will switch to plavix. Admitted for workup and treatment.    Stroke workup:  CTH: no acute intracranial abnormality  CTA head and neck: no LVO or hemodynamically significant stenosis in the cervical vasculature. Mild to moderate bilateral intracranial atherosclerotic disease of the carotids.  MRI brain: no acute stroke, mild to moderate white matter disease  Risk factors: A1C 5.8, TSH 0.782, LDL 63.6  Echocardiogram: normal LVSF with EF 65-70%, no left atrial enlargement    Plan:  - Admitted to hospital medicine with q4 hour neuro checks, on telemetry, continuous pulse oximetry  - Diet after eval per SLP.  - Secondary stroke prevention with ASA 81 mg daily, plavix 75 mg daily, atorvastatin 80 mg daily. Continue DAPT with both ASA and plavix for 3 weeks, then stop ASA and continue Plavix monotherapy and statin  - Risk factor stratification with HgbA1C, Fasting Lipid profile, TSH  - 2D echocardiogram as above  - Maintain Euthermia with Tylenol prn temp > 37.2 degrees C.  - BP Goal <180/105  - Cardiac enzymes and EKG, stress test done, cardiology on board, appreciate assistance  - No further inpatient workup is needed at this time. Please call with questions, concerns or neurological decline.      DVT prophylaxis with chemo/SCD prophylaxis  Extensively discussed lifestyle modifications as prophylactic measures for stroke prevention including smoking cessation, adequate blood pressure management, healthy diet and  regular exercise.    Patient to follow up with [facility] at [phone] within 7 days from discharge.     Stroke education was provided including stroke risk factors modification and any acute neurological changes including weakness, confusion, visual changes to come straight to the ER.     All questions were answered.                              Thank you kindly for including us in the care of this patient. Please do not hesitate to contact us with any questions.      70 minutes of care time has been spent evaluating with the patient. Time includes chart review not limited to diagnostic imaging, labs, and vitals, patient assessment, discussion with family and nursing, current order evaluations, and new order entries.      Jahaira Angelo MD  Neurology

## 2023-09-12 NOTE — PT/OT/SLP EVAL
Physical Therapy Evaluation    Patient Name:  Papito Hutton   MRN:  [id number]    Recommendations:     Discharge Recommendations: home health PT   Discharge Equipment Recommendations: walker, rolling   Barriers to discharge: None    Assessment:     Papito Hutton is a 78 y.o. male admitted with a medical diagnosis of Hypertensive emergency.  He presents with the following impairments/functional limitations: weakness, impaired endurance, impaired functional mobility, decreased lower extremity function, impaired cardiopulmonary response to activity .    Pt seen supine in bed and agreeable to PT. Pt stated of R weakness yesterday but all resolved with MRI negative. Pt reported his knees are bad with difficulty walking. Pt ambulated with RW min to [facility] 250ft and OOB chair. Pt to benefit from continued therapies    Rehab Prognosis: Good; patient would benefit from acute skilled PT services to address these deficits and reach maximum level of function.    Recent Surgery: * No surgery found *      Plan:     During this hospitalization, patient to be seen daily to address the identified rehab impairments via gait training, therapeutic activities, therapeutic exercises and progress toward the following goals:    Plan of Care Expires:  09/30/23    Subjective   Stated that it felt good to get up in chair  Bed is uncomfortable and did not sleep well  Stated he was taking a lot of over the counter meds Sudafed  Chief Complaint: knees are bad  Patient/Family Comments/goals: get home  Pain/Comfort:  Pain Rating 1: 0/10    Patients cultural, spiritual, Mandaen conflicts given the current situation:      Living Environment:  Home with SO  Prior to admission, patients level of function was ambulatory/indep.  Equipment used at home: none.  DME owned (not currently used): none.  Upon discharge, patient will have assistance from family.    Objective:     Communicated with nurse De Luna/charge Sen prior to session.  Patient  found HOB elevated with telemetry, blood pressure cuff, pulse ox (continuous), oxygen, peripheral IV  upon PT entry to room.    General Precautions: Standard, fall  Orthopedic Precautions:N/A   Braces: N/A  Respiratory Status: Nasal cannula, flow 2 L/min    Exams:  Postural Exam:  Patient presented with the following abnormalities:    -       Rounded shoulders  -       Forward head  -       BMI 28  RLE ROM: WFL  RLE Strength: WFL  LLE ROM: WFL  LLE Strength: WFL    Functional Mobility:  Bed Mobility:     Rolling Right: modified independence  Scooting: minimum assistance  Supine to Sit: minimum assistance  Transfers:     Sit to Stand:  contact guard assistance with rolling walker  Bed to Chair: contact guard assistance with  rolling walker  using  Stand Pivot  Gait: 250ft with RW CGA with RW with O2 at 2 liters with SAT 96%      AM-PAC 6 CLICK MOBILITY  Total Score:17       Treatment & Education:  Patient was educated on the importance of OOB activity and functional mobility to negate negative effects of prolonged bed rest during hospitalization, safe transfers and ambulation, and D/C planning   OOB chair post PT with all needs within reach    Patient left up in chair with all lines intact, call button in reach, chair alarm on, and nurse Annamarie present.    GOALS:   Multidisciplinary Problems       Physical Therapy Goals          Problem: Physical Therapy    Goal Priority Disciplines Outcome Goal Variances Interventions   Physical Therapy Goal     PT, PT/OT Ongoing, Progressing     Description: Goals to be met by: 2023     Patient will increase functional independence with mobility by performin. Supine to sit with Modified [place]  2. Sit to stand transfer with Supervision  3. Bed to chair transfer with Supervision using Rolling Walker  4. Gait  x 250x2 feet with Stand-by Assistance using Rolling Walker.   5. Lower extremity exercise program x20 reps                       History:     Past Medical  History:   Diagnosis Date    Arthritis     B12 deficiency     Colon polyp     Diabetes mellitus     BORDERLINE    GERD (gastroesophageal reflux disease)     [place] (hard of hearing)     BILAT AIDS    Hyperlipidemia     Hypertension     Low testosterone     Personal history of colonic polyps 2008    adenomatous polyp    Sinus disorder     Sleep apnea     DOES NOT USE MACHINE    Wears glasses        Past Surgical History:   Procedure Laterality Date    COLON SURGERY      COLONOSCOPY  2008    Dr. Garcia; polyps removed    COLONOSCOPY N/A 07/13/2020    Procedure: COLONOSCOPY;  Surgeon: Jj Fried MD;  Location: [facility];  Service: Endoscopy;  Laterality: N/A;    COLONOSCOPY N/A 8/15/2023    Procedure: COLONOSCOPY;  Surgeon: Jj Fried MD;  Location: [facility];  Service: Endoscopy;  Laterality: N/A;    ESOPHAGOGASTRODUODENOSCOPY N/A 7/31/2023    Procedure: EGD (ESOPHAGOGASTRODUODENOSCOPY);  Surgeon: Jj Fried MD;  Location: [facility];  Service: Endoscopy;  Laterality: N/A;    LAPAROSCOPIC RIGHT COLON RESECTION Right 08/10/2020    Procedure: COLECTOMY, RIGHT, LAPAROSCOPIC pooss conversion to open;  Surgeon: Noble Kaye MD;  Location: [facility];  Service: General;  Laterality: Right;    NASAL SEPTUM SURGERY      right hand surgery      RIGHT HEMICOLECTOMY N/A 08/10/2020    Procedure: HEMICOLECTOMY, RIGHT;  Surgeon: Noble Kaye MD;  Location: [facility];  Service: General;  Laterality: N/A;    UPPER GASTROINTESTINAL ENDOSCOPY  2008    hiatal hernia; esophagitis       Time Tracking:     PT Received On: 09/12/23  PT Start Time: 1006     PT Stop Time: 1028  PT Total Time (min): 22 min     Billable Minutes: Evaluation 10 and Gait Training 12 09/12/2023

## 2023-09-12 NOTE — DISCHARGE SUMMARY
[facility] Medicine  Discharge Summary      Patient Name: Papito Hutton  MRN: [id number]  [facility]: [id number]  Patient Class: IP- Inpatient  Admission Date: 9/9/2023  Hospital Length of Stay: 3 days  Discharge Date and Time:  09/12/2023 1:20 PM  Attending Physician: Gina Arriaza MD   Discharging Provider: Gina Arriaza MD  Primary Care Provider: Rafi Martin MD    Primary Care Team: Networked reference to record PCT     HPI:   Papito Hutton is a 78 year old male with a past medical history of KEITH, allergies, hypertension, hyperlipidemia, GERD and polyps who presented to the ED with dyspnea.  Patient states he experienced short episodes of dyspnea while falling asleep or awakening him from sleep.  Patient states he has been diagnosed with sleep apnea and does not wear CPAP.  Patient's wife states he does snore and has a chronic history of awakening from sleep.  Patient states shortness of breath worsens with activity.  Patient was seen in urgent care and noted to have some lower extremity edema.  He was referred to the ED from urgent care.  Upon arrival to the ED patient found to be hypertensive with SBP 180s that increased to 230s while in the emergency room.  Patient denies headache and vision changes.  He states he does take his blood pressure medication as prescribed and has never experienced difficulty in managing his blood pressure.  EKG showed sinus bradycardia and lab work performed in the ED was unremarkable.  Patient received 20 mg of hydralazine which decreased his blood pressure to 218 over 90s.  Patient was referred to Hospital Medicine and seen in the ED. He reports long history of awakening from sleep and was told by PCP to get sleep study. He states he started experiencing shortness of breath during the day two days ago that occurs with activity and when laying flat.  Patient did not become hypoxic after ambulating in the ED. He denies chest pain, nausea,  vomiting, fever and chills.  Patient will be admitted to hospital medicine for further evaluation and management.           * No surgery found *      Hospital Course:   Patient admitted to medicine telemetry service where patient was initiated on intravenous Cardene infusion.  Blood pressure was closely monitored.  No arrhythmia noted.  Serum troponin level trended.  A cardiology consultation and 2D echocardiogram ordered.  Patient and his wife counseled importance of having polysomnography at earliest for evaluation of obstructive sleep apnea.  Patient was extensively counseled regarding dangers of using over-the-counter decongestants which might have contributed towards uncontrolled hypertension.  Antihypertensive medications were adjusted with improved blood pressure.  During hospital stay patient developed sudden onset right facial weakness and right-sided pronator drift with facial drooping which resolved after few minutes.  Patient underwent neurological workup which was negative for acute CVA.  Patient was evaluated by neurologist who recommended dual antiplatelet therapy for 21 days and subsequently patient will continue Plavix therapy.  Patient instructed to monitor daily blood pressure and maintain a log.  Patient will follow-up with outpatient polysomnography.  Patient to follow-up with primary care physician, cardiologist and neurologist as outpatient.  Discharge plan of care reviewed with patient and his wife in detail who voiced understanding.       Goals of Care Treatment Preferences:  Code Status: Full Code    Living Will: Yes          CXR: No acute cardiopulmonary process.     DSE ():  The left ventricle is normal in size with normal systolic function.  Normal left ventricular diastolic function.  The estimated ejection fraction is 65%.  Normal right ventricular size with normal right ventricular systolic function.  The stress echo portion of this study is negative for myocardial  ischemia.  During stress, the following significant arrhythmias were observed: occasional PVCs.  The ECG portion of this study is negative for myocardial ischemia.     ECHO:     Left Ventricle: The left ventricle is normal in size. Mildly increased wall thickness. There is mild concentric hypertrophy. Normal wall motion. There is normal systolic function with a visually estimated ejection fraction of 65 - 70%. There is normal diastolic function.    Right Ventricle: Normal right ventricular cavity size. Wall thickness is normal. Right ventricle wall motion  is normal. Systolic function is normal.    IVC/SVC: Normal venous pressure at 3 mmHg.     Lexiscan:   1.  Scintigraphically negative for ischemia or infarct.  2. the global left ventricular systolic function is good with an LV ejection fraction of 65 % and no evidence of LV dilatation. Wall motion is normal.     MRI Brain:  No acute intracranial abnormality    Microbiology Results (last 7 days)       ** No results found for the last 168 hours. **          Consults:   Consults (From admission, onward)          Status Ordering Provider     Inpatient consult to Social Work/Case Management  Once        Provider:  (Not yet assigned)    Acknowledged ISA HYMAN     Inpatient consult to Neurology  Once        Provider:  Jahaira Sam MD    Completed SULTAN, AQIB     Inpatient consult to Physical Medicine Rehab  Once        Provider:  (Not yet assigned)    Acknowledged SULTAN, AQAMBER     IP consult to case management/social work  Once        Provider:  (Not yet assigned)    Completed SULTAN AQIB     Consult to Telemedicine - Acute Stroke  Once        Provider:  Evan Tinsley MD    Acknowledged WICHO LARA     Inpatient consult to Cardiology  Once        Provider:  Artur Sanches MD    Acknowledged WICHO LARA          Final Active Diagnoses:    Diagnosis Date Noted POA    PRINCIPAL PROBLEM:  Hypertensive emergency [I16.1] 09/10/2023 Yes     Dyspnea [R06.00] 09/09/2023 Yes    Hypercalcemia [E83.52] 05/23/2016 Yes    Hyperlipidemia [E78.5]  Yes    Hypertension [I10]  Yes    GERD (gastroesophageal reflux disease) [K21.9]  Yes      Problems Resolved During this Admission:    Diagnosis Date Noted Date Resolved POA    Hypertensive urgency [I16.0] 09/09/2023 09/09/2023 Unknown       Discharged Condition: good    Disposition: Home or Self Care    Follow Up:   Follow-up Information       Rafi Martin MD Follow up in 1 week(s).    Specialty: Family Medicine  Contact information:  [address] [facility] [address]  [phone]               Ramirez Beasley MD Follow up in 2 week(s).    Specialties: Interventional Cardiology, Cardiology  Contact information:  [address] [place] [address]  [facility]  [place] [address]  [phone]               Jahaira Sam MD Follow up in 1 week(s).    Specialty: Neurology  Contact information:  [address]  [facility] [address]  [phone]                           Patient Instructions:      Diet Cardiac   Order Comments: See Stroke Patient Education Guide Booklet for details.     Call 911 for any of the following:   Order Comments: Call 911  right away if any of the following warning signs come on suddenly, even if the symptoms only last for a few minutes. With stroke, timing is very important.   - Warning Signs of Stroke:  - Weakness: You may feel a sudden weakness, tingling or loss of feeling on one side of your face or body.  - Vision Problems: You may have sudden double vision or trouble seeing in one or both eyes.  - Speech Problems: You may have sudden trouble talking, slured speech, or problems understanding others.  - Headache: You may have sudden, severe headache.  - Movement Problems: You may experience dizziness, a feeling of spinning, a loss of balance, a feeling of falling or blackouts.     Call MD for:  temperature >100.4     Call MD for:  persistent nausea and vomiting or diarrhea      Call MD for:  severe uncontrolled pain     Call MD for:  redness, tenderness, or signs of infection (pain, swelling, redness, odor or green/yellow discharge around incision site)     Call MD for:  difficulty breathing or increased cough     Call MD for:  severe persistent headache     Call MD for:  worsening rash     Call MD for:  persistent dizziness, light-headedness, or visual disturbances     Call MD for:  increased confusion or weakness       Significant Diagnostic Studies: Labs:   CMP   Recent Labs   Lab 09/11/23  0445 09/11/23  1451 09/12/23  0711    136 139   K 4.2 3.9 4.1    108 106   CO2 23 25 24   GLU 97 102 106   BUN 15 18 13   CREATININE 1.0 0.9 0.9   CALCIUM 10.4 10.2 10.6*   PROT 6.4 6.1 6.7   ALBUMIN 3.6 3.5 3.7   BILITOT 0.8 0.5 0.8   ALKPHOS 78 75 82   AST 19 20 18   ALT 26 26 25   ANIONGAP 9 3* 9   , CBC   Recent Labs   Lab 09/11/23  1451 09/12/23  0509 09/12/23  0711   WBC 7.19 6.85 5.60   HGB 13.8* 14.4 14.3   HCT 39.9* 43.7 42.3    190 192   , INR   Lab Results   Component Value Date    INR 1.0 09/12/2023    INR 1.0 09/11/2023    INR 1.0 09/11/2023    and Lipid Panel   Lab Results   Component Value Date    CHOL 137 09/12/2023    HDL 46 09/12/2023    LDLCALC 63.6 09/12/2023    TRIG 137 09/12/2023    CHOLHDL 33.6 09/12/2023       Pending Diagnostic Studies:       Procedure Component Value Units Date/Time    EKG 12-lead [[id number]]     Order Status: Sent Lab Status: No result            Medications:  Reconciled Home Medications:      Medication List        START taking these medications      atorvastatin 80 MG tablet  Commonly known as: LIPITOR  Take 1 tablet (80 mg total) by mouth once daily.  Start taking on: September 13, 2023  Replaces: rosuvastatin 20 MG tablet     clopidogreL 75 mg tablet  Commonly known as: PLAVIX  Take 1 tablet (75 mg total) by mouth once daily.  Start taking on: September 13, 2023     triamterene-hydrochlorothiazide 37.5-25 mg 37.5-25 mg per  tablet  Commonly known as: MAXZIDE-25  Take 1 tablet by mouth once daily.  Start taking on: September 13, 2023            CHANGE how you take these medications      diclofenac sodium 1 % Gel  Commonly known as: VOLTAREN  APPLY 2 GRAMS TOPICALLY ONCE DAILY  What changed:   when to take this  Another medication with the same name was removed. Continue taking this medication, and follow the directions you see here.     fluticasone propionate 50 mcg/actuation nasal spray  Commonly known as: FLONASE  USE 2 SPRAYS IN EACH NOSTRIL ONCE DAILY  What changed:   how much to take  when to take this     metoprolol succinate 25 MG 24 hr tablet  Commonly known as: TOPROL-XL  TAKE 1 TABLET DAILY  What changed: when to take this            CONTINUE taking these medications      acetaminophen 650 MG Tbsr  Commonly known as: TYLENOL  Take 1 tablet (650 mg total) by mouth every 8 (eight) hours as needed (do not take with any other tylenol or acetaminophen).     aspirin 81 MG Chew  Take 1 tablet (81 mg total) by mouth once daily. for 21 days     azelastine 137 mcg (0.1 %) nasal spray  Commonly known as: ASTELIN  1 spray (137 mcg total) by Nasal route 2 (two) times daily. for 180 doses     benazepriL 40 MG tablet  Commonly known as: LOTENSIN  Take 1 tablet (40 mg total) by mouth once daily.     cetirizine 10 MG tablet  Commonly known as: ZYRTEC  Take 1 tablet (10 mg total) by mouth every evening.     cyanocobalamin 1000 MCG tablet  Commonly known as: VITAMIN B-12  Take 100 mcg by mouth once daily.     esomeprazole 40 MG capsule  Commonly known as: NEXIUM  Take 1 capsule (40 mg total) by mouth before breakfast.     montelukast 10 mg tablet  Commonly known as: SINGULAIR  Take 10 mg by mouth every evening.     VITAMIN D3 ORAL  Take 1 capsule by mouth once daily.            STOP taking these medications      rosuvastatin 20 MG tablet  Commonly known as: CRESTOR  Replaced by: atorvastatin 80 MG tablet     verapamiL 120 MG C24p  Commonly known  as: VERELAN              Indwelling Lines/Drains at time of discharge:   Lines/Drains/Airways       None                   Time spent on the discharge of patient: 33 minutes         Gina Arriaza MD  Department of Hospital Medicine  [facility]/Surg

## 2023-09-12 NOTE — TELEPHONE ENCOUNTER
----- Message from Chance Segundo sent at 9/12/2023  1:30 PM CDT -----  Type:  Sooner Appointment Request    Caller is requesting a sooner appointment.  Caller declined first available appointment listed below.  Caller will not accept being placed on the waitlist and is requesting a message be sent to doctor.    Name of Caller:  Patti/ EZEKIEL   When is the first available appointment?  Out of Template  Symptoms:  [facility]  Best Call Back Number: Pt- [phone]  Additional Information:

## 2023-09-12 NOTE — PLAN OF CARE
Problem: Physical Therapy  Goal: Physical Therapy Goal  Description: Goals to be met by: 2023     Patient will increase functional independence with mobility by performin. Supine to sit with Modified [place]  2. Sit to stand transfer with Supervision  3. Bed to chair transfer with Supervision using Rolling Walker  4. Gait  x 250x2 feet with Stand-by Assistance using Rolling Walker.   5. Lower extremity exercise program x20 reps  Outcome: Ongoing, Progressing   PT eval and treat. Gait with RW 250ft with O2 at 2 liters, SAT 96%. OOB chair. HHPT

## 2023-09-12 NOTE — PLAN OF CARE
Patient cleared for discharge from case management standpoint.    SW scheduled PCP hospital follow up.  Neurology, Cardiology office to contact patient to schedule hospital follow up.       09/12/23 1333   Final Note   Assessment Type Final Discharge Note   Anticipated Discharge Disposition Home   What phone number can be called within the next 1-3 days to see how you are doing after discharge? [phone]   Hospital Resources/Appts/Education Provided Provided patient/caregiver with written discharge plan information;Provided education on problems/symptoms using teachback;Appointments scheduled and added to AVS

## 2023-09-12 NOTE — PT/OT/SLP EVAL
"Occupational Therapy   Evaluation and Discharge Note    Name: Papito Hutton  MRN: [id number]  Admitting Diagnosis: Hypertensive emergency  Recent Surgery: * No surgery found *      Recommendations:     Discharge Recommendations:    Discharge Equipment Recommendations:    Barriers to discharge:       Assessment:     Papito Hutton is a 78 y.o. male with a medical diagnosis of Hypertensive emergency. At this time, patient requires CGA/SBA with ADL's. OT provided instruction in home safety with ADL's/IADL's including review of home set up and DME/AE. Pt verbalized understanding of education, but did not feel that further OT was indicated.    Plan:     During this hospitalization, patient does not require further acute OT services.  Please re-consult if situation changes.    Plan of Care Reviewed with: patient    Subjective     Chief Complaint: "I had therapy already" "People keep coming in"   Patient/Family Comments/goals: To go home    Occupational Profile:  Living Environment: Pt lives with SO in 1 story home with no CHARLIE. Pt has a walk in shower with built in seat. Pt has a raised toilet.  Previous level of function: Independent  Roles and Routines: SO  Equipment Used at home:    Assistance upon Discharge: SO    Pain/Comfort:  Pain Rating 1: 0/10  Pain Rating Post-Intervention 1: 0/10    Patients cultural, spiritual, Mandaen conflicts given the current situation:      Objective:     Communicated with: Nurse De Luna prior to session.  Patient found up in chair with chair check, oxygen, peripheral IV, pulse ox (continuous) upon OT entry to room.    General Precautions: Standard, fall  Orthopedic Precautions: N/A  Braces: N/A  Respiratory Status: Nasal cannula, flow 1 L/min     Occupational Performance:      Activities of Daily Living:  Feeding:  independence    Grooming: stand by assistance set up in sitting  Bathing: contact guard assistance    Upper Body Dressing: stand by assistance set up in sitting  Lower " Body Dressing: contact guard assistance    Toileting: contact guard assistance      Cognitive/Visual Perceptual:  Pt alert and oriented    Physical Exam:  Upper Extremity Strength:    -       Right Upper Extremity: Pt demonstrates impaired AROM digits 2-4 R hand. Pt unable to make a fist, but demonstrates thumb finger opposition. Pt reports history of injury/surgery R hand. Pt demonstrates distal RUE AROM/strength WFL's.  -       Left Upper Extremity: WFL    AMPAC 6 Click ADL:  AMPAC Total Score: 20    Treatment & Education:  OT provided education in role of OT. Pt verbalized understanding and participated in OT Evaluation.   OT provided instruction in home safety with ADL's/IADL's including review of home set up and DME/AE. Pt verbalized understanding.   OT provided education in calling for assist. Pt verbalized understanding.    Patient left up in chair with all lines intact, call button in reach, and chair alarm on    GOALS:   Multidisciplinary Problems       Occupational Therapy Goals       Not on file                    History:     Past Medical History:   Diagnosis Date    Arthritis     B12 deficiency     Colon polyp     Diabetes mellitus     BORDERLINE    GERD (gastroesophageal reflux disease)     [place] (hard of hearing)     BILAT AIDS    Hyperlipidemia     Hypertension     Low testosterone     Personal history of colonic polyps 2008    adenomatous polyp    Sinus disorder     Sleep apnea     DOES NOT USE MACHINE    Wears glasses          Past Surgical History:   Procedure Laterality Date    COLON SURGERY      COLONOSCOPY  2008    Dr. Garcia; polyps removed    COLONOSCOPY N/A 07/13/2020    Procedure: COLONOSCOPY;  Surgeon: Jj Fried MD;  Location: [facility];  Service: Endoscopy;  Laterality: N/A;    COLONOSCOPY N/A 8/15/2023    Procedure: COLONOSCOPY;  Surgeon: Jj Fried MD;  Location: [facility];  Service: Endoscopy;  Laterality: N/A;    ESOPHAGOGASTRODUODENOSCOPY N/A 7/31/2023    Procedure:  EGD (ESOPHAGOGASTRODUODENOSCOPY);  Surgeon: Jj Fried MD;  Location: [facility];  Service: Endoscopy;  Laterality: N/A;    LAPAROSCOPIC RIGHT COLON RESECTION Right 08/10/2020    Procedure: COLECTOMY, RIGHT, LAPAROSCOPIC pooss conversion to open;  Surgeon: Noble Kaye MD;  Location: [facility];  Service: General;  Laterality: Right;    NASAL SEPTUM SURGERY      right hand surgery      RIGHT HEMICOLECTOMY N/A 08/10/2020    Procedure: HEMICOLECTOMY, RIGHT;  Surgeon: Noble Kaye MD;  Location: [facility];  Service: General;  Laterality: N/A;    UPPER GASTROINTESTINAL ENDOSCOPY  2008    hiatal hernia; esophagitis       Time Tracking:     OT Date of Treatment: 09/12/23  OT Start Time: 1059  OT Stop Time: 1109  OT Total Time (min): 10 min    Billable Minutes:Evaluation 10    9/12/2023

## 2023-09-13 ENCOUNTER — PATIENT OUTREACH (OUTPATIENT)
Dept: ADMINISTRATIVE | Facility: CLINIC | Age: 78
End: 2023-09-13
Payer: MEDICARE

## 2023-09-13 NOTE — PROGRESS NOTES
C3 nurse spoke with Papito Hutton for a TCC post hospital discharge follow up call. The patient has a scheduled HOSFU appointment with Rafi Martin MD on 09/26/23 @ 1000.

## 2023-09-19 DIAGNOSIS — M25.50 ARTHRALGIA, UNSPECIFIED JOINT: ICD-10-CM

## 2023-09-19 NOTE — TELEPHONE ENCOUNTER
No care due was identified.  [facility] Embedded Care Due Messages. Reference number: [id number].   9/19/2023 2:40:12 PM CDT

## 2023-09-19 NOTE — TELEPHONE ENCOUNTER
Please see pharmacy comment below:    Pharmacy comment: YOUR PATIENT REQUESTED AND EXPRESSLY CONSENTED FOR THIS RX TO BE FILLED AT [facility]. THIS INFORMATION IS PATIENT PROVIDED, NOT CLAIM BASED. PLEASE VERIFY CURRENT THERAPY. 90-DAY SUPPLY REQUESTED    Pharmacy comment: YOUR PATIENT REQUESTED AND EXPRESSLY CONSENTED FOR THIS RX TO BE FILLED AT [facility]. THIS INFORMATION IS PATIENT PROVIDED, NOT CLAIM BASED. PLEASE VERIFY CURRENT THERAPY. 90-DAY SUPPLY REQUESTED    Please transfer attached prescriptions to mail order pharmacy.   LOV--7-20-23  FOV--9-26-23

## 2023-09-20 RX ORDER — ATORVASTATIN CALCIUM 80 MG/1
80 TABLET, FILM COATED ORAL DAILY
Qty: 90 TABLET | Refills: 1 | Status: SHIPPED | OUTPATIENT
Start: 2023-09-20 | End: 2023-12-11 | Stop reason: SDUPTHER

## 2023-09-20 RX ORDER — DICLOFENAC SODIUM 10 MG/G
GEL TOPICAL
Qty: 100 G | Refills: 0 | Status: SHIPPED | OUTPATIENT
Start: 2023-09-20 | End: 2023-10-12 | Stop reason: SDUPTHER

## 2023-09-20 RX ORDER — TRIAMTERENE AND HYDROCHLOROTHIAZIDE 37.5; 25 MG/1; MG/1
1 CAPSULE ORAL DAILY
Qty: 90 CAPSULE | Refills: 1 | Status: SHIPPED | OUTPATIENT
Start: 2023-09-20 | End: 2023-09-26 | Stop reason: DRUGHIGH

## 2023-09-20 RX ORDER — CLOPIDOGREL BISULFATE 75 MG/1
75 TABLET ORAL DAILY
Qty: 90 TABLET | Refills: 1 | Status: SHIPPED | OUTPATIENT
Start: 2023-09-20 | End: 2024-03-01

## 2023-09-21 NOTE — PHYSICIAN QUERY
PT Name: Papito Hutton  MR #: [id number]    DOCUMENTATION CLARIFICATION     CDS/: Paulette Wooten               Contact information:  This form is a permanent document in the medical record.    Query Date: September 21, 2023      By submitting this query, we are merely seeking further clarification of documentation.  Please utilize your independent clinical judgment when addressing the question(s) below.    The Medical Record reflects the following:    Clinical Information Location in Medical Record   Transient Ischemic Attack   Hypertensive Urgency   78 year old man with history of KEITH, allergies, hypertension,   hyperlipidemia, GERD and polyps who initially presented to the ED   with dyspnea and severe hypertension on arrival (systolic in the   220s, diastolic in the 110s), was admitted for treatment of   hypertensive urgency and after undergoing nuclear stress test   with cardiology, he developed a right pronator drift, aphasia,   dysarthria and facial droop, which resolved in less than an hour.   CT brain was unremarkable and CTA head and neck showed no LVO.   MRI brain was negative for acute stroke, but showed mild to   moderate microvascular disease. He was taking ASA 81 mg daily   prior to the event, so will switch to plavix. Consults by Evan Tinsley MD9/11/2023       Please clarify/confirm the Consultants diagnosis of TIA:     [  ] Diagnosis ruled in   [  ] Diagnosis ruled in, but it resolved prior to my assessment of the patient   [  x] Diagnosis ruled out   [  ] Other diagnosis (please specify): _____________________________

## 2023-09-25 RX ORDER — METOPROLOL SUCCINATE 25 MG/1
TABLET, EXTENDED RELEASE ORAL
Qty: 90 TABLET | Refills: 3 | Status: SHIPPED | OUTPATIENT
Start: 2023-09-25 | End: 2024-01-24 | Stop reason: DRUGHIGH

## 2023-09-25 NOTE — TELEPHONE ENCOUNTER
No care due was identified.  [facility] Embedded Care Due Messages. Reference number: [id number].   9/25/2023 2:43:28 AM CDT

## 2023-09-26 ENCOUNTER — OFFICE VISIT (OUTPATIENT)
Dept: FAMILY MEDICINE | Facility: CLINIC | Age: 78
End: 2023-09-26
Payer: MEDICARE

## 2023-09-26 VITALS
DIASTOLIC BLOOD PRESSURE: 86 MMHG | OXYGEN SATURATION: 96 % | WEIGHT: 209 LBS | BODY MASS INDEX: 28.31 KG/M2 | HEIGHT: 72 IN | HEART RATE: 78 BPM | SYSTOLIC BLOOD PRESSURE: 136 MMHG

## 2023-09-26 DIAGNOSIS — I10 ESSENTIAL HYPERTENSION: ICD-10-CM

## 2023-09-26 DIAGNOSIS — Z86.73 HX OF TRANSIENT ISCHEMIC ATTACK (TIA): ICD-10-CM

## 2023-09-26 DIAGNOSIS — E21.3 HYPERPARATHYROIDISM: ICD-10-CM

## 2023-09-26 DIAGNOSIS — Z09 HOSPITAL DISCHARGE FOLLOW-UP: Primary | ICD-10-CM

## 2023-09-26 DIAGNOSIS — I1A.0 RESISTANT HYPERTENSION: ICD-10-CM

## 2023-09-26 DIAGNOSIS — G47.20 CIRCADIAN RHYTHM SLEEP DISORDER, UNSPECIFIED TYPE: ICD-10-CM

## 2023-09-26 PROCEDURE — 99999 PR PBB SHADOW E&M-EST. PATIENT-LVL V: ICD-10-PCS | Mod: PBBFAC,,, | Performed by: STUDENT IN AN ORGANIZED HEALTH CARE EDUCATION/TRAINING PROGRAM

## 2023-09-26 PROCEDURE — 99999 PR PBB SHADOW E&M-EST. PATIENT-LVL V: CPT | Mod: PBBFAC,,, | Performed by: STUDENT IN AN ORGANIZED HEALTH CARE EDUCATION/TRAINING PROGRAM

## 2023-09-26 PROCEDURE — 99215 OFFICE O/P EST HI 40 MIN: CPT | Mod: PBBFAC,PO | Performed by: STUDENT IN AN ORGANIZED HEALTH CARE EDUCATION/TRAINING PROGRAM

## 2023-09-26 PROCEDURE — 99214 OFFICE O/P EST MOD 30 MIN: CPT | Mod: S$PBB,,, | Performed by: STUDENT IN AN ORGANIZED HEALTH CARE EDUCATION/TRAINING PROGRAM

## 2023-09-26 PROCEDURE — 99214 PR OFFICE/OUTPT VISIT, EST, LEVL IV, 30-39 MIN: ICD-10-PCS | Mod: S$PBB,,, | Performed by: STUDENT IN AN ORGANIZED HEALTH CARE EDUCATION/TRAINING PROGRAM

## 2023-09-26 RX ORDER — ROSUVASTATIN CALCIUM 20 MG/1
20 TABLET, COATED ORAL NIGHTLY
COMMUNITY
Start: 2023-09-18 | End: 2023-12-11

## 2023-09-26 RX ORDER — METFORMIN HYDROCHLORIDE 500 MG/1
500 TABLET ORAL DAILY
COMMUNITY
End: 2024-03-28 | Stop reason: DRUGHIGH

## 2023-09-26 RX ORDER — DICLOFENAC SODIUM 75 MG/1
75 TABLET, DELAYED RELEASE ORAL 2 TIMES DAILY
COMMUNITY
Start: 2023-09-12 | End: 2024-03-28 | Stop reason: DRUGHIGH

## 2023-09-26 RX ORDER — TRIAMTERENE/HYDROCHLOROTHIAZID 37.5-25 MG
1 TABLET ORAL DAILY
Qty: 90 TABLET | Refills: 3 | Status: SHIPPED | OUTPATIENT
Start: 2023-09-26 | End: 2024-03-28 | Stop reason: SDUPTHER

## 2023-09-26 NOTE — PROGRESS NOTES
[facility] MEDICINE CLINIC NOTE    Patient Name: Papito Hutton  YOB: 1945    PRESENTING HISTORY     History of Present Illness:  Mr. Papito Hutton is a 78 y.o. male here for hospital f/u.     A few months ago I adjusted his antihypertensive regimen related to leg pains and hypercalcemia.   I stopped his amlodipine amd replaced with verapamil.   He has not been on diuretic due to calcium.     Then was taking sudafed and blood pressure went up.  Went to , then referred to ED for hypertensive urgency.   Had TIA during admission.     Separate chronic problems.   Dysphagia- marginal improvement s/p EGD dilation.   Hyperparthyroidism- high PTH. No bone loss of Dexa.             ROS      OBJECTIVE:   Vital Signs:  Vitals:    09/26/23 0951   BP: 136/86   Pulse: 78   SpO2: 96%   Weight: 94.8 kg (209 lb)   Height: 6' (1.829 m)          Physical Exam: Normal, no change.     Physical Exam    ASSESSMENT & PLAN:     Hospital discharge follow-up    Essential hypertension  -     triamterene-hydrochlorothiazide 37.5-25 mg (MAXZIDE-25) 37.5-25 mg per tablet; Take 1 tablet by mouth once daily.  Dispense: 90 tablet; Refill: 3    Resistant hypertension  -     US Renal Artery Stenosis Hyperten (xpd); Future; Expected date: 09/26/2023  -     Home Sleep Study; Future    Circadian rhythm sleep disorder, unspecified type  -     Home Sleep Study; Future    Hyperparathyroidism    Hx of transient ischemic attack (TIA)      Secondary HTN workup with sleep study, renal artery evaluation.   Will refer for consideration of hypoglossal nerve stimulator if KEITH found.     While thiazide will likely increase calcium, I don't think it can be avoided at this time.     Constellation of dysphagia and hypercalcemia- no Reynaud's to suggest CREST.              Rafi Martin MD   Internal Medicine

## 2023-09-26 NOTE — PATIENT INSTRUCTIONS
Ultrasound of the kidneys to check blood flow and a sleep study.   Issues with either of these can raise blood pressure.     If you have sleep apnea I will send you to the ENT to discuss sleep options.           Edwardo Cobos,     If you are due for any health screening(s) below please notify me so we can arrange them to be ordered and scheduled to maintain your health. Most healthy patients complete it. Don't lose out on improving your health.     Tests to Keep You Healthy    Last Blood Pressure <= 139/89 (9/26/2023): NO

## 2023-09-29 ENCOUNTER — OFFICE VISIT (OUTPATIENT)
Dept: CARDIOLOGY | Facility: CLINIC | Age: 78
End: 2023-09-29
Payer: MEDICARE

## 2023-09-29 VITALS
HEIGHT: 72 IN | DIASTOLIC BLOOD PRESSURE: 88 MMHG | BODY MASS INDEX: 27.63 KG/M2 | WEIGHT: 204 LBS | OXYGEN SATURATION: 97 % | SYSTOLIC BLOOD PRESSURE: 150 MMHG | HEART RATE: 73 BPM

## 2023-09-29 DIAGNOSIS — I10 PRIMARY HYPERTENSION: ICD-10-CM

## 2023-09-29 DIAGNOSIS — R73.03 PREDIABETES: ICD-10-CM

## 2023-09-29 DIAGNOSIS — I16.1 HYPERTENSIVE EMERGENCY: Primary | ICD-10-CM

## 2023-09-29 DIAGNOSIS — Z86.73 HX OF TRANSIENT ISCHEMIC ATTACK (TIA): ICD-10-CM

## 2023-09-29 DIAGNOSIS — E78.5 HYPERLIPIDEMIA, UNSPECIFIED HYPERLIPIDEMIA TYPE: ICD-10-CM

## 2023-09-29 PROCEDURE — 99214 PR OFFICE/OUTPT VISIT, EST, LEVL IV, 30-39 MIN: ICD-10-PCS | Mod: S$PBB,,, | Performed by: NURSE PRACTITIONER

## 2023-09-29 PROCEDURE — 99214 OFFICE O/P EST MOD 30 MIN: CPT | Mod: PBBFAC,PN | Performed by: NURSE PRACTITIONER

## 2023-09-29 PROCEDURE — 99999 PR PBB SHADOW E&M-EST. PATIENT-LVL IV: ICD-10-PCS | Mod: PBBFAC,,, | Performed by: NURSE PRACTITIONER

## 2023-09-29 PROCEDURE — 99999 PR PBB SHADOW E&M-EST. PATIENT-LVL IV: CPT | Mod: PBBFAC,,, | Performed by: NURSE PRACTITIONER

## 2023-09-29 PROCEDURE — 99214 OFFICE O/P EST MOD 30 MIN: CPT | Mod: S$PBB,,, | Performed by: NURSE PRACTITIONER

## 2023-09-29 NOTE — PROGRESS NOTES
Subjective:    Patient ID:  Papito Hutton is a 78 y.o. male   Chief Complaint   Patient presents with    Follow-up    Shortness of Breath     From dc summary: Hospital Course:   Patient admitted to medicine telemetry service where patient was initiated on intravenous Cardene infusion.  Blood pressure was closely monitored.  No arrhythmia noted.  Serum troponin level trended.  A cardiology consultation and 2D echocardiogram ordered.  Patient and his wife counseled importance of having polysomnography at earliest for evaluation of obstructive sleep apnea.  Patient was extensively counseled regarding dangers of using over-the-counter decongestants which might have contributed towards uncontrolled hypertension.  Antihypertensive medications were adjusted with improved blood pressure.  During hospital stay patient developed sudden onset right facial weakness and right-sided pronator drift with facial drooping which resolved after few minutes.  Patient underwent neurological workup which was negative for acute CVA.  Patient was evaluated by neurologist who recommended dual antiplatelet therapy for 21 days and subsequently patient will continue Plavix therapy.  Patient instructed to monitor daily blood pressure and maintain a log.  Patient will follow-up with outpatient polysomnography.  Patient to follow-up with primary care physician, cardiologist and neurologist as outpatient.  Discharge plan of care reviewed with patient and his wife in detail who voiced understanding.    HPI:  Patient seen today for follow-up appointment.  He was recently admitted with hypertensive emergency as well as some difficulty breathing.  Patient is being evaluated for obstructive sleep apnea as he does have frequent episodes of waking gasping during the night.  Patient had TIA symptoms in the hospital and had a negative workup for CVA.  He was started on aspirin as well as Plavix.  Patient underwent nuclear stress testing due to  elevated troponin level.  Nuclear stress test was negative for reversible ischemia.  Echocardiogram was unremarkable.    Review of patient's allergies indicates:  No Known Allergies    Past Medical History:   Diagnosis Date    Arthritis     B12 deficiency     Colon polyp     Diabetes mellitus     BORDERLINE    GERD (gastroesophageal reflux disease)     [place] (hard of hearing)     BILAT AIDS    Hyperlipidemia     Hypertension     Low testosterone     Personal history of colonic polyps 2008    adenomatous polyp    Sinus disorder     Sleep apnea     DOES NOT USE MACHINE    Wears glasses      Past Surgical History:   Procedure Laterality Date    COLON SURGERY      COLONOSCOPY  2008    Dr. Garcia; polyps removed    COLONOSCOPY N/A 07/13/2020    Procedure: COLONOSCOPY;  Surgeon: Jj Fried MD;  Location: [facility];  Service: Endoscopy;  Laterality: N/A;    COLONOSCOPY N/A 8/15/2023    Procedure: COLONOSCOPY;  Surgeon: Jj Fried MD;  Location: [facility];  Service: Endoscopy;  Laterality: N/A;    ESOPHAGOGASTRODUODENOSCOPY N/A 7/31/2023    Procedure: EGD (ESOPHAGOGASTRODUODENOSCOPY);  Surgeon: Jj Fried MD;  Location: [facility];  Service: Endoscopy;  Laterality: N/A;    LAPAROSCOPIC RIGHT COLON RESECTION Right 08/10/2020    Procedure: COLECTOMY, RIGHT, LAPAROSCOPIC pooss conversion to open;  Surgeon: Noble Kaye MD;  Location: [facility];  Service: General;  Laterality: Right;    NASAL SEPTUM SURGERY      right hand surgery      RIGHT HEMICOLECTOMY N/A 08/10/2020    Procedure: HEMICOLECTOMY, RIGHT;  Surgeon: Noble Kaye MD;  Location: [facility];  Service: General;  Laterality: N/A;    UPPER GASTROINTESTINAL ENDOSCOPY  2008    hiatal hernia; esophagitis     Social History     Tobacco Use    Smoking status: Former     Types: Cigars    Smokeless tobacco: Former     Types: Chew   Substance Use Topics    Alcohol use: Yes     Alcohol/week: 2.0 standard drinks of alcohol     Types: 2 Cans of beer per  week     Comment: occ    Drug use: No     Family History   Problem Relation Age of Onset    Cancer Neg Hx     Diabetes Neg Hx     Heart disease Neg Hx     Colon cancer Neg Hx     Colon polyps Neg Hx     Esophageal cancer Neg Hx     Stomach cancer Neg Hx         Review of Systems:   Per HPI         Objective:        Vitals:    09/29/23 1058   BP: (!) 150/88   Pulse: 73       Lab Results   Component Value Date    WBC 5.60 09/12/2023    HGB 14.3 09/12/2023    HCT 42.3 09/12/2023     09/12/2023    CHOL 137 09/12/2023    TRIG 137 09/12/2023    HDL 46 09/12/2023    ALT 25 09/12/2023    AST 18 09/12/2023     09/12/2023    K 4.1 09/12/2023     09/12/2023    CREATININE 0.9 09/12/2023    BUN 13 09/12/2023    CO2 24 09/12/2023    TSH 0.782 09/12/2023    PSA 0.40 01/29/2019    INR 1.0 09/12/2023    HGBA1C 5.8 09/12/2023        ECHOCARDIOGRAM RESULTS  Results for orders placed during the hospital encounter of 09/09/23    STRESS TEST REPORT        CURRENT/PREVIOUS VISIT EKG  Results for orders placed or performed during the hospital encounter of 09/09/23   EKG 12-lead    Collection Time: 09/09/23  3:26 PM    Narrative    Test Reason : R06.02,    Vent. Rate : 056 BPM     Atrial Rate : 056 BPM     P-R Int : 200 ms          QRS Dur : 094 ms      QT Int : 428 ms       P-R-T Axes : 043 012 032 degrees     QTc Int : 413 ms    Sinus bradycardia  Otherwise normal ECG  When compared with ECG of 10-PATRIZIA-2023 09:41,  No significant change was found  Confirmed by Mak LAGOS, Satish ARANDA ([id number]) on 9/17/2023 11:16:55 PM    Referred By: [email or web address]   SELF           Confirmed By:Satish Corado MD     No valid procedures specified.   Results for orders placed during the hospital encounter of 09/09/23    Nuclear Stress Test    Interpretation Summary    The ECG portion of the study is negative for ischemia.    The patient reported no chest pain during the stress test.    There were no arrhythmias during stress.    The nuclear portion  of this study will be reported separately.      Physical Exam:  CONSTITUTIONAL: No fever, no chills  HEENT: Normocephalic, atraumatic,pupils reactive to light                 NECK:  No JVD no carotid bruit  CVS: S1S2+, RRR  LUNGS: Clear  ABDOMEN: Soft, NT, BS+  EXTREMITIES: No cyanosis, edema  : No bauman catheter  NEURO: AAO X 3  PSY: Normal affect      Medication List with Changes/Refills   Current Medications    ACETAMINOPHEN (TYLENOL) 650 MG TBSR    Take 1 tablet (650 mg total) by mouth every 8 (eight) hours as needed (do not take with any other tylenol or acetaminophen).    ASPIRIN 81 MG CHEW    Take 1 tablet (81 mg total) by mouth once daily. for 21 days    ATORVASTATIN (LIPITOR) 80 MG TABLET    Take 1 tablet (80 mg total) by mouth once daily.    AZELASTINE (ASTELIN) 137 MCG (0.1 %) NASAL SPRAY    1 spray (137 mcg total) by Nasal route 2 (two) times daily. for 180 doses    BENAZEPRIL (LOTENSIN) 40 MG TABLET    Take 1 tablet (40 mg total) by mouth once daily.    CETIRIZINE (ZYRTEC) 10 MG TABLET    Take 1 tablet (10 mg total) by mouth every evening.    CHOLECALCIFEROL, VITAMIN D3, (VITAMIN D3 ORAL)    Take 1 capsule by mouth once daily.    CLOPIDOGREL (PLAVIX) 75 MG TABLET    Take 1 tablet (75 mg total) by mouth once daily.    CYANOCOBALAMIN (VITAMIN B-12) 1000 MCG TABLET    Take 100 mcg by mouth once daily.    DICLOFENAC (VOLTAREN) 75 MG EC TABLET    Take 75 mg by mouth 2 (two) times daily.    DICLOFENAC SODIUM (VOLTAREN) 1 % GEL    Apply 2 grams topically once daily.    ESOMEPRAZOLE (NEXIUM) 40 MG CAPSULE    Take 1 capsule (40 mg total) by mouth before breakfast.    METFORMIN (GLUCOPHAGE) 500 MG TABLET    Take 500 mg by mouth daily with breakfast.    METOPROLOL SUCCINATE (TOPROL-XL) 25 MG 24 HR TABLET    TAKE 1 TABLET DAILY    MONTELUKAST (SINGULAIR) 10 MG TABLET    Take 10 mg by mouth every evening.    ROSUVASTATIN (CRESTOR) 20 MG TABLET    Take 20 mg by mouth every evening.     TRIAMTERENE-HYDROCHLOROTHIAZIDE 37.5-25 MG (MAXZIDE-25) 37.5-25 MG PER TABLET    Take 1 tablet by mouth once daily.   Discontinued Medications    FLUTICASONE PROPIONATE (FLONASE) 50 MCG/ACTUATION NASAL SPRAY    USE 2 SPRAYS IN EACH NOSTRIL ONCE DAILY             Assessment:       1. Hypertensive emergency    2. Hyperlipidemia, unspecified hyperlipidemia type    3. Primary hypertension    4. Prediabetes    5. Hx of transient ischemic attack (TIA)         Plan:     Problem List Items Addressed This Visit          Unprioritized    Hyperlipidemia    Current Assessment & Plan     Recommend low-fat low-cholesterol diet and regular exercise.  Goal for LDL is less than 70.          Hypertension    Current Assessment & Plan     Blood pressure stable on current regimen.  Blood pressure is slightly elevated at today's visit but he states that it is better controlled at home..  Continue benazepril 40 mg p.o. daily, metoprolol succinate 25 mg p.o. daily, and Maxzide 37.5-25 mg p.o. daily.  Followed by PCP.         Prediabetes    Hypertensive emergency - Primary    Current Assessment & Plan     Resolved.         Hx of transient ischemic attack (TIA)       Follow up in about 4 months (around 1/29/2024).

## 2023-09-29 NOTE — ASSESSMENT & PLAN NOTE
Blood pressure stable on current regimen.  Blood pressure is slightly elevated at today's visit but he states that it is better controlled at home..  Continue benazepril 40 mg p.o. daily, metoprolol succinate 25 mg p.o. daily, and Maxzide 37.5-25 mg p.o. daily.  Followed by PCP.

## 2023-10-12 DIAGNOSIS — M25.50 ARTHRALGIA, UNSPECIFIED JOINT: ICD-10-CM

## 2023-10-12 NOTE — TELEPHONE ENCOUNTER
No care due was identified.  [facility] Embedded Care Due Messages. Reference number: [id number].   10/12/2023 10:25:42 AM CDT

## 2023-10-13 RX ORDER — DICLOFENAC SODIUM 10 MG/G
GEL TOPICAL
Qty: 100 G | Refills: 11 | Status: SHIPPED | OUTPATIENT
Start: 2023-10-13

## 2023-10-16 ENCOUNTER — HOSPITAL ENCOUNTER (OUTPATIENT)
Dept: RADIOLOGY | Facility: HOSPITAL | Age: 78
Discharge: HOME | End: 2023-10-16
Attending: STUDENT IN AN ORGANIZED HEALTH CARE EDUCATION/TRAINING PROGRAM
Payer: MEDICARE

## 2023-10-16 DIAGNOSIS — I1A.0 RESISTANT HYPERTENSION: ICD-10-CM

## 2023-10-16 PROCEDURE — 76770 US EXAM ABDO BACK WALL COMP: CPT | Mod: TC

## 2023-11-10 DIAGNOSIS — K21.9 GASTROESOPHAGEAL REFLUX DISEASE: ICD-10-CM

## 2023-11-10 RX ORDER — ESOMEPRAZOLE MAGNESIUM 40 MG/1
40 CAPSULE, DELAYED RELEASE ORAL
Qty: 90 CAPSULE | Refills: 3 | Status: SHIPPED | OUTPATIENT
Start: 2023-11-10

## 2023-11-10 NOTE — TELEPHONE ENCOUNTER
Refill Routing Note   Medication(s) are not appropriate for processing by [facility] for the following reason(s):      Plavix is on active medication list     [facility] action(s):  Defer Care Due:  None identified            Appointments  past 12m or future 3m with PCP    Date Provider   Last Visit   9/26/2023 Rafi Martin MD   Next Visit   1/24/2024 Rafi Martin MD   ED visits in past 90 days: 0        Note composed:10:04 AM 11/10/2023

## 2023-11-10 NOTE — TELEPHONE ENCOUNTER
No care due was identified.  [facility] Embedded Care Due Messages. Reference number: [id number].   11/10/2023 9:51:28 AM CST

## 2023-11-27 ENCOUNTER — OFFICE VISIT (OUTPATIENT)
Dept: ORTHOPEDICS | Facility: CLINIC | Age: 78
End: 2023-11-27
Payer: MEDICARE

## 2023-11-27 VITALS — BODY MASS INDEX: 27.63 KG/M2 | WEIGHT: 204 LBS | HEIGHT: 72 IN | RESPIRATION RATE: 18 BRPM

## 2023-11-27 DIAGNOSIS — M25.512 LEFT SHOULDER PAIN, UNSPECIFIED CHRONICITY: Primary | ICD-10-CM

## 2023-11-27 DIAGNOSIS — M75.100 ROTATOR CUFF SYNDROME, UNSPECIFIED LATERALITY: Primary | ICD-10-CM

## 2023-11-27 PROCEDURE — 99214 OFFICE O/P EST MOD 30 MIN: CPT | Mod: 25,S$PBB,, | Performed by: ORTHOPAEDIC SURGERY

## 2023-11-27 PROCEDURE — 99213 OFFICE O/P EST LOW 20 MIN: CPT | Mod: PBBFAC,PO | Performed by: ORTHOPAEDIC SURGERY

## 2023-11-27 PROCEDURE — 20610 DRAIN/INJ JOINT/BURSA W/O US: CPT | Mod: PBBFAC,PO,LT | Performed by: ORTHOPAEDIC SURGERY

## 2023-11-27 PROCEDURE — 99999PBSHW PR PBB SHADOW TECHNICAL ONLY FILED TO HB: Mod: PBBFAC,,,

## 2023-11-27 PROCEDURE — 20610 LARGE JOINT ASPIRATION/INJECTION: L SUBACROMIAL BURSA: ICD-10-PCS | Mod: S$PBB,LT,, | Performed by: ORTHOPAEDIC SURGERY

## 2023-11-27 PROCEDURE — 99999 PR PBB SHADOW E&M-EST. PATIENT-LVL III: ICD-10-PCS | Mod: PBBFAC,,, | Performed by: ORTHOPAEDIC SURGERY

## 2023-11-27 PROCEDURE — 99999PBSHW PR PBB SHADOW TECHNICAL ONLY FILED TO HB: ICD-10-PCS | Mod: PBBFAC,,,

## 2023-11-27 PROCEDURE — 99214 PR OFFICE/OUTPT VISIT, EST, LEVL IV, 30-39 MIN: ICD-10-PCS | Mod: 25,S$PBB,, | Performed by: ORTHOPAEDIC SURGERY

## 2023-11-27 PROCEDURE — 99999 PR PBB SHADOW E&M-EST. PATIENT-LVL III: CPT | Mod: PBBFAC,,, | Performed by: ORTHOPAEDIC SURGERY

## 2023-11-27 RX ORDER — TRIAMCINOLONE ACETONIDE 40 MG/ML
40 INJECTION, SUSPENSION INTRA-ARTICULAR; INTRAMUSCULAR
Status: DISCONTINUED | OUTPATIENT
Start: 2023-11-27 | End: 2023-11-27 | Stop reason: HOSPADM

## 2023-11-27 RX ADMIN — TRIAMCINOLONE ACETONIDE 40 MG: 40 INJECTION, SUSPENSION INTRA-ARTICULAR; INTRAMUSCULAR at 01:11

## 2023-11-27 NOTE — PROCEDURES
Large Joint Aspiration/Injection: L subacromial bursa    Date/Time: 11/27/2023 1:15 PM    Performed by: Ernie Cox MD  Authorized by: Ernie Cox MD    Consent Done?:  Yes (Verbal)  Indications:  Pain  Site marked: the procedure site was marked    Timeout: prior to procedure the correct patient, procedure, and site was verified      Local anesthesia used?: Yes    Local anesthetic: Ropivicaine.  Anesthetic total (ml):  3      Details:  Needle Size:  20 G  Ultrasonic Guidance for needle placement?: No    Approach:  Posterior  Location:  Shoulder  Site:  L subacromial bursa  Medications:  40 mg triamcinolone acetonide 40 mg/mL  Patient tolerance:  Patient tolerated the procedure well with no immediate complications

## 2023-11-27 NOTE — PROGRESS NOTES
Past Medical History:   Diagnosis Date    Arthritis     B12 deficiency     Colon polyp     Diabetes mellitus     BORDERLINE    GERD (gastroesophageal reflux disease)     [place] (hard of hearing)     BILAT AIDS    Hyperlipidemia     Hypertension     Low testosterone     Personal history of colonic polyps 2008    adenomatous polyp    Sinus disorder     Sleep apnea     DOES NOT USE MACHINE    Wears glasses        Past Surgical History:   Procedure Laterality Date    COLON SURGERY      COLONOSCOPY  2008    Dr. Garcia; polyps removed    COLONOSCOPY N/A 07/13/2020    Procedure: COLONOSCOPY;  Surgeon: Jj Fried MD;  Location[facility];  Service: Endoscopy;  Laterality: N/A;    COLONOSCOPY N/A 8/15/2023    Procedure: COLONOSCOPY;  Surgeon: Jj Fried MD;  Location: [facility];  Service: Endoscopy;  Laterality: N/A;    ESOPHAGOGASTRODUODENOSCOPY N/A 7/31/2023    Procedure: EGD (ESOPHAGOGASTRODUODENOSCOPY);  Surgeon: Jj Fried MD;  Location: [facility];  Service: Endoscopy;  Laterality: N/A;    LAPAROSCOPIC RIGHT COLON RESECTION Right 08/10/2020    Procedure: COLECTOMY, RIGHT, LAPAROSCOPIC pooss conversion to open;  Surgeon: Noble Kaye MD;  Location: [facility];  Service: General;  Laterality: Right;    NASAL SEPTUM SURGERY      right hand surgery      RIGHT HEMICOLECTOMY N/A 08/10/2020    Procedure: HEMICOLECTOMY, RIGHT;  Surgeon: Noble Kaye MD;  Location: [facility];  Service: General;  Laterality: N/A;    UPPER GASTROINTESTINAL ENDOSCOPY  2008    hiatal hernia; esophagitis       Current Outpatient Medications   Medication Sig    acetaminophen (TYLENOL) 650 MG TbSR Take 1 tablet (650 mg total) by mouth every 8 (eight) hours as needed (do not take with any other tylenol or acetaminophen).    atorvastatin (LIPITOR) 80 MG tablet Take 1 tablet (80 mg total) by mouth once daily.    benazepriL (LOTENSIN) 40 MG tablet Take 1 tablet (40 mg total) by mouth once daily.    cetirizine (ZYRTEC) 10 MG tablet  Take 1 tablet (10 mg total) by mouth every evening.    CHOLECALCIFEROL, VITAMIN D3, (VITAMIN D3 ORAL) Take 1 capsule by mouth once daily.    clopidogreL (PLAVIX) 75 mg tablet Take 1 tablet (75 mg total) by mouth once daily.    cyanocobalamin (VITAMIN B-12) 1000 MCG tablet Take 100 mcg by mouth once daily.    diclofenac (VOLTAREN) 75 MG EC tablet Take 75 mg by mouth 2 (two) times daily.    diclofenac sodium (VOLTAREN) 1 % Gel Apply 2 grams topically once daily.    esomeprazole (NEXIUM) 40 MG capsule TAKE 1 CAPSULE BEFORE BREAKFAST    metFORMIN (GLUCOPHAGE) 500 MG tablet Take 500 mg by mouth daily with breakfast.    metoprolol succinate (TOPROL-XL) 25 MG 24 hr tablet TAKE 1 TABLET DAILY    montelukast (SINGULAIR) 10 mg tablet Take 10 mg by mouth every evening.    rosuvastatin (CRESTOR) 20 MG tablet Take 20 mg by mouth every evening.    triamterene-hydrochlorothiazide 37.5-25 mg (MAXZIDE-25) 37.5-25 mg per tablet Take 1 tablet by mouth once daily.    aspirin 81 MG Chew Take 1 tablet (81 mg total) by mouth once daily. for 21 days    azelastine (ASTELIN) 137 mcg (0.1 %) nasal spray 1 spray (137 mcg total) by Nasal route 2 (two) times daily. for 180 doses     No current facility-administered medications for this visit.       Review of patient's allergies indicates:  No Known Allergies    Family History   Problem Relation Age of Onset    Cancer Neg Hx     Diabetes Neg Hx     Heart disease Neg Hx     Colon cancer Neg Hx     Colon polyps Neg Hx     Esophageal cancer Neg Hx     Stomach cancer Neg Hx        Social History     Socioeconomic History    Marital status:    Tobacco Use    Smoking status: Former     Types: Cigars    Smokeless tobacco: Former     Types: Chew   Substance and Sexual Activity    Alcohol use: Yes     Alcohol/week: 2.0 standard drinks of alcohol     Types: 2 Cans of beer per week     Comment: occ    Drug use: No    Sexual activity: Yes     Partners: Female     Social Determinants of Health      Financial Resource Strain: Unknown (9/10/2023)    Overall Financial Resource Strain (CARDIA)     Difficulty of Paying Living Expenses: Patient refused   Food Insecurity: No Food Insecurity (9/10/2023)    Hunger Vital Sign     Worried About Running Out of Food in the Last Year: Never true     Ran Out of Food in the Last Year: Never true   Transportation Needs: No Transportation Needs (9/10/2023)    PRAPARE - Transportation     Lack of Transportation (Medical): No     Lack of Transportation (Non-Medical): No   Physical Activity: Unknown (9/10/2023)    Exercise Vital Sign     Days of Exercise per Week: Patient refused     Minutes of Exercise per Session: Patient refused   Stress: Unknown (9/10/2023)    [place] [employer] - Occupational Stress Questionnaire     Feeling of Stress : Patient refused   Social Connections: Unknown (9/10/2023)    Social Connection and Isolation Panel [NHANES]     Frequency of Communication with Friends and Family: Patient refused     Frequency of Social Gatherings with Friends and Family: Patient refused     Attends Jain Services: Patient refused     Active Member of Clubs or Organizations: Patient refused     Attends Club or Organization Meetings: Patient refused     Marital Status: Patient refused   Housing Stability: Unknown (9/10/2023)    Housing Stability Vital Sign     Unable to Pay for Housing in the Last Year: No     Unstable Housing in the Last Year: No       Chief Complaint:   Chief Complaint   Patient presents with    Follow-up     Follow up left shoulder        History of present illness:  77-year-old left-hand dominant male seen for left shoulder pain.  Patient has had pain since about February.  No injury or trauma.  The patient has pain all the time but significant difficulty raising his arm up above his head or sleeping at night.  Right arm is starting to hurt as well.  Pain can be as high as a 10/10.  Injected him back in May.  It worked for  about 5 months.      Review of Systems:    Constitution: Negative for chills, fever, and sweats.  Negative for unexplained weight loss.    HENT:  Negative for headaches and blurry vision.    Cardiovascular:Negative for chest pain or irregular heart beat. Negative for hypertension.    Respiratory:  Negative for cough and shortness of breath.    Gastrointestinal: Negative for abdominal pain, heartburn, melena, nausea, and vomitting.    Genitourinary:  Negative bladder incontinence and dysuria.    Musculoskeletal:  See HPI    Neurological: Negative for numbness.    Psychiatric/Behavioral: Negative for depression.  The patient is not nervous/anxious.      Endocrine: Negative for polyuria    Hematologic/Lymphatic: Negative for bleeding problem.  Does not bruise/bleed easily.    Skin: Negative for poor would healing and rash      Physical Examination:    Vital Signs:    Vitals:    11/27/23 1300   Resp: 18       Body mass index is 27.67 kg/m².    This a well-developed, well nourished patient in no acute distress.  They are alert and oriented and cooperative to examination.  Pt. walks without an antalgic gait.      Examination of the left shoulder shows no rashes or erythema. There are no masses, ecchymosis, or atrophy. The patient has significantly limited active range of motion in forward flexion, external rotation, and internal rotation to the mid T-spine. The patient has positive Robles and Neer test- [place]'s test. - Speeds test. Nontender to palpation over a.c. joint. Normal stability anteriorly, posteriorly, and negative sulcus sign. Passive range of motion: Forward flexion of 180°, external rotation at 90° of 90°, internal rotation of 50°, and external rotation at 0° of 50°. 2+ radial pulse. Intact axillary, radial, median and ulnar sensation. 4 out of 5 resisted forward flexion, external rotation, and negative lift off test.      X-rays:  Four views of the left shoulder  reviewed which show some AC arthritis but  otherwise healthy-appearing shoulder     Assessment::  Left rotator cuff syndrome    Plan:  Reviewed the findings with him today.  I agreed to do 1 more injection.  I would like to go ahead and get the MRI though it make sure he does not have a tear that we could potentially be making worse.  Follow-up after the MRI is completed.  We will get an open MRI due to patient's history of claustrophobia.    All previous pertinent notes including ER visits, physical therapy visits, other orthopedic visits as well as other care for the same musculoskeletal problem were reviewed.  All pertinent lab values and previous imaging was reviewed pertinent to the current visit.    This note was created using [employer] voice recognition software that occasionally misinterpreted phrases or words.    Consult note is delivered via Epic messaging service.

## 2023-12-07 ENCOUNTER — TELEPHONE (OUTPATIENT)
Dept: ORTHOPEDICS | Facility: CLINIC | Age: 78
End: 2023-12-07
Payer: MEDICARE

## 2023-12-07 DIAGNOSIS — M25.512 LEFT SHOULDER PAIN, UNSPECIFIED CHRONICITY: Primary | ICD-10-CM

## 2023-12-07 DIAGNOSIS — M75.100 ROTATOR CUFF SYNDROME, UNSPECIFIED LATERALITY: ICD-10-CM

## 2023-12-07 NOTE — TELEPHONE ENCOUNTER
----- Message from Kiley Cuellar MA sent at 12/7/2023  2:21 PM CST -----  Contact: stevo Cox said order CT arth  ----- Message -----  From: Laura Genao MA  Sent: 12/7/2023   1:57 PM CST  To: Kenny Guevara Staff    Could not complete MRI, too closed.   Can the order be sent to I?   Is this an open MRI machine   Call back  [phone]

## 2023-12-11 ENCOUNTER — PATIENT MESSAGE (OUTPATIENT)
Dept: FAMILY MEDICINE | Facility: CLINIC | Age: 78
End: 2023-12-11
Payer: MEDICARE

## 2023-12-11 RX ORDER — ATORVASTATIN CALCIUM 80 MG/1
80 TABLET, FILM COATED ORAL DAILY
Qty: 90 TABLET | Refills: 3 | Status: SHIPPED | OUTPATIENT
Start: 2023-12-11

## 2023-12-11 NOTE — TELEPHONE ENCOUNTER
Rafi Martin MD   to Mario Mckee Staff         12/11/23  1:52 PM  Please confirm that he does not also take crestor

## 2023-12-11 NOTE — TELEPHONE ENCOUNTER
No care due was identified.  [facility] Embedded Care Due Messages. Reference number: [id number].   12/11/2023 1:23:41 PM CST

## 2023-12-11 NOTE — TELEPHONE ENCOUNTER
Unable to reach by phone. Line rings multiple times and announces that call cannot be completed at this time. Portal message sent as this is how patient started this refill communication.

## 2023-12-14 NOTE — ADDENDUM NOTE
Addendum  created 12/14/23 1410 by Hayder Porter MD    Attestation recorded in Intraprocedure, Intraprocedure Attestations filed, Intraprocedure Event edited

## 2023-12-18 ENCOUNTER — HOSPITAL ENCOUNTER (OUTPATIENT)
Dept: RADIOLOGY | Facility: HOSPITAL | Age: 78
Discharge: HOME | End: 2023-12-18
Attending: ORTHOPAEDIC SURGERY
Payer: MEDICARE

## 2023-12-18 DIAGNOSIS — M25.512 LEFT SHOULDER PAIN, UNSPECIFIED CHRONICITY: ICD-10-CM

## 2023-12-18 DIAGNOSIS — M75.100 ROTATOR CUFF SYNDROME, UNSPECIFIED LATERALITY: ICD-10-CM

## 2023-12-18 PROCEDURE — 73040 CONTRAST X-RAY OF SHOULDER: CPT | Mod: 26,LT,, | Performed by: RADIOLOGY

## 2023-12-18 PROCEDURE — 23350 INJECTION FOR SHOULDER X-RAY: CPT | Mod: LT,,, | Performed by: RADIOLOGY

## 2023-12-18 PROCEDURE — 23350 XR ARTHROGRAM SHOULDER LEFT, COMPLETE WITH CT TO FOLLOW (XPD): ICD-10-PCS | Mod: LT,,, | Performed by: RADIOLOGY

## 2023-12-18 PROCEDURE — 23350 INJECTION FOR SHOULDER X-RAY: CPT | Mod: LT

## 2023-12-18 PROCEDURE — 73040 XR ARTHROGRAM SHOULDER LEFT, COMPLETE WITH CT TO FOLLOW (XPD): ICD-10-PCS | Mod: 26,LT,, | Performed by: RADIOLOGY

## 2023-12-28 ENCOUNTER — TELEPHONE (OUTPATIENT)
Dept: ORTHOPEDICS | Facility: CLINIC | Age: 78
End: 2023-12-28
Payer: MEDICARE

## 2023-12-28 NOTE — TELEPHONE ENCOUNTER
Unfortunately, I do not do diagnostic ultrasound. Most of what Dr. Max and I do is for needle guidance during injections.

## 2023-12-28 NOTE — TELEPHONE ENCOUNTER
Patient could not tolerate CT arthrogram due to claustrophobia.  He could not even make it through getting the xray guided injection.  I discussed trying again with medication to see if he could complete the arthrogram.  He wanted to see if diagnostic ultrasound would be an option prior to attempting CT again.        Asa

## 2024-01-04 ENCOUNTER — PATIENT MESSAGE (OUTPATIENT)
Dept: ORTHOPEDICS | Facility: CLINIC | Age: 79
End: 2024-01-04
Payer: MEDICARE

## 2024-01-04 DIAGNOSIS — M25.512 LEFT SHOULDER PAIN, UNSPECIFIED CHRONICITY: Primary | ICD-10-CM

## 2024-01-04 DIAGNOSIS — M75.100 ROTATOR CUFF SYNDROME, UNSPECIFIED LATERALITY: ICD-10-CM

## 2024-01-05 ENCOUNTER — HOSPITAL ENCOUNTER (OUTPATIENT)
Dept: RADIOLOGY | Facility: HOSPITAL | Age: 79
Discharge: HOME | End: 2024-01-05
Attending: ORTHOPAEDIC SURGERY
Payer: MEDICARE

## 2024-01-05 DIAGNOSIS — M75.100 ROTATOR CUFF SYNDROME, UNSPECIFIED LATERALITY: ICD-10-CM

## 2024-01-05 DIAGNOSIS — M25.512 LEFT SHOULDER PAIN, UNSPECIFIED CHRONICITY: ICD-10-CM

## 2024-01-05 PROCEDURE — 76882 US LMTD JT/FCL EVL NVASC XTR: CPT | Mod: TC,PO,LT

## 2024-01-05 PROCEDURE — 76882 US LMTD JT/FCL EVL NVASC XTR: CPT | Mod: 26,LT,, | Performed by: RADIOLOGY

## 2024-01-11 DIAGNOSIS — Z00.00 ENCOUNTER FOR MEDICARE ANNUAL WELLNESS EXAM: ICD-10-CM

## 2024-01-24 ENCOUNTER — PATIENT MESSAGE (OUTPATIENT)
Dept: FAMILY MEDICINE | Facility: CLINIC | Age: 79
End: 2024-01-24
Payer: MEDICARE

## 2024-01-24 ENCOUNTER — OFFICE VISIT (OUTPATIENT)
Dept: FAMILY MEDICINE | Facility: CLINIC | Age: 79
End: 2024-01-24
Payer: MEDICARE

## 2024-01-24 VITALS
OXYGEN SATURATION: 98 % | WEIGHT: 211.19 LBS | DIASTOLIC BLOOD PRESSURE: 84 MMHG | HEART RATE: 62 BPM | SYSTOLIC BLOOD PRESSURE: 142 MMHG | BODY MASS INDEX: 28.64 KG/M2

## 2024-01-24 DIAGNOSIS — E21.3 HYPERPARATHYROIDISM: ICD-10-CM

## 2024-01-24 DIAGNOSIS — R06.00 PAROXYSMAL NOCTURNAL DYSPNEA: ICD-10-CM

## 2024-01-24 DIAGNOSIS — Z00.00 ROUTINE GENERAL MEDICAL EXAMINATION AT A HEALTH CARE FACILITY: Primary | ICD-10-CM

## 2024-01-24 DIAGNOSIS — R51.9 FRONTAL HEADACHE: ICD-10-CM

## 2024-01-24 DIAGNOSIS — I10 ESSENTIAL HYPERTENSION: ICD-10-CM

## 2024-01-24 DIAGNOSIS — I70.0 AORTIC ATHEROSCLEROSIS: ICD-10-CM

## 2024-01-24 DIAGNOSIS — G47.8 OTHER SLEEP DISORDERS: ICD-10-CM

## 2024-01-24 DIAGNOSIS — R06.00 PAROXYSMAL NOCTURNAL DYSPNEA: Primary | ICD-10-CM

## 2024-01-24 LAB
INFLUENZA A, MOLECULAR: NEGATIVE
INFLUENZA B, MOLECULAR: NEGATIVE
SARS-COV-2 RNA RESP QL NAA+PROBE: NOT DETECTED
SPECIMEN SOURCE: NORMAL

## 2024-01-24 PROCEDURE — 99214 OFFICE O/P EST MOD 30 MIN: CPT | Mod: PBBFAC,PO | Performed by: STUDENT IN AN ORGANIZED HEALTH CARE EDUCATION/TRAINING PROGRAM

## 2024-01-24 PROCEDURE — 87502 INFLUENZA DNA AMP PROBE: CPT | Mod: PO | Performed by: STUDENT IN AN ORGANIZED HEALTH CARE EDUCATION/TRAINING PROGRAM

## 2024-01-24 PROCEDURE — G2211 COMPLEX E/M VISIT ADD ON: HCPCS | Mod: S$PBB,,, | Performed by: STUDENT IN AN ORGANIZED HEALTH CARE EDUCATION/TRAINING PROGRAM

## 2024-01-24 PROCEDURE — 99214 OFFICE O/P EST MOD 30 MIN: CPT | Mod: S$PBB,,, | Performed by: STUDENT IN AN ORGANIZED HEALTH CARE EDUCATION/TRAINING PROGRAM

## 2024-01-24 PROCEDURE — 99999 PR PBB SHADOW E&M-EST. PATIENT-LVL IV: CPT | Mod: PBBFAC,,, | Performed by: STUDENT IN AN ORGANIZED HEALTH CARE EDUCATION/TRAINING PROGRAM

## 2024-01-24 PROCEDURE — 87635 SARS-COV-2 COVID-19 AMP PRB: CPT | Performed by: STUDENT IN AN ORGANIZED HEALTH CARE EDUCATION/TRAINING PROGRAM

## 2024-01-24 RX ORDER — CARVEDILOL 12.5 MG/1
12.5 TABLET ORAL 2 TIMES DAILY WITH MEALS
Qty: 60 TABLET | Refills: 11 | Status: SHIPPED | OUTPATIENT
Start: 2024-01-24 | End: 2024-01-24

## 2024-01-24 RX ORDER — CARVEDILOL 12.5 MG/1
12.5 TABLET ORAL 2 TIMES DAILY WITH MEALS
Qty: 60 TABLET | Refills: 11 | Status: SHIPPED | OUTPATIENT
Start: 2024-01-24 | End: 2024-02-26 | Stop reason: SDUPTHER

## 2024-01-24 RX ORDER — FLUTICASONE PROPIONATE 50 MCG
SPRAY, SUSPENSION (ML) NASAL
COMMUNITY
End: 2024-06-05 | Stop reason: ALTCHOICE

## 2024-01-24 NOTE — PROGRESS NOTES
[facility] CLINIC NOTE    Patient Name: Papito Hutton  YOB: 1945    PRESENTING HISTORY     History of Present Illness:  Mr. Papito Hutton is a 78 y.o. male here for routine f/u.     4-5 days of symptoms.   SOB.   HA.   Worse at night. Waxing/waning.       Separately- has been having high BPs recvently last few days.     Did recently resume diclofenac for chronic knee pain.     Has had long standing paroxysmal nocturnal dyspnea. Previously recommended for sleep testing which he didn't feel like he needed. Discussed and willing to pursue now. Has had Echo.     Review of Systems   Constitutional:  Positive for chills (not out of the ordinary). Negative for fever.   HENT:  Negative for sore throat.         Post nasal drip   Respiratory:  Positive for shortness of breath (gets sensation of paroxysmal nocturnal dyspnea for years).         Only re to PND   Cardiovascular:  Negative for chest pain.   Neurological:  Positive for headaches (frontal sinus area).         OBJECTIVE:   Vital Signs:  Vitals:    01/24/24 1020   BP: (!) 142/84   Pulse: 62   SpO2: 98%   Weight: 95.8 kg (211 lb 3.2 oz)            Physical Exam  Vitals and nursing note reviewed.   Constitutional:       Appearance: He is not diaphoretic.   HENT:      Head: Normocephalic and atraumatic.      Right Ear: External ear normal.      Left Ear: External ear normal.   Eyes:      General: No scleral icterus.     Conjunctiva/sclera: Conjunctivae normal.      Pupils: Pupils are equal, round, and reactive to light.   Neck:      Thyroid: No thyromegaly.   Cardiovascular:      Rate and Rhythm: Normal rate and regular rhythm.      Heart sounds: Normal heart sounds. No murmur heard.  Pulmonary:      Effort: Pulmonary effort is normal. No respiratory distress.      Breath sounds: Normal breath sounds. No wheezing or rales.   Musculoskeletal:         General: No tenderness or deformity. Normal range of motion.      Cervical back:  Normal range of motion and neck supple.      Right lower leg: No edema.      Left lower leg: No edema.   Lymphadenopathy:      Cervical: No cervical adenopathy.   Skin:     General: Skin is warm and dry.      Findings: No erythema or rash.   Neurological:      Mental Status: He is alert and oriented to person, place, and time.      Gait: Gait is intact.   Psychiatric:         Mood and Affect: Mood and affect normal.         Cognition and Memory: Memory normal.         Judgment: Judgment normal.         ASSESSMENT & PLAN:     Routine general medical examination at a health care facility    Hyperparathyroidism  Monitored    Aortic atherosclerosis  Cont asa/statin. Better BP control    Paroxysmal noctunral dyspnea.  Needs sleep study.     Frontal headache  -     Influenza A & B by Molecular  -     COVID-19 Routine Screening  Suspect viral URI.     Essential hypertension  -     Discontinue: carvediloL (COREG) 12.5 MG tablet; Take 1 tablet (12.5 mg total) by mouth 2 (two) times daily with meals.  Dispense: 60 tablet; Refill: 11  -     Lipid Panel; Future; Expected date: 01/24/2024  -     Comprehensive Metabolic Panel; Future; Expected date: 01/24/2024  -     CBC Auto Differential; Future; Expected date: 01/24/2024  -     carvediloL (COREG) 12.5 MG tablet; Take 1 tablet (12.5 mg total) by mouth 2 (two) times daily with meals.  Dispense: 60 tablet; Refill: 11  Add BB. Recheck 2 weeks.            Rafi Martin  Internal Medicine    Visit complexity inherent to evaluation and management associated with medical care services that serve as the continuing focal point for all needed health care services and/or with medical care services that are part of ongoing care related to a patient's single, serious condition or a complex condition provided today

## 2024-01-30 ENCOUNTER — PROCEDURE VISIT (OUTPATIENT)
Dept: SLEEP MEDICINE | Facility: HOSPITAL | Age: 79
End: 2024-01-30
Attending: STUDENT IN AN ORGANIZED HEALTH CARE EDUCATION/TRAINING PROGRAM
Payer: MEDICARE

## 2024-01-30 DIAGNOSIS — R06.00 PAROXYSMAL NOCTURNAL DYSPNEA: ICD-10-CM

## 2024-01-30 DIAGNOSIS — G47.8 OTHER SLEEP DISORDERS: ICD-10-CM

## 2024-01-30 PROCEDURE — 95806 SLEEP STUDY UNATT&RESP EFFT: CPT

## 2024-02-02 DIAGNOSIS — G47.33 OBSTRUCTIVE SLEEP APNEA: Primary | ICD-10-CM

## 2024-02-07 ENCOUNTER — LAB VISIT (OUTPATIENT)
Dept: LAB | Facility: HOSPITAL | Age: 79
End: 2024-02-07
Attending: STUDENT IN AN ORGANIZED HEALTH CARE EDUCATION/TRAINING PROGRAM
Payer: MEDICARE

## 2024-02-07 ENCOUNTER — CLINICAL SUPPORT (OUTPATIENT)
Dept: FAMILY MEDICINE | Facility: CLINIC | Age: 79
End: 2024-02-07
Payer: MEDICARE

## 2024-02-07 ENCOUNTER — OFFICE VISIT (OUTPATIENT)
Dept: FAMILY MEDICINE | Facility: CLINIC | Age: 79
End: 2024-02-07
Payer: MEDICARE

## 2024-02-07 VITALS — SYSTOLIC BLOOD PRESSURE: 158 MMHG | DIASTOLIC BLOOD PRESSURE: 90 MMHG

## 2024-02-07 VITALS
HEART RATE: 55 BPM | SYSTOLIC BLOOD PRESSURE: 164 MMHG | HEIGHT: 72 IN | BODY MASS INDEX: 28.78 KG/M2 | TEMPERATURE: 99 F | WEIGHT: 212.5 LBS | DIASTOLIC BLOOD PRESSURE: 90 MMHG | OXYGEN SATURATION: 98 % | RESPIRATION RATE: 17 BRPM

## 2024-02-07 DIAGNOSIS — I10 ESSENTIAL HYPERTENSION: ICD-10-CM

## 2024-02-07 DIAGNOSIS — R06.02 SHORTNESS OF BREATH: ICD-10-CM

## 2024-02-07 DIAGNOSIS — R09.81 SINUS CONGESTION: ICD-10-CM

## 2024-02-07 DIAGNOSIS — R79.9 ABNORMAL FINDING OF BLOOD CHEMISTRY, UNSPECIFIED: ICD-10-CM

## 2024-02-07 DIAGNOSIS — R06.00 PAROXYSMAL NOCTURNAL DYSPNEA: Primary | ICD-10-CM

## 2024-02-07 DIAGNOSIS — Z01.89 PATIENT REQUEST FOR DIAGNOSTIC TESTING: Primary | ICD-10-CM

## 2024-02-07 DIAGNOSIS — E21.3 HYPERPARATHYROIDISM: ICD-10-CM

## 2024-02-07 DIAGNOSIS — I10 ESSENTIAL HYPERTENSION: Primary | ICD-10-CM

## 2024-02-07 DIAGNOSIS — G47.33 OSA (OBSTRUCTIVE SLEEP APNEA): ICD-10-CM

## 2024-02-07 LAB
ALBUMIN SERPL BCP-MCNC: 3.9 G/DL (ref 3.5–5.2)
ALP SERPL-CCNC: 73 U/L (ref 55–135)
ALT SERPL W/O P-5'-P-CCNC: 15 U/L (ref 10–44)
ANION GAP SERPL CALC-SCNC: 8 MMOL/L (ref 8–16)
AST SERPL-CCNC: 18 U/L (ref 10–40)
BASOPHILS # BLD AUTO: 0.05 K/UL (ref 0–0.2)
BASOPHILS NFR BLD: 1 % (ref 0–1.9)
BILIRUB SERPL-MCNC: 0.7 MG/DL (ref 0.1–1)
BUN SERPL-MCNC: 17 MG/DL (ref 8–23)
CALCIUM SERPL-MCNC: 10 MG/DL (ref 8.7–10.5)
CHLORIDE SERPL-SCNC: 108 MMOL/L (ref 95–110)
CHOLEST SERPL-MCNC: 156 MG/DL (ref 120–199)
CHOLEST/HDLC SERPL: 3.4 {RATIO} (ref 2–5)
CO2 SERPL-SCNC: 24 MMOL/L (ref 23–29)
CREAT SERPL-MCNC: 1 MG/DL (ref 0.5–1.4)
DIFFERENTIAL METHOD BLD: ABNORMAL
EOSINOPHIL # BLD AUTO: 0.1 K/UL (ref 0–0.5)
EOSINOPHIL NFR BLD: 2.3 % (ref 0–8)
ERYTHROCYTE [DISTWIDTH] IN BLOOD BY AUTOMATED COUNT: 13.8 % (ref 11.5–14.5)
EST. GFR  (NO RACE VARIABLE): >60 ML/MIN/1.73 M^2
GLUCOSE SERPL-MCNC: 103 MG/DL (ref 70–110)
HCT VFR BLD AUTO: 38.9 % (ref 40–54)
HDLC SERPL-MCNC: 46 MG/DL (ref 40–75)
HDLC SERPL: 29.5 % (ref 20–50)
HGB BLD-MCNC: 12.9 G/DL (ref 14–18)
IMM GRANULOCYTES # BLD AUTO: 0.01 K/UL (ref 0–0.04)
IMM GRANULOCYTES NFR BLD AUTO: 0.2 % (ref 0–0.5)
LDLC SERPL CALC-MCNC: 81.6 MG/DL (ref 63–159)
LYMPHOCYTES # BLD AUTO: 1.6 K/UL (ref 1–4.8)
LYMPHOCYTES NFR BLD: 31.1 % (ref 18–48)
MCH RBC QN AUTO: 30.2 PG (ref 27–31)
MCHC RBC AUTO-ENTMCNC: 33.2 G/DL (ref 32–36)
MCV RBC AUTO: 91 FL (ref 82–98)
MONOCYTES # BLD AUTO: 0.3 K/UL (ref 0.3–1)
MONOCYTES NFR BLD: 5.6 % (ref 4–15)
NEUTROPHILS # BLD AUTO: 3.1 K/UL (ref 1.8–7.7)
NEUTROPHILS NFR BLD: 59.8 % (ref 38–73)
NONHDLC SERPL-MCNC: 110 MG/DL
NRBC BLD-RTO: 0 /100 WBC
PLATELET # BLD AUTO: 246 K/UL (ref 150–450)
PMV BLD AUTO: 10.6 FL (ref 9.2–12.9)
POTASSIUM SERPL-SCNC: 4.1 MMOL/L (ref 3.5–5.1)
PROT SERPL-MCNC: 6.8 G/DL (ref 6–8.4)
PTH-INTACT SERPL-MCNC: 115.2 PG/ML (ref 9–77)
RBC # BLD AUTO: 4.27 M/UL (ref 4.6–6.2)
SODIUM SERPL-SCNC: 140 MMOL/L (ref 136–145)
TRIGL SERPL-MCNC: 142 MG/DL (ref 30–150)
WBC # BLD AUTO: 5.15 K/UL (ref 3.9–12.7)

## 2024-02-07 PROCEDURE — 99499 UNLISTED E&M SERVICE: CPT | Mod: S$PBB,,, | Performed by: STUDENT IN AN ORGANIZED HEALTH CARE EDUCATION/TRAINING PROGRAM

## 2024-02-07 PROCEDURE — 84165 PROTEIN E-PHORESIS SERUM: CPT | Performed by: STUDENT IN AN ORGANIZED HEALTH CARE EDUCATION/TRAINING PROGRAM

## 2024-02-07 PROCEDURE — 84165 PROTEIN E-PHORESIS SERUM: CPT | Mod: 26,,, | Performed by: PATHOLOGY

## 2024-02-07 PROCEDURE — 99999 PR PBB SHADOW E&M-EST. PATIENT-LVL V: CPT | Mod: PBBFAC,,,

## 2024-02-07 PROCEDURE — 99214 OFFICE O/P EST MOD 30 MIN: CPT | Mod: S$PBB,,,

## 2024-02-07 PROCEDURE — 99215 OFFICE O/P EST HI 40 MIN: CPT | Mod: PBBFAC,PO

## 2024-02-07 PROCEDURE — 85025 COMPLETE CBC W/AUTO DIFF WBC: CPT | Performed by: STUDENT IN AN ORGANIZED HEALTH CARE EDUCATION/TRAINING PROGRAM

## 2024-02-07 PROCEDURE — 82306 VITAMIN D 25 HYDROXY: CPT | Performed by: STUDENT IN AN ORGANIZED HEALTH CARE EDUCATION/TRAINING PROGRAM

## 2024-02-07 PROCEDURE — 83970 ASSAY OF PARATHORMONE: CPT | Performed by: STUDENT IN AN ORGANIZED HEALTH CARE EDUCATION/TRAINING PROGRAM

## 2024-02-07 PROCEDURE — 80061 LIPID PANEL: CPT | Performed by: STUDENT IN AN ORGANIZED HEALTH CARE EDUCATION/TRAINING PROGRAM

## 2024-02-07 PROCEDURE — 36415 COLL VENOUS BLD VENIPUNCTURE: CPT | Mod: PO | Performed by: STUDENT IN AN ORGANIZED HEALTH CARE EDUCATION/TRAINING PROGRAM

## 2024-02-07 PROCEDURE — 80053 COMPREHEN METABOLIC PANEL: CPT | Performed by: STUDENT IN AN ORGANIZED HEALTH CARE EDUCATION/TRAINING PROGRAM

## 2024-02-07 RX ORDER — IPRATROPIUM BROMIDE 21 UG/1
2 SPRAY, METERED NASAL 2 TIMES DAILY PRN
Qty: 90 ML | Refills: 1 | Status: SHIPPED | OUTPATIENT
Start: 2024-02-07 | End: 2024-06-05

## 2024-02-07 RX ORDER — VALSARTAN 320 MG/1
320 TABLET ORAL DAILY
Qty: 90 TABLET | Refills: 0 | Status: SHIPPED | OUTPATIENT
Start: 2024-02-07 | End: 2024-02-26 | Stop reason: SDUPTHER

## 2024-02-07 RX ORDER — HYDROCHLOROTHIAZIDE 12.5 MG/1
12.5 TABLET ORAL DAILY
Qty: 90 TABLET | Refills: 0 | Status: CANCELLED | OUTPATIENT
Start: 2024-02-07 | End: 2025-02-06

## 2024-02-07 NOTE — PATIENT INSTRUCTIONS
"Edwardo Cobos,     If you are due for any health screening(s) below please notify me so we can arrange them to be ordered and scheduled to maintain your health. Most healthy patients complete it. Don't lose out on improving your health.     Tests to Keep You Healthy    Last Blood Pressure <= 139/89 (2/7/2024): NO                          Patient Education       Checking Your Blood Pressure at Home   The Basics   Written by the doctors and editors at [facility]   How is blood pressure measured? -- Blood pressure is usually measured with a device that goes around your upper arm. This is often done in a doctor's office. But some people also check their blood pressure themselves, at home or at work.  Blood pressure is explained with 2 numbers. For instance, your blood pressure might be "140 over 90." The first (top) number is the pressure inside your arteries when your heart is kim. The second (bottom) number is the pressure inside your arteries when your heart is relaxed. The table shows how doctors and nurses define high and normal blood pressure (table 1).  If your blood pressure gets too high, it puts you at risk for heart attack, stroke, and kidney disease. High blood pressure does not usually cause symptoms. But it can be serious.  What is a home blood pressure meter? -- A home blood pressure meter (or "monitor") is a device you can use to check your blood pressure yourself. It has a cuff that goes around your upper arm (figure 1). Some devices have a cuff that goes around your wrist instead. But doctors aren't sure if these work as well. The meter also has a small screen, or dial, that shows your blood pressure numbers.  There are also special meters you can wear for a day or 2. These are different because they automatically check your blood pressure throughout the day and night, even while you are sleeping. If your doctor thinks you should use one of these devices, they will talk to you about how to wear " it.  Why do I need to check my blood pressure at home? -- If your doctor knows or suspects that you have high blood pressure, they might want you to check it at home. There are a few reasons for this. Your doctor might want to look at:  Whether your blood pressure measures the same at home as it did in the doctor's office  How well your blood pressure medicines are working  Changes in your blood pressure, for example, if it goes up and down  People who check their own blood pressure at home usually do better at keeping it low.  How do I choose a home blood pressure meter? -- When choosing a home blood pressure meter, you will probably want to think about:  Cost - Some devices cost more than others. You should also check to see if your insurance will help pay for your device.  Size - It's important to make sure the cuff fits your arm comfortably. Your doctor or nurse can help you with this.  How easy it is to use - You should make sure you understand how to use the device. You also need to be able to read the numbers on the screen.  You do not need a prescription to buy a home blood pressure meter. You can buy them at most pharmacies or over the internet. Your doctor or nurse can help you choose the right device for you.  How do I check my blood pressure at home? -- Once you have a home blood pressure meter, your doctor or nurse should check it to make sure it fits you and works correctly.  When it's time to check your blood pressure:  Go to the bathroom and empty your bladder first. Having a full bladder can temporarily increase your blood pressure, making the results inaccurate.  Sit in a chair with your feet flat on the ground.  Try to breathe normally and stay calm.  Attach the cuff to your arm. Place the cuff directly on your skin, not over your clothing. The cuff should be tight enough to not slip down, but not uncomfortably tight.  Sit and relax for about 3 to 5 minutes with the cuff on.  Follow the directions  that came with your device to start measuring your blood pressure. This might involve squeezing the bulb at the end of the tube to inflate the cuff (fill it with air). With some monitors, you just need to press a button to inflate the cuff. When the cuff fills with air, it feels like someone is squeezing your arm, but it should not hurt. Then you will slowly deflate the cuff (let the air out of it), or it will deflate by itself. The screen or dial will show your blood pressure numbers.  Stay seated and relax for 1 minute, then measure your blood pressure again.  How often should I check my blood pressure? -- It depends. Different people need to follow different schedules. Your doctor or nurse will tell you how often to check your blood pressure, and when. Some people need to check their blood pressure twice a day, in the morning and evening.  Your doctor or nurse will probably tell you to keep track of your blood pressure for at least a few days (table 2). Then they will look at the numbers. The reason for this is that it's normal for your blood pressure to change a bit from day to day. For example, the numbers might change depending on whether you recently had caffeine, just exercised, or feel stressed. Checking your blood pressure over several days - or longer - will give your doctor or nurse a better idea of what is average for you.  How should I keep track of my blood pressure? -- Some blood pressure meters will record your numbers for you, or send them to your computer or smartphone. If yours does not do this, you will need to write them down. Your doctor or nurse can help you figure out the best way to keep track of the numbers.  What if my blood pressure is high? -- Your doctor or nurse will tell you what to do if your blood pressure is high when you check it at home. If you get a number that is higher than normal, measure it again to see if it is still high. If it is very high (above a certain number, which  "your doctor or nurse will tell you to watch out for), you should call your doctor right away.  If your blood pressure is only a little high, your doctor or nurse might tell you to keep checking it for a few more days or weeks, and then call if it does not go back down. Then they can help you decide what to do next.  All topics are updated as new evidence becomes available and our peer review process is complete.  This topic retrieved from [employer] on: Sep 21, 2021.  Topic 483926 Version 4.0  Release: 29.4.2 - C29.263  © 2021 UpToDate, Inc. and/or its affiliates. All rights reserved.  table 1: Definition of normal and high blood pressure  Level  Top number  Bottom number    High 130 or above 80 or above   Elevated 120 to 129 79 or below   Normal 119 or below 79 or below   These definitions are from the American College of Cardiology/American Heart Association. Other expert groups might use slightly different definitions.  "Elevated blood pressure" is a term doctor or nurses use as a warning. It means you do not yet have high blood pressure, but your blood pressure is not as low as it should be for good health.  Graphic 01370 Version 6.0  figure 1: Using a home blood pressure meter     This is an example of a person using a home blood pressure meter.  Graphic 378722 Version 1.0    table 2: 7-day diary for checking blood pressure at home  Day 1  Day 2  Day 3  Day 4  Day 5  Day 6  Day 7    Morning  1st read Morning  1st read Morning  1st read Morning  1st read Morning  1st read Morning  1st read Morning  1st read   Systolic: __________ Systolic: __________ Systolic: __________ Systolic: __________ Systolic: __________ Systolic: __________ Systolic: __________   Diastolic: __________ Diastolic: __________ Diastolic: __________ Diastolic: __________ Diastolic: __________ Diastolic: __________ Diastolic: __________   Pulse: __________ Pulse: __________ Pulse: __________ Pulse: __________ Pulse: __________ Pulse: __________ " Pulse: __________   Morning  2nd read Morning  2nd read Morning  2nd read Morning  2nd read Morning  2nd read Morning  2nd read Morning  2nd read   Systolic: __________ Systolic: __________ Systolic: __________ Systolic: __________ Systolic: __________ Systolic: __________ Systolic: __________   Diastolic: __________ Diastolic: __________ Diastolic: __________ Diastolic: __________ Diastolic: __________ Diastolic: __________ Diastolic: __________   Pulse: __________ Pulse: __________ Pulse: __________ Pulse: __________ Pulse: __________ Pulse: __________ Pulse: __________   Evening  1st read Evening  1st read Evening  1st read Evening  1st read Evening  1st read Evening  1st read Evening  1st read   Systolic: __________ Systolic: __________ Systolic: __________ Systolic: __________ Systolic: __________ Systolic: __________ Systolic: __________   Diastolic: __________ Diastolic: __________ Diastolic: __________ Diastolic: __________ Diastolic: __________ Diastolic: __________ Diastolic: __________   Pulse: __________ Pulse: __________ Pulse: __________ Pulse: __________ Pulse: __________ Pulse: __________ Pulse: __________   Evening  2nd read Evening  2nd read Evening  2nd read Evening  2nd read Evening  2nd read Evening  2nd read Evening  2nd read   Systolic: __________ Systolic: __________ Systolic: __________ Systolic: __________ Systolic: __________ Systolic: __________ Systolic: __________   Diastolic: __________ Diastolic: __________ Diastolic: __________ Diastolic: __________ Diastolic: __________ Diastolic: __________ Diastolic: __________   Pulse: __________ Pulse: __________ Pulse: __________ Pulse: __________ Pulse: __________ Pulse: __________ Pulse: __________   Notes    Notes    Notes    Notes    Notes    Notes    Notes      ____________________ ____________________ ____________________ ____________________ ____________________ ____________________ ____________________   ____________________  ____________________ ____________________ ____________________ ____________________ ____________________ ____________________   ____________________ ____________________ ____________________ ____________________ ____________________ ____________________ ____________________   Patient name: ______________________________     Patient ID: ________________________________    Primary care provider: _______________________    Average BP: _______________________________    [facility] [place] [id number] Version 1.0  Consumer Information Use and Disclaimer   This information is not specific medical advice and does not replace information you receive from your health care provider. This is only a brief summary of general information. It does NOT include all information about conditions, illnesses, injuries, tests, procedures, treatments, therapies, discharge instructions or life-style choices that may apply to you. You must talk with your health care provider for complete information about your health and treatment options. This information should not be used to decide whether or not to accept your health care provider's advice, instructions or recommendations. Only your health care provider has the knowledge and training to provide advice that is right for you. The use of this information is governed by the [employer] End User License Agreement, available at [email or web address]://[email or web address]/[email or web address]/[email or web address]/[email or web address].The use of [employer] content is governed by the [employer] Terms of Use. ©2021 UpToDate, Inc. All rights reserved.  Copyright   © 2021 UpToDate, Inc. and/or its affiliates. All rights reserved.

## 2024-02-07 NOTE — PROGRESS NOTES
Papito Hutton 78 y.o. male is here today for Blood Pressure check.   History of HTN yes.    Review of patient's allergies indicates:  No Known Allergies  Creatinine   Date Value Ref Range Status   09/12/2023 0.9 0.5 - 1.4 mg/dL Final     Sodium   Date Value Ref Range Status   09/12/2023 139 136 - 145 mmol/L Final     Potassium   Date Value Ref Range Status   09/12/2023 4.1 3.5 - 5.1 mmol/L Final   ]  Patient verifies taking blood pressure medications on a regular basis at the same time of the day.     Current Outpatient Medications:     acetaminophen (TYLENOL) 650 MG TbSR, Take 1 tablet (650 mg total) by mouth every 8 (eight) hours as needed (do not take with any other tylenol or acetaminophen)., Disp: , Rfl: 0    aspirin 81 MG Chew, Take 1 tablet (81 mg total) by mouth once daily. for 21 days, Disp: , Rfl: 0    atorvastatin (LIPITOR) 80 MG tablet, Take 1 tablet (80 mg total) by mouth once daily., Disp: 90 tablet, Rfl: 3    azelastine (ASTELIN) 137 mcg (0.1 %) nasal spray, 1 spray (137 mcg total) by Nasal route 2 (two) times daily. for 180 doses, Disp: 120 mL, Rfl: 2    benazepriL (LOTENSIN) 40 MG tablet, Take 1 tablet (40 mg total) by mouth once daily., Disp: 90 tablet, Rfl: 3    carvediloL (COREG) 12.5 MG tablet, Take 1 tablet (12.5 mg total) by mouth 2 (two) times daily with meals., Disp: 60 tablet, Rfl: 11    cetirizine (ZYRTEC) 10 MG tablet, Take 1 tablet (10 mg total) by mouth every evening., Disp: 90 tablet, Rfl: 3    CHOLECALCIFEROL, VITAMIN D3, (VITAMIN D3 ORAL), Take 1 capsule by mouth once daily., Disp: , Rfl:     clopidogreL (PLAVIX) 75 mg tablet, Take 1 tablet (75 mg total) by mouth once daily., Disp: 90 tablet, Rfl: 1    cyanocobalamin (VITAMIN B-12) 1000 MCG tablet, Take 100 mcg by mouth once daily., Disp: , Rfl:     diclofenac (VOLTAREN) 75 MG EC tablet, Take 75 mg by mouth 2 (two) times daily., Disp: , Rfl:     diclofenac sodium (VOLTAREN) 1 % Gel, Apply 2 grams topically once daily., Disp:  100 g, Rfl: 11    esomeprazole (NEXIUM) 40 MG capsule, TAKE 1 CAPSULE BEFORE BREAKFAST, Disp: 90 capsule, Rfl: 3    fluticasone propionate (FLONASE) 50 mcg/actuation nasal spray, Nasal for 90 Days, Disp: , Rfl:     metFORMIN (GLUCOPHAGE) 500 MG tablet, Take 500 mg by mouth once daily., Disp: , Rfl:     montelukast (SINGULAIR) 10 mg tablet, Take 10 mg by mouth every evening., Disp: , Rfl:     triamterene-hydrochlorothiazide 37.5-25 mg (MAXZIDE-25) 37.5-25 mg per tablet, Take 1 tablet by mouth once daily., Disp: 90 tablet, Rfl: 3  Does patient have record of home blood pressure readings yes. Readings have been averaging 140/70.   Last dose of blood pressure medication was taken at 0830.  Patient is asymptomatic.   No Complains voiced      ,   .    Blood pressure reading after 15 minutes was 160/88  Dr. Martin notified.

## 2024-02-07 NOTE — PROGRESS NOTES
Subjective:       Patient ID: Papito Hutton is a 78 y.o. male.    Chief Complaint: Nasal Congestion    Presents to the clinic for sinus congestion and dyspnea. Long standing dyspnea. Had sleep study. Significant KEITH. To be scheduled to see pulmonology, he expects a call back to schedule. He is waking up gasping for air. States that he can't sleep and his sinus symptoms dont help this situation. The recently prescribes astelin has provided some relief of his congestion.     HTN. Pressures remain elevated despite new therapy that has been prescribed. Will attempt changing therapy.         Past Medical History:   Diagnosis Date    Arthritis     B12 deficiency     Colon polyp     Diabetes mellitus     BORDERLINE    GERD (gastroesophageal reflux disease)     [place] (hard of hearing)     BILAT AIDS    Hyperlipidemia     Hypertension     Low testosterone     Personal history of colonic polyps 2008    adenomatous polyp    Sinus disorder     Sleep apnea     DOES NOT USE MACHINE    Wears glasses        Review of patient's allergies indicates:  No Known Allergies      Current Outpatient Medications:     acetaminophen (TYLENOL) 650 MG TbSR, Take 1 tablet (650 mg total) by mouth every 8 (eight) hours as needed (do not take with any other tylenol or acetaminophen)., Disp: , Rfl: 0    atorvastatin (LIPITOR) 80 MG tablet, Take 1 tablet (80 mg total) by mouth once daily., Disp: 90 tablet, Rfl: 3    azelastine (ASTELIN) 137 mcg (0.1 %) nasal spray, 1 spray (137 mcg total) by Nasal route 2 (two) times daily. for 180 doses, Disp: 120 mL, Rfl: 2    carvediloL (COREG) 12.5 MG tablet, Take 1 tablet (12.5 mg total) by mouth 2 (two) times daily with meals., Disp: 60 tablet, Rfl: 11    cetirizine (ZYRTEC) 10 MG tablet, Take 1 tablet (10 mg total) by mouth every evening., Disp: 90 tablet, Rfl: 3    CHOLECALCIFEROL, VITAMIN D3, (VITAMIN D3 ORAL), Take 1 capsule by mouth once daily., Disp: , Rfl:     clopidogreL (PLAVIX) 75 mg tablet, Take 1  tablet (75 mg total) by mouth once daily., Disp: 90 tablet, Rfl: 1    cyanocobalamin (VITAMIN B-12) 1000 MCG tablet, Take 100 mcg by mouth once daily., Disp: , Rfl:     diclofenac (VOLTAREN) 75 MG EC tablet, Take 75 mg by mouth 2 (two) times daily., Disp: , Rfl:     diclofenac sodium (VOLTAREN) 1 % Gel, Apply 2 grams topically once daily., Disp: 100 g, Rfl: 11    esomeprazole (NEXIUM) 40 MG capsule, TAKE 1 CAPSULE BEFORE BREAKFAST, Disp: 90 capsule, Rfl: 3    metFORMIN (GLUCOPHAGE) 500 MG tablet, Take 500 mg by mouth once daily., Disp: , Rfl:     montelukast (SINGULAIR) 10 mg tablet, Take 10 mg by mouth every evening., Disp: , Rfl:     triamterene-hydrochlorothiazide 37.5-25 mg (MAXZIDE-25) 37.5-25 mg per tablet, Take 1 tablet by mouth once daily., Disp: 90 tablet, Rfl: 3    aspirin 81 MG Chew, Take 1 tablet (81 mg total) by mouth once daily. for 21 days (Patient not taking: Reported on 2/7/2024), Disp: , Rfl: 0    fluticasone propionate (FLONASE) 50 mcg/actuation nasal spray, Nasal for 90 Days, Disp: , Rfl:     ipratropium (ATROVENT) 21 mcg (0.03 %) nasal spray, 2 sprays by Each Nostril route 2 (two) times daily as needed for Rhinitis (sinus congestion)., Disp: 90 mL, Rfl: 1    valsartan (DIOVAN) 320 MG tablet, Take 1 tablet (320 mg total) by mouth once daily., Disp: 90 tablet, Rfl: 0    Review of Systems    Objective:      BP (!) 164/90 (BP Location: Right arm, Patient Position: Sitting, BP Method: Medium (Manual))   Pulse (!) 55   Temp 98.6 °F (37 °C) (Oral)   Resp 17   Ht 6' (1.829 m)   Wt 96.4 kg (212 lb 8.4 oz)   SpO2 98%   BMI 28.82 kg/m²   Physical Exam    Assessment:       1. Paroxysmal nocturnal dyspnea    2. Shortness of breath    3. Essential hypertension    4. Sinus congestion    5. KEITH (obstructive sleep apnea)        Plan:       Paroxysmal nocturnal dyspnea       -     To see pulm    Shortness of breath       -    As above    Essential hypertension  -     valsartan (DIOVAN) 320 MG tablet; Take  1 tablet (320 mg total) by mouth once daily.  Dispense: 90 tablet; Refill: 0         -     Change ACE to potent ARB. Continue other meds. BP log sheet provided. Will monitor at home and contact me if he has any issues.     Sinus congestion  -     ipratropium (ATROVENT) 21 mcg (0.03 %) nasal spray; 2 sprays by Each Nostril route 2 (two) times daily as needed for Rhinitis (sinus congestion).  Dispense: 90 mL; Refill: 1    KEITH (obstructive sleep apnea)        -    To see pulmonology                 Aguila Walsh PA-C  Family Medicine Physician Assistant       Future Appointments       Date Provider Specialty Appt Notes    2/26/2024 Aguila Walsh PA-C Family Medicine 2 week f/u               I spent a total of 30 minutes on the day of the visit.This includes face to face time and non-face to face time preparing to see the patient (eg, review of tests), obtaining and/or reviewing separately obtained history, documenting clinical information in the electronic or other health record, independently interpreting results and communicating results to the patient/family/caregiver, or care coordinator.      We have addressed [4] Moderate: 1 or more chronic illnesses with exacerbation, progression, or side effects of treatment / 2 or more stable chronic illnesses / 1 undiagnosed new problem with uncertain prognosis / 1 acute illness with systemic symptoms / 1 acute complicated injury  The complexity of the data reviewed and analyzed for this visit was [2] Minimal or None  The risk of complications and/or morbidity or mortality are [4] Moderate risk (I.e. prescription drug management / decision regarding minor surgery with identified pt or procedure risk factors / decision regarding elective major surgery without identified pt or procedure risk factors / diagnosis or treatment significantly limited by social determinants of health)   The level of Medical Decision Making for this visit is [4] Moderate

## 2024-02-08 LAB
25(OH)D3+25(OH)D2 SERPL-MCNC: 32 NG/ML (ref 30–96)
ALBUMIN SERPL ELPH-MCNC: 3.95 G/DL (ref 3.35–5.55)
ALPHA1 GLOB SERPL ELPH-MCNC: 0.24 G/DL (ref 0.17–0.41)
ALPHA2 GLOB SERPL ELPH-MCNC: 0.56 G/DL (ref 0.43–0.99)
B-GLOBULIN SERPL ELPH-MCNC: 0.77 G/DL (ref 0.5–1.1)
GAMMA GLOB SERPL ELPH-MCNC: 0.88 G/DL (ref 0.67–1.58)
PROT SERPL-MCNC: 6.4 G/DL (ref 6–8.4)

## 2024-02-09 LAB — PATHOLOGIST INTERPRETATION SPE: NORMAL

## 2024-02-15 ENCOUNTER — PATIENT MESSAGE (OUTPATIENT)
Dept: FAMILY MEDICINE | Facility: CLINIC | Age: 79
End: 2024-02-15
Payer: MEDICARE

## 2024-02-15 ENCOUNTER — HOSPITAL ENCOUNTER (OUTPATIENT)
Dept: RADIOLOGY | Facility: HOSPITAL | Age: 79
Discharge: HOME | DRG: 100 | End: 2024-02-15
Attending: NURSE PRACTITIONER
Payer: MEDICARE

## 2024-02-15 ENCOUNTER — TELEPHONE (OUTPATIENT)
Dept: PULMONOLOGY | Facility: CLINIC | Age: 79
End: 2024-02-15

## 2024-02-15 ENCOUNTER — OFFICE VISIT (OUTPATIENT)
Dept: PULMONOLOGY | Facility: CLINIC | Age: 79
End: 2024-02-15
Payer: MEDICARE

## 2024-02-15 VITALS
DIASTOLIC BLOOD PRESSURE: 100 MMHG | BODY MASS INDEX: 28.44 KG/M2 | HEART RATE: 69 BPM | HEIGHT: 72 IN | SYSTOLIC BLOOD PRESSURE: 170 MMHG | OXYGEN SATURATION: 98 % | WEIGHT: 210 LBS

## 2024-02-15 DIAGNOSIS — I10 HYPERTENSION, UNSPECIFIED TYPE: ICD-10-CM

## 2024-02-15 DIAGNOSIS — R06.00 DYSPNEA, UNSPECIFIED TYPE: Primary | ICD-10-CM

## 2024-02-15 DIAGNOSIS — I10 ESSENTIAL HYPERTENSION: Primary | ICD-10-CM

## 2024-02-15 DIAGNOSIS — R06.00 DYSPNEA, UNSPECIFIED TYPE: ICD-10-CM

## 2024-02-15 DIAGNOSIS — G47.33 OBSTRUCTIVE SLEEP APNEA: ICD-10-CM

## 2024-02-15 PROCEDURE — 71046 X-RAY EXAM CHEST 2 VIEWS: CPT | Mod: TC

## 2024-02-15 PROCEDURE — 99203 OFFICE O/P NEW LOW 30 MIN: CPT | Mod: S$GLB,,, | Performed by: NURSE PRACTITIONER

## 2024-02-15 NOTE — PROGRESS NOTES
SUBJECTIVE:    Patient ID: Papito Hutton is a 78 y.o. male.    Chief Complaint: Establish Care, Apnea, and Shortness of Breath    HPI  Patient here today to establish care for KEITH. His PCP ordered a home sleep study which showed KEITH.  The patient states he has difficulty sleeping, wakes up often. He does have some brain fog and forgetfulness. His BP has been high as well, states he has had some medication changes for his BP.  He is short of breath with exertion at times and at night.  He has difficulty breathing through his nose and does have orthopnea.   Past Medical History:   Diagnosis Date    Arthritis     B12 deficiency     Colon polyp     Diabetes mellitus     BORDERLINE    GERD (gastroesophageal reflux disease)     [place] (hard of hearing)     BILAT AIDS    Hyperlipidemia     Hypertension     Low testosterone     Personal history of colonic polyps 2008    adenomatous polyp    Sinus disorder     Sleep apnea     DOES NOT USE MACHINE    Wears glasses      Past Surgical History:   Procedure Laterality Date    COLON SURGERY      COLONOSCOPY  2008    Dr. Garcia; polyps removed    COLONOSCOPY N/A 07/13/2020    Procedure: COLONOSCOPY;  Surgeon: Jj Fried MD;  Location: [facility];  Service: Endoscopy;  Laterality: N/A;    COLONOSCOPY N/A 8/15/2023    Procedure: COLONOSCOPY;  Surgeon: Jj Fried MD;  Location: [facility];  Service: Endoscopy;  Laterality: N/A;    ESOPHAGOGASTRODUODENOSCOPY N/A 7/31/2023    Procedure: EGD (ESOPHAGOGASTRODUODENOSCOPY);  Surgeon: Jj Fried MD;  Location: [facility];  Service: Endoscopy;  Laterality: N/A;    LAPAROSCOPIC RIGHT COLON RESECTION Right 08/10/2020    Procedure: COLECTOMY, RIGHT, LAPAROSCOPIC pooss conversion to open;  Surgeon: Noble Kaye MD;  Location: [facility];  Service: General;  Laterality: Right;    NASAL SEPTUM SURGERY      right hand surgery      RIGHT HEMICOLECTOMY N/A 08/10/2020    Procedure: HEMICOLECTOMY, RIGHT;  Surgeon: Noble MARTIN  MD Vargas;  Location: [facility];  Service: General;  Laterality: N/A;    UPPER GASTROINTESTINAL ENDOSCOPY  2008    hiatal hernia; esophagitis     Family History   Problem Relation Age of Onset    Cancer Neg Hx     Diabetes Neg Hx     Heart disease Neg Hx     Colon cancer Neg Hx     Colon polyps Neg Hx     Esophageal cancer Neg Hx     Stomach cancer Neg Hx         Social History:   Marital Status:   Occupation: Data Unavailable  Alcohol History:  reports current alcohol use of about 2.0 standard drinks of alcohol per week.  Tobacco History:  reports that he has quit smoking. His smoking use included cigars. He has quit using smokeless tobacco.  His smokeless tobacco use included chew.  Drug History:  reports no history of drug use.    Review of patient's allergies indicates:  No Known Allergies    Current Outpatient Medications   Medication Sig Dispense Refill    acetaminophen (TYLENOL) 650 MG TbSR Take 1 tablet (650 mg total) by mouth every 8 (eight) hours as needed (do not take with any other tylenol or acetaminophen).  0    atorvastatin (LIPITOR) 80 MG tablet Take 1 tablet (80 mg total) by mouth once daily. 90 tablet 3    carvediloL (COREG) 12.5 MG tablet Take 1 tablet (12.5 mg total) by mouth 2 (two) times daily with meals. 60 tablet 11    cetirizine (ZYRTEC) 10 MG tablet Take 1 tablet (10 mg total) by mouth every evening. 90 tablet 3    CHOLECALCIFEROL, VITAMIN D3, (VITAMIN D3 ORAL) Take 1 capsule by mouth once daily.      clopidogreL (PLAVIX) 75 mg tablet Take 1 tablet (75 mg total) by mouth once daily. 90 tablet 1    cyanocobalamin (VITAMIN B-12) 1000 MCG tablet Take 100 mcg by mouth once daily.      diclofenac (VOLTAREN) 75 MG EC tablet Take 75 mg by mouth 2 (two) times daily.      diclofenac sodium (VOLTAREN) 1 % Gel Apply 2 grams topically once daily. 100 g 11    esomeprazole (NEXIUM) 40 MG capsule TAKE 1 CAPSULE BEFORE BREAKFAST 90 capsule 3    fluticasone propionate (FLONASE) 50 mcg/actuation nasal  spray Nasal for 90 Days      ipratropium (ATROVENT) 21 mcg (0.03 %) nasal spray 2 sprays by Each Nostril route 2 (two) times daily as needed for Rhinitis (sinus congestion). 90 mL 1    metFORMIN (GLUCOPHAGE) 500 MG tablet Take 500 mg by mouth once daily.      montelukast (SINGULAIR) 10 mg tablet Take 10 mg by mouth every evening.      triamterene-hydrochlorothiazide 37.5-25 mg (MAXZIDE-25) 37.5-25 mg per tablet Take 1 tablet by mouth once daily. 90 tablet 3    valsartan (DIOVAN) 320 MG tablet Take 1 tablet (320 mg total) by mouth once daily. 90 tablet 0    aspirin 81 MG Chew Take 1 tablet (81 mg total) by mouth once daily. for 21 days (Patient not taking: Reported on 2/7/2024)  0    azelastine (ASTELIN) 137 mcg (0.1 %) nasal spray 1 spray (137 mcg total) by Nasal route 2 (two) times daily. for 180 doses 120 mL 2     No current facility-administered medications for this visit.     Last ECHO 09/2023  Left Ventricle: The left ventricle is normal in size. Mildly increased wall thickness. There is mild concentric hypertrophy. Normal wall motion. There is normal systolic function with a visually estimated ejection fraction of 65 - 70%. There is normal diastolic function.    Right Ventricle: Normal right ventricular cavity size. Wall thickness is normal. Right ventricle wall motion  is normal. Systolic function is normal.    IVC/SVC: Normal venous pressure at 3 mmHg.    Review of Systems  General: Feeling Well. Tired all day  Eyes: Vision is good.  ENT:  No sinusitis or pharyngitis. Difficulty breathing through his nose  Heart:: No chest pain or palpitations. Orthopnea   Lungs: wheezes when he lays down, short of breath with exertion   GI: No Nausea, vomiting, constipation, diarrhea, or reflux.  : No dysuria, hesitancy, or nocturia.  Musculoskeletal: knee pain   Skin: No lesions or rashes.  Neuro: No headaches or neuropathy. Brain fog and forgetfulness   Lymph: No edema or adenopathy.  Psych: No anxiety or  depression.  Endo: No weight change.    OBJECTIVE:      BP (!) 170/100 (BP Location: Right arm, Patient Position: Sitting, BP Method: Medium (Manual))   Pulse 69   Ht 6' (1.829 m)   Wt 95.3 kg (210 lb)   SpO2 98%   BMI 28.48 kg/m²     Physical Exam  GENERAL: Older patient in no distress.  HEENT: Pupils equal and reactive. Extraocular movements intact. Nose intact.      Pharynx moist.  NECK: Supple.   HEART: Regular rate and rhythm. No murmur or gallop auscultated.  LUNGS: Clear to auscultation and percussion. Lung excursion symmetrical. No change in fremitus. No adventitial noises.  ABDOMEN: Bowel sounds present. Non-tender, no masses palpated.  EXTREMITIES: Normal muscle tone and joint movement, no cyanosis or clubbing.   LYMPHATICS: No adenopathy palpated, no edema.  SKIN: Dry, intact, no lesions.   NEURO: Cranial nerves II-XII intact. Motor strength 5/5 bilaterally, upper and lower extremities.  PSYCH: Appropriate affect.    Assessment:       1. Dyspnea, unspecified type    2. Obstructive sleep apnea    3. Hypertension, unspecified type          Plan:       CPAP titration  Chest xray  PFT    Follow up in about 6 weeks (around 3/28/2024).

## 2024-02-16 RX ORDER — NIFEDIPINE 60 MG/1
60 TABLET, EXTENDED RELEASE ORAL DAILY
Qty: 30 TABLET | Refills: 11 | Status: SHIPPED | OUTPATIENT
Start: 2024-02-16 | End: 2024-02-26

## 2024-02-17 ENCOUNTER — HOSPITAL ENCOUNTER (INPATIENT)
Facility: HOSPITAL | Age: 79
LOS: 5 days | Discharge: HOME HEALTH | DRG: 100 | End: 2024-02-22
Attending: STUDENT IN AN ORGANIZED HEALTH CARE EDUCATION/TRAINING PROGRAM | Admitting: INTERNAL MEDICINE
Payer: MEDICARE

## 2024-02-17 DIAGNOSIS — Z91.89 AT RISK FOR LONG QT SYNDROME: ICD-10-CM

## 2024-02-17 DIAGNOSIS — R56.9 SEIZURE: ICD-10-CM

## 2024-02-17 DIAGNOSIS — R29.818 ACUTE FOCAL NEUROLOGICAL DEFICIT: ICD-10-CM

## 2024-02-17 DIAGNOSIS — I63.9 ACUTE CVA (CEREBROVASCULAR ACCIDENT): Primary | ICD-10-CM

## 2024-02-17 DIAGNOSIS — I63.9 CVA (CEREBRAL VASCULAR ACCIDENT): ICD-10-CM

## 2024-02-17 DIAGNOSIS — R45.1 AGITATION: ICD-10-CM

## 2024-02-17 DIAGNOSIS — R09.02 HYPOXEMIA: ICD-10-CM

## 2024-02-17 LAB
ALBUMIN SERPL BCP-MCNC: 3.9 G/DL (ref 3.5–5.2)
ALLENS TEST: ABNORMAL
ALP SERPL-CCNC: 75 U/L (ref 55–135)
ALT SERPL W/O P-5'-P-CCNC: 19 U/L (ref 10–44)
ANION GAP SERPL CALC-SCNC: 14 MMOL/L (ref 8–16)
AST SERPL-CCNC: 20 U/L (ref 10–40)
BASOPHILS # BLD AUTO: 0.05 K/UL (ref 0–0.2)
BASOPHILS NFR BLD: 0.6 % (ref 0–1.9)
BILIRUB SERPL-MCNC: 1 MG/DL (ref 0.1–1)
BUN SERPL-MCNC: 13 MG/DL (ref 8–23)
CALCIUM SERPL-MCNC: 9.9 MG/DL (ref 8.7–10.5)
CHLORIDE SERPL-SCNC: 107 MMOL/L (ref 95–110)
CHOLEST SERPL-MCNC: 147 MG/DL (ref 120–199)
CHOLEST/HDLC SERPL: 3.5 {RATIO} (ref 2–5)
CO2 SERPL-SCNC: 19 MMOL/L (ref 23–29)
CREAT SERPL-MCNC: 1.7 MG/DL (ref 0.5–1.4)
DELSYS: ABNORMAL
DIFFERENTIAL METHOD BLD: ABNORMAL
EOSINOPHIL # BLD AUTO: 0.1 K/UL (ref 0–0.5)
EOSINOPHIL NFR BLD: 1.2 % (ref 0–8)
ERYTHROCYTE [DISTWIDTH] IN BLOOD BY AUTOMATED COUNT: 13.1 % (ref 11.5–14.5)
ERYTHROCYTE [SEDIMENTATION RATE] IN BLOOD BY WESTERGREN METHOD: 22 MM/H
EST. GFR  (NO RACE VARIABLE): 41 ML/MIN/1.73 M^2
FLOW: 5
GLUCOSE SERPL-MCNC: 132 MG/DL (ref 70–110)
HCO3 UR-SCNC: 22 MMOL/L (ref 24–28)
HCT VFR BLD AUTO: 37.6 % (ref 40–54)
HDLC SERPL-MCNC: 42 MG/DL (ref 40–75)
HDLC SERPL: 28.6 % (ref 20–50)
HGB BLD-MCNC: 13.3 G/DL (ref 14–18)
IMM GRANULOCYTES # BLD AUTO: 0.02 K/UL (ref 0–0.04)
IMM GRANULOCYTES NFR BLD AUTO: 0.2 % (ref 0–0.5)
INR PPP: 1.2 (ref 0.8–1.2)
LDLC SERPL CALC-MCNC: 80.8 MG/DL (ref 63–159)
LYMPHOCYTES # BLD AUTO: 1.7 K/UL (ref 1–4.8)
LYMPHOCYTES NFR BLD: 20.2 % (ref 18–48)
MCH RBC QN AUTO: 30.7 PG (ref 27–31)
MCHC RBC AUTO-ENTMCNC: 35.4 G/DL (ref 32–36)
MCV RBC AUTO: 87 FL (ref 82–98)
MODE: ABNORMAL
MONOCYTES # BLD AUTO: 0.5 K/UL (ref 0.3–1)
MONOCYTES NFR BLD: 6.2 % (ref 4–15)
NEUTROPHILS # BLD AUTO: 5.9 K/UL (ref 1.8–7.7)
NEUTROPHILS NFR BLD: 71.6 % (ref 38–73)
NONHDLC SERPL-MCNC: 105 MG/DL
NRBC BLD-RTO: 0 /100 WBC
PCO2 BLDA: 41.5 MMHG (ref 35–45)
PH SMN: 7.33 [PH] (ref 7.35–7.45)
PLATELET # BLD AUTO: 240 K/UL (ref 150–450)
PMV BLD AUTO: 9.9 FL (ref 9.2–12.9)
PO2 BLDA: 124 MMHG (ref 80–100)
POC BE: -4 MMOL/L
POC SATURATED O2: 99 % (ref 95–100)
POC TCO2: 23 MMOL/L (ref 23–27)
POCT GLUCOSE: 122 MG/DL (ref 70–110)
POTASSIUM SERPL-SCNC: 3.8 MMOL/L (ref 3.5–5.1)
PROT SERPL-MCNC: 6.7 G/DL (ref 6–8.4)
PROTHROMBIN TIME: 12.4 SEC (ref 9–12.5)
RBC # BLD AUTO: 4.33 M/UL (ref 4.6–6.2)
SAMPLE: ABNORMAL
SITE: ABNORMAL
SODIUM SERPL-SCNC: 140 MMOL/L (ref 136–145)
SP02: 97
TRIGL SERPL-MCNC: 121 MG/DL (ref 30–150)
TSH SERPL DL<=0.005 MIU/L-ACNC: 0.7 UIU/ML (ref 0.4–4)
WBC # BLD AUTO: 8.28 K/UL (ref 3.9–12.7)

## 2024-02-17 PROCEDURE — 20000000 HC ICU ROOM

## 2024-02-17 PROCEDURE — 82962 GLUCOSE BLOOD TEST: CPT

## 2024-02-17 PROCEDURE — 82803 BLOOD GASES ANY COMBINATION: CPT

## 2024-02-17 PROCEDURE — 85610 PROTHROMBIN TIME: CPT | Performed by: STUDENT IN AN ORGANIZED HEALTH CARE EDUCATION/TRAINING PROGRAM

## 2024-02-17 PROCEDURE — 99900035 HC TECH TIME PER 15 MIN (STAT)

## 2024-02-17 PROCEDURE — 94761 N-INVAS EAR/PLS OXIMETRY MLT: CPT

## 2024-02-17 PROCEDURE — G0426 INPT/ED TELECONSULT50: HCPCS | Mod: 95,,, | Performed by: PSYCHIATRY & NEUROLOGY

## 2024-02-17 PROCEDURE — 36415 COLL VENOUS BLD VENIPUNCTURE: CPT | Performed by: STUDENT IN AN ORGANIZED HEALTH CARE EDUCATION/TRAINING PROGRAM

## 2024-02-17 PROCEDURE — C9399 UNCLASSIFIED DRUGS OR BIOLOG: HCPCS | Performed by: STUDENT IN AN ORGANIZED HEALTH CARE EDUCATION/TRAINING PROGRAM

## 2024-02-17 PROCEDURE — 84443 ASSAY THYROID STIM HORMONE: CPT | Performed by: STUDENT IN AN ORGANIZED HEALTH CARE EDUCATION/TRAINING PROGRAM

## 2024-02-17 PROCEDURE — 80061 LIPID PANEL: CPT | Performed by: STUDENT IN AN ORGANIZED HEALTH CARE EDUCATION/TRAINING PROGRAM

## 2024-02-17 PROCEDURE — 85610 PROTHROMBIN TIME: CPT

## 2024-02-17 PROCEDURE — 99291 CRITICAL CARE FIRST HOUR: CPT

## 2024-02-17 PROCEDURE — 85025 COMPLETE CBC W/AUTO DIFF WBC: CPT | Performed by: STUDENT IN AN ORGANIZED HEALTH CARE EDUCATION/TRAINING PROGRAM

## 2024-02-17 PROCEDURE — 63600175 PHARM REV CODE 636 W HCPCS: Performed by: STUDENT IN AN ORGANIZED HEALTH CARE EDUCATION/TRAINING PROGRAM

## 2024-02-17 PROCEDURE — 25000003 PHARM REV CODE 250

## 2024-02-17 PROCEDURE — 25500020 PHARM REV CODE 255

## 2024-02-17 PROCEDURE — 25000003 PHARM REV CODE 250: Performed by: STUDENT IN AN ORGANIZED HEALTH CARE EDUCATION/TRAINING PROGRAM

## 2024-02-17 PROCEDURE — 36600 WITHDRAWAL OF ARTERIAL BLOOD: CPT

## 2024-02-17 PROCEDURE — 99292 CRITICAL CARE ADDL 30 MIN: CPT

## 2024-02-17 PROCEDURE — 80053 COMPREHEN METABOLIC PANEL: CPT | Performed by: STUDENT IN AN ORGANIZED HEALTH CARE EDUCATION/TRAINING PROGRAM

## 2024-02-17 PROCEDURE — 27000221 HC OXYGEN, UP TO 24 HOURS

## 2024-02-17 PROCEDURE — 95819 EEG AWAKE AND ASLEEP: CPT

## 2024-02-17 RX ORDER — DEXMEDETOMIDINE HYDROCHLORIDE 4 UG/ML
0-1.4 INJECTION, SOLUTION INTRAVENOUS CONTINUOUS
Status: DISCONTINUED | OUTPATIENT
Start: 2024-02-17 | End: 2024-02-21

## 2024-02-17 RX ORDER — SODIUM CHLORIDE 0.9 % (FLUSH) 0.9 %
2 SYRINGE (ML) INJECTION EVERY 12 HOURS PRN
Status: DISCONTINUED | OUTPATIENT
Start: 2024-02-17 | End: 2024-02-22 | Stop reason: HOSPADM

## 2024-02-17 RX ORDER — ENOXAPARIN SODIUM 100 MG/ML
40 INJECTION SUBCUTANEOUS EVERY 24 HOURS
Status: DISCONTINUED | OUTPATIENT
Start: 2024-02-17 | End: 2024-02-22 | Stop reason: HOSPADM

## 2024-02-17 RX ORDER — CLOPIDOGREL BISULFATE 75 MG/1
75 TABLET ORAL DAILY
Status: DISCONTINUED | OUTPATIENT
Start: 2024-02-18 | End: 2024-02-22 | Stop reason: HOSPADM

## 2024-02-17 RX ORDER — ATORVASTATIN CALCIUM 40 MG/1
80 TABLET, FILM COATED ORAL DAILY
Status: DISCONTINUED | OUTPATIENT
Start: 2024-02-18 | End: 2024-02-22 | Stop reason: HOSPADM

## 2024-02-17 RX ORDER — MIDAZOLAM HYDROCHLORIDE 5 MG/ML
2 INJECTION INTRAMUSCULAR; INTRAVENOUS
Status: COMPLETED | OUTPATIENT
Start: 2024-02-17 | End: 2024-02-17

## 2024-02-17 RX ORDER — GLUCAGON 1 MG
1 KIT INJECTION
Status: DISCONTINUED | OUTPATIENT
Start: 2024-02-17 | End: 2024-02-22 | Stop reason: HOSPADM

## 2024-02-17 RX ORDER — LEVETIRACETAM 5 MG/ML
500 INJECTION INTRAVASCULAR EVERY 12 HOURS
Status: DISCONTINUED | OUTPATIENT
Start: 2024-02-18 | End: 2024-02-19

## 2024-02-17 RX ORDER — DEXMEDETOMIDINE HYDROCHLORIDE 4 UG/ML
INJECTION, SOLUTION INTRAVENOUS
Status: COMPLETED
Start: 2024-02-17 | End: 2024-02-17

## 2024-02-17 RX ORDER — MUPIROCIN 20 MG/G
OINTMENT TOPICAL 2 TIMES DAILY
Status: DISCONTINUED | OUTPATIENT
Start: 2024-02-17 | End: 2024-02-22 | Stop reason: HOSPADM

## 2024-02-17 RX ORDER — LORAZEPAM 2 MG/ML
INJECTION INTRAMUSCULAR
Status: DISCONTINUED
Start: 2024-02-17 | End: 2024-02-17 | Stop reason: WASHOUT

## 2024-02-17 RX ORDER — ONDANSETRON HYDROCHLORIDE 2 MG/ML
4 INJECTION, SOLUTION INTRAVENOUS EVERY 6 HOURS PRN
Status: DISCONTINUED | OUTPATIENT
Start: 2024-02-17 | End: 2024-02-22 | Stop reason: HOSPADM

## 2024-02-17 RX ORDER — DEXMEDETOMIDINE HYDROCHLORIDE 4 UG/ML
0-1.4 INJECTION INTRAVENOUS CONTINUOUS
Status: DISCONTINUED | OUTPATIENT
Start: 2024-02-17 | End: 2024-02-17

## 2024-02-17 RX ORDER — LORAZEPAM 2 MG/ML
1 INJECTION INTRAMUSCULAR
Status: DISCONTINUED | OUTPATIENT
Start: 2024-02-17 | End: 2024-02-17

## 2024-02-17 RX ORDER — MONTELUKAST SODIUM 10 MG/1
10 TABLET ORAL NIGHTLY
Status: DISCONTINUED | OUTPATIENT
Start: 2024-02-17 | End: 2024-02-22 | Stop reason: HOSPADM

## 2024-02-17 RX ADMIN — DEXMEDETOMIDINE HYDROCHLORIDE 1 MCG: 400 INJECTION INTRAVENOUS at 11:02

## 2024-02-17 RX ADMIN — IOHEXOL 75 ML: 350 INJECTION, SOLUTION INTRAVENOUS at 06:02

## 2024-02-17 RX ADMIN — MIDAZOLAM 2 MG: 5 INJECTION INTRAMUSCULAR; INTRAVENOUS at 07:02

## 2024-02-17 RX ADMIN — ENOXAPARIN SODIUM 40 MG: 100 INJECTION SUBCUTANEOUS at 11:02

## 2024-02-17 RX ADMIN — DEXMEDETOMIDINE HYDROCHLORIDE 0.2 MCG/KG/HR: 4 INJECTION INTRAVENOUS at 07:02

## 2024-02-17 RX ADMIN — MUPIROCIN 1 G: 20 OINTMENT TOPICAL at 11:02

## 2024-02-17 RX ADMIN — LEVETIRACETAM 1000 MG: 100 INJECTION, SOLUTION INTRAVENOUS at 06:02

## 2024-02-17 NOTE — ED PROVIDER NOTES
Encounter Date: 2/17/2024       History     Chief Complaint   Patient presents with    Facial Droop     Last seen well 1030 am      Fall     Resulting to rt. Sided facial injury     Extremity Weakness     Rt. Side  and nonverbal     78-year-old male presents with neurological deficit by EMS.  Last known normal at 10 30 this morning.  Associated right-sided deficits, facial droop and inability to find words    The history is provided by the EMS personnel.     Review of patient's allergies indicates:  No Known Allergies  Past Medical History:   Diagnosis Date    Arthritis     B12 deficiency     Colon polyp     Diabetes mellitus     BORDERLINE    GERD (gastroesophageal reflux disease)     [place] (hard of hearing)     BILAT AIDS    Hyperlipidemia     Hypertension     Low testosterone     Personal history of colonic polyps 2008    adenomatous polyp    Sinus disorder     Sleep apnea     DOES NOT USE MACHINE    Wears glasses      Past Surgical History:   Procedure Laterality Date    COLON SURGERY      COLONOSCOPY  2008    Dr. Garcia; polyps removed    COLONOSCOPY N/A 07/13/2020    Procedure: COLONOSCOPY;  Surgeon: Jj Fried MD;  Location: [facility];  Service: Endoscopy;  Laterality: N/A;    COLONOSCOPY N/A 8/15/2023    Procedure: COLONOSCOPY;  Surgeon: Jj Fried MD;  Location: [facility];  Service: Endoscopy;  Laterality: N/A;    ESOPHAGOGASTRODUODENOSCOPY N/A 7/31/2023    Procedure: EGD (ESOPHAGOGASTRODUODENOSCOPY);  Surgeon: Jj Fried MD;  Location: [facility];  Service: Endoscopy;  Laterality: N/A;    LAPAROSCOPIC RIGHT COLON RESECTION Right 08/10/2020    Procedure: COLECTOMY, RIGHT, LAPAROSCOPIC pooss conversion to open;  Surgeon: Noble Kaye MD;  Location: [facility];  Service: General;  Laterality: Right;    NASAL SEPTUM SURGERY      right hand surgery      RIGHT HEMICOLECTOMY N/A 08/10/2020    Procedure: HEMICOLECTOMY, RIGHT;  Surgeon: Noble Kaye MD;  Location: [facility];  Service:  General;  Laterality: N/A;    UPPER GASTROINTESTINAL ENDOSCOPY  2008    hiatal hernia; esophagitis     Family History   Problem Relation Age of Onset    Cancer Neg Hx     Diabetes Neg Hx     Heart disease Neg Hx     Colon cancer Neg Hx     Colon polyps Neg Hx     Esophageal cancer Neg Hx     Stomach cancer Neg Hx      Social History     Tobacco Use    Smoking status: Former     Types: Cigars    Smokeless tobacco: Former     Types: Chew   Substance Use Topics    Alcohol use: Yes     Alcohol/week: 2.0 standard drinks of alcohol     Types: 2 Cans of beer per week     Comment: occ    Drug use: No     Review of Systems   All other systems reviewed and are negative.      Physical Exam     Initial Vitals [02/17/24 1730]   BP Pulse Resp Temp SpO2   (!) 234/104 (!) 55 18 -- 99 %      MAP       --         Physical Exam    Nursing note and vitals reviewed.  Constitutional: He is not diaphoretic.   HENT:   Bruising and swelling to right side of face, no facial instability or trismus, no hyphema   Eyes: Right eye exhibits no discharge. Left eye exhibits no discharge.   Cardiovascular:            Rate 55   Pulmonary/Chest: No stridor.   Abdominal: He exhibits no distension.   Musculoskeletal:         General: No edema.     Neurological:   See NIH scale   Skin: Skin is warm.         ED Course   Critical Care    Date/Time: 2/17/2024 5:23 PM    Performed by: Richi Allen Jr., DO  Authorized by: Junior Santoro MD  Direct patient critical care time: 40 minutes  Additional history critical care time: 10 minutes  Ordering / reviewing critical care time: 10 minutes  Documentation critical care time: 10 minutes  Consulting other physicians critical care time: 5 minutes  Consult with family critical care time: 5 minutes  Total critical care time (exclusive of procedural time) : 80 minutes        Labs Reviewed   CBC W/ AUTO DIFFERENTIAL - Abnormal; Notable for the following components:       Result Value    RBC 4.33 (*)     Hemoglobin  13.3 (*)     Hematocrit 37.6 (*)     All other components within normal limits   COMPREHENSIVE METABOLIC PANEL - Abnormal; Notable for the following components:    CO2 19 (*)     Glucose 132 (*)     Creatinine 1.7 (*)     eGFR 41 (*)     All other components within normal limits   POCT GLUCOSE - Abnormal; Notable for the following components:    POCT Glucose 122 (*)     All other components within normal limits   ISTAT PROCEDURE - Abnormal; Notable for the following components:    POC PH 7.334 (*)     POC PO2 124 (*)     POC HCO3 22.0 (*)     POC BE -4 (*)     All other components within normal limits   PROTIME-INR   TSH   LIPID PANEL   POCT GLUCOSE, HAND-HELD DEVICE   POCT PROTIME-INR          Imaging Results              CT Cervical Spine Without Contrast (In process)                      CTA Head and Neck (xpd) (In process)  Result time 02/17/24 17:37:51                     CT Maxillofacial Without Contrast (In process)                      Medications   dexmedetomidine (PRECEDEX) 400mcg/100mL dextrose 5% infusion (0.5 mcg/kg/hr × 106.1 kg Intravenous Rate/Dose Change 2/17/24 2026)   iohexoL (OMNIPAQUE 350) 350 mg iodine/mL injection (75 mLs Intravenous Given 2/17/24 1816)   levETIRAcetam (Keppra) 1,000 mg in dextrose 5 % (D5W) 100 mL IVPB (0 mg Intravenous Stopped 2/17/24 1929)   midazolam (VERSED) 5 mg/mL injection 2 mg (2 mg Intravenous Given 2/17/24 1923)     Medical Decision Making  78-year-old male presents with neurological deficits, last known normal 10:30 a.m. roughly 7 hours ago outside of tPA window.  Code stroke initiated.  CT imaging with no large vessel occlusion or intracranial hemorrhage, spoke with neurologist Dr. Mabry.  While in room 1 patient had seizure-like activity with associated cyanosis and hypoxemia.  Resolved spontaneously,  hypoxemia resolved with supportive care.  per neurologist recommendations administered Keppra 1 g IV, .  Patient had some postictal agitation requiring IV benzos  and Precedex infusion.  Spoke with Dr. Santoro via secure chat and Dr. Kim by phone and secure chat, hospitalist with [facility] and patient will be admitted to [facility] ICU.  Updated wife and both sons at bedside and by phone of disposition and findings    Amount and/or Complexity of Data Reviewed  Labs: ordered. Decision-making details documented in ED Course.  Radiology: ordered.  ECG/medicine tests: ordered and independent interpretation performed.     Details: EKG rate 92, QRS 98, , no STEMI  Discussion of management or test interpretation with external provider(s): Spoke with neurologist Dr. Mabry-no large vessel occlusion or intracranial hemorrhage, recommend IV Keppra and Precedex  Spoke with hospitalist Dr. Santoro via secure chat, placed admission orders to ICU, spoke with Dr. Kim and provided signed out    Risk  Prescription drug management.  Decision regarding hospitalization.      Additional MDM:     NIH Stroke Scale:   Interval = baseline (upon arrival/admit)  Level of consciousness = 0 - alert  LOC questions = 2 - answers none correctly  LOC commands = 2 - performs neither correctly  Best gaze = 1 - partial gaze palsy  Visual = 0 - no visual loss  Facial palsy = 2 - partial  Motor left arm =  0 - no drift  Motor right arm =  4 - no movement  Motor left leg = 0 - no drift  Motor right leg =  4 - no movement  Limb ataxia = 1 - present in one limb  Sensory = 0 - normal  Best language = 2 - severe aphasia  Dysarthria = 1 - mild to moderate dysarthria  Extinction and inattention = 1 - partial neglect  NIH Stroke Scale Total = 20              ED Course as of 02/17/24 2038   Sat Feb 17, 2024   1732 78-year-old male presents with stroke-like symptoms.  Last known normal 10:30 a.m..  Patient brought in by EMS, right-sided motor deficits, eyes do not cross midline towards right, associated trauma to face as well.  Respiratory distress at this time [KB]   1733 Code stroke called, outside tPA  window [KB]   1733     Thrombolysis Candidate? No, Out of window - Symptom onset > 4.5 hours    Delays to Thrombolysis?  No   [KB]   1749 WBC: 8.28 [KB]   1749 Hemoglobin(!): 13.3 [KB]   1749 Hematocrit(!): 37.6 [KB]   1749 POCT Glucose(!): 122 [KB]   1807 Sodium: 140 [KB]   1807 Potassium: 3.8 [KB]   1807 Chloride: 107 [KB]   1807 Glucose(!): 132 [KB]   1807 BUN: 13 [KB]   1807 Creatinine(!): 1.7 [KB]   1807 eGFR(!): 41 [KB]   1807 Anion Gap: 14 [KB]   1835 Patient had seizure episode for roughly 1 minute.  Became hypoxemic provided supportive care with oxygen therapy and jaw thrust, seizure resolved spontaneously.  Repeat blood pressure currently improving 191/92 [KB]   1836 Pending response from neurologist Dr. Mabry.  Messaged hospitalist  at [facility] Dr. Santoro for admission to ICU level care [KB]   1847 CTA neck VRAD  IMPRESSION: No occlusion or significant stenosis in the arteries of the neck.    [KB]   1848 CTA Head Per Vrad  IMPRESSION: 1. No intracranial arterial occlusion or significant stenosis. 2. No acute findings on non-contrast Head CT images. ASPECTS score 10. 3. Chronic findings, as above.   [KB]   1849 TSH: 0.700 [KB]   1849 Patient accepted to [facility] for further management evaluation   [KB]   1849 Spoke with neurologist Dr. Mabry recommend 1 g Keppra IV now, 500 mg Keppra b.i.d. and will give 1 mg Ativan IV for agitation at this time [KB]   1956 Patient with agitation likely postictal however ABG obtained with no significant hypoxemia or hypercapnia [KB]   2002 POC PH(!): 7.334 [KB]   2002 POC PCO2: 41.5 [KB]   2002 POC PO2(!): 124 [KB]      ED Course User Index  [KB] Richi Allen Jr.,                            Clinical Impression:  Final diagnoses:  [R29.818] Acute focal neurological deficit  [I63.9] Acute CVA (cerebrovascular accident) (Primary)  [R56.9] Seizure  [R09.02] Hypoxemia  [R45.1] Agitation          ED Disposition Condition    Admit                  Richi Allen Jr., DO  02/17/24 1950       Richi Allen Jr., DO  02/17/24 1957       Richi Allen Jr., DO  02/17/24 2002       Richi Allen Jr., DO  02/17/24 2038

## 2024-02-18 ENCOUNTER — CLINICAL SUPPORT (OUTPATIENT)
Dept: CARDIOLOGY | Facility: HOSPITAL | Age: 79
DRG: 100 | End: 2024-02-18
Attending: INTERNAL MEDICINE
Payer: MEDICARE

## 2024-02-18 VITALS — HEIGHT: 72 IN | WEIGHT: 202.38 LBS | BODY MASS INDEX: 27.41 KG/M2

## 2024-02-18 LAB
ALBUMIN SERPL BCP-MCNC: 4.1 G/DL (ref 3.5–5.2)
ALP SERPL-CCNC: 68 U/L (ref 55–135)
ALT SERPL W/O P-5'-P-CCNC: 18 U/L (ref 10–44)
AMMONIA PLAS-SCNC: 31 UMOL/L (ref 10–50)
AMPHET+METHAMPHET UR QL: NEGATIVE
ANION GAP SERPL CALC-SCNC: 8 MMOL/L (ref 8–16)
AORTIC ROOT ANNULUS: 3.1 CM
AORTIC VALVE CUSP SEPERATION: 2.2 CM
ASCENDING AORTA: 3.1 CM
AST SERPL-CCNC: 25 U/L (ref 10–40)
AV INDEX (PROSTH): 0.87
AV MEAN GRADIENT: 5 MMHG
AV PEAK GRADIENT: 10 MMHG
AV VALVE AREA BY VELOCITY RATIO: 3.08 CM²
AV VALVE AREA: 3 CM²
AV VELOCITY RATIO: 0.89
BARBITURATES UR QL SCN>200 NG/ML: NEGATIVE
BASOPHILS # BLD AUTO: 0.04 K/UL (ref 0–0.2)
BASOPHILS NFR BLD: 0.4 % (ref 0–1.9)
BENZODIAZ UR QL SCN>200 NG/ML: ABNORMAL
BILIRUB SERPL-MCNC: 0.8 MG/DL (ref 0.1–1)
BSA FOR ECHO PROCEDURE: 2.16 M2
BUN SERPL-MCNC: 14 MG/DL (ref 8–23)
BZE UR QL SCN: NEGATIVE
CALCIUM SERPL-MCNC: 10.1 MG/DL (ref 8.7–10.5)
CANNABINOIDS UR QL SCN: NEGATIVE
CHLORIDE SERPL-SCNC: 105 MMOL/L (ref 95–110)
CO2 SERPL-SCNC: 27 MMOL/L (ref 23–29)
CREAT SERPL-MCNC: 1.2 MG/DL (ref 0.5–1.4)
CREAT UR-MCNC: 38.9 MG/DL (ref 23–375)
CV ECHO LV RWT: 0.45 CM
DIFFERENTIAL METHOD BLD: ABNORMAL
DOP CALC AO PEAK VEL: 1.56 M/S
DOP CALC AO VTI: 34.5 CM
DOP CALC LVOT AREA: 3.5 CM2
DOP CALC LVOT DIAMETER: 2.1 CM
DOP CALC LVOT PEAK VEL: 1.39 M/S
DOP CALC LVOT STROKE VOLUME: 103.51 CM3
DOP CALC MV VTI: 30.8 CM
DOP CALCLVOT PEAK VEL VTI: 29.9 CM
E WAVE DECELERATION TIME: 259 MSEC
E/A RATIO: 0.76
E/E' RATIO: 8.86 M/S
ECHO LV POSTERIOR WALL: 1.07 CM (ref 0.6–1.1)
EOSINOPHIL # BLD AUTO: 0 K/UL (ref 0–0.5)
EOSINOPHIL NFR BLD: 0.1 % (ref 0–8)
ERYTHROCYTE [DISTWIDTH] IN BLOOD BY AUTOMATED COUNT: 13.1 % (ref 11.5–14.5)
EST. GFR  (NO RACE VARIABLE): >60 ML/MIN/1.73 M^2
FOLATE SERPL-MCNC: 16.3 NG/ML (ref 4–24)
FRACTIONAL SHORTENING: 47 % (ref 28–44)
GLUCOSE SERPL-MCNC: 124 MG/DL (ref 70–110)
GLUCOSE SERPL-MCNC: 138 MG/DL (ref 70–110)
GLUCOSE SERPL-MCNC: 98 MG/DL (ref 70–110)
HCT VFR BLD AUTO: 39.9 % (ref 40–54)
HGB BLD-MCNC: 13.5 G/DL (ref 14–18)
IMM GRANULOCYTES # BLD AUTO: 0.02 K/UL (ref 0–0.04)
IMM GRANULOCYTES NFR BLD AUTO: 0.2 % (ref 0–0.5)
INTERVENTRICULAR SEPTUM: 1.18 CM (ref 0.6–1.1)
IVC DIAMETER: 2.01 CM
LACTATE SERPL-SCNC: 0.9 MMOL/L (ref 0.5–1.9)
LEFT ATRIUM SIZE: 2.8 CM
LEFT INTERNAL DIMENSION IN SYSTOLE: 2.52 CM (ref 2.1–4)
LEFT VENTRICLE DIASTOLIC VOLUME INDEX: 48.13 ML/M2
LEFT VENTRICLE DIASTOLIC VOLUME: 103 ML
LEFT VENTRICLE MASS INDEX: 91 G/M2
LEFT VENTRICLE SYSTOLIC VOLUME INDEX: 10.7 ML/M2
LEFT VENTRICLE SYSTOLIC VOLUME: 22.8 ML
LEFT VENTRICULAR INTERNAL DIMENSION IN DIASTOLE: 4.72 CM (ref 3.5–6)
LEFT VENTRICULAR MASS: 194.83 G
LV LATERAL E/E' RATIO: 7.75 M/S
LV SEPTAL E/E' RATIO: 10.33 M/S
LVOT MG: 3 MMHG
LVOT MV: 0.83 CM/S
LYMPHOCYTES # BLD AUTO: 1.4 K/UL (ref 1–4.8)
LYMPHOCYTES NFR BLD: 15.1 % (ref 18–48)
MAGNESIUM SERPL-MCNC: 1.3 MG/DL (ref 1.6–2.6)
MCH RBC QN AUTO: 30.2 PG (ref 27–31)
MCHC RBC AUTO-ENTMCNC: 33.8 G/DL (ref 32–36)
MCV RBC AUTO: 89 FL (ref 82–98)
MONOCYTES # BLD AUTO: 0.6 K/UL (ref 0.3–1)
MONOCYTES NFR BLD: 6.8 % (ref 4–15)
MV MEAN GRADIENT: 1 MMHG
MV PEAK A VEL: 0.82 M/S
MV PEAK E VEL: 0.62 M/S
MV PEAK GRADIENT: 3 MMHG
MV STENOSIS PRESSURE HALF TIME: 74 MS
MV VALVE AREA BY CONTINUITY EQUATION: 3.36 CM2
MV VALVE AREA P 1/2 METHOD: 2.97 CM2
NEUTROPHILS # BLD AUTO: 7.1 K/UL (ref 1.8–7.7)
NEUTROPHILS NFR BLD: 77.4 % (ref 38–73)
NRBC BLD-RTO: 0 /100 WBC
OPIATES UR QL SCN: NEGATIVE
PCP UR QL SCN>25 NG/ML: NEGATIVE
PISA TR MAX VEL: 2.47 M/S
PLATELET # BLD AUTO: 220 K/UL (ref 150–450)
PMV BLD AUTO: 10.4 FL (ref 9.2–12.9)
POC PTINR: 1.2 (ref 0.9–1.2)
POTASSIUM SERPL-SCNC: 3.5 MMOL/L (ref 3.5–5.1)
PROT SERPL-MCNC: 6.1 G/DL (ref 6–8.4)
PV MV: 0.62 M/S
PV PEAK GRADIENT: 4 MMHG
PV PEAK VELOCITY: 0.97 M/S
RA PRESSURE ESTIMATED: 3 MMHG
RBC # BLD AUTO: 4.47 M/UL (ref 4.6–6.2)
RIGHT VENTRICULAR END-DIASTOLIC DIMENSION: 3.03 CM
RV TB RVSP: 5 MMHG
RV TISSUE DOPPLER FREE WALL SYSTOLIC VELOCITY 1 (APICAL 4 CHAMBER VIEW): 13.4 CM/S
SAMPLE: NORMAL
SODIUM SERPL-SCNC: 140 MMOL/L (ref 136–145)
TDI LATERAL: 0.08 M/S
TDI SEPTAL: 0.06 M/S
TDI: 0.07 M/S
TOXICOLOGY INFORMATION: ABNORMAL
TR MAX PG: 24 MMHG
TV REST PULMONARY ARTERY PRESSURE: 27 MMHG
VIT B12 SERPL-MCNC: 726 PG/ML (ref 210–950)
WBC # BLD AUTO: 9.18 K/UL (ref 3.9–12.7)
Z-SCORE OF LEFT VENTRICULAR DIMENSION IN END DIASTOLE: -3.77
Z-SCORE OF LEFT VENTRICULAR DIMENSION IN END SYSTOLE: -4.03

## 2024-02-18 PROCEDURE — 93306 TTE W/DOPPLER COMPLETE: CPT

## 2024-02-18 PROCEDURE — 25000003 PHARM REV CODE 250: Performed by: INTERNAL MEDICINE

## 2024-02-18 PROCEDURE — 80307 DRUG TEST PRSMV CHEM ANLYZR: CPT | Performed by: STUDENT IN AN ORGANIZED HEALTH CARE EDUCATION/TRAINING PROGRAM

## 2024-02-18 PROCEDURE — 25000003 PHARM REV CODE 250: Performed by: STUDENT IN AN ORGANIZED HEALTH CARE EDUCATION/TRAINING PROGRAM

## 2024-02-18 PROCEDURE — 63600175 PHARM REV CODE 636 W HCPCS: Performed by: INTERNAL MEDICINE

## 2024-02-18 PROCEDURE — 99900031 HC PATIENT EDUCATION (STAT)

## 2024-02-18 PROCEDURE — 82962 GLUCOSE BLOOD TEST: CPT

## 2024-02-18 PROCEDURE — 85025 COMPLETE CBC W/AUTO DIFF WBC: CPT | Performed by: STUDENT IN AN ORGANIZED HEALTH CARE EDUCATION/TRAINING PROGRAM

## 2024-02-18 PROCEDURE — 80053 COMPREHEN METABOLIC PANEL: CPT | Performed by: STUDENT IN AN ORGANIZED HEALTH CARE EDUCATION/TRAINING PROGRAM

## 2024-02-18 PROCEDURE — 82607 VITAMIN B-12: CPT | Performed by: STUDENT IN AN ORGANIZED HEALTH CARE EDUCATION/TRAINING PROGRAM

## 2024-02-18 PROCEDURE — 27000221 HC OXYGEN, UP TO 24 HOURS

## 2024-02-18 PROCEDURE — 63600175 PHARM REV CODE 636 W HCPCS: Performed by: STUDENT IN AN ORGANIZED HEALTH CARE EDUCATION/TRAINING PROGRAM

## 2024-02-18 PROCEDURE — 20000000 HC ICU ROOM

## 2024-02-18 PROCEDURE — 83605 ASSAY OF LACTIC ACID: CPT | Performed by: STUDENT IN AN ORGANIZED HEALTH CARE EDUCATION/TRAINING PROGRAM

## 2024-02-18 PROCEDURE — 82140 ASSAY OF AMMONIA: CPT | Performed by: STUDENT IN AN ORGANIZED HEALTH CARE EDUCATION/TRAINING PROGRAM

## 2024-02-18 PROCEDURE — 82746 ASSAY OF FOLIC ACID SERUM: CPT | Performed by: STUDENT IN AN ORGANIZED HEALTH CARE EDUCATION/TRAINING PROGRAM

## 2024-02-18 PROCEDURE — 83735 ASSAY OF MAGNESIUM: CPT | Performed by: STUDENT IN AN ORGANIZED HEALTH CARE EDUCATION/TRAINING PROGRAM

## 2024-02-18 PROCEDURE — 84425 ASSAY OF VITAMIN B-1: CPT | Performed by: STUDENT IN AN ORGANIZED HEALTH CARE EDUCATION/TRAINING PROGRAM

## 2024-02-18 PROCEDURE — 84207 ASSAY OF VITAMIN B-6: CPT | Performed by: STUDENT IN AN ORGANIZED HEALTH CARE EDUCATION/TRAINING PROGRAM

## 2024-02-18 PROCEDURE — 36415 COLL VENOUS BLD VENIPUNCTURE: CPT | Performed by: STUDENT IN AN ORGANIZED HEALTH CARE EDUCATION/TRAINING PROGRAM

## 2024-02-18 PROCEDURE — 94761 N-INVAS EAR/PLS OXIMETRY MLT: CPT

## 2024-02-18 PROCEDURE — 93306 TTE W/DOPPLER COMPLETE: CPT | Mod: 26,,, | Performed by: GENERAL PRACTICE

## 2024-02-18 RX ORDER — POTASSIUM CHLORIDE 7.45 MG/ML
40 INJECTION INTRAVENOUS
Status: DISCONTINUED | OUTPATIENT
Start: 2024-02-18 | End: 2024-02-22 | Stop reason: HOSPADM

## 2024-02-18 RX ORDER — MAGNESIUM SULFATE HEPTAHYDRATE 40 MG/ML
2 INJECTION, SOLUTION INTRAVENOUS
Status: DISCONTINUED | OUTPATIENT
Start: 2024-02-18 | End: 2024-02-22 | Stop reason: HOSPADM

## 2024-02-18 RX ORDER — POTASSIUM CHLORIDE 7.45 MG/ML
80 INJECTION INTRAVENOUS
Status: DISCONTINUED | OUTPATIENT
Start: 2024-02-18 | End: 2024-02-22 | Stop reason: HOSPADM

## 2024-02-18 RX ORDER — MAGNESIUM SULFATE HEPTAHYDRATE 40 MG/ML
4 INJECTION, SOLUTION INTRAVENOUS
Status: DISCONTINUED | OUTPATIENT
Start: 2024-02-18 | End: 2024-02-22 | Stop reason: HOSPADM

## 2024-02-18 RX ORDER — CALCIUM GLUCONATE 20 MG/ML
3 INJECTION, SOLUTION INTRAVENOUS
Status: DISCONTINUED | OUTPATIENT
Start: 2024-02-18 | End: 2024-02-22 | Stop reason: HOSPADM

## 2024-02-18 RX ORDER — POTASSIUM CHLORIDE 7.45 MG/ML
60 INJECTION INTRAVENOUS
Status: DISCONTINUED | OUTPATIENT
Start: 2024-02-18 | End: 2024-02-22 | Stop reason: HOSPADM

## 2024-02-18 RX ORDER — GLUCAGON 1 MG
1 KIT INJECTION
Status: DISCONTINUED | OUTPATIENT
Start: 2024-02-18 | End: 2024-02-18

## 2024-02-18 RX ORDER — CALCIUM GLUCONATE 20 MG/ML
1 INJECTION, SOLUTION INTRAVENOUS
Status: DISCONTINUED | OUTPATIENT
Start: 2024-02-18 | End: 2024-02-22 | Stop reason: HOSPADM

## 2024-02-18 RX ORDER — INSULIN ASPART 100 [IU]/ML
0-5 INJECTION, SOLUTION INTRAVENOUS; SUBCUTANEOUS EVERY 6 HOURS PRN
Status: DISCONTINUED | OUTPATIENT
Start: 2024-02-18 | End: 2024-02-22 | Stop reason: HOSPADM

## 2024-02-18 RX ORDER — CALCIUM GLUCONATE 20 MG/ML
2 INJECTION, SOLUTION INTRAVENOUS
Status: DISCONTINUED | OUTPATIENT
Start: 2024-02-18 | End: 2024-02-22 | Stop reason: HOSPADM

## 2024-02-18 RX ADMIN — DEXMEDETOMIDINE HYDROCHLORIDE 0.8 MCG/KG/HR: 400 INJECTION INTRAVENOUS at 03:02

## 2024-02-18 RX ADMIN — LEVETIRACETAM 500 MG: 5 INJECTION INTRAVENOUS at 08:02

## 2024-02-18 RX ADMIN — MAGNESIUM SULFATE HEPTAHYDRATE 4 G: 40 INJECTION, SOLUTION INTRAVENOUS at 05:02

## 2024-02-18 RX ADMIN — MUPIROCIN 1 G: 20 OINTMENT TOPICAL at 08:02

## 2024-02-18 RX ADMIN — POTASSIUM CHLORIDE 40 MEQ: 7.46 INJECTION, SOLUTION INTRAVENOUS at 10:02

## 2024-02-18 RX ADMIN — ENOXAPARIN SODIUM 40 MG: 100 INJECTION SUBCUTANEOUS at 04:02

## 2024-02-18 NOTE — NURSING
Discontinued restraints as patient is calm and no longer combative.  All restraints removed.  Will monitor patient closely.

## 2024-02-18 NOTE — ED NOTES
[facility] called for Papito Hutton for transport to [facility] ICU; will be here in 15 minutes according to Erwin at dispatch.

## 2024-02-18 NOTE — CONSULTS
[facility]  Neurology Consult    Patient Name: Papito Hutton  MRN: [id number]  : 1945  TODAY'S DATE: 2024  ADMIT DATE: 2024  5:26 PM                                          CONSULTED PROVIDER: Jahaira Angelo MD, Neurologist. On-call Phone: [phone]  CONSULT REQUESTED BY: Gina Arriaza MD     Chief Complaint   Patient presents with    Facial Droop     Last seen well 1030 am      Fall     Resulting to rt. Sided facial injury     Extremity Weakness     Rt. Side  and nonverbal       HPI per EMR:  Patient is a 79yo with a PMH of HTN, Pre-Diabetes, GERD and suspected KEITH. Patient was unable to speak, so history was provided by [facility] and Patient's spouse. Patient's spouse found Patient to be unconscious in the afternoon, this was after she saw the Patient Aox4 in the morning. To the spouse it seemed as though the Patient had fallen over, when she found him he was conscious though very confused and somnolent. She called EMS, to EMS Patient had a slight right sided facial droop, though Patient's spouse couldn't see it. To note, Patient was recently started on Procardia for his resistant hypertension, his BP in the morning was      Patient was brought to [facility] ED where he had a CT Scan, Neurology reviewed the CT Findings, found there wasn't a large vessel occlusion. However Patient had a Seizure in that ED and was transferred to [facility] after getting IV Keppra. While at [facility] Patient was noted to be agitated after his Seizure, with him moving all four extremities, and was placed on Precedex gtt     Overview/Hospital Course:  No notes on file     Interval History:  Patient admitted with likely new onset seizure with tongue biting, loss of consciousness, speech dysfunction and right facial droop evaluation.  Significantly sedated at this time.  Patient's wife is present at bedside.  Patient has tongue bite and right periorbital ecchymosis and bruising.  Patient received Keppra  load.  Neuro radiology results pending.  Neurology evaluation pending.  Currently patient is on Precedex infusion.  Currently afebrile.    Neurology Consult: Patient was seen and examined by me today. He is a 79 yo man with history of HTN, Pre-Diabetes, GERD and suspected KEITH, who presented to the ED after he was found unconscious by his wife. HPI per chart review, since patient unable to provide history. When he was found down, he seemed somnolent and confused, so wife called EMS. Upon EMS arrival, they noticed a right facial droop, so he was taken to [facility] for evaluation. While in the ED, he had a generalized tonic-clonic seizure, with tongue biting, so he was loaded with Keppra and transferred to [facility] for treatment. Of note, wife reports patient has been experiencing some issues with memory and balance over the past few weeks.     Review of Systems:    Unable to obtain due to AMS.    Past Medical History:  has a past medical history of Arthritis, B12 deficiency, Colon polyp, Diabetes mellitus, GERD (gastroesophageal reflux disease), [place] (hard of hearing), Hyperlipidemia, Hypertension, Low testosterone, Personal history of colonic polyps (2008), Sinus disorder, Sleep apnea, and Wears glasses.    Past Surgical History:  has a past surgical history that includes right hand surgery; Nasal septum surgery; Colonoscopy (2008); Upper gastrointestinal endoscopy (2008); Colonoscopy (N/A, 07/13/2020); Laparoscopic right colon resection (Right, 08/10/2020); Right hemicolectomy (N/A, 08/10/2020); Colon surgery; Esophagogastroduodenoscopy (N/A, 7/31/2023); and Colonoscopy (N/A, 8/15/2023).    Family Medical History: family history is not on file.    Social History:  reports that he has quit smoking. His smoking use included cigars. He has quit using smokeless tobacco.  His smokeless tobacco use included chew. He reports current alcohol use of about 2.0 standard drinks of alcohol per week. He reports that he does not use  drugs.    PCP: Rafi Martin MD    Allergies: Review of patient's allergies indicates:  No Known Allergies    Medications:   No current facility-administered medications on file prior to encounter.     Current Outpatient Medications on File Prior to Encounter   Medication Sig Dispense Refill    carvediloL (COREG) 12.5 MG tablet Take 1 tablet (12.5 mg total) by mouth 2 (two) times daily with meals. 60 tablet 11    cetirizine (ZYRTEC) 10 MG tablet Take 1 tablet (10 mg total) by mouth every evening. 90 tablet 3    CHOLECALCIFEROL, VITAMIN D3, (VITAMIN D3 ORAL) Take 1 capsule by mouth once daily.      clopidogreL (PLAVIX) 75 mg tablet Take 1 tablet (75 mg total) by mouth once daily. 90 tablet 1    cyanocobalamin (VITAMIN B-12) 1000 MCG tablet Take 100 mcg by mouth once daily.      diclofenac (VOLTAREN) 75 MG EC tablet Take 75 mg by mouth 2 (two) times daily.      esomeprazole (NEXIUM) 40 MG capsule TAKE 1 CAPSULE BEFORE BREAKFAST 90 capsule 3    metFORMIN (GLUCOPHAGE) 500 MG tablet Take 500 mg by mouth once daily.      montelukast (SINGULAIR) 10 mg tablet Take 10 mg by mouth every evening.      NIFEdipine (PROCARDIA-XL) 60 MG (OSM) 24 hr tablet Take 1 tablet (60 mg total) by mouth once daily. 30 tablet 11    triamterene-hydrochlorothiazide 37.5-25 mg (MAXZIDE-25) 37.5-25 mg per tablet Take 1 tablet by mouth once daily. 90 tablet 3    valsartan (DIOVAN) 320 MG tablet Take 1 tablet (320 mg total) by mouth once daily. 90 tablet 0    acetaminophen (TYLENOL) 650 MG TbSR Take 1 tablet (650 mg total) by mouth every 8 (eight) hours as needed (do not take with any other tylenol or acetaminophen).  0    atorvastatin (LIPITOR) 80 MG tablet Take 1 tablet (80 mg total) by mouth once daily. 90 tablet 3    azelastine (ASTELIN) 137 mcg (0.1 %) nasal spray 1 spray (137 mcg total) by Nasal route 2 (two) times daily. for 180 doses 120 mL 2    diclofenac sodium (VOLTAREN) 1 % Gel Apply 2 grams topically once daily. 100 g 11     fluticasone propionate (FLONASE) 50 mcg/actuation nasal spray Nasal for 90 Days      ipratropium (ATROVENT) 21 mcg (0.03 %) nasal spray 2 sprays by Each Nostril route 2 (two) times daily as needed for Rhinitis (sinus congestion). 90 mL 1     Scheduled Meds:   atorvastatin  80 mg Oral Daily    clopidogreL  75 mg Oral Daily    enoxparin  40 mg Subcutaneous Daily    levETIRAcetam (Keppra) IV (PEDS and ADULTS)  500 mg Intravenous Q12H    montelukast  10 mg Oral QHS    mupirocin   Nasal BID     Continuous Infusions:   dexmedeTOMIDine (Precedex) infusion (titrating) Stopped (02/18/24 8645)     PRN Meds:.calcium gluconate IVPB, calcium gluconate IVPB, calcium gluconate IVPB, dextrose 50% in water (D50W), dextrose 50% in water (D50W), glucagon (human recombinant), insulin aspart U-100, magnesium sulfate IVPB, magnesium sulfate IVPB, ondansetron, potassium chloride **AND** potassium chloride **AND** potassium chloride, sodium chloride 0.9%, sodium phosphate 15 mmol in dextrose 5 % (D5W) 250 mL IVPB, sodium phosphate 20.01 mmol in dextrose 5 % (D5W) 250 mL IVPB, sodium phosphate 30 mmol in dextrose 5 % (D5W) 250 mL IVPB      Physical Exam  Current Vitals:  Vitals:    02/18/24 1100   BP:    Pulse: (!) 58   Resp: (!) 23   Temp:        Physical Exam:  General: drowsy but arousable  HEENT: PERRL, EOMI, right periorbital edema and ecchymoses, tongue laceration  CV: RRR  Lungs: no respiratory distress  Abdomen: soft, non tender    Neurological Exam    MENTAL STATUS EXAM:  Patient is drowsy but wakes up to voice, able to answer some simple questions, oriented to self, place (hospital), month and year. Speech is mildly slurred but intelligible. Follows commands consistently.    CRANIAL NERVE EXAM:  II/III: fundoscopic exam deferred, PERRL; unable to assess visual fields  III/IV/VI: EOMI   V: no deficits appreciated to light touch  VII: no facial asymmetry noted  VIII: no deficits in hearing bilaterally  IX/X: palate @ ML and raises  symmetrically  XI: unable to assess  XII: tongue to midline w/out asymmetry    MOTOR EXAM:  Bulk and Tone: normal throughout  Strength is at least 4+/5 in upper extremities and 3/5 in lower extremities. Poor effort due to pain.    REFLEXES:  2+ in bilateral upper and lower extremities    SENSORY EXAM  Withdraws to pain in all extremities.    COORDINATION/CEREBELLAR EXAM:  Unable to assess.    GAIT:  Deferred for safety.    Laboratory Data & Studies    Recent Labs   Lab 02/17/24 1735 02/18/24  0405   WBC 8.28 9.18   HGB 13.3* 13.5*    220   MCV 87 89       Recent Labs   Lab 02/17/24 1735 02/18/24  0405    140   K 3.8 3.5    105   CO2 19* 27   BUN 13 14   * 138*   CALCIUM 9.9 10.1   MG  --  1.3*       Recent Labs   Lab 02/17/24 1735 02/18/24  0405   PROT 6.7 6.1   ALBUMIN 3.9 4.1   BILITOT 1.0 0.8   AST 20 25   ALT 19 18   ALKPHOS 75 68       Recent Labs   Lab 02/17/24 1735 02/17/24  1744   INR 1.2 1.2       Recent Labs   Lab 02/17/24  1735   CHOL 147   TRIG 121   LDLCALC 80.8   HDL 42   TSH 0.700         Microbiology:  Microbiology Results (last 7 days)       ** No results found for the last 168 hours. **              Imaging:  CTA Head and Neck (xpd)    Result Date: 2/18/2024  EXAMINATION: CTA HEAD AND NECK (XPD) CLINICAL HISTORY: Neuro deficit, acute, stroke suspected; TECHNIQUE: Non contrast low dose axial images were obtained through the head.  CT angiogram was performed from the level of the chaparrita to the top of the head following the IV administration of 75mL of Omnipaque 350.   Sagittal and coronal reconstructions and maximum intensity projection reconstructions were performed. COMPARISON: CTA head and neck 09/11/2023, MRI brain 09/11/2023 FINDINGS: Intracranial Compartment: Ventricles and sulci are normal in size for age without evidence of hydrocephalus. No extra-axial blood or fluid collections. Irregular white matter hypoattenuation although nonspecific most in keeping with  chronic microangiopathic ischemic change.  Hypoattenuating focus in the left basal ganglia most consistent with remote lacunar infarct.  No parenchymal mass, hemorrhage, edema, or major vascular distribution infarct. Skull/Extracranial Contents (limited evaluation): No fracture. Mastoid air cells and paranasal sinuses are essentially clear. Non-Vascular Structures of the Neck/Thoracic Inlet (limited evaluation): Partially imaged left hilar node measures 12 mm (series 6, image 1).  Prominent although non pathologically enlarged mediastinal lymph nodes. Aorta: Normal 3 vessel arch. Extracranial carotid circulation: No hemodynamically significant stenosis, aneurysmal dilatation, or dissection.  Atherosclerotic plaque at the carotid bifurcation resulting in less than 50% stenosis, similar to prior. Extracranial vertebral circulation: No hemodynamically significant stenosis, aneurysmal dilatation, or dissection. Intracranial Arteries: No focal high-grade stenosis, occlusion, or aneurysm.  Atherosclerotic calcification of the cavernous ICAs.  Multifocal irregularity likely due to underlying atherosclerotic plaque involving the bilateral M1 segments the middle cerebral arteries, P2 segment of the left PCA and intracranial right vertebral artery. Venous structures (limited evaluation): Normal.     No acute intracranial abnormality.  No high-grade stenosis or major vessel occlusion. Prominent mediastinal and hilar lymph nodes, possibly reactive. The preliminary and final reports are concordant. Electronically signed by: Johnna Fowler Date:    02/18/2024 Time:    08:14    CT Cervical Spine Without Contrast    Result Date: 2/18/2024  EXAMINATION: CT CERVICAL SPINE WITHOUT CONTRAST CLINICAL HISTORY: Neck trauma (Age >= 65y); TECHNIQUE: Low dose axial CT images through the cervical spine, with sagittal and coronal reformations.  Contrast was not administered. COMPARISON: CT head and neck 09/11/2020 FINDINGS: The vertebral bodies  are normal in height and morphology without evidence of fracture or osseous destructive process.  Normal sagittal alignment is preserved. Mild degenerative changes without evidence of bony spinal canal stenosis.  Multilevel posterior disc bulges resulting moderate to severe spinal canal stenosis worse at C3-C4.  Moderate to severe neural foraminal stenosis at C4-C5, C5-C6 and C6-C7.  Intervertebral disc space height loss at C6-C7.  Otherwise, intervertebral disc heights are well maintained. Bulky anterior flowing osteophytes extending C2 through C7. Limited evaluation of the intraspinal contents demonstrates no hematoma or mass.Paraspinal soft tissues exhibit no acute abnormalities.     No fracture or traumatic malalignment of the cervical spine. Cervical spondylosis resulting in up to severe spinal canal stenosis at C3-C4 and multilevel moderate to severe neural foraminal stenosis at the lower levels. Bulky anterior flowing osteophytes consistent with diffuse idiopathic skeletal hyperostosis (DISH). The preliminary and final reports are concordant. Electronically signed by: Johnna Fowler Date:    02/18/2024 Time:    07:52    CT Maxillofacial Without Contrast    Result Date: 2/18/2024  EXAMINATION: CT MAXILLOFACIAL WITHOUT CONTRAST CLINICAL HISTORY: Facial trauma, blunt; TECHNIQUE: Low dose CT images throughout the region of the facial bones.  Axial, sagittal and coronal reformations were obtained.  Contrast was not administered. COMPARISON: None FINDINGS: Mildly motion degraded exam.  No focal soft tissue swelling identified.  The globes and intraorbital contents are within normal limits.  No orbital fracture. The remainder of the facial bones appear intact without evidence of an acute displaced fracture.  No osseous destructive lesions. Temporomandibular joints appropriately position without evidence of dislocation. Mild maxillary mucosal thickening.  Otherwise, paranasal sinuses essentially clear.  Mastoids are  clear. Limited intracranial evaluation is unremarkable.     No evidence of an acute displaced fracture. The preliminary and final reports are concordant. Electronically signed by: Johnna Fowler Date:    02/18/2024 Time:    07:45    X-Ray Chest PA And Lateral    Result Date: 2/15/2024  Reason: Dyspnea FINDINGS: PA and lateral chest is compared to 9/9/2023 show normal cardiomediastinal silhouette. Lungs are clear. Pulmonary vasculature is normal. Bones are normal. IMPRESSION: Normal chest. Electronically signed by:  Miladis Brar MD  02/15/2024 10:27 AM [facility] Workstation: [id number]        Assessment and Plan:    New onset seizure - ongoing etiology  Rule out stroke  77 yo man with history of HTN, Pre-Diabetes, GERD and suspected KEITH, who presented to the ED with new onset seizure. He also had right facial droop, so need to rule out stroke as well. Loaded with Keppra in the ED and transferred to [facility] for evaluation and treatment.  - Admitted to hospital medicine with q4 hour neuro checks, on telemetry, continuous pulse oximetry  - Seizure precautions while inpatient  - Continue Keppra 500 mg BID for prevention of seizures  - Diet after eval per SLP  - Ordered EEG routine that showed moderate to severe slowing but no evidence of epileptiform activity  - Ordered MRI brain with and without contrast with seizure protocol to assess for masses, lesions, strokes or other abnormalities. Patient is severely claustrophobic, so may need anesthesia consultation in order to undergo study. Would try Precedex and or ativan first  - Secondary stroke prevention with plavix 75 mg daily, atorvastatin 80 mg daily  - Avoid seizure threshold lowering medications such as:  Bupropion, diphenhydramine, tramadol, certain antibiotics  - patient will need follow-up as outpatient with Neurology for long-term EEG monitoring to better characterize epileptiform abnormalities  - Maintain Euthermia with Tylenol prn temp > 37.2 degrees C.  -  Assessment for rehab with PT/OT/SLP evaluation and treatment.  - workup and treatment of metabolic and infectious abnormalities as per primary team  - Will follow along    Seizure education:  No driving for now, no swimming alone, no climbing high areas, no operation of heavy machinery or working with high risk electricity equipment.  Continue to take AEDs as prescribed. If any major side effects from neurological medications go to the ED including mood changes and severe depression.  Patient and/or next of kin informed.  Medication side effects discussed with the patient and/or caregiver.       DVT prophylaxis with chemo/SCD prophylaxis      Patient to follow up with [facility] at [phone] within 7 days from discharge.     Stroke education was provided including stroke risk factors modification and any acute neurological changes including weakness, confusion, visual changes to come straight to the ER.     All questions were answered.                              Thank you kindly for including us in the care of this patient. Please do not hesitate to contact us with any questions.       Critical Care:  75 minutes of critical care time has been spent evaluating with the patient. Time includes chart review not limited to diagnostic imaging, labs, and vitals, patient assessment, discussion with family and nursing, current order evaluations, and new order entries.      Jahaira Angelo MD  Neurology

## 2024-02-18 NOTE — TELEMEDICINE CONSULT
[facility]  Vascular Neurology  Comprehensive Stroke Center  TeleVascular Neurology Acute Consultation Note        Consult Information  Consults    Consulting Provider: ALETHEA RICKETTS   Current Providers  No providers found    Patient Location:  [facility] ICU B 2ND FLOOR Emergency Department    Spoke hospital nurse at bedside with patient assisting consultant.  Patient information was obtained from patient.       Stroke Documentation  Acute Stroke Times   Last Known Normal Date: 02/17/24  Last Known Normal Time: 1030  Stroke Team Called Date: 02/17/24  Stroke Team Called Time: 1736  Stroke Team Arrival Date: 02/17/24  Stroke Team Arrival Time: 1736  Thrombolytic Therapy Recommended: No    NIH Scale:  1a. Level of Consciousness: 0-->Alert, keenly responsive  1b. LOC Questions: 2-->Answers neither question correctly  1c. LOC Commands: 2-->Performs neither task correctly  2. Best Gaze: 0-->Normal  3. Visual: 0-->No visual loss  4. Facial Palsy: 1-->Minor paralysis (flattened nasolabial fold, asymmetry on smiling)  5a. Motor Arm, Left: 2-->Some effort against gravity, limb cannot get to or maintain (if cued) 90 (or 45) degrees, drifts down to bed, but has some effort against gravity  5b. Motor Arm, Right: 2-->Some effort against gravity, limb cannot get to or maintain (if cued) 90 (or 45) degrees, drifts down to bed, but has some effort against gravity  6a. Motor Leg, Left: 2-->Some effort against gravity, leg falls to bed by 5 secs, but has some effort against gravity  6b. Motor Leg, Right: 2-->Some effort against gravity, leg falls to bed by 5 secs, but has some effort against gravity  7. Limb Ataxia: 0-->Absent  8. Sensory: 0-->Normal, no sensory loss  9. Best Language: 2-->Severe aphasia, all communication is through fragmentary expression, great need for inference, questioning, and guessing by the listener. Range of information that can be exchanged is limited, listener carries burden of.  . . (see row details)  10. Dysarthria: 2-->Severe dysarthria, patients speech is so slurred as to be unintelligible in the absence of or out of proportion to any dysphasia, or is mute/anarthric  11. Extinction and Inattention (formerly Neglect): 0-->No abnormality  Total (NIH Stroke Scale): 17      Modified Huntington:    Justin Coma Scale:     ABCD2 Score:    [id number] Score:    HAS -BLED Score:    ICH Score:    Hunt & Niño Classification:      Blood pressure (!) 145/83, pulse 72, resp. rate (!) 22, height 6' (1.829 m), weight 106.1 kg (234 lb), SpO2 98 %.    Van Positive    Medical Decision Making  HPI:  78 y.o. male Referring Facility and Unit: [facility] ED  Referring Physician: Dr Richi Allen  Last Known Well Date: Today  Last Known Well Time: 10:30am  Symptoms: rs facial droop, rs weakness, fall with rs facial injury, nonverbal  Unilateral Weakness (yes or no): YES  If Unilateral Weakness is yes, is there Aphasia, Confusion, Neglect, Visual Deficit: YES  Van Positive      Images personally reviewed and interpreted:  Study: Head CT and CTA Head & Neck  Study Interpretation: no acute process, no large vessel occlusion     Assessment and plan:  Ddx: seizure like activity from epilepsy vs provoked      1 gram Keppra now and followed by 500 mg daily BID Keppra  Recommend admission for MRI brain  Seizure precautions    Lytics recommendation: Thrombolytic therapy not recommended due to Outside of treatment window   Thrombectomy recommendation: No; No large vessel occlusion identified on imaging   Placement recommendation: admit to inpatient               ROS  Physical Exam  Past Medical History:   Diagnosis Date    Arthritis     B12 deficiency     Colon polyp     Diabetes mellitus     BORDERLINE    GERD (gastroesophageal reflux disease)     [place] (hard of hearing)     BILAT AIDS    Hyperlipidemia     Hypertension     Low testosterone     Personal history of colonic polyps 2008    adenomatous polyp    Sinus disorder      Sleep apnea     DOES NOT USE MACHINE    Wears glasses      Past Surgical History:   Procedure Laterality Date    COLON SURGERY      COLONOSCOPY  2008    Dr. Garcia; polyps removed    COLONOSCOPY N/A 07/13/2020    Procedure: COLONOSCOPY;  Surgeon: Jj Fried MD;  Location: [facility] ENDO;  Service: Endoscopy;  Laterality: N/A;    COLONOSCOPY N/A 8/15/2023    Procedure: COLONOSCOPY;  Surgeon: Jj Fried MD;  Location: [facility];  Service: Endoscopy;  Laterality: N/A;    ESOPHAGOGASTRODUODENOSCOPY N/A 7/31/2023    Procedure: EGD (ESOPHAGOGASTRODUODENOSCOPY);  Surgeon: Jj Fried MD;  Location: [facility];  Service: Endoscopy;  Laterality: N/A;    LAPAROSCOPIC RIGHT COLON RESECTION Right 08/10/2020    Procedure: COLECTOMY, RIGHT, LAPAROSCOPIC pooss conversion to open;  Surgeon: Noble Kaye MD;  Location: [facility] OR;  Service: General;  Laterality: Right;    NASAL SEPTUM SURGERY      right hand surgery      RIGHT HEMICOLECTOMY N/A 08/10/2020    Procedure: HEMICOLECTOMY, RIGHT;  Surgeon: Noble Kaye MD;  Location: [facility] OR;  Service: General;  Laterality: N/A;    UPPER GASTROINTESTINAL ENDOSCOPY  2008    hiatal hernia; esophagitis     Family History   Problem Relation Age of Onset    Cancer Neg Hx     Diabetes Neg Hx     Heart disease Neg Hx     Colon cancer Neg Hx     Colon polyps Neg Hx     Esophageal cancer Neg Hx     Stomach cancer Neg Hx        Diagnoses  Observed seizure like activity    Turner Mabry MD      Emergent/Acute neurological consultation requested by spoke provider due to critical concerns for possible cerebrovascular event that could result in permanent loss of neurologic/bodily function, severe disability or death of this patient.  Immediate/timely evaluation by a highly prepared expert is paramount for optimal outcomes  High risk for neurological deterioration if not properly diagnosed  High risk for neurological deterioration if not treated promplty/as soon as  possible  Complex diagnostic evaluation may be required (advanced imaging)  High risk treatment options (thrombolytics and/or thrombectomy)    Patient care was coordinated with spoke provider, including but not limted to    Discussing likely diagnosis/etiology of symptoms  Making recommendations for further diagnostic studies  Making recommendations for intravenous thrombolytics or other advanced therapies  Making recommendations for disposition (admission/transfer for higher level of care)

## 2024-02-18 NOTE — SUBJECTIVE & OBJECTIVE
HPI:  78 y.o. male Referring Facility and Unit: [facility] ED  Referring Physician: Dr Richi Allen  Last Known Well Date: Today  Last Known Well Time: 10:30am  Symptoms: rs facial droop, rs weakness, fall with rs facial injury, nonverbal  Unilateral Weakness (yes or no): YES  If Unilateral Weakness is yes, is there Aphasia, Confusion, Neglect, Visual Deficit: YES  Van Positive      Images personally reviewed and interpreted:  Study: Head CT and CTA Head & Neck  Study Interpretation: no acute process, no large vessel occlusion     Assessment and plan:  Ddx: seizure like activity from epilepsy vs provoked      1 gram Keppra now and followed by 500 mg daily BID Keppra  Recommend admission for MRI brain  Seizure precautions    Lytics recommendation: Thrombolytic therapy not recommended due to Outside of treatment window   Thrombectomy recommendation: No; No large vessel occlusion identified on imaging   Placement recommendation: admit to inpatient

## 2024-02-18 NOTE — ED NOTES
Pt was thrashing on bed and stopped becoming rigid and then began to have and seizure which lasted 75 seconds, O2 applied and pt's airway was maintained with continuous O2 saturation being monitored along with VS. Pt then was post-ictal. Side rails were padded and will monitor pt.

## 2024-02-18 NOTE — SUBJECTIVE & OBJECTIVE
Past Medical History:   Diagnosis Date    Arthritis     B12 deficiency     Colon polyp     Diabetes mellitus     BORDERLINE    GERD (gastroesophageal reflux disease)     Elem (hard of hearing)     BILAT AIDS    Hyperlipidemia     Hypertension     Low testosterone     Personal history of colonic polyps 2008    adenomatous polyp    Sinus disorder     Sleep apnea     DOES NOT USE MACHINE    Wears glasses        Past Surgical History:   Procedure Laterality Date    COLON SURGERY      COLONOSCOPY  2008    Dr. Garcia; polyps removed    COLONOSCOPY N/A 07/13/2020    Procedure: COLONOSCOPY;  Surgeon: Jj Fried MD;  Location: [facility];  Service: Endoscopy;  Laterality: N/A;    COLONOSCOPY N/A 8/15/2023    Procedure: COLONOSCOPY;  Surgeon: Jj Fried MD;  Location: [facility];  Service: Endoscopy;  Laterality: N/A;    ESOPHAGOGASTRODUODENOSCOPY N/A 7/31/2023    Procedure: EGD (ESOPHAGOGASTRODUODENOSCOPY);  Surgeon: Jj Fried MD;  Location: [facility];  Service: Endoscopy;  Laterality: N/A;    LAPAROSCOPIC RIGHT COLON RESECTION Right 08/10/2020    Procedure: COLECTOMY, RIGHT, LAPAROSCOPIC pooss conversion to open;  Surgeon: Noble Kaye MD;  Location: [facility];  Service: General;  Laterality: Right;    NASAL SEPTUM SURGERY      right hand surgery      RIGHT HEMICOLECTOMY N/A 08/10/2020    Procedure: HEMICOLECTOMY, RIGHT;  Surgeon: Noble aKye MD;  Location: [facility];  Service: General;  Laterality: N/A;    UPPER GASTROINTESTINAL ENDOSCOPY  2008    hiatal hernia; esophagitis       Review of patient's allergies indicates:  No Known Allergies    No current facility-administered medications on file prior to encounter.     Current Outpatient Medications on File Prior to Encounter   Medication Sig    carvediloL (COREG) 12.5 MG tablet Take 1 tablet (12.5 mg total) by mouth 2 (two) times daily with meals.    cetirizine (ZYRTEC) 10 MG tablet Take 1 tablet (10 mg total) by mouth every evening.     CHOLECALCIFEROL, VITAMIN D3, (VITAMIN D3 ORAL) Take 1 capsule by mouth once daily.    clopidogreL (PLAVIX) 75 mg tablet Take 1 tablet (75 mg total) by mouth once daily.    cyanocobalamin (VITAMIN B-12) 1000 MCG tablet Take 100 mcg by mouth once daily.    diclofenac (VOLTAREN) 75 MG EC tablet Take 75 mg by mouth 2 (two) times daily.    esomeprazole (NEXIUM) 40 MG capsule TAKE 1 CAPSULE BEFORE BREAKFAST    metFORMIN (GLUCOPHAGE) 500 MG tablet Take 500 mg by mouth once daily.    montelukast (SINGULAIR) 10 mg tablet Take 10 mg by mouth every evening.    NIFEdipine (PROCARDIA-XL) 60 MG (OSM) 24 hr tablet Take 1 tablet (60 mg total) by mouth once daily.    triamterene-hydrochlorothiazide 37.5-25 mg (MAXZIDE-25) 37.5-25 mg per tablet Take 1 tablet by mouth once daily.    valsartan (DIOVAN) 320 MG tablet Take 1 tablet (320 mg total) by mouth once daily.    acetaminophen (TYLENOL) 650 MG TbSR Take 1 tablet (650 mg total) by mouth every 8 (eight) hours as needed (do not take with any other tylenol or acetaminophen).    atorvastatin (LIPITOR) 80 MG tablet Take 1 tablet (80 mg total) by mouth once daily.    azelastine (ASTELIN) 137 mcg (0.1 %) nasal spray 1 spray (137 mcg total) by Nasal route 2 (two) times daily. for 180 doses    diclofenac sodium (VOLTAREN) 1 % Gel Apply 2 grams topically once daily.    fluticasone propionate (FLONASE) 50 mcg/actuation nasal spray Nasal for 90 Days    ipratropium (ATROVENT) 21 mcg (0.03 %) nasal spray 2 sprays by Each Nostril route 2 (two) times daily as needed for Rhinitis (sinus congestion).    [DISCONTINUED] aspirin 81 MG Chew Take 1 tablet (81 mg total) by mouth once daily. for 21 days (Patient not taking: Reported on 2/7/2024)     Family History    None       Tobacco Use    Smoking status: Former     Types: Cigars    Smokeless tobacco: Former     Types: Chew   Substance and Sexual Activity    Alcohol use: Yes     Alcohol/week: 2.0 standard drinks of alcohol     Types: 2 Cans of beer per  week     Comment: occ    Drug use: No    Sexual activity: Yes     Partners: Female     Review of Systems   Unable to perform ROS: Mental status change     Objective:     Vital Signs (Most Recent):  Temp: 98.2 °F (36.8 °C) (02/17/24 2301)  Pulse: (!) 52 (02/17/24 2301)  Resp: 20 (02/17/24 2301)  BP: (!) 205/93 (02/17/24 2301)  SpO2: 100 % (02/17/24 2301) Vital Signs (24h Range):  Temp:  [98.2 °F (36.8 °C)] 98.2 °F (36.8 °C)  Pulse:  [47-95] 52  Resp:  [18-22] 20  SpO2:  [95 %-100 %] 100 %  BP: (112-234)/() 205/93     Weight: 91.8 kg (202 lb 6.1 oz)  Body mass index is 27.45 kg/m².     Physical Exam  HENT:      Head: Normocephalic and atraumatic.      Right Ear: External ear normal.      Left Ear: External ear normal.      Nose: No congestion or rhinorrhea.      Mouth/Throat:      Pharynx: No oropharyngeal exudate or posterior oropharyngeal erythema.   Eyes:      General:         Right eye: No discharge.         Left eye: No discharge.      Conjunctiva/sclera: Conjunctivae normal.   Cardiovascular:      Rate and Rhythm: Regular rhythm. Tachycardia present.      Heart sounds: No murmur heard.     No friction rub. No gallop.   Pulmonary:      Effort: No respiratory distress.      Breath sounds: No stridor. No wheezing, rhonchi or rales.   Chest:      Chest wall: No tenderness.   Abdominal:      General: There is no distension.      Palpations: There is no mass.      Tenderness: There is no abdominal tenderness. There is no guarding or rebound.      Hernia: No hernia is present.   Musculoskeletal:         General: No swelling, tenderness, deformity or signs of injury.      Right lower leg: No edema.      Left lower leg: No edema.   Skin:     Coloration: Skin is not jaundiced or pale.      Findings: No bruising, erythema, lesion or rash.   Neurological:      Comments: Patient seemed to have a slight droop of the right side of his face, though he was too unresponsive to assess for muscle strength. Pupils weren't  dilated or constricted                Significant Labs: All pertinent labs within the past 24 hours have been reviewed.  CBC:   Recent Labs   Lab 02/17/24  1735   WBC 8.28   HGB 13.3*   HCT 37.6*        CMP:   Recent Labs   Lab 02/17/24  1735      K 3.8      CO2 19*   *   BUN 13   CREATININE 1.7*   CALCIUM 9.9   PROT 6.7   ALBUMIN 3.9   BILITOT 1.0   ALKPHOS 75   AST 20   ALT 19   ANIONGAP 14       Significant Imaging: I have reviewed all pertinent imaging results/findings within the past 24 hours.

## 2024-02-18 NOTE — CARE UPDATE
02/17/24 2130   Patient Assessment/Suction   Level of Consciousness (AVPU) alert   Respiratory Effort Unlabored   Expansion/Accessory Muscles/Retractions no use of accessory muscles   All Lung Fields Breath Sounds clear;diminished   Rhythm/Pattern, Respiratory no shortness of breath reported   Cough Frequency no cough   PRE-TX-O2   Device (Oxygen Therapy) Oxymask   $ Is the patient on Low Flow Oxygen? Yes   Flow (L/min) 3   Pulse Oximetry Type Continuous   $ Pulse Oximetry - Multiple Charge Pulse Oximetry - Multiple   Positioning   Head of Bed (HOB) Positioning HOB elevated;HOB at 30-45 degrees

## 2024-02-18 NOTE — PROGRESS NOTES
[facility] Medicine  Progress Note    Patient Name: Papito Hutton  MRN: [id number]  Patient Class: IP- Inpatient   Admission Date: 2/17/2024  Length of Stay: 1 days  Attending Physician: Gina Arriaza MD  Primary Care Provider: Rafi Martin MD        Subjective:     Principal Problem:Seizure        HPI:  Patient is a 79yo with a PMH of HTN, Pre-Diabetes, GERD and suspected KEITH. Patient was unable to speak, so history was provided by [facility] and Patient's spouse. Patient's spouse found Patient to be unconscious in the afternoon, this was after she saw the Patient Aox4 in the morning. To the spouse it seemed as though the Patient had fallen over, when she found him he was conscious though very confused and somnolent. She called EMS, to EMS Patient had a slight right sided facial droop, though Patient's spouse couldn't see it. To note, Patient was recently started on Procardia for his resistant hypertension, his BP in the morning was     Patient was brought to [facility] ED where he had a CT Scan, Neurology reviewed the CT Findings, found there wasn't a large vessel occlusion. However Patient had a Seizure in that ED and was transferred to [facility] after getting IV Keppra. While at [facility] Patient was noted to be agitated after his Seizure, with him moving all four extremities, and was placed on Precedex gtt    Overview/Hospital Course:  No notes on file    Interval History:  Patient admitted with likely new onset seizure with tongue biting, loss of consciousness, speech dysfunction and right facial droop evaluation.  Patient received Keppra load.  Neuro radiology results pending.  Neurology evaluation pending.  Currently patient is on Precedex infusion.  Currently afebrile.    Review of Systems   Unable to perform ROS: Mental status change     Objective:     Vital Signs (Most Recent):  Temp: 98.4 °F (36.9 °C) (02/18/24 0301)  Pulse: (!) 43 (02/18/24 0500)  Resp: 18 (02/18/24 0500)  BP: (!)  187/82 (02/18/24 0500)  SpO2: 96 % (02/18/24 0500) Vital Signs (24h Range):  Temp:  [98.2 °F (36.8 °C)-98.4 °F (36.9 °C)] 98.4 °F (36.9 °C)  Pulse:  [43-95] 43  Resp:  [15-22] 18  SpO2:  [95 %-100 %] 96 %  BP: (112-234)/() 187/82     Weight: 91.8 kg (202 lb 6.1 oz)  Body mass index is 27.45 kg/m².    Intake/Output Summary (Last 24 hours) at 2/18/2024 0752  Last data filed at 2/18/2024 0445  Gross per 24 hour   Intake 185.06 ml   Output 1900 ml   Net -1714.94 ml         Physical Exam  HENT:      Head: Normocephalic and atraumatic.      Right Ear: External ear normal.      Left Ear: External ear normal.      Nose: No congestion or rhinorrhea.      Mouth/Throat:      Pharynx: No oropharyngeal exudate or posterior oropharyngeal erythema.   Eyes:      General:         Right eye: No discharge.         Left eye: No discharge.      Conjunctiva/sclera: Conjunctivae normal.   Cardiovascular:      Rate and Rhythm: Regular rhythm. Tachycardia present.      Heart sounds: No murmur heard.     No friction rub. No gallop.   Pulmonary:      Effort: No respiratory distress.      Breath sounds: No stridor. No wheezing, rhonchi or rales.   Chest:      Chest wall: No tenderness.   Abdominal:      General: There is no distension.      Palpations: There is no mass.      Tenderness: There is no abdominal tenderness. There is no guarding or rebound.      Hernia: No hernia is present.   Musculoskeletal:         General: No swelling, tenderness, deformity or signs of injury.      Right lower leg: No edema.      Left lower leg: No edema.   Skin:     Coloration: Skin is not jaundiced or pale.      Findings: No bruising, erythema, lesion or rash.   Neurological:      Comments: Patient seemed to have a slight droop of the right side of his face, though he was too unresponsive to assess for muscle strength. Pupils weren't dilated or constricted             Significant Labs: All pertinent labs within the past 24 hours have been  "reviewed.  CBC:   Recent Labs   Lab 02/17/24 1735 02/18/24  0405   WBC 8.28 9.18   HGB 13.3* 13.5*   HCT 37.6* 39.9*    220     CMP:   Recent Labs   Lab 02/17/24 1735 02/18/24  0405    140   K 3.8 3.5    105   CO2 19* 27   * 138*   BUN 13 14   CREATININE 1.7* 1.2   CALCIUM 9.9 10.1   PROT 6.7 6.1   ALBUMIN 3.9 4.1   BILITOT 1.0 0.8   ALKPHOS 75 68   AST 20 25   ALT 19 18   ANIONGAP 14 8     Lactic Acid:   Recent Labs   Lab 02/18/24  0026   LACTATE 0.9     Lipid Panel:   Recent Labs   Lab 02/17/24  1735   CHOL 147   HDL 42   LDLCALC 80.8   TRIG 121   CHOLHDL 28.6     Troponin: No results for input(s): "TROPONINI", "TROPONINIHS" in the last 48 hours.  TSH:   Recent Labs   Lab 02/17/24 1735   TSH 0.700     Urine Studies: No results for input(s): "COLORU", "APPEARANCEUA", "PHUR", "SPECGRAV", "PROTEINUA", "GLUCUA", "KETONESU", "BILIRUBINUA", "OCCULTUA", "NITRITE", "UROBILINOGEN", "LEUKOCYTESUR", "RBCUA", "WBCUA", "BACTERIA", "SQUAMEPITHEL", "HYALINECASTS" in the last 48 hours.    Invalid input(s): "WRIGHTSUR"  Microbiology Results (last 7 days)       ** No results found for the last 168 hours. **          Significant Imaging:   EEG: Pending.     CT scan maxillofacial:   No evidence of an acute displaced fracture.     CT of the head and neck:  Pending.    CT C-spine: Pending.    MRI brain with and without contrast:  Pending.    ECHO: Pending.       Assessment/Plan:      * Seizure  Patient had a Seizure, had lateral tongue biting, On Keppra IV. MRI Ordered, Neuro to see Patient in the morning. Precedex for Post-Ictal Agitation.  Follow EEG results.      Prediabetes  Home Metformin Held, check hemoglobin A1c and cover with low-dose insulin sliding scale.      Hypertension  Chronic, uncontrolled. Latest blood pressure and vitals reviewed-     Temp:  [98.2 °F (36.8 °C)-98.4 °F (36.9 °C)]   Pulse:  [43-95]   Resp:  [15-22]   BP: (112-234)/()   SpO2:  [93 %-100 %] .   Home meds for " hypertension were reviewed and noted below.   Hypertension Medications               carvediloL (COREG) 12.5 MG tablet Take 1 tablet (12.5 mg total) by mouth 2 (two) times daily with meals.    NIFEdipine (PROCARDIA-XL) 60 MG (OSM) 24 hr tablet Take 1 tablet (60 mg total) by mouth once daily.    triamterene-hydrochlorothiazide 37.5-25 mg (MAXZIDE-25) 37.5-25 mg per tablet Take 1 tablet by mouth once daily.    valsartan (DIOVAN) 320 MG tablet Take 1 tablet (320 mg total) by mouth once daily.            While in the hospital, will manage blood pressure as follows; Adjust home antihypertensive regimen as follows- Hold Home Meds due to Labile BP on Precedex    Will utilize p.r.n. blood pressure medication only if patient's blood pressure greater than 210 SBP and he develops symptoms such as worsening chest pain or shortness of breath    Patient was recently started on Procardia for resistant HTN, unlikely to cause Seizures, though may have dropped BP. Will avoid Calcium Channel Blockers on Patient     Hyperlipidemia  Check fasting lipid profile.  Continue Lipitor 80 mg daily.        VTE Risk Mitigation (From admission, onward)           Ordered     enoxaparin injection 40 mg  Daily         02/17/24 2224     IP VTE HIGH RISK PATIENT  Once         02/17/24 2224     Place sequential compression device  Until discontinued         02/17/24 2224                    Discharge Planning   ROCIO: 2/23/2024     Code Status: Full Code   Is the patient medically ready for discharge?:     Reason for patient still in hospital (select all that apply): {HMREASONPATIENTINHOSP:98706}               Critical care time spent on the evaluation and treatment of severe organ dysfunction, review of pertinent labs and imaging studies, discussions with consulting providers and discussions with patient/family: *** minutes.      Gina Arriaza MD  Department of Hospital Medicine   [facility]

## 2024-02-18 NOTE — RESPIRATORY THERAPY
ABG obtained and results given to Dr. Hernandez. Oxymask decreased from 5L to 3L.    Latest Reference Range & Units 02/17/24 19:52   POC PH 7.35 - 7.45  7.334 (L)   POC PCO2 35 - 45 mmHg 41.5   POC PO2 80 - 100 mmHg 124 (H)   POC HCO3 24 - 28 mmol/L 22.0 (L)   POC SATURATED O2 95 - 100 % 99   Sample  ARTERIAL   POC TCO2 23 - 27 mmol/L 23   POC BE -2 to 2 mmol/L -4 (L)   Flow  5   DelSys  Venti Mask   Site  RR   Mode  SPONT   (L): Data is abnormally low  (H): Data is abnormally high

## 2024-02-18 NOTE — ED NOTES
Papito Hutton now accepted in transfer to [facility]  by Dr. JC Santoro; call report to:  [phone].  Will  arrange transport after report.

## 2024-02-18 NOTE — PT/OT/SLP PROGRESS
Physical Therapy      Patient Name:  Papito Hutton   MRN:  [id number]    Patient not seen today secondary to Testing/imaging (xray/CT/MRI) EEG. Will follow-up 2/19/24.

## 2024-02-18 NOTE — ASSESSMENT & PLAN NOTE
Patient had a Seizure, had lateral tongue biting, On Keppra IV. MRI Ordered, Neuro to see Patient in the morning. Precedex for Post-Ictal Agitation

## 2024-02-18 NOTE — ASSESSMENT & PLAN NOTE
Patient had a Seizure, had lateral tongue biting, On Keppra IV. MRI Ordered, Neuro to see Patient in the morning. Precedex for Post-Ictal Agitation.  Follow EEG results.  Fall and seizure precautions.

## 2024-02-18 NOTE — ASSESSMENT & PLAN NOTE
Chronic, uncontrolled. Latest blood pressure and vitals reviewed-     Temp:  [98.2 °F (36.8 °C)-98.4 °F (36.9 °C)]   Pulse:  [43-95]   Resp:  [15-22]   BP: (112-234)/()   SpO2:  [93 %-100 %] .   Home meds for hypertension were reviewed and noted below.   Hypertension Medications               carvediloL (COREG) 12.5 MG tablet Take 1 tablet (12.5 mg total) by mouth 2 (two) times daily with meals.    NIFEdipine (PROCARDIA-XL) 60 MG (OSM) 24 hr tablet Take 1 tablet (60 mg total) by mouth once daily.    triamterene-hydrochlorothiazide 37.5-25 mg (MAXZIDE-25) 37.5-25 mg per tablet Take 1 tablet by mouth once daily.    valsartan (DIOVAN) 320 MG tablet Take 1 tablet (320 mg total) by mouth once daily.            While in the hospital, will manage blood pressure as follows; Adjust home antihypertensive regimen as follows- Hold Home Meds due to Labile BP on Precedex    Will utilize p.r.n. blood pressure medication only if patient's blood pressure greater than 210 SBP and he develops symptoms such as worsening chest pain or shortness of breath    Patient was recently started on Procardia for resistant HTN, unlikely to cause Seizures, though may have dropped BP. Will avoid Calcium Channel Blockers on Patient

## 2024-02-18 NOTE — PLAN OF CARE
Problem: Adult Inpatient Plan of Care  Goal: Plan of Care Review  Outcome: Ongoing, Progressing  Goal: Patient-Specific Goal (Individualized)  Outcome: Ongoing, Progressing  Goal: Absence of Hospital-Acquired Illness or Injury  Outcome: Ongoing, Progressing  Goal: Optimal Comfort and Wellbeing  Outcome: Ongoing, Progressing  Goal: Readiness for Transition of Care  Outcome: Ongoing, Progressing     Problem: Infection  Goal: Absence of Infection Signs and Symptoms  Outcome: Ongoing, Progressing     Problem: Skin Injury Risk Increased  Goal: Skin Health and Integrity  Outcome: Ongoing, Progressing     Problem: Fall Injury Risk  Goal: Absence of Fall and Fall-Related Injury  Outcome: Ongoing, Progressing     Problem: Restraint, Nonbehavioral (Nonviolent)  Goal: Absence of Harm or Injury  Outcome: Ongoing, Progressing     Problem: Impaired Wound Healing  Goal: Optimal Wound Healing  Outcome: Ongoing, Progressing

## 2024-02-18 NOTE — NURSING
Nurses Note -- 4 Eyes      2/17/2024   10:42 PM      Skin assessed during: Admit      [] No Altered Skin Integrity Present    []Prevention Measures Documented      [x] Yes- Altered Skin Integrity Present or Discovered   [x] LDA Added if Not in Epic (Describe Wound)   [] New Altered Skin Integrity was Present on Admit and Documented in LDA   [] Wound Image Taken    Wound Care Consulted? No    Attending Nurse:  Casey Nash RN/Staff Member:  Daisy HORAN

## 2024-02-18 NOTE — ED NOTES
In CT and on table for test. He is beginning to thrash head from side to side and moving all extremities. He is becoming more agitated as testing progresses. Nurse is at table side to closely monitor for safety and care. Once CT completed moved pt back to stretch and returned to room.

## 2024-02-18 NOTE — H&P
[facility]  History & Physical    Patient Name: Papito Hutton  MRN: [id number]  Patient Class: IP- Inpatient  Admission Date: 2/17/2024  Attending Physician: Saul Kim MD  Primary Care Provider: Rafi Martin MD         Patient information was obtained from spouse/SO and ER records.     Subjective:     Principal Problem:Seizure    Chief Complaint:   Chief Complaint   Patient presents with    Facial Droop     Last seen well 1030 am      Fall     Resulting to rt. Sided facial injury     Extremity Weakness     Rt. Side  and nonverbal        HPI: Patient is a 79yo with a PMH of HTN, Pre-Diabetes, GERD and suspected KEITH. Patient was unable to speak, so history was provided by [facility] and Patient's spouse. Patient's spouse found Patient to be unconscious in the afternoon, this was after she saw the Patient Aox4 in the morning. To the spouse it seemed as though the Patient had fallen over, when she found him he was conscious though very confused and somnolent. She called EMS, to EMS Patient had a slight right sided facial droop, though Patient's spouse couldn't see it. To note, Patient was recently started on Procardia for his resistant hypertension, his BP in the morning was     Patient was brought to [facility] ED where he had a CT Scan, Neurology reviewed the CT Findings, found there wasn't a large vessel occlusion. However Patient had a Seizure in that ED and was transferred to [facility] after getting IV Keppra. While at [facility] Patient was noted to be agitated after his Seizure, with him moving all four extremities, and was placed on Precedex gtt    Past Medical History:   Diagnosis Date    Arthritis     B12 deficiency     Colon polyp     Diabetes mellitus     BORDERLINE    GERD (gastroesophageal reflux disease)     [place] (hard of hearing)     BILAT AIDS    Hyperlipidemia     Hypertension     Low testosterone     Personal history of colonic polyps 2008    adenomatous polyp     Sinus disorder     Sleep apnea     DOES NOT USE MACHINE    Wears glasses        Past Surgical History:   Procedure Laterality Date    COLON SURGERY      COLONOSCOPY  2008    Dr. Garcia; polyps removed    COLONOSCOPY N/A 07/13/2020    Procedure: COLONOSCOPY;  Surgeon: Jj Fried MD;  Location: [facility] ENDO;  Service: Endoscopy;  Laterality: N/A;    COLONOSCOPY N/A 8/15/2023    Procedure: COLONOSCOPY;  Surgeon: Jj Fried MD;  Location: [facility];  Service: Endoscopy;  Laterality: N/A;    ESOPHAGOGASTRODUODENOSCOPY N/A 7/31/2023    Procedure: EGD (ESOPHAGOGASTRODUODENOSCOPY);  Surgeon: Jj Fried MD;  Location: [facility];  Service: Endoscopy;  Laterality: N/A;    LAPAROSCOPIC RIGHT COLON RESECTION Right 08/10/2020    Procedure: COLECTOMY, RIGHT, LAPAROSCOPIC pooss conversion to open;  Surgeon: Noble Kaye MD;  Location: [facility] OR;  Service: General;  Laterality: Right;    NASAL SEPTUM SURGERY      right hand surgery      RIGHT HEMICOLECTOMY N/A 08/10/2020    Procedure: HEMICOLECTOMY, RIGHT;  Surgeon: Noble Kaye MD;  Location: [facility] OR;  Service: General;  Laterality: N/A;    UPPER GASTROINTESTINAL ENDOSCOPY  2008    hiatal hernia; esophagitis       Review of patient's allergies indicates:  No Known Allergies    No current facility-administered medications on file prior to encounter.     Current Outpatient Medications on File Prior to Encounter   Medication Sig    carvediloL (COREG) 12.5 MG tablet Take 1 tablet (12.5 mg total) by mouth 2 (two) times daily with meals.    cetirizine (ZYRTEC) 10 MG tablet Take 1 tablet (10 mg total) by mouth every evening.    CHOLECALCIFEROL, VITAMIN D3, (VITAMIN D3 ORAL) Take 1 capsule by mouth once daily.    clopidogreL (PLAVIX) 75 mg tablet Take 1 tablet (75 mg total) by mouth once daily.    cyanocobalamin (VITAMIN B-12) 1000 MCG tablet Take 100 mcg by mouth once daily.    diclofenac (VOLTAREN) 75 MG EC tablet Take 75 mg by mouth 2 (two) times daily.     esomeprazole (NEXIUM) 40 MG capsule TAKE 1 CAPSULE BEFORE BREAKFAST    metFORMIN (GLUCOPHAGE) 500 MG tablet Take 500 mg by mouth once daily.    montelukast (SINGULAIR) 10 mg tablet Take 10 mg by mouth every evening.    NIFEdipine (PROCARDIA-XL) 60 MG (OSM) 24 hr tablet Take 1 tablet (60 mg total) by mouth once daily.    triamterene-hydrochlorothiazide 37.5-25 mg (MAXZIDE-25) 37.5-25 mg per tablet Take 1 tablet by mouth once daily.    valsartan (DIOVAN) 320 MG tablet Take 1 tablet (320 mg total) by mouth once daily.    acetaminophen (TYLENOL) 650 MG TbSR Take 1 tablet (650 mg total) by mouth every 8 (eight) hours as needed (do not take with any other tylenol or acetaminophen).    atorvastatin (LIPITOR) 80 MG tablet Take 1 tablet (80 mg total) by mouth once daily.    azelastine (ASTELIN) 137 mcg (0.1 %) nasal spray 1 spray (137 mcg total) by Nasal route 2 (two) times daily. for 180 doses    diclofenac sodium (VOLTAREN) 1 % Gel Apply 2 grams topically once daily.    fluticasone propionate (FLONASE) 50 mcg/actuation nasal spray Nasal for 90 Days    ipratropium (ATROVENT) 21 mcg (0.03 %) nasal spray 2 sprays by Each Nostril route 2 (two) times daily as needed for Rhinitis (sinus congestion).    [DISCONTINUED] aspirin 81 MG Chew Take 1 tablet (81 mg total) by mouth once daily. for 21 days (Patient not taking: Reported on 2/7/2024)     Family History    None       Tobacco Use    Smoking status: Former     Types: Cigars    Smokeless tobacco: Former     Types: Chew   Substance and Sexual Activity    Alcohol use: Yes     Alcohol/week: 2.0 standard drinks of alcohol     Types: 2 Cans of beer per week     Comment: occ    Drug use: No    Sexual activity: Yes     Partners: Female     Review of Systems   Unable to perform ROS: Mental status change     Objective:     Vital Signs (Most Recent):  Temp: 98.2 °F (36.8 °C) (02/17/24 2301)  Pulse: (!) 52 (02/17/24 2301)  Resp: 20 (02/17/24 2301)  BP: (!) 205/93 (02/17/24 2301)  SpO2: 100  % (02/17/24 2301) Vital Signs (24h Range):  Temp:  [98.2 °F (36.8 °C)] 98.2 °F (36.8 °C)  Pulse:  [47-95] 52  Resp:  [18-22] 20  SpO2:  [95 %-100 %] 100 %  BP: (112-234)/() 205/93     Weight: 91.8 kg (202 lb 6.1 oz)  Body mass index is 27.45 kg/m².     Physical Exam  HENT:      Head: Normocephalic and atraumatic.      Right Ear: External ear normal.      Left Ear: External ear normal.      Nose: No congestion or rhinorrhea.      Mouth/Throat:      Pharynx: No oropharyngeal exudate or posterior oropharyngeal erythema.   Eyes:      General:         Right eye: No discharge.         Left eye: No discharge.      Conjunctiva/sclera: Conjunctivae normal.   Cardiovascular:      Rate and Rhythm: Regular rhythm. Tachycardia present.      Heart sounds: No murmur heard.     No friction rub. No gallop.   Pulmonary:      Effort: No respiratory distress.      Breath sounds: No stridor. No wheezing, rhonchi or rales.   Chest:      Chest wall: No tenderness.   Abdominal:      General: There is no distension.      Palpations: There is no mass.      Tenderness: There is no abdominal tenderness. There is no guarding or rebound.      Hernia: No hernia is present.   Musculoskeletal:         General: No swelling, tenderness, deformity or signs of injury.      Right lower leg: No edema.      Left lower leg: No edema.   Skin:     Coloration: Skin is not jaundiced or pale.      Findings: No bruising, erythema, lesion or rash.   Neurological:      Comments: Patient seemed to have a slight droop of the right side of his face, though he was too unresponsive to assess for muscle strength. Pupils weren't dilated or constricted                Significant Labs: All pertinent labs within the past 24 hours have been reviewed.  CBC:   Recent Labs   Lab 02/17/24  1735   WBC 8.28   HGB 13.3*   HCT 37.6*        CMP:   Recent Labs   Lab 02/17/24  1735      K 3.8      CO2 19*   *   BUN 13   CREATININE 1.7*   CALCIUM 9.9    PROT 6.7   ALBUMIN 3.9   BILITOT 1.0   ALKPHOS 75   AST 20   ALT 19   ANIONGAP 14       Significant Imaging: I have reviewed all pertinent imaging results/findings within the past 24 hours.  Assessment/Plan:     * Seizure  Patient had a Seizure, had lateral tongue biting, On Keppra IV. MRI Ordered, Neuro to see Patient in the morning. Precedex for Post-Ictal Agitation       Prediabetes  Home Metformin Held, Started SSI      Hypertension  Chronic, uncontrolled. Latest blood pressure and vitals reviewed-     Temp:  [98.2 °F (36.8 °C)]   Pulse:  [47-95]   Resp:  [18-22]   BP: (112-234)/()   SpO2:  [95 %-100 %] .   Home meds for hypertension were reviewed and noted below.   Hypertension Medications               carvediloL (COREG) 12.5 MG tablet Take 1 tablet (12.5 mg total) by mouth 2 (two) times daily with meals.    NIFEdipine (PROCARDIA-XL) 60 MG (OSM) 24 hr tablet Take 1 tablet (60 mg total) by mouth once daily.    triamterene-hydrochlorothiazide 37.5-25 mg (MAXZIDE-25) 37.5-25 mg per tablet Take 1 tablet by mouth once daily.    valsartan (DIOVAN) 320 MG tablet Take 1 tablet (320 mg total) by mouth once daily.            While in the hospital, will manage blood pressure as follows; Adjust home antihypertensive regimen as follows- Hold Home Meds due to Labile BP on Precedex    Will utilize p.r.n. blood pressure medication only if patient's blood pressure greater than 210 SBP and he develops symptoms such as worsening chest pain or shortness of breath    Patient was recently started on Procardia for resistant HTN, unlikely to cause Seizures, though may have dropped BP. Will avoid Calcium Channel Blockers on Patient       VTE Risk Mitigation (From admission, onward)           Ordered     enoxaparin injection 40 mg  Daily         02/17/24 2224     IP VTE HIGH RISK PATIENT  Once         02/17/24 2224     Place sequential compression device  Until discontinued         02/17/24 2224                  Critical care  time spent on the evaluation and treatment of severe organ dysfunction, review of pertinent labs and imaging studies, discussions with consulting providers and discussions with patient/family: 35 minutes.                  Saul Kim MD  Department of Hospital Medicine  Formerly [facility]

## 2024-02-18 NOTE — HPI
Patient is a 77yo with a PMH of HTN, Pre-Diabetes, GERD and suspected KEITH. Patient was unable to speak, so history was provided by [facility] and Patient's spouse. Patient's spouse found Patient to be unconscious in the afternoon, this was after she saw the Patient Aox4 in the morning. To the spouse it seemed as though the Patient had fallen over, when she found him he was conscious though very confused and somnolent. She called EMS, to EMS Patient had a slight right sided facial droop, though Patient's spouse couldn't see it. To note, Patient was recently started on Procardia for his resistant hypertension, his BP in the morning was     Patient was brought to [facility] ED where he had a CT Scan, Neurology reviewed the CT Findings, found there wasn't a large vessel occlusion. However Patient had a Seizure in that ED and was transferred to [facility] after getting IV Keppra. While at [facility] Patient was noted to be agitated after his Seizure, with him moving all four extremities, and was placed on Precedex gtt

## 2024-02-18 NOTE — ASSESSMENT & PLAN NOTE
Chronic, uncontrolled. Latest blood pressure and vitals reviewed-     Temp:  [98.2 °F (36.8 °C)]   Pulse:  [47-95]   Resp:  [18-22]   BP: (112-234)/()   SpO2:  [95 %-100 %] .   Home meds for hypertension were reviewed and noted below.   Hypertension Medications               carvediloL (COREG) 12.5 MG tablet Take 1 tablet (12.5 mg total) by mouth 2 (two) times daily with meals.    NIFEdipine (PROCARDIA-XL) 60 MG (OSM) 24 hr tablet Take 1 tablet (60 mg total) by mouth once daily.    triamterene-hydrochlorothiazide 37.5-25 mg (MAXZIDE-25) 37.5-25 mg per tablet Take 1 tablet by mouth once daily.    valsartan (DIOVAN) 320 MG tablet Take 1 tablet (320 mg total) by mouth once daily.            While in the hospital, will manage blood pressure as follows; Adjust home antihypertensive regimen as follows- Hold Home Meds due to Labile BP on Precedex    Will utilize p.r.n. blood pressure medication only if patient's blood pressure greater than 210 SBP and he develops symptoms such as worsening chest pain or shortness of breath    Patient was recently started on Procardia for resistant HTN, unlikely to cause Seizures, though may have dropped BP. Will avoid Calcium Channel Blockers on Patient

## 2024-02-18 NOTE — PROCEDURES
EEG REPORT    NAME: Papito Hutton  : 1945  MRN: [id number]    DATE of EE2024    CLINICAL INDICATION: This is a 78 y.o. male with history of HTN being evaluated for new onset seizure.    MEDICATIONS:  Scheduled Meds:   atorvastatin  80 mg Oral Daily    clopidogreL  75 mg Oral Daily    enoxparin  40 mg Subcutaneous Daily    levETIRAcetam (Keppra) IV (PEDS and ADULTS)  500 mg Intravenous Q12H    montelukast  10 mg Oral QHS    mupirocin   Nasal BID     Continuous Infusions:   dexmedeTOMIDine (Precedex) infusion (titrating) Stopped (24 6305)     PRN Meds:.calcium gluconate IVPB, calcium gluconate IVPB, calcium gluconate IVPB, dextrose 50% in water (D50W), dextrose 50% in water (D50W), glucagon (human recombinant), insulin aspart U-100, magnesium sulfate IVPB, magnesium sulfate IVPB, ondansetron, potassium chloride **AND** potassium chloride **AND** potassium chloride, sodium chloride 0.9%, sodium phosphate 15 mmol in dextrose 5 % (D5W) 250 mL IVPB, sodium phosphate 20.01 mmol in dextrose 5 % (D5W) 250 mL IVPB, sodium phosphate 30 mmol in dextrose 5 % (D5W) 250 mL IVPB    EEG DESCRIPTION:  A 16-channel EEG was performed with electrode placement in 10/20 International System. Longitudinal bipolar and referential montages were utilized in analysis. Total duration of this study was 25 minutes.    This is a technically adequate study with minor muscle and motion artifacts.    The background consists of a low voltage mixture of theta and delta rhythms, with no posterior dominant rhythm seen. Stage II sleep structures are not seen.    Photic stimulation is performed with no abnormal reactivity noted. Hyperventilation is not performed.     No epileptiform discharges or seizures are captured.    Impression:  This is an abnormal EEG due to diffuse slowing of the background activity consistent with a moderate to severe encephalopathy. No epileptiform discharges or seizures are captured.  Findings of this  study indicate a generalized encephalopathic process that is nonspecific in type. Toxic, metabolic, or structural abnormalities may be considered.  Further clinical correlation is advised.      Jahaira Angelo MD  Neurology

## 2024-02-18 NOTE — PLAN OF CARE
02/18/24 0713   Patient Assessment/Suction   Level of Consciousness (AVPU) responds to voice   Respiratory Effort Normal;Unlabored   Expansion/Accessory Muscles/Retractions expansion symmetric;no retractions;no use of accessory muscles   PRE-TX-O2   Device (Oxygen Therapy) Oxymask   $ Is the patient on Low Flow Oxygen? Yes   Flow (L/min) 4   SpO2 98 %   Pulse Oximetry Type Continuous   $ Pulse Oximetry - Multiple Charge Pulse Oximetry - Multiple   Pulse (!) 53   Resp 20   Education   $ Education 15 min

## 2024-02-18 NOTE — ED NOTES
Pt beginning to awaken and is moving on the bed. He is not communication and is not following any commands. Tele stroke Neuro consult in progress and orders given for Keppra and Precedex.

## 2024-02-18 NOTE — PLAN OF CARE
[facility]  Initial Discharge Assessment  CM completed initial discharge assessment with patient's friend and emergency contact Anny at bedside. Patient unable to be assessed due to current condition.  Patient's preferred pharmacy location is [facility] on [facility]. [place] [address].  Pt does not a living will or POA.  Anny verified all of the patient's demographics as correct on face sheet. Patient is currently residing with friend Anny. Pt does not receive any outpatient services or attend dialysis. Patient does not take Coumadin medication  but is currently being prescribed plavix. Pt is independent in all ADLS.  Pt to discharge home via family transport. Pt has no other needs to be addressed at this time. Patient does not use any other DME except for those listed below.      Case management will follow up as necessary.     Primary Care Provider: Rafi Martin MD    Admission Diagnosis: Acute CVA (cerebrovascular accident) [I63.9]    Admission Date: 2/17/2024  Expected Discharge Date: 2/23/2024    Transition of Care Barriers: None    Payor: MEDICARE / Plan: MEDICARE PART A & B / Product Type: Government /     Extended Emergency Contact Information  Primary Emergency Contact: Anny Knott  Address: [id number] [place] [address]  Mobile Phone: [phone]  Relation: Friend  Preferred language: English   needed? No    Discharge Plan A: Home with family  Discharge Plan B: Home with family      [facility]  for [facility] - [place], [place] - [facility] [facility] [address]  Phone: [phone] Fax: [phone]    [facility] - [place], [place] - [facility] [facility] [address]  Phone: [phone] Fax: [phone]    [facility] [place], [place] - [address] [facility] [address]  [place] [address]  Phone: [phone]  Fax: [phone]      Initial Assessment (most recent)       Adult Discharge Assessment - 02/18/24 1611          Discharge Assessment    Assessment Type Discharge Planning Assessment     Confirmed/corrected address, phone number and insurance Yes     Confirmed Demographics Correct on Facesheet     Source of Information family     Contact Name/Number Anny Knott, [phone]     When was your last doctors appointment? --   02/2024    Communicated ROCIO with patient/caregiver Date not available/Unable to determine     Reason For Admission Seizure     People in Home friend(s)     Facility Arrived From: Home     Do you expect to return to your current living situation? Yes     Do you have help at home or someone to help you manage your care at home? Yes     Who are your caregiver(s) and their phone number(s)? Anny Calixtobobby [phone]     Prior to hospitilization cognitive status: Unable to Assess     Current cognitive status: Unable to Assess     Walking or Climbing Stairs Difficulty yes     Walking or Climbing Stairs ambulation difficulty, requires equipment     Mobility Management Cane     Dressing/Bathing Difficulty yes     Dressing/Bathing bathing difficulty, requires equipment     Dressing/Bathing Management grab rails.     Home Accessibility wheelchair accessible     Home Layout Able to live on 1st floor     Equipment Currently Used at Home grab bar;cane, straight     Readmission within 30 days? No     Patient currently being followed by outpatient case management? No     Do you currently have service(s) that help you manage your care at home? No     Do you take prescription medications? Yes     Do you have prescription coverage? Yes     Coverage Medicare Part A and Part B     Do you have any problems affording any of your prescribed medications? No     Is the patient taking medications as prescribed? yes     Who is going to help you get home at discharge? Anny Husain     How do you get to  doctors appointments? car, drives self     Are you on dialysis? No     Do you take coumadin? No     Discharge Plan A Home with family     Discharge Plan B Home with family     DME Needed Upon Discharge  none     Discharge Plan discussed with: Friend     Name(s) and Number(s) Anny Knott [phone]     Transition of Care Barriers None

## 2024-02-18 NOTE — PLAN OF CARE
02/18/24 1619   Medicare Message   Important Message from Medicare regarding Discharge Appeal Rights Given to patient/caregiver;Explained to patient/caregiver;Signed/date by patient/caregiver   Date IMM was signed 02/18/24   Time IMM was signed [id number]

## 2024-02-18 NOTE — PROGRESS NOTES
[facility]  Progress Note    Patient Name: Papito Hutton  MRN: [id number]  Patient Class: IP- Inpatient   Admission Date: 2/17/2024  Length of Stay: 1 days  Attending Physician: Gina Arriaza MD  Primary Care Provider: Rafi Martin MD        Subjective:     Principal Problem:Seizure        HPI:  Patient is a 77yo with a PMH of HTN, Pre-Diabetes, GERD and suspected KEITH. Patient was unable to speak, so history was provided by [facility] and Patient's spouse. Patient's spouse found Patient to be unconscious in the afternoon, this was after she saw the Patient Aox4 in the morning. To the spouse it seemed as though the Patient had fallen over, when she found him he was conscious though very confused and somnolent. She called EMS, to EMS Patient had a slight right sided facial droop, though Patient's spouse couldn't see it. To note, Patient was recently started on Procardia for his resistant hypertension, his BP in the morning was     Patient was brought to [facility] ED where he had a CT Scan, Neurology reviewed the CT Findings, found there wasn't a large vessel occlusion. However Patient had a Seizure in that ED and was transferred to [facility] after getting IV Keppra. While at [facility] Patient was noted to be agitated after his Seizure, with him moving all four extremities, and was placed on Precedex gtt    Overview/Hospital Course:  No notes on file    Interval History:  Patient admitted with likely new onset seizure with tongue biting, loss of consciousness, speech dysfunction and right facial droop evaluation.  Significantly sedated at this time.  Patient's wife is present at bedside.  Patient has tongue bite and right periorbital ecchymosis and bruising.  Patient received Keppra load.  Neuro radiology results pending.  Neurology evaluation pending.  Currently patient is on Precedex infusion.  Currently afebrile.    Review of Systems   Unable to perform ROS: Mental status change      Objective:     Vital Signs (Most Recent):  Temp: 98.4 °F (36.9 °C) (02/18/24 0301)  Pulse: (!) 43 (02/18/24 0500)  Resp: 18 (02/18/24 0500)  BP: (!) 187/82 (02/18/24 0500)  SpO2: 96 % (02/18/24 0500) Vital Signs (24h Range):  Temp:  [98.2 °F (36.8 °C)-98.4 °F (36.9 °C)] 98.4 °F (36.9 °C)  Pulse:  [43-95] 43  Resp:  [15-22] 18  SpO2:  [95 %-100 %] 96 %  BP: (112-234)/() 187/82     Weight: 91.8 kg (202 lb 6.1 oz)  Body mass index is 27.45 kg/m².    Intake/Output Summary (Last 24 hours) at 2/18/2024 0752  Last data filed at 2/18/2024 0445  Gross per 24 hour   Intake 185.06 ml   Output 1900 ml   Net -1714.94 ml         Physical Exam  HENT:      Head: Normocephalic and atraumatic.      Right Ear: External ear normal.      Left Ear: External ear normal.      Nose: No congestion or rhinorrhea.      Mouth/Throat:      Pharynx: No oropharyngeal exudate or posterior oropharyngeal erythema.   Eyes:      General:         Right eye: No discharge.         Left eye: No discharge.      Conjunctiva/sclera: Conjunctivae normal.   Cardiovascular:      Rate and Rhythm: Regular rhythm. Tachycardia present.      Heart sounds: No murmur heard.     No friction rub. No gallop.   Pulmonary:      Effort: No respiratory distress.      Breath sounds: No stridor. No wheezing, rhonchi or rales.   Chest:      Chest wall: No tenderness.   Abdominal:      General: There is no distension.      Palpations: There is no mass.      Tenderness: There is no abdominal tenderness. There is no guarding or rebound.      Hernia: No hernia is present.   Musculoskeletal:         General: No swelling, tenderness, deformity or signs of injury.      Right lower leg: No edema.      Left lower leg: No edema.   Skin:     Coloration: Skin is not jaundiced or pale.      Findings: No bruising, erythema, lesion or rash.   Neurological:      Comments: Patient appears sedated, arousable to verbal commands.  Able to move all extremities to verbal command.  Lateral  "tongue bite noted.  Further detailed neurological examination not possible due to patient's present condition.             Significant Labs: All pertinent labs within the past 24 hours have been reviewed.  CBC:   Recent Labs   Lab 02/17/24  1735 02/18/24  0405   WBC 8.28 9.18   HGB 13.3* 13.5*   HCT 37.6* 39.9*    220     CMP:   Recent Labs   Lab 02/17/24  1735 02/18/24  0405    140   K 3.8 3.5    105   CO2 19* 27   * 138*   BUN 13 14   CREATININE 1.7* 1.2   CALCIUM 9.9 10.1   PROT 6.7 6.1   ALBUMIN 3.9 4.1   BILITOT 1.0 0.8   ALKPHOS 75 68   AST 20 25   ALT 19 18   ANIONGAP 14 8     Lactic Acid:   Recent Labs   Lab 02/18/24  0026   LACTATE 0.9     Lipid Panel:   Recent Labs   Lab 02/17/24  1735   CHOL 147   HDL 42   LDLCALC 80.8   TRIG 121   CHOLHDL 28.6     Troponin: No results for input(s): "TROPONINI", "TROPONINIHS" in the last 48 hours.  TSH:   Recent Labs   Lab 02/17/24  1735   TSH 0.700     Urine Studies: No results for input(s): "COLORU", "APPEARANCEUA", "PHUR", "SPECGRAV", "PROTEINUA", "GLUCUA", "KETONESU", "BILIRUBINUA", "OCCULTUA", "NITRITE", "UROBILINOGEN", "LEUKOCYTESUR", "RBCUA", "WBCUA", "BACTERIA", "SQUAMEPITHEL", "HYALINECASTS" in the last 48 hours.    Invalid input(s): "WRIGHTSUR"  Microbiology Results (last 7 days)       ** No results found for the last 168 hours. **          Significant Imaging:   EEG: Pending.     CT scan maxillofacial:   No evidence of an acute displaced fracture.     CT of the head and neck:    No acute intracranial abnormality.  No high-grade stenosis or major vessel occlusion.  Prominent mediastinal and hilar lymph nodes, possibly reactive.    CT C-spine:   No fracture or traumatic malalignment of the cervical spine.  Cervical spondylosis resulting in up to severe spinal canal stenosis at C3-C4 and multilevel moderate to severe neural foraminal stenosis at the lower levels.  Bulky anterior flowing osteophytes consistent with diffuse idiopathic " skeletal hyperostosis (DISH).    MRI brain with and without contrast:  Pending.    ECHO: Pending.       Assessment/Plan:      * Seizure  Patient had a Seizure, had lateral tongue biting, On Keppra IV. MRI Ordered, Neuro to see Patient in the morning. Precedex for Post-Ictal Agitation.  Follow EEG results.  Fall and seizure precautions.      Prediabetes  Home Metformin Held, check hemoglobin A1c and cover with low-dose insulin sliding scale.      Hypertension  Chronic, uncontrolled. Latest blood pressure and vitals reviewed-     Temp:  [98.2 °F (36.8 °C)-98.4 °F (36.9 °C)]   Pulse:  [43-95]   Resp:  [15-22]   BP: (112-234)/()   SpO2:  [93 %-100 %] .   Home meds for hypertension were reviewed and noted below.   Hypertension Medications               carvediloL (COREG) 12.5 MG tablet Take 1 tablet (12.5 mg total) by mouth 2 (two) times daily with meals.    NIFEdipine (PROCARDIA-XL) 60 MG (OSM) 24 hr tablet Take 1 tablet (60 mg total) by mouth once daily.    triamterene-hydrochlorothiazide 37.5-25 mg (MAXZIDE-25) 37.5-25 mg per tablet Take 1 tablet by mouth once daily.    valsartan (DIOVAN) 320 MG tablet Take 1 tablet (320 mg total) by mouth once daily.            While in the hospital, will manage blood pressure as follows; Adjust home antihypertensive regimen as follows- Hold Home Meds due to Labile BP on Precedex    Will utilize p.r.n. blood pressure medication only if patient's blood pressure greater than 210 SBP and he develops symptoms such as worsening chest pain or shortness of breath    Patient was recently started on Procardia for resistant HTN, unlikely to cause Seizures, though may have dropped BP. Will avoid Calcium Channel Blockers on Patient     Hyperlipidemia  Check fasting lipid profile.  Continue Lipitor 80 mg daily.      Discussed with patient's wife, answered all questions.  VTE Risk Mitigation (From admission, onward)           Ordered     enoxaparin injection 40 mg  Daily         02/17/24  2224     IP VTE HIGH RISK PATIENT  Once         02/17/24 2224     Place sequential compression device  Until discontinued         02/17/24 2224                    Discharge Planning   ROCIO: 2/23/2024     Code Status: Full Code   Is the patient medically ready for discharge?:     Reason for patient still in hospital (select all that apply): Patient trending condition and Consult recommendations           Gina Arriaza MD  Department of Hospital Medicine   [facility]

## 2024-02-18 NOTE — SUBJECTIVE & OBJECTIVE
Interval History:  Patient admitted with likely new onset seizure with tongue biting, loss of consciousness, speech dysfunction and right facial droop evaluation.  Significantly sedated at this time.  Patient's wife is present at bedside.  Patient has tongue bite and right periorbital ecchymosis and bruising.  Patient received Keppra load.  Neuro radiology results pending.  Neurology evaluation pending.  Currently patient is on Precedex infusion.  Currently afebrile.    Review of Systems   Unable to perform ROS: Mental status change     Objective:     Vital Signs (Most Recent):  Temp: 98.4 °F (36.9 °C) (02/18/24 0301)  Pulse: (!) 43 (02/18/24 0500)  Resp: 18 (02/18/24 0500)  BP: (!) 187/82 (02/18/24 0500)  SpO2: 96 % (02/18/24 0500) Vital Signs (24h Range):  Temp:  [98.2 °F (36.8 °C)-98.4 °F (36.9 °C)] 98.4 °F (36.9 °C)  Pulse:  [43-95] 43  Resp:  [15-22] 18  SpO2:  [95 %-100 %] 96 %  BP: (112-234)/() 187/82     Weight: 91.8 kg (202 lb 6.1 oz)  Body mass index is 27.45 kg/m².    Intake/Output Summary (Last 24 hours) at 2/18/2024 0752  Last data filed at 2/18/2024 0445  Gross per 24 hour   Intake 185.06 ml   Output 1900 ml   Net -1714.94 ml         Physical Exam  HENT:      Head: Normocephalic and atraumatic.      Right Ear: External ear normal.      Left Ear: External ear normal.      Nose: No congestion or rhinorrhea.      Mouth/Throat:      Pharynx: No oropharyngeal exudate or posterior oropharyngeal erythema.   Eyes:      General:         Right eye: No discharge.         Left eye: No discharge.      Conjunctiva/sclera: Conjunctivae normal.   Cardiovascular:      Rate and Rhythm: Regular rhythm. Tachycardia present.      Heart sounds: No murmur heard.     No friction rub. No gallop.   Pulmonary:      Effort: No respiratory distress.      Breath sounds: No stridor. No wheezing, rhonchi or rales.   Chest:      Chest wall: No tenderness.   Abdominal:      General: There is no distension.      Palpations: There  "is no mass.      Tenderness: There is no abdominal tenderness. There is no guarding or rebound.      Hernia: No hernia is present.   Musculoskeletal:         General: No swelling, tenderness, deformity or signs of injury.      Right lower leg: No edema.      Left lower leg: No edema.   Skin:     Coloration: Skin is not jaundiced or pale.      Findings: No bruising, erythema, lesion or rash.   Neurological:      Comments: Patient appears sedated, arousable to verbal commands.  Able to move all extremities to verbal command.  Lateral tongue bite noted.  Further detailed neurological examination not possible due to patient's present condition.             Significant Labs: All pertinent labs within the past 24 hours have been reviewed.  CBC:   Recent Labs   Lab 02/17/24 1735 02/18/24  0405   WBC 8.28 9.18   HGB 13.3* 13.5*   HCT 37.6* 39.9*    220     CMP:   Recent Labs   Lab 02/17/24 1735 02/18/24  0405    140   K 3.8 3.5    105   CO2 19* 27   * 138*   BUN 13 14   CREATININE 1.7* 1.2   CALCIUM 9.9 10.1   PROT 6.7 6.1   ALBUMIN 3.9 4.1   BILITOT 1.0 0.8   ALKPHOS 75 68   AST 20 25   ALT 19 18   ANIONGAP 14 8     Lactic Acid:   Recent Labs   Lab 02/18/24  0026   LACTATE 0.9     Lipid Panel:   Recent Labs   Lab 02/17/24  1735   CHOL 147   HDL 42   LDLCALC 80.8   TRIG 121   CHOLHDL 28.6     Troponin: No results for input(s): "TROPONINI", "TROPONINIHS" in the last 48 hours.  TSH:   Recent Labs   Lab 02/17/24  1735   TSH 0.700     Urine Studies: No results for input(s): "COLORU", "APPEARANCEUA", "PHUR", "SPECGRAV", "PROTEINUA", "GLUCUA", "KETONESU", "BILIRUBINUA", "OCCULTUA", "NITRITE", "UROBILINOGEN", "LEUKOCYTESUR", "RBCUA", "WBCUA", "BACTERIA", "SQUAMEPITHEL", "HYALINECASTS" in the last 48 hours.    Invalid input(s): "WRIGHTSUR"  Microbiology Results (last 7 days)       ** No results found for the last 168 hours. **          Significant Imaging:   EEG: Pending.     CT scan maxillofacial:   No " evidence of an acute displaced fracture.     CT of the head and neck:    No acute intracranial abnormality.  No high-grade stenosis or major vessel occlusion.  Prominent mediastinal and hilar lymph nodes, possibly reactive.    CT C-spine:   No fracture or traumatic malalignment of the cervical spine.  Cervical spondylosis resulting in up to severe spinal canal stenosis at C3-C4 and multilevel moderate to severe neural foraminal stenosis at the lower levels.  Bulky anterior flowing osteophytes consistent with diffuse idiopathic skeletal hyperostosis (DISH).    MRI brain with and without contrast:  Pending.    ECHO: Pending.

## 2024-02-19 ENCOUNTER — ANESTHESIA (OUTPATIENT)
Dept: INTENSIVE CARE | Facility: HOSPITAL | Age: 79
DRG: 100 | End: 2024-02-19
Payer: MEDICARE

## 2024-02-19 ENCOUNTER — ANESTHESIA EVENT (OUTPATIENT)
Dept: INTENSIVE CARE | Facility: HOSPITAL | Age: 79
DRG: 100 | End: 2024-02-19
Payer: MEDICARE

## 2024-02-19 PROBLEM — Z71.89 ADVANCE CARE PLANNING: Status: ACTIVE | Noted: 2024-02-19

## 2024-02-19 PROBLEM — R93.89 ABNORMAL CHEST X-RAY: Status: ACTIVE | Noted: 2024-02-19

## 2024-02-19 PROBLEM — E83.42 HYPOMAGNESEMIA: Status: ACTIVE | Noted: 2024-02-19

## 2024-02-19 PROBLEM — R93.7 ABNORMAL CT SCAN, CERVICAL SPINE: Status: ACTIVE | Noted: 2024-02-19

## 2024-02-19 PROBLEM — S00.83XA FACIAL CONTUSION: Status: ACTIVE | Noted: 2024-02-19

## 2024-02-19 LAB
ALBUMIN SERPL BCP-MCNC: 3.9 G/DL (ref 3.5–5.2)
ALP SERPL-CCNC: 60 U/L (ref 55–135)
ALT SERPL W/O P-5'-P-CCNC: 15 U/L (ref 10–44)
ANION GAP SERPL CALC-SCNC: 7 MMOL/L (ref 8–16)
AST SERPL-CCNC: 23 U/L (ref 10–40)
BASOPHILS # BLD AUTO: 0.06 K/UL (ref 0–0.2)
BASOPHILS NFR BLD: 0.6 % (ref 0–1.9)
BILIRUB SERPL-MCNC: 1 MG/DL (ref 0.1–1)
BUN SERPL-MCNC: 21 MG/DL (ref 8–23)
CALCIUM SERPL-MCNC: 10.1 MG/DL (ref 8.7–10.5)
CHLORIDE SERPL-SCNC: 105 MMOL/L (ref 95–110)
CO2 SERPL-SCNC: 28 MMOL/L (ref 23–29)
CREAT SERPL-MCNC: 1.2 MG/DL (ref 0.5–1.4)
DIFFERENTIAL METHOD BLD: ABNORMAL
EOSINOPHIL # BLD AUTO: 0 K/UL (ref 0–0.5)
EOSINOPHIL NFR BLD: 0.4 % (ref 0–8)
ERYTHROCYTE [DISTWIDTH] IN BLOOD BY AUTOMATED COUNT: 13.3 % (ref 11.5–14.5)
EST. GFR  (NO RACE VARIABLE): >60 ML/MIN/1.73 M^2
GLUCOSE SERPL-MCNC: 105 MG/DL (ref 70–110)
GLUCOSE SERPL-MCNC: 75 MG/DL (ref 70–110)
GLUCOSE SERPL-MCNC: 92 MG/DL (ref 70–110)
HCT VFR BLD AUTO: 38.7 % (ref 40–54)
HGB BLD-MCNC: 13.1 G/DL (ref 14–18)
IMM GRANULOCYTES # BLD AUTO: 0.04 K/UL (ref 0–0.04)
IMM GRANULOCYTES NFR BLD AUTO: 0.4 % (ref 0–0.5)
LYMPHOCYTES # BLD AUTO: 2.2 K/UL (ref 1–4.8)
LYMPHOCYTES NFR BLD: 22.3 % (ref 18–48)
MAGNESIUM SERPL-MCNC: 2 MG/DL (ref 1.6–2.6)
MCH RBC QN AUTO: 30.8 PG (ref 27–31)
MCHC RBC AUTO-ENTMCNC: 33.9 G/DL (ref 32–36)
MCV RBC AUTO: 91 FL (ref 82–98)
MONOCYTES # BLD AUTO: 0.8 K/UL (ref 0.3–1)
MONOCYTES NFR BLD: 7.8 % (ref 4–15)
NEUTROPHILS # BLD AUTO: 6.8 K/UL (ref 1.8–7.7)
NEUTROPHILS NFR BLD: 68.5 % (ref 38–73)
NRBC BLD-RTO: 0 /100 WBC
PLATELET # BLD AUTO: 216 K/UL (ref 150–450)
PMV BLD AUTO: 10.8 FL (ref 9.2–12.9)
POTASSIUM SERPL-SCNC: 4.1 MMOL/L (ref 3.5–5.1)
PROT SERPL-MCNC: 6.5 G/DL (ref 6–8.4)
RBC # BLD AUTO: 4.25 M/UL (ref 4.6–6.2)
SODIUM SERPL-SCNC: 140 MMOL/L (ref 136–145)
WBC # BLD AUTO: 9.96 K/UL (ref 3.9–12.7)

## 2024-02-19 PROCEDURE — 25000003 PHARM REV CODE 250: Performed by: HOSPITALIST

## 2024-02-19 PROCEDURE — 25500020 PHARM REV CODE 255: Performed by: HOSPITALIST

## 2024-02-19 PROCEDURE — A9585 GADOBUTROL INJECTION: HCPCS | Performed by: HOSPITALIST

## 2024-02-19 PROCEDURE — 97165 OT EVAL LOW COMPLEX 30 MIN: CPT

## 2024-02-19 PROCEDURE — 20000000 HC ICU ROOM

## 2024-02-19 PROCEDURE — 94761 N-INVAS EAR/PLS OXIMETRY MLT: CPT

## 2024-02-19 PROCEDURE — 25000003 PHARM REV CODE 250: Performed by: STUDENT IN AN ORGANIZED HEALTH CARE EDUCATION/TRAINING PROGRAM

## 2024-02-19 PROCEDURE — D9220A PRA ANESTHESIA: Mod: CRNA,,, | Performed by: NURSE ANESTHETIST, CERTIFIED REGISTERED

## 2024-02-19 PROCEDURE — 25000003 PHARM REV CODE 250: Performed by: INTERNAL MEDICINE

## 2024-02-19 PROCEDURE — D9220A PRA ANESTHESIA: Mod: ANES,,, | Performed by: ANESTHESIOLOGY

## 2024-02-19 PROCEDURE — 63600175 PHARM REV CODE 636 W HCPCS: Performed by: STUDENT IN AN ORGANIZED HEALTH CARE EDUCATION/TRAINING PROGRAM

## 2024-02-19 PROCEDURE — 97116 GAIT TRAINING THERAPY: CPT

## 2024-02-19 PROCEDURE — 97535 SELF CARE MNGMENT TRAINING: CPT

## 2024-02-19 PROCEDURE — 82962 GLUCOSE BLOOD TEST: CPT

## 2024-02-19 PROCEDURE — 25000003 PHARM REV CODE 250: Performed by: NURSE ANESTHETIST, CERTIFIED REGISTERED

## 2024-02-19 PROCEDURE — 80053 COMPREHEN METABOLIC PANEL: CPT | Performed by: STUDENT IN AN ORGANIZED HEALTH CARE EDUCATION/TRAINING PROGRAM

## 2024-02-19 PROCEDURE — 97161 PT EVAL LOW COMPLEX 20 MIN: CPT

## 2024-02-19 PROCEDURE — 83735 ASSAY OF MAGNESIUM: CPT | Performed by: STUDENT IN AN ORGANIZED HEALTH CARE EDUCATION/TRAINING PROGRAM

## 2024-02-19 PROCEDURE — 63600175 PHARM REV CODE 636 W HCPCS: Performed by: NURSE ANESTHETIST, CERTIFIED REGISTERED

## 2024-02-19 PROCEDURE — 63600175 PHARM REV CODE 636 W HCPCS: Performed by: INTERNAL MEDICINE

## 2024-02-19 PROCEDURE — 85025 COMPLETE CBC W/AUTO DIFF WBC: CPT | Performed by: STUDENT IN AN ORGANIZED HEALTH CARE EDUCATION/TRAINING PROGRAM

## 2024-02-19 PROCEDURE — 99900031 HC PATIENT EDUCATION (STAT)

## 2024-02-19 PROCEDURE — 92610 EVALUATE SWALLOWING FUNCTION: CPT

## 2024-02-19 PROCEDURE — 36415 COLL VENOUS BLD VENIPUNCTURE: CPT | Performed by: STUDENT IN AN ORGANIZED HEALTH CARE EDUCATION/TRAINING PROGRAM

## 2024-02-19 RX ORDER — SODIUM CHLORIDE, SODIUM LACTATE, POTASSIUM CHLORIDE, CALCIUM CHLORIDE 600; 310; 30; 20 MG/100ML; MG/100ML; MG/100ML; MG/100ML
INJECTION, SOLUTION INTRAVENOUS CONTINUOUS PRN
Status: DISCONTINUED | OUTPATIENT
Start: 2024-02-19 | End: 2024-02-19

## 2024-02-19 RX ORDER — NIFEDIPINE 30 MG/1
30 TABLET, EXTENDED RELEASE ORAL DAILY
Status: DISCONTINUED | OUTPATIENT
Start: 2024-02-20 | End: 2024-02-22 | Stop reason: HOSPADM

## 2024-02-19 RX ORDER — LEVETIRACETAM 500 MG/1
500 TABLET ORAL 2 TIMES DAILY
Status: DISCONTINUED | OUTPATIENT
Start: 2024-02-19 | End: 2024-02-22 | Stop reason: HOSPADM

## 2024-02-19 RX ORDER — GADOBUTROL 604.72 MG/ML
10 INJECTION INTRAVENOUS
Status: COMPLETED | OUTPATIENT
Start: 2024-02-19 | End: 2024-02-19

## 2024-02-19 RX ORDER — FAMOTIDINE 10 MG/ML
INJECTION INTRAVENOUS
Status: DISCONTINUED | OUTPATIENT
Start: 2024-02-19 | End: 2024-02-19

## 2024-02-19 RX ORDER — CARVEDILOL 3.12 MG/1
3.12 TABLET ORAL 2 TIMES DAILY
Status: DISCONTINUED | OUTPATIENT
Start: 2024-02-19 | End: 2024-02-22 | Stop reason: HOSPADM

## 2024-02-19 RX ORDER — PROPOFOL 10 MG/ML
VIAL (ML) INTRAVENOUS
Status: DISCONTINUED | OUTPATIENT
Start: 2024-02-19 | End: 2024-02-19

## 2024-02-19 RX ORDER — HYDRALAZINE HYDROCHLORIDE 20 MG/ML
10 INJECTION INTRAMUSCULAR; INTRAVENOUS EVERY 6 HOURS PRN
Status: DISCONTINUED | OUTPATIENT
Start: 2024-02-19 | End: 2024-02-22 | Stop reason: HOSPADM

## 2024-02-19 RX ORDER — LIDOCAINE HYDROCHLORIDE 10 MG/ML
INJECTION, SOLUTION EPIDURAL; INFILTRATION; INTRACAUDAL; PERINEURAL
Status: DISCONTINUED | OUTPATIENT
Start: 2024-02-19 | End: 2024-02-19

## 2024-02-19 RX ORDER — ONDANSETRON HYDROCHLORIDE 2 MG/ML
INJECTION, SOLUTION INTRAVENOUS
Status: DISCONTINUED | OUTPATIENT
Start: 2024-02-19 | End: 2024-02-19

## 2024-02-19 RX ORDER — ROCURONIUM BROMIDE 10 MG/ML
INJECTION, SOLUTION INTRAVENOUS
Status: DISCONTINUED | OUTPATIENT
Start: 2024-02-19 | End: 2024-02-19

## 2024-02-19 RX ORDER — PHENYLEPHRINE HYDROCHLORIDE 10 MG/ML
INJECTION INTRAVENOUS
Status: DISCONTINUED | OUTPATIENT
Start: 2024-02-19 | End: 2024-02-19

## 2024-02-19 RX ORDER — VALSARTAN 80 MG/1
320 TABLET ORAL DAILY
Status: DISCONTINUED | OUTPATIENT
Start: 2024-02-19 | End: 2024-02-22 | Stop reason: HOSPADM

## 2024-02-19 RX ORDER — NIFEDIPINE 30 MG/1
60 TABLET, EXTENDED RELEASE ORAL DAILY
Status: DISCONTINUED | OUTPATIENT
Start: 2024-02-19 | End: 2024-02-19

## 2024-02-19 RX ORDER — MIDAZOLAM HYDROCHLORIDE 1 MG/ML
INJECTION INTRAMUSCULAR; INTRAVENOUS
Status: DISCONTINUED | OUTPATIENT
Start: 2024-02-19 | End: 2024-02-19

## 2024-02-19 RX ORDER — SUCCINYLCHOLINE CHLORIDE 20 MG/ML
INJECTION INTRAMUSCULAR; INTRAVENOUS
Status: DISCONTINUED | OUTPATIENT
Start: 2024-02-19 | End: 2024-02-19

## 2024-02-19 RX ORDER — TRIAMTERENE/HYDROCHLOROTHIAZID 37.5-25 MG
1 TABLET ORAL DAILY
Status: DISCONTINUED | OUTPATIENT
Start: 2024-02-19 | End: 2024-02-22 | Stop reason: HOSPADM

## 2024-02-19 RX ADMIN — NIFEDIPINE 60 MG: 30 TABLET, FILM COATED, EXTENDED RELEASE ORAL at 03:02

## 2024-02-19 RX ADMIN — ENOXAPARIN SODIUM 40 MG: 100 INJECTION SUBCUTANEOUS at 05:02

## 2024-02-19 RX ADMIN — ONDANSETRON 4 MG: 2 INJECTION INTRAMUSCULAR; INTRAVENOUS at 11:02

## 2024-02-19 RX ADMIN — GADOBUTROL 10 ML: 604.72 INJECTION INTRAVENOUS at 12:02

## 2024-02-19 RX ADMIN — SUGAMMADEX 200 MG: 100 INJECTION, SOLUTION INTRAVENOUS at 12:02

## 2024-02-19 RX ADMIN — PHENYLEPHRINE HYDROCHLORIDE 100 MCG: 10 INJECTION INTRAVENOUS at 12:02

## 2024-02-19 RX ADMIN — CLOPIDOGREL BISULFATE 75 MG: 75 TABLET, FILM COATED ORAL at 08:02

## 2024-02-19 RX ADMIN — ROCURONIUM BROMIDE 25 MG: 10 INJECTION, SOLUTION INTRAVENOUS at 11:02

## 2024-02-19 RX ADMIN — MONTELUKAST 10 MG: 10 TABLET, FILM COATED ORAL at 09:02

## 2024-02-19 RX ADMIN — LIDOCAINE HYDROCHLORIDE 50 MG: 10 INJECTION, SOLUTION EPIDURAL; INFILTRATION; INTRACAUDAL; PERINEURAL at 11:02

## 2024-02-19 RX ADMIN — MIDAZOLAM HYDROCHLORIDE 2 MG: 1 INJECTION, SOLUTION INTRAMUSCULAR; INTRAVENOUS at 11:02

## 2024-02-19 RX ADMIN — MUPIROCIN 1 G: 20 OINTMENT TOPICAL at 08:02

## 2024-02-19 RX ADMIN — FAMOTIDINE 20 MG: 10 INJECTION, SOLUTION INTRAVENOUS at 11:02

## 2024-02-19 RX ADMIN — PROPOFOL 150 MG: 10 INJECTION, EMULSION INTRAVENOUS at 11:02

## 2024-02-19 RX ADMIN — TRIAMTERENE AND HYDROCHLOROTHIAZIDE 1 TABLET: 37.5; 25 TABLET ORAL at 02:02

## 2024-02-19 RX ADMIN — CARVEDILOL 3.12 MG: 3.12 TABLET, FILM COATED ORAL at 09:02

## 2024-02-19 RX ADMIN — ROCURONIUM BROMIDE 5 MG: 10 INJECTION, SOLUTION INTRAVENOUS at 11:02

## 2024-02-19 RX ADMIN — HYDRALAZINE HYDROCHLORIDE 10 MG: 20 INJECTION INTRAMUSCULAR; INTRAVENOUS at 03:02

## 2024-02-19 RX ADMIN — MUPIROCIN 1 G: 20 OINTMENT TOPICAL at 09:02

## 2024-02-19 RX ADMIN — SODIUM CHLORIDE, SODIUM LACTATE, POTASSIUM CHLORIDE, AND CALCIUM CHLORIDE: .6; .31; .03; .02 INJECTION, SOLUTION INTRAVENOUS at 11:02

## 2024-02-19 RX ADMIN — ATORVASTATIN CALCIUM 80 MG: 40 TABLET, FILM COATED ORAL at 08:02

## 2024-02-19 RX ADMIN — LEVETIRACETAM 500 MG: 500 TABLET, FILM COATED ORAL at 09:02

## 2024-02-19 RX ADMIN — LEVETIRACETAM 500 MG: 5 INJECTION INTRAVENOUS at 08:02

## 2024-02-19 RX ADMIN — CARVEDILOL 3.12 MG: 3.12 TABLET, FILM COATED ORAL at 02:02

## 2024-02-19 RX ADMIN — VALSARTAN 320 MG: 80 TABLET, FILM COATED ORAL at 02:02

## 2024-02-19 RX ADMIN — Medication 120 MG: at 11:02

## 2024-02-19 NOTE — PT/OT/SLP EVAL
"Speech Language Pathology Evaluation  Bedside Swallow    Patient Name:  Papito Hutton   MRN:  [id number]  Admitting Diagnosis: Seizure    Recommendations:                 General Recommendations:  Cognitive-linguistic evaluation  Diet recommendations:  Regular Diet - IDDSI Level 7, Thin liquids - IDDSI Level 0   Aspiration Precautions: Alternating bites/sips and Standard aspiration precautions   General Precautions: Standard,    Communication strategies:  none    Assessment:     Papito Hutton is a 78 y.o. male with an admitting diagnosis of  seizures .  He presents with swallowing WFL. Adequate oral prep and transport; occasional residue w/ complex textures adequately cleared w/ liquid wash/2nd swallow. Rec regular textures and thin liquids. Cog eval deferred 2/2 pt had returned from MRI w/ anesthesia, impacting performance on cog eval attempt. Will follow to attempt cog eval at next service date.    History:   Per H&P:  "HPI: Patient is a 79yo with a PMH of HTN, Pre-Diabetes, GERD and suspected KEITH. Patient was unable to speak, so history was provided by [facility] and Patient's spouse. Patient's spouse found Patient to be unconscious in the afternoon, this was after she saw the Patient Aox4 in the morning. To the spouse it seemed as though the Patient had fallen over, when she found him he was conscious though very confused and somnolent. She called EMS, to EMS Patient had a slight right sided facial droop, though Patient's spouse couldn't see it. To note, Patient was recently started on Procardia for his resistant hypertension, his BP in the morning was      Patient was brought to [facility] ED where he had a CT Scan, Neurology reviewed the CT Findings, found there wasn't a large vessel occlusion. However Patient had a Seizure in that ED and was transferred to [facility] after getting IV Keppra. While at [facility] Patient was noted to be agitated after his Seizure, with him moving all four extremities, and was placed " "on Precedex gtt."    Past Medical History:   Diagnosis Date    Arthritis     B12 deficiency     Colon polyp     Diabetes mellitus     BORDERLINE    GERD (gastroesophageal reflux disease)     [place] (hard of hearing)     BILAT AIDS    Hyperlipidemia     Hypertension     Low testosterone     Personal history of colonic polyps 2008    adenomatous polyp    Sinus disorder     Sleep apnea     DOES NOT USE MACHINE    Wears glasses        Past Surgical History:   Procedure Laterality Date    COLON SURGERY      COLONOSCOPY  2008    Dr. Garcia; polyps removed    COLONOSCOPY N/A 07/13/2020    Procedure: COLONOSCOPY;  Surgeon: Jj Fried MD;  Location: [facility] ENDO;  Service: Endoscopy;  Laterality: N/A;    COLONOSCOPY N/A 8/15/2023    Procedure: COLONOSCOPY;  Surgeon: Jj Fried MD;  Location: [facility] ENDO;  Service: Endoscopy;  Laterality: N/A;    ESOPHAGOGASTRODUODENOSCOPY N/A 7/31/2023    Procedure: EGD (ESOPHAGOGASTRODUODENOSCOPY);  Surgeon: Jj Fried MD;  Location: [facility];  Service: Endoscopy;  Laterality: N/A;    LAPAROSCOPIC RIGHT COLON RESECTION Right 08/10/2020    Procedure: COLECTOMY, RIGHT, LAPAROSCOPIC pooss conversion to open;  Surgeon: Noble Kaye MD;  Location: [facility] OR;  Service: General;  Laterality: Right;    NASAL SEPTUM SURGERY      right hand surgery      RIGHT HEMICOLECTOMY N/A 08/10/2020    Procedure: HEMICOLECTOMY, RIGHT;  Surgeon: Noble Kaye MD;  Location: [facility] OR;  Service: General;  Laterality: N/A;    UPPER GASTROINTESTINAL ENDOSCOPY  2008    hiatal hernia; esophagitis       Social History: Patient lives with his SO.    Prior Intubation HX:  2/19/24 for MRI    Modified Barium Swallow: none found in epic or reported by pt    Imaging:   Imaging Results              CT Cervical Spine Without Contrast (Final result)  Result time 02/18/24 07:52:53      Final result by Johnna Fowler MD (02/18/24 07:52:53)                   Impression:      No fracture or traumatic " malalignment of the cervical spine.    Cervical spondylosis resulting in up to severe spinal canal stenosis at C3-C4 and multilevel moderate to severe neural foraminal stenosis at the lower levels.    Bulky anterior flowing osteophytes consistent with diffuse idiopathic skeletal hyperostosis (DISH).    The preliminary and final reports are concordant.      Electronically signed by: Johnna Fowler  Date:    02/18/2024  Time:    07:52               Narrative:    EXAMINATION:  CT CERVICAL SPINE WITHOUT CONTRAST    CLINICAL HISTORY:  Neck trauma (Age >= 65y);    TECHNIQUE:  Low dose axial CT images through the cervical spine, with sagittal and coronal reformations.  Contrast was not administered.    COMPARISON:  CT head and neck 09/11/2020    FINDINGS:  The vertebral bodies are normal in height and morphology without evidence of fracture or osseous destructive process.  Normal sagittal alignment is preserved.    Mild degenerative changes without evidence of bony spinal canal stenosis.  Multilevel posterior disc bulges resulting moderate to severe spinal canal stenosis worse at C3-C4.  Moderate to severe neural foraminal stenosis at C4-C5, C5-C6 and C6-C7.  Intervertebral disc space height loss at C6-C7.  Otherwise, intervertebral disc heights are well maintained.    Bulky anterior flowing osteophytes extending C2 through C7.    Limited evaluation of the intraspinal contents demonstrates no hematoma or mass.Paraspinal soft tissues exhibit no acute abnormalities.                                       CTA Head and Neck (xpd) (Final result)  Result time 02/18/24 08:14:35      Final result by Johnna Fowler MD (02/18/24 08:14:35)                   Impression:      No acute intracranial abnormality.  No high-grade stenosis or major vessel occlusion.    Prominent mediastinal and hilar lymph nodes, possibly reactive.    The preliminary and final reports are concordant.      Electronically signed by: Johnna  Malcolm  Date:    02/18/2024  Time:    08:14               Narrative:    EXAMINATION:  CTA HEAD AND NECK (XPD)    CLINICAL HISTORY:  Neuro deficit, acute, stroke suspected;    TECHNIQUE:  Non contrast low dose axial images were obtained through the head.  CT angiogram was performed from the level of the chaparrita to the top of the head following the IV administration of 75mL of Omnipaque 350.   Sagittal and coronal reconstructions and maximum intensity projection reconstructions were performed.    COMPARISON:    CTA head and neck 09/11/2023, MRI brain 09/11/2023    FINDINGS:  Intracranial Compartment:    Ventricles and sulci are normal in size for age without evidence of hydrocephalus. No extra-axial blood or fluid collections.    Irregular white matter hypoattenuation although nonspecific most in keeping with chronic microangiopathic ischemic change.  Hypoattenuating focus in the left basal ganglia most consistent with remote lacunar infarct.  No parenchymal mass, hemorrhage, edema, or major vascular distribution infarct.    Skull/Extracranial Contents (limited evaluation): No fracture. Mastoid air cells and paranasal sinuses are essentially clear.    Non-Vascular Structures of the Neck/Thoracic Inlet (limited evaluation): Partially imaged left hilar node measures 12 mm (series 6, image 1).  Prominent although non pathologically enlarged mediastinal lymph nodes.    Aorta: Normal 3 vessel arch.    Extracranial carotid circulation: No hemodynamically significant stenosis, aneurysmal dilatation, or dissection.  Atherosclerotic plaque at the carotid bifurcation resulting in less than 50% stenosis, similar to prior.    Extracranial vertebral circulation: No hemodynamically significant stenosis, aneurysmal dilatation, or dissection.    Intracranial Arteries: No focal high-grade stenosis, occlusion, or aneurysm.  Atherosclerotic calcification of the cavernous ICAs.  Multifocal irregularity likely due to underlying  "atherosclerotic plaque involving the bilateral M1 segments the middle cerebral arteries, P2 segment of the left PCA and intracranial right vertebral artery.    Venous structures (limited evaluation): Normal.                                       CT Maxillofacial Without Contrast (Final result)  Result time 02/18/24 07:45:55      Final result by Johnna Fowler MD (02/18/24 07:45:55)                   Impression:      No evidence of an acute displaced fracture.    The preliminary and final reports are concordant.      Electronically signed by: Johnna Fowler  Date:    02/18/2024  Time:    07:45               Narrative:    EXAMINATION:  CT MAXILLOFACIAL WITHOUT CONTRAST    CLINICAL HISTORY:  Facial trauma, blunt;    TECHNIQUE:  Low dose CT images throughout the region of the facial bones.  Axial, sagittal and coronal reformations were obtained.  Contrast was not administered.    COMPARISON:  None    FINDINGS:  Mildly motion degraded exam.  No focal soft tissue swelling identified.  The globes and intraorbital contents are within normal limits.  No orbital fracture.    The remainder of the facial bones appear intact without evidence of an acute displaced fracture.  No osseous destructive lesions.    Temporomandibular joints appropriately position without evidence of dislocation.    Mild maxillary mucosal thickening.  Otherwise, paranasal sinuses essentially clear.  Mastoids are clear.    Limited intracranial evaluation is unremarkable.                                      Prior diet: Regular, thin at home; NPO at time of eval.    Occupation/hobbies/homemaking: Pt is retired [employer] and . He enjoys fishing and golf.    Subjective     Pt awake laying in bed; significant other at bedside. Pt agreeable to CSE.  Patient goals: "I haven't eaten all day"     Pain/Comfort:       Respiratory Status: Nasal cannula, flow   L/min    Objective:   Pt awake, oriented x4 w/ exception of NINI. He was able to answer questions " "appropriately and most directives followed appropriately. SO at bedside reporting pt woke up from MRI ~10 minutes prior and is still "loopy." Held cog eval today.    Oral Musculature Evaluation  Oral Musculature: WFL  Dentition: present and adequate, other (see comments) (missing upper L molars)  Secretion Management: adequate  Mucosal Quality: adequate (tongue bruised and bitten)  Mandibular Strength and Mobility: WFL  Oral Labial Strength and Mobility: WFL  Lingual Strength and Mobility: WFL  Velar Elevation: WFL  Buccal Strength and Mobility: WFL  Volitional Cough: elicited; functional  Volitional Swallow: laryngeal movement palpated  Voice Prior to PO Intake: clear    Bedside Swallow Eval:   Consistencies Assessed:  Thin liquids water via cup edge  Puree tsp bites applesauce  Soft solids cracker softened in applesauce  Solids cracker      Oral Phase:   Lingual residue- cracker trials    Pharyngeal Phase:   no overt clinical signs/symptoms of aspiration  no overt clinical signs/symptoms of pharyngeal dysphagia    Compensatory Strategies  2nd swallow/liquid wash- cleared cracker residue    Treatment: Pt and SO educated re purpose of evaluation, role of SLP, results of evaluation, and recs. Pt and family expressed understanding of recs and plan for cog eval. Recs shared w/ nursing.      Goals:   Multidisciplinary Problems       SLP Goals          Problem: SLP    Goal Priority Disciplines Outcome   SLP Goal     SLP Ongoing, Progressing   Description: 1. Pt will complete cognitive-linguistic evaluation                        Plan:     Patient to be seen:  2 x/week   Plan of Care expires:     Plan of Care reviewed with:  patient, significant other, other (see comments) (nursing)   SLP Follow-Up:  Yes       Discharge recommendations:   (TBD pending cog eval)   Barriers to Discharge:   Cog eval    Time Tracking:     SLP Treatment Date:   02/19/24  Speech Start Time:  1330  Speech Stop Time:  1352     Speech Total Time " (min):  22 min    Billable Minutes: Eval Swallow and Oral Function 22 min    02/19/2024

## 2024-02-19 NOTE — PLAN OF CARE
Goals to be met by: 3/18/24     Patient will increase functional independence with mobility by performin. Supine to sit with Supervision  2. Sit to stand transfer with Supervision  3. Bed to chair transfer with Supervision using Rolling Walker  4. Gait  x 300 feet with Supervision using Rolling Walker.

## 2024-02-19 NOTE — CARE UPDATE
02/19/24 0707   Patient Assessment/Suction   Level of Consciousness (AVPU) alert   Respiratory Effort Normal;Unlabored   Expansion/Accessory Muscles/Retractions expansion symmetric;no retractions   Rhythm/Pattern, Respiratory depth regular;pattern regular;unlabored   PRE-TX-O2   Device (Oxygen Therapy) room air   SpO2 96 %   Pulse Oximetry Type Continuous   $ Pulse Oximetry - Multiple Charge Pulse Oximetry - Multiple   Pulse 68   Resp (!) 23

## 2024-02-19 NOTE — PT/OT/SLP EVAL
Occupational Therapy   Evaluation    Name: Papito Hutton  MRN: [id number]  Admitting Diagnosis: Seizure  Recent Surgery: * No surgery found *      Recommendations:     Discharge Recommendations: Low Intensity Therapy  Discharge Equipment Recommendations:  shower chair  Barriers to discharge:   (increased assist with ADLs and mobility)    Assessment:     Papito Hutton is a 78 y.o. male with a medical diagnosis of Seizure.  Pt agreeable to OT evaluation this AM. Performance deficits affecting function: weakness, impaired endurance, impaired self care skills, impaired functional mobility, gait instability, impaired balance, decreased upper extremity function, decreased lower extremity function, decreased safety awareness, impaired cardiopulmonary response to activity.      Rehab Prognosis: Good; patient would benefit from acute skilled OT services to address these deficits and reach maximum level of function.       Plan:     Patient to be seen 3 x/week to address the above listed problems via self-care/home management, therapeutic activities, therapeutic exercises  Plan of Care Expires: 03/19/24  Plan of Care Reviewed with: patient, significant other    Subjective     Chief Complaint: none stated  Patient/Family Comments/goals: none stated    Occupational Profile:  Living Environment: Pt lives with significant other in a 1 story home with no CHARLIE. Pt has a WIS and a tub/shower combo  Previous level of function: Independent- Mod I with ADLs and mobility  Roles and Routines: ; enjoys playing golf  Equipment Used at Home: cane, straight  Assistance upon Discharge: yes, from significant other    Pain/Comfort:  Pain Rating 1: 0/10    Patients cultural, spiritual, [place] conflicts given the current situation:      Objective:     Communicated with: nursing prior to session.  Patient found up in chair with blood pressure cuff, telemetry, pulse ox (continuous), peripheral IV, bauman catheter upon OT entry to  room.    General Precautions: Standard, fall, seizure  Orthopedic Precautions: N/A  Braces: N/A  Respiratory Status: Room air    Occupational Performance:    Functional Mobility/Transfers:  Patient completed Sit <> Stand Transfer with minimum assistance  with  hand-held assist     Activities of Daily Living:  Grooming: setup assistance seated in chair to brush teeth and to wash face    Lower Body Dressing: stand by assistance seated EOB to doff socks; minimum assistance to don socks   Toileting: merna      Cognitive/Visual Perceptual:  Cognitive/Psychosocial Skills:     -       Follows Commands/attention:Follows one-step commands  -       Communication: clear/fluent  -       Memory: deficits  -       Safety awareness/insight to disability: impaired   -       Mood/Affect/Coping skills/emotional control: Appropriate to situation, Cooperative, and Pleasant    Physical Exam:  Balance:    -       SBA seated balance; CGA standing balance  Upper Extremity Range of Motion:     -       Right Upper Extremity: WFL  -       Left Upper Extremity: WFL  Upper Extremity Strength:    -       Right Upper Extremity: WFL  -       Left Upper Extremity: WFL   Strength:    -       Right Upper Extremity: fair   -       Left Upper Extremity: fair   Fine Motor Coordination:    -       Intact  Gross motor coordination:   WFL    AMPAC 6 Click ADL:  AMPAC Total Score: 19    Treatment & Education:  Pt educated on role of OT/POC, importance of OOB/EOB activity, use of call bell, and safety during ADLs, transfers, and functional mobility.    Patient left up in chair with all lines intact, call button in reach, and significant other present    GOALS:   Multidisciplinary Problems       Occupational Therapy Goals          Problem: Occupational Therapy    Goal Priority Disciplines Outcome Interventions   Occupational Therapy Goal     OT, PT/OT     Description: Goals to be met by: 3/19/24     Patient will increase functional independence  with ADLs by performing:    UE Dressing with Supervision.  LE Dressing with Supervision.  Grooming while standing at sink with Supervision.  Toileting from toilet with Supervision for hygiene and clothing management.   Toilet transfer to toilet with Supervision.                         History:     Past Medical History:   Diagnosis Date    Arthritis     B12 deficiency     Colon polyp     Diabetes mellitus     BORDERLINE    GERD (gastroesophageal reflux disease)     [place] (hard of hearing)     BILAT AIDS    Hyperlipidemia     Hypertension     Low testosterone     Personal history of colonic polyps 2008    adenomatous polyp    Sinus disorder     Sleep apnea     DOES NOT USE MACHINE    Wears glasses          Past Surgical History:   Procedure Laterality Date    COLON SURGERY      COLONOSCOPY  2008    Dr. Garcia; polyps removed    COLONOSCOPY N/A 07/13/2020    Procedure: COLONOSCOPY;  Surgeon: Jj Fried MD;  Location: [facility];  Service: Endoscopy;  Laterality: N/A;    COLONOSCOPY N/A 8/15/2023    Procedure: COLONOSCOPY;  Surgeon: Jj Fried MD;  Location: [facility];  Service: Endoscopy;  Laterality: N/A;    ESOPHAGOGASTRODUODENOSCOPY N/A 7/31/2023    Procedure: EGD (ESOPHAGOGASTRODUODENOSCOPY);  Surgeon: Jj Fried MD;  Location: [facility];  Service: Endoscopy;  Laterality: N/A;    LAPAROSCOPIC RIGHT COLON RESECTION Right 08/10/2020    Procedure: COLECTOMY, RIGHT, LAPAROSCOPIC pooss conversion to open;  Surgeon: Noble Kaye MD;  Location: [facility];  Service: General;  Laterality: Right;    NASAL SEPTUM SURGERY      right hand surgery      RIGHT HEMICOLECTOMY N/A 08/10/2020    Procedure: HEMICOLECTOMY, RIGHT;  Surgeon: Noble Kaye MD;  Location: [facility];  Service: General;  Laterality: N/A;    UPPER GASTROINTESTINAL ENDOSCOPY  2008    hiatal hernia; esophagitis       Time Tracking:     OT Date of Treatment: 02/19/24  OT Start Time: 1025  OT Stop Time: 1042  OT Total Time (min): 17  min    Billable Minutes:Evaluation 9  Self Care/Home Management 8    2/19/2024

## 2024-02-19 NOTE — ANESTHESIA POSTPROCEDURE EVALUATION
Anesthesia Post Evaluation    Patient: Papito Hutton    Procedure(s) Performed: * No procedures listed *    Final Anesthesia Type: general      Patient location during evaluation: ICU  Patient participation: Yes- Able to Participate  Level of consciousness: awake and alert and oriented  Post-procedure vital signs: reviewed and stable  Pain management: adequate  Airway patency: patent    PONV status at discharge: No PONV  Anesthetic complications: no      Cardiovascular status: blood pressure returned to baseline, hemodynamically stable and hypertensive  Respiratory status: unassisted, spontaneous ventilation and nasal cannula  Hydration status: euvolemic  Follow-up not needed.              Vitals Value Taken Time   /82 02/19/24 1249   Temp 36.8  02/19/24 1300   Pulse 68 02/19/24 1300   Resp 31 02/19/24 1300   SpO2 100 % 02/19/24 1300   Vitals shown include unvalidated device data.      No case tracking events are documented in the log.      Pain/Patty Score: No data recorded

## 2024-02-19 NOTE — PLAN OF CARE
02/18/24 2049   Patient Assessment/Suction   Level of Consciousness (AVPU) alert   Respiratory Effort Unlabored   Expansion/Accessory Muscles/Retractions expansion symmetric   All Lung Fields Breath Sounds coarse   Rhythm/Pattern, Respiratory depth regular;pattern regular   Cough Frequency infrequent   PRE-TX-O2   Device (Oxygen Therapy) room air   SpO2 97 %   Pulse Oximetry Type Continuous   $ Pulse Oximetry - Multiple Charge Pulse Oximetry - Multiple   Pulse 67   Resp (!) 26   Education   $ Education DME Oxygen;15 min

## 2024-02-19 NOTE — ANESTHESIA PREPROCEDURE EVALUATION
02/19/2024  Papito Hutton is a 78 y.o., male.      Pre-op Assessment    I have reviewed the Patient Summary Reports.     I have reviewed the Nursing Notes. I have reviewed the NPO Status.   I have reviewed the Medications.     Review of Systems  Anesthesia Hx:  No problems with previous Anesthesia   Neg history of prior surgery.          Denies Family Hx of Anesthesia complications.    Denies Personal Hx of Anesthesia complications.                    Social:  Former Smoker, Alcohol Use       Hematology/Oncology:  Hematology Normal   Oncology Normal                                   EENT/Dental:  EENT/Dental Normal  [place]          Cardiovascular:     Hypertension                                        Pulmonary:      Shortness of breath  Sleep Apnea           Education provided regarding risk of obstructive sleep apnea            Renal/:  Renal/ Normal                 Hepatic/GI:     GERD             Musculoskeletal:  Arthritis               Neurological:       Seizures    Seizure vs TIA/CVA                            Endocrine:  Diabetes, type 2           Dermatological:  Skin Normal    Psych:  Psychiatric Normal                  Patient Active Problem List   Diagnosis    Hyperlipidemia    Hypertension    GERD (gastroesophageal reflux disease)    Low testosterone    B12 deficiency    Arthritis    Hypercalcemia    Obesity, Class I, BMI 30-34.9    BMI 30.0-30.9,adult    Hypercholesterolemia    Multinodular goiter    Hypovitaminosis D    Prediabetes    Hypogonadism in male    Dysmetabolic syndrome    At risk for heart disease    Proteinuria    Elevated calcitonin level    History of colon polyps    Hypophosphatemia    Preop examination    Colon polyp    SBO (small bowel obstruction)    Hypokalemia    Transaminitis    Hearing loss    Dyspnea    Hypertensive emergency    Hx of transient ischemic attack  (TIA)    Hyperparathyroidism    Aortic atherosclerosis    Seizure       Past Surgical History:   Procedure Laterality Date    COLON SURGERY      COLONOSCOPY  2008    Dr. Garcia; polyps removed    COLONOSCOPY N/A 07/13/2020    Procedure: COLONOSCOPY;  Surgeon: Jj Fried MD;  Location: [facility] ENDO;  Service: Endoscopy;  Laterality: N/A;    COLONOSCOPY N/A 8/15/2023    Procedure: COLONOSCOPY;  Surgeon: Jj Fried MD;  Location: [facility] ENDO;  Service: Endoscopy;  Laterality: N/A;    ESOPHAGOGASTRODUODENOSCOPY N/A 7/31/2023    Procedure: EGD (ESOPHAGOGASTRODUODENOSCOPY);  Surgeon: Jj Fried MD;  Location: [facility] ENDO;  Service: Endoscopy;  Laterality: N/A;    LAPAROSCOPIC RIGHT COLON RESECTION Right 08/10/2020    Procedure: COLECTOMY, RIGHT, LAPAROSCOPIC pooss conversion to open;  Surgeon: Noble Kaye MD;  Location: [facility] OR;  Service: General;  Laterality: Right;    NASAL SEPTUM SURGERY      right hand surgery      RIGHT HEMICOLECTOMY N/A 08/10/2020    Procedure: HEMICOLECTOMY, RIGHT;  Surgeon: Noble Kaye MD;  Location: [facility] OR;  Service: General;  Laterality: N/A;    UPPER GASTROINTESTINAL ENDOSCOPY  2008    hiatal hernia; esophagitis        Tobacco Use:  The patient  reports that he has quit smoking. His smoking use included cigars. He has quit using smokeless tobacco.  His smokeless tobacco use included chew.     Results for orders placed or performed during the hospital encounter of 09/09/23   EKG 12-lead    Collection Time: 09/10/23  8:40 AM    Narrative    Test Reason : R06.02,    Vent. Rate : 065 BPM     Atrial Rate : 065 BPM     P-R Int : 196 ms          QRS Dur : 084 ms      QT Int : 390 ms       P-R-T Axes : 030 011 043 degrees     QTc Int : 405 ms    Normal sinus rhythm  Nonspecific T wave abnormality  Abnormal ECG  When compared with ECG of 09-SEP-2023 15:26,  No significant change was found  Confirmed by Satish Corado MD ([id number]) on 10/4/2023 11:13:43 PM    Referred By:  [email or web address]   SELF           Confirmed By:Satish Corado MD        Imaging Results              CT Cervical Spine Without Contrast (Final result)  Result time 02/18/24 07:52:53      Final result by Johnna Fowler MD (02/18/24 07:52:53)                   Impression:      No fracture or traumatic malalignment of the cervical spine.    Cervical spondylosis resulting in up to severe spinal canal stenosis at C3-C4 and multilevel moderate to severe neural foraminal stenosis at the lower levels.    Bulky anterior flowing osteophytes consistent with diffuse idiopathic skeletal hyperostosis (DISH).    The preliminary and final reports are concordant.      Electronically signed by: Johnna Fowler  Date:    02/18/2024  Time:    07:52               Narrative:    EXAMINATION:  CT CERVICAL SPINE WITHOUT CONTRAST    CLINICAL HISTORY:  Neck trauma (Age >= 65y);    TECHNIQUE:  Low dose axial CT images through the cervical spine, with sagittal and coronal reformations.  Contrast was not administered.    COMPARISON:  CT head and neck 09/11/2020    FINDINGS:  The vertebral bodies are normal in height and morphology without evidence of fracture or osseous destructive process.  Normal sagittal alignment is preserved.    Mild degenerative changes without evidence of bony spinal canal stenosis.  Multilevel posterior disc bulges resulting moderate to severe spinal canal stenosis worse at C3-C4.  Moderate to severe neural foraminal stenosis at C4-C5, C5-C6 and C6-C7.  Intervertebral disc space height loss at C6-C7.  Otherwise, intervertebral disc heights are well maintained.    Bulky anterior flowing osteophytes extending C2 through C7.    Limited evaluation of the intraspinal contents demonstrates no hematoma or mass.Paraspinal soft tissues exhibit no acute abnormalities.                                       CTA Head and Neck (xpd) (Final result)  Result time 02/18/24 08:14:35      Final result by Johnna Fowler MD (02/18/24 08:14:35)                    Impression:      No acute intracranial abnormality.  No high-grade stenosis or major vessel occlusion.    Prominent mediastinal and hilar lymph nodes, possibly reactive.    The preliminary and final reports are concordant.      Electronically signed by: Johnna Fowler  Date:    02/18/2024  Time:    08:14               Narrative:    EXAMINATION:  CTA HEAD AND NECK (XPD)    CLINICAL HISTORY:  Neuro deficit, acute, stroke suspected;    TECHNIQUE:  Non contrast low dose axial images were obtained through the head.  CT angiogram was performed from the level of the chaparrita to the top of the head following the IV administration of 75mL of Omnipaque 350.   Sagittal and coronal reconstructions and maximum intensity projection reconstructions were performed.    COMPARISON:    CTA head and neck 09/11/2023, MRI brain 09/11/2023    FINDINGS:  Intracranial Compartment:    Ventricles and sulci are normal in size for age without evidence of hydrocephalus. No extra-axial blood or fluid collections.    Irregular white matter hypoattenuation although nonspecific most in keeping with chronic microangiopathic ischemic change.  Hypoattenuating focus in the left basal ganglia most consistent with remote lacunar infarct.  No parenchymal mass, hemorrhage, edema, or major vascular distribution infarct.    Skull/Extracranial Contents (limited evaluation): No fracture. Mastoid air cells and paranasal sinuses are essentially clear.    Non-Vascular Structures of the Neck/Thoracic Inlet (limited evaluation): Partially imaged left hilar node measures 12 mm (series 6, image 1).  Prominent although non pathologically enlarged mediastinal lymph nodes.    Aorta: Normal 3 vessel arch.    Extracranial carotid circulation: No hemodynamically significant stenosis, aneurysmal dilatation, or dissection.  Atherosclerotic plaque at the carotid bifurcation resulting in less than 50% stenosis, similar to prior.    Extracranial vertebral circulation:  No hemodynamically significant stenosis, aneurysmal dilatation, or dissection.    Intracranial Arteries: No focal high-grade stenosis, occlusion, or aneurysm.  Atherosclerotic calcification of the cavernous ICAs.  Multifocal irregularity likely due to underlying atherosclerotic plaque involving the bilateral M1 segments the middle cerebral arteries, P2 segment of the left PCA and intracranial right vertebral artery.    Venous structures (limited evaluation): Normal.                                       CT Maxillofacial Without Contrast (Final result)  Result time 02/18/24 07:45:55      Final result by Johnna Fowler MD (02/18/24 07:45:55)                   Impression:      No evidence of an acute displaced fracture.    The preliminary and final reports are concordant.      Electronically signed by: Johnna Fowler  Date:    02/18/2024  Time:    07:45               Narrative:    EXAMINATION:  CT MAXILLOFACIAL WITHOUT CONTRAST    CLINICAL HISTORY:  Facial trauma, blunt;    TECHNIQUE:  Low dose CT images throughout the region of the facial bones.  Axial, sagittal and coronal reformations were obtained.  Contrast was not administered.    COMPARISON:  None    FINDINGS:  Mildly motion degraded exam.  No focal soft tissue swelling identified.  The globes and intraorbital contents are within normal limits.  No orbital fracture.    The remainder of the facial bones appear intact without evidence of an acute displaced fracture.  No osseous destructive lesions.    Temporomandibular joints appropriately position without evidence of dislocation.    Mild maxillary mucosal thickening.  Otherwise, paranasal sinuses essentially clear.  Mastoids are clear.    Limited intracranial evaluation is unremarkable.                                       Lab Results   Component Value Date    WBC 9.96 02/19/2024    HGB 13.1 (L) 02/19/2024    HCT 38.7 (L) 02/19/2024    MCV 91 02/19/2024     02/19/2024     BMP  Lab Results   Component Value  Date     02/19/2024    K 4.1 02/19/2024     02/19/2024    CO2 28 02/19/2024    BUN 21 02/19/2024    CREATININE 1.2 02/19/2024    CALCIUM 10.1 02/19/2024    ANIONGAP 7 (L) 02/19/2024    GLU 92 02/19/2024     (H) 02/18/2024     (H) 02/17/2024       Results for orders placed during the hospital encounter of 02/17/24    Echo    Interpretation Summary    Left Ventricle: The left ventricle is normal in size. Mildly increased wall thickness. There is normal systolic function with a visually estimated ejection fraction of 60 - 65%. Grade I diastolic dysfunction. E/A ratio is 0.76.    Right Ventricle: Normal right ventricular cavity size. Wall thickness is normal. Right ventricle wall motion  is normal. Systolic function is normal.    IVC/SVC: Normal venous pressure at 3 mmHg.           Physical Exam  General: Well nourished and Alert    Airway:  Mallampati: II   Mouth Opening: Normal  TM Distance: Normal  Tongue: Normal  Neck ROM: Normal ROM    Dental:  Intact    Chest/Lungs:  Clear to auscultation, Normal Respiratory Rate    Heart:  Rate: Normal  Rhythm: Regular Rhythm  Sounds: Normal        Anesthesia Plan  Type of Anesthesia, risks & benefits discussed:    Anesthesia Type: Gen ETT  Intra-op Monitoring Plan: Standard ASA Monitors  Post Op Pain Control Plan:   (medical reason for not using multimodal pain management)  Induction:  IV  Airway Plan: Video, Post-Induction  Informed Consent: Informed consent signed with the Patient and all parties understand the risks and agree with anesthesia plan.  All questions answered. Patient consented to blood products? Yes  ASA Score: 3  Anesthesia Plan Notes: GETA  Zofran, Pepcid    Ready For Surgery From Anesthesia Perspective.     .

## 2024-02-19 NOTE — TRANSFER OF CARE
Anesthesia Transfer of Care Note    Patient: Papito Hutton    Procedure(s) Performed: * No procedures listed *    Patient location: ICU    Anesthesia Type: general    Transport from OR: Transported from OR on 2-3 L/min O2 by NC with adequate spontaneous ventilation. Continuous ECG monitoring in transport. Continuous SpO2 monitoring in transport    Post pain: adequate analgesia    Post assessment: no apparent anesthetic complications    Post vital signs: stable    Level of consciousness: awake    Nausea/Vomiting: no nausea/vomiting    Complications: none    Transfer of care protocol was followed    Last vitals: Visit Vitals  BP (!) 185/84   Pulse 68   Temp 36.8 °C (98.2 °F) (Axillary)   Resp (!) 23   Ht 6' (1.829 m)   Wt 91.8 kg (202 lb 6.1 oz)   SpO2 96%   BMI 27.45 kg/m²

## 2024-02-19 NOTE — NURSING
Patient given a sip of H2O and struggled to swallow. 2nd attempt was a struggle accompanied by coughing. Unable to provide po meds at this time. NAD noted. Safety maintained.

## 2024-02-19 NOTE — PROGRESS NOTES
[facility]  Department of Neurology  Progress Note  Date: 2024 9:54 AM          Patient Name: Papito Hutton   MRN: [id number]   : 1945    AGE: 78 y.o.    LOS: 2 days Hospital Day: 3  Admit date: 2024  5:26 PM       HPI per EMR:  Patient is a 77yo with a PMH of HTN, Pre-Diabetes, GERD and suspected KEITH. Patient was unable to speak, so history was provided by [facility] and Patient's spouse. Patient's spouse found Patient to be unconscious in the afternoon, this was after she saw the Patient Aox4 in the morning.  To the spouse it seemed as though the Patient had fallen over, when she found him he was conscious though very confused and somnolent. She called EMS, to EMS Patient had a slight right sided facial droop, though Patient's spouse couldn't see it. To note, Patient was recently started on Procardia for his resistant hypertension, his BP in the morning was      Patient was brought to [facility] ED where he had a CT Scan, Neurology reviewed the CT Findings, found there wasn't a large vessel occlusion. However Patient had a Seizure in that ED and was transferred to [facility] after getting IV Keppra. While at [facility] Patient was noted to be agitated after his Seizure, with him moving all four extremities, and was placed on Precedex gtt     Overview/Hospital Course:  No notes on file     Interval History:  Patient admitted with likely new onset seizure with tongue biting, loss of consciousness, speech dysfunction and right facial droop evaluation.  Significantly sedated at this time.  Patient's wife is present at bedside.  Patient has tongue bite and right periorbital ecchymosis and bruising.  Patient received Keppra load.  Neuro radiology results pending.  Neurology evaluation pending.  Currently patient is on Precedex infusion.  Currently afebrile.     Neurology Consult: Patient was seen and examined. He is a 77 yo man with history of HTN, Pre-Diabetes, GERD and suspected KEITH, who presented  to the ED after he was found unconscious by his wife. HPI per chart review, since patient unable to provide history. When he was found down, he seemed somnolent and confused, so wife called EMS. Upon EMS arrival, they noticed a right facial droop, so he was taken to [facility] for evaluation. While in the ED, he had a generalized tonic-clonic seizure, with tongue biting, so he was loaded with Keppra and transferred to [facility] for treatment. Of note, wife reports patient has been experiencing some issues with memory and balance over the past few weeks.     02/19/2024: No acute events overnight. Patient was seen and examined by me this morning. Neuro exam is normal this morning.  I examined him after he came back from [facility].  He is oriented x3 and denies any complaints at this time.       Vitals:  Patient Vitals for the past 24 hrs:   BP Temp Temp src Pulse Resp SpO2   02/19/24 0707 -- -- -- 68 (!) 23 96 %   02/19/24 0515 (!) 185/84 -- -- 67 (!) 22 97 %   02/19/24 0400 (!) 185/82 98.2 °F (36.8 °C) Axillary 65 20 97 %   02/19/24 0200 (!) 180/75 -- -- 68 (!) 21 96 %   02/19/24 0100 (!) 189/79 -- -- 65 (!) 21 97 %   02/19/24 0000 (!) 179/79 -- -- 64 (!) 26 96 %   02/18/24 2301 (!) 179/80 98.6 °F (37 °C) Axillary 61 20 96 %   02/18/24 2049 -- -- -- 67 (!) 26 97 %   02/18/24 2000 (!) 170/76 99 °F (37.2 °C) Axillary 65 (!) 22 96 %   02/18/24 1901 (!) 183/78 -- -- 66 (!) 23 96 %   02/18/24 1753 -- -- -- 65 (!) 23 96 %   02/18/24 1745 -- -- -- 64 (!) 26 (!) 94 %   02/18/24 1730 -- -- -- 68 (!) 22 95 %   02/18/24 1715 -- -- -- 66 (!) 22 95 %   02/18/24 1700 (!) 166/70 -- -- -- -- --   02/18/24 1651 -- -- -- 62 (!) 27 100 %   02/18/24 1645 -- 99.6 °F (37.6 °C) Axillary 60 (!) 24 100 %   02/18/24 1630 -- -- -- 67 (!) 21 99 %   02/18/24 1615 -- -- -- (!) 57 20 99 %   02/18/24 1600 (!) 154/68 -- -- 63 (!) 21 99 %   02/18/24 1545 -- -- -- (!) 56 20 98 %   02/18/24 1530 -- -- -- (!) 59 (!) 26 97 %   02/18/24 1522 -- -- -- 61 20 96 %    02/18/24 1515 -- -- -- 67 (!) 22 97 %   02/18/24 1500 (!) 164/74 -- -- 66 (!) 22 (!) 93 %   02/18/24 1445 -- -- -- 65 (!) 32 (!) 93 %   02/18/24 1430 -- -- -- 67 (!) 22 96 %   02/18/24 1415 -- -- -- 75 19 97 %   02/18/24 1400 (!) 142/90 -- -- 72 20 98 %   02/18/24 1345 -- -- -- (!) 58 (!) 21 98 %   02/18/24 1330 -- -- -- (!) 57 (!) 22 95 %   02/18/24 1315 -- -- -- 62 (!) 24 98 %   02/18/24 1300 128/60 -- -- 63 (!) 23 97 %   02/18/24 1245 -- -- -- 60 (!) 24 97 %   02/18/24 1230 -- -- -- (!) 59 (!) 26 98 %   02/18/24 1215 -- -- -- 63 (!) 21 96 %   02/18/24 1207 -- -- -- 61 (!) 22 98 %   02/18/24 1200 134/63 -- -- 60 19 95 %   02/18/24 1145 -- -- -- 64 (!) 22 95 %   02/18/24 1130 -- -- -- 65 (!) 23 96 %   02/18/24 1115 -- -- -- 64 (!) 23 95 %   02/18/24 1110 -- 97.7 °F (36.5 °C) Axillary 61 (!) 22 97 %   02/18/24 1105 -- -- -- (!) 59 (!) 26 96 %   02/18/24 1104 (!) 141/64 -- -- -- -- --   02/18/24 1100 (!) 141/64 -- -- (!) 58 (!) 23 96 %   02/18/24 1055 -- -- -- (!) 58 (!) 22 97 %   02/18/24 1050 -- -- -- (!) 57 (!) 22 (!) 94 %   02/18/24 1045 -- -- -- (!) 57 (!) 22 --   02/18/24 1040 -- -- -- (!) 57 (!) 23 (!) 92 %   02/18/24 1035 -- -- -- 63 (!) 22 (!) 93 %   02/18/24 1030 -- -- -- (!) 58 (!) 9 95 %   02/18/24 1025 -- -- -- 64 18 (!) 93 %   02/18/24 0955 -- -- -- 64 (!) 23 97 %     PHYSICAL EXAM:     GENERAL APPEARANCE: Well-developed, well-nourished male in no acute distress.  HEENT: Normocephalic and atraumatic. PERRL. Oropharynx unremarkable.  PULM: Comfortable on room air.  CV: RRR.  ABDOMEN: Soft, nontender.  EXTREMITIES: No signs of vascular compromise. Pulses present. No cyanosis, clubbing or edema.  SKIN: Clear; no rashes, lesions or skin breaks in exposed areas.      NEURO:   MENTAL STATUS: Patient awake and oriented to time, place, and person. Affect normal.  CRANIAL NERVES II-XII: Pupils equal, round and reactive to light. Extraocular movements full and intact. No facial asymmetry.  MOTOR: Normal bulk.  "Tone normal and symmetrical throughout.  No abnormal movements. No tremor.   Strength 4/5 throughout unless specified below.  REFLEXES: DTRs 1+; normal and symmetric throughout.   SENSATION: Sensation grossly intact to fine touch.  COORDINATION:  Intact  STATION: Romberg deferred.  GAIT: Deferred.    CURRENT SCHEDULED MEDICATIONS:   atorvastatin  80 mg Oral Daily    clopidogreL  75 mg Oral Daily    enoxparin  40 mg Subcutaneous Daily    levETIRAcetam (Keppra) IV (PEDS and ADULTS)  500 mg Intravenous Q12H    montelukast  10 mg Oral QHS    mupirocin   Nasal BID     CURRENT INFUSIONS:   dexmedeTOMIDine (Precedex) infusion (titrating) Stopped (02/18/24 0445)     DATA:  Recent Labs   Lab 02/17/24  1735 02/18/24 0026 02/18/24 0405 02/19/24  0439     --  140 140   K 3.8  --  3.5 4.1     --  105 105   CO2 19*  --  27 28   BUN 13  --  14 21   CREATININE 1.7*  --  1.2 1.2   *  --  138* 92   CALCIUM 9.9  --  10.1 10.1   MG  --   --  1.3* 2.0   AST 20  --  25 23   ALT 19  --  18 15   AMMONIA  --  31  --   --      Recent Labs   Lab 02/17/24 1735 02/18/24 0405 02/19/24  0439   WBC 8.28 9.18 9.96   HGB 13.3* 13.5* 13.1*   HCT 37.6* 39.9* 38.7*    220 216     No results found for: "PROTEINCSF", "GLUCCSF", "WBCCSF", "RBCCSF", "PMNCSF"  Hemoglobin A1C   Date Value Ref Range Status   09/12/2023 5.8 4.5 - 6.2 % Final     Comment:     According to ADA guidelines, hemoglobin A1C <7.0% represents  optimal control in non-pregnant diabetic patients.  Different  metrics may apply to specific populations.   Standards of Medical Care in Diabetes - 2016.    For the purpose of screening for the presence of diabetes:  <5.7%     Consistent with the absence of diabetes  5.7-6.4%  Consistent with increasing risk for diabetes   (prediabetes)  >or=6.5%  Consistent with diabetes    Currently no consensus exists for use of hemoglobin A1C  for diagnosis of diabetes for children.     07/20/2023 5.7 (H) 4.0 - 5.6 % Final     " Comment:     ADA Screening Guidelines:  5.7-6.4%  Consistent with prediabetes  >or=6.5%  Consistent with diabetes    High levels of fetal hemoglobin interfere with the HbA1C  assay. Heterozygous hemoglobin variants (HbS, HgC, etc)do  not significantly interfere with this assay.   However, presence of multiple variants may affect accuracy.     01/10/2023 5.5 4.0 - 5.6 % Final     Comment:     ADA Screening Guidelines:  5.7-6.4%  Consistent with prediabetes  >or=6.5%  Consistent with diabetes    High levels of fetal hemoglobin interfere with the HbA1C  assay. Heterozygous hemoglobin variants (HbS, HgC, etc)do  not significantly interfere with this assay.   However, presence of multiple variants may affect accuracy.              I have personally reviewed and interpreted the pertinent imaging and lab results.  Imaging Results              CT Cervical Spine Without Contrast (Final result)  Result time 02/18/24 07:52:53      Final result by Johnna Fowler MD (02/18/24 07:52:53)                   Impression:      No fracture or traumatic malalignment of the cervical spine.    Cervical spondylosis resulting in up to severe spinal canal stenosis at C3-C4 and multilevel moderate to severe neural foraminal stenosis at the lower levels.    Bulky anterior flowing osteophytes consistent with diffuse idiopathic skeletal hyperostosis (DISH).    The preliminary and final reports are concordant.      Electronically signed by: Johnna Fowler  Date:    02/18/2024  Time:    07:52               Narrative:    EXAMINATION:  CT CERVICAL SPINE WITHOUT CONTRAST    CLINICAL HISTORY:  Neck trauma (Age >= 65y);    TECHNIQUE:  Low dose axial CT images through the cervical spine, with sagittal and coronal reformations.  Contrast was not administered.    COMPARISON:  CT head and neck 09/11/2020    FINDINGS:  The vertebral bodies are normal in height and morphology without evidence of fracture or osseous destructive process.  Normal sagittal  alignment is preserved.    Mild degenerative changes without evidence of bony spinal canal stenosis.  Multilevel posterior disc bulges resulting moderate to severe spinal canal stenosis worse at C3-C4.  Moderate to severe neural foraminal stenosis at C4-C5, C5-C6 and C6-C7.  Intervertebral disc space height loss at C6-C7.  Otherwise, intervertebral disc heights are well maintained.    Bulky anterior flowing osteophytes extending C2 through C7.    Limited evaluation of the intraspinal contents demonstrates no hematoma or mass.Paraspinal soft tissues exhibit no acute abnormalities.                                       CTA Head and Neck (xpd) (Final result)  Result time 02/18/24 08:14:35      Final result by Johnna Fowler MD (02/18/24 08:14:35)                   Impression:      No acute intracranial abnormality.  No high-grade stenosis or major vessel occlusion.    Prominent mediastinal and hilar lymph nodes, possibly reactive.    The preliminary and final reports are concordant.      Electronically signed by: Johnna Fowler  Date:    02/18/2024  Time:    08:14               Narrative:    EXAMINATION:  CTA HEAD AND NECK (XPD)    CLINICAL HISTORY:  Neuro deficit, acute, stroke suspected;    TECHNIQUE:  Non contrast low dose axial images were obtained through the head.  CT angiogram was performed from the level of the chaparrita to the top of the head following the IV administration of 75mL of Omnipaque 350.   Sagittal and coronal reconstructions and maximum intensity projection reconstructions were performed.    COMPARISON:    CTA head and neck 09/11/2023, MRI brain 09/11/2023    FINDINGS:  Intracranial Compartment:    Ventricles and sulci are normal in size for age without evidence of hydrocephalus. No extra-axial blood or fluid collections.    Irregular white matter hypoattenuation although nonspecific most in keeping with chronic microangiopathic ischemic change.  Hypoattenuating focus in the left basal ganglia most  consistent with remote lacunar infarct.  No parenchymal mass, hemorrhage, edema, or major vascular distribution infarct.    Skull/Extracranial Contents (limited evaluation): No fracture. Mastoid air cells and paranasal sinuses are essentially clear.    Non-Vascular Structures of the Neck/Thoracic Inlet (limited evaluation): Partially imaged left hilar node measures 12 mm (series 6, image 1).  Prominent although non pathologically enlarged mediastinal lymph nodes.    Aorta: Normal 3 vessel arch.    Extracranial carotid circulation: No hemodynamically significant stenosis, aneurysmal dilatation, or dissection.  Atherosclerotic plaque at the carotid bifurcation resulting in less than 50% stenosis, similar to prior.    Extracranial vertebral circulation: No hemodynamically significant stenosis, aneurysmal dilatation, or dissection.    Intracranial Arteries: No focal high-grade stenosis, occlusion, or aneurysm.  Atherosclerotic calcification of the cavernous ICAs.  Multifocal irregularity likely due to underlying atherosclerotic plaque involving the bilateral M1 segments the middle cerebral arteries, P2 segment of the left PCA and intracranial right vertebral artery.    Venous structures (limited evaluation): Normal.                                       CT Maxillofacial Without Contrast (Final result)  Result time 02/18/24 07:45:55      Final result by Johnna Fowler MD (02/18/24 07:45:55)                   Impression:      No evidence of an acute displaced fracture.    The preliminary and final reports are concordant.      Electronically signed by: Johnna Fowler  Date:    02/18/2024  Time:    07:45               Narrative:    EXAMINATION:  CT MAXILLOFACIAL WITHOUT CONTRAST    CLINICAL HISTORY:  Facial trauma, blunt;    TECHNIQUE:  Low dose CT images throughout the region of the facial bones.  Axial, sagittal and coronal reformations were obtained.  Contrast was not  administered.    COMPARISON:  None    FINDINGS:  Mildly motion degraded exam.  No focal soft tissue swelling identified.  The globes and intraorbital contents are within normal limits.  No orbital fracture.    The remainder of the facial bones appear intact without evidence of an acute displaced fracture.  No osseous destructive lesions.    Temporomandibular joints appropriately position without evidence of dislocation.    Mild maxillary mucosal thickening.  Otherwise, paranasal sinuses essentially clear.  Mastoids are clear.    Limited intracranial evaluation is unremarkable.                                              ASSESSMENT AND PLAN:     New onset seizure - ongoing etiology  Rule out stroke  77 yo man with history of HTN, Pre-Diabetes, GERD and suspected KEITH, who presented to the ED with new onset seizure.  Etiology of seizure is unknown.    - Admitted to hospital medicine with q4 hour neuro checks, on telemetry, continuous pulse oximetry  - Seizure precautions while inpatient  - Continue Keppra 500 mg BID for prevention of seizures  - Diet after eval per SLP  - Ordered EEG routine that showed moderate to severe slowing but no evidence of epileptiform activity  - MRI brain with and without contrast-Negative for acute ischemia or acute intracranial pathology.   - continue plavix 75 mg daily, atorvastatin 80 mg daily for stroke prevention  - Avoid seizure threshold lowering medications such as:  Bupropion, diphenhydramine, tramadol, certain antibiotics  - patient will need follow-up as outpatient with Neurology for long-term EEG monitoring to better characterize epileptiform abnormalities  - Assessment for rehab with PT/OT/SLP evaluation and treatment.  -okay to be transitioned out of the ICU.  - Will follow along     Seizure education:  No driving for now, no swimming alone, no climbing high areas, no operation of heavy machinery or working with high risk electricity equipment.  Continue to take AEDs as  prescribed. If any major side effects from neurological medications go to the ED including mood changes and severe depression.  Patient and/or next of kin informed.  Medication side effects discussed with the patient and/or caregiver.          Christopher Barrett MD  Neurology/vascular Neurology  Date of Service: 02/19/2024  9:54 AM    Please note: This note was transcribed using voice recognition software. Because of this technology there are often uinintended grammatical, spelling, and other transcription errors. Please disregard these errors.

## 2024-02-19 NOTE — PT/OT/SLP EVAL
Physical Therapy Evaluation    Patient Name:  Papito Hutton   MRN:  [id number]    Recommendations:     Discharge Recommendations: Low Intensity Therapy   Discharge Equipment Recommendations: walker, rolling   Barriers to discharge:  Decreased safety awareness needing supervision    Assessment:     Papito Hutton is a 78 y.o. male admitted with a medical diagnosis of Seizure.  He presents with the following impairments/functional limitations: weakness, impaired functional mobility, gait instability, impaired balance, decreased safety awareness, edema, impaired cardiopulmonary response to activity.    Pt found HOB elevated with SO present and pt agreeable to working with PT. Pt A & O x  3 and has the following co-morbidities: HTN, TIA, Aortic Atherosclerosis.  Pt tolerated session fairly, needing vc for safety during gait training with RW due to impaired ability to focus on task and required moderated A > close CGA for safe mobilization during session today. Pt would benefit from acute PT during hospitalization to increase strength, endurance and safety with mobility and would benefit from Low Intensity therapy upon discharge home. In addition, PT recommends pt have RW for home use due to current mobility limitation. The mobility limitation cannot be sufficiently resolved by the use of a cane(pt's prior level). The patient's functional mobility deficit can be sufficiently resolved with the use of a rolling walker. The patient's mobility limitation significantly impairs their ability to participate in one or more activities of daily living safely. The use of a rolling walker will significantly improve the patient's ability to participate in mobility-related activities of daily living and the patient will use it on a regular basis in the home with close supervision from his SO.         Rehab Prognosis: Good and Fair; patient would benefit from acute skilled PT services to address these deficits and reach  maximum level of function.    Recent Surgery: * No surgery found *      Plan:     During this hospitalization, patient to be seen 6 x/week to address the identified rehab impairments via gait training, therapeutic activities, therapeutic exercises, neuromuscular re-education and progress toward the following goals:    Plan of Care Expires:  03/18/24    Subjective     Chief Complaint: Pt with no particular complaints today.  Patient/Family Comments/goals: home with SO to assist  Pain/Comfort:  Pain Rating 1: 0/10  Pain Rating Post-Intervention 1: 0/10    Patients cultural, spiritual, Scientology conflicts given the current situation:      Living Environment:  Pt lives with his SO   Prior to admission, patients level of function was Independent with mobility (occasionally uses a cane for community due to knee OA) and drives.  Equipment used at home: cane, straight.  DME owned (not currently used): none.  Upon discharge, patient will have assistance from his SO.    Objective:     Communicated with MARLINE Broderick prior to session.  Patient found HOB elevated with bed alarm, blood pressure cuff, bauman catheter, peripheral IV, pulse ox (continuous), telemetry  upon PT entry to room.    General Precautions: Standard, fall, seizure  Orthopedic Precautions:N/A   Braces: N/A  Respiratory Status: Room air    Exams:  Cognitive Exam:  Patient is oriented to Person, Place, and Time  RLE ROM: WFL  RLE Strength: WFL  LLE ROM: WFL  LLE Strength: WFL    Functional Mobility:  Bed Mobility:     Scooting: stand by assistance and contact guard assistance in sitting  Supine to Sit: minimum assistance and moderate assistance  Transfers:     Sit to Stand:  minimum assistance with rolling walker  Gait: x 300 feet with RW and close CGA for safety plus frequent vc for safety with RW due to pt swerving because of lack of attention to task/looking in pt rooms.      AM-PAC 6 CLICK MOBILITY  Total Score:18       Treatment & Education:  Pt was educated  on the following: call light use, importance of OOB activity and functional mobility to negate the negative effects of prolonged bed rest during this hospitalization, safe transfers/ambulation and discharge planning recommendations/options.      Patient left up in chair with all lines intact, call button in reach, RN notified, and pt's SO present.    GOALS:   Multidisciplinary Problems       Physical Therapy Goals          Problem: Physical Therapy    Goal Priority Disciplines Outcome Goal Variances Interventions   Physical Therapy Goal     PT, PT/OT      Description: Goals to be met by: 3/18/24     Patient will increase functional independence with mobility by performin. Supine to sit with Supervision  2. Sit to stand transfer with Supervision  3. Bed to chair transfer with Supervision using Rolling Walker  4. Gait  x 300 feet with Supervision using Rolling Walker.                              History:     Past Medical History:   Diagnosis Date    Arthritis     B12 deficiency     Colon polyp     Diabetes mellitus     BORDERLINE    GERD (gastroesophageal reflux disease)     [place] (hard of hearing)     BILAT AIDS    Hyperlipidemia     Hypertension     Low testosterone     Personal history of colonic polyps     adenomatous polyp    Sinus disorder     Sleep apnea     DOES NOT USE MACHINE    Wears glasses        Past Surgical History:   Procedure Laterality Date    COLON SURGERY      COLONOSCOPY      Dr. Garcia; polyps removed    COLONOSCOPY N/A 2020    Procedure: COLONOSCOPY;  Surgeon: Jj Fried MD;  Location: [facility];  Service: Endoscopy;  Laterality: N/A;    COLONOSCOPY N/A 8/15/2023    Procedure: COLONOSCOPY;  Surgeon: Jj Fried MD;  Location: [facility];  Service: Endoscopy;  Laterality: N/A;    ESOPHAGOGASTRODUODENOSCOPY N/A 2023    Procedure: EGD (ESOPHAGOGASTRODUODENOSCOPY);  Surgeon: Jj Fried MD;  Location: [facility];  Service: Endoscopy;  Laterality: N/A;     LAPAROSCOPIC RIGHT COLON RESECTION Right 08/10/2020    Procedure: COLECTOMY, RIGHT, LAPAROSCOPIC pooss conversion to open;  Surgeon: Noble Kaye MD;  Location: [facility] OR;  Service: General;  Laterality: Right;    NASAL SEPTUM SURGERY      right hand surgery      RIGHT HEMICOLECTOMY N/A 08/10/2020    Procedure: HEMICOLECTOMY, RIGHT;  Surgeon: Noble Kaye MD;  Location: [facility] OR;  Service: General;  Laterality: N/A;    UPPER GASTROINTESTINAL ENDOSCOPY  2008    hiatal hernia; esophagitis       Time Tracking:     PT Received On: 02/19/24  PT Start Time: 0952     PT Stop Time: 1014  PT Total Time (min): 22 min     Billable Minutes: Evaluation 10 and Gait Training 12      02/19/2024

## 2024-02-19 NOTE — PLAN OF CARE
"  Problem: SLP  Goal: SLP Goal  Description: 1. Pt will complete cognitive-linguistic evaluation   Outcome: Ongoing, Progressing     CSE completed; rec regular textures w/ thin liquids. Cognitive-linguistic eval deferred 2/2 pt mildly altered from MRI (pt's SO at bedside stating pt "loopy from anesthesia for MRI"). Will follow for cog eval.  "

## 2024-02-20 LAB
ALBUMIN SERPL BCP-MCNC: 3.9 G/DL (ref 3.5–5.2)
ALP SERPL-CCNC: 62 U/L (ref 55–135)
ALT SERPL W/O P-5'-P-CCNC: 15 U/L (ref 10–44)
ANION GAP SERPL CALC-SCNC: 9 MMOL/L (ref 8–16)
AST SERPL-CCNC: 24 U/L (ref 10–40)
BASOPHILS # BLD AUTO: 0.06 K/UL (ref 0–0.2)
BASOPHILS NFR BLD: 0.7 % (ref 0–1.9)
BILIRUB SERPL-MCNC: 1.2 MG/DL (ref 0.1–1)
BUN SERPL-MCNC: 22 MG/DL (ref 8–23)
CALCIUM SERPL-MCNC: 10 MG/DL (ref 8.7–10.5)
CHLORIDE SERPL-SCNC: 105 MMOL/L (ref 95–110)
CO2 SERPL-SCNC: 24 MMOL/L (ref 23–29)
CREAT SERPL-MCNC: 1.2 MG/DL (ref 0.5–1.4)
DIFFERENTIAL METHOD BLD: ABNORMAL
EOSINOPHIL # BLD AUTO: 0.1 K/UL (ref 0–0.5)
EOSINOPHIL NFR BLD: 1.4 % (ref 0–8)
ERYTHROCYTE [DISTWIDTH] IN BLOOD BY AUTOMATED COUNT: 13.2 % (ref 11.5–14.5)
EST. GFR  (NO RACE VARIABLE): >60 ML/MIN/1.73 M^2
ESTIMATED AVG GLUCOSE: 114 MG/DL (ref 68–131)
GLUCOSE SERPL-MCNC: 105 MG/DL (ref 70–110)
GLUCOSE SERPL-MCNC: 113 MG/DL (ref 70–110)
HBA1C MFR BLD: 5.6 % (ref 4.5–6.2)
HCT VFR BLD AUTO: 39.4 % (ref 40–54)
HGB BLD-MCNC: 13.2 G/DL (ref 14–18)
IMM GRANULOCYTES # BLD AUTO: 0.03 K/UL (ref 0–0.04)
IMM GRANULOCYTES NFR BLD AUTO: 0.3 % (ref 0–0.5)
LYMPHOCYTES # BLD AUTO: 2.2 K/UL (ref 1–4.8)
LYMPHOCYTES NFR BLD: 23.9 % (ref 18–48)
MAGNESIUM SERPL-MCNC: 1.7 MG/DL (ref 1.6–2.6)
MCH RBC QN AUTO: 30.5 PG (ref 27–31)
MCHC RBC AUTO-ENTMCNC: 33.5 G/DL (ref 32–36)
MCV RBC AUTO: 91 FL (ref 82–98)
MONOCYTES # BLD AUTO: 0.9 K/UL (ref 0.3–1)
MONOCYTES NFR BLD: 9.5 % (ref 4–15)
NEUTROPHILS # BLD AUTO: 5.8 K/UL (ref 1.8–7.7)
NEUTROPHILS NFR BLD: 64.2 % (ref 38–73)
NRBC BLD-RTO: 0 /100 WBC
PLATELET # BLD AUTO: 210 K/UL (ref 150–450)
PMV BLD AUTO: 10.5 FL (ref 9.2–12.9)
POTASSIUM SERPL-SCNC: 3.7 MMOL/L (ref 3.5–5.1)
PROT SERPL-MCNC: 6.7 G/DL (ref 6–8.4)
RBC # BLD AUTO: 4.33 M/UL (ref 4.6–6.2)
SODIUM SERPL-SCNC: 138 MMOL/L (ref 136–145)
WBC # BLD AUTO: 9.07 K/UL (ref 3.9–12.7)

## 2024-02-20 PROCEDURE — 94761 N-INVAS EAR/PLS OXIMETRY MLT: CPT

## 2024-02-20 PROCEDURE — 97116 GAIT TRAINING THERAPY: CPT

## 2024-02-20 PROCEDURE — 93005 ELECTROCARDIOGRAM TRACING: CPT | Performed by: GENERAL PRACTICE

## 2024-02-20 PROCEDURE — 97535 SELF CARE MNGMENT TRAINING: CPT

## 2024-02-20 PROCEDURE — 83036 HEMOGLOBIN GLYCOSYLATED A1C: CPT | Performed by: STUDENT IN AN ORGANIZED HEALTH CARE EDUCATION/TRAINING PROGRAM

## 2024-02-20 PROCEDURE — 25000003 PHARM REV CODE 250: Performed by: STUDENT IN AN ORGANIZED HEALTH CARE EDUCATION/TRAINING PROGRAM

## 2024-02-20 PROCEDURE — 85025 COMPLETE CBC W/AUTO DIFF WBC: CPT | Performed by: STUDENT IN AN ORGANIZED HEALTH CARE EDUCATION/TRAINING PROGRAM

## 2024-02-20 PROCEDURE — 93010 ELECTROCARDIOGRAM REPORT: CPT | Mod: ,,, | Performed by: GENERAL PRACTICE

## 2024-02-20 PROCEDURE — 25000003 PHARM REV CODE 250: Performed by: INTERNAL MEDICINE

## 2024-02-20 PROCEDURE — 25000003 PHARM REV CODE 250: Performed by: HOSPITALIST

## 2024-02-20 PROCEDURE — 36415 COLL VENOUS BLD VENIPUNCTURE: CPT | Performed by: STUDENT IN AN ORGANIZED HEALTH CARE EDUCATION/TRAINING PROGRAM

## 2024-02-20 PROCEDURE — 63600175 PHARM REV CODE 636 W HCPCS: Performed by: STUDENT IN AN ORGANIZED HEALTH CARE EDUCATION/TRAINING PROGRAM

## 2024-02-20 PROCEDURE — 83735 ASSAY OF MAGNESIUM: CPT | Performed by: STUDENT IN AN ORGANIZED HEALTH CARE EDUCATION/TRAINING PROGRAM

## 2024-02-20 PROCEDURE — 21400001 HC TELEMETRY ROOM

## 2024-02-20 PROCEDURE — 92523 SPEECH SOUND LANG COMPREHEN: CPT

## 2024-02-20 PROCEDURE — 80053 COMPREHEN METABOLIC PANEL: CPT | Performed by: STUDENT IN AN ORGANIZED HEALTH CARE EDUCATION/TRAINING PROGRAM

## 2024-02-20 RX ORDER — POTASSIUM CHLORIDE 20 MEQ/1
20 TABLET, EXTENDED RELEASE ORAL DAILY
Status: DISCONTINUED | OUTPATIENT
Start: 2024-02-20 | End: 2024-02-22 | Stop reason: HOSPADM

## 2024-02-20 RX ORDER — HALOPERIDOL 5 MG/ML
5 INJECTION INTRAMUSCULAR EVERY 6 HOURS PRN
Status: DISCONTINUED | OUTPATIENT
Start: 2024-02-20 | End: 2024-02-22 | Stop reason: HOSPADM

## 2024-02-20 RX ORDER — SODIUM,POTASSIUM PHOSPHATES 280-250MG
2 POWDER IN PACKET (EA) ORAL
Status: DISCONTINUED | OUTPATIENT
Start: 2024-02-20 | End: 2024-02-22 | Stop reason: HOSPADM

## 2024-02-20 RX ORDER — LANOLIN ALCOHOL/MO/W.PET/CERES
800 CREAM (GRAM) TOPICAL
Status: DISCONTINUED | OUTPATIENT
Start: 2024-02-20 | End: 2024-02-22 | Stop reason: HOSPADM

## 2024-02-20 RX ORDER — LANOLIN ALCOHOL/MO/W.PET/CERES
400 CREAM (GRAM) TOPICAL DAILY
Status: DISCONTINUED | OUTPATIENT
Start: 2024-02-20 | End: 2024-02-22 | Stop reason: HOSPADM

## 2024-02-20 RX ADMIN — POTASSIUM CHLORIDE 20 MEQ: 1500 TABLET, EXTENDED RELEASE ORAL at 09:02

## 2024-02-20 RX ADMIN — LEVETIRACETAM 500 MG: 500 TABLET, FILM COATED ORAL at 09:02

## 2024-02-20 RX ADMIN — POTASSIUM BICARBONATE 50 MEQ: 977.5 TABLET, EFFERVESCENT ORAL at 05:02

## 2024-02-20 RX ADMIN — MUPIROCIN 1 G: 20 OINTMENT TOPICAL at 09:02

## 2024-02-20 RX ADMIN — Medication 800 MG: at 05:02

## 2024-02-20 RX ADMIN — Medication 400 MG: at 09:02

## 2024-02-20 RX ADMIN — ENOXAPARIN SODIUM 40 MG: 100 INJECTION SUBCUTANEOUS at 05:02

## 2024-02-20 RX ADMIN — LEVETIRACETAM 500 MG: 500 TABLET, FILM COATED ORAL at 08:02

## 2024-02-20 RX ADMIN — TRIAMTERENE AND HYDROCHLOROTHIAZIDE 1 TABLET: 37.5; 25 TABLET ORAL at 09:02

## 2024-02-20 RX ADMIN — CARVEDILOL 3.12 MG: 3.12 TABLET, FILM COATED ORAL at 09:02

## 2024-02-20 RX ADMIN — ATORVASTATIN CALCIUM 80 MG: 40 TABLET, FILM COATED ORAL at 09:02

## 2024-02-20 RX ADMIN — NIFEDIPINE 30 MG: 30 TABLET, FILM COATED, EXTENDED RELEASE ORAL at 09:02

## 2024-02-20 RX ADMIN — MONTELUKAST 10 MG: 10 TABLET, FILM COATED ORAL at 08:02

## 2024-02-20 RX ADMIN — CLOPIDOGREL BISULFATE 75 MG: 75 TABLET, FILM COATED ORAL at 09:02

## 2024-02-20 RX ADMIN — CARVEDILOL 3.12 MG: 3.12 TABLET, FILM COATED ORAL at 08:02

## 2024-02-20 NOTE — ASSESSMENT & PLAN NOTE
As low as 1.3  Due to Dyazide probably  Pt says he has K+ and Mg++ at home but does not take them, told him he needs to take as prescribed and that his diuretic is probably causing these problems but he needs to stay on it for the time being for his blood pressure  Replace, trend, follow as OPT  2.0 today

## 2024-02-20 NOTE — PT/OT/SLP PROGRESS
"Physical Therapy Treatment    Patient Name:  Papito Hutton   MRN:  [id number]    Recommendations:     Discharge Recommendations: Low Intensity Therapy  Discharge Equipment Recommendations: walker, rolling  Barriers to discharge:  Pt will need close supervision at home and use of RW for safety with mobility    Assessment:     Papito Hutton is a 78 y.o. male admitted with a medical diagnosis of Seizure.  He presents with the following impairments/functional limitations: weakness, impaired functional mobility, gait instability, impaired balance, decreased safety awareness, edema, impaired cardiopulmonary response to activity.    Pt found up in chair with SO present and pt agreeable to PT session. Pt tolerated session fairly with impaired attention and safety awareness, but followed commands well with verbal instruction from PT.     Rehab Prognosis: Good and Fair; patient would benefit from acute skilled PT services to address these deficits and reach maximum level of function.    Recent Surgery: * No surgery found *      Plan:     During this hospitalization, patient to be seen 6 x/week to address the identified rehab impairments via gait training, therapeutic activities, therapeutic exercises, neuromuscular re-education and progress toward the following goals:    Plan of Care Expires:  03/18/24    Subjective     Chief Complaint: Pt reported "that anesthesia they gave me for the test yesterday gave me some problems last night."   Patient/Family Comments/goals: return to independent mobility  Pain/Comfort:  Pain Rating 1: 0/10  Pain Rating Post-Intervention 1: 0/10      Objective:     Communicated with MARLINE Trevino prior to session.  Patient found up in chair with blood pressure cuff, peripheral IV, pulse ox (continuous), telemetry upon PT entry to room.     General Precautions: Standard, fall, seizure  Orthopedic Precautions: N/A  Braces: N/A  Respiratory Status: Room air     Functional Mobility:  Transfers:   "   Sit to Stand:  stand by assistance, contact guard assistance, and vc with no AD and rolling walker  Gait: x 300' with RW and CGA for safety with pt picking up the walker during turns; however, with verbal cues pt able to follow commands for demonstration of safe technique. Pt did exhibit impaired attention to task/impulsivity/impaired safety awareness.      AM-PAC 6 CLICK MOBILITY          Treatment & Education:  Pt was educated on the following: call light use, importance of OOB activity and functional mobility to negate the negative effects of prolonged bed rest during this hospitalization, safe transfers/ambulation and discharge planning recommendations/options.      Patient left up in chair with all lines intact, call button in reach, RN notified, and pt's SO & Dr. Barrett present..    GOALS:   Multidisciplinary Problems       Physical Therapy Goals          Problem: Physical Therapy    Goal Priority Disciplines Outcome Goal Variances Interventions   Physical Therapy Goal     PT, PT/OT      Description: Goals to be met by: 3/18/24     Patient will increase functional independence with mobility by performin. Supine to sit with Supervision  2. Sit to stand transfer with Supervision  3. Bed to chair transfer with Supervision using Rolling Walker  4. Gait  x 300 feet with Supervision using Rolling Walker.                              Time Tracking:     PT Received On: 24  PT Start Time: 1059     PT Stop Time: 1113  PT Total Time (min): 14 min     Billable Minutes: Gait Training 14    Treatment Type: Treatment  PT/PTA: PT           2024

## 2024-02-20 NOTE — ASSESSMENT & PLAN NOTE
Secondary prevention, on Plavix, statin  HTN mgt  W/u for secondary HTN as OPT at discretion of PCP  Pt very likely will continue to have difficulty w/ BP control until his underlying sleep apnea is formally diagnosed and treated, he is scheduled for a sleep study in lab 3/5, don't know if this can be done any sooner?  Will ask CM to look into it  TTE noted - has he had a bubble study?

## 2024-02-20 NOTE — CARE UPDATE
02/19/24 1958   Patient Assessment/Suction   Level of Consciousness (AVPU) alert   Respiratory Effort Unlabored   Expansion/Accessory Muscles/Retractions expansion symmetric   All Lung Fields Breath Sounds diminished   Rhythm/Pattern, Respiratory depth regular;pattern regular   Cough Frequency infrequent   Cough Type good;nonproductive   PRE-TX-O2   Device (Oxygen Therapy) room air   SpO2 96 %   Pulse Oximetry Type Continuous   $ Pulse Oximetry - Multiple Charge Pulse Oximetry - Multiple   Pulse 85   Resp (!) 28   Education   $ Education DME Oxygen;15 min

## 2024-02-20 NOTE — SUBJECTIVE & OBJECTIVE
"         [facility] Medicine  Progress Note     Patient Name: Papito Hutton  MRN: [id number]  Patient Class: IP- Inpatient     Admission Date: 2/17/2024  Length of Stay: 2 days  Attending Physician: Sathish You MD  Primary Care Provider: Rafi Martin MD           Subjective:      Principal Problem:Seizure        HPI:  Patient is a 77yo with a PMH of HTN, Pre-Diabetes, GERD and suspected KEITH. Patient was unable to speak, so history was provided by [facility] and Patient's spouse. Patient's spouse found Patient to be unconscious in the afternoon, this was after she saw the Patient Aox4 in the morning. To the spouse it seemed as though the Patient had fallen over, when she found him he was conscious though very confused and somnolent. She called EMS, to EMS Patient had a slight right sided facial droop, though Patient's spouse couldn't see it. To note, Patient was recently started on Procardia for his resistant hypertension, his BP in the morning was      Patient was brought to [facility] ED where he had a CT Scan, Neurology reviewed the CT Findings, found there wasn't a large vessel occlusion. However Patient had a Seizure in that ED and was transferred to [facility] after getting IV Keppra. While at [facility] Patient was noted to be agitated after his Seizure, with him moving all four extremities, and was placed on Precedex gtt     Overview/Hospital Course:  No notes on file     Interval History:      2/19/24  Assumed care today, pt seen and examined, chart reviewed, care d/w RN at bedside.  Pt off Precedex but required anesthesia to complete imaging studies.  He has had not further sz activity, but has been a bit confused and restless post procedure, requiring frequent redirection, he's trying to climb OOB, says "I am in a hotel room".  He's got 2 [employer] shows to go to soon and is not too happy about being told he can't drive, but says he will let his wife drive.  Note and appreciate input " "from neuro and all ancillary services.  Ambulated 300' w/ PT today.  Likely can go home tomorrow if stable and hopefully a bit less impulsive.     2/20/24  Pt wife is here today and is very apprehensive about him potentially going home in his current state.  He remains impulsive, trying to get OOB w/o assistance, frequently interrupts during conversations, and RN and wife describe visual hallucinations "he was seeing bats on the ceiling earlier" - Currently he's awake and alert and in NAD, oriented x 4.  BP remains labile and they confirm that he did recently have an abnormal HST and is scheduled for in lab sleep study 3/5.  No further sz activity.  Due to wife's level of concern about not being able to care for him at home, her desire to speak further w/ neurology, his impulsiveness and hallucinations, and his ongoing BP med adjustments we will not pursue d/c home today, rather will downgrade to med-tele and possibly get him home in a day or two.  Continue PT/OT work on floor. CM for d/c planning/HH.  I did caution that some of his ongoing encephalopathy may be hospital induced and may actually improve w/ d/c.  No ha/n/v/cp/sob/f/c          Review of Systems    11 pt ROS negative except as noted o/w    Objective:     Vital Signs (Most Recent):  Temp: 98 °F (36.7 °C) (02/20/24 0301)  Pulse: 80 (02/20/24 0400)  Resp: (!) 22 (02/20/24 0400)  BP: (!) 127/59 (02/20/24 0400)  SpO2: (!) 94 % (02/20/24 0400) Vital Signs (24h Range):  Temp:  [98 °F (36.7 °C)-98.6 °F (37 °C)] 98 °F (36.7 °C)  Pulse:  [63-94] 80  Resp:  [12-45] 22  SpO2:  [93 %-100 %] 94 %  BP: (127-211)/(59-92) 127/59     Weight: 91.8 kg (202 lb 6.1 oz)  Body mass index is 27.45 kg/m².    Intake/Output Summary (Last 24 hours) at 2/20/2024 0763  Last data filed at 2/20/2024 0515  Gross per 24 hour   Intake 1640 ml   Output 734 ml   Net 906 ml         Physical Exam      General:  A&O, NAD  HEENT: neck supple, PERRL/EOMI, EAC clear bilaterally, normal bilateral " carotid upstroke w/o bruits, inf turbs clear bilaterally, OC/OP clear  Lungs: CTAB  CV: RRR w/o M/G/R  Abdomen: soft, NTND, no HSM, no bruits/masses/pulsations  Ext: no edema  : def  Rectal: def  Neuro: NF    Significant Labs: All pertinent labs within the past 24 hours have been reviewed.    Significant Imaging: I have reviewed all pertinent imaging results/findings within the past 24 hours.

## 2024-02-20 NOTE — PROGRESS NOTES
[facility]  Department of Neurology  Progress Note  Date: 2024 9:54 AM          Patient Name: Papito Hutton   MRN: [id number]   : 1945    AGE: 78 y.o.    LOS: 3 days Hospital Day: 4  Admit date: 2024  5:26 PM       HPI per EMR:  Patient is a 79yo with a PMH of HTN, Pre-Diabetes, GERD and suspected KEITH. Patient was unable to speak, so history was provided by [facility] and Patient's spouse. Patient's spouse found Patient to be unconscious in the afternoon, this was after she saw the Patient Aox4 in the morning.  To the spouse it seemed as though the Patient had fallen over, when she found him he was conscious though very confused and somnolent. She called EMS, to EMS Patient had a slight right sided facial droop, though Patient's spouse couldn't see it. To note, Patient was recently started on Procardia for his resistant hypertension, his BP in the morning was      Patient was brought to [facility] ED where he had a CT Scan, Neurology reviewed the CT Findings, found there wasn't a large vessel occlusion. However Patient had a Seizure in that ED and was transferred to [facility] after getting IV Keppra. While at [facility] Patient was noted to be agitated after his Seizure, with him moving all four extremities, and was placed on Precedex gtt     Overview/Hospital Course:  No notes on file     Interval History:  Patient admitted with likely new onset seizure with tongue biting, loss of consciousness, speech dysfunction and right facial droop evaluation.  Significantly sedated at this time.  Patient's wife is present at bedside.  Patient has tongue bite and right periorbital ecchymosis and bruising.  Patient received Keppra load.  Neuro radiology results pending.  Neurology evaluation pending.  Currently patient is on Precedex infusion.  Currently afebrile.     Neurology Consult: Patient was seen and examined. He is a 79 yo man with history of HTN, Pre-Diabetes, GERD and suspected KEITH, who presented  to the ED after he was found unconscious by his wife. HPI per chart review, since patient unable to provide history. When he was found down, he seemed somnolent and confused, so wife called EMS. Upon EMS arrival, they noticed a right facial droop, so he was taken to [facility] for evaluation. While in the ED, he had a generalized tonic-clonic seizure, with tongue biting, so he was loaded with Keppra and transferred to [facility] for treatment. Of note, wife reports patient has been experiencing some issues with memory and balance over the past few weeks.     02/19/2024: No acute events overnight. Patient was seen and examined by me this morning. Neuro exam is normal this morning.  I examined him after he came back from [facility].  He is oriented x3 and denies any complaints at this time.    02/20/2024:  Patient was seen and examined by me.  No acute events overnight.  He states that intermittently he has been having hallucinations where he sees spiders on the wall.  Denies any other complaints.  He is oriented x3.       Vitals:  Patient Vitals for the past 24 hrs:   BP Temp Temp src Pulse Resp SpO2   02/20/24 1300 (!) 120/57 -- -- 72 (!) 30 95 %   02/20/24 1230 (!) 114/56 -- -- 73 (!) 33 95 %   02/20/24 1200 127/60 -- -- 76 (!) 26 95 %   02/20/24 1130 117/63 97.4 °F (36.3 °C) Axillary 77 (!) 23 (!) 94 %   02/20/24 1100 121/66 -- -- 74 (!) 30 95 %   02/20/24 1000 (!) 131/92 -- -- 79 (!) 32 95 %   02/20/24 0930 130/65 -- -- 75 (!) 32 (!) 94 %   02/20/24 0915 (!) 115/59 -- -- 82 (!) 29 (!) 94 %   02/20/24 0914 (!) 115/59 -- -- 80 -- --   02/20/24 0900 109/60 -- -- 81 (!) 24 (!) 94 %   02/20/24 0828 -- -- -- 82 (!) 21 (!) 94 %   02/20/24 0800 (!) 173/70 97 °F (36.1 °C) Axillary 86 (!) 31 95 %   02/20/24 0630 (!) 160/72 -- -- 99 (!) 38 --   02/20/24 0400 (!) 127/59 -- -- 80 (!) 22 (!) 94 %   02/20/24 0301 (!) 151/61 98 °F (36.7 °C) Oral 80 (!) 23 95 %   02/20/24 0200 (!) 187/80 -- -- 70 17 --   02/20/24 0100 (!) 161/71 -- -- 73 20  --   02/20/24 0000 132/60 -- -- 72 (!) 23 --   02/19/24 2315 (!) 152/64 98.4 °F (36.9 °C) Oral 80 20 96 %   02/19/24 2100 (!) 157/73 -- -- 84 (!) 22 96 %   02/19/24 2000 (!) 157/73 98.6 °F (37 °C) Oral 86 20 95 %   02/19/24 1958 -- -- -- 85 (!) 28 96 %   02/19/24 1900 (!) 165/70 -- -- 82 (!) 22 95 %   02/19/24 1845 -- -- -- 80 (!) 24 96 %   02/19/24 1830 -- -- -- 79 (!) 22 96 %   02/19/24 1815 -- -- -- 81 -- 97 %   02/19/24 1800 -- -- -- 80 20 97 %   02/19/24 1745 -- -- -- 83 (!) 24 96 %   02/19/24 1730 -- -- -- 85 -- 97 %   02/19/24 1702 128/60 -- -- 83 -- 97 %   02/19/24 1700 128/60 -- -- 83 -- 97 %   02/19/24 1648 -- -- -- 87 (!) 28 97 %   02/19/24 1645 -- 98.4 °F (36.9 °C) Axillary 83 (!) 26 97 %   02/19/24 1630 -- -- -- 94 (!) 24 (!) 93 %   02/19/24 1615 -- -- -- 68 20 96 %   02/19/24 1600 -- -- -- 73 (!) 25 96 %   02/19/24 1545 (!) 151/67 -- -- 80 (!) 28 95 %   02/19/24 1530 (!) 185/92 -- -- 75 (!) 22 96 %   02/19/24 1515 (!) 179/81 -- -- 76 (!) 26 97 %   02/19/24 1500 (!) 206/91 -- -- 67 19 95 %   02/19/24 1445 (!) 199/92 -- -- 68 (!) 24 97 %   02/19/24 1430 (!) 187/87 -- -- 67 (!) 24 97 %   02/19/24 1415 (!) 185/79 -- -- 66 (!) 28 100 %     PHYSICAL EXAM:     GENERAL APPEARANCE: Well-developed, well-nourished male in no acute distress.  HEENT: Normocephalic and atraumatic. PERRL. Oropharynx unremarkable.  PULM: Comfortable on room air.  CV: RRR.  ABDOMEN: Soft, nontender.  EXTREMITIES: No signs of vascular compromise. Pulses present. No cyanosis, clubbing or edema.  SKIN: Clear; no rashes, lesions or skin breaks in exposed areas.      NEURO:   MENTAL STATUS: Patient awake and oriented to time, place, and person. Affect normal.  CRANIAL NERVES II-XII: Pupils equal, round and reactive to light. Extraocular movements full and intact. No facial asymmetry.  MOTOR: Normal bulk. Tone normal and symmetrical throughout.  No abnormal movements. No tremor.   Strength 4/5 throughout unless specified below.  REFLEXES:  "DTRs 1+; normal and symmetric throughout.   SENSATION: Sensation grossly intact to fine touch.  COORDINATION:  Intact  STATION: Romberg deferred.  GAIT: Deferred.    CURRENT SCHEDULED MEDICATIONS:   atorvastatin  80 mg Oral Daily    carvediloL  3.125 mg Oral BID    clopidogreL  75 mg Oral Daily    enoxparin  40 mg Subcutaneous Daily    levETIRAcetam  500 mg Oral BID    magnesium oxide  400 mg Oral Daily    montelukast  10 mg Oral QHS    mupirocin   Nasal BID    NIFEdipine  30 mg Oral Daily    potassium chloride  20 mEq Oral Daily    triamterene-hydrochlorothiazide 37.5-25 mg  1 tablet Oral Daily    valsartan  320 mg Oral Daily     CURRENT INFUSIONS:   dexmedeTOMIDine (Precedex) infusion (titrating) Stopped (02/18/24 0445)     DATA:  Recent Labs   Lab 02/17/24 1735 02/18/24 0026 02/18/24 0405 02/19/24  0439 02/20/24  0321     --  140 140 138   K 3.8  --  3.5 4.1 3.7     --  105 105 105   CO2 19*  --  27 28 24   BUN 13  --  14 21 22   CREATININE 1.7*  --  1.2 1.2 1.2   *  --  138* 92 113*   CALCIUM 9.9  --  10.1 10.1 10.0   MG  --   --  1.3* 2.0 1.7   AST 20  --  25 23 24   ALT 19  --  18 15 15   AMMONIA  --  31  --   --   --      Recent Labs   Lab 02/17/24  1735 02/18/24  0405 02/19/24  0439 02/20/24  0321   WBC 8.28 9.18 9.96 9.07   HGB 13.3* 13.5* 13.1* 13.2*   HCT 37.6* 39.9* 38.7* 39.4*    220 216 210     No results found for: "PROTEINCSF", "GLUCCSF", "WBCCSF", "RBCCSF", "PMNCSF"  Hemoglobin A1C   Date Value Ref Range Status   02/20/2024 5.6 4.5 - 6.2 % Final     Comment:     According to ADA guidelines, hemoglobin A1C <7.0% represents  optimal control in non-pregnant diabetic patients.  Different  metrics may apply to specific populations.   Standards of Medical Care in Diabetes - 2016.    For the purpose of screening for the presence of diabetes:  <5.7%     Consistent with the absence of diabetes  5.7-6.4%  Consistent with increasing risk for diabetes   (prediabetes)  >or=6.5%  " Consistent with diabetes    Currently no consensus exists for use of hemoglobin A1C  for diagnosis of diabetes for children.     09/12/2023 5.8 4.5 - 6.2 % Final     Comment:     According to ADA guidelines, hemoglobin A1C <7.0% represents  optimal control in non-pregnant diabetic patients.  Different  metrics may apply to specific populations.   Standards of Medical Care in Diabetes - 2016.    For the purpose of screening for the presence of diabetes:  <5.7%     Consistent with the absence of diabetes  5.7-6.4%  Consistent with increasing risk for diabetes   (prediabetes)  >or=6.5%  Consistent with diabetes    Currently no consensus exists for use of hemoglobin A1C  for diagnosis of diabetes for children.     07/20/2023 5.7 (H) 4.0 - 5.6 % Final     Comment:     ADA Screening Guidelines:  5.7-6.4%  Consistent with prediabetes  >or=6.5%  Consistent with diabetes    High levels of fetal hemoglobin interfere with the HbA1C  assay. Heterozygous hemoglobin variants (HbS, HgC, etc)do  not significantly interfere with this assay.   However, presence of multiple variants may affect accuracy.              I have personally reviewed and interpreted the pertinent imaging and lab results.  Imaging Results              CT Cervical Spine Without Contrast (Final result)  Result time 02/18/24 07:52:53      Final result by Johnna Fowler MD (02/18/24 07:52:53)                   Impression:      No fracture or traumatic malalignment of the cervical spine.    Cervical spondylosis resulting in up to severe spinal canal stenosis at C3-C4 and multilevel moderate to severe neural foraminal stenosis at the lower levels.    Bulky anterior flowing osteophytes consistent with diffuse idiopathic skeletal hyperostosis (DISH).    The preliminary and final reports are concordant.      Electronically signed by: Johnna Fowler  Date:    02/18/2024  Time:    07:52               Narrative:    EXAMINATION:  CT CERVICAL SPINE WITHOUT  CONTRAST    CLINICAL HISTORY:  Neck trauma (Age >= 65y);    TECHNIQUE:  Low dose axial CT images through the cervical spine, with sagittal and coronal reformations.  Contrast was not administered.    COMPARISON:  CT head and neck 09/11/2020    FINDINGS:  The vertebral bodies are normal in height and morphology without evidence of fracture or osseous destructive process.  Normal sagittal alignment is preserved.    Mild degenerative changes without evidence of bony spinal canal stenosis.  Multilevel posterior disc bulges resulting moderate to severe spinal canal stenosis worse at C3-C4.  Moderate to severe neural foraminal stenosis at C4-C5, C5-C6 and C6-C7.  Intervertebral disc space height loss at C6-C7.  Otherwise, intervertebral disc heights are well maintained.    Bulky anterior flowing osteophytes extending C2 through C7.    Limited evaluation of the intraspinal contents demonstrates no hematoma or mass.Paraspinal soft tissues exhibit no acute abnormalities.                                       CTA Head and Neck (xpd) (Final result)  Result time 02/18/24 08:14:35      Final result by Johnna Fowler MD (02/18/24 08:14:35)                   Impression:      No acute intracranial abnormality.  No high-grade stenosis or major vessel occlusion.    Prominent mediastinal and hilar lymph nodes, possibly reactive.    The preliminary and final reports are concordant.      Electronically signed by: Johnna Fowler  Date:    02/18/2024  Time:    08:14               Narrative:    EXAMINATION:  CTA HEAD AND NECK (XPD)    CLINICAL HISTORY:  Neuro deficit, acute, stroke suspected;    TECHNIQUE:  Non contrast low dose axial images were obtained through the head.  CT angiogram was performed from the level of the chaparrita to the top of the head following the IV administration of 75mL of Omnipaque 350.   Sagittal and coronal reconstructions and maximum intensity projection reconstructions were performed.    COMPARISON:    CTA head and  neck 09/11/2023, MRI brain 09/11/2023    FINDINGS:  Intracranial Compartment:    Ventricles and sulci are normal in size for age without evidence of hydrocephalus. No extra-axial blood or fluid collections.    Irregular white matter hypoattenuation although nonspecific most in keeping with chronic microangiopathic ischemic change.  Hypoattenuating focus in the left basal ganglia most consistent with remote lacunar infarct.  No parenchymal mass, hemorrhage, edema, or major vascular distribution infarct.    Skull/Extracranial Contents (limited evaluation): No fracture. Mastoid air cells and paranasal sinuses are essentially clear.    Non-Vascular Structures of the Neck/Thoracic Inlet (limited evaluation): Partially imaged left hilar node measures 12 mm (series 6, image 1).  Prominent although non pathologically enlarged mediastinal lymph nodes.    Aorta: Normal 3 vessel arch.    Extracranial carotid circulation: No hemodynamically significant stenosis, aneurysmal dilatation, or dissection.  Atherosclerotic plaque at the carotid bifurcation resulting in less than 50% stenosis, similar to prior.    Extracranial vertebral circulation: No hemodynamically significant stenosis, aneurysmal dilatation, or dissection.    Intracranial Arteries: No focal high-grade stenosis, occlusion, or aneurysm.  Atherosclerotic calcification of the cavernous ICAs.  Multifocal irregularity likely due to underlying atherosclerotic plaque involving the bilateral M1 segments the middle cerebral arteries, P2 segment of the left PCA and intracranial right vertebral artery.    Venous structures (limited evaluation): Normal.                                       CT Maxillofacial Without Contrast (Final result)  Result time 02/18/24 07:45:55      Final result by Johnna Fowler MD (02/18/24 07:45:55)                   Impression:      No evidence of an acute displaced fracture.    The preliminary and final reports are concordant.      Electronically  signed by: Johnna Fowler  Date:    02/18/2024  Time:    07:45               Narrative:    EXAMINATION:  CT MAXILLOFACIAL WITHOUT CONTRAST    CLINICAL HISTORY:  Facial trauma, blunt;    TECHNIQUE:  Low dose CT images throughout the region of the facial bones.  Axial, sagittal and coronal reformations were obtained.  Contrast was not administered.    COMPARISON:  None    FINDINGS:  Mildly motion degraded exam.  No focal soft tissue swelling identified.  The globes and intraorbital contents are within normal limits.  No orbital fracture.    The remainder of the facial bones appear intact without evidence of an acute displaced fracture.  No osseous destructive lesions.    Temporomandibular joints appropriately position without evidence of dislocation.    Mild maxillary mucosal thickening.  Otherwise, paranasal sinuses essentially clear.  Mastoids are clear.    Limited intracranial evaluation is unremarkable.                                              ASSESSMENT AND PLAN:     New onset seizure - ongoing etiology  Rule out stroke  79 yo man with history of HTN, Pre-Diabetes, GERD and suspected KEITH, who presented to the ED with new onset seizure.  Etiology of seizure is unknown.    - Admitted to hospital medicine with q4 hour neuro checks, on telemetry, continuous pulse oximetry  - Seizure precautions while inpatient  - Continue Keppra 500 mg BID for prevention of seizures.   -hallucination could likely be post anesthesia affect versus secondary to Keppra.  If hallucinations do not improve, will switch Keppra to Vimpat  - Diet after eval per SLP  - Ordered EEG routine that showed moderate to severe slowing but no evidence of epileptiform activity  - MRI brain with and without contrast-Negative for acute ischemia or acute intracranial pathology.   - continue plavix 75 mg daily, atorvastatin 80 mg daily for stroke prevention  - Avoid seizure threshold lowering medications such as:  Bupropion, diphenhydramine, tramadol,  certain antibiotics  - patient will need follow-up as outpatient with Neurology for long-term EEG monitoring to better characterize epileptiform abnormalities  - Assessment for rehab with PT/OT/SLP evaluation and treatment.  -okay to be transitioned out of the ICU.  - Will follow along     Seizure education:  No driving for now, no swimming alone, no climbing high areas, no operation of heavy machinery or working with high risk electricity equipment.  Continue to take AEDs as prescribed. If any major side effects from neurological medications go to the ED including mood changes and severe depression.  Patient and/or next of kin informed.  Medication side effects discussed with the patient and/or caregiver.          Christopher Barrett MD  Neurology/vascular Neurology  Date of Service: 02/20/2024  9:54 AM    Please note: This note was transcribed using voice recognition software. Because of this technology there are often uinintended grammatical, spelling, and other transcription errors. Please disregard these errors.

## 2024-02-20 NOTE — ASSESSMENT & PLAN NOTE
? New onset  Pt with TIAs in past, was found down at home ? CVA > in ED had witnessed generalized seizure  W/u is negative including imaging, EEG for any obvious structural/neoplastic, acute ischemic/embolic, infectious, toxic process  TTE:    Left Ventricle: The left ventricle is normal in size. Mildly increased wall thickness. There is normal systolic function with a visually estimated ejection fraction of 60 - 65%. Grade I diastolic dysfunction. E/A ratio is 0.76.    Right Ventricle: Normal right ventricular cavity size. Wall thickness is normal. Right ventricle wall motion  is normal. Systolic function is normal.    IVC/SVC: Normal venous pressure at 3 mmHg.  Will need to be on AED (Keppra) and follow sz precautions  In the wake of today's workup and treatment he's noted to be a bit restless/impulsive - observe in ICU tonite if possible (1:1 setting)  Ativan prn  Possibly home tomorrow, preferably w/ HH

## 2024-02-20 NOTE — ASSESSMENT & PLAN NOTE
Noted to have ? Reactive nodes in mediastinum  Pt is w/o cp/sob/cough  Sats do drop a little but he likely has KEITH  I have elected not to start abx at this time  OPT f/u, consider dedicated chest CT

## 2024-02-20 NOTE — ASSESSMENT & PLAN NOTE
Continue ARB, diuretic, BB  CCB was held as this is suspected to have possibly acutely lowered BP and possibly also lowered sz threshold  BP remains labile over the past 24 hrs  Have resumed nifedipine at 30 rather than 60 mg dose  Orthostatic VS ordered, pt not clinically orthostatic  Apparently he does have KEITH - wife has observed sleep disordered breathing, though he does not snore too much - he is scheduled for in lab sleep study 3/5  OPT f/u and monitoring  Continue adjustments of BP meds as warranted  K+, Mg++ replacement

## 2024-02-20 NOTE — PLAN OF CARE
02/20/24 1435   Post-Acute Status   Post-Acute Authorization [facility] Status Set-up Complete/Auth obtained     RW delivered to pt from [facility]

## 2024-02-20 NOTE — ASSESSMENT & PLAN NOTE
A1c was 5.8% last Sep  Will update for completeness  Metformin on hold and likely will remain so on d/c so as to avoid any possible hypoglycemia

## 2024-02-20 NOTE — SUBJECTIVE & OBJECTIVE
"Interval History:     2/19/24  Assumed care today, pt seen and examined, chart reviewed, care d/w RN at bedside.  Pt off Precedex but required anesthesia to complete imaging studies.  He has had not further sz activity, but has been a bit confused and restless post procedure, requiring frequent redirection, he's trying to climb OOB, says "I am in a hotel room".  He's got 2 [employer] shows to go to soon and is not too happy about being told he can't drive, but says he will let his wife drive.  Note and appreciate input from neuro and all ancillary services.  Ambulated 300' w/ PT today.  Likely can go home tomorrow if stable and hopefully a bit less impulsive.     Review of Systems    11 pt ROS negative except as noted o/w    Objective:     Vital Signs (Most Recent):  Temp: 98 °F (36.7 °C) (02/19/24 1300)  Pulse: 85 (02/19/24 1730)  Resp: (!) 28 (02/19/24 1648)  BP: 128/60 (02/19/24 1702)  SpO2: 97 % (02/19/24 1730) Vital Signs (24h Range):  Temp:  [97.7 °F (36.5 °C)-99 °F (37.2 °C)] 98 °F (36.7 °C)  Pulse:  [61-94] 85  Resp:  [12-45] 28  SpO2:  [93 %-100 %] 97 %  BP: (128-211)/(60-92) 128/60     Weight: 91.8 kg (202 lb 6.1 oz)  Body mass index is 27.45 kg/m².    Intake/Output Summary (Last 24 hours) at 2/19/2024 1836  Last data filed at 2/19/2024 1709  Gross per 24 hour   Intake 200 ml   Output 979 ml   Net -779 ml         Physical Exam      General:  Alert, restless but redirectable w/ RN help, NAD  HEENT: neck supple, PERRL/EOMI, abrasion to right periorbital area, EAC clear bilaterally, normal bilateral carotid upstroke w/o bruits, inf turbs clear bilaterally, OC/OP clear  Lungs: CTAB  CV: RRR w/o M/G/R  Abdomen: soft, NTND, no HSM, no bruits/masses/pulsations  Ext: no edema  : def  Rectal: def  Neuro: NF    Significant Labs: All pertinent labs within the past 24 hours have been reviewed.    Significant Imaging: I have reviewed all pertinent imaging results/findings within the past 24 hours.  "

## 2024-02-20 NOTE — PT/OT/SLP PROGRESS
Occupational Therapy      Patient Name:  Papito Hutton   MRN:  [id number]    Patient not seen today secondary to Other (Comment) (Pt unavailable x 2 attempts). Will follow-up next service date.    2/20/2024

## 2024-02-20 NOTE — NURSING
Nurses Note -- 4 Eyes      2/20/2024   3:50 PM      Skin assessed during: Transfer      [] No Altered Skin Integrity Present    [x]Prevention Measures Documented      [x] Yes- Altered Skin Integrity Present or Discovered   [] LDA Added if Not in Epic (Describe Wound)   [] New Altered Skin Integrity was Present on Admit and Documented in LDA   [] Wound Image Taken    Wound Care Consulted? No    Attending Nurse:  LEOBARDO gao    Second RN/Staff Member:  MARLINE Reynoso    [id number]

## 2024-02-20 NOTE — PROGRESS NOTES
"[facility] Medicine  Progress Note    Patient Name: Papito Hutton  MRN: [id number]  Patient Class: IP- Inpatient   Admission Date: 2/17/2024  Length of Stay: 2 days  Attending Physician: Sathish You MD  Primary Care Provider: Rafi Martin MD        Subjective:     Principal Problem:Seizure        HPI:  Patient is a 79yo with a PMH of HTN, Pre-Diabetes, GERD and suspected KEITH. Patient was unable to speak, so history was provided by [facility] and Patient's spouse. Patient's spouse found Patient to be unconscious in the afternoon, this was after she saw the Patient Aox4 in the morning. To the spouse it seemed as though the Patient had fallen over, when she found him he was conscious though very confused and somnolent. She called EMS, to EMS Patient had a slight right sided facial droop, though Patient's spouse couldn't see it. To note, Patient was recently started on Procardia for his resistant hypertension, his BP in the morning was     Patient was brought to [facility] ED where he had a CT Scan, Neurology reviewed the CT Findings, found there wasn't a large vessel occlusion. However Patient had a Seizure in that ED and was transferred to [facility] after getting IV Keppra. While at [facility] Patient was noted to be agitated after his Seizure, with him moving all four extremities, and was placed on Precedex gtt    Overview/Hospital Course:  No notes on file    Interval History:     2/19/24  Assumed care today, pt seen and examined, chart reviewed, care d/w RN at bedside.  Pt off Precedex but required anesthesia to complete imaging studies.  He has had not further sz activity, but has been a bit confused and restless post procedure, requiring frequent redirection, he's trying to climb OOB, says "I am in a hotel room".  He's got 2 [employer] shows to go to soon and is not too happy about being told he can't drive, but says he will let his wife drive.  Note and appreciate input from neuro and all " ancillary services.  Ambulated 300' w/ PT today.  Likely can go home tomorrow if stable and hopefully a bit less impulsive.     Review of Systems    11 pt ROS negative except as noted o/w    Objective:     Vital Signs (Most Recent):  Temp: 98 °F (36.7 °C) (02/19/24 1300)  Pulse: 85 (02/19/24 1730)  Resp: (!) 28 (02/19/24 1648)  BP: 128/60 (02/19/24 1702)  SpO2: 97 % (02/19/24 1730) Vital Signs (24h Range):  Temp:  [97.7 °F (36.5 °C)-99 °F (37.2 °C)] 98 °F (36.7 °C)  Pulse:  [61-94] 85  Resp:  [12-45] 28  SpO2:  [93 %-100 %] 97 %  BP: (128-211)/(60-92) 128/60     Weight: 91.8 kg (202 lb 6.1 oz)  Body mass index is 27.45 kg/m².    Intake/Output Summary (Last 24 hours) at 2/19/2024 1836  Last data filed at 2/19/2024 1709  Gross per 24 hour   Intake 200 ml   Output 979 ml   Net -779 ml         Physical Exam      General:  Alert, restless but redirectable w/ RN help, NAD  HEENT: neck supple, PERRL/EOMI, abrasion to right periorbital area, EAC clear bilaterally, normal bilateral carotid upstroke w/o bruits, inf turbs clear bilaterally, OC/OP clear  Lungs: CTAB  CV: RRR w/o M/G/R  Abdomen: soft, NTND, no HSM, no bruits/masses/pulsations  Ext: no edema  : def  Rectal: def  Neuro: NF    Significant Labs: All pertinent labs within the past 24 hours have been reviewed.    Significant Imaging: I have reviewed all pertinent imaging results/findings within the past 24 hours.    Assessment/Plan:      * Seizure  ? New onset  Pt with TIAs in past, was found down at home ? CVA > in ED had witnessed generalized seizure  W/u is negative including imaging, EEG for any obvious structural/neoplastic, acute ischemic/embolic, infectious, toxic process  TTE:    Left Ventricle: The left ventricle is normal in size. Mildly increased wall thickness. There is normal systolic function with a visually estimated ejection fraction of 60 - 65%. Grade I diastolic dysfunction. E/A ratio is 0.76.    Right Ventricle: Normal right ventricular cavity  size. Wall thickness is normal. Right ventricle wall motion  is normal. Systolic function is normal.    IVC/SVC: Normal venous pressure at 3 mmHg.  Will need to be on AED (Keppra) and follow sz precautions  In the wake of today's workup and treatment he's noted to be a bit restless/impulsive - observe in ICU tonite if possible (1:1 setting)  Ativan prn  Possibly home tomorrow, preferably w/ HH        Hypomagnesemia  As low as 1.3  Due to Dyazide?  Replace, trend, follow as OPT  2.0 today    Advance care planning  Full Code      Abnormal CT scan, cervical spine  DISH and significant focal central canal and foraminal narrowing/osteophytosis seen, incidentally, no fractures  Pt is w/o c/o neck/arm pain/weakness  OPT f/u        Abnormal chest x-ray  Noted to have ? Reactive nodes in mediastinum  Pt is w/o cp/sob/cough  OPT f/u    Facial contusion  CT maxillofacial and c-spine negative  Local wound care        Hx of transient ischemic attack (TIA)  Secondary prevention, on Plavix, statin  HTN mgt  W/u for secondary HTN as OPT at discretion of PCP - would certainly consider sleep apnea testing  Imaging this admit w/o any significant atherosclerotic process  TTE noted - has he had a bubble study?        Prediabetes  A1c was 5.8% last Sep  Not repeated here?  Will add to AM lab for completeness  Metformin on hold and likely will remain so on d/c so as to avoid any possible hypoglycemia      Hypertension  Continue ARB, diuretic, BB  We were CCB as this is suspected to have possibly acutely lowered BP and possibly also lowered sz threshold; however his BP got very high this afternoon, a dose was given and it's normal now  Will continue nifedipine, but at 30 mg dose  Does he have secondary cause for HTN - KEITH? Other?  OPT f/u and monitoring  Consider sleep study if not done previously      Hyperlipidemia  Continue Lipitor 80 mg QD  Note lipid panel  OPT f/u          VTE Risk Mitigation (From admission, onward)            Ordered     enoxaparin injection 40 mg  Daily         02/17/24 2224     IP VTE HIGH RISK PATIENT  Once         02/17/24 2224     Place sequential compression device  Until discontinued         02/17/24 2224                    Discharge Planning   ROCIO: 2/23/2024     Code Status: Full Code   Is the patient medically ready for discharge?:     Reason for patient still in hospital (select all that apply): Patient trending condition  Discharge Plan A: Home with family            Care time spent on the evaluation and treatment of severe organ dysfunction, review of pertinent labs and imaging studies, discussions with consulting providers and discussions with patient/family: 35 minutes.      Sathish You MD  Department of Hospital Medicine   [facility]

## 2024-02-20 NOTE — PROGRESS NOTES
"         [facility] Medicine  Progress Note     Patient Name: Papito Hutton  MRN: [id number]  Patient Class: IP- Inpatient     Admission Date: 2/17/2024  Length of Stay: 2 days  Attending Physician: Sathish You MD  Primary Care Provider: Rafi Martin MD           Subjective:      Principal Problem:Seizure        HPI:  Patient is a 79yo with a PMH of HTN, Pre-Diabetes, GERD and suspected KEITH. Patient was unable to speak, so history was provided by [facility] and Patient's spouse. Patient's spouse found Patient to be unconscious in the afternoon, this was after she saw the Patient Aox4 in the morning. To the spouse it seemed as though the Patient had fallen over, when she found him he was conscious though very confused and somnolent. She called EMS, to EMS Patient had a slight right sided facial droop, though Patient's spouse couldn't see it. To note, Patient was recently started on Procardia for his resistant hypertension, his BP in the morning was      Patient was brought to [facility] ED where he had a CT Scan, Neurology reviewed the CT Findings, found there wasn't a large vessel occlusion. However Patient had a Seizure in that ED and was transferred to [facility] after getting IV Keppra. While at [facility] Patient was noted to be agitated after his Seizure, with him moving all four extremities, and was placed on Precedex gtt     Overview/Hospital Course:  No notes on file     Interval History:      2/19/24  Assumed care today, pt seen and examined, chart reviewed, care d/w RN at bedside.  Pt off Precedex but required anesthesia to complete imaging studies.  He has had not further sz activity, but has been a bit confused and restless post procedure, requiring frequent redirection, he's trying to climb OOB, says "I am in a hotel room".  He's got 2 [employer] shows to go to soon and is not too happy about being told he can't drive, but says he will let his wife drive.  Note and appreciate input " "from neuro and all ancillary services.  Ambulated 300' w/ PT today.  Likely can go home tomorrow if stable and hopefully a bit less impulsive.     2/20/24  Pt wife is here today and is very apprehensive about him potentially going home in his current state.  He remains impulsive, trying to get OOB w/o assistance, frequently interrupts during conversations, and RN and wife describe visual hallucinations "he was seeing bats on the ceiling earlier" - Currently he's awake and alert and in NAD, oriented x 4.  BP remains labile and they confirm that he did recently have an abnormal HST and is scheduled for in lab sleep study 3/5.  No further sz activity.  Due to wife's level of concern about not being able to care for him at home, her desire to speak further w/ neurology, his impulsiveness and hallucinations, and his ongoing BP med adjustments we will not pursue d/c home today, rather will downgrade to med-tele and possibly get him home in a day or two.  Continue PT/OT work on floor. CM for d/c planning/HH.  I did caution that some of his ongoing encephalopathy may be hospital induced and may actually improve w/ d/c.  No ha/n/v/cp/sob/f/c          Review of Systems    11 pt ROS negative except as noted o/w    Objective:     Vital Signs (Most Recent):  Temp: 98 °F (36.7 °C) (02/20/24 0301)  Pulse: 80 (02/20/24 0400)  Resp: (!) 22 (02/20/24 0400)  BP: (!) 127/59 (02/20/24 0400)  SpO2: (!) 94 % (02/20/24 0400) Vital Signs (24h Range):  Temp:  [98 °F (36.7 °C)-98.6 °F (37 °C)] 98 °F (36.7 °C)  Pulse:  [63-94] 80  Resp:  [12-45] 22  SpO2:  [93 %-100 %] 94 %  BP: (127-211)/(59-92) 127/59     Weight: 91.8 kg (202 lb 6.1 oz)  Body mass index is 27.45 kg/m².    Intake/Output Summary (Last 24 hours) at 2/20/2024 0745  Last data filed at 2/20/2024 0515  Gross per 24 hour   Intake 1640 ml   Output 734 ml   Net 906 ml         Physical Exam      General:  A&O, NAD  HEENT: neck supple, PERRL/EOMI, EAC clear bilaterally, normal bilateral " carotid upstroke w/o bruits, inf turbs clear bilaterally, OC/OP clear  Lungs: CTAB  CV: RRR w/o M/G/R  Abdomen: soft, NTND, no HSM, no bruits/masses/pulsations  Ext: no edema  : def  Rectal: def  Neuro: NF    Significant Labs: All pertinent labs within the past 24 hours have been reviewed.    Significant Imaging: I have reviewed all pertinent imaging results/findings within the past 24 hours.    Assessment/Plan:      * Seizure  ? New onset  Pt with TIAs in past, was found down at home ? CVA > in ED had witnessed generalized seizure  W/u is negative including imaging, EEG for any obvious structural/neoplastic, acute ischemic/embolic, infectious, toxic process  TTE:    Left Ventricle: The left ventricle is normal in size. Mildly increased wall thickness. There is normal systolic function with a visually estimated ejection fraction of 60 - 65%. Grade I diastolic dysfunction. E/A ratio is 0.76.    Right Ventricle: Normal right ventricular cavity size. Wall thickness is normal. Right ventricle wall motion  is normal. Systolic function is normal.    IVC/SVC: Normal venous pressure at 3 mmHg.  Will need to be on AED (Keppra) and follow sz precautions - changed Keppra to po from IV today  Seizure precautions, advised pt and wife again today that he will not be able to drive, work at [employer], etc for the time being  In the wake of his workup and treatment he's noted to be a bit restless/impulsive  Ativan prn  Possibly home soon  Downgrade to med tele        Hypomagnesemia  As low as 1.3  Due to Dyazide probably  Pt says he has K+ and Mg++ at home but does not take them, told him he needs to take as prescribed and that his diuretic is probably causing these problems but he needs to stay on it for the time being for his blood pressure  Replace, trend, follow as OPT  2.0 today    Advance care planning  Full Code      Abnormal CT scan, cervical spine  DISH and significant focal central canal and foraminal  narrowing/osteophytosis seen, incidentally, no fractures  Pt is w/o c/o neck/arm pain/weakness  OPT f/u        Abnormal chest x-ray  Noted to have ? Reactive nodes in mediastinum  Pt is w/o cp/sob/cough  Sats do drop a little but he likely has KEITH  I have elected not to start abx at this time  OPT f/u, consider dedicated chest CT    Facial contusion  CT maxillofacial and c-spine negative  Local wound care        Hx of transient ischemic attack (TIA)  Secondary prevention, on Plavix, statin  HTN mgt  W/u for secondary HTN as OPT at discretion of PCP  Pt very likely will continue to have difficulty w/ BP control until his underlying sleep apnea is formally diagnosed and treated, he is scheduled for a sleep study in lab 3/5, don't know if this can be done any sooner?  Will ask CM to look into it  TTE noted - has he had a bubble study?        Prediabetes  A1c was 5.8% last Sep  Will update for completeness  Metformin on hold and likely will remain so on d/c so as to avoid any possible hypoglycemia      Hypertension  Continue ARB, diuretic, BB  CCB was held as this is suspected to have possibly acutely lowered BP and possibly also lowered sz threshold  BP remains labile over the past 24 hrs  Have resumed nifedipine at 30 rather than 60 mg dose  Orthostatic VS ordered, pt not clinically orthostatic  Apparently he does have KEITH - wife has observed sleep disordered breathing, though he does not snore too much - he is scheduled for in lab sleep study 3/5  OPT f/u and monitoring  Continue adjustments of BP meds as warranted  K+, Mg++ replacement        Hyperlipidemia  Continue Lipitor 80 mg QD  Note lipid panel  OPT f/u          VTE Risk Mitigation (From admission, onward)           Ordered     enoxaparin injection 40 mg  Daily         02/17/24 2224     IP VTE HIGH RISK PATIENT  Once         02/17/24 2224     Place sequential compression device  Until discontinued         02/17/24 2224                    Discharge Planning    ROCIO: 2/23/2024     Code Status: Full Code   Is the patient medically ready for discharge?:     Reason for patient still in hospital (select all that apply): Patient trending condition  Discharge Plan A: Home with family        Care time spent on the evaluation and treatment of severe organ dysfunction, review of pertinent labs and imaging studies, discussions with consulting providers and discussions with patient/family: 35 minutes.      Sathish You MD  Department of Hospital Medicine   [facility]

## 2024-02-20 NOTE — PT/OT/SLP EVAL
"Speech Language Pathology Evaluation  Cognitive Communication    Patient Name:  Papito Hutton   MRN:  [id number]  Admitting Diagnosis: Seizure    Recommendations:     Recommendations:                General Recommendations:  Cognitive-linguistic therapy  Diet recommendations:  Regular Diet - IDDSI Level 7, Thin liquids - IDDSI Level 0   Aspiration Precautions: Standard aspiration precautions   General Precautions: Standard,    Communication strategies:  none    Assessment:     Papito Hutton is a 78 y.o. male with an SLP diagnosis of moderate Cognitive-Linguistic Impairment.  He presents with impaired attention, memory, and language. Pt w/ poor insight to deficits. Rec ST during admit and low intensity ST at d'c to address cognitive deficits.    History:   Per H&P:  "HPI: Patient is a 77yo with a PMH of HTN, Pre-Diabetes, GERD and suspected KEITH. Patient was unable to speak, so history was provided by [facility] and Patient's spouse. Patient's spouse found Patient to be unconscious in the afternoon, this was after she saw the Patient Aox4 in the morning. To the spouse it seemed as though the Patient had fallen over, when she found him he was conscious though very confused and somnolent. She called EMS, to EMS Patient had a slight right sided facial droop, though Patient's spouse couldn't see it. To note, Patient was recently started on Procardia for his resistant hypertension, his BP in the morning was      Patient was brought to [facility] ED where he had a CT Scan, Neurology reviewed the CT Findings, found there wasn't a large vessel occlusion. However Patient had a Seizure in that ED and was transferred to [facility] after getting IV Keppra. While at [facility] Patient was noted to be agitated after his Seizure, with him moving all four extremities, and was placed on Precedex gtt."          Past Medical History:   Diagnosis Date    Arthritis      B12 deficiency      Colon polyp      Diabetes mellitus       BORDERLINE    " GERD (gastroesophageal reflux disease)      [place] (hard of hearing)       BILAT AIDS    Hyperlipidemia      Hypertension      Low testosterone      Personal history of colonic polyps 2008     adenomatous polyp    Sinus disorder      Sleep apnea       DOES NOT USE MACHINE    Wears glasses                 Past Surgical History:   Procedure Laterality Date    COLON SURGERY        COLONOSCOPY   2008     Dr. Garcia; polyps removed    COLONOSCOPY N/A 07/13/2020     Procedure: COLONOSCOPY;  Surgeon: Jj Fried MD;  Location: [facility] ENDO;  Service: Endoscopy;  Laterality: N/A;    COLONOSCOPY N/A 8/15/2023     Procedure: COLONOSCOPY;  Surgeon: Jj Fried MD;  Location: [facility];  Service: Endoscopy;  Laterality: N/A;    ESOPHAGOGASTRODUODENOSCOPY N/A 7/31/2023     Procedure: EGD (ESOPHAGOGASTRODUODENOSCOPY);  Surgeon: Jj Fried MD;  Location: [facility];  Service: Endoscopy;  Laterality: N/A;    LAPAROSCOPIC RIGHT COLON RESECTION Right 08/10/2020     Procedure: COLECTOMY, RIGHT, LAPAROSCOPIC pooss conversion to open;  Surgeon: Noble Kaye MD;  Location: [facility] OR;  Service: General;  Laterality: Right;    NASAL SEPTUM SURGERY        right hand surgery        RIGHT HEMICOLECTOMY N/A 08/10/2020     Procedure: HEMICOLECTOMY, RIGHT;  Surgeon: Noble Kaye MD;  Location: [facility] OR;  Service: General;  Laterality: N/A;    UPPER GASTROINTESTINAL ENDOSCOPY   2008     hiatal hernia; esophagitis         Social History: Patient lives with his SO.     Prior Intubation HX:  2/19/24 for MRI     Modified Barium Swallow: none found in epic or reported by pt     Imaging:   Imaging Results                  CT Cervical Spine Without Contrast (Final result)  Result time 02/18/24 07:52:53            Final result by Johnna Fowler MD (02/18/24 07:52:53)                           Impression:        No fracture or traumatic malalignment of the cervical spine.     Cervical spondylosis resulting in up to severe spinal  canal stenosis at C3-C4 and multilevel moderate to severe neural foraminal stenosis at the lower levels.     Bulky anterior flowing osteophytes consistent with diffuse idiopathic skeletal hyperostosis (DISH).     The preliminary and final reports are concordant.        Electronically signed by:     Johnna Fowler  Date:                                            02/18/2024  Time:                                            07:52                     Narrative:     EXAMINATION:  CT CERVICAL SPINE WITHOUT CONTRAST     CLINICAL HISTORY:  Neck trauma (Age >= 65y);     TECHNIQUE:  Low dose axial CT images through the cervical spine, with sagittal and coronal reformations.  Contrast was not administered.     COMPARISON:  CT head and neck 09/11/2020     FINDINGS:  The vertebral bodies are normal in height and morphology without evidence of fracture or osseous destructive process.  Normal sagittal alignment is preserved.     Mild degenerative changes without evidence of bony spinal canal stenosis.  Multilevel posterior disc bulges resulting moderate to severe spinal canal stenosis worse at C3-C4.  Moderate to severe neural foraminal stenosis at C4-C5, C5-C6 and C6-C7.  Intervertebral disc space height loss at C6-C7.  Otherwise, intervertebral disc heights are well maintained.     Bulky anterior flowing osteophytes extending C2 through C7.     Limited evaluation of the intraspinal contents demonstrates no hematoma or mass.Paraspinal soft tissues exhibit no acute abnormalities.                                                CTA Head and Neck (xpd) (Final result)  Result time 02/18/24 08:14:35            Final result by Johnna Fowler MD (02/18/24 08:14:35)                           Impression:        No acute intracranial abnormality.  No high-grade stenosis or major vessel occlusion.     Prominent mediastinal and hilar lymph nodes, possibly reactive.     The preliminary and final reports are concordant.        Electronically  signed by:     Johnna Fowler  Date:                                            02/18/2024  Time:                                            08:14                     Narrative:     EXAMINATION:  CTA HEAD AND NECK (XPD)     CLINICAL HISTORY:  Neuro deficit, acute, stroke suspected;     TECHNIQUE:  Non contrast low dose axial images were obtained through the head.  CT angiogram was performed from the level of the chaparrita to the top of the head following the IV administration of 75mL of Omnipaque 350.   Sagittal and coronal reconstructions and maximum intensity projection reconstructions were performed.     COMPARISON:     CTA head and neck 09/11/2023, MRI brain 09/11/2023     FINDINGS:  Intracranial Compartment:     Ventricles and sulci are normal in size for age without evidence of hydrocephalus. No extra-axial blood or fluid collections.     Irregular white matter hypoattenuation although nonspecific most in keeping with chronic microangiopathic ischemic change.  Hypoattenuating focus in the left basal ganglia most consistent with remote lacunar infarct.  No parenchymal mass, hemorrhage, edema, or major vascular distribution infarct.     Skull/Extracranial Contents (limited evaluation): No fracture. Mastoid air cells and paranasal sinuses are essentially clear.     Non-Vascular Structures of the Neck/Thoracic Inlet (limited evaluation): Partially imaged left hilar node measures 12 mm (series 6, image 1).  Prominent although non pathologically enlarged mediastinal lymph nodes.     Aorta: Normal 3 vessel arch.     Extracranial carotid circulation: No hemodynamically significant stenosis, aneurysmal dilatation, or dissection.  Atherosclerotic plaque at the carotid bifurcation resulting in less than 50% stenosis, similar to prior.     Extracranial vertebral circulation: No hemodynamically significant stenosis, aneurysmal dilatation, or dissection.     Intracranial Arteries: No focal high-grade stenosis, occlusion, or  aneurysm.  Atherosclerotic calcification of the cavernous ICAs.  Multifocal irregularity likely due to underlying atherosclerotic plaque involving the bilateral M1 segments the middle cerebral arteries, P2 segment of the left PCA and intracranial right vertebral artery.     Venous structures (limited evaluation): Normal.                                                CT Maxillofacial Without Contrast (Final result)  Result time 02/18/24 07:45:55            Final result by Johnna Fowler MD (02/18/24 07:45:55)                           Impression:        No evidence of an acute displaced fracture.     The preliminary and final reports are concordant.        Electronically signed by:     Johnna Fowler  Date:                                            02/18/2024  Time:                                            07:45                     Narrative:     EXAMINATION:  CT MAXILLOFACIAL WITHOUT CONTRAST     CLINICAL HISTORY:  Facial trauma, blunt;     TECHNIQUE:  Low dose CT images throughout the region of the facial bones.  Axial, sagittal and coronal reformations were obtained.  Contrast was not administered.     COMPARISON:  None     FINDINGS:  Mildly motion degraded exam.  No focal soft tissue swelling identified.  The globes and intraorbital contents are within normal limits.  No orbital fracture.     The remainder of the facial bones appear intact without evidence of an acute displaced fracture.  No osseous destructive lesions.     Temporomandibular joints appropriately position without evidence of dislocation.     Mild maxillary mucosal thickening.  Otherwise, paranasal sinuses essentially clear.  Mastoids are clear.     Limited intracranial evaluation is unremarkable.                                               Prior diet: Regular, thin at home; NPO at time of eval.     Occupation/hobbies/homemaking: Pt is retired [employer] and . He enjoys fishing and golf.      Subjective     Pt awake sitting in bedside chair;  "he reports continued hallucinations that he attributes to yesterday's anesthesia. SO at bedside. Pt agreeable to cognitive evaluation.  Patient goals: "When I retired, I decided I wanted to do nothing, so I'm going to keep doing nothing once I get home even though you say I need therapy"     Pain/Comfort:       Respiratory Status: Room air    Objective:     Cognitive Status:      MoCA (Version 8.3) administered with pt achieving a score of 15/30 suggesting moderate cognitive-linguistic impairment. Pt earned 3 of 5 points on visuospatial/executive functions, 3 of 3 points on naming, 2 of 6 points for attention, 0 of 3 points for language, 2 of 2 points for abstraction, 0 of 5 points for delayed recall and 5 of 6 points for orientation. Pt w/ incorrect hand placement on clock and incorrect drawing of bed. He recalled 3/5 words on first immediate recall task and 4/5 words on second. Incorrect recall of number series and incorrect completion of letter series, though he verbally noted most "A's." Attention for mental math impaired significantly. Pt eliminated phrases from sentence repetition task; he named 9 words w/in the criteria of divergent naming task. He was able to recall 3/5 words given a multiple choice cue.     Pt not receptive to evaluation findings. He does not feel the evaluation proves he has cognitive deficits. He was agreeable to ST once increased ed, encouragement to participate, and practical applications of therapy/deficits impacting everyday life were provided.     Receptive Language:   Comprehension:   Questions Simple yes/no WFL  Open ended questions mostly functional  Commands  One step WFL  two step basic commands WFL  complex/abstract commands impaired  Conversation WFL    Pragmatics:    Appropriate    Expressive Language:  Verbal:    Initiation WFL  Repetition Words impaired 2/2 attention/memory and Sentences impaired 2/2 memory/attention  Naming Confrontation WFL, Convergent WFL, and Divergent " responsive impaired  Conversational speech WFL      Motor Speech:  WFL    Voice:     Adequate for age, sex, size.    Visual-Spatial:  WFL    Reading:   Not assessed      Written Expression:   WFL    Treatment: Pt and family educated re purpose of evaluation, role of SLP, results of evaluation, and recs. Pt and family expressed understanding of recs. Recs shared w/ nursing, , and MD.      Goals:   Multidisciplinary Problems       SLP Goals          Problem: SLP    Goal Priority Disciplines Outcome   SLP Goal     SLP Ongoing, Progressing   Description: 1. Pt will complete cognitive-linguistic evaluation -MET  2. Pt will complete immediate and delayed memory tasks w/ 80% accuracy given min cues  3. Pt will sustain attention to task w/ <2 repetitions/redirections per task over 80% of tasks  4. Pt will complete complex vocabulary tasks w/ 80% accuracy given min cues                       Plan:   Patient to be seen:  3 x/week   Plan of Care expires:     Plan of Care reviewed with:  patient, significant other, other (see comments) (nursing, MD, )   SLP Follow-Up:  Yes       Discharge recommendations:  Therapy Intensity Recommendations at Discharge: Low Intensity Therapy   Barriers to Discharge:  Safety Awareness /insight to deficits    Time Tracking:     SLP Treatment Date:   02/20/24  Speech Start Time:  1015  Speech Stop Time:  1050     Speech Total Time (min):  35 min    Billable Minutes: Eval cognitive-linguistic 20 min  and Self Care/Home Management Training 15 min    02/20/2024

## 2024-02-20 NOTE — ASSESSMENT & PLAN NOTE
DISH and significant focal central canal and foraminal narrowing/osteophytosis seen, incidentally, no fractures  Pt is w/o c/o neck/arm pain/weakness  OPT f/u

## 2024-02-20 NOTE — PLAN OF CARE
Problem: SLP  Goal: SLP Goal  Description: 1. Pt will complete cognitive-linguistic evaluation -MET  2. Pt will complete immediate and delayed memory tasks w/ 80% accuracy given min cues  3. Pt will sustain attention to task w/ <2 repetitions/redirections per task over 80% of tasks  4. Pt will complete complex vocabulary tasks w/ 80% accuracy given min cues  Outcome: Ongoing, Progressing     Cognitive-linguistic evaluation completed; pt presents w/ moderate cognitive-linguistic impairments. Rec low intensity ST at d'c.

## 2024-02-20 NOTE — ASSESSMENT & PLAN NOTE
Continue ARB, diuretic, BB  We were CCB as this is suspected to have possibly acutely lowered BP and possibly also lowered sz threshold; however his BP got very high this afternoon, a dose was given and it's normal now  Will continue nifedipine, but at 30 mg dose  Does he have secondary cause for HTN - KEITH? Other?  OPT f/u and monitoring  Consider sleep study if not done previously

## 2024-02-20 NOTE — ASSESSMENT & PLAN NOTE
Secondary prevention, on Plavix, statin  HTN mgt  W/u for secondary HTN as OPT at discretion of PCP - would certainly consider sleep apnea testing  Imaging this admit w/o any significant atherosclerotic process  TTE noted - has he had a bubble study?

## 2024-02-20 NOTE — PLAN OF CARE
02/20/24 1249   Discharge Reassessment   Assessment Type Discharge Planning Reassessment   Did the patient's condition or plan change since previous assessment? No   Discharge Plan discussed with: Patient   Communicated ROCIO with patient/caregiver Yes   Discharge Plan A Home Health   Discharge Plan B Home Health   DME Needed Upon Discharge  shower chair;walker, rolling   Transition of Care Barriers None   Why the patient remains in the hospital Requires continued medical care   Post-Acute Status   Post-Acute Authorization Home Health;[facility] Status Referrals Sent   Home Health Status Pending medical clearance/testing   Patient choice form signed by patient/caregiver List from [employer] Compare   Discharge Delays None known at this time     Spoke to pt at bedside about the need for home health, pt signed choice for [facility] who will accept pt pending discharge for SOC.  Order for SC and RW sent to [facility] via secure chat(MG).  Cm to follow for completion

## 2024-02-20 NOTE — ASSESSMENT & PLAN NOTE
A1c was 5.8% last Sep  Not repeated here?  Will add to AM lab for completeness  Metformin on hold and likely will remain so on d/c so as to avoid any possible hypoglycemia

## 2024-02-20 NOTE — ASSESSMENT & PLAN NOTE
? New onset  Pt with TIAs in past, was found down at home ? CVA > in ED had witnessed generalized seizure  W/u is negative including imaging, EEG for any obvious structural/neoplastic, acute ischemic/embolic, infectious, toxic process  TTE:    Left Ventricle: The left ventricle is normal in size. Mildly increased wall thickness. There is normal systolic function with a visually estimated ejection fraction of 60 - 65%. Grade I diastolic dysfunction. E/A ratio is 0.76.    Right Ventricle: Normal right ventricular cavity size. Wall thickness is normal. Right ventricle wall motion  is normal. Systolic function is normal.    IVC/SVC: Normal venous pressure at 3 mmHg.  Will need to be on AED (Keppra) and follow sz precautions - changed Keppra to po from IV today  Seizure precautions, advised pt and wife again today that he will not be able to drive, work at heights, etc for the time being  In the wake of his workup and treatment he's noted to be a bit restless/impulsive  Ativan prn  Possibly home soon  Downgrade to med tele

## 2024-02-21 LAB
ALBUMIN SERPL BCP-MCNC: 3.9 G/DL (ref 3.5–5.2)
ALP SERPL-CCNC: 70 U/L (ref 55–135)
ALT SERPL W/O P-5'-P-CCNC: 28 U/L (ref 10–44)
ANION GAP SERPL CALC-SCNC: 7 MMOL/L (ref 8–16)
AST SERPL-CCNC: 28 U/L (ref 10–40)
BASOPHILS # BLD AUTO: 0.05 K/UL (ref 0–0.2)
BASOPHILS NFR BLD: 0.7 % (ref 0–1.9)
BILIRUB SERPL-MCNC: 0.9 MG/DL (ref 0.1–1)
BILIRUB UR QL STRIP: NEGATIVE
BUN SERPL-MCNC: 26 MG/DL (ref 8–23)
CALCIUM SERPL-MCNC: 10.2 MG/DL (ref 8.7–10.5)
CHLORIDE SERPL-SCNC: 105 MMOL/L (ref 95–110)
CLARITY UR: CLEAR
CO2 SERPL-SCNC: 27 MMOL/L (ref 23–29)
COLOR UR: YELLOW
CREAT SERPL-MCNC: 1.2 MG/DL (ref 0.5–1.4)
DIFFERENTIAL METHOD BLD: ABNORMAL
EOSINOPHIL # BLD AUTO: 0.2 K/UL (ref 0–0.5)
EOSINOPHIL NFR BLD: 2.4 % (ref 0–8)
ERYTHROCYTE [DISTWIDTH] IN BLOOD BY AUTOMATED COUNT: 13.3 % (ref 11.5–14.5)
EST. GFR  (NO RACE VARIABLE): >60 ML/MIN/1.73 M^2
GLUCOSE SERPL-MCNC: 115 MG/DL (ref 70–110)
GLUCOSE SERPL-MCNC: 128 MG/DL (ref 70–110)
GLUCOSE SERPL-MCNC: 150 MG/DL (ref 70–110)
GLUCOSE UR QL STRIP: NEGATIVE
HCT VFR BLD AUTO: 39.2 % (ref 40–54)
HGB BLD-MCNC: 13 G/DL (ref 14–18)
HGB UR QL STRIP: NEGATIVE
IMM GRANULOCYTES # BLD AUTO: 0.02 K/UL (ref 0–0.04)
IMM GRANULOCYTES NFR BLD AUTO: 0.3 % (ref 0–0.5)
KETONES UR QL STRIP: NEGATIVE
LEUKOCYTE ESTERASE UR QL STRIP: NEGATIVE
LYMPHOCYTES # BLD AUTO: 2 K/UL (ref 1–4.8)
LYMPHOCYTES NFR BLD: 26.7 % (ref 18–48)
MAGNESIUM SERPL-MCNC: 1.9 MG/DL (ref 1.6–2.6)
MCH RBC QN AUTO: 30.2 PG (ref 27–31)
MCHC RBC AUTO-ENTMCNC: 33.2 G/DL (ref 32–36)
MCV RBC AUTO: 91 FL (ref 82–98)
MONOCYTES # BLD AUTO: 0.9 K/UL (ref 0.3–1)
MONOCYTES NFR BLD: 11.5 % (ref 4–15)
NEUTROPHILS # BLD AUTO: 4.3 K/UL (ref 1.8–7.7)
NEUTROPHILS NFR BLD: 58.4 % (ref 38–73)
NITRITE UR QL STRIP: NEGATIVE
NRBC BLD-RTO: 0 /100 WBC
PH UR STRIP: 7 [PH] (ref 5–8)
PLATELET # BLD AUTO: 218 K/UL (ref 150–450)
PMV BLD AUTO: 10.6 FL (ref 9.2–12.9)
POTASSIUM SERPL-SCNC: 3.9 MMOL/L (ref 3.5–5.1)
PROT SERPL-MCNC: 6.8 G/DL (ref 6–8.4)
PROT UR QL STRIP: NEGATIVE
RBC # BLD AUTO: 4.3 M/UL (ref 4.6–6.2)
SODIUM SERPL-SCNC: 139 MMOL/L (ref 136–145)
SP GR UR STRIP: 1 (ref 1–1.03)
URN SPEC COLLECT METH UR: ABNORMAL
UROBILINOGEN UR STRIP-ACNC: NEGATIVE EU/DL
WBC # BLD AUTO: 7.42 K/UL (ref 3.9–12.7)

## 2024-02-21 PROCEDURE — 25000003 PHARM REV CODE 250: Performed by: HOSPITALIST

## 2024-02-21 PROCEDURE — 97530 THERAPEUTIC ACTIVITIES: CPT

## 2024-02-21 PROCEDURE — 25000003 PHARM REV CODE 250: Performed by: INTERNAL MEDICINE

## 2024-02-21 PROCEDURE — 63600175 PHARM REV CODE 636 W HCPCS: Performed by: STUDENT IN AN ORGANIZED HEALTH CARE EDUCATION/TRAINING PROGRAM

## 2024-02-21 PROCEDURE — 97535 SELF CARE MNGMENT TRAINING: CPT

## 2024-02-21 PROCEDURE — 21400001 HC TELEMETRY ROOM

## 2024-02-21 PROCEDURE — 25000003 PHARM REV CODE 250: Performed by: STUDENT IN AN ORGANIZED HEALTH CARE EDUCATION/TRAINING PROGRAM

## 2024-02-21 PROCEDURE — 81003 URINALYSIS AUTO W/O SCOPE: CPT | Performed by: INTERNAL MEDICINE

## 2024-02-21 PROCEDURE — 97116 GAIT TRAINING THERAPY: CPT | Mod: CQ

## 2024-02-21 PROCEDURE — 85025 COMPLETE CBC W/AUTO DIFF WBC: CPT | Performed by: STUDENT IN AN ORGANIZED HEALTH CARE EDUCATION/TRAINING PROGRAM

## 2024-02-21 PROCEDURE — 80053 COMPREHEN METABOLIC PANEL: CPT | Performed by: STUDENT IN AN ORGANIZED HEALTH CARE EDUCATION/TRAINING PROGRAM

## 2024-02-21 PROCEDURE — 97129 THER IVNTJ 1ST 15 MIN: CPT

## 2024-02-21 PROCEDURE — 83735 ASSAY OF MAGNESIUM: CPT | Performed by: STUDENT IN AN ORGANIZED HEALTH CARE EDUCATION/TRAINING PROGRAM

## 2024-02-21 PROCEDURE — 36415 COLL VENOUS BLD VENIPUNCTURE: CPT | Performed by: STUDENT IN AN ORGANIZED HEALTH CARE EDUCATION/TRAINING PROGRAM

## 2024-02-21 RX ORDER — DOXYCYCLINE 100 MG/1
100 CAPSULE ORAL EVERY 12 HOURS
Status: DISCONTINUED | OUTPATIENT
Start: 2024-02-21 | End: 2024-02-22 | Stop reason: HOSPADM

## 2024-02-21 RX ADMIN — ENOXAPARIN SODIUM 40 MG: 100 INJECTION SUBCUTANEOUS at 05:02

## 2024-02-21 RX ADMIN — VALSARTAN 320 MG: 80 TABLET, FILM COATED ORAL at 10:02

## 2024-02-21 RX ADMIN — MONTELUKAST 10 MG: 10 TABLET, FILM COATED ORAL at 08:02

## 2024-02-21 RX ADMIN — DOXYCYCLINE HYCLATE 100 MG: 100 CAPSULE ORAL at 08:02

## 2024-02-21 RX ADMIN — CARVEDILOL 3.12 MG: 3.12 TABLET, FILM COATED ORAL at 08:02

## 2024-02-21 RX ADMIN — MUPIROCIN 1 G: 20 OINTMENT TOPICAL at 10:02

## 2024-02-21 RX ADMIN — NIFEDIPINE 30 MG: 30 TABLET, FILM COATED, EXTENDED RELEASE ORAL at 10:02

## 2024-02-21 RX ADMIN — MUPIROCIN 1 G: 20 OINTMENT TOPICAL at 08:02

## 2024-02-21 RX ADMIN — Medication 400 MG: at 10:02

## 2024-02-21 RX ADMIN — POTASSIUM CHLORIDE 20 MEQ: 1500 TABLET, EXTENDED RELEASE ORAL at 10:02

## 2024-02-21 RX ADMIN — CLOPIDOGREL BISULFATE 75 MG: 75 TABLET, FILM COATED ORAL at 10:02

## 2024-02-21 RX ADMIN — LEVETIRACETAM 500 MG: 500 TABLET, FILM COATED ORAL at 10:02

## 2024-02-21 RX ADMIN — TRIAMTERENE AND HYDROCHLOROTHIAZIDE 1 TABLET: 37.5; 25 TABLET ORAL at 10:02

## 2024-02-21 RX ADMIN — CARVEDILOL 3.12 MG: 3.12 TABLET, FILM COATED ORAL at 10:02

## 2024-02-21 RX ADMIN — LEVETIRACETAM 500 MG: 500 TABLET, FILM COATED ORAL at 08:02

## 2024-02-21 RX ADMIN — ATORVASTATIN CALCIUM 80 MG: 40 TABLET, FILM COATED ORAL at 10:02

## 2024-02-21 NOTE — PT/OT/SLP PROGRESS
"Speech Language Pathology Treatment    Patient Name:  Papito Hutton   MRN:  [id number]  Admitting Diagnosis: Seizure    Recommendations:                 General Recommendations:  Cognitive-linguistic evaluation  Diet recommendations:  Regular Diet - IDDSI Level 7, Liquid Diet Level: Thin liquids - IDDSI Level 0   Aspiration Precautions: Standard aspiration precautions   General Precautions: Standard,    Communication strategies:  none    Assessment:     Papito Hutton is a 78 y.o. male with an SLP diagnosis of moderate Cognitive-Linguistic Impairment, per yesterday's evaluation.  He presents with significantly improved cognitive-linguistic status from yesterday's evaluation. Significantly improved attention, immediate memory, and language. Suspect cognition improved to mild v. no impairment based off of performance and presentation today. SO at bedside reporting introduction of Keppra may have been the cause of confusion and behavior changes, per her discussion w/ MD earlier today. Rec f/u w/ pt to determine new level of cognitive function and future ST goals, since improved status.    Subjective     Pt awake laying in bed w/ friend and SO at bedside. Pt agreeable to ST.  Patient goals: "Yeah yesterday I was fighting aliens, but today, I have not had any hallucinations"     Pain/Comfort:       Respiratory Status: Room air    Objective:     Has the patient been evaluated by SLP for swallowing?   Yes  Keep patient NPO? No   Current Respiratory Status:        Pt completed immediate memory task paired w/ convergent naming w/ 100% accuracy given min to no cuing. He was able to recall previously read steps for ADLs and placed them in appropriate order for completing a task w/ 100% accuracy given min to no cuing. Pt's responses to conversational exchange much more appropriate and polite today. Pt's SO at bedside stating pt is at baseline today, in re to personality and cognitive status. POC discussed w/ plan for " SLP to see pt 1 more visit to determine any further ST needs; pt in agreement.    Goals:   Multidisciplinary Problems       SLP Goals          Problem: SLP    Goal Priority Disciplines Outcome   SLP Goal     SLP Ongoing, Progressing   Description: 1. Pt will complete cognitive-linguistic evaluation -MET  2. Pt will complete immediate and delayed memory tasks w/ 80% accuracy given min cues  3. Pt will sustain attention to task w/ <2 repetitions/redirections per task over 80% of tasks  4. Pt will complete complex vocabulary tasks w/ 80% accuracy given min cues                       Plan:     Patient to be seen:  3 x/week   Plan of Care expires:     Plan of Care reviewed with:  patient, significant other   SLP Follow-Up:  Yes       Discharge recommendations:  Low Intensity Therapy   Barriers to Discharge:  None    Time Tracking:     SLP Treatment Date:   02/21/24  Speech Start Time:  1416  Speech Stop Time:  1433     Speech Total Time (min):  17 min    Billable Minutes: Speech Therapy Individual cognitive 17 min    02/21/2024

## 2024-02-21 NOTE — PROGRESS NOTES
[facility]  Progress Note    Patient Name: Papito Hutton  MRN: [id number]  Patient Class: IP- Inpatient   Admission Date: 2/17/2024  Length of Stay: 4 days  Attending Physician: Gina Arriaza MD  Primary Care Provider: Rafi Martin MD        Subjective:     Principal Problem:Seizure        HPI:  Patient is a 77yo with a PMH of HTN, Pre-Diabetes, GERD and suspected KEITH. Patient was unable to speak, so history was provided by [facility] and Patient's spouse. Patient's spouse found Patient to be unconscious in the afternoon, this was after she saw the Patient Aox4 in the morning. To the spouse it seemed as though the Patient had fallen over, when she found him he was conscious though very confused and somnolent. She called EMS, to EMS Patient had a slight right sided facial droop, though Patient's spouse couldn't see it. To note, Patient was recently started on Procardia for his resistant hypertension, his BP in the morning was     Patient was brought to [facility] ED where he had a CT Scan, Neurology reviewed the CT Findings, found there wasn't a large vessel occlusion. However Patient had a Seizure in that ED and was transferred to [facility] after getting IV Keppra. While at [facility] Patient was noted to be agitated after his Seizure, with him moving all four extremities, and was placed on Precedex gtt    Overview/Hospital Course:  Patient was admitted to intensive care unit where patient was closely monitored.  Patient was evaluated by neurologist.  Neuro checks were performed.  Patient was maintained on Keppra.  Fall and seizure precautions observed.  EEG showed moderate to severe slowing diffusely but no epileptiform activity.  Patient was maintained on Plavix 75 mg daily and Lipitor 80 mg daily for stroke prevention.  MRI brain did not report acute intracranial process.  Patient was evaluated by PT, OT and SLP.  Upon improvement in symptoms patient has been transferred to medicine  telemetry floor.  Patient has been noted to have visual hallucinations at nighttime.  Medications which lower seizure threshold including tramadol, Wellbutrin and Benadryl are being avoided.  No driving, operating heavy machinery or going to [place] discussed with patient and his wife in detail.    Interval History:  Patient reportedly had visual hallucinations last night. Wife at bedside. No new focal complaints. On Keppra, neurology following. Currently afebrile.    Review of Systems   Neurological:  Positive for weakness.   Psychiatric/Behavioral:  Positive for confusion.      Objective:     Vital Signs (Most Recent):  Temp: 97.7 °F (36.5 °C) (02/21/24 0359)  Pulse: 62 (02/21/24 0359)  Resp: 18 (02/21/24 0359)  BP: (!) 120/59 (02/21/24 0359)  SpO2: 95 % (02/21/24 0359) Vital Signs (24h Range):  Temp:  [97.3 °F (36.3 °C)-98.7 °F (37.1 °C)] 97.7 °F (36.5 °C)  Pulse:  [61-79] 62  Resp:  [18-33] 18  SpO2:  [94 %-96 %] 95 %  BP: (114-131)/(53-92) 120/59     Weight: 91.8 kg (202 lb 6.1 oz)  Body mass index is 27.45 kg/m².    Intake/Output Summary (Last 24 hours) at 2/21/2024 0933  Last data filed at 2/20/2024 1619  Gross per 24 hour   Intake 480 ml   Output --   Net 480 ml           Physical Exam  HENT:      Head: Normocephalic and atraumatic.      Right Ear: External ear normal.      Left Ear: External ear normal.      Nose: No congestion or rhinorrhea.      Mouth/Throat:      Pharynx: No oropharyngeal exudate or posterior oropharyngeal erythema.   Eyes:      General:         Right eye: No discharge.         Left eye: No discharge.      Conjunctiva/sclera: Conjunctivae normal.   Cardiovascular:      Rate and Rhythm: Normal rate and regular rhythm.      Heart sounds: No murmur heard.     No friction rub. No gallop.   Pulmonary:      Effort: No respiratory distress.      Breath sounds: No stridor. No wheezing, rhonchi or rales.   Chest:      Chest wall: No tenderness.   Abdominal:      General: There is no distension.  "     Palpations: There is no mass.      Tenderness: There is no abdominal tenderness. There is no guarding or rebound.      Hernia: No hernia is present.   Musculoskeletal:         General: No swelling, tenderness, deformity or signs of injury.      Right lower leg: No edema.      Left lower leg: No edema.   Skin:     Coloration: Skin is not jaundiced or pale.      Findings: No bruising, erythema, lesion or rash.   Neurological:      Comments: Grossly nonfocal exam.             Significant Labs: All pertinent labs within the past 24 hours have been reviewed.  CBC:   Recent Labs   Lab 02/20/24  0321 02/21/24  0503   WBC 9.07 7.42   HGB 13.2* 13.0*   HCT 39.4* 39.2*    218       CMP:   Recent Labs   Lab 02/20/24  0321 02/21/24  0503    139   K 3.7 3.9    105   CO2 24 27   * 115*   BUN 22 26*   CREATININE 1.2 1.2   CALCIUM 10.0 10.2   PROT 6.7 6.8   ALBUMIN 3.9 3.9   BILITOT 1.2* 0.9   ALKPHOS 62 70   AST 24 28   ALT 15 28   ANIONGAP 9 7*       Lactic Acid:   No results for input(s): "LACTATE" in the last 48 hours.    Lipid Panel:   No results for input(s): "CHOL", "HDL", "LDLCALC", "TRIG", "CHOLHDL" in the last 48 hours.    Troponin: No results for input(s): "TROPONINI", "TROPONINIHS" in the last 48 hours.  TSH:   Recent Labs   Lab 02/17/24  1735   TSH 0.700       Urine Studies: No results for input(s): "COLORU", "APPEARANCEUA", "PHUR", "SPECGRAV", "PROTEINUA", "GLUCUA", "KETONESU", "BILIRUBINUA", "OCCULTUA", "NITRITE", "UROBILINOGEN", "LEUKOCYTESUR", "RBCUA", "WBCUA", "BACTERIA", "SQUAMEPITHEL", "HYALINECASTS" in the last 48 hours.    Invalid input(s): "WRIGHTSUR"  Microbiology Results (last 7 days)       ** No results found for the last 168 hours. **          Significant Imaging:   EEG:   This is an abnormal EEG due to diffuse slowing of the background activity consistent with a moderate to severe encephalopathy. No epileptiform discharges or seizures are captured.  Findings of this study " indicate a generalized encephalopathic process that is nonspecific in type. Toxic, metabolic, or structural abnormalities may be considered.  Further clinical correlation is advised.     CT scan maxillofacial:   No evidence of an acute displaced fracture.     CT of the head and neck:    No acute intracranial abnormality.  No high-grade stenosis or major vessel occlusion.  Prominent mediastinal and hilar lymph nodes, possibly reactive.    CT C-spine:   No fracture or traumatic malalignment of the cervical spine.  Cervical spondylosis resulting in up to severe spinal canal stenosis at C3-C4 and multilevel moderate to severe neural foraminal stenosis at the lower levels.  Bulky anterior flowing osteophytes consistent with diffuse idiopathic skeletal hyperostosis (DISH).    MRI brain with and without contrast:    Negative for acute ischemia or acute intracranial pathology.  Mild white matter microangiopathic changes.  No abnormal intracranial contrast enhancement.    ECHO:     Left Ventricle: The left ventricle is normal in size. Mildly increased wall thickness. There is normal systolic function with a visually estimated ejection fraction of 60 - 65%. Grade I diastolic dysfunction. E/A ratio is 0.76.    Right Ventricle: Normal right ventricular cavity size. Wall thickness is normal. Right ventricle wall motion  is normal. Systolic function is normal.    IVC/SVC: Normal venous pressure at 3 mmHg.    CXR:  1.  Focal patchy opacity of the right upper lung could reflect atelectasis or infiltrate.  2.  Mild blunting of the left costophrenic angle could reflect atelectasis, infiltrate or small pleural effusion.      Assessment/Plan:      * Seizure  ? New onset  Pt with TIAs in past, was found down at home ? CVA > in ED had witnessed generalized seizure  W/u is negative including imaging, EEG for any obvious structural/neoplastic, acute ischemic/embolic, infectious, toxic process  TTE:    Left Ventricle: The left ventricle is  normal in size. Mildly increased wall thickness. There is normal systolic function with a visually estimated ejection fraction of 60 - 65%. Grade I diastolic dysfunction. E/A ratio is 0.76.    Right Ventricle: Normal right ventricular cavity size. Wall thickness is normal. Right ventricle wall motion  is normal. Systolic function is normal.    IVC/SVC: Normal venous pressure at 3 mmHg.  Will need to be on AED (Keppra) and follow sz precautions - changed Keppra to po from IV today  Seizure precautions, advised pt and wife again today that he will not be able to drive, work at [place], etc for the time being  In the wake of his workup and treatment he's noted to be a bit restless/impulsive  Ativan prn  Possibly home soon  Downgrade to med tele        Hypomagnesemia  As low as 1.3  Due to Dyazide probably  Pt says he has K+ and Mg++ at home but does not take them, told him he needs to take as prescribed and that his diuretic is probably causing these problems but he needs to stay on it for the time being for his blood pressure  Replace, trend, follow as OPT  2.0 today    Advance care planning  Full Code      Abnormal CT scan, cervical spine  DISH and significant focal central canal and foraminal narrowing/osteophytosis seen, incidentally, no fractures  Pt is w/o c/o neck/arm pain/weakness  OPT f/u        Abnormal chest x-ray  Noted to have ? Reactive nodes in mediastinum  Pt is w/o cp/sob/cough  Sats do drop a little but he likely has KEITH  I have elected not to start abx at this time  OPT f/u, consider dedicated chest CT    Facial contusion  CT maxillofacial and c-spine negative  Local wound care        Hx of transient ischemic attack (TIA)  Secondary prevention, on Plavix, statin  HTN mgt  W/u for secondary HTN as OPT at discretion of PCP  Pt very likely will continue to have difficulty w/ BP control until his underlying sleep apnea is formally diagnosed and treated, he is scheduled for a sleep study in lab 3/5, don't  know if this can be done any sooner?  Will ask CM to look into it  TTE noted - has he had a bubble study?        Prediabetes  A1c was 5.8% last Sep  Will update for completeness  Metformin on hold and likely will remain so on d/c so as to avoid any possible hypoglycemia      Hypertension  Continue ARB, diuretic, BB  CCB was held as this is suspected to have possibly acutely lowered BP and possibly also lowered sz threshold  BP remains labile over the past 24 hrs  Have resumed nifedipine at 30 rather than 60 mg dose  Orthostatic VS ordered, pt not clinically orthostatic  Apparently he does have KEITH - wife has observed sleep disordered breathing, though he does not snore too much - he is scheduled for in lab sleep study 3/5  OPT f/u and monitoring  Continue adjustments of BP meds as warranted  K+, Mg++ replacement        Hyperlipidemia  Continue Lipitor 80 mg QD  Note lipid panel  OPT f/u        Discussed with patient's wife at length, answered all questions.  Discussed with  regarding discharge planning.  VTE Risk Mitigation (From admission, onward)           Ordered     enoxaparin injection 40 mg  Daily         02/17/24 2224     IP VTE HIGH RISK PATIENT  Once         02/17/24 2224     Place sequential compression device  Until discontinued         02/17/24 2224                    Discharge Planning   ROCIO: 2/23/2024     Code Status: Full Code   Is the patient medically ready for discharge?:     Reason for patient still in hospital (select all that apply): Patient trending condition and Consult recommendations  Discharge Plan A: Home Health   Discharge Delays: None known at this time              Gina Arriaza MD  Department of Hospital Medicine   [facility]

## 2024-02-21 NOTE — PHYSICIAN QUERY
"PT Name: Papito Hutton  MR #: [id number]    DOCUMENTATION CLARIFICATION     CDS/: Nathaly Nunez Pe               Contact information: [phone]   This form is a permanent document in the medical record.     Query Date: February 21, 2024    By submitting this query, we are merely seeking further clarification of documentation. Please utilize your independent clinical judgment when addressing the question(s) below.    The Medical Record contains the following:   Indicators   Supporting Clinical Findings Location in Medical Record    AMS, Confusion,  LOC, etc.  2/20 Progress note -Encephalopathy -may be hospital induced and may actually improve w/ d/c     2/20 Neuro-hallucination could likely be post anesth affect vs 2/2 Keppra.  If hallucinations do not improve, will switch Keppra to Vimpat     2/20 Pt w/  impulsive behavior, trying to get OOB w/o assist, freq interrupts during conversations, RN and wife describe visual hallucinations "he was seeing bats on the ceiling earlier"  2/20 Progress note      2/20 Neurology note        2/20 Progress note    Acute/Chronic Illness Patient admitted with seizure activity 2/17 - no further seizures since admit  Per Chart review     Medication Patient was started on Precedex gtt due to postictal agitation - Dc'd 2/18  IV Keppra changed to PO 2/19  Per MAR     The noted clinical guidelines are only system guidelines and do not replace the providers clinical judgment.    The National [place] of Neurologic Disorders and Stroke (NINDS) of the NIH describes encephalopathy as any diffuse disease of the brain that alters brain function or structure.    Provider, please further clarify the cause of encephalopathy/hallucinations, if known, associated with above clinical findings.    [x   ] Metabolic Encephalopathy - Due to electrolyte imbalance, metabolic derangements, or infectious processes, includes Septic Encephalopathy, Uremic Encephalopathy   [   ] Toxic Encephalopathy " - Due to drugs, chemicals, or other toxic substances   [   ] Hospital acquired Delirium    [   ] Other Encephalopathy (please specify): ____________________   [   ] Other neurological condition- Includes Post-ictal altered mental status (please specify condition): __________     Please document in your progress notes daily for the duration of treatment until resolved, and include in your discharge summary.    References:  JULES Mallory, RN, CCDS. (2018, June 9). Notes from the Instructor: Encephalopathy tips. Retrieved October 22, 2020, from [email or web address]/[email or web address]-[email or web address]-tips    ICD-9-CM Coding Clinic First Quarter 2013, Effective with discharges: October 21, 2013 [facility] § Seizure with encephalopathy due to postictal state (2013).    ICD-10-CM/[place] Integrated Codebook (Version V.20.8.10.0) [Computer software]. (2020). Retrieved October 21, 2020.    National [place] of Neurological Disorders and Stroke. (2019, March 27). Retrieved October 22, 2020, from https://www.ninds.nih.gov/Disorders/All-Disorders/Evonamnahjpium-Muracutstbk-Gkhc    Form No. 66971

## 2024-02-21 NOTE — SUBJECTIVE & OBJECTIVE
Interval History:  Patient reportedly had visual hallucinations last night. Wife at bedside. No new focal complaints. On Keppra, neurology following. Currently afebrile.    Review of Systems   Neurological:  Positive for weakness.   Psychiatric/Behavioral:  Positive for confusion.      Objective:     Vital Signs (Most Recent):  Temp: 97.7 °F (36.5 °C) (02/21/24 0359)  Pulse: 62 (02/21/24 0359)  Resp: 18 (02/21/24 0359)  BP: (!) 120/59 (02/21/24 0359)  SpO2: 95 % (02/21/24 0359) Vital Signs (24h Range):  Temp:  [97.3 °F (36.3 °C)-98.7 °F (37.1 °C)] 97.7 °F (36.5 °C)  Pulse:  [61-79] 62  Resp:  [18-33] 18  SpO2:  [94 %-96 %] 95 %  BP: (114-131)/(53-92) 120/59     Weight: 91.8 kg (202 lb 6.1 oz)  Body mass index is 27.45 kg/m².    Intake/Output Summary (Last 24 hours) at 2/21/2024 0933  Last data filed at 2/20/2024 1619  Gross per 24 hour   Intake 480 ml   Output --   Net 480 ml           Physical Exam  HENT:      Head: Normocephalic and atraumatic.      Right Ear: External ear normal.      Left Ear: External ear normal.      Nose: No congestion or rhinorrhea.      Mouth/Throat:      Pharynx: No oropharyngeal exudate or posterior oropharyngeal erythema.   Eyes:      General:         Right eye: No discharge.         Left eye: No discharge.      Conjunctiva/sclera: Conjunctivae normal.   Cardiovascular:      Rate and Rhythm: Normal rate and regular rhythm.      Heart sounds: No murmur heard.     No friction rub. No gallop.   Pulmonary:      Effort: No respiratory distress.      Breath sounds: No stridor. No wheezing, rhonchi or rales.   Chest:      Chest wall: No tenderness.   Abdominal:      General: There is no distension.      Palpations: There is no mass.      Tenderness: There is no abdominal tenderness. There is no guarding or rebound.      Hernia: No hernia is present.   Musculoskeletal:         General: No swelling, tenderness, deformity or signs of injury.      Right lower leg: No edema.      Left lower leg: No  "edema.   Skin:     Coloration: Skin is not jaundiced or pale.      Findings: No bruising, erythema, lesion or rash.   Neurological:      Comments: Grossly nonfocal exam.             Significant Labs: All pertinent labs within the past 24 hours have been reviewed.  CBC:   Recent Labs   Lab 02/20/24  0321 02/21/24  0503   WBC 9.07 7.42   HGB 13.2* 13.0*   HCT 39.4* 39.2*    218       CMP:   Recent Labs   Lab 02/20/24  0321 02/21/24  0503    139   K 3.7 3.9    105   CO2 24 27   * 115*   BUN 22 26*   CREATININE 1.2 1.2   CALCIUM 10.0 10.2   PROT 6.7 6.8   ALBUMIN 3.9 3.9   BILITOT 1.2* 0.9   ALKPHOS 62 70   AST 24 28   ALT 15 28   ANIONGAP 9 7*       Lactic Acid:   No results for input(s): "LACTATE" in the last 48 hours.    Lipid Panel:   No results for input(s): "CHOL", "HDL", "LDLCALC", "TRIG", "CHOLHDL" in the last 48 hours.    Troponin: No results for input(s): "TROPONINI", "TROPONINIHS" in the last 48 hours.  TSH:   Recent Labs   Lab 02/17/24  1735   TSH 0.700       Urine Studies: No results for input(s): "COLORU", "APPEARANCEUA", "PHUR", "SPECGRAV", "PROTEINUA", "GLUCUA", "KETONESU", "BILIRUBINUA", "OCCULTUA", "NITRITE", "UROBILINOGEN", "LEUKOCYTESUR", "RBCUA", "WBCUA", "BACTERIA", "SQUAMEPITHEL", "HYALINECASTS" in the last 48 hours.    Invalid input(s): "WRIGHTSUR"  Microbiology Results (last 7 days)       ** No results found for the last 168 hours. **          Significant Imaging:   EEG:   This is an abnormal EEG due to diffuse slowing of the background activity consistent with a moderate to severe encephalopathy. No epileptiform discharges or seizures are captured.  Findings of this study indicate a generalized encephalopathic process that is nonspecific in type. Toxic, metabolic, or structural abnormalities may be considered.  Further clinical correlation is advised.     CT scan maxillofacial:   No evidence of an acute displaced fracture.     CT of the head and neck:    No acute " intracranial abnormality.  No high-grade stenosis or major vessel occlusion.  Prominent mediastinal and hilar lymph nodes, possibly reactive.    CT C-spine:   No fracture or traumatic malalignment of the cervical spine.  Cervical spondylosis resulting in up to severe spinal canal stenosis at C3-C4 and multilevel moderate to severe neural foraminal stenosis at the lower levels.  Bulky anterior flowing osteophytes consistent with diffuse idiopathic skeletal hyperostosis (DISH).    MRI brain with and without contrast:    Negative for acute ischemia or acute intracranial pathology.  Mild white matter microangiopathic changes.  No abnormal intracranial contrast enhancement.    ECHO:     Left Ventricle: The left ventricle is normal in size. Mildly increased wall thickness. There is normal systolic function with a visually estimated ejection fraction of 60 - 65%. Grade I diastolic dysfunction. E/A ratio is 0.76.    Right Ventricle: Normal right ventricular cavity size. Wall thickness is normal. Right ventricle wall motion  is normal. Systolic function is normal.    IVC/SVC: Normal venous pressure at 3 mmHg.    CXR:  1.  Focal patchy opacity of the right upper lung could reflect atelectasis or infiltrate.  2.  Mild blunting of the left costophrenic angle could reflect atelectasis, infiltrate or small pleural effusion.

## 2024-02-21 NOTE — ASSESSMENT & PLAN NOTE
? New onset  Pt with TIAs in past, was found down at home ? CVA > in ED had witnessed generalized seizure  W/u is negative including imaging, EEG for any obvious structural/neoplastic, acute ischemic/embolic, infectious, toxic process  TTE:    Left Ventricle: The left ventricle is normal in size. Mildly increased wall thickness. There is normal systolic function with a visually estimated ejection fraction of 60 - 65%. Grade I diastolic dysfunction. E/A ratio is 0.76.    Right Ventricle: Normal right ventricular cavity size. Wall thickness is normal. Right ventricle wall motion  is normal. Systolic function is normal.    IVC/SVC: Normal venous pressure at 3 mmHg.  Will need to be on AED (Keppra) and follow sz precautions - changed Keppra to po from IV today  Seizure precautions, advised pt and wife again today that he will not be able to drive, work at [employer], etc for the time being  In the wake of his workup and treatment he's noted to be a bit restless/impulsive  Ativan prn  Possibly home soon  Downgrade to med tele

## 2024-02-21 NOTE — HOSPITAL COURSE
Patient was admitted to intensive care unit where patient was closely monitored.  Patient was evaluated by neurologist.  Neuro checks were performed.  Patient was maintained on Keppra.  Fall and seizure precautions observed.  EEG showed moderate to severe slowing diffusely but no epileptiform activity.  Patient was maintained on Plavix 75 mg daily and Lipitor 80 mg daily for stroke prevention.  MRI brain did not report acute intracranial process.  Patient was evaluated by PT, OT and SLP.  Upon improvement in symptoms patient has been transferred to medicine telemetry floor.  Patient has been noted to have visual hallucinations at nighttime.  Medications which lower seizure threshold including tramadol, Wellbutrin and Benadryl are being avoided.  No driving, operating heavy machinery or going to [place] discussed with patient and his wife in detail.

## 2024-02-21 NOTE — PT/OT/SLP PROGRESS
Physical Therapy Treatment    Patient Name:  Papito Hutton   MRN:  [id number]    Recommendations:     Discharge Recommendations: Low Intensity Therapy  Discharge Equipment Recommendations: walker, rolling  Barriers to discharge:  impulsive, fall risk    Assessment:     Papiot Hutton is a 78 y.o. male admitted with a medical diagnosis of Seizure.  He presents with the following impairments/functional limitations: weakness, impaired endurance, impaired self care skills, gait instability, impaired functional mobility, impaired balance, decreased upper extremity function, decreased lower extremity function, decreased safety awareness.    Pt with HOB elevated and significant other present. Pt agreeable to visit. Pt reports that he was having visual hallucinations yesterday and that the doctor attributes it to his seizure medication.    SO reports that pt's RW was delivered and asked if it could be adjusted for pt's height. Pt performed supine to sit transfer with supervision. Pt performed sit to stand transfer with stand by assist. Pt instructed to push up from the surface he is sitting on to stand and to not pull back on the RW as it is likely to tip. Therapist adjusted RW to pt's height.    Pt ambulated 40' with RW and close stand by assist. Pt instructed to keep a slow, steady pace and to keep RW on the ground at all times. Pt expressed understanding.    Pt left sitting EOB with SO present.    Rehab Prognosis: Fair; patient would benefit from acute skilled PT services to address these deficits and reach maximum level of function.    Recent Surgery: * No surgery found *      Plan:     During this hospitalization, patient to be seen 6 x/week to address the identified rehab impairments via gait training, therapeutic activities, therapeutic exercises, neuromuscular re-education and progress toward the following goals:    Plan of Care Expires:  03/18/24    Subjective     Chief Complaint: pt reports that he was  having visual hallucinations yesterday  Patient/Family Comments/goals: to get better and return home  Pain/Comfort:  Pain Rating 1: 0/10      Objective:     Communicated with RN prior to session.  Patient found HOB elevated with telemetry, peripheral IV upon PT entry to room.     General Precautions: Standard, fall, seizure  Orthopedic Precautions: N/A  Braces: N/A  Respiratory Status: Room air     Functional Mobility:  Bed Mobility:     Supine to Sit: supervision  Transfers:     Sit to Stand:  stand by assistance with rolling walker  Gait: x 40' with RW and close SBA to ensure that RW was good height for pt to ambulate with      AM-PAC 6 CLICK MOBILITY          Treatment & Education:  Pt educated on importance of time OOB, importance of intermittent mobility, safe techniques for transfers/ambulation, discharge recommendations/options, and use of call light for assistance and fall prevention.      Patient left sitting edge of bed with all lines intact, call button in reach, and significant other present..    GOALS:   Multidisciplinary Problems       Physical Therapy Goals          Problem: Physical Therapy    Goal Priority Disciplines Outcome Goal Variances Interventions   Physical Therapy Goal     PT, PT/OT      Description: Goals to be met by: 3/18/24     Patient will increase functional independence with mobility by performin. Supine to sit with Supervision  2. Sit to stand transfer with Supervision  3. Bed to chair transfer with Supervision using Rolling Walker  4. Gait  x 300 feet with Supervision using Rolling Walker.                              Time Tracking:     PT Received On: 24  PT Start Time: 1501     PT Stop Time: 1518  PT Total Time (min): 17 min     Billable Minutes: Gait Training 17    Treatment Type: Treatment  PT/PTA: PTA           2024

## 2024-02-21 NOTE — PROGRESS NOTES
[facility] Medicine  Progress Note    Patient Name: Papito Hutton  MRN: [id number]  Patient Class: IP- Inpatient   Admission Date: 2/17/2024  Length of Stay: 4 days  Attending Physician: Gina Arriaza MD  Primary Care Provider: Rafi Martin MD        Subjective:     Principal Problem:Seizure        HPI:  Patient is a 77yo with a PMH of HTN, Pre-Diabetes, GERD and suspected KEITH. Patient was unable to speak, so history was provided by [facility] and Patient's spouse. Patient's spouse found Patient to be unconscious in the afternoon, this was after she saw the Patient Aox4 in the morning. To the spouse it seemed as though the Patient had fallen over, when she found him he was conscious though very confused and somnolent. She called EMS, to EMS Patient had a slight right sided facial droop, though Patient's spouse couldn't see it. To note, Patient was recently started on Procardia for his resistant hypertension, his BP in the morning was     Patient was brought to [facility] ED where he had a CT Scan, Neurology reviewed the CT Findings, found there wasn't a large vessel occlusion. However Patient had a Seizure in that ED and was transferred to [facility] after getting IV Keppra. While at [facility] Patient was noted to be agitated after his Seizure, with him moving all four extremities, and was placed on Precedex gtt    Overview/Hospital Course:  No notes on file    Interval History:  Patient admitted with likely new onset seizure with tongue biting, loss of consciousness, speech dysfunction and right facial droop evaluation.  Significantly sedated at this time.  Patient's wife is present at bedside.  Patient has tongue bite and right periorbital ecchymosis and bruising.  Patient received Keppra load.  Neuro radiology results pending.  Neurology evaluation pending.  Currently patient is on Precedex infusion.  Currently afebrile.    Review of Systems   Unable to perform ROS: Mental status change      Objective:     Vital Signs (Most Recent):  Temp: 97.7 °F (36.5 °C) (02/21/24 0359)  Pulse: 62 (02/21/24 0359)  Resp: 18 (02/21/24 0359)  BP: (!) 120/59 (02/21/24 0359)  SpO2: 95 % (02/21/24 0359) Vital Signs (24h Range):  Temp:  [97.3 °F (36.3 °C)-98.7 °F (37.1 °C)] 97.7 °F (36.5 °C)  Pulse:  [61-79] 62  Resp:  [18-33] 18  SpO2:  [94 %-96 %] 95 %  BP: (114-131)/(53-92) 120/59     Weight: 91.8 kg (202 lb 6.1 oz)  Body mass index is 27.45 kg/m².    Intake/Output Summary (Last 24 hours) at 2/21/2024 0933  Last data filed at 2/20/2024 1619  Gross per 24 hour   Intake 480 ml   Output --   Net 480 ml           Physical Exam  HENT:      Head: Normocephalic and atraumatic.      Right Ear: External ear normal.      Left Ear: External ear normal.      Nose: No congestion or rhinorrhea.      Mouth/Throat:      Pharynx: No oropharyngeal exudate or posterior oropharyngeal erythema.   Eyes:      General:         Right eye: No discharge.         Left eye: No discharge.      Conjunctiva/sclera: Conjunctivae normal.   Cardiovascular:      Rate and Rhythm: Regular rhythm. Tachycardia present.      Heart sounds: No murmur heard.     No friction rub. No gallop.   Pulmonary:      Effort: No respiratory distress.      Breath sounds: No stridor. No wheezing, rhonchi or rales.   Chest:      Chest wall: No tenderness.   Abdominal:      General: There is no distension.      Palpations: There is no mass.      Tenderness: There is no abdominal tenderness. There is no guarding or rebound.      Hernia: No hernia is present.   Musculoskeletal:         General: No swelling, tenderness, deformity or signs of injury.      Right lower leg: No edema.      Left lower leg: No edema.   Skin:     Coloration: Skin is not jaundiced or pale.      Findings: No bruising, erythema, lesion or rash.   Neurological:      Comments: Patient appears sedated, arousable to verbal commands.  Able to move all extremities to verbal command.  Lateral tongue bite noted.  " Further detailed neurological examination not possible due to patient's present condition.             Significant Labs: All pertinent labs within the past 24 hours have been reviewed.  CBC:   Recent Labs   Lab 02/20/24  0321 02/21/24  0503   WBC 9.07 7.42   HGB 13.2* 13.0*   HCT 39.4* 39.2*    218       CMP:   Recent Labs   Lab 02/20/24  0321 02/21/24  0503    139   K 3.7 3.9    105   CO2 24 27   * 115*   BUN 22 26*   CREATININE 1.2 1.2   CALCIUM 10.0 10.2   PROT 6.7 6.8   ALBUMIN 3.9 3.9   BILITOT 1.2* 0.9   ALKPHOS 62 70   AST 24 28   ALT 15 28   ANIONGAP 9 7*       Lactic Acid:   No results for input(s): "LACTATE" in the last 48 hours.    Lipid Panel:   No results for input(s): "CHOL", "HDL", "LDLCALC", "TRIG", "CHOLHDL" in the last 48 hours.    Troponin: No results for input(s): "TROPONINI", "TROPONINIHS" in the last 48 hours.  TSH:   Recent Labs   Lab 02/17/24  1735   TSH 0.700       Urine Studies: No results for input(s): "COLORU", "APPEARANCEUA", "PHUR", "SPECGRAV", "PROTEINUA", "GLUCUA", "KETONESU", "BILIRUBINUA", "OCCULTUA", "NITRITE", "UROBILINOGEN", "LEUKOCYTESUR", "RBCUA", "WBCUA", "BACTERIA", "SQUAMEPITHEL", "HYALINECASTS" in the last 48 hours.    Invalid input(s): "WRIGHTSUR"  Microbiology Results (last 7 days)       ** No results found for the last 168 hours. **          Significant Imaging:   EEG:   This is an abnormal EEG due to diffuse slowing of the background activity consistent with a moderate to severe encephalopathy. No epileptiform discharges or seizures are captured.  Findings of this study indicate a generalized encephalopathic process that is nonspecific in type. Toxic, metabolic, or structural abnormalities may be considered.  Further clinical correlation is advised.     CT scan maxillofacial:   No evidence of an acute displaced fracture.     CT of the head and neck:    No acute intracranial abnormality.  No high-grade stenosis or major vessel " occlusion.  Prominent mediastinal and hilar lymph nodes, possibly reactive.    CT C-spine:   No fracture or traumatic malalignment of the cervical spine.  Cervical spondylosis resulting in up to severe spinal canal stenosis at C3-C4 and multilevel moderate to severe neural foraminal stenosis at the lower levels.  Bulky anterior flowing osteophytes consistent with diffuse idiopathic skeletal hyperostosis (DISH).    MRI brain with and without contrast:    Negative for acute ischemia or acute intracranial pathology.  Mild white matter microangiopathic changes.  No abnormal intracranial contrast enhancement.    ECHO:     Left Ventricle: The left ventricle is normal in size. Mildly increased wall thickness. There is normal systolic function with a visually estimated ejection fraction of 60 - 65%. Grade I diastolic dysfunction. E/A ratio is 0.76.    Right Ventricle: Normal right ventricular cavity size. Wall thickness is normal. Right ventricle wall motion  is normal. Systolic function is normal.    IVC/SVC: Normal venous pressure at 3 mmHg.    CXR:  1.  Focal patchy opacity of the right upper lung could reflect atelectasis or infiltrate.  2.  Mild blunting of the left costophrenic angle could reflect atelectasis, infiltrate or small pleural effusion.      Assessment/Plan:      * Seizure  ? New onset  Pt with TIAs in past, was found down at home ? CVA > in ED had witnessed generalized seizure  W/u is negative including imaging, EEG for any obvious structural/neoplastic, acute ischemic/embolic, infectious, toxic process  TTE:    Left Ventricle: The left ventricle is normal in size. Mildly increased wall thickness. There is normal systolic function with a visually estimated ejection fraction of 60 - 65%. Grade I diastolic dysfunction. E/A ratio is 0.76.    Right Ventricle: Normal right ventricular cavity size. Wall thickness is normal. Right ventricle wall motion  is normal. Systolic function is normal.    IVC/SVC: Normal  venous pressure at 3 mmHg.  Will need to be on AED (Keppra) and follow sz precautions - changed Keppra to po from IV today  Seizure precautions, advised pt and wife again today that he will not be able to drive, work at heights, etc for the time being  In the wake of his workup and treatment he's noted to be a bit restless/impulsive  Ativan prn  Possibly home soon  Downgrade to med tele        Hypomagnesemia  As low as 1.3  Due to Dyazide probably  Pt says he has K+ and Mg++ at home but does not take them, told him he needs to take as prescribed and that his diuretic is probably causing these problems but he needs to stay on it for the time being for his blood pressure  Replace, trend, follow as OPT  2.0 today    Advance care planning  Full Code      Abnormal CT scan, cervical spine  DISH and significant focal central canal and foraminal narrowing/osteophytosis seen, incidentally, no fractures  Pt is w/o c/o neck/arm pain/weakness  OPT f/u        Abnormal chest x-ray  Noted to have ? Reactive nodes in mediastinum  Pt is w/o cp/sob/cough  Sats do drop a little but he likely has KEITH  I have elected not to start abx at this time  OPT f/u, consider dedicated chest CT    Facial contusion  CT maxillofacial and c-spine negative  Local wound care        Hx of transient ischemic attack (TIA)  Secondary prevention, on Plavix, statin  HTN mgt  W/u for secondary HTN as OPT at discretion of PCP  Pt very likely will continue to have difficulty w/ BP control until his underlying sleep apnea is formally diagnosed and treated, he is scheduled for a sleep study in lab 3/5, don't know if this can be done any sooner?  Will ask CM to look into it  TTE noted - has he had a bubble study?        Prediabetes  A1c was 5.8% last Sep  Will update for completeness  Metformin on hold and likely will remain so on d/c so as to avoid any possible hypoglycemia      Hypertension  Continue ARB, diuretic, BB  CCB was held as this is suspected to have  possibly acutely lowered BP and possibly also lowered sz threshold  BP remains labile over the past 24 hrs  Have resumed nifedipine at 30 rather than 60 mg dose  Orthostatic VS ordered, pt not clinically orthostatic  Apparently he does have KEITH - wife has observed sleep disordered breathing, though he does not snore too much - he is scheduled for in lab sleep study 3/5  OPT f/u and monitoring  Continue adjustments of BP meds as warranted  K+, Mg++ replacement        Hyperlipidemia  Continue Lipitor 80 mg QD  Note lipid panel  OPT f/u          VTE Risk Mitigation (From admission, onward)           Ordered     enoxaparin injection 40 mg  Daily         02/17/24 2224     IP VTE HIGH RISK PATIENT  Once         02/17/24 2224     Place sequential compression device  Until discontinued         02/17/24 2224                    Discharge Planning   ROCIO: 2/23/2024     Code Status: Full Code   Is the patient medically ready for discharge?:     Reason for patient still in hospital (select all that apply): {HMREASONPATIENTINHOSP:16302}  Discharge Plan A: Home Health   Discharge Delays: None known at this time              Gina Arriaza MD  Department of Hospital Medicine   [facility]

## 2024-02-21 NOTE — PROGRESS NOTES
Critical access hospital  Department of Neurology  Progress Note  Date: 2024 9:54 AM          Patient Name: Papito Hutton   MRN: [id number]   : 1945    AGE: 78 y.o.    LOS: 4 days Hospital Day: 5  Admit date: 2024  5:26 PM       HPI per EMR:  Patient is a 77yo with a PMH of HTN, Pre-Diabetes, GERD and suspected KEITH. Patient was unable to speak, so history was provided by [facility] and Patient's spouse. Patient's spouse found Patient to be unconscious in the afternoon, this was after she saw the Patient Aox4 in the morning.  To the spouse it seemed as though the Patient had fallen over, when she found him he was conscious though very confused and somnolent. She called EMS, to EMS Patient had a slight right sided facial droop, though Patient's spouse couldn't see it. To note, Patient was recently started on Procardia for his resistant hypertension, his BP in the morning was      Patient was brought to [facility] ED where he had a CT Scan, Neurology reviewed the CT Findings, found there wasn't a large vessel occlusion. However Patient had a Seizure in that ED and was transferred to [facility] after getting IV Keppra. While at [facility] Patient was noted to be agitated after his Seizure, with him moving all four extremities, and was placed on Precedex gtt     Overview/Hospital Course:  No notes on file     Interval History:  Patient admitted with likely new onset seizure with tongue biting, loss of consciousness, speech dysfunction and right facial droop evaluation.  Significantly sedated at this time.  Patient's wife is present at bedside.  Patient has tongue bite and right periorbital ecchymosis and bruising.  Patient received Keppra load.  Neuro radiology results pending.  Neurology evaluation pending.  Currently patient is on Precedex infusion.  Currently afebrile.     Neurology Consult: Patient was seen and examined. He is a 77 yo man with history of HTN, Pre-Diabetes, GERD and suspected KEITH, who presented  to the ED after he was found unconscious by his wife. HPI per chart review, since patient unable to provide history. When he was found down, he seemed somnolent and confused, so wife called EMS. Upon EMS arrival, they noticed a right facial droop, so he was taken to [facility] for evaluation. While in the ED, he had a generalized tonic-clonic seizure, with tongue biting, so he was loaded with Keppra and transferred to [facility] for treatment. Of note, wife reports patient has been experiencing some issues with memory and balance over the past few weeks.     02/19/2024: No acute events overnight. Patient was seen and examined by me this morning. Neuro exam is normal this morning.  I examined him after he came back from [facility].  He is oriented x3 and denies any complaints at this time.    02/20/2024:  Patient was seen and examined by me.  No acute events overnight.  He states that intermittently he has been having hallucinations where he sees spiders on the wall.  Denies any other complaints.  He is oriented x3.    2/21:  Patient was seen and examined by me.  Hallucinations are improved.  No further complaints this morning.       Vitals:  Patient Vitals for the past 24 hrs:   BP Temp Temp src Pulse Resp SpO2   02/21/24 0730 137/66 98.2 °F (36.8 °C) Temporal 85 17 96 %   02/21/24 0359 (!) 120/59 97.7 °F (36.5 °C) Oral 62 18 95 %   02/20/24 2330 (!) 131/53 97.6 °F (36.4 °C) Axillary 61 18 (!) 94 %   02/20/24 1902 (!) 129/53 97.3 °F (36.3 °C) Axillary 68 -- 96 %   02/20/24 1500 131/64 98.7 °F (37.1 °C) Temporal 72 (!) 25 95 %   02/20/24 1300 (!) 120/57 -- -- 72 (!) 30 95 %   02/20/24 1230 (!) 114/56 -- -- 73 (!) 33 95 %   02/20/24 1200 127/60 -- -- 76 (!) 26 95 %   02/20/24 1130 117/63 97.4 °F (36.3 °C) Axillary 77 (!) 23 (!) 94 %   02/20/24 1100 121/66 -- -- 74 (!) 30 95 %     PHYSICAL EXAM:     GENERAL APPEARANCE: Well-developed, well-nourished male in no acute distress.  HEENT: Normocephalic and atraumatic. PERRL.  Oropharynx unremarkable.  PULM: Comfortable on room air.  CV: RRR.  ABDOMEN: Soft, nontender.  EXTREMITIES: No signs of vascular compromise. Pulses present. No cyanosis, clubbing or edema.  SKIN: Clear; no rashes, lesions or skin breaks in exposed areas.      NEURO:   MENTAL STATUS: Patient awake and oriented to time, place, and person. Affect normal.  CRANIAL NERVES II-XII: Pupils equal, round and reactive to light. Extraocular movements full and intact. No facial asymmetry.  MOTOR: Normal bulk. Tone normal and symmetrical throughout.  No abnormal movements. No tremor.   Strength 4/5 throughout unless specified below.  REFLEXES: DTRs 1+; normal and symmetric throughout.   SENSATION: Sensation grossly intact to fine touch.  COORDINATION:  Intact  STATION: Romberg deferred.  GAIT: Deferred.    CURRENT SCHEDULED MEDICATIONS:   atorvastatin  80 mg Oral Daily    carvediloL  3.125 mg Oral BID    clopidogreL  75 mg Oral Daily    enoxparin  40 mg Subcutaneous Daily    levETIRAcetam  500 mg Oral BID    magnesium oxide  400 mg Oral Daily    montelukast  10 mg Oral QHS    mupirocin   Nasal BID    NIFEdipine  30 mg Oral Daily    potassium chloride  20 mEq Oral Daily    triamterene-hydrochlorothiazide 37.5-25 mg  1 tablet Oral Daily    valsartan  320 mg Oral Daily     CURRENT INFUSIONS:   dexmedeTOMIDine (Precedex) infusion (titrating) Stopped (02/18/24 0445)     DATA:  Recent Labs   Lab 02/17/24  1735 02/18/24  0026 02/18/24  0405 02/19/24  0439 02/20/24  0321 02/21/24  0503     --  140 140 138 139   K 3.8  --  3.5 4.1 3.7 3.9     --  105 105 105 105   CO2 19*  --  27 28 24 27   BUN 13  --  14 21 22 26*   CREATININE 1.7*  --  1.2 1.2 1.2 1.2   *  --  138* 92 113* 115*   CALCIUM 9.9  --  10.1 10.1 10.0 10.2   MG  --   --  1.3* 2.0 1.7 1.9   AST 20  --  25 23 24 28   ALT 19  --  18 15 15 28   AMMONIA  --  31  --   --   --   --      Recent Labs   Lab 02/17/24  1735 02/18/24  0405 02/19/24  0439 02/20/24  032  "02/21/24  0503   WBC 8.28 9.18 9.96 9.07 7.42   HGB 13.3* 13.5* 13.1* 13.2* 13.0*   HCT 37.6* 39.9* 38.7* 39.4* 39.2*    220 216 210 218     No results found for: "PROTEINCSF", "GLUCCSF", "WBCCSF", "RBCCSF", "PMNCSF"  Hemoglobin A1C   Date Value Ref Range Status   02/20/2024 5.6 4.5 - 6.2 % Final     Comment:     According to ADA guidelines, hemoglobin A1C <7.0% represents  optimal control in non-pregnant diabetic patients.  Different  metrics may apply to specific populations.   Standards of Medical Care in Diabetes - 2016.    For the purpose of screening for the presence of diabetes:  <5.7%     Consistent with the absence of diabetes  5.7-6.4%  Consistent with increasing risk for diabetes   (prediabetes)  >or=6.5%  Consistent with diabetes    Currently no consensus exists for use of hemoglobin A1C  for diagnosis of diabetes for children.     09/12/2023 5.8 4.5 - 6.2 % Final     Comment:     According to ADA guidelines, hemoglobin A1C <7.0% represents  optimal control in non-pregnant diabetic patients.  Different  metrics may apply to specific populations.   Standards of Medical Care in Diabetes - 2016.    For the purpose of screening for the presence of diabetes:  <5.7%     Consistent with the absence of diabetes  5.7-6.4%  Consistent with increasing risk for diabetes   (prediabetes)  >or=6.5%  Consistent with diabetes    Currently no consensus exists for use of hemoglobin A1C  for diagnosis of diabetes for children.     07/20/2023 5.7 (H) 4.0 - 5.6 % Final     Comment:     ADA Screening Guidelines:  5.7-6.4%  Consistent with prediabetes  >or=6.5%  Consistent with diabetes    High levels of fetal hemoglobin interfere with the HbA1C  assay. Heterozygous hemoglobin variants (HbS, HgC, etc)do  not significantly interfere with this assay.   However, presence of multiple variants may affect accuracy.              I have personally reviewed and interpreted the pertinent imaging and lab results.  Imaging Results  "             CT Cervical Spine Without Contrast (Final result)  Result time 02/18/24 07:52:53      Final result by Johnna Fowler MD (02/18/24 07:52:53)                   Impression:      No fracture or traumatic malalignment of the cervical spine.    Cervical spondylosis resulting in up to severe spinal canal stenosis at C3-C4 and multilevel moderate to severe neural foraminal stenosis at the lower levels.    Bulky anterior flowing osteophytes consistent with diffuse idiopathic skeletal hyperostosis (DISH).    The preliminary and final reports are concordant.      Electronically signed by: Johnna Fowler  Date:    02/18/2024  Time:    07:52               Narrative:    EXAMINATION:  CT CERVICAL SPINE WITHOUT CONTRAST    CLINICAL HISTORY:  Neck trauma (Age >= 65y);    TECHNIQUE:  Low dose axial CT images through the cervical spine, with sagittal and coronal reformations.  Contrast was not administered.    COMPARISON:  CT head and neck 09/11/2020    FINDINGS:  The vertebral bodies are normal in height and morphology without evidence of fracture or osseous destructive process.  Normal sagittal alignment is preserved.    Mild degenerative changes without evidence of bony spinal canal stenosis.  Multilevel posterior disc bulges resulting moderate to severe spinal canal stenosis worse at C3-C4.  Moderate to severe neural foraminal stenosis at C4-C5, C5-C6 and C6-C7.  Intervertebral disc space height loss at C6-C7.  Otherwise, intervertebral disc heights are well maintained.    Bulky anterior flowing osteophytes extending C2 through C7.    Limited evaluation of the intraspinal contents demonstrates no hematoma or mass.Paraspinal soft tissues exhibit no acute abnormalities.                                       CTA Head and Neck (xpd) (Final result)  Result time 02/18/24 08:14:35      Final result by Johnna Fowler MD (02/18/24 08:14:35)                   Impression:      No acute intracranial abnormality.  No high-grade  stenosis or major vessel occlusion.    Prominent mediastinal and hilar lymph nodes, possibly reactive.    The preliminary and final reports are concordant.      Electronically signed by: Johnnaromana Fowler  Date:    02/18/2024  Time:    08:14               Narrative:    EXAMINATION:  CTA HEAD AND NECK (XPD)    CLINICAL HISTORY:  Neuro deficit, acute, stroke suspected;    TECHNIQUE:  Non contrast low dose axial images were obtained through the head.  CT angiogram was performed from the level of the chaparrita to the top of the head following the IV administration of 75mL of Omnipaque 350.   Sagittal and coronal reconstructions and maximum intensity projection reconstructions were performed.    COMPARISON:    CTA head and neck 09/11/2023, MRI brain 09/11/2023    FINDINGS:  Intracranial Compartment:    Ventricles and sulci are normal in size for age without evidence of hydrocephalus. No extra-axial blood or fluid collections.    Irregular white matter hypoattenuation although nonspecific most in keeping with chronic microangiopathic ischemic change.  Hypoattenuating focus in the left basal ganglia most consistent with remote lacunar infarct.  No parenchymal mass, hemorrhage, edema, or major vascular distribution infarct.    Skull/Extracranial Contents (limited evaluation): No fracture. Mastoid air cells and paranasal sinuses are essentially clear.    Non-Vascular Structures of the Neck/Thoracic Inlet (limited evaluation): Partially imaged left hilar node measures 12 mm (series 6, image 1).  Prominent although non pathologically enlarged mediastinal lymph nodes.    Aorta: Normal 3 vessel arch.    Extracranial carotid circulation: No hemodynamically significant stenosis, aneurysmal dilatation, or dissection.  Atherosclerotic plaque at the carotid bifurcation resulting in less than 50% stenosis, similar to prior.    Extracranial vertebral circulation: No hemodynamically significant stenosis, aneurysmal dilatation, or  dissection.    Intracranial Arteries: No focal high-grade stenosis, occlusion, or aneurysm.  Atherosclerotic calcification of the cavernous ICAs.  Multifocal irregularity likely due to underlying atherosclerotic plaque involving the bilateral M1 segments the middle cerebral arteries, P2 segment of the left PCA and intracranial right vertebral artery.    Venous structures (limited evaluation): Normal.                                       CT Maxillofacial Without Contrast (Final result)  Result time 02/18/24 07:45:55      Final result by Johnna Fowler MD (02/18/24 07:45:55)                   Impression:      No evidence of an acute displaced fracture.    The preliminary and final reports are concordant.      Electronically signed by: Johnna Fowler  Date:    02/18/2024  Time:    07:45               Narrative:    EXAMINATION:  CT MAXILLOFACIAL WITHOUT CONTRAST    CLINICAL HISTORY:  Facial trauma, blunt;    TECHNIQUE:  Low dose CT images throughout the region of the facial bones.  Axial, sagittal and coronal reformations were obtained.  Contrast was not administered.    COMPARISON:  None    FINDINGS:  Mildly motion degraded exam.  No focal soft tissue swelling identified.  The globes and intraorbital contents are within normal limits.  No orbital fracture.    The remainder of the facial bones appear intact without evidence of an acute displaced fracture.  No osseous destructive lesions.    Temporomandibular joints appropriately position without evidence of dislocation.    Mild maxillary mucosal thickening.  Otherwise, paranasal sinuses essentially clear.  Mastoids are clear.    Limited intracranial evaluation is unremarkable.                                              ASSESSMENT AND PLAN:     New onset seizure - ongoing etiology  Rule out stroke  77 yo man with history of HTN, Pre-Diabetes, GERD and suspected KEITH, who presented to the ED with new onset seizure.  Etiology of seizure is unknown.    - Admitted to  hospital medicine with q4 hour neuro checks, on telemetry, continuous pulse oximetry  - Seizure precautions while inpatient  - Continue Keppra 500 mg BID for prevention of seizures.   -hallucination could likely be post anesthesia affect.  Hallucinations now improved.  - Diet after eval per SLP  - Ordered EEG routine that showed moderate to severe slowing but no evidence of epileptiform activity  - MRI brain with and without contrast-Negative for acute ischemia or acute intracranial pathology.   - continue plavix 75 mg daily, atorvastatin 80 mg daily for stroke prevention  - Avoid seizure threshold lowering medications such as:  Bupropion, diphenhydramine, tramadol, certain antibiotics  - patient will need follow-up as outpatient with Neurology for long-term EEG monitoring to better characterize epileptiform abnormalities  - Assessment for rehab with PT/OT/SLP evaluation and treatment.  -okay to be transitioned out of the ICU.  - outpatient neurology follow-up.     Seizure education:  No driving for now, no swimming alone, no climbing high areas, no operation of heavy machinery or working with high risk electricity equipment.  Continue to take AEDs as prescribed. If any major side effects from neurological medications go to the ED including mood changes and severe depression.  Patient and/or next of kin informed.  Medication side effects discussed with the patient and/or caregiver.          Christopher Barrett MD  Neurology/vascular Neurology  Date of Service: 02/21/2024  9:54 AM    Please note: This note was transcribed using voice recognition software. Because of this technology there are often uinintended grammatical, spelling, and other transcription errors. Please disregard these errors.

## 2024-02-21 NOTE — PT/OT/SLP PROGRESS
Occupational Therapy   Treatment    Name: Papito Hutton  MRN: [id number]  Admitting Diagnosis:  Seizure       Recommendations:     Discharge Recommendations: Low Intensity Therapy  Discharge Equipment Recommendations:  shower chair  Barriers to discharge:  None    Assessment:     Papito Hutton is a 78 y.o. male with a medical diagnosis of Seizure.  He presents with improving medical acuity and functional mobility. Patient participated in bed mobility, unsupported sitting EOB, LB dressing sitting EOB, toilet transfer and ambulation using rolling walker. Performance deficits affecting function are weakness, impaired endurance, impaired self care skills, impaired functional mobility, gait instability, impaired balance, decreased upper extremity function, decreased lower extremity function, decreased safety awareness.     Rehab Prognosis:  Fair; patient would benefit from acute skilled OT services to address these deficits and reach maximum level of function.       Plan:     Patient to be seen 5 x/week to address the above listed problems via self-care/home management, therapeutic activities, therapeutic exercises  Plan of Care Expires: 03/19/24  Plan of Care Reviewed with: patient, spouse    Subjective     Chief Complaint: General weakness  Patient/Family Comments/goals: Improved functional mobility and ADL independence.  Pain/Comfort:  Pain Rating 1: 0/10  Pain Rating Post-Intervention 1: 0/10    Objective:     Communicated with: nurse prior to session.  Patient found HOB elevated with telemetry, peripheral IV upon OT entry to room.    General Precautions: Standard, fall, seizure    Orthopedic Precautions:N/A  Braces: N/A  Respiratory Status: Room air     Occupational Performance:     Bed Mobility:    Patient completed Scooting/Bridging with contact guard assistance  Patient completed Supine to Sit with contact guard assistance     Functional Mobility/Transfers:  Patient completed Toilet Transfer Step  Transfer technique with contact guard assistance with  rolling walker  Functional Mobility: ambulated 12 feet using rolling walker with contact guard assistance.    Activities of Daily Living:  Lower Body Dressing: contact guard assistance to don/doff socks sitting EOB.      [facility] 6 Click ADL:      Treatment & Education:  Patient is more alert , oriented and making positive progress towards all OT goals.     Patient left up in chair with all lines intact, call button in reach, and spouse present    GOALS:   Multidisciplinary Problems       Occupational Therapy Goals          Problem: Occupational Therapy    Goal Priority Disciplines Outcome Interventions   Occupational Therapy Goal     OT, PT/OT     Description: Goals to be met by: 3/19/24     Patient will increase functional independence with ADLs by performing:    UE Dressing with Supervision.  LE Dressing with Supervision.  Grooming while standing at sink with Supervision.  Toileting from toilet with Supervision for hygiene and clothing management.   Toilet transfer to toilet with Supervision.                         Time Tracking:     OT Date of Treatment: 02/21/24  OT Start Time: 1032  OT Stop Time: 1055  OT Total Time (min): 23 min    Billable Minutes:Self Care/Home Management 12  Therapeutic Activity 11    OT/SEGUN: OT          2/21/2024

## 2024-02-22 VITALS
TEMPERATURE: 98 F | WEIGHT: 202.38 LBS | OXYGEN SATURATION: 96 % | RESPIRATION RATE: 19 BRPM | BODY MASS INDEX: 27.41 KG/M2 | HEART RATE: 70 BPM | SYSTOLIC BLOOD PRESSURE: 158 MMHG | HEIGHT: 72 IN | DIASTOLIC BLOOD PRESSURE: 70 MMHG

## 2024-02-22 LAB
ALBUMIN SERPL BCP-MCNC: 3.8 G/DL (ref 3.5–5.2)
ALP SERPL-CCNC: 74 U/L (ref 55–135)
ALT SERPL W/O P-5'-P-CCNC: 30 U/L (ref 10–44)
ANION GAP SERPL CALC-SCNC: 8 MMOL/L (ref 8–16)
AST SERPL-CCNC: 24 U/L (ref 10–40)
BASOPHILS # BLD AUTO: 0.07 K/UL (ref 0–0.2)
BASOPHILS NFR BLD: 1.2 % (ref 0–1.9)
BILIRUB SERPL-MCNC: 0.8 MG/DL (ref 0.1–1)
BUN SERPL-MCNC: 24 MG/DL (ref 8–23)
CALCIUM SERPL-MCNC: 10.5 MG/DL (ref 8.7–10.5)
CHLORIDE SERPL-SCNC: 106 MMOL/L (ref 95–110)
CO2 SERPL-SCNC: 24 MMOL/L (ref 23–29)
CREAT SERPL-MCNC: 1.2 MG/DL (ref 0.5–1.4)
DIFFERENTIAL METHOD BLD: ABNORMAL
EOSINOPHIL # BLD AUTO: 0.2 K/UL (ref 0–0.5)
EOSINOPHIL NFR BLD: 3.4 % (ref 0–8)
ERYTHROCYTE [DISTWIDTH] IN BLOOD BY AUTOMATED COUNT: 13.2 % (ref 11.5–14.5)
EST. GFR  (NO RACE VARIABLE): >60 ML/MIN/1.73 M^2
GLUCOSE SERPL-MCNC: 111 MG/DL (ref 70–110)
GLUCOSE SERPL-MCNC: 117 MG/DL (ref 70–110)
GLUCOSE SERPL-MCNC: 125 MG/DL (ref 70–110)
HCT VFR BLD AUTO: 38.4 % (ref 40–54)
HGB BLD-MCNC: 12.9 G/DL (ref 14–18)
IMM GRANULOCYTES # BLD AUTO: 0.02 K/UL (ref 0–0.04)
IMM GRANULOCYTES NFR BLD AUTO: 0.3 % (ref 0–0.5)
LYMPHOCYTES # BLD AUTO: 2.3 K/UL (ref 1–4.8)
LYMPHOCYTES NFR BLD: 38.8 % (ref 18–48)
MAGNESIUM SERPL-MCNC: 1.6 MG/DL (ref 1.6–2.6)
MCH RBC QN AUTO: 30.4 PG (ref 27–31)
MCHC RBC AUTO-ENTMCNC: 33.6 G/DL (ref 32–36)
MCV RBC AUTO: 91 FL (ref 82–98)
MONOCYTES # BLD AUTO: 0.5 K/UL (ref 0.3–1)
MONOCYTES NFR BLD: 9 % (ref 4–15)
NEUTROPHILS # BLD AUTO: 2.8 K/UL (ref 1.8–7.7)
NEUTROPHILS NFR BLD: 47.3 % (ref 38–73)
NRBC BLD-RTO: 0 /100 WBC
PLATELET # BLD AUTO: 236 K/UL (ref 150–450)
PMV BLD AUTO: 10.7 FL (ref 9.2–12.9)
POTASSIUM SERPL-SCNC: 3.9 MMOL/L (ref 3.5–5.1)
PROT SERPL-MCNC: 6.6 G/DL (ref 6–8.4)
RBC # BLD AUTO: 4.24 M/UL (ref 4.6–6.2)
SODIUM SERPL-SCNC: 138 MMOL/L (ref 136–145)
WBC # BLD AUTO: 5.87 K/UL (ref 3.9–12.7)

## 2024-02-22 PROCEDURE — 80053 COMPREHEN METABOLIC PANEL: CPT | Performed by: STUDENT IN AN ORGANIZED HEALTH CARE EDUCATION/TRAINING PROGRAM

## 2024-02-22 PROCEDURE — 25000003 PHARM REV CODE 250: Performed by: INTERNAL MEDICINE

## 2024-02-22 PROCEDURE — 83735 ASSAY OF MAGNESIUM: CPT | Performed by: STUDENT IN AN ORGANIZED HEALTH CARE EDUCATION/TRAINING PROGRAM

## 2024-02-22 PROCEDURE — 25000003 PHARM REV CODE 250: Performed by: STUDENT IN AN ORGANIZED HEALTH CARE EDUCATION/TRAINING PROGRAM

## 2024-02-22 PROCEDURE — 85025 COMPLETE CBC W/AUTO DIFF WBC: CPT | Performed by: STUDENT IN AN ORGANIZED HEALTH CARE EDUCATION/TRAINING PROGRAM

## 2024-02-22 PROCEDURE — 36415 COLL VENOUS BLD VENIPUNCTURE: CPT | Performed by: STUDENT IN AN ORGANIZED HEALTH CARE EDUCATION/TRAINING PROGRAM

## 2024-02-22 PROCEDURE — 25000003 PHARM REV CODE 250: Performed by: HOSPITALIST

## 2024-02-22 PROCEDURE — 97116 GAIT TRAINING THERAPY: CPT | Mod: CQ

## 2024-02-22 RX ORDER — LEVETIRACETAM 500 MG/1
500 TABLET ORAL 2 TIMES DAILY
Qty: 60 TABLET | Refills: 0 | Status: SHIPPED | OUTPATIENT
Start: 2024-02-22 | End: 2024-02-22

## 2024-02-22 RX ORDER — LEVETIRACETAM 500 MG/1
500 TABLET ORAL 2 TIMES DAILY
Qty: 60 TABLET | Refills: 0 | Status: SHIPPED | OUTPATIENT
Start: 2024-02-22 | End: 2024-02-26 | Stop reason: SDUPTHER

## 2024-02-22 RX ORDER — DOXYCYCLINE 100 MG/1
100 CAPSULE ORAL EVERY 12 HOURS
Qty: 14 CAPSULE | Refills: 0 | Status: SHIPPED | OUTPATIENT
Start: 2024-02-22 | End: 2024-02-22

## 2024-02-22 RX ORDER — DOXYCYCLINE 100 MG/1
100 CAPSULE ORAL EVERY 12 HOURS
Qty: 14 CAPSULE | Refills: 0 | Status: SHIPPED | OUTPATIENT
Start: 2024-02-22 | End: 2024-03-28 | Stop reason: ALTCHOICE

## 2024-02-22 RX ADMIN — CARVEDILOL 3.12 MG: 3.12 TABLET, FILM COATED ORAL at 09:02

## 2024-02-22 RX ADMIN — VALSARTAN 320 MG: 80 TABLET, FILM COATED ORAL at 09:02

## 2024-02-22 RX ADMIN — NIFEDIPINE 30 MG: 30 TABLET, FILM COATED, EXTENDED RELEASE ORAL at 09:02

## 2024-02-22 RX ADMIN — DOXYCYCLINE HYCLATE 100 MG: 100 CAPSULE ORAL at 09:02

## 2024-02-22 RX ADMIN — Medication 800 MG: at 06:02

## 2024-02-22 RX ADMIN — CLOPIDOGREL BISULFATE 75 MG: 75 TABLET, FILM COATED ORAL at 09:02

## 2024-02-22 RX ADMIN — LEVETIRACETAM 500 MG: 500 TABLET, FILM COATED ORAL at 09:02

## 2024-02-22 RX ADMIN — Medication 400 MG: at 09:02

## 2024-02-22 RX ADMIN — POTASSIUM CHLORIDE 20 MEQ: 1500 TABLET, EXTENDED RELEASE ORAL at 09:02

## 2024-02-22 RX ADMIN — ATORVASTATIN CALCIUM 80 MG: 40 TABLET, FILM COATED ORAL at 09:02

## 2024-02-22 RX ADMIN — MUPIROCIN 1 G: 20 OINTMENT TOPICAL at 09:02

## 2024-02-22 RX ADMIN — TRIAMTERENE AND HYDROCHLOROTHIAZIDE 1 TABLET: 37.5; 25 TABLET ORAL at 09:02

## 2024-02-22 NOTE — PT/OT/SLP PROGRESS
Speech Language Pathology      Papito Hutton  MRN: [id number]    Patient not seen today secondary to Other (Comment) (pt scheduled for d'c). Will follow-up 2/23 if d'c plans fall through.

## 2024-02-22 NOTE — PLAN OF CARE
Problem: Adult Inpatient Plan of Care  Goal: Plan of Care Review  Outcome: Met  Goal: Patient-Specific Goal (Individualized)  Outcome: Met  Goal: Absence of Hospital-Acquired Illness or Injury  Outcome: Met  Goal: Optimal Comfort and Wellbeing  Outcome: Met  Goal: Readiness for Transition of Care  Outcome: Met     Problem: Infection  Goal: Absence of Infection Signs and Symptoms  Outcome: Met     Problem: Skin Injury Risk Increased  Goal: Skin Health and Integrity  Outcome: Met     Problem: Fall Injury Risk  Goal: Absence of Fall and Fall-Related Injury  Outcome: Met     Problem: Restraint, Nonbehavioral (Nonviolent)  Goal: Absence of Harm or Injury  Outcome: Met     Problem: Impaired Wound Healing  Goal: Optimal Wound Healing  Outcome: Met     Problem: Physical Therapy  Goal: Physical Therapy Goal  Description: Goals to be met by: 3/18/24     Patient will increase functional independence with mobility by performin. Supine to sit with Supervision  2. Sit to stand transfer with Supervision  3. Bed to chair transfer with Supervision using Rolling Walker  4. Gait  x 300 feet with Supervision using Rolling Walker.         Outcome: Met     Problem: Occupational Therapy  Goal: Occupational Therapy Goal  Description: Goals to be met by: 3/19/24     Patient will increase functional independence with ADLs by performing:    UE Dressing with Supervision.  LE Dressing with Supervision.  Grooming while standing at sink with Supervision.  Toileting from toilet with Supervision for hygiene and clothing management.   Toilet transfer to toilet with Supervision.    Outcome: Met

## 2024-02-22 NOTE — PT/OT/SLP PROGRESS
Physical Therapy Treatment    Patient Name:  Papito Hutton   MRN:  [id number]    Recommendations:     Discharge Recommendations: Low Intensity Therapy  Discharge Equipment Recommendations: walker, rolling  Barriers to discharge: None    Assessment:     Papito Hutton is a 78 y.o. male admitted with a medical diagnosis of Seizure.  He presents with the following impairments/functional limitations: weakness, impaired endurance, impaired self care skills, gait instability, impaired functional mobility, impaired balance, decreased upper extremity function, decreased lower extremity function, decreased safety awareness.    Pt with HOB elevated and nurse present administering medication. Pt finished taking medication and then agreeable to visit.    Pt supervision for supine to sit transfer and stand by assist for sit to stand transfer with RW. Pt ambulated 350' with RW and close stand by assist. Good pacing and RW management.    Pt left sitting EOB with significant other present.    Rehab Prognosis: Fair; patient would benefit from acute skilled PT services to address these deficits and reach maximum level of function.    Recent Surgery: * No surgery found *      Plan:     During this hospitalization, patient to be seen 6 x/week to address the identified rehab impairments via gait training, therapeutic activities, therapeutic exercises, neuromuscular re-education and progress toward the following goals:    Plan of Care Expires:  03/18/24    Subjective     Chief Complaint: Pt reports that his knees bother him when ambulating  Patient/Family Comments/goals: to get better  Pain/Comfort:  Pain Rating 1: 0/10      Objective:     Communicated with RN prior to session.  Patient found HOB elevated with telemetry, peripheral IV upon PT entry to room.     General Precautions: Standard, fall, seizure  Orthopedic Precautions: N/A  Braces: N/A  Respiratory Status: Room air     Functional Mobility:  Bed Mobility:     Supine to  Sit: supervision  Transfers:     Sit to Stand:  stand by assistance with rolling walker  Gait: x 350' with RW and close SBA      AM-PAC 6 CLICK MOBILITY          Treatment & Education:  Pt educated on importance of time OOB, importance of intermittent mobility, safe techniques for transfers/ambulation, discharge recommendations/options, and use of call light for assistance and fall prevention.      Patient left sitting edge of bed with all lines intact, call button in reach, and significant other present..    GOALS:   Multidisciplinary Problems       Physical Therapy Goals       Not on file              Multidisciplinary Problems (Resolved)          Problem: Physical Therapy    Goal Priority Disciplines Outcome Goal Variances Interventions   Physical Therapy Goal   (Resolved)     PT, PT/OT Met     Description: Goals to be met by: 3/18/24     Patient will increase functional independence with mobility by performin. Supine to sit with Supervision  2. Sit to stand transfer with Supervision  3. Bed to chair transfer with Supervision using Rolling Walker  4. Gait  x 300 feet with Supervision using Rolling Walker.                              Time Tracking:     PT Received On: 24  PT Start Time: 921     PT Stop Time: 936  PT Total Time (min): 15 min     Billable Minutes: Gait Training 15    Treatment Type: Treatment  PT/PTA: PTA     Number of PTA visits since last PT visit: 2024

## 2024-02-22 NOTE — PLAN OF CARE
CM sent [facility] a message in careport updating them that the pt is discharging home today.  Pt has already been accepted. AVS updated.    Pt has PCP  follow up appt 2/26/24 at 10:00 am with MELLISSA Carr     02/22/24 0910   Post-Acute Status   Post-Acute Authorization Home Health   Home Health Status Set-up Complete/Auth obtained

## 2024-02-22 NOTE — PT/OT/SLP PROGRESS
Occupational Therapy      Patient Name:  Papito Hutton   MRN:  [id number]    Patient not seen today secondary to Patient discharged prior to initiation of evaluation. Will follow-up tomorrow if patient is not d/c home.    2/22/2024

## 2024-02-22 NOTE — DISCHARGE SUMMARY
[facility] Medicine  Discharge Summary      Patient Name: Papito Hutton  MRN: [id number]  [facility]: [id number]  Patient Class: IP- Inpatient  Admission Date: 2/17/2024  Hospital Length of Stay: 5 days  Discharge Date and Time:  02/22/2024 9:24 AM  Attending Physician: Gina Arriaza MD   Discharging Provider: Gina Arriaza MD  Primary Care Provider: Rafi Martin MD    Primary Care Team: Networked reference to record PCT     HPI:   Patient is a 79yo with a PMH of HTN, Pre-Diabetes, GERD and suspected KEITH. Patient was unable to speak, so history was provided by [facility] and Patient's spouse. Patient's spouse found Patient to be unconscious in the afternoon, this was after she saw the Patient Aox4 in the morning. To the spouse it seemed as though the Patient had fallen over, when she found him he was conscious though very confused and somnolent. She called EMS, to EMS Patient had a slight right sided facial droop, though Patient's spouse couldn't see it. To note, Patient was recently started on Procardia for his resistant hypertension, his BP in the morning was     Patient was brought to [facility] ED where he had a CT Scan, Neurology reviewed the CT Findings, found there wasn't a large vessel occlusion. However Patient had a Seizure in that ED and was transferred to [facility] after getting IV Keppra. While at [facility] Patient was noted to be agitated after his Seizure, with him moving all four extremities, and was placed on Precedex gtt    * No surgery found *      Hospital Course:   Patient was admitted to intensive care unit where patient was closely monitored.  Patient was evaluated by neurologist.  Neuro checks were performed.  Patient was maintained on Keppra.  Fall and seizure precautions observed.  EEG showed moderate to severe slowing diffusely but no epileptiform activity.  Patient was maintained on Plavix 75 mg daily and Lipitor 80 mg daily for stroke prevention.  MRI brain did not report  acute intracranial process.  Patient was evaluated by PT, OT and SLP.  Upon improvement in symptoms patient has been transferred to medicine telemetry floor.  Patient has been noted to have visual hallucinations at nighttime.  Medications which lower seizure threshold including tramadol, Wellbutrin and Benadryl are being avoided.  No driving, operating heavy machinery or going to [place] discussed with patient and his wife in detail.  Discharge plan of care reviewed with the patient and his wife in detail.  Patient is scheduled for outpatient polysomnography as well.  Patient encouraged to complete antibiotic therapy course for possible pneumonitis process.  Patient encouraged to follow-up for polysomnogram.  Patient to continue close follow-up with primary care physician and neurologist as outpatient.    Goals of Care Treatment Preferences:  Code Status: Full Code    Living Will: Yes              Consults:   Consults (From admission, onward)          Status Ordering Provider     Inpatient consult to Social Work/Case Management  Once        Provider:  (Not yet assigned)    Completed PABLO YOUSSEF     Inpatient consult to Neurology  Once        Provider:  Jahaira Sam MD    Completed CHAD TARANGO     Consult to Telemedicine - Acute Stroke  Once        Provider:  (Not yet assigned)    Acknowledged CHAD TARANGO          Microbiology Results (last 7 days)       ** No results found for the last 168 hours. **          EEG:   This is an abnormal EEG due to diffuse slowing of the background activity consistent with a moderate to severe encephalopathy. No epileptiform discharges or seizures are captured.  Findings of this study indicate a generalized encephalopathic process that is nonspecific in type. Toxic, metabolic, or structural abnormalities may be considered.  Further clinical correlation is advised.      CT scan maxillofacial:   No evidence of an acute displaced fracture.      CT of the head and  neck:    No acute intracranial abnormality.  No high-grade stenosis or major vessel occlusion.  Prominent mediastinal and hilar lymph nodes, possibly reactive.     CT C-spine:   No fracture or traumatic malalignment of the cervical spine.  Cervical spondylosis resulting in up to severe spinal canal stenosis at C3-C4 and multilevel moderate to severe neural foraminal stenosis at the lower levels.  Bulky anterior flowing osteophytes consistent with diffuse idiopathic skeletal hyperostosis (DISH).     MRI brain with and without contrast:    Negative for acute ischemia or acute intracranial pathology.  Mild white matter microangiopathic changes.  No abnormal intracranial contrast enhancement.     ECHO:     Left Ventricle: The left ventricle is normal in size. Mildly increased wall thickness. There is normal systolic function with a visually estimated ejection fraction of 60 - 65%. Grade I diastolic dysfunction. E/A ratio is 0.76.    Right Ventricle: Normal right ventricular cavity size. Wall thickness is normal. Right ventricle wall motion  is normal. Systolic function is normal.    IVC/SVC: Normal venous pressure at 3 mmHg.     CXR:  1.  Focal patchy opacity of the right upper lung could reflect atelectasis or infiltrate.  2.  Mild blunting of the left costophrenic angle could reflect atelectasis, infiltrate or small pleural effusion.    Final Active Diagnoses:    Diagnosis Date Noted POA    PRINCIPAL PROBLEM:  Seizure [R56.9] 02/17/2024 Yes    Facial contusion [S00.83XA] 02/19/2024 Yes    Abnormal chest x-ray [R93.89] 02/19/2024 Yes    Abnormal CT scan, cervical spine [R93.7] 02/19/2024 Yes    Advance care planning [Z71.89] 02/19/2024 Not Applicable    Hypomagnesemia [E83.42] 02/19/2024 Yes    Hx of transient ischemic attack (TIA) [Z86.73] 09/26/2023 Not Applicable    Prediabetes [R73.03] 02/15/2018 Yes    Hypertension [I10]  Yes    Hyperlipidemia [E78.5]  Yes      Problems Resolved During this Admission:  "      Discharged Condition: good    Disposition: Home or Self Care    Follow Up:   Contact information for follow-up providers       Rafi Martin MD Follow up in 1 week(s).    Specialty: Family Medicine  Contact information:  [address]  [place] [address] [phone]               Christopher Barrett MD Follow up in 1 week(s).    Specialty: Neurology  Contact information:  [address]  [place] [address] [phone]                       Contact information for after-discharge care       Durable Medical Equipment       [facility] .    Service: Durable Medical Equipment  Contact information:  [address] [facility] [address]  [phone]                     Home Medical Care       [facility] .    Service: Home Health Services  Contact information:  [address] [place] [address] [phone]                                 Patient Instructions:      WALKER FOR HOME USE     Order Specific Question Answer Comments   Type of Walker: Adult (5'4"-6'6")    With wheels? Yes    Height: 6' (1.829 m)    Weight: 91.8 kg (202 lb 6.1 oz)    Length of need (1-99 months): 99    Does patient have medical equipment at home? cane, straight    Please check all that apply: Patient's condition impairs ambulation.    Please check all that apply: Patient needs help to get in and out of chair.      Ambulatory referral/consult to Home Health   Standing Status: Future   Referral Priority: Routine Referral Type: Home Health   Referral Reason: Specialty Services Required   Requested Specialty: Home Health Services   Number of Visits Requested: 1     Diet Cardiac     Diet diabetic     Notify your health care provider if you experience any of the following:  temperature >100.4     Notify your health care provider if you experience any of the following:  persistent nausea and vomiting or diarrhea     Notify your health care provider if you experience any of the following:  " "severe uncontrolled pain     Notify your health care provider if you experience any of the following:  redness, tenderness, or signs of infection (pain, swelling, redness, odor or green/yellow discharge around incision site)     Notify your health care provider if you experience any of the following:  difficulty breathing or increased cough     Notify your health care provider if you experience any of the following:  severe persistent headache     Notify your health care provider if you experience any of the following:  worsening rash     Notify your health care provider if you experience any of the following:  persistent dizziness, light-headedness, or visual disturbances     Notify your health care provider if you experience any of the following:  increased confusion or weakness     Activity as tolerated   Order Comments: Fall precautions       Significant Diagnostic Studies: Labs: CMP   Recent Labs   Lab 02/21/24  0503 02/22/24  0442    138   K 3.9 3.9    106   CO2 27 24   * 125*   BUN 26* 24*   CREATININE 1.2 1.2   CALCIUM 10.2 10.5   PROT 6.8 6.6   ALBUMIN 3.9 3.8   BILITOT 0.9 0.8   ALKPHOS 70 74   AST 28 24   ALT 28 30   ANIONGAP 7* 8   , CBC   Recent Labs   Lab 02/21/24  0503 02/22/24  0442   WBC 7.42 5.87   HGB 13.0* 12.9*   HCT 39.2* 38.4*    236   , Lipid Panel   Lab Results   Component Value Date    CHOL 147 02/17/2024    HDL 42 02/17/2024    LDLCALC 80.8 02/17/2024    TRIG 121 02/17/2024    CHOLHDL 28.6 02/17/2024   , and Troponin No results for input(s): "TROPONINI" in the last 168 hours.  Microbiology: Blood Culture No results found for: "LABBLOO", Sputum Culture No results found for: "GSRESP", "RESPIRATORYC", and Urine Culture  No results found for: "LABURIN"    Pending Diagnostic Studies:       None           Medications:  Reconciled Home Medications:      Medication List        START taking these medications      doxycycline 100 MG Cap  Commonly known as: VIBRAMYCIN  Take " 1 capsule (100 mg total) by mouth every 12 (twelve) hours.     levETIRAcetam 500 MG Tab  Commonly known as: KEPPRA  Take 1 tablet (500 mg total) by mouth 2 (two) times daily.            CONTINUE taking these medications      acetaminophen 650 MG Tbsr  Commonly known as: TYLENOL  Take 1 tablet (650 mg total) by mouth every 8 (eight) hours as needed (do not take with any other tylenol or acetaminophen).     atorvastatin 80 MG tablet  Commonly known as: LIPITOR  Take 1 tablet (80 mg total) by mouth once daily.     azelastine 137 mcg (0.1 %) nasal spray  Commonly known as: ASTELIN  1 spray (137 mcg total) by Nasal route 2 (two) times daily. for 180 doses     carvediloL 12.5 MG tablet  Commonly known as: COREG  Take 1 tablet (12.5 mg total) by mouth 2 (two) times daily with meals.     clopidogreL 75 mg tablet  Commonly known as: PLAVIX  Take 1 tablet (75 mg total) by mouth once daily.     cyanocobalamin 1000 MCG tablet  Commonly known as: VITAMIN B-12  Take 100 mcg by mouth once daily.     * diclofenac 75 MG EC tablet  Commonly known as: VOLTAREN  Take 75 mg by mouth 2 (two) times daily.     * diclofenac sodium 1 % Gel  Commonly known as: VOLTAREN  Apply 2 grams topically once daily.     esomeprazole 40 MG capsule  Commonly known as: NEXIUM  TAKE 1 CAPSULE BEFORE BREAKFAST     fluticasone propionate 50 mcg/actuation nasal spray  Commonly known as: FLONASE  Nasal for 90 Days     ipratropium 21 mcg (0.03 %) nasal spray  Commonly known as: ATROVENT  2 sprays by Each Nostril route 2 (two) times daily as needed for Rhinitis (sinus congestion).     metFORMIN 500 MG tablet  Commonly known as: GLUCOPHAGE  Take 500 mg by mouth once daily.     montelukast 10 mg tablet  Commonly known as: SINGULAIR  Take 10 mg by mouth every evening.     NIFEdipine 60 MG (OSM) 24 hr tablet  Commonly known as: PROCARDIA-XL  Take 1 tablet (60 mg total) by mouth once daily.     triamterene-hydrochlorothiazide 37.5-25 mg 37.5-25 mg per tablet  Commonly  known as: MAXZIDE-25  Take 1 tablet by mouth once daily.     valsartan 320 MG tablet  Commonly known as: DIOVAN  Take 1 tablet (320 mg total) by mouth once daily.     VITAMIN D3 ORAL  Take 1 capsule by mouth once daily.           * This list has 2 medication(s) that are the same as other medications prescribed for you. Read the directions carefully, and ask your doctor or other care provider to review them with you.                STOP taking these medications      cetirizine 10 MG tablet  Commonly known as: ZYRTEC              Indwelling Lines/Drains at time of discharge:   Lines/Drains/Airways       None                   Time spent on the discharge of patient: 34 minutes         Gina Arriaza MD  Department of Hospital Medicine  [facility]

## 2024-02-22 NOTE — PLAN OF CARE
[facility] enc[facility] - SOC is tomorrow- Discharge plan closed in [facility].    02/22/24 1134   Post-Acute Status   Post-Acute Authorization Home Health   Home Health Status Set-up Complete/Auth obtained

## 2024-02-22 NOTE — DISCHARGE INSTRUCTIONS
No driving for now, no swimming alone, no climbing high areas, no operation of heavy machinery or working with high risk electricity equipment.  Continue to take AEDs as prescribed. If any major side effects from neurological medications go to the ED including mood changes and severe depression.  Patient and/or next of kin informed.

## 2024-02-22 NOTE — PLAN OF CARE
The pt is cleared for discharge home with [facility] once cleared from Dr Gaston Barrett and has follow up appts added to his AVS.    02/22/24 1135   Final Note   Assessment Type Final Discharge Note   Anticipated Discharge Disposition Home-Health   What phone number can be called within the next 1-3 days to see how you are doing after discharge? [phone]   Hospital Resources/Appts/Education Provided Appointment suggestion unavailable;Community resources provided   Post-Acute Status   Post-Acute Authorization Home Health   Home Health Status Set-up Complete/Auth obtained   Patient choice form signed by patient/caregiver List with quality metrics by geographic area provided   Discharge Delays None known at this time

## 2024-02-22 NOTE — SUBJECTIVE & OBJECTIVE
Interval History:  Patient reportedly had visual hallucinations last night. Wife at bedside. No new focal complaints. On Keppra, neurology following. Currently afebrile.    Review of Systems   Neurological:  Positive for weakness.   Psychiatric/Behavioral:  Positive for confusion.      Objective:     Vital Signs (Most Recent):  Temp: 98.1 °F (36.7 °C) (02/22/24 0733)  Pulse: 70 (02/22/24 0733)  Resp: 19 (02/22/24 0733)  BP: (!) 158/70 (02/22/24 0733)  SpO2: 96 % (02/22/24 0733) Vital Signs (24h Range):  Temp:  [97.9 °F (36.6 °C)-98.3 °F (36.8 °C)] 98.1 °F (36.7 °C)  Pulse:  [70-88] 70  Resp:  [17-19] 19  SpO2:  [95 %-96 %] 96 %  BP: (139-165)/(62-81) 158/70     Weight: 91.8 kg (202 lb 6.1 oz)  Body mass index is 27.45 kg/m².  No intake or output data in the 24 hours ending 02/22/24 0906        Physical Exam  HENT:      Head: Normocephalic and atraumatic.      Right Ear: External ear normal.      Left Ear: External ear normal.      Nose: No congestion or rhinorrhea.      Mouth/Throat:      Pharynx: No oropharyngeal exudate or posterior oropharyngeal erythema.   Eyes:      General:         Right eye: No discharge.         Left eye: No discharge.      Conjunctiva/sclera: Conjunctivae normal.   Cardiovascular:      Rate and Rhythm: Normal rate and regular rhythm.      Heart sounds: No murmur heard.     No friction rub. No gallop.   Pulmonary:      Effort: No respiratory distress.      Breath sounds: No stridor. No wheezing, rhonchi or rales.   Chest:      Chest wall: No tenderness.   Abdominal:      General: There is no distension.      Palpations: There is no mass.      Tenderness: There is no abdominal tenderness. There is no guarding or rebound.      Hernia: No hernia is present.   Musculoskeletal:         General: No swelling, tenderness, deformity or signs of injury.      Right lower leg: No edema.      Left lower leg: No edema.   Skin:     Coloration: Skin is not jaundiced or pale.      Findings: No bruising,  "erythema, lesion or rash.   Neurological:      Comments: Grossly nonfocal exam.             Significant Labs: All pertinent labs within the past 24 hours have been reviewed.  CBC:   Recent Labs   Lab 02/21/24  0503 02/22/24  0442   WBC 7.42 5.87   HGB 13.0* 12.9*   HCT 39.2* 38.4*    236       CMP:   Recent Labs   Lab 02/21/24  0503 02/22/24  0442    138   K 3.9 3.9    106   CO2 27 24   * 125*   BUN 26* 24*   CREATININE 1.2 1.2   CALCIUM 10.2 10.5   PROT 6.8 6.6   ALBUMIN 3.9 3.8   BILITOT 0.9 0.8   ALKPHOS 70 74   AST 28 24   ALT 28 30   ANIONGAP 7* 8       Lactic Acid:   No results for input(s): "LACTATE" in the last 48 hours.    Lipid Panel:   No results for input(s): "CHOL", "HDL", "LDLCALC", "TRIG", "CHOLHDL" in the last 48 hours.    Troponin: No results for input(s): "TROPONINI", "TROPONINIHS" in the last 48 hours.  TSH:   Recent Labs   Lab 02/17/24  1735   TSH 0.700       Urine Studies:   Recent Labs   Lab 02/21/24  1802   COLORU Yellow   APPEARANCEUA Clear   PHUR 7.0   SPECGRAV 1.000*   PROTEINUA Negative   GLUCUA Negative   KETONESU Negative   BILIRUBINUA Negative   OCCULTUA Negative   NITRITE Negative   UROBILINOGEN Negative   LEUKOCYTESUR Negative     Microbiology Results (last 7 days)       ** No results found for the last 168 hours. **          Significant Imaging:   EEG:   This is an abnormal EEG due to diffuse slowing of the background activity consistent with a moderate to severe encephalopathy. No epileptiform discharges or seizures are captured.  Findings of this study indicate a generalized encephalopathic process that is nonspecific in type. Toxic, metabolic, or structural abnormalities may be considered.  Further clinical correlation is advised.     CT scan maxillofacial:   No evidence of an acute displaced fracture.     CT of the head and neck:    No acute intracranial abnormality.  No high-grade stenosis or major vessel occlusion.  Prominent mediastinal and hilar " lymph nodes, possibly reactive.    CT C-spine:   No fracture or traumatic malalignment of the cervical spine.  Cervical spondylosis resulting in up to severe spinal canal stenosis at C3-C4 and multilevel moderate to severe neural foraminal stenosis at the lower levels.  Bulky anterior flowing osteophytes consistent with diffuse idiopathic skeletal hyperostosis (DISH).    MRI brain with and without contrast:    Negative for acute ischemia or acute intracranial pathology.  Mild white matter microangiopathic changes.  No abnormal intracranial contrast enhancement.    ECHO:     Left Ventricle: The left ventricle is normal in size. Mildly increased wall thickness. There is normal systolic function with a visually estimated ejection fraction of 60 - 65%. Grade I diastolic dysfunction. E/A ratio is 0.76.    Right Ventricle: Normal right ventricular cavity size. Wall thickness is normal. Right ventricle wall motion  is normal. Systolic function is normal.    IVC/SVC: Normal venous pressure at 3 mmHg.    CXR:  1.  Focal patchy opacity of the right upper lung could reflect atelectasis or infiltrate.  2.  Mild blunting of the left costophrenic angle could reflect atelectasis, infiltrate or small pleural effusion.

## 2024-02-23 ENCOUNTER — PATIENT OUTREACH (OUTPATIENT)
Dept: ADMINISTRATIVE | Facility: CLINIC | Age: 79
End: 2024-02-23
Payer: MEDICARE

## 2024-02-23 LAB — VIT B1 BLD-SCNC: 132.9 NMOL/L (ref 66.5–200)

## 2024-02-23 PROCEDURE — G0180 MD CERTIFICATION HHA PATIENT: HCPCS | Mod: ,,, | Performed by: STUDENT IN AN ORGANIZED HEALTH CARE EDUCATION/TRAINING PROGRAM

## 2024-02-23 NOTE — PROGRESS NOTES
C3 nurse attempted to contact Papito Hutton  for a TCC post hospital discharge follow up call. No answer. Left voicemail with callback information. The patient has a scheduled HOSFU appointment with EVER Walsh  on 02/26/2024 @ 1000..

## 2024-02-24 LAB — VIT B6 SERPL-MCNC: 2.2 UG/L (ref 3.4–65.2)

## 2024-02-26 ENCOUNTER — OFFICE VISIT (OUTPATIENT)
Dept: FAMILY MEDICINE | Facility: CLINIC | Age: 79
End: 2024-02-26
Payer: MEDICARE

## 2024-02-26 VITALS
HEART RATE: 71 BPM | DIASTOLIC BLOOD PRESSURE: 62 MMHG | RESPIRATION RATE: 16 BRPM | OXYGEN SATURATION: 97 % | WEIGHT: 200.81 LBS | HEIGHT: 72 IN | SYSTOLIC BLOOD PRESSURE: 110 MMHG | BODY MASS INDEX: 27.2 KG/M2

## 2024-02-26 DIAGNOSIS — E83.42 HYPOMAGNESEMIA: ICD-10-CM

## 2024-02-26 DIAGNOSIS — Z09 HOSPITAL DISCHARGE FOLLOW-UP: Primary | ICD-10-CM

## 2024-02-26 DIAGNOSIS — R73.03 PREDIABETES: ICD-10-CM

## 2024-02-26 DIAGNOSIS — I10 ESSENTIAL HYPERTENSION: ICD-10-CM

## 2024-02-26 DIAGNOSIS — R56.9 SEIZURE: ICD-10-CM

## 2024-02-26 PROCEDURE — 99215 OFFICE O/P EST HI 40 MIN: CPT | Mod: PBBFAC,PO

## 2024-02-26 PROCEDURE — 99999 PR PBB SHADOW E&M-EST. PATIENT-LVL V: CPT | Mod: PBBFAC,,,

## 2024-02-26 PROCEDURE — 99495 TRANSJ CARE MGMT MOD F2F 14D: CPT | Mod: S$PBB,,,

## 2024-02-26 RX ORDER — VITS A,C,E/LUTEIN/MINERALS 300MCG-200
200 TABLET ORAL DAILY
Qty: 90 TABLET | Refills: 3 | Status: SHIPPED | OUTPATIENT
Start: 2024-02-26 | End: 2024-06-05 | Stop reason: DRUGHIGH

## 2024-02-26 RX ORDER — CARVEDILOL 12.5 MG/1
12.5 TABLET ORAL 2 TIMES DAILY WITH MEALS
Qty: 180 TABLET | Refills: 3 | Status: SHIPPED | OUTPATIENT
Start: 2024-02-26 | End: 2025-02-25

## 2024-02-26 RX ORDER — NIFEDIPINE 60 MG/1
60 TABLET, EXTENDED RELEASE ORAL DAILY
Qty: 90 TABLET | Refills: 3 | Status: SHIPPED | OUTPATIENT
Start: 2024-02-26 | End: 2024-02-26

## 2024-02-26 RX ORDER — CETIRIZINE HYDROCHLORIDE 10 MG/1
TABLET ORAL
COMMUNITY
Start: 2023-08-14 | End: 2024-06-05

## 2024-02-26 RX ORDER — VALSARTAN 320 MG/1
320 TABLET ORAL DAILY
Qty: 90 TABLET | Refills: 3 | Status: SHIPPED | OUTPATIENT
Start: 2024-02-26 | End: 2024-03-15 | Stop reason: DRUGHIGH

## 2024-02-26 RX ORDER — LEVETIRACETAM 500 MG/1
500 TABLET ORAL 2 TIMES DAILY
Qty: 180 TABLET | Refills: 3 | Status: SHIPPED | OUTPATIENT
Start: 2024-02-26 | End: 2025-02-25

## 2024-02-26 RX ORDER — NIFEDIPINE 30 MG/1
30 TABLET, EXTENDED RELEASE ORAL DAILY
Qty: 90 TABLET | Refills: 3 | Status: SHIPPED | OUTPATIENT
Start: 2024-02-26 | End: 2025-02-25

## 2024-02-26 NOTE — PATIENT INSTRUCTIONS
Edwardo Cobos,     If you are due for any health screening(s) below please notify me so we can arrange them to be ordered and scheduled to maintain your health. Most healthy patients complete it. Don't lose out on improving your health.     Tests to Keep You Healthy    Last Blood Pressure <= 139/89 (2/26/2024): NO

## 2024-02-26 NOTE — PROGRESS NOTES
Transitional Care Note  Admit date: 02/17/2024  Discharge date: 02/22/2024  Date of interactive contact (2 business days post D/C): 02/23/2024  Hospitalized at: [facility]  Discharge diagnoses: Seizure, facial contusion, abnormal CXR, abnormal CT scan cervical spine, advance care planning, hypomagnesemia, HLD, HTN, Prediabetes, hx of TIA    Family and/or Caretaker present at visit?  Yes.  Diagnostic tests reviewed/disposition: I have reviewed all completed as well as pending diagnostic tests at the time of discharge.  Disease/illness education: As below  Medication changes: As below   Home health/community services discussion/referrals: Patient does not have home health established from hospital visit.  They do not need home health.  If needed, we will set up home health for the patient.   Establishment or re-establishment of referral orders for community resources: No other necessary community resources.   Discussion with other health care providers: No discussion with other health care providers necessary.                       Subjective:       Patient ID: Papito Hutton is a 78 y.o. male.    Chief Complaint: Hospital Follow Up    [facility] hospital discharge follow up. Presentation and course can be seen below. Since discharge has been doing fairly well. Requests long term meds be sent to mail pharmacy. He has follow up with neurology scheduled for 03/01/2024. Sees Dr. Pedro and Dr. Barrett. He is being evaluated and treated by physical therapy.     Blood pressures have improved significantly but are a little soft. Admits to feeling dizzy/lightheaded if he gets up too quickly. Reviewing hospital notes he was changed to 30mg nifedipine but was kept on the 60mg dose at discharge.         HPI:   Patient is a 77yo with a PMH of HTN, Pre-Diabetes, GERD and suspected KEITH. Patient was unable to speak, so history was provided by [facility] and Patient's spouse. Patient's spouse found Patient to be unconscious in the afternoon, this was  after she saw the Patient Aox4 in the morning. To the spouse it seemed as though the Patient had fallen over, when she found him he was conscious though very confused and somnolent. She called EMS, to EMS Patient had a slight right sided facial droop, though Patient's spouse couldn't see it. To note, Patient was recently started on Procardia for his resistant hypertension, his BP in the morning was      Patient was brought to [facility] ED where he had a CT Scan, Neurology reviewed the CT Findings, found there wasn't a large vessel occlusion. However Patient had a Seizure in that ED and was transferred to [facility] after getting IV Keppra. While at [facility] Patient was noted to be agitated after his Seizure, with him moving all four extremities, and was placed on Precedex gtt     * No surgery found *       Hospital Course:   Patient was admitted to intensive care unit where patient was closely monitored.  Patient was evaluated by neurologist.  Neuro checks were performed.  Patient was maintained on Keppra.  Fall and seizure precautions observed.  EEG showed moderate to severe slowing diffusely but no epileptiform activity.  Patient was maintained on Plavix 75 mg daily and Lipitor 80 mg daily for stroke prevention.  MRI brain did not report acute intracranial process.  Patient was evaluated by PT, OT and SLP.  Upon improvement in symptoms patient has been transferred to medicine telemetry floor.  Patient has been noted to have visual hallucinations at nighttime.  Medications which lower seizure threshold including tramadol, Wellbutrin and Benadryl are being avoided.  No driving, operating heavy machinery or going to [place] discussed with patient and his wife in detail.  Discharge plan of care reviewed with the patient and his wife in detail.  Patient is scheduled for outpatient polysomnography as well.  Patient encouraged to complete antibiotic therapy course for possible pneumonitis process.  Patient encouraged to  follow-up for polysomnogram.  Patient to continue close follow-up with primary care physician and neurologist as outpatient.      Past Medical History:   Diagnosis Date    Advance care planning 02/19/2024    Arthritis     B12 deficiency     Colon polyp     Diabetes mellitus     BORDERLINE    GERD (gastroesophageal reflux disease)     [place] (hard of hearing)     BILAT AIDS    Hyperlipidemia     Hypertension     Low testosterone     Personal history of colonic polyps 2008    adenomatous polyp    Seizures     Sinus disorder     Sleep apnea     DOES NOT USE MACHINE    Wears glasses        Review of patient's allergies indicates:  No Known Allergies      Current Outpatient Medications:     acetaminophen (TYLENOL) 650 MG TbSR, Take 1 tablet (650 mg total) by mouth every 8 (eight) hours as needed (do not take with any other tylenol or acetaminophen)., Disp: , Rfl: 0    atorvastatin (LIPITOR) 80 MG tablet, Take 1 tablet (80 mg total) by mouth once daily., Disp: 90 tablet, Rfl: 3    carvediloL (COREG) 12.5 MG tablet, Take 1 tablet (12.5 mg total) by mouth 2 (two) times daily with meals., Disp: 60 tablet, Rfl: 11    cetirizine (ZYRTEC) 10 MG tablet, , Disp: , Rfl:     CHOLECALCIFEROL, VITAMIN D3, (VITAMIN D3 ORAL), Take 1 capsule by mouth once daily., Disp: , Rfl:     clopidogreL (PLAVIX) 75 mg tablet, Take 1 tablet (75 mg total) by mouth once daily., Disp: 90 tablet, Rfl: 1    cyanocobalamin (VITAMIN B-12) 1000 MCG tablet, Take 100 mcg by mouth once daily., Disp: , Rfl:     diclofenac (VOLTAREN) 75 MG EC tablet, Take 75 mg by mouth 2 (two) times daily., Disp: , Rfl:     diclofenac sodium (VOLTAREN) 1 % Gel, Apply 2 grams topically once daily., Disp: 100 g, Rfl: 11    doxycycline (VIBRAMYCIN) 100 MG Cap, Take 1 capsule (100 mg total) by mouth every 12 (twelve) hours., Disp: 14 capsule, Rfl: 0    esomeprazole (NEXIUM) 40 MG capsule, TAKE 1 CAPSULE BEFORE BREAKFAST, Disp: 90 capsule, Rfl: 3    fluticasone propionate (FLONASE) 50  mcg/actuation nasal spray, Nasal for 90 Days, Disp: , Rfl:     ipratropium (ATROVENT) 21 mcg (0.03 %) nasal spray, 2 sprays by Each Nostril route 2 (two) times daily as needed for Rhinitis (sinus congestion)., Disp: 90 mL, Rfl: 1    levETIRAcetam (KEPPRA) 500 MG Tab, Take 1 tablet (500 mg total) by mouth 2 (two) times daily., Disp: 60 tablet, Rfl: 0    metFORMIN (GLUCOPHAGE) 500 MG tablet, Take 500 mg by mouth once daily., Disp: , Rfl:     montelukast (SINGULAIR) 10 mg tablet, Take 10 mg by mouth every evening., Disp: , Rfl:     NIFEdipine (PROCARDIA-XL) 60 MG (OSM) 24 hr tablet, Take 1 tablet (60 mg total) by mouth once daily., Disp: 30 tablet, Rfl: 11    triamterene-hydrochlorothiazide 37.5-25 mg (MAXZIDE-25) 37.5-25 mg per tablet, Take 1 tablet by mouth once daily., Disp: 90 tablet, Rfl: 3    valsartan (DIOVAN) 320 MG tablet, Take 1 tablet (320 mg total) by mouth once daily., Disp: 90 tablet, Rfl: 0    azelastine (ASTELIN) 137 mcg (0.1 %) nasal spray, 1 spray (137 mcg total) by Nasal route 2 (two) times daily. for 180 doses, Disp: 120 mL, Rfl: 2    Review of Systems   Neurological:  Positive for dizziness and light-headedness.       Objective:      /62 (BP Location: Right arm, Patient Position: Sitting, BP Method: Large (Manual))   Pulse 71   Resp 16   Ht 6' (1.829 m)   Wt 91.1 kg (200 lb 13.4 oz)   SpO2 97%   BMI 27.24 kg/m²   Physical Exam  Vitals reviewed.   Constitutional:       General: He is not in acute distress.     Appearance: Normal appearance. He is normal weight. He is not ill-appearing, toxic-appearing or diaphoretic.   HENT:      Head: Normocephalic.      Right Ear: External ear normal.      Left Ear: External ear normal.      Nose: Nose normal. No congestion or rhinorrhea.      Mouth/Throat:      Mouth: Mucous membranes are moist.      Pharynx: Oropharynx is clear.   Eyes:      General: No scleral icterus.        Right eye: No discharge.         Left eye: No discharge.      Extraocular  Movements: Extraocular movements intact.      Conjunctiva/sclera: Conjunctivae normal.   Cardiovascular:      Rate and Rhythm: Normal rate and regular rhythm.      Pulses: Normal pulses.      Heart sounds: Normal heart sounds. No murmur heard.     No friction rub. No gallop.   Pulmonary:      Effort: Pulmonary effort is normal. No respiratory distress.      Breath sounds: Normal breath sounds. No wheezing, rhonchi or rales.   Chest:      Chest wall: No tenderness.   Musculoskeletal:         General: No swelling, tenderness or deformity. Normal range of motion.      Cervical back: Normal range of motion.      Right lower leg: No edema.      Left lower leg: No edema.   Skin:     General: Skin is warm and dry.      Capillary Refill: Capillary refill takes less than 2 seconds.      Coloration: Skin is not jaundiced.      Findings: No bruising, erythema, lesion or rash.   Neurological:      Mental Status: He is alert and oriented to person, place, and time. Mental status is at baseline.   Psychiatric:         Mood and Affect: Mood normal.         Behavior: Behavior normal.         Thought Content: Thought content normal.         Judgment: Judgment normal.         Assessment:       1. Hypomagnesemia    2. Essential hypertension    3. Seizure    4. Prediabetes        Plan:       Hospital discharge follow-up       -     Has appropriate follow ups scheduled. Taking appropriate meds.     Hypomagnesemia  -     magnesium oxide 200 mg magnesium Tab; Take 200 mg by mouth Daily.  Dispense: 90 tablet; Refill: 3  -     Magnesium; Future; Expected date: 02/26/2024        -    Recheck prior to future appt     Essential hypertension  -     valsartan (DIOVAN) 320 MG tablet; Take 1 tablet (320 mg total) by mouth once daily.  Dispense: 90 tablet; Refill: 3  -     Discontinue: NIFEdipine (PROCARDIA-XL) 60 MG (OSM) 24 hr tablet; Take 1 tablet (60 mg total) by mouth once daily.  Dispense: 90 tablet; Refill: 3  -     carvediloL (COREG) 12.5  MG tablet; Take 1 tablet (12.5 mg total) by mouth 2 (two) times daily with meals.  Dispense: 180 tablet; Refill: 3  -     NIFEdipine (PROCARDIA-XL) 30 MG (OSM) 24 hr tablet; Take 1 tablet (30 mg total) by mouth once daily.  Dispense: 90 tablet; Refill: 3  -     COMPREHENSIVE METABOLIC PANEL; Future; Expected date: 02/26/2024        -    A little soft and symptomatic. Adjust as above. Continue to monitor at home. Will continue to monitor.     Seizure  -     levETIRAcetam (KEPPRA) 500 MG Tab; Take 1 tablet (500 mg total) by mouth 2 (two) times daily.  Dispense: 180 tablet; Refill: 3          -    Follow with neurology as scheduled    Prediabetes       -    Will continue to monitor.                Aguila Walsh PA-C  Family Medicine Physician Assistant              I spent a total of 20 minutes on the day of the visit.This includes face to face time and non-face to face time preparing to see the patient (eg, review of tests), obtaining and/or reviewing separately obtained history, documenting clinical information in the electronic or other health record, independently interpreting results and communicating results to the patient/family/caregiver, or care coordinator.      We have addressed [4] Moderate: 1 or more chronic illnesses with exacerbation, progression, or side effects of treatment / 2 or more stable chronic illnesses / 1 undiagnosed new problem with uncertain prognosis / 1 acute illness with systemic symptoms / 1 acute complicated injury  The complexity of the data reviewed and analyzed for this visit was [3] Limited (Reviewed prior external note, ordered unique testing or reviewed the results of each unique test)   The risk of complications and/or morbidity or mortality are [4] Moderate risk (I.e. prescription drug management / decision regarding minor surgery with identified pt or procedure risk factors / decision regarding elective major surgery without identified pt or procedure risk factors /  diagnosis or treatment significantly limited by social determinants of health)   The level of Medical Decision Making for this visit is [4] Moderate

## 2024-02-29 LAB
OHS QRS DURATION: 94 MS
OHS QTC CALCULATION: 470 MS

## 2024-02-29 RX ORDER — TRIAMTERENE AND HYDROCHLOROTHIAZIDE 37.5; 25 MG/1; MG/1
1 CAPSULE ORAL
Qty: 90 CAPSULE | Refills: 3 | OUTPATIENT
Start: 2024-02-29

## 2024-02-29 NOTE — TELEPHONE ENCOUNTER
No care due was identified.  [facility] Embedded Care Due Messages. Reference number: [id number].   2/29/2024 1:04:26 AM CST

## 2024-02-29 NOTE — TELEPHONE ENCOUNTER
--Clopidogrel--LR--9-20-23  LOV--2-26-24  FOV--5-27-24      --Refill request for (Dyazide) 37.5-25 mg denied as order was changed to (Maxzide-25) 37.5-25 on 9-26-23.  Prescription for Maxzide-25 e-scribed to pharmacy on 9-26-23 for a 12 month supply.

## 2024-02-29 NOTE — TELEPHONE ENCOUNTER
Refill Routing Note   Medication(s) are not appropriate for processing by [facility] for the following reason(s):        ED/Hospital Visit since last OV with provider: 2/17/24 due to acute CVA  Drug-drug interaction: Nexium    [facility] action(s):  Defer               Appointments  past 12m or future 3m with PCP    Date Provider   Last Visit   1/24/2024 Rafi Martin MD   Next Visit   Visit date not found Rafi Martin MD   ED visits in past 90 days: 0        Note composed:2:25 PM 02/29/2024

## 2024-03-01 RX ORDER — CLOPIDOGREL BISULFATE 75 MG/1
75 TABLET ORAL DAILY
Qty: 90 TABLET | Refills: 4 | Status: SHIPPED | OUTPATIENT
Start: 2024-03-01

## 2024-03-05 ENCOUNTER — PROCEDURE VISIT (OUTPATIENT)
Dept: SLEEP MEDICINE | Facility: HOSPITAL | Age: 79
End: 2024-03-05
Attending: NURSE PRACTITIONER
Payer: MEDICARE

## 2024-03-05 DIAGNOSIS — G47.33 OBSTRUCTIVE SLEEP APNEA: ICD-10-CM

## 2024-03-05 PROCEDURE — 95811 POLYSOM 6/>YRS CPAP 4/> PARM: CPT

## 2024-03-06 ENCOUNTER — EXTERNAL HOME HEALTH (OUTPATIENT)
Dept: HOME HEALTH SERVICES | Facility: HOSPITAL | Age: 79
End: 2024-03-06
Payer: MEDICARE

## 2024-03-06 ENCOUNTER — TELEPHONE (OUTPATIENT)
Dept: PULMONOLOGY | Facility: CLINIC | Age: 79
End: 2024-03-06

## 2024-03-06 DIAGNOSIS — G47.33 OSA (OBSTRUCTIVE SLEEP APNEA): Primary | ICD-10-CM

## 2024-03-06 NOTE — TELEPHONE ENCOUNTER
Called patient told him his CPAP titration shows he needs CPAP of 10 cm of pressure with a Resmed F20 airlift mask large.   [facility] if you do not hear from them within 2 weeks please give us a call at [phone]   He verbaized an understanding.

## 2024-03-15 ENCOUNTER — TELEPHONE (OUTPATIENT)
Dept: FAMILY MEDICINE | Facility: CLINIC | Age: 79
End: 2024-03-15
Payer: MEDICARE

## 2024-03-15 DIAGNOSIS — I10 ESSENTIAL HYPERTENSION: Primary | ICD-10-CM

## 2024-03-15 RX ORDER — VALSARTAN 160 MG/1
160 TABLET ORAL DAILY
Qty: 90 TABLET | Refills: 0 | Status: SHIPPED | OUTPATIENT
Start: 2024-03-15 | End: 2024-03-28 | Stop reason: SDUPTHER

## 2024-03-15 NOTE — TELEPHONE ENCOUNTER
Contacted pt via phone. Notified to decrease Valsartan to 160 mg. Pt and wife verbalized understanding. Fu made with Aguila 3/27 at 3 pm.

## 2024-03-15 NOTE — TELEPHONE ENCOUNTER
Decrease Valsartan to 160mg. I sent local. Continue to monitor pressures. Follow up with me sometime next week to see how he is responding to this.

## 2024-03-15 NOTE — TELEPHONE ENCOUNTER
Alonso Guerra Staff     ----- Message from Alonso Guerra sent at 3/15/2024 12:51 PM CDT -----  Contact: carolann  Type: Needs Medical Advice  Who Called:  carolann Cárdenas Call Back Number: [phone]  Additional Information: Carolann is calling home health nurse regarding pt blood pressure before meds pt is fine after meds dropping low 99/64 yesterday.Please call back and advise.

## 2024-03-15 NOTE — TELEPHONE ENCOUNTER
Call placed to number in attached message spoke to home health nurse (Meggan) who states patient's blood pressure drops low after taking blood pressure medication.  Patient's blood pressure after medication on yesterday reported as 99/64.  Patient advised after taking his medication he feels off balanced and dizzy and has to sit down for an hour before he can do his normal daily activities.  Patient is requesting to notify Dr. Martin regarding.  Dr. Martin is out of the office today.  Patient performed office visit with MELLISSA Walsh on 2-26-24 for hospital follow up.  Will forward message to Mr. Walsh for further follow up.

## 2024-03-21 ENCOUNTER — DOCUMENT SCAN (OUTPATIENT)
Dept: HOME HEALTH SERVICES | Facility: HOSPITAL | Age: 79
End: 2024-03-21
Payer: MEDICARE

## 2024-03-22 ENCOUNTER — DOCUMENT SCAN (OUTPATIENT)
Dept: HOME HEALTH SERVICES | Facility: HOSPITAL | Age: 79
End: 2024-03-22
Payer: MEDICARE

## 2024-03-22 ENCOUNTER — TELEPHONE (OUTPATIENT)
Dept: PULMONOLOGY | Facility: CLINIC | Age: 79
End: 2024-03-22

## 2024-03-23 ENCOUNTER — DOCUMENT SCAN (OUTPATIENT)
Dept: HOME HEALTH SERVICES | Facility: HOSPITAL | Age: 79
End: 2024-03-23
Payer: MEDICARE

## 2024-03-25 ENCOUNTER — TELEPHONE (OUTPATIENT)
Dept: PULMONOLOGY | Facility: CLINIC | Age: 79
End: 2024-03-25

## 2024-03-25 NOTE — TELEPHONE ENCOUNTER
Patient 's friend called to see if he has to come to visit tomorrow and what is going to be done, he is not having any issues.  Told her he needs his PFT and has to come in to see Nemo 1 month after he has received his CPAP.  She said okay will get these scheduled.

## 2024-03-28 ENCOUNTER — OFFICE VISIT (OUTPATIENT)
Dept: FAMILY MEDICINE | Facility: CLINIC | Age: 79
End: 2024-03-28
Payer: MEDICARE

## 2024-03-28 ENCOUNTER — HOSPITAL ENCOUNTER (OUTPATIENT)
Dept: PULMONOLOGY | Facility: HOSPITAL | Age: 79
Discharge: HOME | End: 2024-03-28
Attending: NURSE PRACTITIONER
Payer: MEDICARE

## 2024-03-28 VITALS
HEART RATE: 56 BPM | DIASTOLIC BLOOD PRESSURE: 68 MMHG | BODY MASS INDEX: 27.5 KG/M2 | WEIGHT: 203.06 LBS | SYSTOLIC BLOOD PRESSURE: 118 MMHG | HEIGHT: 72 IN | RESPIRATION RATE: 16 BRPM | OXYGEN SATURATION: 99 %

## 2024-03-28 DIAGNOSIS — R73.03 PREDIABETES: ICD-10-CM

## 2024-03-28 DIAGNOSIS — R06.00 DYSPNEA, UNSPECIFIED TYPE: ICD-10-CM

## 2024-03-28 DIAGNOSIS — R93.3 ABNORMAL FINDINGS ON DIAGNOSTIC IMAGING OF OTHER PARTS OF DIGESTIVE TRACT: ICD-10-CM

## 2024-03-28 DIAGNOSIS — I10 ESSENTIAL HYPERTENSION: ICD-10-CM

## 2024-03-28 DIAGNOSIS — Z86.73 HX OF TRANSIENT ISCHEMIC ATTACK (TIA): ICD-10-CM

## 2024-03-28 DIAGNOSIS — G47.33 OSA (OBSTRUCTIVE SLEEP APNEA): ICD-10-CM

## 2024-03-28 DIAGNOSIS — Z00.00 ROUTINE GENERAL MEDICAL EXAMINATION AT A HEALTH CARE FACILITY: Primary | ICD-10-CM

## 2024-03-28 PROCEDURE — G2211 COMPLEX E/M VISIT ADD ON: HCPCS | Mod: S$PBB,,, | Performed by: STUDENT IN AN ORGANIZED HEALTH CARE EDUCATION/TRAINING PROGRAM

## 2024-03-28 PROCEDURE — 99999 PR PBB SHADOW E&M-EST. PATIENT-LVL IV: CPT | Mod: PBBFAC,,, | Performed by: STUDENT IN AN ORGANIZED HEALTH CARE EDUCATION/TRAINING PROGRAM

## 2024-03-28 PROCEDURE — 94729 DIFFUSING CAPACITY: CPT

## 2024-03-28 PROCEDURE — 94010 BREATHING CAPACITY TEST: CPT

## 2024-03-28 PROCEDURE — 94060 EVALUATION OF WHEEZING: CPT

## 2024-03-28 PROCEDURE — 94727 GAS DIL/WSHOT DETER LNG VOL: CPT

## 2024-03-28 PROCEDURE — 99214 OFFICE O/P EST MOD 30 MIN: CPT | Mod: S$PBB,,, | Performed by: STUDENT IN AN ORGANIZED HEALTH CARE EDUCATION/TRAINING PROGRAM

## 2024-03-28 PROCEDURE — 99214 OFFICE O/P EST MOD 30 MIN: CPT | Mod: PBBFAC,PO | Performed by: STUDENT IN AN ORGANIZED HEALTH CARE EDUCATION/TRAINING PROGRAM

## 2024-03-28 RX ORDER — TRIAMTERENE/HYDROCHLOROTHIAZID 37.5-25 MG
1 TABLET ORAL DAILY
Qty: 90 TABLET | Refills: 3 | Status: SHIPPED | OUTPATIENT
Start: 2024-03-28 | End: 2024-06-05 | Stop reason: DRUGHIGH

## 2024-03-28 RX ORDER — VALSARTAN 160 MG/1
160 TABLET ORAL DAILY
Qty: 90 TABLET | Refills: 3 | Status: SHIPPED | OUTPATIENT
Start: 2024-03-28 | End: 2024-06-17

## 2024-03-28 NOTE — PATIENT INSTRUCTIONS
Edwardo Cobos,     If you are due for any health screening(s) below please notify me so we can arrange them to be ordered and scheduled to maintain your health. Most healthy patients complete it. Don't lose out on improving your health.     All of your core healthy metrics are met.

## 2024-03-28 NOTE — PROGRESS NOTES
[facility] CLINIC NOTE    Patient Name: Papito Hutton  YOB: 1945    PRESENTING HISTORY     History of Present Illness:  MrGaston Hutton is a 78 y.o. male here for medication review.     He is taking a lot of medications and would like clarification/reduction if possible.     HTN- medications have been frequently adjusted.   Reviewed current logs as well as current meds. Appropriate.    Will consider dose reduction at f/u but continue as is for no.    Needed refills on ARB, diuretic.     He has been on metformin chronically for pre-diabetes. A1c consistently out of pre-DM range. Will hodl and reassess in 3 months.     Diclofenac for OA. Recently had knees injected and doing well.   In light of HTN and plavix, will try to hold NSAIDs.     On Keppra for sz prevention per neurology.     On high intensity statin.     KEITH, on CPAP. Having difficulty adjusting to it. Pulm working on some different options.         ROS      OBJECTIVE:   Vital Signs:  Vitals:    03/28/24 1052   BP: 118/68   Pulse: (!) 56   Resp: 16   SpO2: 99%   Weight: 92.1 kg (203 lb 0.7 oz)   Height: 6' (1.829 m)          Physical Exam: Normal, no change.     Physical Exam    ASSESSMENT & PLAN:     Routine general medical examination at a health care facility    KEITH (obstructive sleep apnea)  -     CBC W/ AUTO DIFFERENTIAL; Future; Expected date: 06/28/2024  -     COMPREHENSIVE METABOLIC PANEL; Future; Expected date: 06/28/2024    Essential hypertension  -     triamterene-hydrochlorothiazide 37.5-25 mg (MAXZIDE-25) 37.5-25 mg per tablet; Take 1 tablet by mouth once daily.  Dispense: 90 tablet; Refill: 3  -     valsartan (DIOVAN) 160 MG tablet; Take 1 tablet (160 mg total) by mouth once daily.  Dispense: 90 tablet; Refill: 3  -     CBC W/ AUTO DIFFERENTIAL; Future; Expected date: 06/28/2024  -     COMPREHENSIVE METABOLIC PANEL; Future; Expected date: 06/28/2024    Prediabetes  -     HEMOGLOBIN A1C; Future; Expected  date: 03/28/2024  Hold metformin.     Hx of transient ischemic attack (TIA)  -     LIPID PANEL; Future; Expected date: 06/28/2024  Stable, continue current medications      Abnormal findings on diagnostic imaging of other parts of digestive tract  -     LIPID PANEL; Future; Expected date: 06/28/2024  Had EGD with dilation but still having symptoms. NO further workup for now.         Rafi Martin  Internal Medicine    Visit complexity inherent to evaluation and management associated with medical care services that serve as the continuing focal point for all needed health care services and/or with medical care services that are part of ongoing care related to a patient's single, serious condition or a complex condition provided today

## 2024-03-29 ENCOUNTER — PATIENT MESSAGE (OUTPATIENT)
Dept: FAMILY MEDICINE | Facility: CLINIC | Age: 79
End: 2024-03-29
Payer: MEDICARE

## 2024-03-29 DIAGNOSIS — G47.33 OSA (OBSTRUCTIVE SLEEP APNEA): Primary | ICD-10-CM

## 2024-04-02 ENCOUNTER — TELEPHONE (OUTPATIENT)
Dept: PULMONOLOGY | Facility: CLINIC | Age: 79
End: 2024-04-02

## 2024-04-02 NOTE — TELEPHONE ENCOUNTER
PFT shows no obstruction, mild restriction, mild diffusion defect. Loop flattened.   He has an appointment with me tomorrow to discuss.

## 2024-04-03 ENCOUNTER — OFFICE VISIT (OUTPATIENT)
Dept: PULMONOLOGY | Facility: CLINIC | Age: 79
End: 2024-04-03
Payer: MEDICARE

## 2024-04-03 VITALS
BODY MASS INDEX: 27.5 KG/M2 | SYSTOLIC BLOOD PRESSURE: 128 MMHG | HEART RATE: 55 BPM | WEIGHT: 202.81 LBS | OXYGEN SATURATION: 97 % | DIASTOLIC BLOOD PRESSURE: 74 MMHG

## 2024-04-03 DIAGNOSIS — G47.33 OSA (OBSTRUCTIVE SLEEP APNEA): Primary | ICD-10-CM

## 2024-04-03 DIAGNOSIS — I10 HYPERTENSION, UNSPECIFIED TYPE: ICD-10-CM

## 2024-04-03 DIAGNOSIS — G45.9 TIA (TRANSIENT ISCHEMIC ATTACK): ICD-10-CM

## 2024-04-03 PROCEDURE — 99214 OFFICE O/P EST MOD 30 MIN: CPT | Mod: S$GLB,,, | Performed by: NURSE PRACTITIONER

## 2024-04-03 NOTE — PROGRESS NOTES
SUBJECTIVE:    Patient ID: Papito Hutton is a 78 y.o. male.    Chief Complaint: Follow-up (Follow up KEITH)    HPI      Patient here today struggling with wearing his CPAP.  He states he has had it for 2 weeks, he can not breathe through his nose and is finding it difficult to get used to mask.  He now has an amaraview view mask.  After reviewing his compliance download he is not on CPAP of 10cm , he is set on an Autopap 4-20.  He feels the pressure gets too high.  I will reach out to the DME as this was not the ordered settings.  His PFT shows no obstruction, mild restriction, mild diffusion defect, flattened flow volume loop.   He does get short of breath with exertion.  He would like to see an ENT about the difficulty breathing through his nostrils and hear about the Inspire device as well in case he can not get used to the CPAP.   Past Medical History:   Diagnosis Date    Advance care planning 02/19/2024    Arthritis     B12 deficiency     Colon polyp     Diabetes mellitus     BORDERLINE    GERD (gastroesophageal reflux disease)     [place] (hard of hearing)     BILAT AIDS    Hyperlipidemia     Hypertension     Low testosterone     Personal history of colonic polyps 2008    adenomatous polyp    Seizures     Sinus disorder     Sleep apnea     DOES NOT USE MACHINE    Wears glasses      Past Surgical History:   Procedure Laterality Date    COLON SURGERY      COLONOSCOPY  2008    Dr. Garcia; polyps removed    COLONOSCOPY N/A 07/13/2020    Procedure: COLONOSCOPY;  Surgeon: Jj Fried MD;  Location: [facility];  Service: Endoscopy;  Laterality: N/A;    COLONOSCOPY N/A 8/15/2023    Procedure: COLONOSCOPY;  Surgeon: Jj Fried MD;  Location: [facility];  Service: Endoscopy;  Laterality: N/A;    ESOPHAGOGASTRODUODENOSCOPY N/A 7/31/2023    Procedure: EGD (ESOPHAGOGASTRODUODENOSCOPY);  Surgeon: Jj Fried MD;  Location: [facility];  Service: Endoscopy;  Laterality: N/A;    LAPAROSCOPIC RIGHT COLON  RESECTION Right 08/10/2020    Procedure: COLECTOMY, RIGHT, LAPAROSCOPIC pooss conversion to open;  Surgeon: Noble Kaye MD;  Location: [facility] OR;  Service: General;  Laterality: Right;    NASAL SEPTUM SURGERY      right hand surgery      RIGHT HEMICOLECTOMY N/A 08/10/2020    Procedure: HEMICOLECTOMY, RIGHT;  Surgeon: Noble Kaye MD;  Location: [facility] OR;  Service: General;  Laterality: N/A;    UPPER GASTROINTESTINAL ENDOSCOPY  2008    hiatal hernia; esophagitis     Family History   Problem Relation Age of Onset    Cancer Neg Hx     Diabetes Neg Hx     Heart disease Neg Hx     Colon cancer Neg Hx     Colon polyps Neg Hx     Esophageal cancer Neg Hx     Stomach cancer Neg Hx         Social History:   Marital Status:   Occupation: Data Unavailable  Alcohol History:  reports current alcohol use of about 2.0 standard drinks of alcohol per week.  Tobacco History:  reports that he has quit smoking. His smoking use included cigars. He has quit using smokeless tobacco.  His smokeless tobacco use included chew.  Drug History:  reports no history of drug use.    Review of patient's allergies indicates:  No Known Allergies    Current Outpatient Medications   Medication Sig Dispense Refill    acetaminophen (TYLENOL) 650 MG TbSR Take 1 tablet (650 mg total) by mouth every 8 (eight) hours as needed (do not take with any other tylenol or acetaminophen).  0    atorvastatin (LIPITOR) 80 MG tablet Take 1 tablet (80 mg total) by mouth once daily. 90 tablet 3    carvediloL (COREG) 12.5 MG tablet Take 1 tablet (12.5 mg total) by mouth 2 (two) times daily with meals. 180 tablet 3    CHOLECALCIFEROL, VITAMIN D3, (VITAMIN D3 ORAL) Take 1 capsule by mouth once daily.      clopidogreL (PLAVIX) 75 mg tablet Take 1 tablet (75 mg total) by mouth once daily. 90 tablet 4    cyanocobalamin (VITAMIN B-12) 1000 MCG tablet Take 100 mcg by mouth once daily.      esomeprazole (NEXIUM) 40 MG capsule TAKE 1 CAPSULE BEFORE BREAKFAST 90 capsule  3    ipratropium (ATROVENT) 21 mcg (0.03 %) nasal spray 2 sprays by Each Nostril route 2 (two) times daily as needed for Rhinitis (sinus congestion). 90 mL 1    levETIRAcetam (KEPPRA) 500 MG Tab Take 1 tablet (500 mg total) by mouth 2 (two) times daily. 180 tablet 3    NIFEdipine (PROCARDIA-XL) 30 MG (OSM) 24 hr tablet Take 1 tablet (30 mg total) by mouth once daily. 90 tablet 3    triamterene-hydrochlorothiazide 37.5-25 mg (MAXZIDE-25) 37.5-25 mg per tablet Take 1 tablet by mouth once daily. 90 tablet 3    valsartan (DIOVAN) 160 MG tablet Take 1 tablet (160 mg total) by mouth once daily. 90 tablet 3    azelastine (ASTELIN) 137 mcg (0.1 %) nasal spray 1 spray (137 mcg total) by Nasal route 2 (two) times daily. for 180 doses 120 mL 2    cetirizine (ZYRTEC) 10 MG tablet       diclofenac sodium (VOLTAREN) 1 % Gel Apply 2 grams topically once daily. (Patient not taking: Reported on 4/3/2024) 100 g 11    fluticasone propionate (FLONASE) 50 mcg/actuation nasal spray Nasal for 90 Days      magnesium oxide 200 mg magnesium Tab Take 200 mg by mouth Daily. (Patient not taking: Reported on 4/3/2024) 90 tablet 3    montelukast (SINGULAIR) 10 mg tablet Take 10 mg by mouth every evening.       No current facility-administered medications for this visit.     Last ECHO 09/2023  Left Ventricle: The left ventricle is normal in size. Mildly increased wall thickness. There is mild concentric hypertrophy. Normal wall motion. There is normal systolic function with a visually estimated ejection fraction of 65 - 70%. There is normal diastolic function.    Right Ventricle: Normal right ventricular cavity size. Wall thickness is normal. Right ventricle wall motion  is normal. Systolic function is normal.    IVC/SVC: Normal venous pressure at 3 mmHg.    Review of Systems  General: Feeling Well. Tired all day  Eyes: Vision is good.  ENT:  No sinusitis or pharyngitis. Difficulty breathing through his nose  Heart:: No chest pain or palpitations.  Orthopnea   Lungs: short of breath with exertion   GI: No Nausea, vomiting, constipation, diarrhea, or reflux.  : No dysuria, hesitancy, or nocturia.  Musculoskeletal: knee pain   Skin: No lesions or rashes.  Neuro: No headaches or neuropathy. Brain fog and forgetfulness   Lymph: No edema or adenopathy.  Psych: No anxiety or depression.  Endo: No weight change.    OBJECTIVE:      /74 (BP Location: Left arm, Patient Position: Sitting, BP Method: Medium (Manual))   Pulse (!) 55   Wt 92 kg (202 lb 12.8 oz)   SpO2 97%   BMI 27.50 kg/m²     Physical Exam  GENERAL: Older patient in no distress.  HEENT: Pupils equal and reactive. Extraocular movements intact. Nose intact.      Pharynx moist.  NECK: Supple.   HEART: Regular rate and rhythm. No murmur or gallop auscultated.  LUNGS: Clear to auscultation and percussion. Lung excursion symmetrical. No change in fremitus. No adventitial noises.  ABDOMEN: Bowel sounds present. Non-tender, no masses palpated.  EXTREMITIES: Normal muscle tone and joint movement, no cyanosis or clubbing.   LYMPHATICS: No adenopathy palpated, no edema.  SKIN: Dry, intact, no lesions.   NEURO: Cranial nerves II-XII intact. Motor strength 5/5 bilaterally, upper and lower extremities.  PSYCH: Appropriate affect.    Assessment:       1. KEITH (obstructive sleep apnea)    2. Hypertension, unspecified type    3. TIA (transient ischemic attack)            Plan:           Follow up in about 6 weeks (around 5/15/2024).  Need to know why his settings are not set to CPAP of 10cm like I ordered, his compliance report has him on autopap 4-20  Need a download on the CPAP of 10cm in 3 weeks.     Will send referral to Dr. Tabor so that he can hear about Inspire and have nares checked.    Will send PFT results to ENT as well to assess upper airway, flattened flow volume loop

## 2024-04-08 ENCOUNTER — TELEPHONE (OUTPATIENT)
Dept: PULMONOLOGY | Facility: CLINIC | Age: 79
End: 2024-04-08

## 2024-04-08 NOTE — TELEPHONE ENCOUNTER
Faxed niraj craft to Dr Tabor and told pt to give them a week to call him to schedule him.  If he has not heard from them to call me back and let me know.  Pt stated understanding.

## 2024-04-24 ENCOUNTER — TELEPHONE (OUTPATIENT)
Dept: PULMONOLOGY | Facility: CLINIC | Age: 79
End: 2024-04-24

## 2024-04-24 NOTE — TELEPHONE ENCOUNTER
Compliance report on CPAP of 10cm shows that he is 100% compliant sleeps an average of 5 hours and 42 minutes with an AHI of 3.5.

## 2024-04-24 NOTE — TELEPHONE ENCOUNTER
Spoke with pt about how he's dealing with cpap now that it is set correctly and he said he still hates it but he is trying his best to be compliant but he doesn't like anything on his face and he said has ENT visit coming up and he has been breathing thru his mouth.  He's still not sleeping well and waking up every hour.

## 2024-04-28 NOTE — TELEPHONE ENCOUNTER
No care due was identified.  [facility] Embedded Care Due Messages. Reference number: [id number].   4/28/2024 12:15:07 PM CDT

## 2024-04-29 RX ORDER — ATORVASTATIN CALCIUM 80 MG/1
80 TABLET, FILM COATED ORAL
Qty: 90 TABLET | Refills: 3 | Status: SHIPPED | OUTPATIENT
Start: 2024-04-29

## 2024-04-29 NOTE — TELEPHONE ENCOUNTER
Refill Decision Note   Papito Hutton  is requesting a refill authorization.  Brief Assessment and Rationale for Refill:  Approve     Medication Therapy Plan:         Comments:     Note composed:4:23 PM 04/29/2024

## 2024-05-16 ENCOUNTER — OFFICE VISIT (OUTPATIENT)
Dept: PULMONOLOGY | Facility: CLINIC | Age: 79
End: 2024-05-16
Payer: MEDICARE

## 2024-05-16 VITALS
DIASTOLIC BLOOD PRESSURE: 75 MMHG | SYSTOLIC BLOOD PRESSURE: 120 MMHG | OXYGEN SATURATION: 98 % | BODY MASS INDEX: 27.22 KG/M2 | HEIGHT: 72 IN | HEART RATE: 62 BPM | WEIGHT: 201 LBS

## 2024-05-16 DIAGNOSIS — G47.33 OBSTRUCTIVE SLEEP APNEA: Primary | ICD-10-CM

## 2024-05-16 PROCEDURE — 99212 OFFICE O/P EST SF 10 MIN: CPT | Mod: S$GLB,,, | Performed by: NURSE PRACTITIONER

## 2024-05-16 RX ORDER — AMOXICILLIN 875 MG/1
875 TABLET, FILM COATED ORAL 2 TIMES DAILY
COMMUNITY
Start: 2024-05-14 | End: 2024-06-05 | Stop reason: ALTCHOICE

## 2024-05-16 RX ORDER — BENAZEPRIL HYDROCHLORIDE 40 MG/1
40 TABLET ORAL
COMMUNITY
Start: 2024-03-28 | End: 2024-06-05 | Stop reason: ALTCHOICE

## 2024-05-16 RX ORDER — TRIAMCINOLONE ACETONIDE 55 UG/1
2 SPRAY, METERED NASAL DAILY
COMMUNITY

## 2024-05-16 NOTE — PROGRESS NOTES
SUBJECTIVE:    Patient ID: Papito Hutton is a 78 y.o. male.    Chief Complaint: Apnea and Follow-up    HPI  Patient here today feeling well. He is sleeping on the CPAP every night but still not a huge fan of it. He states he has to wake up often to adjust it. He is still interested in getting the Inspire device.  He is following with the ENT.  His compliance report shows that he is 100% compliant sleeps an average of 5 hours and 35 minutes with an AHI of 3.1.     Past Medical History:   Diagnosis Date    Advance care planning 02/19/2024    Arthritis     B12 deficiency     Colon polyp     Diabetes mellitus     BORDERLINE    GERD (gastroesophageal reflux disease)     [place] (hard of hearing)     BILAT AIDS    Hyperlipidemia     Hypertension     Low testosterone     Personal history of colonic polyps 2008    adenomatous polyp    Seizures     Sinus disorder     Sleep apnea     DOES NOT USE MACHINE    Wears glasses      Past Surgical History:   Procedure Laterality Date    COLON SURGERY      COLONOSCOPY  2008    Dr. Garcia; polyps removed    COLONOSCOPY N/A 07/13/2020    Procedure: COLONOSCOPY;  Surgeon: Jj Fried MD;  Location: [facility];  Service: Endoscopy;  Laterality: N/A;    COLONOSCOPY N/A 8/15/2023    Procedure: COLONOSCOPY;  Surgeon: Jj Fried MD;  Location: [facility];  Service: Endoscopy;  Laterality: N/A;    ESOPHAGOGASTRODUODENOSCOPY N/A 7/31/2023    Procedure: EGD (ESOPHAGOGASTRODUODENOSCOPY);  Surgeon: Jj Fried MD;  Location: [facility];  Service: Endoscopy;  Laterality: N/A;    LAPAROSCOPIC RIGHT COLON RESECTION Right 08/10/2020    Procedure: COLECTOMY, RIGHT, LAPAROSCOPIC pooss conversion to open;  Surgeon: Noble Kaye MD;  Location: [facility];  Service: General;  Laterality: Right;    NASAL SEPTUM SURGERY      right hand surgery      RIGHT HEMICOLECTOMY N/A 08/10/2020    Procedure: HEMICOLECTOMY, RIGHT;  Surgeon: Noble Kaye MD;  Location: [facility];  Service:  General;  Laterality: N/A;    UPPER GASTROINTESTINAL ENDOSCOPY  2008    hiatal hernia; esophagitis     Family History   Problem Relation Name Age of Onset    Cancer Neg Hx      Diabetes Neg Hx      Heart disease Neg Hx      Colon cancer Neg Hx      Colon polyps Neg Hx      Esophageal cancer Neg Hx      Stomach cancer Neg Hx          Social History:   Marital Status:   Occupation: Data Unavailable  Alcohol History:  reports current alcohol use of about 2.0 standard drinks of alcohol per week.  Tobacco History:  reports that he has quit smoking. His smoking use included cigars. He has quit using smokeless tobacco.  His smokeless tobacco use included chew.  Drug History:  reports no history of drug use.    Review of patient's allergies indicates:  No Known Allergies    Current Outpatient Medications   Medication Sig Dispense Refill    amoxicillin (AMOXIL) 875 MG tablet Take 875 mg by mouth 2 (two) times daily.      atorvastatin (LIPITOR) 80 MG tablet TAKE 1 TABLET DAILY 90 tablet 3    benazepriL (LOTENSIN) 40 MG tablet Take 40 mg by mouth.      carvediloL (COREG) 12.5 MG tablet Take 1 tablet (12.5 mg total) by mouth 2 (two) times daily with meals. 180 tablet 3    cetirizine (ZYRTEC) 10 MG tablet       CHOLECALCIFEROL, VITAMIN D3, (VITAMIN D3 ORAL) Take 1 capsule by mouth once daily.      clopidogreL (PLAVIX) 75 mg tablet Take 1 tablet (75 mg total) by mouth once daily. 90 tablet 4    cyanocobalamin (VITAMIN B-12) 1000 MCG tablet Take 100 mcg by mouth once daily.      esomeprazole (NEXIUM) 40 MG capsule TAKE 1 CAPSULE BEFORE BREAKFAST 90 capsule 3    ipratropium (ATROVENT) 21 mcg (0.03 %) nasal spray 2 sprays by Each Nostril route 2 (two) times daily as needed for Rhinitis (sinus congestion). 90 mL 1    levETIRAcetam (KEPPRA) 500 MG Tab Take 1 tablet (500 mg total) by mouth 2 (two) times daily. 180 tablet 3    NIFEdipine (PROCARDIA-XL) 30 MG (OSM) 24 hr tablet Take 1 tablet (30 mg total) by mouth once daily. 90  tablet 3    triamcinolone (NASACORT) 55 mcg nasal inhaler 2 sprays by Nasal route once daily.      triamterene-hydrochlorothiazide 37.5-25 mg (MAXZIDE-25) 37.5-25 mg per tablet Take 1 tablet by mouth once daily. 90 tablet 3    valsartan (DIOVAN) 160 MG tablet Take 1 tablet (160 mg total) by mouth once daily. 90 tablet 3    acetaminophen (TYLENOL) 650 MG TbSR Take 1 tablet (650 mg total) by mouth every 8 (eight) hours as needed (do not take with any other tylenol or acetaminophen).  0    azelastine (ASTELIN) 137 mcg (0.1 %) nasal spray 1 spray (137 mcg total) by Nasal route 2 (two) times daily. for 180 doses 120 mL 2    diclofenac sodium (VOLTAREN) 1 % Gel Apply 2 grams topically once daily. (Patient not taking: Reported on 4/3/2024) 100 g 11    fluticasone propionate (FLONASE) 50 mcg/actuation nasal spray Nasal for 90 Days (Patient not taking: Reported on 5/16/2024)      magnesium oxide 200 mg magnesium Tab Take 200 mg by mouth Daily. (Patient not taking: Reported on 4/3/2024) 90 tablet 3    montelukast (SINGULAIR) 10 mg tablet Take 10 mg by mouth every evening. (Patient not taking: Reported on 5/16/2024)       No current facility-administered medications for this visit.     Last ECHO 09/2023  Left Ventricle: The left ventricle is normal in size. Mildly increased wall thickness. There is mild concentric hypertrophy. Normal wall motion. There is normal systolic function with a visually estimated ejection fraction of 65 - 70%. There is normal diastolic function.    Right Ventricle: Normal right ventricular cavity size. Wall thickness is normal. Right ventricle wall motion  is normal. Systolic function is normal.    IVC/SVC: Normal venous pressure at 3 mmHg.    Review of Systems  General: Feeling Well.   Eyes: Vision is good.  ENT:  No sinusitis or pharyngitis. Difficulty breathing through his nose  Heart:: No chest pain or palpitations. Orthopnea   Lungs: short of breath with exertion   GI: No Nausea, vomiting,  constipation, diarrhea, or reflux.  : No dysuria, hesitancy, or nocturia.  Musculoskeletal: knee pain   Skin: No lesions or rashes.  Neuro: No headaches or neuropathy. Brain fog and forgetfulness   Lymph: No edema or adenopathy.  Psych: No anxiety or depression.  Endo: No weight change.    OBJECTIVE:      /75 (BP Location: Left arm, Patient Position: Sitting, BP Method: Medium (Manual))   Pulse 62   Ht 6' (1.829 m)   Wt 91.2 kg (201 lb)   SpO2 98%   BMI 27.26 kg/m²     Physical Exam  GENERAL: Older patient in no distress.  HEENT: Pupils equal and reactive. Extraocular movements intact. Nose intact.      Pharynx moist.  NECK: Supple.   HEART: Regular rate and rhythm. No murmur or gallop auscultated.  LUNGS: Clear to auscultation and percussion. Lung excursion symmetrical. No change in fremitus. No adventitial noises.  ABDOMEN: Bowel sounds present. Non-tender, no masses palpated.  EXTREMITIES: Normal muscle tone and joint movement, no cyanosis or clubbing.   LYMPHATICS: No adenopathy palpated, no edema.  SKIN: Dry, intact, no lesions.   NEURO: Cranial nerves II-XII intact. Motor strength 5/5 bilaterally, upper and lower extremities.  PSYCH: Appropriate affect.    Assessment:       1. Obstructive sleep apnea              Plan:       Keep sleeping on your CPAP  Continue to see ENT see if can have Inspire      Follow up in about 1 year (around 5/16/2025).

## 2024-05-23 ENCOUNTER — LAB VISIT (OUTPATIENT)
Dept: LAB | Facility: HOSPITAL | Age: 79
End: 2024-05-23
Payer: MEDICARE

## 2024-05-23 DIAGNOSIS — R73.03 PREDIABETES: ICD-10-CM

## 2024-05-23 DIAGNOSIS — G47.33 OSA (OBSTRUCTIVE SLEEP APNEA): ICD-10-CM

## 2024-05-23 DIAGNOSIS — R93.3 ABNORMAL FINDINGS ON DIAGNOSTIC IMAGING OF OTHER PARTS OF DIGESTIVE TRACT: ICD-10-CM

## 2024-05-23 DIAGNOSIS — I10 ESSENTIAL HYPERTENSION: ICD-10-CM

## 2024-05-23 DIAGNOSIS — Z86.73 HX OF TRANSIENT ISCHEMIC ATTACK (TIA): ICD-10-CM

## 2024-05-23 DIAGNOSIS — E83.42 HYPOMAGNESEMIA: ICD-10-CM

## 2024-05-23 LAB
ALBUMIN SERPL BCP-MCNC: 3.8 G/DL (ref 3.5–5.2)
ALBUMIN SERPL BCP-MCNC: 3.8 G/DL (ref 3.5–5.2)
ALP SERPL-CCNC: 83 U/L (ref 55–135)
ALP SERPL-CCNC: 83 U/L (ref 55–135)
ALT SERPL W/O P-5'-P-CCNC: 19 U/L (ref 10–44)
ALT SERPL W/O P-5'-P-CCNC: 19 U/L (ref 10–44)
ANION GAP SERPL CALC-SCNC: 9 MMOL/L (ref 8–16)
ANION GAP SERPL CALC-SCNC: 9 MMOL/L (ref 8–16)
AST SERPL-CCNC: 18 U/L (ref 10–40)
AST SERPL-CCNC: 18 U/L (ref 10–40)
BASOPHILS # BLD AUTO: 0.05 K/UL (ref 0–0.2)
BASOPHILS NFR BLD: 0.7 % (ref 0–1.9)
BILIRUB SERPL-MCNC: 0.5 MG/DL (ref 0.1–1)
BILIRUB SERPL-MCNC: 0.5 MG/DL (ref 0.1–1)
BUN SERPL-MCNC: 18 MG/DL (ref 8–23)
BUN SERPL-MCNC: 18 MG/DL (ref 8–23)
CALCIUM SERPL-MCNC: 11.1 MG/DL (ref 8.7–10.5)
CALCIUM SERPL-MCNC: 11.1 MG/DL (ref 8.7–10.5)
CHLORIDE SERPL-SCNC: 108 MMOL/L (ref 95–110)
CHLORIDE SERPL-SCNC: 108 MMOL/L (ref 95–110)
CHOLEST SERPL-MCNC: 144 MG/DL (ref 120–199)
CHOLEST/HDLC SERPL: 3 {RATIO} (ref 2–5)
CO2 SERPL-SCNC: 24 MMOL/L (ref 23–29)
CO2 SERPL-SCNC: 24 MMOL/L (ref 23–29)
CREAT SERPL-MCNC: 1.3 MG/DL (ref 0.5–1.4)
CREAT SERPL-MCNC: 1.3 MG/DL (ref 0.5–1.4)
DIFFERENTIAL METHOD BLD: ABNORMAL
EOSINOPHIL # BLD AUTO: 0.1 K/UL (ref 0–0.5)
EOSINOPHIL NFR BLD: 1.2 % (ref 0–8)
ERYTHROCYTE [DISTWIDTH] IN BLOOD BY AUTOMATED COUNT: 13.8 % (ref 11.5–14.5)
EST. GFR  (NO RACE VARIABLE): 56.6 ML/MIN/1.73 M^2
EST. GFR  (NO RACE VARIABLE): 56.6 ML/MIN/1.73 M^2
ESTIMATED AVG GLUCOSE: 128 MG/DL (ref 68–131)
GLUCOSE SERPL-MCNC: 110 MG/DL (ref 70–110)
GLUCOSE SERPL-MCNC: 110 MG/DL (ref 70–110)
HBA1C MFR BLD: 6.1 % (ref 4–5.6)
HCT VFR BLD AUTO: 39.3 % (ref 40–54)
HDLC SERPL-MCNC: 48 MG/DL (ref 40–75)
HDLC SERPL: 33.3 % (ref 20–50)
HGB BLD-MCNC: 13.6 G/DL (ref 14–18)
IMM GRANULOCYTES # BLD AUTO: 0.02 K/UL (ref 0–0.04)
IMM GRANULOCYTES NFR BLD AUTO: 0.3 % (ref 0–0.5)
LDLC SERPL CALC-MCNC: 72.8 MG/DL (ref 63–159)
LYMPHOCYTES # BLD AUTO: 2.4 K/UL (ref 1–4.8)
LYMPHOCYTES NFR BLD: 34.8 % (ref 18–48)
MAGNESIUM SERPL-MCNC: 1.8 MG/DL (ref 1.6–2.6)
MCH RBC QN AUTO: 31.4 PG (ref 27–31)
MCHC RBC AUTO-ENTMCNC: 34.6 G/DL (ref 32–36)
MCV RBC AUTO: 91 FL (ref 82–98)
MONOCYTES # BLD AUTO: 0.5 K/UL (ref 0.3–1)
MONOCYTES NFR BLD: 6.7 % (ref 4–15)
NEUTROPHILS # BLD AUTO: 3.9 K/UL (ref 1.8–7.7)
NEUTROPHILS NFR BLD: 56.3 % (ref 38–73)
NONHDLC SERPL-MCNC: 96 MG/DL
NRBC BLD-RTO: 0 /100 WBC
PLATELET # BLD AUTO: 237 K/UL (ref 150–450)
PMV BLD AUTO: 10.3 FL (ref 9.2–12.9)
POTASSIUM SERPL-SCNC: 4.8 MMOL/L (ref 3.5–5.1)
POTASSIUM SERPL-SCNC: 4.8 MMOL/L (ref 3.5–5.1)
PROT SERPL-MCNC: 6.9 G/DL (ref 6–8.4)
PROT SERPL-MCNC: 6.9 G/DL (ref 6–8.4)
RBC # BLD AUTO: 4.33 M/UL (ref 4.6–6.2)
SODIUM SERPL-SCNC: 141 MMOL/L (ref 136–145)
SODIUM SERPL-SCNC: 141 MMOL/L (ref 136–145)
TRIGL SERPL-MCNC: 116 MG/DL (ref 30–150)
WBC # BLD AUTO: 6.86 K/UL (ref 3.9–12.7)

## 2024-05-23 PROCEDURE — 80061 LIPID PANEL: CPT | Performed by: STUDENT IN AN ORGANIZED HEALTH CARE EDUCATION/TRAINING PROGRAM

## 2024-05-23 PROCEDURE — 83036 HEMOGLOBIN GLYCOSYLATED A1C: CPT | Performed by: STUDENT IN AN ORGANIZED HEALTH CARE EDUCATION/TRAINING PROGRAM

## 2024-05-23 PROCEDURE — 85025 COMPLETE CBC W/AUTO DIFF WBC: CPT | Performed by: STUDENT IN AN ORGANIZED HEALTH CARE EDUCATION/TRAINING PROGRAM

## 2024-05-23 PROCEDURE — 80053 COMPREHEN METABOLIC PANEL: CPT

## 2024-05-23 PROCEDURE — 36415 COLL VENOUS BLD VENIPUNCTURE: CPT | Mod: PO

## 2024-05-23 PROCEDURE — 83735 ASSAY OF MAGNESIUM: CPT

## 2024-06-05 ENCOUNTER — OFFICE VISIT (OUTPATIENT)
Dept: FAMILY MEDICINE | Facility: CLINIC | Age: 79
End: 2024-06-05
Payer: MEDICARE

## 2024-06-05 VITALS
RESPIRATION RATE: 16 BRPM | WEIGHT: 202.38 LBS | HEART RATE: 56 BPM | SYSTOLIC BLOOD PRESSURE: 110 MMHG | DIASTOLIC BLOOD PRESSURE: 62 MMHG | OXYGEN SATURATION: 97 % | BODY MASS INDEX: 27.41 KG/M2 | HEIGHT: 72 IN

## 2024-06-05 DIAGNOSIS — I10 ESSENTIAL HYPERTENSION: Primary | ICD-10-CM

## 2024-06-05 DIAGNOSIS — E21.3 HYPERPARATHYROIDISM: ICD-10-CM

## 2024-06-05 DIAGNOSIS — R73.03 PRE-DIABETES: ICD-10-CM

## 2024-06-05 PROCEDURE — 99214 OFFICE O/P EST MOD 30 MIN: CPT | Mod: S$PBB,,, | Performed by: STUDENT IN AN ORGANIZED HEALTH CARE EDUCATION/TRAINING PROGRAM

## 2024-06-05 PROCEDURE — G2211 COMPLEX E/M VISIT ADD ON: HCPCS | Mod: S$PBB,,, | Performed by: STUDENT IN AN ORGANIZED HEALTH CARE EDUCATION/TRAINING PROGRAM

## 2024-06-05 PROCEDURE — 99214 OFFICE O/P EST MOD 30 MIN: CPT | Mod: PBBFAC,PO | Performed by: STUDENT IN AN ORGANIZED HEALTH CARE EDUCATION/TRAINING PROGRAM

## 2024-06-05 PROCEDURE — 99999 PR PBB SHADOW E&M-EST. PATIENT-LVL IV: CPT | Mod: PBBFAC,,, | Performed by: STUDENT IN AN ORGANIZED HEALTH CARE EDUCATION/TRAINING PROGRAM

## 2024-06-05 RX ORDER — IPRATROPIUM BROMIDE 42 UG/1
SPRAY, METERED NASAL
COMMUNITY
Start: 2024-05-14 | End: 2024-06-05 | Stop reason: ALTCHOICE

## 2024-06-05 RX ORDER — MULTIVIT,STRESS FORMULA/ZINC
TABLET ORAL
COMMUNITY

## 2024-06-05 NOTE — PROGRESS NOTES
[facility] MEDICINE CLINIC NOTE    Patient Name: Papito Hutton  YOB: 1945    PRESENTING HISTORY     History of Present Illness:  Mr. Papito Hutton is a 78 y.o. male here for routine f/u.     Brings in several months of BP logs.   Consistently running 110s/60s.   Feels a light/headed with standing around 11 AM every day.   Discussed options and will try holding diuretic.   He no longer drinks EtOH as I gave him a strict moratorium. although may start again.   KEITH- forcing himself to use CPAP. Seeing ENT for other options.     Hypercalcemia- chronic. With high PTH. No stones, no osteoporosis. He does not want to pursue any further intervention at this time.     Pre-dm. Off metformin. Modest elevation. Will monitor off therapy.     Cleaned up medicine list.     ROS      OBJECTIVE:   Vital Signs:  Vitals:    06/05/24 0817   BP: 110/62   Pulse: (!) 56   Resp: 16   SpO2: 97%   Weight: 91.8 kg (202 lb 6.1 oz)   Height: 6' (1.829 m)          Physical Exam: Normal, no change.     Physical Exam    ASSESSMENT & PLAN:     Essential hypertension  -     COMPREHENSIVE METABOLIC PANEL; Future; Expected date: 12/05/2024  -     HEMOGLOBIN A1C; Future; Expected date: 12/05/2024  Hold diuretic.     Pre-diabetes  -     HEMOGLOBIN A1C; Future; Expected date: 12/05/2024  Hold metformin.     Hyperparathyroidism  Monitor calcium.         Rafi Martin  Internal Medicine    Visit complexity inherent to evaluation and management associated with medical care services that serve as the continuing focal point for all needed health care services and/or with medical care services that are part of ongoing care related to a patient's single, serious condition or a complex condition provided today

## 2024-06-08 DIAGNOSIS — I10 ESSENTIAL HYPERTENSION: ICD-10-CM

## 2024-06-10 NOTE — TELEPHONE ENCOUNTER
Requesting refill for   Diovan    Last visit 6/5/2024   Next visit  12/13/2024    Changing to local pharmacy.

## 2024-06-10 NOTE — TELEPHONE ENCOUNTER
Refill Routing Note   Medication(s) are not appropriate for processing by [facility] for the following reason(s):        New or recently adjusted medication    [facility] action(s):  Defer             Appointments  past 12m or future 3m with PCP    Date Provider   Last Visit   6/5/2024 Rafi Martin MD   Next Visit   Visit date not found Rafi Martin MD   ED visits in past 90 days: 0        Note composed:9:30 AM 06/10/2024

## 2024-06-10 NOTE — TELEPHONE ENCOUNTER
No care due was identified.  [facility] Embedded Care Due Messages. Reference number: [id number].   6/10/2024 8:46:18 AM CDT

## 2024-06-17 RX ORDER — VALSARTAN 160 MG/1
160 TABLET ORAL DAILY
Qty: 90 TABLET | Refills: 4 | Status: SHIPPED | OUTPATIENT
Start: 2024-06-17 | End: 2025-06-17

## 2024-06-25 ENCOUNTER — TELEPHONE (OUTPATIENT)
Dept: FAMILY MEDICINE | Facility: CLINIC | Age: 79
End: 2024-06-25
Payer: MEDICARE

## 2024-06-25 NOTE — TELEPHONE ENCOUNTER
Contacted pt via phone to schedule pre op appt pre Dr. Martin for procedure on 07/19/24. Pt confirmed appt time and date.

## 2024-06-27 ENCOUNTER — OFFICE VISIT (OUTPATIENT)
Dept: FAMILY MEDICINE | Facility: CLINIC | Age: 79
End: 2024-06-27
Payer: MEDICARE

## 2024-06-27 ENCOUNTER — TELEPHONE (OUTPATIENT)
Dept: FAMILY MEDICINE | Facility: CLINIC | Age: 79
End: 2024-06-27

## 2024-06-27 VITALS
BODY MASS INDEX: 28.58 KG/M2 | RESPIRATION RATE: 16 BRPM | SYSTOLIC BLOOD PRESSURE: 130 MMHG | DIASTOLIC BLOOD PRESSURE: 64 MMHG | HEIGHT: 72 IN | WEIGHT: 211 LBS | OXYGEN SATURATION: 96 % | HEART RATE: 52 BPM

## 2024-06-27 DIAGNOSIS — Z01.818 PRE-OPERATIVE EXAMINATION: Primary | ICD-10-CM

## 2024-06-27 DIAGNOSIS — R06.00 DYSPNEA, UNSPECIFIED TYPE: ICD-10-CM

## 2024-06-27 PROCEDURE — 99214 OFFICE O/P EST MOD 30 MIN: CPT | Mod: S$PBB,,, | Performed by: STUDENT IN AN ORGANIZED HEALTH CARE EDUCATION/TRAINING PROGRAM

## 2024-06-27 PROCEDURE — 99214 OFFICE O/P EST MOD 30 MIN: CPT | Mod: PBBFAC,PO | Performed by: STUDENT IN AN ORGANIZED HEALTH CARE EDUCATION/TRAINING PROGRAM

## 2024-06-27 PROCEDURE — G2211 COMPLEX E/M VISIT ADD ON: HCPCS | Mod: S$PBB,,, | Performed by: STUDENT IN AN ORGANIZED HEALTH CARE EDUCATION/TRAINING PROGRAM

## 2024-06-27 PROCEDURE — 99999 PR PBB SHADOW E&M-EST. PATIENT-LVL IV: CPT | Mod: PBBFAC,,, | Performed by: STUDENT IN AN ORGANIZED HEALTH CARE EDUCATION/TRAINING PROGRAM

## 2024-06-27 RX ORDER — ALBUTEROL SULFATE 90 UG/1
2 AEROSOL, METERED RESPIRATORY (INHALATION) EVERY 6 HOURS PRN
Qty: 18 G | Refills: 0 | Status: SHIPPED | OUTPATIENT
Start: 2024-06-27

## 2024-06-27 NOTE — TELEPHONE ENCOUNTER
Pre-op clearance paperwork has been faxed to TEJINDER (Dr. Tabor's office)  Last EKG  Last Office Note   Phone: [phone]  Fax: [phone]  Address: [address] [place] [id number]

## 2024-06-27 NOTE — PROGRESS NOTES
[facility] CLINIC NOTE    Patient Name: Papito Hutton  YOB: 1945    PRESENTING HISTORY     History of Present Illness:  Mr. Papito Hutton is a 78 y.o. male here for pre-op evaluation.     July 19th having sinoplasty/septoplasty to help with breathing/KEITH    Highest level of actviity- cut grass, walked uphill.   No further dizziness.   No chest pain. Some dyspnea due to limited airflow through nostrils.   Difficulty walking due to knees.     Nuclear stress in Sept 23- WNL.     Chronic KEITH on CPAP.     Hx PND of unknown etiology. Possibly KEITH mediated.   Nl Echo.    Hx CVA on Plavix.     HTN is well controlled and he is feeling better since reduction in meds.     He has noticed some increased LE swelling since yesterday. Was on his feet all day working on cars, had 1/2 daiquiri last night.       ROS      OBJECTIVE:   Vital Signs:  Vitals:    06/27/24 1306   BP: 130/64   Pulse: (!) 52   Resp: 16   SpO2: 96%   Weight: 95.7 kg (211 lb)   Height: 6' (1.829 m)          Physical Exam  Vitals and nursing note reviewed.   Constitutional:       Appearance: He is not ill-appearing, toxic-appearing or diaphoretic.   HENT:      Head: Normocephalic and atraumatic.      Right Ear: External ear normal.      Left Ear: External ear normal.   Eyes:      General: No scleral icterus.     Conjunctiva/sclera: Conjunctivae normal.      Pupils: Pupils are equal, round, and reactive to light.   Neck:      Thyroid: No thyromegaly.   Cardiovascular:      Rate and Rhythm: Normal rate and regular rhythm.      Heart sounds: Normal heart sounds. No murmur heard.  Pulmonary:      Effort: Pulmonary effort is normal. No respiratory distress.      Breath sounds: Normal breath sounds. No wheezing or rales.   Musculoskeletal:         General: No tenderness or deformity. Normal range of motion.      Cervical back: Normal range of motion and neck supple.      Right lower leg: Edema present.      Left lower leg: Edema  present.   Lymphadenopathy:      Cervical: No cervical adenopathy.   Skin:     General: Skin is warm and dry.      Findings: No erythema or rash.   Neurological:      Mental Status: He is alert and oriented to person, place, and time.      Gait: Gait is intact.   Psychiatric:         Mood and Affect: Mood and affect normal.         Cognition and Memory: Memory normal.         Judgment: Judgment normal.         ASSESSMENT & PLAN:     Pre-operative examination  He is low risk for procedure.   Okay to hold plavix for 5 days.   Medically optimized. No further testing required.     Dyspnea, unspecified type  -     albuterol (PROVENTIL HFA) 90 mcg/actuation inhaler; Inhale 2 puffs into the lungs every 6 (six) hours as needed for Shortness of Breath. Rescue  Dispense: 18 g; Refill: 0  He describes a sensation of unable to get a deep breath going back decades. No obstruction or reversibility on PFTs, but I wonder if he has undiagnosed asthma. Trial of inhaler and see if he feels any different.       Rafi Martin  Internal Medicine    I spent a total of 31 minutes on the day of the visit.  This includes face-to-face time and non face-to-face time preparing to see the patient (e.g., review of tests) , obtaining and/or reviewing separately obtain history, documenting clinical information in the electronic or other health record, independently interpreting results and communicating results to the patient/family/caregiver, or care coordinator.    Visit complexity inherent to evaluation and management associated with medical care services that serve as the continuing focal point for all needed health care services and/or with medical care services that are part of ongoing care related to a patient's single, serious condition or a complex condition provided today

## 2024-07-15 NOTE — DISCHARGE INSTRUCTIONS
Postoperative Sinus and Nasal Surgery Instructions      Sleep with your head slightly elevated for 2-3 days.    No heavy lifting or straining for 7 days.    Do not blow your nose or sniff forcefully.    Sneeze with your mouth open if possible.    It is OK to wipe your nose gently  .  It is normal to have mild bloody drainage for the first 24 to 48 hours.  If the bleeding worsens or persists, sit up and spray your nose with Afrin or generic oxymetazoline, 2-3 sprays in each side.  If the bleeding still does not stop, call your physician.    Starting the morning after surgery, unless you are instructed otherwise, wash your nose out with salt water twice a day per the instructions on the NASAL SALINE INSTRUCTION sheet. Continue this until you are told it is okay to stop. THIS IS VERY IMPORTANT FOR PROPER HEALING.    Use your nasal steroid spray, if prescribed, twice a day after irrigating with salt water starting the day after surgery.    Take your antibiotic or oral steroid pills if prescribed.    Take pain medicine as needed. If the pain is mild, you may use Tylenol or generic acetamenophin. Avoid aspirin or other anti-inflammatory medications  .  If you have any trouble with your vision or bruising or swelling around the eyes, call your physician.    You will have either absorbable or removable packing in your nose. If it is removable, you will be given an appointment in the first day or two after surgery to have it removed.   For Questions or Emergency Care: Call the office at [phone]. You may need to speak with the doctor on-call. [address] [place] [address] [place]  [address] [place] [address] Phone: [phone]  Fax: [phone]

## 2024-07-16 ENCOUNTER — ANESTHESIA EVENT (OUTPATIENT)
Dept: SURGERY | Facility: HOSPITAL | Age: 79
End: 2024-07-16
Payer: MEDICARE

## 2024-07-19 ENCOUNTER — ANESTHESIA (OUTPATIENT)
Dept: SURGERY | Facility: HOSPITAL | Age: 79
End: 2024-07-19
Payer: MEDICARE

## 2024-07-19 ENCOUNTER — HOSPITAL ENCOUNTER (OUTPATIENT)
Facility: HOSPITAL | Age: 79
Discharge: HOME | End: 2024-07-19
Attending: OTOLARYNGOLOGY | Admitting: OTOLARYNGOLOGY
Payer: MEDICARE

## 2024-07-19 DIAGNOSIS — J34.3 HYPERTROPHY OF NASAL TURBINATES: Chronic | ICD-10-CM

## 2024-07-19 DIAGNOSIS — R09.81 NASAL CONGESTION: Chronic | ICD-10-CM

## 2024-07-19 DIAGNOSIS — J32.2 CHRONIC ETHMOIDAL SINUSITIS: Chronic | ICD-10-CM

## 2024-07-19 DIAGNOSIS — J34.2 DEVIATED NASAL SEPTUM: Primary | Chronic | ICD-10-CM

## 2024-07-19 DIAGNOSIS — J34.89 ADHESIONS OF NASAL SEPTUM AND TURBINATES: Chronic | ICD-10-CM

## 2024-07-19 DIAGNOSIS — J34.81 NON-ULCERATIVE NASAL MUCOSITIS: Chronic | ICD-10-CM

## 2024-07-19 DIAGNOSIS — J32.0 CHRONIC MAXILLARY SINUSITIS: ICD-10-CM

## 2024-07-19 PROCEDURE — 63600175 PHARM REV CODE 636 W HCPCS: Performed by: OTOLARYNGOLOGY

## 2024-07-19 PROCEDURE — 37000008 HC ANESTHESIA 1ST 15 MINUTES: Performed by: OTOLARYNGOLOGY

## 2024-07-19 PROCEDURE — 88305 TISSUE EXAM BY PATHOLOGIST: CPT | Mod: TC | Performed by: PATHOLOGY

## 2024-07-19 PROCEDURE — 71000033 HC RECOVERY, INTIAL HOUR: Performed by: OTOLARYNGOLOGY

## 2024-07-19 PROCEDURE — 63600175 PHARM REV CODE 636 W HCPCS: Performed by: ANESTHESIOLOGY

## 2024-07-19 PROCEDURE — 36000711: Performed by: OTOLARYNGOLOGY

## 2024-07-19 PROCEDURE — 25000003 PHARM REV CODE 250: Performed by: ANESTHESIOLOGY

## 2024-07-19 PROCEDURE — 88311 DECALCIFY TISSUE: CPT | Mod: TC | Performed by: PATHOLOGY

## 2024-07-19 PROCEDURE — 25000003 PHARM REV CODE 250: Performed by: OTOLARYNGOLOGY

## 2024-07-19 PROCEDURE — 63600175 PHARM REV CODE 636 W HCPCS: Performed by: NURSE ANESTHETIST, CERTIFIED REGISTERED

## 2024-07-19 PROCEDURE — 37000009 HC ANESTHESIA EA ADD 15 MINS: Performed by: OTOLARYNGOLOGY

## 2024-07-19 PROCEDURE — 71000039 HC RECOVERY, EACH ADD'L HOUR: Performed by: OTOLARYNGOLOGY

## 2024-07-19 PROCEDURE — 36000710: Performed by: OTOLARYNGOLOGY

## 2024-07-19 PROCEDURE — 25000003 PHARM REV CODE 250: Performed by: NURSE ANESTHETIST, CERTIFIED REGISTERED

## 2024-07-19 RX ORDER — EPHEDRINE SULFATE 50 MG/ML
INJECTION, SOLUTION INTRAVENOUS
Status: DISCONTINUED | OUTPATIENT
Start: 2024-07-19 | End: 2024-07-19

## 2024-07-19 RX ORDER — PROPOFOL 10 MG/ML
VIAL (ML) INTRAVENOUS
Status: DISCONTINUED | OUTPATIENT
Start: 2024-07-19 | End: 2024-07-19

## 2024-07-19 RX ORDER — LIDOCAINE HYDROCHLORIDE AND EPINEPHRINE 10; 10 MG/ML; UG/ML
INJECTION, SOLUTION INFILTRATION; PERINEURAL
Status: DISCONTINUED | OUTPATIENT
Start: 2024-07-19 | End: 2024-07-19 | Stop reason: HOSPADM

## 2024-07-19 RX ORDER — ACETAMINOPHEN 10 MG/ML
INJECTION, SOLUTION INTRAVENOUS
Status: DISCONTINUED | OUTPATIENT
Start: 2024-07-19 | End: 2024-07-19

## 2024-07-19 RX ORDER — ROCURONIUM BROMIDE 10 MG/ML
INJECTION, SOLUTION INTRAVENOUS
Status: DISCONTINUED | OUTPATIENT
Start: 2024-07-19 | End: 2024-07-19

## 2024-07-19 RX ORDER — OXYCODONE HYDROCHLORIDE 5 MG/1
5 TABLET ORAL ONCE AS NEEDED
Status: COMPLETED | OUTPATIENT
Start: 2024-07-19 | End: 2024-07-19

## 2024-07-19 RX ORDER — SODIUM CHLORIDE, SODIUM LACTATE, POTASSIUM CHLORIDE, CALCIUM CHLORIDE 600; 310; 30; 20 MG/100ML; MG/100ML; MG/100ML; MG/100ML
125 INJECTION, SOLUTION INTRAVENOUS CONTINUOUS
Status: DISCONTINUED | OUTPATIENT
Start: 2024-07-19 | End: 2024-07-19 | Stop reason: HOSPADM

## 2024-07-19 RX ORDER — SODIUM CHLORIDE, SODIUM LACTATE, POTASSIUM CHLORIDE, CALCIUM CHLORIDE 600; 310; 30; 20 MG/100ML; MG/100ML; MG/100ML; MG/100ML
INJECTION, SOLUTION INTRAVENOUS CONTINUOUS
Status: DISCONTINUED | OUTPATIENT
Start: 2024-07-19 | End: 2024-07-19 | Stop reason: HOSPADM

## 2024-07-19 RX ORDER — LIDOCAINE HYDROCHLORIDE 20 MG/ML
INJECTION INTRAVENOUS
Status: DISCONTINUED | OUTPATIENT
Start: 2024-07-19 | End: 2024-07-19

## 2024-07-19 RX ORDER — SUCCINYLCHOLINE CHLORIDE 20 MG/ML
INJECTION INTRAMUSCULAR; INTRAVENOUS
Status: DISCONTINUED | OUTPATIENT
Start: 2024-07-19 | End: 2024-07-19

## 2024-07-19 RX ORDER — EPINEPHRINE 1 MG/ML
INJECTION, SOLUTION, CONCENTRATE INTRAVENOUS
Status: DISCONTINUED | OUTPATIENT
Start: 2024-07-19 | End: 2024-07-19 | Stop reason: HOSPADM

## 2024-07-19 RX ORDER — FENTANYL CITRATE 50 UG/ML
25 INJECTION, SOLUTION INTRAMUSCULAR; INTRAVENOUS EVERY 5 MIN PRN
Status: DISCONTINUED | OUTPATIENT
Start: 2024-07-19 | End: 2024-07-19 | Stop reason: HOSPADM

## 2024-07-19 RX ORDER — ONDANSETRON HYDROCHLORIDE 2 MG/ML
INJECTION, SOLUTION INTRAMUSCULAR; INTRAVENOUS
Status: DISCONTINUED | OUTPATIENT
Start: 2024-07-19 | End: 2024-07-19

## 2024-07-19 RX ORDER — ONDANSETRON HYDROCHLORIDE 2 MG/ML
4 INJECTION, SOLUTION INTRAVENOUS ONCE AS NEEDED
Status: DISCONTINUED | OUTPATIENT
Start: 2024-07-19 | End: 2024-07-19 | Stop reason: HOSPADM

## 2024-07-19 RX ORDER — LIDOCAINE HYDROCHLORIDE 10 MG/ML
1 INJECTION, SOLUTION EPIDURAL; INFILTRATION; INTRACAUDAL; PERINEURAL ONCE
Status: DISCONTINUED | OUTPATIENT
Start: 2024-07-19 | End: 2024-07-19 | Stop reason: HOSPADM

## 2024-07-19 RX ORDER — DEXAMETHASONE SODIUM PHOSPHATE 4 MG/ML
INJECTION, SOLUTION INTRA-ARTICULAR; INTRALESIONAL; INTRAMUSCULAR; INTRAVENOUS; SOFT TISSUE
Status: DISCONTINUED | OUTPATIENT
Start: 2024-07-19 | End: 2024-07-19

## 2024-07-19 RX ORDER — FENTANYL CITRATE 50 UG/ML
INJECTION, SOLUTION INTRAMUSCULAR; INTRAVENOUS
Status: DISCONTINUED | OUTPATIENT
Start: 2024-07-19 | End: 2024-07-19

## 2024-07-19 RX ADMIN — ACETAMINOPHEN 1000 MG: 10 INJECTION, SOLUTION INTRAVENOUS at 09:07

## 2024-07-19 RX ADMIN — SUCCINYLCHOLINE CHLORIDE 180 MG: 20 INJECTION, SOLUTION INTRAMUSCULAR; INTRAVENOUS at 09:07

## 2024-07-19 RX ADMIN — EPHEDRINE SULFATE 5 MG: 50 INJECTION, SOLUTION INTRAMUSCULAR; INTRAVENOUS; SUBCUTANEOUS at 10:07

## 2024-07-19 RX ADMIN — ONDANSETRON 4 MG: 2 INJECTION INTRAMUSCULAR; INTRAVENOUS at 09:07

## 2024-07-19 RX ADMIN — ROCURONIUM BROMIDE 5 MG: 10 INJECTION, SOLUTION INTRAVENOUS at 09:07

## 2024-07-19 RX ADMIN — SODIUM CHLORIDE, SODIUM LACTATE, POTASSIUM CHLORIDE, AND CALCIUM CHLORIDE: .6; .31; .03; .02 INJECTION, SOLUTION INTRAVENOUS at 10:07

## 2024-07-19 RX ADMIN — PROPOFOL 200 MG: 10 INJECTION, EMULSION INTRAVENOUS at 09:07

## 2024-07-19 RX ADMIN — DEXAMETHASONE SODIUM PHOSPHATE 12 MG: 4 INJECTION, SOLUTION INTRA-ARTICULAR; INTRALESIONAL; INTRAMUSCULAR; INTRAVENOUS; SOFT TISSUE at 09:07

## 2024-07-19 RX ADMIN — LIDOCAINE HYDROCHLORIDE 100 MG: 20 INJECTION, SOLUTION INTRAVENOUS at 09:07

## 2024-07-19 RX ADMIN — SODIUM CHLORIDE, SODIUM LACTATE, POTASSIUM CHLORIDE, AND CALCIUM CHLORIDE: .6; .31; .03; .02 INJECTION, SOLUTION INTRAVENOUS at 08:07

## 2024-07-19 RX ADMIN — FENTANYL CITRATE 50 MCG: 0.05 INJECTION, SOLUTION INTRAMUSCULAR; INTRAVENOUS at 09:07

## 2024-07-19 RX ADMIN — OXYCODONE 5 MG: 5 TABLET ORAL at 11:07

## 2024-07-19 NOTE — TRANSFER OF CARE
Anesthesia Transfer of Care Note    Patient: Papito Hutton    Procedure(s) Performed: Procedure(s) (LRB):  FESS, USING COMPUTER-ASSISTED NAVIGATION (disc loaded), WITH NASAL TURBINATE REDUCTION (N/A)  SEPTOPLASTY, NOSE (N/A)  REDUCTION, NASAL TURBINATE (Bilateral)  LYSIS, ADHESIONS, NOSE (Left)    Patient location: PACU    Anesthesia Type: general    Transport from OR: Transported from OR on 2-3 L/min O2 by NC with adequate spontaneous ventilation    Post pain: adequate analgesia    Post assessment: no apparent anesthetic complications and tolerated procedure well    Post vital signs: stable    Level of consciousness: awake, alert and oriented    Nausea/Vomiting: no nausea/vomiting    Complications: none    Transfer of care protocol was followed      Last vitals: Visit Vitals  BP (!) 154/96   Pulse 60   Temp 36.4 °C (97.5 °F) (Skin)   Resp 16   Ht 6' (1.829 m)   Wt 95.7 kg (210 lb 15.7 oz)   SpO2 100%   BMI 28.61 kg/m²

## 2024-07-19 NOTE — OP NOTE
ENT OPERATIVE NOTE    PATIENT:  Papito Hutton  [id number]  1945  PCP: Rafi Martin MD  CSN: [id number]    DATE OF SURGERY: 07/19/2024    ATTENDING SURGEON:  Thien Tabor MD    ANESTHESIA:  General endotracheal anesthesia.    PREOPERATIVE DIAGNOSIS:   Chronic/Recurrent right maxillary sinusitis.  Bilateral inferior turbinate hypertrophy.  Chronic nasal congestion.  Nasal septal deviation.  Left intranasal synechiae    POSTOPERATIVE DIAGNOSIS:   Same with:  Chronic/Recurrent right ethmoid sinusitis.    PROCEDURE:   Use of image guidance.  Endoscopic right maxillary sinusotomy with removal of contents.  Endoscopic right anterior ethmoidectomy  Bilateral inferior turbinate out fractures  Bilateral submucous resection of inferior turbinates  Nasal septoplasty  Left Intranasal synechiae lysis    INDICATIONS:  This is a 78 y.o. male with a history of the above diagnosis.  The CT scan revealed significant disease unresponsive to appropriate therapy. Recommendations were made for sinus surgery.  The risks, benefits, and alternatives to sinus surgery were discussed with the patient who wished to proceed.    OPERATIVE FINDINGS:  - right maxillary sinus floor mucosal thickening, scared nasal cavity with obstruction of ethmoid outflows, left nasal synechiae, ITH, deviated septum.    ANESTHETIC AND POSITIONING:  The patient was taken to the operating room and placed in a supine position where general endotracheal anesthesia was established without difficulty.  The patient was placed in a beach chair position and the table was turned 90 degrees.  The eyes were taped shut laterally so that the medial eye could be viewed as needed and the patient was adequately padded.    IMAGE GUIDANCE:  A preoperative image guidance compatible scan was obtained due to the indication of  previous surgery  and was used to plan the surgery.   The system was registered and verified with an acceptable degree of accuracy.  The system  was used for navigation throughout the case.    NASAL CAVITY PREPARATION:  Decongestion:  Epinephrine 1:8000 applied topically on pledgets.  Injection:   Lidocaine 1% with epinephrine 1:100,000 was injected in the operative field for hemostasis.    NASAL ENDOSCOPY:  The nasopharynx, inferior nasal cavity, middle meatus, olfactory region, and sphenoethmoidal recess were examined bilaterally with nasal endoscopy.  The remainder of the case was performed via endoscopes working off a video monitor.     DESCRIPTION OF PROCEDURE:  Nasal Synechiae:  Left: Turbinate scissors used to perform lysis.    Maxillary Sinusotomy:  Right:  Uncinate removed.  Primary ostium cannulated and opened widely into the posterior fontanelle. Residual disease removed and antrostomy widened.   Findings:  Polypoid tissue    Anterior Ethmoidectomy:  Right: Bulla entered in inferomedial fashion with the anterior face removed.  Remaining septae removed to open the anterior ethmoid cavity. Residual cells removed.  Findings: Polypoid tissue and scar tissue    Septoplasty:  A left hemitransfixion incision was performed.  The mucoperichondrial flap was elevated past the bony cartilaginous junction. Taking care to preserve an adequate dorsal caudal strut, caudal and dorsal cartilaginous incisions were made taking care to preserve the contralateral mucoperichondrial flap. The deviated portion of the cartilaginous and bony septum was removed, modified, and replaced.  The hemitransfixion incision was closed with 4-0 fast gut simple, interrupted sutures.  The septal flaps were coapted with a 4-0 plain gut suture on a Chance needle.  Findings: left septal deviation    Inferior Submucous Resection:  Right:  An incision was made on the anterior aspect of the inferior turbinate.  The turbinate blade was used to remove submucosal soft tissue and bone of the turbinate on the medial and inferior aspects.  The turbinate was out-fractured.   Findings: Hypertrophied  inferior turbinate.  Left: An incision was made on the anterior aspect of the inferior turbinate.  The turbinate blade was used to remove submucosal soft tissue and bone of the turbinate on the medial and inferior aspects.  The turbinate was out-fractured.   Findings: Hypertrophied inferior turbinate.    IMPLANTS:  * No implants in log *    HEMOSTASIS:  Epinephrine-soaked pledgets which were removed.      PROCEDURE TERMINATION:  Counts correct.  Eyes checked for bruising.  Stomach suctioned with orogastric tube.  Face cleaned.  Patient extubated and taken to the recovery room in stable condition.    COMPLICATIONS:  None    EBL:  * No values recorded between 7/19/2024  9:40 AM and 7/19/2024 11:31 AM *    SPECIMENS SENT:  * No specimens in log *  Pathology: sinus  Cultures: none    DISPOSITION: Pt will be discharged home and f/u in one week.   Surgery Related Medications:    Antibiotics, steroids, anti-nausea and pain medications with normal saline irrigations.    Thien Tabor

## 2024-07-19 NOTE — ANESTHESIA PROCEDURE NOTES
Intubation    Date/Time: 7/19/2024 9:22 AM    Performed by: Nora Delaney CRNA  Authorized by: Josias Berrios MD    Intubation:     Induction:  Intravenous    Intubated:  Postinduction    Mask Ventilation:  Easy with oral airway    Attempts:  1    Attempted By:  CRNA    Method of Intubation:  Video laryngoscopy    Blade:  Wallace 3    Laryngeal View Grade: Grade I - full view of cords      Difficult Airway Encountered?: No      Complications:  None    Airway Device:  Oral endotracheal tube    Airway Device Size:  7.5    Style/Cuff Inflation:  Cuffed (inflated to minimal occlusive pressure)    Tube secured:  23    Secured at:  The lips    Placement Verified By:  Capnometry    Complicating Factors:  None    Findings Post-Intubation:  BS equal bilateral and atraumatic/condition of teeth unchanged

## 2024-07-19 NOTE — DISCHARGE SUMMARY
[facility]  Discharge Note  Short Stay    Procedure(s) (LRB):  FESS, USING COMPUTER-ASSISTED NAVIGATION (disc loaded), WITH NASAL TURBINATE REDUCTION (N/A)  SEPTOPLASTY, NOSE (N/A)  REDUCTION, NASAL TURBINATE (Bilateral)  LYSIS, ADHESIONS, NOSE (Left)      OUTCOME: Patient tolerated treatment/procedure well without complication and is now ready for discharge.    DISPOSITION: Home or Self Care    FINAL DIAGNOSIS:  Deviated nasal septum    FOLLOWUP: In clinic    DISCHARGE INSTRUCTIONS:    Discharge Procedure Orders   Diet general     Other restrictions (specify):   Order Comments: Patient has the following activity restrictions:  Head of bed 45-60 degree elevation.  Activity AD ROSALBA, no strenuous activity for 2 weeks.  No nose blowing, sneezing or bending over for 2 weeks.  May apply drip pad to nose if needed for bleeding (call MD if more than 3 drip pads are needed per hour)  Follow up appointment scheduled with Dr. Tabor.  If not made, please schedule.  Any questions or problems, patient is to call [phone].    May use CPAP if needed for sleep        TIME SPENT ON DISCHARGE: 15 minutes

## 2024-07-19 NOTE — ANESTHESIA PREPROCEDURE EVALUATION
07/19/2024  Papito Hutton is a 78 y.o., male.      Pre-op Assessment    I have reviewed the Patient Summary Reports.     I have reviewed the Nursing Notes. I have reviewed the NPO Status.   I have reviewed the Medications.     Review of Systems  Anesthesia Hx:             Denies Family Hx of Anesthesia complications.    Denies Personal Hx of Anesthesia complications.                    Cardiovascular:     Hypertension           hyperlipidemia   ECG has been reviewed. Gr I DD                         Pulmonary:      Shortness of breath  Sleep Apnea           Education provided regarding risk of obstructive sleep apnea            Hepatic/GI:     GERD             Neurological:       Seizures, well controlled                                Endocrine:  Diabetes               Physical Exam  General: Well nourished, Cooperative, Alert and Oriented    Airway:  Mallampati: III   Mouth Opening: Normal  TM Distance: 4 - 6 cm  Neck ROM: Extension Decreased  Neck: Girth Increased    Dental:  Caps / Implants    Chest/Lungs:  Normal Respiratory Rate    Heart:  Rate: Normal  Rhythm: Regular Rhythm        Anesthesia Plan  Type of Anesthesia, risks & benefits discussed:    Anesthesia Type: Gen ETT  Intra-op Monitoring Plan: Standard ASA Monitors  Post Op Pain Control Plan: multimodal analgesia  Induction:  IV  Airway Plan: Video  Informed Consent: Informed consent signed with the Patient and all parties understand the risks and agree with anesthesia plan.  All questions answered.   ASA Score: 3    Ready For Surgery From Anesthesia Perspective.     .

## 2024-07-21 NOTE — ANESTHESIA POSTPROCEDURE EVALUATION
Anesthesia Post Evaluation    Patient: Papito Hutton    Procedure(s) Performed: Procedure(s) (LRB):  FESS, USING COMPUTER-ASSISTED NAVIGATION (disc loaded), WITH NASAL TURBINATE REDUCTION (N/A)  SEPTOPLASTY, NOSE (N/A)  REDUCTION, NASAL TURBINATE (Bilateral)  LYSIS, ADHESIONS, NOSE (Left)    Final Anesthesia Type: general      Patient location during evaluation: PACU  Patient participation: Yes- Able to Participate  Level of consciousness: awake and alert  Post-procedure vital signs: reviewed and stable  Pain management: adequate  Airway patency: patent    PONV status at discharge: No PONV  Anesthetic complications: no      Cardiovascular status: blood pressure returned to baseline  Respiratory status: unassisted  Hydration status: euvolemic  Follow-up not needed.              Vitals Value Taken Time   /85 07/19/24 1245   Temp 36.5 °C (97.7 °F) 07/19/24 1136   Pulse 58 07/19/24 1245   Resp 20 07/19/24 1245   SpO2 97 % 07/19/24 1245         Event Time   Out of Recovery 13:00:00         Pain/Patty Score: No data recorded

## 2024-07-22 VITALS
SYSTOLIC BLOOD PRESSURE: 171 MMHG | HEIGHT: 72 IN | WEIGHT: 211 LBS | RESPIRATION RATE: 20 BRPM | DIASTOLIC BLOOD PRESSURE: 85 MMHG | OXYGEN SATURATION: 97 % | TEMPERATURE: 98 F | BODY MASS INDEX: 28.58 KG/M2 | HEART RATE: 58 BPM

## 2024-07-22 DIAGNOSIS — J31.0 CHRONIC RHINITIS: ICD-10-CM

## 2024-07-22 RX ORDER — AZELASTINE 1 MG/ML
SPRAY, METERED NASAL
Qty: 120 ML | Refills: 3 | OUTPATIENT
Start: 2024-07-22

## 2024-07-22 NOTE — TELEPHONE ENCOUNTER
Refill Decision Note   Papito Anni  is requesting a refill authorization.  Brief Assessment and Rationale for Refill:  Quick Discontinue     Medication Therapy Plan:  Med d/c by PCP on 06/05/24; [facility]      Comments:     Note composed:5:00 AM 07/22/2024

## 2024-07-22 NOTE — TELEPHONE ENCOUNTER
No care due was identified.  [facility] Embedded Care Due Messages. Reference number: [id number].   7/22/2024 2:42:22 AM CDT

## 2024-07-30 DIAGNOSIS — R06.00 DYSPNEA, UNSPECIFIED TYPE: ICD-10-CM

## 2024-07-30 NOTE — TELEPHONE ENCOUNTER
Refill Routing Note   Medication(s) are not appropriate for processing by [facility] for the following reason(s):        New or recently adjusted medication    [facility] action(s):  Defer               Appointments  past 12m or future 3m with PCP    Date Provider   Last Visit   6/27/2024 Rafi Martin MD   Next Visit   Visit date not found Rafi Martin MD   ED visits in past 90 days: 0        Note composed:4:12 PM 07/30/2024

## 2024-07-30 NOTE — TELEPHONE ENCOUNTER
No care due was identified.  [facility] Embedded Care Due Messages. Reference number: [id number].   7/30/2024 9:01:35 AM CDT

## 2024-07-31 RX ORDER — ALBUTEROL SULFATE 90 UG/1
2 AEROSOL, METERED RESPIRATORY (INHALATION) EVERY 6 HOURS PRN
Qty: 54 G | Refills: 3 | Status: SHIPPED | OUTPATIENT
Start: 2024-07-31

## 2024-08-01 ENCOUNTER — TELEPHONE (OUTPATIENT)
Dept: PULMONOLOGY | Facility: CLINIC | Age: 79
End: 2024-08-01
Payer: MEDICARE

## 2024-08-01 ENCOUNTER — HOSPITAL ENCOUNTER (OUTPATIENT)
Dept: RADIOLOGY | Facility: HOSPITAL | Age: 79
Discharge: HOME | End: 2024-08-01
Attending: NURSE PRACTITIONER
Payer: MEDICARE

## 2024-08-01 ENCOUNTER — OFFICE VISIT (OUTPATIENT)
Dept: PULMONOLOGY | Facility: CLINIC | Age: 79
End: 2024-08-01
Payer: MEDICARE

## 2024-08-01 VITALS
HEIGHT: 72 IN | SYSTOLIC BLOOD PRESSURE: 135 MMHG | HEART RATE: 58 BPM | DIASTOLIC BLOOD PRESSURE: 70 MMHG | OXYGEN SATURATION: 98 % | WEIGHT: 207 LBS | BODY MASS INDEX: 28.04 KG/M2

## 2024-08-01 DIAGNOSIS — R06.00 DYSPNEA, UNSPECIFIED TYPE: ICD-10-CM

## 2024-08-01 DIAGNOSIS — G47.33 OBSTRUCTIVE SLEEP APNEA: Primary | ICD-10-CM

## 2024-08-01 PROCEDURE — 71046 X-RAY EXAM CHEST 2 VIEWS: CPT | Mod: 26,,, | Performed by: RADIOLOGY

## 2024-08-01 PROCEDURE — 99214 OFFICE O/P EST MOD 30 MIN: CPT | Mod: PBBFAC,PN | Performed by: NURSE PRACTITIONER

## 2024-08-01 PROCEDURE — 71046 X-RAY EXAM CHEST 2 VIEWS: CPT | Mod: TC

## 2024-08-01 PROCEDURE — 99999 PR PBB SHADOW E&M-EST. PATIENT-LVL IV: CPT | Mod: PBBFAC,,, | Performed by: NURSE PRACTITIONER

## 2024-08-01 PROCEDURE — 99212 OFFICE O/P EST SF 10 MIN: CPT | Mod: S$PBB,,, | Performed by: NURSE PRACTITIONER

## 2024-08-01 NOTE — Clinical Note
Dami Tabor, Mr. Hutton came in today asking if he could start using nasal pillows. I told him I needed to reach out to you before doing so since he just had his sinus surgery. I want to make sure he is healed before doing so.

## 2024-08-01 NOTE — PROGRESS NOTES
SUBJECTIVE:    Patient ID: Papito Hutton is a 78 y.o. male.    Chief Complaint: Apnea and Follow-up    HPI  Patient here today because he wanted to discuss using nasal pillow mask since he had his sinus surgery.  He feels he would like it more since he is able to breathe through his nose.  His surgery was 3 weeks ago.  He states he does feel like he does need to treat his KEITH now since he was not able to sleep on his machine for a few weeks due to his surgery.     Past Medical History:   Diagnosis Date    Advance care planning 02/19/2024    Arthritis     B12 deficiency     Colon polyp     Diabetes mellitus     BORDERLINE    GERD (gastroesophageal reflux disease)     [place] (hard of hearing)     BILAT AIDS    Hyperlipidemia     Hypertension     Low testosterone     Personal history of colonic polyps 2008    adenomatous polyp    Seizures     Sinus disorder     Sleep apnea     DOES NOT USE MACHINE    Wears glasses      Past Surgical History:   Procedure Laterality Date    COLON SURGERY      COLONOSCOPY  2008    Dr. Garcia; polyps removed    COLONOSCOPY N/A 07/13/2020    Procedure: COLONOSCOPY;  Surgeon: Jj Fried MD;  Location: [facility];  Service: Endoscopy;  Laterality: N/A;    COLONOSCOPY N/A 8/15/2023    Procedure: COLONOSCOPY;  Surgeon: Jj Fried MD;  Location: [facility];  Service: Endoscopy;  Laterality: N/A;    ESOPHAGOGASTRODUODENOSCOPY N/A 7/31/2023    Procedure: EGD (ESOPHAGOGASTRODUODENOSCOPY);  Surgeon: Jj Fried MD;  Location: [facility];  Service: Endoscopy;  Laterality: N/A;    FESS, USING COMPUTER-ASSISTED NAVIGATION, WITH NASAL TURBINATE REDUCTION N/A 7/19/2024    Procedure: FESS, USING COMPUTER-ASSISTED NAVIGATION (disc loaded), WITH NASAL TURBINATE REDUCTION;  Surgeon: Thien Tabor MD;  Location: [facility];  Service: ENT;  Laterality: N/A;    LAPAROSCOPIC RIGHT COLON RESECTION Right 08/10/2020    Procedure: COLECTOMY, RIGHT, LAPAROSCOPIC pooss conversion to open;   Surgeon: Noble Kaye MD;  Location: [facility] OR;  Service: General;  Laterality: Right;    LYSIS, ADHESIONS, NOSE Left 7/19/2024    Procedure: LYSIS, ADHESIONS, NOSE;  Surgeon: Thien Tabor MD;  Location: [facility] OR;  Service: ENT;  Laterality: Left;    NASAL SEPTOPLASTY N/A 7/19/2024    Procedure: SEPTOPLASTY, NOSE;  Surgeon: Thien Tabor MD;  Location: [facility] OR;  Service: ENT;  Laterality: N/A;    NASAL SEPTUM SURGERY      NASAL TURBINATE REDUCTION Bilateral 7/19/2024    Procedure: REDUCTION, NASAL TURBINATE;  Surgeon: Thien Tabor MD;  Location: [facility] OR;  Service: ENT;  Laterality: Bilateral;    right hand surgery      RIGHT HEMICOLECTOMY N/A 08/10/2020    Procedure: HEMICOLECTOMY, RIGHT;  Surgeon: Noble Kaye MD;  [facility]: [facility];  Service: General;  Laterality: N/A;    UPPER GASTROINTESTINAL ENDOSCOPY  2008    hiatal hernia; esophagitis     Family History   Problem Relation Name Age of Onset    Cancer Neg Hx      Diabetes Neg Hx      Heart disease Neg Hx      Colon cancer Neg Hx      Colon polyps Neg Hx      Esophageal cancer Neg Hx      Stomach cancer Neg Hx          Social History:   Marital Status:   Occupation: Data Unavailable  Alcohol History:  reports current alcohol use of about 2.0 standard drinks of alcohol per week.  Tobacco History:  reports that he has quit smoking. His smoking use included cigars. He has quit using smokeless tobacco.  His smokeless tobacco use included chew.  Drug History:  reports no history of drug use.    Review of patient's allergies indicates:  No Known Allergies    Current Outpatient Medications   Medication Sig Dispense Refill    albuterol (PROVENTIL HFA) 90 mcg/actuation inhaler Inhale 2 puffs into the lungs every 6 (six) hours as needed for Shortness of Breath. Rescue 54 g 3    atorvastatin (LIPITOR) 80 MG tablet TAKE 1 TABLET DAILY 90 tablet 3    carvediloL (COREG) 12.5 MG tablet Take 1 tablet (12.5 mg total) by mouth 2 (two) times daily  with meals. 180 tablet 3    cyanocobalamin (VITAMIN B-12) 1000 MCG tablet Take 100 mcg by mouth once daily.      diclofenac sodium (VOLTAREN) 1 % Gel Apply 2 grams topically once daily. 100 g 11    esomeprazole (NEXIUM) 40 MG capsule TAKE 1 CAPSULE BEFORE BREAKFAST 90 capsule 3    levETIRAcetam (KEPPRA) 500 MG Tab Take 1 tablet (500 mg total) by mouth 2 (two) times daily. 180 tablet 3    NIFEdipine (PROCARDIA-XL) 30 MG (OSM) 24 hr tablet Take 1 tablet (30 mg total) by mouth once daily. 90 tablet 3    triamcinolone (NASACORT) 55 mcg nasal inhaler 2 sprays by Nasal route once daily.      valsartan (DIOVAN) 160 MG tablet Take 1 tablet (160 mg total) by mouth once daily. 90 tablet 4     No current facility-administered medications for this visit.     Last ECHO 09/2023  Left Ventricle: The left ventricle is normal in size. Mildly increased wall thickness. There is mild concentric hypertrophy. Normal wall motion. There is normal systolic function with a visually estimated ejection fraction of 65 - 70%. There is normal diastolic function.    Right Ventricle: Normal right ventricular cavity size. Wall thickness is normal. Right ventricle wall motion  is normal. Systolic function is normal.    IVC/SVC: Normal venous pressure at 3 mmHg.    Review of Systems  General: Feeling Well.   Eyes: Vision is good.  ENT:  No sinusitis or pharyngitis.   Heart:: No chest pain or palpitations. Orthopnea   Lungs: short of breath with exertion at times   GI: No Nausea, vomiting, constipation, diarrhea, or reflux.  : No dysuria, hesitancy, or nocturia.  Musculoskeletal: knee pain   Skin: No lesions or rashes.  Neuro: No headaches or neuropathy.   Lymph: No edema or adenopathy.  Psych: No anxiety or depression.  Endo: No weight change.    OBJECTIVE:      /70 (BP Location: Right arm, Patient Position: Sitting, BP Method: Medium (Manual))   Pulse (!) 58   Ht 6' (1.829 m)   Wt 93.9 kg (207 lb)   SpO2 98%   BMI 28.07 kg/m²      Physical Exam  GENERAL: Older patient in no distress.  HEENT: Pupils equal and reactive. Extraocular movements intact. Nose intact.      Pharynx moist.  NECK: Supple.   HEART: Regular rate and rhythm. No murmur or gallop auscultated.  LUNGS: Clear to auscultation and percussion. Lung excursion symmetrical. No change in fremitus. No adventitial noises.  ABDOMEN: Bowel sounds present. Non-tender, no masses palpated.  EXTREMITIES: Normal muscle tone and joint movement, no cyanosis or clubbing.   LYMPHATICS: No adenopathy palpated, no edema.  SKIN: Dry, intact, no lesions.   NEURO: Cranial nerves II-XII intact. Motor strength 5/5 bilaterally, upper and lower extremities.  PSYCH: Appropriate affect.    Assessment:       1. Obstructive sleep apnea    2. Dyspnea, unspecified type                Plan:           Need to get approval from ENT for when ok to try nasal pillow mask since had recent sinus surgery   Chest xray     Follow up in about 1 year (around 8/1/2025).

## 2024-08-02 ENCOUNTER — TELEPHONE (OUTPATIENT)
Dept: PULMONOLOGY | Facility: CLINIC | Age: 79
End: 2024-08-02
Payer: MEDICARE

## 2024-08-02 DIAGNOSIS — G47.33 OSA (OBSTRUCTIVE SLEEP APNEA): Primary | ICD-10-CM

## 2024-08-02 NOTE — TELEPHONE ENCOUNTER
Received message back from Dr. Tabor that it is ok to treat with nasal pillows mask now. Will order.   The patient is aware

## 2024-10-28 DIAGNOSIS — Z86.73 HX OF TRANSIENT ISCHEMIC ATTACK (TIA): Primary | ICD-10-CM

## 2024-10-28 DIAGNOSIS — K21.9 GASTROESOPHAGEAL REFLUX DISEASE: ICD-10-CM

## 2024-10-28 RX ORDER — ESOMEPRAZOLE MAGNESIUM 40 MG/1
40 CAPSULE, DELAYED RELEASE ORAL
Qty: 90 CAPSULE | Refills: 2 | Status: SHIPPED | OUTPATIENT
Start: 2024-10-28

## 2024-10-28 RX ORDER — CLOPIDOGREL BISULFATE 75 MG/1
75 TABLET ORAL DAILY
Qty: 90 TABLET | Refills: 4 | Status: SHIPPED | OUTPATIENT
Start: 2024-10-28 | End: 2026-01-21

## 2024-11-11 ENCOUNTER — HOSPITAL ENCOUNTER (OUTPATIENT)
Dept: RADIOLOGY | Facility: HOSPITAL | Age: 79
Discharge: HOME | End: 2024-11-11
Attending: ORTHOPAEDIC SURGERY
Payer: MEDICARE

## 2024-11-11 ENCOUNTER — OFFICE VISIT (OUTPATIENT)
Dept: ORTHOPEDICS | Facility: CLINIC | Age: 79
End: 2024-11-11
Payer: MEDICARE

## 2024-11-11 VITALS — HEIGHT: 72 IN | WEIGHT: 207 LBS | BODY MASS INDEX: 28.04 KG/M2

## 2024-11-11 DIAGNOSIS — M17.10 ARTHRITIS OF KNEE: Primary | ICD-10-CM

## 2024-11-11 DIAGNOSIS — M25.569 KNEE PAIN, UNSPECIFIED CHRONICITY, UNSPECIFIED LATERALITY: Primary | ICD-10-CM

## 2024-11-11 DIAGNOSIS — M25.569 KNEE PAIN, UNSPECIFIED CHRONICITY, UNSPECIFIED LATERALITY: ICD-10-CM

## 2024-11-11 PROCEDURE — 99212 OFFICE O/P EST SF 10 MIN: CPT | Mod: PBBFAC,25,PO | Performed by: ORTHOPAEDIC SURGERY

## 2024-11-11 PROCEDURE — 99214 OFFICE O/P EST MOD 30 MIN: CPT | Mod: 25,S$PBB,, | Performed by: ORTHOPAEDIC SURGERY

## 2024-11-11 PROCEDURE — 20610 DRAIN/INJ JOINT/BURSA W/O US: CPT | Mod: 50,PBBFAC,PO | Performed by: ORTHOPAEDIC SURGERY

## 2024-11-11 PROCEDURE — 99999 PR PBB SHADOW E&M-EST. PATIENT-LVL II: CPT | Mod: PBBFAC,,, | Performed by: ORTHOPAEDIC SURGERY

## 2024-11-11 PROCEDURE — 99999PBSHW PR PBB SHADOW TECHNICAL ONLY FILED TO HB: Mod: JZ,PBBFAC,,

## 2024-11-11 PROCEDURE — 73564 X-RAY EXAM KNEE 4 OR MORE: CPT | Mod: TC,50,PO

## 2024-11-11 PROCEDURE — 73564 X-RAY EXAM KNEE 4 OR MORE: CPT | Mod: 26,50,, | Performed by: RADIOLOGY

## 2024-11-11 RX ADMIN — Medication 60 MG: at 02:11

## 2024-11-11 NOTE — PROGRESS NOTES
Past Medical History:   Diagnosis Date    Advance care planning 02/19/2024    Arthritis     B12 deficiency     Colon polyp     Diabetes mellitus     BORDERLINE    GERD (gastroesophageal reflux disease)     [place] (hard of hearing)     BILAT AIDS    Hyperlipidemia     Hypertension     Low testosterone     Personal history of colonic polyps 2008    adenomatous polyp    Seizures     Sinus disorder     Sleep apnea     DOES NOT USE MACHINE    Wears glasses        Past Surgical History:   Procedure Laterality Date    COLON SURGERY      COLONOSCOPY  2008    Dr. Garcia; polyps removed    COLONOSCOPY N/A 07/13/2020    Procedure: COLONOSCOPY;  Surgeon: Jj Fried MD;  Location: [facility];  Service: Endoscopy;  Laterality: N/A;    COLONOSCOPY N/A 8/15/2023    Procedure: COLONOSCOPY;  Surgeon: Jj Fried MD;  Location: [facility];  Service: Endoscopy;  Laterality: N/A;    ESOPHAGOGASTRODUODENOSCOPY N/A 7/31/2023    Procedure: EGD (ESOPHAGOGASTRODUODENOSCOPY);  Surgeon: Jj Fried MD;  Location: [facility];  Service: Endoscopy;  Laterality: N/A;    FESS, USING COMPUTER-ASSISTED NAVIGATION, WITH NASAL TURBINATE REDUCTION N/A 7/19/2024    Procedure: FESS, USING COMPUTER-ASSISTED NAVIGATION (disc loaded), WITH NASAL TURBINATE REDUCTION;  Surgeon: Thien Tabor MD;  Location: [facility];  Service: ENT;  Laterality: N/A;    LAPAROSCOPIC RIGHT COLON RESECTION Right 08/10/2020    Procedure: COLECTOMY, RIGHT, LAPAROSCOPIC pooss conversion to open;  Surgeon: Noble Kaye MD;  Location: [facility] OR;  Service: General;  Laterality: Right;    LYSIS, ADHESIONS, NOSE Left 7/19/2024    Procedure: LYSIS, ADHESIONS, NOSE;  Surgeon: Thien Tabor MD;  Location: [facility] OR;  Service: ENT;  Laterality: Left;    NASAL SEPTOPLASTY N/A 7/19/2024    Procedure: SEPTOPLASTY, NOSE;  Surgeon: Thien Tabor MD;  Location: [facility] OR;  Service: ENT;  Laterality: N/A;    NASAL SEPTUM SURGERY      NASAL TURBINATE REDUCTION  Bilateral 7/19/2024    Procedure: REDUCTION, NASAL TURBINATE;  Surgeon: Thien Tabor MD;  Location: [facility] OR;  Service: ENT;  Laterality: Bilateral;    right hand surgery      RIGHT HEMICOLECTOMY N/A 08/10/2020    Procedure: HEMICOLECTOMY, RIGHT;  Surgeon: Noble Kaye MD;  Location: [facility] OR;  Service: General;  Laterality: N/A;    UPPER GASTROINTESTINAL ENDOSCOPY  2008    hiatal hernia; esophagitis       Current Outpatient Medications   Medication Sig    albuterol (PROVENTIL HFA) 90 mcg/actuation inhaler Inhale 2 puffs into the lungs every 6 (six) hours as needed for Shortness of Breath. Rescue    atorvastatin (LIPITOR) 80 MG tablet TAKE 1 TABLET DAILY    carvediloL (COREG) 12.5 MG tablet Take 1 tablet (12.5 mg total) by mouth 2 (two) times daily with meals.    clopidogreL (PLAVIX) 75 mg tablet Take 1 tablet (75 mg total) by mouth once daily.    cyanocobalamin (VITAMIN B-12) 1000 MCG tablet Take 100 mcg by mouth once daily.    diclofenac sodium (VOLTAREN) 1 % Gel Apply 2 grams topically once daily.    esomeprazole (NEXIUM) 40 MG capsule TAKE 1 CAPSULE BEFORE BREAKFAST    levETIRAcetam (KEPPRA) 500 MG Tab Take 1 tablet (500 mg total) by mouth 2 (two) times daily.    NIFEdipine (PROCARDIA-XL) 30 MG (OSM) 24 hr tablet Take 1 tablet (30 mg total) by mouth once daily.    triamcinolone (NASACORT) 55 mcg nasal inhaler 2 sprays by Nasal route once daily.    valsartan (DIOVAN) 160 MG tablet Take 1 tablet (160 mg total) by mouth once daily.     No current facility-administered medications for this visit.       Review of patient's allergies indicates:  No Known Allergies    Family History   Problem Relation Name Age of Onset    Cancer Neg Hx      Diabetes Neg Hx      Heart disease Neg Hx      Colon cancer Neg Hx      Colon polyps Neg Hx      Esophageal cancer Neg Hx      Stomach cancer Neg Hx         Social History     Socioeconomic History    Marital status:    Tobacco Use    Smoking status: Former      Types: Cigars    Smokeless tobacco: Former     Types: Chew   Substance and Sexual Activity    Alcohol use: Yes     Alcohol/week: 2.0 standard drinks of alcohol     Types: 2 Cans of beer per week     Comment: occ    Drug use: No    Sexual activity: Yes     Partners: Female     Social Drivers of Health     Financial Resource Strain: Patient Unable To Answer (2/17/2024)    Overall Financial Resource Strain (CARDIA)     Difficulty of Paying Living Expenses: Patient unable to answer   Food Insecurity: Patient Unable To Answer (2/17/2024)    Hunger Vital Sign     Worried About Running Out of Food in the Last Year: Patient unable to answer     Ran Out of Food in the Last Year: Patient unable to answer   Transportation Needs: Patient Unable To Answer (2/17/2024)    PRAPARE - Transportation     Lack of Transportation (Medical): Patient unable to answer     Lack of Transportation (Non-Medical): Patient unable to answer   Physical Activity: Patient Declined (9/10/2023)    Exercise Vital Sign     Days of Exercise per Week: Patient declined     Minutes of Exercise per Session: Patient declined   Stress: Patient Unable To Answer (2/17/2024)    [employer] - Occupational Stress Questionnaire     Feeling of Stress : Patient unable to answer   Housing Stability: Patient Unable To Answer (2/17/2024)    Housing Stability Vital Sign     Unable to Pay for Housing in the Last Year: Patient unable to answer     Unstable Housing in the Last Year: Patient unable to answer       Chief Complaint:   Chief Complaint   Patient presents with    Left Knee - Pain    Right Knee - Pain       History of present illness:  79-year-old male seen for bilateral knee pain.  It has hurt him for years.  Patient has painful and limited range of motion.  Painful on the anterolateral knee.  Gets popping and clicking bilaterally.        Review of Systems:    Constitution: Negative for chills, fever, and sweats.  Negative for  unexplained weight loss.    HENT:  Negative for headaches and blurry vision.    Cardiovascular:Negative for chest pain or irregular heart beat. Negative for hypertension.    Respiratory:  Negative for cough and shortness of breath.    Gastrointestinal: Negative for abdominal pain, heartburn, melena, nausea, and vomitting.    Genitourinary:  Negative bladder incontinence and dysuria.    Musculoskeletal:  See HPI    Neurological: Negative for numbness.    Psychiatric/Behavioral: Negative for depression.  The patient is not nervous/anxious.      Endocrine: Negative for polyuria    Hematologic/Lymphatic: Negative for bleeding problem.  Does not bruise/bleed easily.    Skin: Negative for poor would healing and rash      Physical Examination:    Vital Signs:  There were no vitals filed for this visit.    Body mass index is 28.08 kg/m².    This a well-developed, well nourished patient in no acute distress.  They are alert and oriented and cooperative to examination.  Pt. walks without an antalgic gait.      Examination of both knees shows no rashes or erythema. There are no masses ecchymosis or effusion. Patient has full range of motion from 0-130°. Patient is nontender to palpation over lateral joint line and mildly tender to palpation over the medial joint line. Patient has a - Lachman exam, - anterior drawer exam, and - posterior drawer exam. - Apley exam. Knee is stable to varus and valgus stress. 5 out of 5 motor strength. Palpable distal pulses. Intact light touch sensation. Negative Patellofemoral crepitus      X-rays:  X-rays of both knees are ordered and reviewed which show some mild medial joint space narrowing of the right knee.  Some spurring off the patella superiorly bilaterally.         Assessment:  Bilateral knee osteoarthritis    Plan:  I reviewed the findings with him today.  We talked about treatment options.  Patient wanted to do hyaluronic acid.  I injected both knees with Durolane today.  Follow up as  needed.            All previous pertinent notes including ER visits, physical therapy visits, other orthopedic visits as well as other care for the same musculoskeletal problem were reviewed.  All pertinent lab values and previous imaging was reviewed pertinent to the current visit.    This note was created using [employer] voice recognition software that occasionally misinterpreted phrases or words.    Consult note is delivered via Epic messaging service.

## 2024-11-11 NOTE — PROCEDURES
Large Joint Aspiration/Injection: bilateral knee    Date/Time: 11/11/2024 2:30 PM    Performed by: Ernie Cox MD  Authorized by: Ernie Cox MD    Consent Done?:  Yes (Verbal)  Indications:  Pain  Site marked: the procedure site was marked    Timeout: prior to procedure the correct patient, procedure, and site was verified      Details:  Needle Size:  18 G  Approach:  Anterolateral  Location:  Knee  Laterality:  Bilateral  Site:  Bilateral knee  Medications (Right):  60 mg hyaluronate sodium, stabilized (DUROLANE) 60 mg/3 mL  Medications (Left):  60 mg hyaluronate sodium, stabilized (DUROLANE) 60 mg/3 mL  Patient tolerance:  Patient tolerated the procedure well with no immediate complications

## 2024-11-22 DIAGNOSIS — M25.50 ARTHRALGIA, UNSPECIFIED JOINT: ICD-10-CM

## 2024-11-22 RX ORDER — DICLOFENAC SODIUM 10 MG/G
GEL TOPICAL
Qty: 100 G | Refills: 6 | Status: SHIPPED | OUTPATIENT
Start: 2024-11-22

## 2024-11-22 NOTE — TELEPHONE ENCOUNTER
No care due was identified.  [facility] Embedded Care Due Messages. Reference number: [id number].   11/22/2024 8:46:06 AM CST

## 2024-12-06 ENCOUNTER — PATIENT MESSAGE (OUTPATIENT)
Dept: PULMONOLOGY | Facility: CLINIC | Age: 79
End: 2024-12-06
Payer: MEDICARE

## 2024-12-06 ENCOUNTER — PATIENT MESSAGE (OUTPATIENT)
Dept: FAMILY MEDICINE | Facility: CLINIC | Age: 79
End: 2024-12-06
Payer: MEDICARE

## 2024-12-06 NOTE — TELEPHONE ENCOUNTER
Home sleep study results printed on 01/30/2024 , as well as BiPAP/CPAP titration results from 03/05/2024. Faxed to number given in pts email. Will send portal message to pt.

## 2024-12-09 ENCOUNTER — TELEPHONE (OUTPATIENT)
Dept: FAMILY MEDICINE | Facility: CLINIC | Age: 79
End: 2024-12-09
Payer: MEDICARE

## 2024-12-09 ENCOUNTER — LAB VISIT (OUTPATIENT)
Dept: LAB | Facility: HOSPITAL | Age: 79
End: 2024-12-09
Attending: STUDENT IN AN ORGANIZED HEALTH CARE EDUCATION/TRAINING PROGRAM
Payer: MEDICARE

## 2024-12-09 DIAGNOSIS — I10 ESSENTIAL HYPERTENSION: ICD-10-CM

## 2024-12-09 DIAGNOSIS — H91.90 HEARING LOSS, UNSPECIFIED HEARING LOSS TYPE, UNSPECIFIED LATERALITY: Primary | ICD-10-CM

## 2024-12-09 DIAGNOSIS — R73.03 PRE-DIABETES: ICD-10-CM

## 2024-12-09 LAB
ALBUMIN SERPL BCP-MCNC: 3.7 G/DL (ref 3.5–5.2)
ALP SERPL-CCNC: 103 U/L (ref 40–150)
ALT SERPL W/O P-5'-P-CCNC: 22 U/L (ref 10–44)
ANION GAP SERPL CALC-SCNC: 11 MMOL/L (ref 8–16)
AST SERPL-CCNC: 19 U/L (ref 10–40)
BILIRUB SERPL-MCNC: 0.8 MG/DL (ref 0.1–1)
BUN SERPL-MCNC: 15 MG/DL (ref 8–23)
CALCIUM SERPL-MCNC: 10.5 MG/DL (ref 8.7–10.5)
CHLORIDE SERPL-SCNC: 106 MMOL/L (ref 95–110)
CO2 SERPL-SCNC: 23 MMOL/L (ref 23–29)
CREAT SERPL-MCNC: 1.3 MG/DL (ref 0.5–1.4)
EST. GFR  (NO RACE VARIABLE): 56.2 ML/MIN/1.73 M^2
ESTIMATED AVG GLUCOSE: 117 MG/DL (ref 68–131)
GLUCOSE SERPL-MCNC: 158 MG/DL (ref 70–110)
HBA1C MFR BLD: 5.7 % (ref 4–5.6)
POTASSIUM SERPL-SCNC: 4.7 MMOL/L (ref 3.5–5.1)
PROT SERPL-MCNC: 6.7 G/DL (ref 6–8.4)
SODIUM SERPL-SCNC: 140 MMOL/L (ref 136–145)

## 2024-12-09 PROCEDURE — 83036 HEMOGLOBIN GLYCOSYLATED A1C: CPT | Performed by: STUDENT IN AN ORGANIZED HEALTH CARE EDUCATION/TRAINING PROGRAM

## 2024-12-09 PROCEDURE — 80053 COMPREHEN METABOLIC PANEL: CPT | Performed by: STUDENT IN AN ORGANIZED HEALTH CARE EDUCATION/TRAINING PROGRAM

## 2024-12-09 PROCEDURE — 36415 COLL VENOUS BLD VENIPUNCTURE: CPT | Mod: PO | Performed by: STUDENT IN AN ORGANIZED HEALTH CARE EDUCATION/TRAINING PROGRAM

## 2024-12-09 NOTE — TELEPHONE ENCOUNTER
Call placed to patient for notification. Patient verbalized understanding. Provided the number to the referral  for further assistance scheduling audiology and ENT appointments.

## 2024-12-09 NOTE — TELEPHONE ENCOUNTER
Front check in staff member (Rosales Osborne) reports patient performed labs this morning and is concerned that Lipid Panel was not ordered/drawn.  Writer spoke to Dr. Martin regarding and was advised Lipid Panel is not due.        Patient also reports difficulty hearing.  Requesting to have hearing tested.  Please advise. Thank you.       ENT & Audiology referrals pended.  Please review and make any necessary changes.  Thank you.

## 2024-12-13 ENCOUNTER — OFFICE VISIT (OUTPATIENT)
Dept: FAMILY MEDICINE | Facility: CLINIC | Age: 79
End: 2024-12-13
Payer: MEDICARE

## 2024-12-13 VITALS
RESPIRATION RATE: 18 BRPM | DIASTOLIC BLOOD PRESSURE: 70 MMHG | WEIGHT: 220.69 LBS | OXYGEN SATURATION: 97 % | BODY MASS INDEX: 29.89 KG/M2 | SYSTOLIC BLOOD PRESSURE: 120 MMHG | HEIGHT: 72 IN | HEART RATE: 54 BPM

## 2024-12-13 DIAGNOSIS — J45.20 MILD INTERMITTENT ASTHMA WITHOUT COMPLICATION: ICD-10-CM

## 2024-12-13 DIAGNOSIS — R93.89 ABNORMAL CHEST X-RAY: ICD-10-CM

## 2024-12-13 DIAGNOSIS — E78.5 HYPERLIPIDEMIA, UNSPECIFIED HYPERLIPIDEMIA TYPE: ICD-10-CM

## 2024-12-13 DIAGNOSIS — R73.03 PREDIABETES: Primary | ICD-10-CM

## 2024-12-13 DIAGNOSIS — I10 ESSENTIAL HYPERTENSION: ICD-10-CM

## 2024-12-13 DIAGNOSIS — J98.4 CALCIFIED GRANULOMA OF LUNG: ICD-10-CM

## 2024-12-13 DIAGNOSIS — N18.31 CHRONIC KIDNEY DISEASE, STAGE 3A: ICD-10-CM

## 2024-12-13 PROCEDURE — 99214 OFFICE O/P EST MOD 30 MIN: CPT | Mod: PBBFAC,PO

## 2024-12-13 PROCEDURE — 99999 PR PBB SHADOW E&M-EST. PATIENT-LVL IV: CPT | Mod: PBBFAC,,,

## 2024-12-13 RX ORDER — IPRATROPIUM BROMIDE 42 UG/1
1 SPRAY, METERED NASAL DAILY
COMMUNITY
Start: 2024-11-24
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
## 2024-12-16 RX ORDER — ATORVASTATIN CALCIUM 80 MG/1
80 TABLET, FILM COATED ORAL
Qty: 90 TABLET | Refills: 1 | Status: SHIPPED | OUTPATIENT
Start: 2024-12-16

## 2024-12-16 NOTE — TELEPHONE ENCOUNTER
Papito Hutton  is requesting a refill authorization.  Brief Assessment and Rationale for Refill:  Approve     Medication Therapy Plan:         Comments:     Note composed:8:41 AM 12/16/2024

## 2024-12-16 NOTE — TELEPHONE ENCOUNTER
No care due was identified.  [facility] Embedded Care Due Messages. Reference number: [id number].   12/16/2024 2:01:42 AM CST

## 2024-12-30 ENCOUNTER — OFFICE VISIT (OUTPATIENT)
Dept: OTOLARYNGOLOGY | Facility: CLINIC | Age: 79
End: 2024-12-30
Payer: MEDICARE

## 2024-12-30 ENCOUNTER — CLINICAL SUPPORT (OUTPATIENT)
Dept: AUDIOLOGY | Facility: CLINIC | Age: 79
End: 2024-12-30
Payer: MEDICARE

## 2024-12-30 VITALS
SYSTOLIC BLOOD PRESSURE: 134 MMHG | DIASTOLIC BLOOD PRESSURE: 76 MMHG | WEIGHT: 218.69 LBS | HEIGHT: 72 IN | BODY MASS INDEX: 29.62 KG/M2 | HEART RATE: 50 BPM | TEMPERATURE: 98 F

## 2024-12-30 DIAGNOSIS — H90.3 SENSORINEURAL HEARING LOSS, BILATERAL: Primary | ICD-10-CM

## 2024-12-30 DIAGNOSIS — H91.90 HEARING LOSS, UNSPECIFIED HEARING LOSS TYPE, UNSPECIFIED LATERALITY: ICD-10-CM

## 2024-12-30 DIAGNOSIS — H90.3 SENSORINEURAL HEARING LOSS (SNHL) OF BOTH EARS: Primary | ICD-10-CM

## 2024-12-30 PROCEDURE — 99213 OFFICE O/P EST LOW 20 MIN: CPT | Mod: PBBFAC,PO,25 | Performed by: PHYSICIAN ASSISTANT

## 2024-12-30 PROCEDURE — 99999 PR PBB SHADOW E&M-EST. PATIENT-LVL II: CPT | Mod: PBBFAC,,, | Performed by: AUDIOLOGIST

## 2024-12-30 PROCEDURE — 92557 COMPREHENSIVE HEARING TEST: CPT | Mod: PBBFAC,PO | Performed by: AUDIOLOGIST

## 2024-12-30 PROCEDURE — 99212 OFFICE O/P EST SF 10 MIN: CPT | Mod: PBBFAC,27,PO | Performed by: AUDIOLOGIST

## 2024-12-30 PROCEDURE — 99999 PR PBB SHADOW E&M-EST. PATIENT-LVL III: CPT | Mod: PBBFAC,,, | Performed by: PHYSICIAN ASSISTANT

## 2024-12-30 PROCEDURE — 92567 TYMPANOMETRY: CPT | Mod: PBBFAC,PO | Performed by: AUDIOLOGIST

## 2024-12-30 NOTE — PROGRESS NOTES
Papito Hutton was seen on 12/30/2024 for an audiological evaluation. Pt was alone during today's visit. Pertinent complaints today include hearing loss. Pt confirms history of loud noise exposure and denies early onset of genetic family history of hearing loss. Otoscopy revealed no cerumen in both ears. The tympanic membrane was visualized AU prior to proceeding with the hearing testing.     Results reveal a mild-to-profound sensorineural hearing loss for the right ear and  mild-to-profound sensorineural hearing loss for the left ear.    Speech Reception Thresholds were  40 dBHL for the right ear and 35 dBHL for the left ear.    Word recognition scores were excellent for the right ear and excellent for the left ear.   Tympanograms were Type A for the right ear and Type A for the left ear.    Recommendations: 1) Follow up with ENT     2) Annual hearing evaluation     3) Continue wearing hearing aids     4) [facility]    Audiogram results were reviewed in detail with patient and all questions were answered. Results will be reviewed by the referring provider at the completion of this note. All complaints were addressed during this visit to the patient's satisfaction.

## 2024-12-30 NOTE — PROGRESS NOTES
[facility]    Subjective:      Patient: Papito Hutton Patient PCP: Rafi Martin MD         :  1945     Sex:  male      MRN:  [id number]          Date of Visit: 2024      Chief Complaint: Hearing Loss    Patient ID: Papito Hutton is a 79 y.o. male who presents to office for evaluation of hearing loss. Pt has h/o hearing loss and [facility]. Pt has history of high-pitched tinnitus. Pt is in need of updated [facility] test for [facility] as he is candidate for free hearing aids.     Past Medical History  He has a past medical history of Advance care planning, Arthritis, B12 deficiency, Colon polyp, Diabetes mellitus, GERD (gastroesophageal reflux disease), [place] (hard of hearing), Hyperlipidemia, Hypertension, Low testosterone, Personal history of colonic polyps, Seizures, Sinus disorder, Sleep apnea, and Wears glasses.    Family History  His family history is not on file.    Past Surgical History:   Procedure Laterality Date    COLON SURGERY      COLONOSCOPY      Dr. Garcia; polyps removed    COLONOSCOPY N/A 2020    Procedure: COLONOSCOPY;  Surgeon: Jj Fried MD;  Location: [facility];  Service: Endoscopy;  Laterality: N/A;    COLONOSCOPY N/A 8/15/2023    Procedure: COLONOSCOPY;  Surgeon: Jj Fried MD;  Location: [facility];  Service: Endoscopy;  Laterality: N/A;    ESOPHAGOGASTRODUODENOSCOPY N/A 2023    Procedure: EGD (ESOPHAGOGASTRODUODENOSCOPY);  Surgeon: Jj Fried MD;  Location: [facility];  Service: Endoscopy;  Laterality: N/A;    FESS, USING COMPUTER-ASSISTED NAVIGATION, WITH NASAL TURBINATE REDUCTION N/A 2024    Procedure: FESS, USING COMPUTER-ASSISTED NAVIGATION (disc loaded), WITH NASAL TURBINATE REDUCTION;  Surgeon: Thien Tabor MD;  Location: [facility];  Service: ENT;  Laterality: N/A;    LAPAROSCOPIC RIGHT COLON RESECTION Right 08/10/2020    Procedure: COLECTOMY, RIGHT, LAPAROSCOPIC pooss conversion to  open;  Surgeon: Noble Kaye MD;  Location: [facility] OR;  Service: General;  Laterality: Right;    LYSIS, ADHESIONS, NOSE Left 7/19/2024    Procedure: LYSIS, ADHESIONS, NOSE;  Surgeon: Thien Tabor MD;  Location: [facility] OR;  Service: ENT;  Laterality: Left;    NASAL SEPTOPLASTY N/A 7/19/2024    Procedure: SEPTOPLASTY, NOSE;  Surgeon: Thien Tabor MD;  Location: [facility] OR;  Service: ENT;  Laterality: N/A;    NASAL SEPTUM SURGERY      NASAL TURBINATE REDUCTION Bilateral 7/19/2024    Procedure: REDUCTION, NASAL TURBINATE;  Surgeon: Thien Tabor MD;  Location: [facility] OR;  Service: ENT;  Laterality: Bilateral;    right hand surgery      RIGHT HEMICOLECTOMY N/A 08/10/2020    Procedure: HEMICOLECTOMY, RIGHT;  Surgeon: Noble Kaye MD;  Location: [facility] OR;  Service: General;  Laterality: N/A;    UPPER GASTROINTESTINAL ENDOSCOPY  2008    hiatal hernia; esophagitis     Social History     Tobacco Use    Smoking status: Former     Types: Cigars    Smokeless tobacco: Former     Types: Chew   Substance and Sexual Activity    Alcohol use: Yes     Alcohol/week: 2.0 standard drinks of alcohol     Types: 2 Cans of beer per week     Comment: occ    Drug use: No    Sexual activity: Yes     Partners: Female     Medications  He has a current medication list which includes the following prescription(s): albuterol, atorvastatin, carvedilol, clopidogrel, cyanocobalamin, diclofenac sodium, esomeprazole, ipratropium, levetiracetam, nifedipine, triamcinolone, and valsartan.    Review of patient's allergies indicates:  No Known Allergies  All medications, allergies, and past history have been reviewed.    Objective:      Vitals:      11/11/2024     2:21 PM 12/13/2024     1:23 PM 12/30/2024     9:02 AM   Vitals - 1 value per visit   SYSTOLIC  144 134   DIASTOLIC  72 76   Pulse  54 50   Temp   97.7 °F (36.5 °C)   Resp  18    SPO2  97 %    Weight (lb) 207.01 220.68 218.7   Weight (kg) 93.9 100.1 99.2   Height 6' (1.829 m) 6'  (1.829 m) 6' (1.829 m)   BMI (Calculated) 28.1 29.9 29.7   Pain Score Six Two Zero       Body surface area is 2.24 meters squared.    Physical Exam  Constitutional:       General: He is not in acute distress.     Appearance: Normal appearance. He is not ill-appearing.   HENT:      Head: Normocephalic and atraumatic.      Right Ear: Tympanic membrane, ear canal and external ear normal.      Left Ear: Tympanic membrane, ear canal and external ear normal.      Nose: Nose normal.      Mouth/Throat:      Lips: Pink. No lesions.      Mouth: Mucous membranes are moist. No oral lesions.      Tongue: No lesions.      Palate: No lesions.      Pharynx: Oropharynx is clear. Uvula midline. No pharyngeal swelling, oropharyngeal exudate, posterior oropharyngeal erythema or uvula swelling.   Eyes:      General:         Right eye: No discharge.         Left eye: No discharge.      Extraocular Movements: Extraocular movements intact.      Conjunctiva/sclera: Conjunctivae normal.   Pulmonary:      Effort: Pulmonary effort is normal.   Neurological:      General: No focal deficit present.      Mental Status: He is alert and oriented to person, place, and time. Mental status is at baseline.   Psychiatric:         Mood and Affect: Mood normal.         Behavior: Behavior normal.         Thought Content: Thought content normal.         Judgment: Judgment normal.       Labs:  WBC   Date Value Ref Range Status   05/23/2024 6.86 3.90 - 12.70 K/uL Final     Platelets   Date Value Ref Range Status   05/23/2024 237 150 - 450 K/uL Final     Creatinine   Date Value Ref Range Status   12/09/2024 1.3 0.5 - 1.4 mg/dL Final     TSH   Date Value Ref Range Status   02/17/2024 0.700 0.400 - 4.000 uIU/mL Final     Glucose   Date Value Ref Range Status   12/09/2024 158 (H) 70 - 110 mg/dL Final     Hemoglobin A1C   Date Value Ref Range Status   12/09/2024 5.7 (H) 4.0 - 5.6 % Final     Comment:     ADA Screening Guidelines:  5.7-6.4%  Consistent with  prediabetes  >or=6.5%  Consistent with diabetes    High levels of fetal hemoglobin interfere with the HbA1C  assay. Heterozygous hemoglobin variants (HbS, HgC, etc)do  not significantly interfere with this assay.   However, presence of multiple variants may affect accuracy.         Audiogram Summary:    All lab results and imaging results have been reviewed.    Assessment:        ICD-10-CM ICD-9-CM   1. Sensorineural hearing loss (SNHL) of both ears  H90.3 389.18            Plan:      Audiogram reviewed with pt in detail. Pt has mild to profound SNHL AU. Type A tymp AD and type A tymp AS. SRTs 40dB AD and 35dB AS. %AD at 80dB and 100%AS at 80dB. [facility] as he is candidate for new free hearing aids or free hearing aid adjustments [facility]. We will monitor hearing loss with annual audiograms.

## 2025-01-13 ENCOUNTER — HOSPITAL ENCOUNTER (OUTPATIENT)
Dept: RADIOLOGY | Facility: HOSPITAL | Age: 80
Discharge: HOME | End: 2025-01-13
Payer: MEDICARE

## 2025-01-13 DIAGNOSIS — R93.89 ABNORMAL CHEST X-RAY: ICD-10-CM

## 2025-01-13 DIAGNOSIS — J98.4 CALCIFIED GRANULOMA OF LUNG: ICD-10-CM

## 2025-01-13 PROCEDURE — 71250 CT THORAX DX C-: CPT | Mod: 26,,, | Performed by: RADIOLOGY

## 2025-01-13 PROCEDURE — 71250 CT THORAX DX C-: CPT | Mod: TC

## 2025-01-14 ENCOUNTER — PATIENT MESSAGE (OUTPATIENT)
Dept: FAMILY MEDICINE | Facility: CLINIC | Age: 80
End: 2025-01-14
Payer: MEDICARE

## 2025-01-23 ENCOUNTER — PATIENT MESSAGE (OUTPATIENT)
Dept: PULMONOLOGY | Facility: CLINIC | Age: 80
End: 2025-01-23
Payer: MEDICARE

## 2025-01-23 DIAGNOSIS — G47.33 OSA (OBSTRUCTIVE SLEEP APNEA): Primary | ICD-10-CM

## 2025-02-19 DIAGNOSIS — M25.50 ARTHRALGIA, UNSPECIFIED JOINT: ICD-10-CM

## 2025-02-19 NOTE — TELEPHONE ENCOUNTER
Care Due:                  Date            Visit Type   Department     Provider  --------------------------------------------------------------------------------                                EP -                              PRIMARY      MIROSLAVA Huerta  Last Visit: 06-      CARE ([facility])   MEDICINE       Naccari                              EP -                              PRIMARY      [facility]    Rafi Huerta  Next Visit: 06-      CARE ([facility])   MEDICINE       Naccari                                                            Last  Test          Frequency    Reason                     Performed    Due Date  --------------------------------------------------------------------------------    CBC.........  12 months..  clopidogreL..............  05- 05-    Lipid Panel.  12 months..  atorvastatin.............  05- 05-    [employer] Embedded Care Due Messages. Reference number: [id number].   2/19/2025 3:57:17 PM CST

## 2025-02-20 RX ORDER — DICLOFENAC SODIUM 10 MG/G
2 GEL TOPICAL
Qty: 100 G | Refills: 0 | Status: SHIPPED | OUTPATIENT
Start: 2025-02-20

## 2025-02-20 NOTE — TELEPHONE ENCOUNTER
Refill Routing Note   Medication(s) are not appropriate for processing by [facility] for the following reason(s):        Outside of protocol  Required labs abnormal    [facility] action(s):  Route      Medication Therapy Plan: FLOS      Appointments  past 12m or future 3m with PCP    Date Provider   Last Visit   6/27/2024 Rafi Martin MD   Next Visit   6/25/2025 Rafi Martin MD   ED visits in past 90 days: 0        Note composed:9:08 PM 02/19/2025

## 2025-02-22 DIAGNOSIS — Z00.00 ENCOUNTER FOR MEDICARE ANNUAL WELLNESS EXAM: ICD-10-CM

## 2025-02-24 ENCOUNTER — TELEPHONE (OUTPATIENT)
Dept: FAMILY MEDICINE | Facility: CLINIC | Age: 80
End: 2025-02-24
Payer: MEDICARE

## 2025-02-24 DIAGNOSIS — M25.50 ARTHRALGIA, UNSPECIFIED JOINT: ICD-10-CM

## 2025-02-24 RX ORDER — DICLOFENAC SODIUM 10 MG/G
2 GEL TOPICAL DAILY
Qty: 100 G | Refills: 4 | Status: SHIPPED | OUTPATIENT
Start: 2025-02-24

## 2025-02-24 NOTE — TELEPHONE ENCOUNTER
No care due was identified.  [facility] Embedded Care Due Messages. Reference number: [id number].   2/24/2025 3:42:13 PM CST

## 2025-02-24 NOTE — TELEPHONE ENCOUNTER
----- Message from Channel M sent at 2/24/2025 11:46 AM CST -----  Type: Needs Medical AdviceWho Called:  Rodolfo Call Back Number: [phone] Additional Information: phoebe from [employer] states the : gel is discounted and needs another RX or can get over the counter,  please call to further assist thank you

## 2025-02-24 NOTE — TELEPHONE ENCOUNTER
----- Message from Meggan sent at 2/24/2025  2:21 PM CST -----  Type:  Patient Returning CallWho Called:ptMisty Left Message for Patient:RomanaDoes the patient know what this is regarding?: noWould the patient rather a call back or a response via [employer]? Call [facility] Call Back Number:[phone] Information:      Please call Back to advise. Thanks!

## 2025-02-24 NOTE — TELEPHONE ENCOUNTER
Spoke with patient notified him Voltaren is OTC. Patient is asking to send medication to [facility] to see if they will fill it. Please advise

## 2025-02-24 NOTE — TELEPHONE ENCOUNTER
Spoke with patient he is asking can a different Gel be prescribed since [facility] will not fill the Voltaren Gel? States Voltaren is now OTC

## 2025-02-24 NOTE — TELEPHONE ENCOUNTER
Spoke with [facility] at [facility] Diclofenac sodium is over the counter so they can not fill medication.

## 2025-02-24 NOTE — TELEPHONE ENCOUNTER
----- Message from [employer] sent at 2/24/2025 11:46 AM CST -----  Type: Needs Medical AdviceWho Called:  Rodolfo Call Back Number: [phone] Additional Information: phoebe from [facility] states the : gel is discounted and needs another RX or can get over the counter,  please call to further assist thank you

## 2025-02-27 ENCOUNTER — PATIENT MESSAGE (OUTPATIENT)
Dept: FAMILY MEDICINE | Facility: CLINIC | Age: 80
End: 2025-02-27
Payer: MEDICARE

## 2025-02-28 ENCOUNTER — TELEPHONE (OUTPATIENT)
Dept: ORTHOPEDICS | Facility: CLINIC | Age: 80
End: 2025-02-28
Payer: MEDICARE

## 2025-02-28 ENCOUNTER — PATIENT MESSAGE (OUTPATIENT)
Dept: ORTHOPEDICS | Facility: CLINIC | Age: 80
End: 2025-02-28
Payer: MEDICARE

## 2025-02-28 NOTE — TELEPHONE ENCOUNTER
----- Message from Med Assistant Sanchez sent at 2/28/2025  9:57 AM CST -----  Contact: pt  Asking for injections bilateral today or Monday, informed pt of surgery day today Call back [phone]

## 2025-03-06 ENCOUNTER — OFFICE VISIT (OUTPATIENT)
Dept: ORTHOPEDICS | Facility: CLINIC | Age: 80
End: 2025-03-06
Payer: MEDICARE

## 2025-03-06 VITALS — WEIGHT: 218.69 LBS | HEIGHT: 72 IN | BODY MASS INDEX: 29.62 KG/M2

## 2025-03-06 DIAGNOSIS — M25.569 KNEE PAIN, UNSPECIFIED CHRONICITY, UNSPECIFIED LATERALITY: Primary | ICD-10-CM

## 2025-03-06 DIAGNOSIS — M17.10 ARTHRITIS OF KNEE: ICD-10-CM

## 2025-03-06 PROCEDURE — 99213 OFFICE O/P EST LOW 20 MIN: CPT | Mod: PBBFAC,PO | Performed by: ORTHOPAEDIC SURGERY

## 2025-03-06 PROCEDURE — 20610 DRAIN/INJ JOINT/BURSA W/O US: CPT | Mod: 50,PBBFAC,PO | Performed by: ORTHOPAEDIC SURGERY

## 2025-03-06 PROCEDURE — 99999 PR PBB SHADOW E&M-EST. PATIENT-LVL III: CPT | Mod: PBBFAC,,, | Performed by: ORTHOPAEDIC SURGERY

## 2025-03-06 PROCEDURE — 99999PBSHW PR PBB SHADOW TECHNICAL ONLY FILED TO HB: Mod: PBBFAC,,,

## 2025-03-06 RX ORDER — TRIAMCINOLONE ACETONIDE 40 MG/ML
40 INJECTION, SUSPENSION INTRA-ARTICULAR; INTRAMUSCULAR
Status: DISCONTINUED | OUTPATIENT
Start: 2025-03-06 | End: 2025-03-06 | Stop reason: HOSPADM

## 2025-03-06 RX ADMIN — TRIAMCINOLONE ACETONIDE 40 MG: 40 INJECTION, SUSPENSION INTRA-ARTICULAR; INTRAMUSCULAR at 09:03

## 2025-03-06 NOTE — PROCEDURES
Large Joint Aspiration/Injection: bilateral knee    Date/Time: 3/6/2025 9:00 AM    Performed by: Ernie Cox MD  Authorized by: Ernie Cox MD    Consent Done?:  Yes (Verbal)  Indications:  Pain  Site marked: the procedure site was marked    Timeout: prior to procedure the correct patient, procedure, and site was verified    Prep: patient was prepped and draped in usual sterile fashion      Local anesthesia used?: Yes    Anesthesia:  Local infiltration  Local anesthetic: Ropivicaine.  Anesthetic total (ml):  3      Details:  Needle Size:  22 G  Ultrasonic Guidance for needle placement?: No    Approach:  Anterolateral  Location:  Knee  Laterality:  Bilateral  Site:  Bilateral knee  Medications (Right):  40 mg triamcinolone acetonide 40 mg/mL  Medications (Left):  40 mg triamcinolone acetonide 40 mg/mL  Patient tolerance:  Patient tolerated the procedure well with no immediate complications

## 2025-03-06 NOTE — PROGRESS NOTES
Past Medical History:   Diagnosis Date    Advance care planning 02/19/2024    Arthritis     B12 deficiency     Colon polyp     Diabetes mellitus     BORDERLINE    GERD (gastroesophageal reflux disease)     [place] (hard of hearing)     BILAT AIDS    Hyperlipidemia     Hypertension     Low testosterone     Personal history of colonic polyps 2008    adenomatous polyp    Seizures     Sinus disorder     Sleep apnea     DOES NOT USE MACHINE    Wears glasses        Past Surgical History:   Procedure Laterality Date    COLON SURGERY      COLONOSCOPY  2008    Dr. Garcia; polyps removed    COLONOSCOPY N/A 07/13/2020    Procedure: COLONOSCOPY;  Surgeon: Jj Fried MD;  Location: [facility];  Service: Endoscopy;  Laterality: N/A;    COLONOSCOPY N/A 8/15/2023    Procedure: COLONOSCOPY;  Surgeon: Jj Fried MD;  Location: [facility];  Service: Endoscopy;  Laterality: N/A;    ESOPHAGOGASTRODUODENOSCOPY N/A 7/31/2023    Procedure: EGD (ESOPHAGOGASTRODUODENOSCOPY);  Surgeon: Jj Fried MD;  Location: [facility];  Service: Endoscopy;  Laterality: N/A;    FESS, USING COMPUTER-ASSISTED NAVIGATION, WITH NASAL TURBINATE REDUCTION N/A 7/19/2024    Procedure: FESS, USING COMPUTER-ASSISTED NAVIGATION (disc loaded), WITH NASAL TURBINATE REDUCTION;  Surgeon: Thien Tabor MD;  Location: [facility];  Service: ENT;  Laterality: N/A;    LAPAROSCOPIC RIGHT COLON RESECTION Right 08/10/2020    Procedure: COLECTOMY, RIGHT, LAPAROSCOPIC pooss conversion to open;  Surgeon: Noble Kaye MD;  Location: [facility] OR;  Service: General;  Laterality: Right;    LYSIS, ADHESIONS, NOSE Left 7/19/2024    Procedure: LYSIS, ADHESIONS, NOSE;  Surgeon: Thien Tabor MD;  Location: [facility] OR;  Service: ENT;  Laterality: Left;    NASAL SEPTOPLASTY N/A 7/19/2024    Procedure: SEPTOPLASTY, NOSE;  Surgeon: Thien Tabor MD;  Location: [facility] OR;  Service: ENT;  Laterality: N/A;    NASAL SEPTUM SURGERY      NASAL TURBINATE REDUCTION  Bilateral 7/19/2024    Procedure: REDUCTION, NASAL TURBINATE;  Surgeon: Thien Tabor MD;  Location: [facility] OR;  Service: ENT;  Laterality: Bilateral;    right hand surgery      RIGHT HEMICOLECTOMY N/A 08/10/2020    Procedure: HEMICOLECTOMY, RIGHT;  Surgeon: Noble Kaye MD;  Location: [facility] OR;  Service: General;  Laterality: N/A;    UPPER GASTROINTESTINAL ENDOSCOPY  2008    hiatal hernia; esophagitis       Current Outpatient Medications   Medication Sig    albuterol (PROVENTIL HFA) 90 mcg/actuation inhaler Inhale 2 puffs into the lungs every 6 (six) hours as needed for Shortness of Breath. Rescue    atorvastatin (LIPITOR) 80 MG tablet TAKE 1 TABLET DAILY    clopidogreL (PLAVIX) 75 mg tablet Take 1 tablet (75 mg total) by mouth once daily.    cyanocobalamin (VITAMIN B-12) 1000 MCG tablet Take 100 mcg by mouth once daily.    diclofenac sodium (VOLTAREN) 1 % Gel Apply 2 g topically once daily.    esomeprazole (NEXIUM) 40 MG capsule TAKE 1 CAPSULE BEFORE BREAKFAST    ipratropium (ATROVENT) 42 mcg (0.06 %) nasal spray 1 spray by Each Nostril route once daily.    triamcinolone (NASACORT) 55 mcg nasal inhaler 2 sprays by Nasal route once daily.    valsartan (DIOVAN) 160 MG tablet Take 1 tablet (160 mg total) by mouth once daily.    carvediloL (COREG) 12.5 MG tablet Take 1 tablet (12.5 mg total) by mouth 2 (two) times daily with meals.    levETIRAcetam (KEPPRA) 500 MG Tab Take 1 tablet (500 mg total) by mouth 2 (two) times daily.    NIFEdipine (PROCARDIA-XL) 30 MG (OSM) 24 hr tablet Take 1 tablet (30 mg total) by mouth once daily.     No current facility-administered medications for this visit.       Review of patient's allergies indicates:  No Known Allergies    Family History   Problem Relation Name Age of Onset    Cancer Neg Hx      Diabetes Neg Hx      Heart disease Neg Hx      Colon cancer Neg Hx      Colon polyps Neg Hx      Esophageal cancer Neg Hx      Stomach cancer Neg Hx         Social History      Socioeconomic History    Marital status:    Tobacco Use    Smoking status: Former     Types: Cigars    Smokeless tobacco: Former     Types: Chew   Substance and Sexual Activity    Alcohol use: Yes     Alcohol/week: 2.0 standard drinks of alcohol     Types: 2 Cans of beer per week     Comment: occ    Drug use: No    Sexual activity: Yes     Partners: Female     Social Drivers of Health     Financial Resource Strain: Patient Unable To Answer (2/17/2024)    Overall Financial Resource Strain (CARDIA)     Difficulty of Paying Living Expenses: Patient unable to answer   Food Insecurity: Patient Unable To Answer (2/17/2024)    Hunger Vital Sign     Worried About Running Out of Food in the Last Year: Patient unable to answer     Ran Out of Food in the Last Year: Patient unable to answer   Transportation Needs: Patient Unable To Answer (2/17/2024)    PRAPARE - Transportation     Lack of Transportation (Medical): Patient unable to answer     Lack of Transportation (Non-Medical): Patient unable to answer   Physical Activity: Patient Declined (9/10/2023)    Exercise Vital Sign     Days of Exercise per Week: Patient declined     Minutes of Exercise per Session: Patient declined   Stress: Patient Unable To Answer (2/17/2024)    [employer] - Occupational Stress Questionnaire     Feeling of Stress : Patient unable to answer   Housing Stability: Patient Unable To Answer (2/17/2024)    Housing Stability Vital Sign     Unable to Pay for Housing in the Last Year: Patient unable to answer     Unstable Housing in the Last Year: Patient unable to answer       Chief Complaint:   Chief Complaint   Patient presents with    Right Knee - Pain    Left Knee - Pain       History of present illness:  79-year-old male seen for bilateral knee pain.  It has hurt him for years.  Patient has painful and limited range of motion.  Painful on the anterolateral knee.  Gets popping and clicking bilaterally.  We tried  injecting him with Durolane back in November.  Did not get long-term relief.  Knee seems to be getting worse.  Patient is having instability and weakness in both knees which has caused him to fall a couple times.  Rates the pain today as a 7/10        Review of Systems:    Constitution: Negative for chills, fever, and sweats.  Negative for unexplained weight loss.    HENT:  Negative for headaches and blurry vision.    Cardiovascular:Negative for chest pain or irregular heart beat. Negative for hypertension.    Respiratory:  Negative for cough and shortness of breath.    Gastrointestinal: Negative for abdominal pain, heartburn, melena, nausea, and vomitting.    Genitourinary:  Negative bladder incontinence and dysuria.    Musculoskeletal:  See HPI    Neurological: Negative for numbness.    Psychiatric/Behavioral: Negative for depression.  The patient is not nervous/anxious.      Endocrine: Negative for polyuria    Hematologic/Lymphatic: Negative for bleeding problem.  Does not bruise/bleed easily.    Skin: Negative for poor would healing and rash      Physical Examination:    Vital Signs:  There were no vitals filed for this visit.    Body mass index is 29.66 kg/m².    This a well-developed, well nourished patient in no acute distress.  They are alert and oriented and cooperative to examination.  Pt. walks without an antalgic gait.      Examination of both knees shows no rashes or erythema. There are no masses ecchymosis or effusion. Patient has full range of motion from 0-130°. Patient is nontender to palpation over lateral joint line and mildly tender to palpation over the medial joint line. . Knee is stable to varus and valgus stress. 5 out of 5 motor strength. Palpable distal pulses. Intact light touch sensation. Negative Patellofemoral crepitus      X-rays:  X-rays of both knees are reviewed which show some mild medial joint space narrowing of the right knee.  Some spurring off the patella superiorly  bilaterally.         Assessment:  Bilateral knee osteoarthritis    Plan:  I reviewed the findings with him today.  We talked about treatment options.  I injected both knees with cortisone today.  Did not get long-term relief with the viscosupplementation.  Feels like his symptoms are worsening and causing him to lose his balance and have his knees buckle          All previous pertinent notes including ER visits, physical therapy visits, other orthopedic visits as well as other care for the same musculoskeletal problem were reviewed.  All pertinent lab values and previous imaging was reviewed pertinent to the current visit.    This note was created using "[employer] voice recognition software that occasionally misinterpreted phrases or words.    Consult note is delivered via Epic messaging service.

## 2025-03-31 DIAGNOSIS — I10 ESSENTIAL HYPERTENSION: ICD-10-CM

## 2025-03-31 RX ORDER — CARVEDILOL 12.5 MG/1
12.5 TABLET ORAL 2 TIMES DAILY WITH MEALS
Qty: 180 TABLET | Refills: 3 | Status: SHIPPED | OUTPATIENT
Start: 2025-03-31

## 2025-03-31 RX ORDER — NIFEDIPINE 30 MG/1
30 TABLET, EXTENDED RELEASE ORAL
Qty: 90 TABLET | Refills: 3 | Status: SHIPPED | OUTPATIENT
Start: 2025-03-31

## 2025-05-07 DIAGNOSIS — I10 ESSENTIAL HYPERTENSION: ICD-10-CM

## 2025-05-07 RX ORDER — VALSARTAN 160 MG/1
160 TABLET ORAL
Qty: 90 TABLET | Refills: 0 | Status: SHIPPED | OUTPATIENT
Start: 2025-05-07

## 2025-05-07 NOTE — TELEPHONE ENCOUNTER
Care Due:                  Date            Visit Type   Department     Provider  --------------------------------------------------------------------------------                                EP -                              PRIMARY      MIROSLAVA Huerta  Last Visit: 06-      CARE ([facility])   MEDICINE       Naccari                              EP -                              PRIMARY      [facility]    Rafi Huerta  Next Visit: 06-      CARE ([facility])   MEDICINE       Naccari                                                            Last  Test          Frequency    Reason                     Performed    Due Date  --------------------------------------------------------------------------------    CBC.........  12 months..  clopidogreL, diclofenac..  05- 05-    Lipid Panel.  12 months..  atorvastatin.............  05- 05-    [facility] Embedded Care Due Messages. Reference number: [id number].   5/07/2025 2:40:43 AM CDT

## 2025-05-07 NOTE — TELEPHONE ENCOUNTER
Refill Decision Note   Papito Hutton  is requesting a refill authorization.  Brief Assessment and Rationale for Refill:  Approve     Medication Therapy Plan:  FLOS; TANIKA      Comments:     Note composed:4:00 AM 05/07/2025

## 2025-05-19 ENCOUNTER — OFFICE VISIT (OUTPATIENT)
Dept: PULMONOLOGY | Facility: CLINIC | Age: 80
End: 2025-05-19
Payer: MEDICARE

## 2025-05-19 VITALS
SYSTOLIC BLOOD PRESSURE: 130 MMHG | BODY MASS INDEX: 28.71 KG/M2 | DIASTOLIC BLOOD PRESSURE: 72 MMHG | HEART RATE: 54 BPM | HEIGHT: 72 IN | OXYGEN SATURATION: 97 % | WEIGHT: 212 LBS

## 2025-05-19 DIAGNOSIS — G47.33 OSA (OBSTRUCTIVE SLEEP APNEA): Primary | ICD-10-CM

## 2025-05-19 PROCEDURE — 99213 OFFICE O/P EST LOW 20 MIN: CPT | Mod: PBBFAC,PN | Performed by: NURSE PRACTITIONER

## 2025-05-19 PROCEDURE — 99999 PR PBB SHADOW E&M-EST. PATIENT-LVL III: CPT | Mod: PBBFAC,,, | Performed by: NURSE PRACTITIONER

## 2025-05-19 PROCEDURE — 99213 OFFICE O/P EST LOW 20 MIN: CPT | Mod: S$PBB,,, | Performed by: NURSE PRACTITIONER

## 2025-05-19 NOTE — PROGRESS NOTES
SUBJECTIVE:    Patient ID: Papito Hutton is a 79 y.o. male.    Chief Complaint: Follow-up (Follow up KEITH)    Follow-up        Patient here today feeling well. He is using his CPAP most nights. He went to see if he was a candidate for Inspire and states he was told he was not.  His compliance download shows he is 70% compliant sleeps an average of 5 hours and 17 minutes with an AHI of 4.1. He still struggles with mask leak, despite changing it.    Past Medical History:   Diagnosis Date    Advance care planning 02/19/2024    Arthritis     B12 deficiency     Colon polyp     Diabetes mellitus     BORDERLINE    GERD (gastroesophageal reflux disease)     [place] (hard of hearing)     BILAT AIDS    Hyperlipidemia     Hypertension     Low testosterone     Personal history of colonic polyps 2008    adenomatous polyp    Seizures     Sinus disorder     Sleep apnea     DOES NOT USE MACHINE    Wears glasses      Past Surgical History:   Procedure Laterality Date    COLON SURGERY      COLONOSCOPY  2008    Dr. Garcia; polyps removed    COLONOSCOPY N/A 07/13/2020    Procedure: COLONOSCOPY;  Surgeon: Jj Fried MD;  Location: [facility];  Service: Endoscopy;  Laterality: N/A;    COLONOSCOPY N/A 8/15/2023    Procedure: COLONOSCOPY;  Surgeon: Jj Fried MD;  Location: [facility];  Service: Endoscopy;  Laterality: N/A;    ESOPHAGOGASTRODUODENOSCOPY N/A 7/31/2023    Procedure: EGD (ESOPHAGOGASTRODUODENOSCOPY);  Surgeon: Jj Fried MD;  Location: [facility];  Service: Endoscopy;  Laterality: N/A;    FESS, USING COMPUTER-ASSISTED NAVIGATION, WITH NASAL TURBINATE REDUCTION N/A 7/19/2024    Procedure: FESS, USING COMPUTER-ASSISTED NAVIGATION (disc loaded), WITH NASAL TURBINATE REDUCTION;  Surgeon: Thien Tabor MD;  Location: [facility];  Service: ENT;  Laterality: N/A;    LAPAROSCOPIC RIGHT COLON RESECTION Right 08/10/2020    Procedure: COLECTOMY, RIGHT, LAPAROSCOPIC pooss conversion to open;  Surgeon: Noble  MARIO Kaye MD;  Location: [facility] OR;  Service: General;  Laterality: Right;    LYSIS, ADHESIONS, NOSE Left 7/19/2024    Procedure: LYSIS, ADHESIONS, NOSE;  Surgeon: Thien Tabor MD;  Location: [facility] OR;  Service: ENT;  Laterality: Left;    NASAL SEPTOPLASTY N/A 7/19/2024    Procedure: SEPTOPLASTY, NOSE;  Surgeon: Thien Tabor MD;  Location: [facility] OR;  Service: ENT;  Laterality: N/A;    NASAL SEPTUM SURGERY      NASAL TURBINATE REDUCTION Bilateral 7/19/2024    Procedure: REDUCTION, NASAL TURBINATE;  Surgeon: Thien Tabor MD;  Location: [facility] OR;  Service: ENT;  Laterality: Bilateral;    right hand surgery      RIGHT HEMICOLECTOMY N/A 08/10/2020    Procedure: HEMICOLECTOMY, RIGHT;  Surgeon: Noble Kaye MD;  Location: [facility];  Service: General;  Laterality: N/A;    UPPER GASTROINTESTINAL ENDOSCOPY  2008    hiatal hernia; esophagitis     Family History   Problem Relation Name Age of Onset    Cancer Neg Hx      Diabetes Neg Hx      Heart disease Neg Hx      Colon cancer Neg Hx      Colon polyps Neg Hx      Esophageal cancer Neg Hx      Stomach cancer Neg Hx          Social History:   Marital Status:   Occupation: Data Unavailable  Alcohol History:  reports current alcohol use of about 2.0 standard drinks of alcohol per week.  Tobacco History:  reports that he has quit smoking. His smoking use included cigars. He has quit using smokeless tobacco.  His smokeless tobacco use included chew.  Drug History:  reports no history of drug use.    Review of patient's allergies indicates:  No Known Allergies    Current Outpatient Medications   Medication Sig Dispense Refill    albuterol (PROVENTIL HFA) 90 mcg/actuation inhaler Inhale 2 puffs into the lungs every 6 (six) hours as needed for Shortness of Breath. Rescue 54 g 3    atorvastatin (LIPITOR) 80 MG tablet TAKE 1 TABLET DAILY 90 tablet 1    carvediloL (COREG) 12.5 MG tablet TAKE 1 TABLET TWICE A DAY WITH MEALS 180 tablet 3    clopidogreL (PLAVIX) 75  mg tablet Take 1 tablet (75 mg total) by mouth once daily. 90 tablet 4    cyanocobalamin (VITAMIN B-12) 1000 MCG tablet Take 100 mcg by mouth once daily.      diclofenac sodium (VOLTAREN) 1 % Gel Apply 2 g topically once daily. 100 g 4    esomeprazole (NEXIUM) 40 MG capsule TAKE 1 CAPSULE BEFORE BREAKFAST 90 capsule 2    ipratropium (ATROVENT) 42 mcg (0.06 %) nasal spray 1 spray by Each Nostril route once daily.      levETIRAcetam (KEPPRA) 500 MG Tab Take 1 tablet (500 mg total) by mouth 2 (two) times daily. 180 tablet 3    NIFEdipine (PROCARDIA-XL) 30 MG (OSM) 24 hr tablet TAKE 1 TABLET DAILY 90 tablet 3    triamcinolone (NASACORT) 55 mcg nasal inhaler 2 sprays by Nasal route once daily.      valsartan (DIOVAN) 160 MG tablet TAKE 1 TABLET DAILY 90 tablet 0     No current facility-administered medications for this visit.     Last ECHO 09/2023  Left Ventricle: The left ventricle is normal in size. Mildly increased wall thickness. There is mild concentric hypertrophy. Normal wall motion. There is normal systolic function with a visually estimated ejection fraction of 65 - 70%. There is normal diastolic function.    Right Ventricle: Normal right ventricular cavity size. Wall thickness is normal. Right ventricle wall motion  is normal. Systolic function is normal.    IVC/SVC: Normal venous pressure at 3 mmHg.    Review of Systems  General: Feeling Well.   Eyes: Vision is good.  ENT:  No sinusitis or pharyngitis.   Heart:: No chest pain or palpitations. Orthopnea   Lungs: short of breath with exertion at times   GI: No Nausea, vomiting, constipation, diarrhea, or reflux.  : No dysuria, hesitancy, or nocturia.  Musculoskeletal: knee pain   Skin: No lesions or rashes.  Neuro: history of seizure.   Lymph: No edema or adenopathy.  Psych: No anxiety or depression.  Endo: No weight change.    OBJECTIVE:      /72 (BP Location: Right arm, Patient Position: Sitting)   Pulse (!) 54   Ht 6' (1.829 m)   Wt 96.2 kg (212  lb)   SpO2 97%   BMI 28.75 kg/m²     Physical Exam  GENERAL: Older patient in no distress.  HEENT: Pupils equal and reactive. Extraocular movements intact. Nose intact.      Pharynx moist.  NECK: Supple.   HEART: Regular rate and rhythm. No murmur or gallop auscultated.  LUNGS: Clear to auscultation and percussion. Lung excursion symmetrical. No change in fremitus. No adventitial noises.  ABDOMEN: Bowel sounds present. Non-tender, no masses palpated.  EXTREMITIES: Normal muscle tone and joint movement, no cyanosis or clubbing.   LYMPHATICS: No adenopathy palpated, no edema.  SKIN: Dry, intact, no lesions.   NEURO: Cranial nerves II-XII intact. Motor strength 5/5 bilaterally, upper and lower extremities.  PSYCH: Appropriate affect.    Assessment:       1. KEITH (obstructive sleep apnea)                  Plan:           Give me an update in 30 days  If not feeling better with new mask will do an in lab diagnostic study       Follow up if symptoms worsen or fail to improve.

## 2025-06-16 ENCOUNTER — PATIENT MESSAGE (OUTPATIENT)
Dept: PULMONOLOGY | Facility: CLINIC | Age: 80
End: 2025-06-16
Payer: MEDICARE

## 2025-06-16 RX ORDER — ATORVASTATIN CALCIUM 80 MG/1
80 TABLET, FILM COATED ORAL
Qty: 90 TABLET | Refills: 4 | Status: SHIPPED | OUTPATIENT
Start: 2025-06-16

## 2025-06-16 NOTE — TELEPHONE ENCOUNTER
No care due was identified.  [facility] Embedded Care Due Messages. Reference number: [id number].   6/16/2025 2:04:09 AM CDT

## 2025-06-16 NOTE — TELEPHONE ENCOUNTER
Refill Routing Note   Medication(s) are not appropriate for processing by [facility] for the following reason(s):        Required labs outdated    [facility] action(s):  Defer               Appointments  past 12m or future 3m with PCP    Date Provider   Last Visit   6/27/2024 Rafi Martin MD   Next Visit   6/25/2025 Rafi Martin MD   ED visits in past 90 days: 0        Note composed:8:47 AM 06/16/2025

## 2025-06-16 NOTE — TELEPHONE ENCOUNTER
Compliance download shows he is 93% compliant sleeps an average of 6 hours and 12 minutes with an AHI of 4.3

## 2025-06-18 ENCOUNTER — LAB VISIT (OUTPATIENT)
Dept: LAB | Facility: HOSPITAL | Age: 80
End: 2025-06-18
Payer: MEDICARE

## 2025-06-18 DIAGNOSIS — I10 ESSENTIAL HYPERTENSION: ICD-10-CM

## 2025-06-18 DIAGNOSIS — E78.5 HYPERLIPIDEMIA, UNSPECIFIED HYPERLIPIDEMIA TYPE: ICD-10-CM

## 2025-06-18 DIAGNOSIS — R73.03 PREDIABETES: ICD-10-CM

## 2025-06-18 LAB
ABSOLUTE EOSINOPHIL (OHS): 0.14 K/UL
ABSOLUTE MONOCYTE (OHS): 0.39 K/UL (ref 0.3–1)
ABSOLUTE NEUTROPHIL COUNT (OHS): 3.29 K/UL (ref 1.8–7.7)
ALBUMIN SERPL BCP-MCNC: 3.9 G/DL (ref 3.5–5.2)
ALP SERPL-CCNC: 94 UNIT/L (ref 40–150)
ALT SERPL W/O P-5'-P-CCNC: 23 UNIT/L (ref 10–44)
ANION GAP (OHS): 10 MMOL/L (ref 8–16)
AST SERPL-CCNC: 20 UNIT/L (ref 11–45)
BASOPHILS # BLD AUTO: 0.07 K/UL
BASOPHILS NFR BLD AUTO: 1.1 %
BILIRUB SERPL-MCNC: 0.7 MG/DL (ref 0.1–1)
BUN SERPL-MCNC: 15 MG/DL (ref 8–23)
CALCIUM SERPL-MCNC: 10.4 MG/DL (ref 8.7–10.5)
CHLORIDE SERPL-SCNC: 109 MMOL/L (ref 95–110)
CHOLEST SERPL-MCNC: 146 MG/DL (ref 120–199)
CHOLEST/HDLC SERPL: 2.7 {RATIO} (ref 2–5)
CO2 SERPL-SCNC: 23 MMOL/L (ref 23–29)
CREAT SERPL-MCNC: 1.1 MG/DL (ref 0.5–1.4)
EAG (OHS): 123 MG/DL (ref 68–131)
ERYTHROCYTE [DISTWIDTH] IN BLOOD BY AUTOMATED COUNT: 14.2 % (ref 11.5–14.5)
GFR SERPLBLD CREATININE-BSD FMLA CKD-EPI: >60 ML/MIN/1.73/M2
GLUCOSE SERPL-MCNC: 138 MG/DL (ref 70–110)
HBA1C MFR BLD: 5.9 % (ref 4–5.6)
HCT VFR BLD AUTO: 42.8 % (ref 40–54)
HDLC SERPL-MCNC: 55 MG/DL (ref 40–75)
HDLC SERPL: 37.7 % (ref 20–50)
HGB BLD-MCNC: 14 GM/DL (ref 14–18)
IMM GRANULOCYTES # BLD AUTO: 0.02 K/UL (ref 0–0.04)
IMM GRANULOCYTES NFR BLD AUTO: 0.3 % (ref 0–0.5)
LDLC SERPL CALC-MCNC: 71.2 MG/DL (ref 63–159)
LYMPHOCYTES # BLD AUTO: 2.35 K/UL (ref 1–4.8)
MCH RBC QN AUTO: 29.9 PG (ref 27–31)
MCHC RBC AUTO-ENTMCNC: 32.7 G/DL (ref 32–36)
MCV RBC AUTO: 91 FL (ref 82–98)
NONHDLC SERPL-MCNC: 91 MG/DL
NUCLEATED RBC (/100WBC) (OHS): 0 /100 WBC
PLATELET # BLD AUTO: 239 K/UL (ref 150–450)
PMV BLD AUTO: 10.5 FL (ref 9.2–12.9)
POTASSIUM SERPL-SCNC: 4.3 MMOL/L (ref 3.5–5.1)
PROT SERPL-MCNC: 6.8 GM/DL (ref 6–8.4)
RBC # BLD AUTO: 4.69 M/UL (ref 4.6–6.2)
RELATIVE EOSINOPHIL (OHS): 2.2 %
RELATIVE LYMPHOCYTE (OHS): 37.5 % (ref 18–48)
RELATIVE MONOCYTE (OHS): 6.2 % (ref 4–15)
RELATIVE NEUTROPHIL (OHS): 52.7 % (ref 38–73)
SODIUM SERPL-SCNC: 142 MMOL/L (ref 136–145)
TRIGL SERPL-MCNC: 99 MG/DL (ref 30–150)
WBC # BLD AUTO: 6.26 K/UL (ref 3.9–12.7)

## 2025-06-18 PROCEDURE — 36415 COLL VENOUS BLD VENIPUNCTURE: CPT | Mod: PO

## 2025-06-18 PROCEDURE — 83036 HEMOGLOBIN GLYCOSYLATED A1C: CPT

## 2025-06-18 PROCEDURE — 85025 COMPLETE CBC W/AUTO DIFF WBC: CPT

## 2025-06-18 PROCEDURE — 80061 LIPID PANEL: CPT

## 2025-06-18 PROCEDURE — 80053 COMPREHEN METABOLIC PANEL: CPT

## 2025-06-19 ENCOUNTER — RESULTS FOLLOW-UP (OUTPATIENT)
Dept: FAMILY MEDICINE | Facility: CLINIC | Age: 80
End: 2025-06-19

## 2025-06-19 DIAGNOSIS — K21.9 GASTROESOPHAGEAL REFLUX DISEASE: ICD-10-CM

## 2025-06-19 RX ORDER — ESOMEPRAZOLE MAGNESIUM 40 MG/1
40 CAPSULE, DELAYED RELEASE ORAL
Qty: 90 CAPSULE | Refills: 0 | Status: SHIPPED | OUTPATIENT
Start: 2025-06-19

## 2025-06-19 NOTE — TELEPHONE ENCOUNTER
No care due was identified.  [facility] Embedded Care Due Messages. Reference number: [id number].   6/19/2025 12:13:47 PM CDT

## 2025-06-19 NOTE — TELEPHONE ENCOUNTER
Refill Routing Note   Medication(s) are not appropriate for processing by [facility] for the following reason(s):        Drug-drug interaction: Plavix    [facility] action(s):  Defer           Pharmacist review requested: Yes     Appointments  past 12m or future 3m with PCP    Date Provider   Last Visit   6/27/2024 Rafi Martin MD   Next Visit   6/25/2025 Rafi Martin MD   ED visits in past 90 days: 0        Note composed:3:43 PM 06/19/2025

## 2025-06-25 ENCOUNTER — TELEPHONE (OUTPATIENT)
Dept: FAMILY MEDICINE | Facility: CLINIC | Age: 80
End: 2025-06-25

## 2025-06-25 ENCOUNTER — OFFICE VISIT (OUTPATIENT)
Dept: FAMILY MEDICINE | Facility: CLINIC | Age: 80
End: 2025-06-25
Payer: MEDICARE

## 2025-06-25 VITALS
HEART RATE: 60 BPM | BODY MASS INDEX: 29.05 KG/M2 | OXYGEN SATURATION: 98 % | HEIGHT: 72 IN | DIASTOLIC BLOOD PRESSURE: 78 MMHG | WEIGHT: 214.5 LBS | SYSTOLIC BLOOD PRESSURE: 130 MMHG

## 2025-06-25 DIAGNOSIS — J45.20 MILD INTERMITTENT ASTHMA WITHOUT COMPLICATION: Primary | ICD-10-CM

## 2025-06-25 DIAGNOSIS — M17.0 OSTEOARTHRITIS OF BOTH KNEES, UNSPECIFIED OSTEOARTHRITIS TYPE: ICD-10-CM

## 2025-06-25 DIAGNOSIS — J45.20 MILD INTERMITTENT ASTHMA WITHOUT COMPLICATION: ICD-10-CM

## 2025-06-25 DIAGNOSIS — M25.50 ARTHRALGIA, UNSPECIFIED JOINT: ICD-10-CM

## 2025-06-25 DIAGNOSIS — R73.03 PRE-DIABETES: ICD-10-CM

## 2025-06-25 DIAGNOSIS — R06.00 DYSPNEA, UNSPECIFIED TYPE: ICD-10-CM

## 2025-06-25 DIAGNOSIS — G47.33 OSA (OBSTRUCTIVE SLEEP APNEA): Primary | ICD-10-CM

## 2025-06-25 PROCEDURE — 99214 OFFICE O/P EST MOD 30 MIN: CPT | Mod: PBBFAC,PO | Performed by: STUDENT IN AN ORGANIZED HEALTH CARE EDUCATION/TRAINING PROGRAM

## 2025-06-25 PROCEDURE — G2211 COMPLEX E/M VISIT ADD ON: HCPCS | Mod: ,,, | Performed by: STUDENT IN AN ORGANIZED HEALTH CARE EDUCATION/TRAINING PROGRAM

## 2025-06-25 PROCEDURE — 99999 PR PBB SHADOW E&M-EST. PATIENT-LVL IV: CPT | Mod: PBBFAC,,, | Performed by: STUDENT IN AN ORGANIZED HEALTH CARE EDUCATION/TRAINING PROGRAM

## 2025-06-25 PROCEDURE — 99214 OFFICE O/P EST MOD 30 MIN: CPT | Mod: S$PBB,,, | Performed by: STUDENT IN AN ORGANIZED HEALTH CARE EDUCATION/TRAINING PROGRAM

## 2025-06-25 RX ORDER — FLUTICASONE PROPIONATE AND SALMETEROL 100; 50 UG/1; UG/1
1 POWDER RESPIRATORY (INHALATION) 2 TIMES DAILY PRN
Qty: 60 EACH | Refills: 3 | Status: SHIPPED | OUTPATIENT
Start: 2025-06-25 | End: 2026-06-25

## 2025-06-25 RX ORDER — ALBUTEROL SULFATE 90 UG/1
2 INHALANT RESPIRATORY (INHALATION) EVERY 6 HOURS PRN
Qty: 54 G | Refills: 3 | Status: SHIPPED | OUTPATIENT
Start: 2025-06-25

## 2025-06-25 RX ORDER — DICLOFENAC SODIUM 10 MG/G
2 GEL TOPICAL DAILY
Qty: 100 G | Refills: 4 | Status: SHIPPED | OUTPATIENT
Start: 2025-06-25

## 2025-06-25 RX ORDER — DICLOFENAC SODIUM 50 MG/1
50 TABLET, DELAYED RELEASE ORAL DAILY PRN
Qty: 30 TABLET | Refills: 3 | Status: SHIPPED | OUTPATIENT
Start: 2025-06-25

## 2025-06-25 RX ORDER — FLUTICASONE FUROATE AND VILANTEROL 100; 25 UG/1; UG/1
1 POWDER RESPIRATORY (INHALATION) DAILY PRN
Qty: 60 EACH | Refills: 3 | Status: SHIPPED | OUTPATIENT
Start: 2025-06-25 | End: 2025-06-25

## 2025-06-25 NOTE — TELEPHONE ENCOUNTER
Dariela (pharmacist) with [facility] advised Wixela and generic Simbicort are preferred under patient's insurance plan.  Per Mrs. Lyle, both of these medications would need to be prescribed as twice daily (not prn) as they contain a steroid to prevent inflammation.     Will send follow up message to Dr. Martin for notification.           Rafi Martin MD to Me (Selected Message)        6/25/25  1:36 PM  Can they tell me if any other asthma controller is covered?

## 2025-06-25 NOTE — PROGRESS NOTES
Patient ID: Papito Hutton is a 79 y.o. male.    Chief Complaint: Follow-up    History of Present Illness    CHIEF COMPLAINT:  Patient presents today for follow up of sleep apnea and breathing issues    SLEEP APNEA:  He initially became concerned when experiencing episodes of dozing off while watching TV and waking up unable to breathe. He underwent septum surgery by Dr. Siegel approximately one year ago, which helped significantly. He currently uses a CPAP machine with variable compliance and inconsistent nightly usage, often removing the mask 2-3 times per night due to discomfort. On one recent night, he used the CPAP for only two hours. He has tried multiple masks and currently uses a mask that fits under his nose, but notes it shifts when he moves, disrupting air flow. He expresses frustration with the machine's tracking of respiratory events, mentioning experiencing 0.1 and 17 respiratory events in different recordings. Despite challenges, he is attempting to use the CPAP machine overnight and is exploring alternative treatments. He is not eligible for Inspire per ENT.     RESPIRATORY:  He reports ongoing breathing difficulties, particularly with exertion since discontinuing athletic activities. His breathing is not as optimal as he would like. He uses albuterol inhaler intermittently when feeling congested, typically taking two puffs. He experiences some challenges with breathing, especially during physical activity, and uses the inhaler as needed for symptomatic relief. He denies regular smoking, reporting only occasional cigar use which has been infrequent.    MUSCULOSKELETAL:  He reports ongoing joint issues managed with multiple interventions. He receives cortisone injections approximately every eight months, which provide temporary relief. He previously underwent a gel injection with a large needle that was unsuccessful. He currently uses topical creams and Voltaren gel for joint discomfort,  which he uses sparingly due to concerns about bleeding risk and potential kidney effects. He uses these medications intermittently, particularly before social activities like going to shows or casinos to minimize knee discomfort.    CURRENT MEDICATIONS:  He takes seizure medication twice daily, morning and night, continuing treatment after previous consideration of discontinuation. He uses Voltaren gel and other topical creams primarily for knee pain, applied 4-5 days per week. He takes Plavix, noting increased bruising and red dots with minor bumps. He acknowledges understanding potential bleeding risks associated with his medications.    IMAGING:  CT was recently performed and looked good. He inquired about potential asbestos-related findings on the scan.      ROS:  General: -fever, -chills, -fatigue, -weight gain, -weight loss  Eyes: -vision changes, -redness, -discharge  ENT: -ear pain, +nasal congestion, -sore throat  Cardiovascular: -chest pain, -palpitations, -lower extremity edema  Respiratory: -cough, -shortness of breath, +difficulty breathing, +apnea, +intermittent breathing while sleeping  Gastrointestinal: -abdominal pain, -nausea, -vomiting, -diarrhea, +constipation, -blood in stool  Genitourinary: -dysuria, -hematuria, -frequency  Musculoskeletal: +joint pain, -muscle pain, +exercise intolerance  Skin: -rash, -lesion, +easy bruising  Neurological: -headache, -dizziness, -numbness, -tingling, +difficulty falling asleep  Psychiatric: -anxiety, -depression, +sleep difficulty         Physical Exam    General: No acute distress. Well-developed. Well-nourished.  Eyes: EOMI. Sclerae anicteric.  HENT: Normocephalic. Atraumatic. Nares patent. Moist oral mucosa.  Cardiovascular: Regular rate. Regular rhythm. No murmurs. No rubs. No gallops. Normal S1, S2.  Respiratory: Normal respiratory effort. Clear to auscultation bilaterally. No rales. No rhonchi. No wheezing.  Musculoskeletal: No  obvious  deformity.  Extremities: No lower extremity edema.  Neurological: Alert & oriented x3. No slurred speech. Normal gait.  Psychiatric: Normal mood. Normal affect. Good insight. Good judgment.  Skin: Warm. Dry. No rash.         Assessment & Plan    R06.00 Dyspnea, unspecified type  R73.03 Pre-diabetes  G47.33 KEITH (obstructive sleep apnea)  J45.20 Mild intermittent asthma without complication  M17.0 Osteoarthritis of both knees, unspecified osteoarthritis type  M25.50 Arthralgia, unspecified joint  R56.9 Seizure  N18.31 Chronic kidney disease, stage 3a  I10 Essential (primary) hypertension  E78.5 Hyperlipidemia, unspecified  J34.2 Deviated nasal septum    IMPRESSION:  - Assessed sleep apnea and CPAP usage, noting continued challenges with mask fit and compliance.  - Evaluated breathing issues, considering asthma as likely cause despite normal breathing tests.  - Reviewed recent CT results, finding no evidence of asbestos-related lung damage.  - Assessed cardiovascular health, noting good results from recent stress test and echocardiogram.  - Considered weight loss injection (Zepbound) for sleep apnea treatment, but deemed it potentially unsuitable due to current weight and potential side effects including nausea and constipation.    DYSPNEA, UNSPECIFIED TYPE:  - Educated the patient on the importance of maintaining muscle strength for breathing, noting how inactivity can lead to a cycle of worsening breathing capacity.  - Clarified that asbestos exposure typically causes plaque formation around lung walls, visible as white splotches on imaging.    KEITH (OBSTRUCTIVE SLEEP APNEA):  - Discussed how weight loss can improve sleep apnea by enhancing breathing mechanics.  - Scheduled follow up in 6 months to reassess sleep apnea treatment options, including potential coverage for weight loss injections.  - Explained that sleep apnea and asthma can coexist independently.    MILD INTERMITTENT ASTHMA WITHOUT COMPLICATION:  -  "Initiated new inhaler with inhaled corticosteroid component, to be taken as needed but no more than daily.  - Continued albuterol inhaler, to be used if additional relief is needed beyond the new inhaler.    ARTHRALGIA, UNSPECIFIED JOINT:  - Continued Voltaren pills, advised the patient to take sparingly due to increased bleeding risk when combined with Plavix.    SEIZURE:  - Continued seizure medication, to be taken in the morning and evening.    CHRONIC KIDNEY DISEASE, STAGE 3A:  - Noted kidney function appears good based on recent laboratory studies.    ESSENTIAL (PRIMARY) HYPERTENSION:  - Blood pressure is stable and well-controlled per patient report and assessment.    HYPERLIPIDEMIA, UNSPECIFIED:  - Cholesterol levels are within acceptable range.    DEVIATED NASAL SEPTUM:  - Nasal breathing has improved following septoplasty performed by Dr. Siegel last year.    LIFESTYLE CHANGES:  - Continue Plavix for stroke prevention.  - Patient was counseled about expected increased bruising as a side effect.          Papito Marquez" was seen today for follow-up.    Diagnoses and all orders for this visit:    KEITH (obstructive sleep apnea)    Mild intermittent asthma without complication  -     fluticasone furoate-vilanteroL (BREO ELLIPTA) 100-25 mcg/dose diskus inhaler; Inhale 1 puff into the lungs daily as needed. Controller    Dyspnea, unspecified type  -     albuterol (PROVENTIL HFA) 90 mcg/actuation inhaler; Inhale 2 puffs into the lungs every 6 (six) hours as needed for Shortness of Breath. Rescue  -     CBC Auto Differential; Future  -     Comprehensive Metabolic Panel; Future    Osteoarthritis of both knees, unspecified osteoarthritis type  -     diclofenac sodium (VOLTAREN) 1 % Gel; Apply 2 g topically once daily.  -     diclofenac (VOLTAREN) 50 MG EC tablet; Take 1 tablet (50 mg total) by mouth daily as needed. Use sparingly  -     CBC Auto Differential; Future  -     Comprehensive Metabolic Panel; " Future    Arthralgia, unspecified joint  -     diclofenac sodium (VOLTAREN) 1 % Gel; Apply 2 g topically once daily.  -     diclofenac (VOLTAREN) 50 MG EC tablet; Take 1 tablet (50 mg total) by mouth daily as needed. Use sparingly    Pre-diabetes  -     Hemoglobin A1C; Future          No follow-ups on file.    This note was generated with the assistance of ambient listening technology. Verbal consent was obtained by the patient and accompanying visitor(s) for the recording of patient appointment to facilitate this note. I attest to having reviewed and edited the generated note for accuracy, though some syntax or spelling errors may persist. Please contact the author of this note for any clarification.

## 2025-06-25 NOTE — TELEPHONE ENCOUNTER
Please see the following message from [facility] regarding Breo Ellipta prescription:    Prescription Name: fluticasone furoate-vilanteroL (BREO ELLIPTA) 100-25 mcg/dose diskus inhaler  What do they need to clarify?: medication is not covered by insurance, as well they are saying that its not as needed... saying that medication needs new directions         Please advise. Thank you.

## 2025-06-25 NOTE — TELEPHONE ENCOUNTER
"CRM # [id number]  Owner: None  Status: Unresolved  Open  Priority: High Created on: 06/25/2025 12:02 PM By: Luisana Eduardo     Primary Information    Source   Papito Hutton "John" (Patient)    Subject   Papito Hutton "John" (Patient)    Topic   Medications - Medication Question      Summary   Medication Question   Communication   Type:  Pharmacy Calling to Clarify an RX            Name of Caller: [email or web address]      Pharmacy Name:[facility] - [address] [address]      Phone: [phone] Fax: [phone]                  Prescription Name: fluticasone furoate-vilanteroL (BREO ELLIPTA) 100-25 mcg/dose diskus inhaler      What do they need to clarify?: medication is not covered by insurance, as well they are saying that its not as needed... saying that medication needs new directions      Best Call Back Number: [phone]      Additional Information: reference number [id number] please call back     06/25/2025 12:06 PM Luisana Eduardo [facility]       "

## 2025-06-25 NOTE — TELEPHONE ENCOUNTER
Call placed to [facility] spoke to pharmacist (Dariela) for notification of attached information below.       Rafi Martin MD to Me (Selected Message)        6/25/25  3:06 PM  Rx for wixela sent. I want it prn. We are doing symptom based controller therapy for his mild intermittent asthma.

## 2025-07-07 ENCOUNTER — PATIENT MESSAGE (OUTPATIENT)
Dept: ORTHOPEDICS | Facility: CLINIC | Age: 80
End: 2025-07-07
Payer: MEDICARE

## 2025-07-07 NOTE — TELEPHONE ENCOUNTER
Spoke with Mr. Hutton via telephone in regards to medication questions. Notified Mr. Anni Martin discontinued Fluticasone-Vilanterol, and changed medication to Albuterol. Mr. Hutton verbalized understanding.

## 2025-08-05 DIAGNOSIS — I10 ESSENTIAL HYPERTENSION: ICD-10-CM

## 2025-08-05 RX ORDER — VALSARTAN 160 MG/1
160 TABLET ORAL
Qty: 90 TABLET | Refills: 3 | Status: SHIPPED | OUTPATIENT
Start: 2025-08-05

## 2025-08-05 NOTE — TELEPHONE ENCOUNTER
No care due was identified.  [facility] Embedded Care Due Messages. Reference number: [id number].   8/05/2025 2:43:09 AM CDT

## 2025-08-05 NOTE — TELEPHONE ENCOUNTER
Refill Decision Note   Papito Hutton  is requesting a refill authorization.  Brief Assessment and Rationale for Refill:  Approve     Medication Therapy Plan:        Comments:     Note composed:3:37 PM 08/05/2025

## 2025-08-07 ENCOUNTER — PATIENT MESSAGE (OUTPATIENT)
Dept: FAMILY MEDICINE | Facility: CLINIC | Age: 80
End: 2025-08-07
Payer: MEDICARE

## 2025-08-07 ENCOUNTER — OFFICE VISIT (OUTPATIENT)
Dept: ORTHOPEDICS | Facility: CLINIC | Age: 80
End: 2025-08-07
Payer: MEDICARE

## 2025-08-07 VITALS — BODY MASS INDEX: 29.05 KG/M2 | RESPIRATION RATE: 18 BRPM | WEIGHT: 214.5 LBS | HEIGHT: 72 IN

## 2025-08-07 DIAGNOSIS — M17.10 ARTHRITIS OF KNEE: ICD-10-CM

## 2025-08-07 DIAGNOSIS — I10 ESSENTIAL HYPERTENSION: ICD-10-CM

## 2025-08-07 DIAGNOSIS — M25.569 KNEE PAIN, UNSPECIFIED CHRONICITY, UNSPECIFIED LATERALITY: Primary | ICD-10-CM

## 2025-08-07 PROCEDURE — 99213 OFFICE O/P EST LOW 20 MIN: CPT | Mod: PBBFAC,PO,25 | Performed by: ORTHOPAEDIC SURGERY

## 2025-08-07 PROCEDURE — 99999PBSHW PR PBB SHADOW TECHNICAL ONLY FILED TO HB: Mod: PBBFAC,,,

## 2025-08-07 PROCEDURE — 20610 DRAIN/INJ JOINT/BURSA W/O US: CPT | Mod: 50,PBBFAC,PO | Performed by: ORTHOPAEDIC SURGERY

## 2025-08-07 PROCEDURE — 99999 PR PBB SHADOW E&M-EST. PATIENT-LVL III: CPT | Mod: PBBFAC,,, | Performed by: ORTHOPAEDIC SURGERY

## 2025-08-07 RX ORDER — TRIAMCINOLONE ACETONIDE 40 MG/ML
40 INJECTION, SUSPENSION INTRA-ARTICULAR; INTRAMUSCULAR
Status: DISCONTINUED | OUTPATIENT
Start: 2025-08-07 | End: 2025-08-07 | Stop reason: HOSPADM

## 2025-08-07 RX ADMIN — TRIAMCINOLONE ACETONIDE 40 MG: 40 INJECTION, SUSPENSION INTRA-ARTICULAR; INTRAMUSCULAR at 03:08

## 2025-08-07 NOTE — PROGRESS NOTES
Past Medical History:   Diagnosis Date    Advance care planning 02/19/2024    Arthritis     B12 deficiency     Colon polyp     Diabetes mellitus     BORDERLINE    GERD (gastroesophageal reflux disease)     [place] (hard of hearing)     BILAT AIDS    Hyperlipidemia     Hypertension     Low testosterone     Personal history of colonic polyps 2008    adenomatous polyp    Seizures     Sinus disorder     Sleep apnea     DOES NOT USE MACHINE    Wears glasses        Past Surgical History:   Procedure Laterality Date    COLON SURGERY      COLONOSCOPY  2008    Dr. Garcia; polyps removed    COLONOSCOPY N/A 07/13/2020    Procedure: COLONOSCOPY;  Surgeon: Jj Fried MD;  Location: [facility];  Service: Endoscopy;  Laterality: N/A;    COLONOSCOPY N/A 8/15/2023    Procedure: COLONOSCOPY;  Surgeon: Jj Fried MD;  Location: [facility];  Service: Endoscopy;  Laterality: N/A;    ESOPHAGOGASTRODUODENOSCOPY N/A 7/31/2023    Procedure: EGD (ESOPHAGOGASTRODUODENOSCOPY);  Surgeon: Jj Fried MD;  Location: [facility];  Service: Endoscopy;  Laterality: N/A;    FESS, USING COMPUTER-ASSISTED NAVIGATION, WITH NASAL TURBINATE REDUCTION N/A 7/19/2024    Procedure: FESS, USING COMPUTER-ASSISTED NAVIGATION (disc loaded), WITH NASAL TURBINATE REDUCTION;  Surgeon: Thien Tabor MD;  Location: [facility];  Service: ENT;  Laterality: N/A;    LAPAROSCOPIC RIGHT COLON RESECTION Right 08/10/2020    Procedure: COLECTOMY, RIGHT, LAPAROSCOPIC pooss conversion to open;  Surgeon: Noble Kaye MD;  Location: [facility] OR;  Service: General;  Laterality: Right;    LYSIS, ADHESIONS, NOSE Left 7/19/2024    Procedure: LYSIS, ADHESIONS, NOSE;  Surgeon: Thien Tabor MD;  Location: [facility] OR;  Service: ENT;  Laterality: Left;    NASAL SEPTOPLASTY N/A 7/19/2024    Procedure: SEPTOPLASTY, NOSE;  Surgeon: Thien Tabor MD;  Location: [facility] OR;  Service: ENT;  Laterality: N/A;    NASAL SEPTUM SURGERY      NASAL TURBINATE REDUCTION  Bilateral 7/19/2024    Procedure: REDUCTION, NASAL TURBINATE;  Surgeon: Thien Tabor MD;  Location: [facility] OR;  Service: ENT;  Laterality: Bilateral;    right hand surgery      RIGHT HEMICOLECTOMY N/A 08/10/2020    Procedure: HEMICOLECTOMY, RIGHT;  Surgeon: Noble Kaye MD;  Location: [facility] OR;  Service: General;  Laterality: N/A;    UPPER GASTROINTESTINAL ENDOSCOPY  2008    hiatal hernia; esophagitis       Current Outpatient Medications   Medication Sig    albuterol (PROVENTIL HFA) 90 mcg/actuation inhaler Inhale 2 puffs into the lungs every 6 (six) hours as needed for Shortness of Breath. Rescue    atorvastatin (LIPITOR) 80 MG tablet TAKE 1 TABLET DAILY    carvediloL (COREG) 12.5 MG tablet TAKE 1 TABLET TWICE A DAY WITH MEALS    clopidogreL (PLAVIX) 75 mg tablet Take 1 tablet (75 mg total) by mouth once daily.    cyanocobalamin (VITAMIN B-12) 1000 MCG tablet Take 100 mcg by mouth once daily.    diclofenac (VOLTAREN) 50 MG EC tablet Take 1 tablet (50 mg total) by mouth daily as needed. Use sparingly    diclofenac sodium (VOLTAREN) 1 % Gel Apply 2 g topically once daily.    esomeprazole (NEXIUM) 40 MG capsule TAKE 1 CAPSULE BEFORE BREAKFAST    fluticasone-salmeterol diskus inhaler 100-50 mcg Inhale 1 puff into the lungs 2 (two) times daily as needed. Controller    ipratropium (ATROVENT) 42 mcg (0.06 %) nasal spray 1 spray by Each Nostril route once daily.    levETIRAcetam (KEPPRA) 500 MG Tab Take 1 tablet (500 mg total) by mouth 2 (two) times daily.    NIFEdipine (PROCARDIA-XL) 30 MG (OSM) 24 hr tablet TAKE 1 TABLET DAILY    triamcinolone (NASACORT) 55 mcg nasal inhaler 2 sprays by Nasal route once daily.    valsartan (DIOVAN) 160 MG tablet TAKE 1 TABLET DAILY     No current facility-administered medications for this visit.       Review of patient's allergies indicates:  No Known Allergies    Family History   Problem Relation Name Age of Onset    Cancer Neg Hx      Diabetes Neg Hx      Heart disease Neg Hx       Colon cancer Neg Hx      Colon polyps Neg Hx      Esophageal cancer Neg Hx      Stomach cancer Neg Hx         Social History     Socioeconomic History    Marital status:    Tobacco Use    Smoking status: Former     Types: Cigars    Smokeless tobacco: Former     Types: Chew   Substance and Sexual Activity    Alcohol use: Yes     Alcohol/week: 2.0 standard drinks of alcohol     Types: 2 Cans of beer per week     Comment: occ    Drug use: No    Sexual activity: Yes     Partners: Female     Social Drivers of Health     Financial Resource Strain: Patient Unable To Answer (2/17/2024)    Overall Financial Resource Strain (CARDIA)     Difficulty of Paying Living Expenses: Patient unable to answer   Food Insecurity: Patient Unable To Answer (2/17/2024)    Hunger Vital Sign     Worried About Running Out of Food in the Last Year: Patient unable to answer     Ran Out of Food in the Last Year: Patient unable to answer   Transportation Needs: Patient Unable To Answer (2/17/2024)    PRAPARE - Transportation     Lack of Transportation (Medical): Patient unable to answer     Lack of Transportation (Non-Medical): Patient unable to answer   Physical Activity: Patient Declined (9/10/2023)    Exercise Vital Sign     Days of Exercise per Week: Patient declined     Minutes of Exercise per Session: Patient declined   Stress: Patient Unable To Answer (2/17/2024)    [employer] [place] [employer] - Occupational Stress Questionnaire     Feeling of Stress : Patient unable to answer   Housing Stability: Patient Unable To Answer (2/17/2024)    Housing Stability Vital Sign     Unable to Pay for Housing in the Last Year: Patient unable to answer     Unstable Housing in the Last Year: Patient unable to answer       Chief Complaint:   Chief Complaint   Patient presents with    Follow-up     B knee pain        History of present illness:  80-year-old male seen for bilateral knee pain.  It has hurt him for years.  Patient has  painful and limited range of motion.  Painful on the anterolateral knee.  Gets popping and clicking bilaterally.  We tried injecting him with Durolane back in November.  Did not get long-term relief.  Cortisone injection seemed to be decreasing in effectiveness.  Knees seems to be getting worse.  Patient is having instability and weakness in both knees which has caused him to fall a couple times.  Rates the pain today as a 10/10.        Review of Systems:    Constitution: Negative for chills, fever, and sweats.  Negative for unexplained weight loss.    HENT:  Negative for headaches and blurry vision.    Cardiovascular:Negative for chest pain or irregular heart beat. Negative for hypertension.    Respiratory:  Negative for cough and shortness of breath.    Gastrointestinal: Negative for abdominal pain, heartburn, melena, nausea, and vomitting.    Genitourinary:  Negative bladder incontinence and dysuria.    Musculoskeletal:  See HPI    Neurological: Negative for numbness.    Psychiatric/Behavioral: Negative for depression.  The patient is not nervous/anxious.      Endocrine: Negative for polyuria    Hematologic/Lymphatic: Negative for bleeding problem.  Does not bruise/bleed easily.    Skin: Negative for poor would healing and rash      Physical Examination:    Vital Signs:    Vitals:    08/07/25 1509   Resp: 18       Body mass index is 29.09 kg/m².    This a well-developed, well nourished patient in no acute distress.  They are alert and oriented and cooperative to examination.  Pt. walks with mild antalgic gait    Examination of both knees shows no rashes or erythema. There are no masses ecchymosis or effusion. Patient has full range of motion from 0-130°. Patient is nontender to palpation over lateral joint line and mildly tender to palpation over the medial joint line. . Knee is stable to varus and valgus stress. 5 out of 5 motor strength. Palpable distal pulses. Intact light touch sensation. Negative  Patellofemoral crepitus      X-rays:  X-rays of both knees are reviewed which show some mild medial joint space narrowing of the right knee.  Some spurring off the patella superiorly bilaterally.         Assessment:  Bilateral knee osteoarthritis    Plan:  I reviewed the findings with him today.  We talked about treatment options.  I injected both knees with cortisone today.  Did not get long-term relief with the viscosupplementation.  Feels like his symptoms are worsening and causing him to lose his balance and have his knees buckle.  Patient is unable to walk for prolonged distances.          All previous pertinent notes including ER visits, physical therapy visits, other orthopedic visits as well as other care for the same musculoskeletal problem were reviewed.  All pertinent lab values and previous imaging was reviewed pertinent to the current visit.    This note was created using [employer] voice recognition software that occasionally misinterpreted phrases or words.    Consult note is delivered via Epic messaging service.

## 2025-08-07 NOTE — PROCEDURES
Large Joint Aspiration/Injection: bilateral knee    Date/Time: 8/7/2025 3:45 PM    Performed by: Ernie Cox MD  Authorized by: Ernie Cox MD    Site marked: the procedure site was marked    Timeout: prior to procedure the correct patient, procedure, and site was verified    Prep: patient was prepped and draped in usual sterile fashion      Local anesthesia used?: Yes    Anesthesia:  Local infiltration  Local anesthetic: Ropivicaine.  Anesthetic total (ml):  3      Details:  Needle Size:  22 G  Ultrasonic Guidance for needle placement?: No    Approach:  Anterolateral  Location:  Knee  Laterality:  Bilateral  Site:  Bilateral knee  Medications (Right):  40 mg triamcinolone acetonide 40 mg/mL  Medications (Left):  40 mg triamcinolone acetonide 40 mg/mL  Patient tolerance:  Patient tolerated the procedure well with no immediate complications

## 2025-08-08 RX ORDER — NIFEDIPINE 30 MG/1
30 TABLET, EXTENDED RELEASE ORAL DAILY
Qty: 90 TABLET | Refills: 3 | Status: SHIPPED | OUTPATIENT
Start: 2025-08-08

## (undated) DEVICE — NDL INSUF ULTRA VERESS 120MM

## (undated) DEVICE — SYR 30CC LUER LOCK

## (undated) DEVICE — SET TUBE PNEUMOCLEAR SE HI FLO

## (undated) DEVICE — ELECTRODE BLD EXT 6.50 ST DISP

## (undated) DEVICE — HEMOSTAT SURGICEL 4X8IN

## (undated) DEVICE — Device

## (undated) DEVICE — TROCAR ENDOPATH XCEL 11MM 10CM

## (undated) DEVICE — PACK CUSTOM UNIV BASIN SLI

## (undated) DEVICE — DRESSING MEPORE 3.6 X 10

## (undated) DEVICE — STAPLER SKIN ROTATING HEAD

## (undated) DEVICE — TUBING SUCTION STR .25INX6FT

## (undated) DEVICE — KIT ANTIFOG

## (undated) DEVICE — KIT ANTIFOG W/SPONG & FLUID

## (undated) DEVICE — SEE L#120831

## (undated) DEVICE — IRRIGATOR SUCTION W/TIP

## (undated) DEVICE — SUT 5-0 CHROMIC GUT / P-3

## (undated) DEVICE — SUT 0 VICRYL / CT-1

## (undated) DEVICE — SUT SILK 0 STRANDS 30IN BLK

## (undated) DEVICE — SUT 0 VICRYL / UR6 (J603)

## (undated) DEVICE — SPONGE PATTY SURGICAL .5X3IN

## (undated) DEVICE — DRAPE FLUID WARMER ORS 44X44IN

## (undated) DEVICE — PACK BASIC

## (undated) DEVICE — BLADE SURG CARBON STEEL SZ11

## (undated) DEVICE — EVACUATOR WOUND BULB 100CC

## (undated) DEVICE — ELECTRODE REM PLYHSV RETURN 9

## (undated) DEVICE — CUTTER PROXIMATE BLUE 75MM

## (undated) DEVICE — CLOSURE SKIN STERI STRIP 1/2X4

## (undated) DEVICE — CANNULA ENDOPATH XCEL 5X100MM

## (undated) DEVICE — NDL SAFETY 21G X 1 1/2 ECLPSE

## (undated) DEVICE — SUT SA85H SILK 2-0

## (undated) DEVICE — BLADE SURG #15 CARBON STEEL

## (undated) DEVICE — DEVICE SEAL TISS CRV 3MMX45CM

## (undated) DEVICE — BLANKET FULL BODY 85.8X50IN

## (undated) DEVICE — RELOAD PROXIMATE CUT BLUE 75MM

## (undated) DEVICE — SYS LABEL CORRECT MED

## (undated) DEVICE — STRAP OR TABLE 5IN X 72IN

## (undated) DEVICE — SLEEVE SCD EXPRESS CALF MEDIUM

## (undated) DEVICE — DRAIN WOUND RELIAVAC 10MMX20CM

## (undated) DEVICE — SPONGE LAP 18X18 PREWASHED

## (undated) DEVICE — SUT MONOCRYL 4-0 PS-2

## (undated) DEVICE — TROCAR ENDOPATH XCEL 12X100MM

## (undated) DEVICE — SUT 1 48IN PDS II VIO MONO

## (undated) DEVICE — WIPE MICRO TIP

## (undated) DEVICE — TROCAR ENDOPATH XCEL 12MM 10CM

## (undated) DEVICE — APPLICATOR CHLORAPREP ORN 26ML

## (undated) DEVICE — NDL 27G X 1 1/4

## (undated) DEVICE — SOL WATER STRL IRR 1000ML

## (undated) DEVICE — DRESSING EYE OVAL LF

## (undated) DEVICE — GLOVE SURG ULTRA TOUCH 7.5

## (undated) DEVICE — SEE MEDLINE ITEM 152622

## (undated) DEVICE — COVER SURG LIGHT HANDLE

## (undated) DEVICE — SOL 9P NACL IRR PIC IL

## (undated) DEVICE — GLOVE SENSICARE PI ALOE 7.5

## (undated) DEVICE — PAD K-THERMIA 24IN X 60IN

## (undated) DEVICE — SYR 10CC LUER LOCK

## (undated) DEVICE — SUT SILK BLK. BR. 3-0 10

## (undated) DEVICE — APPLIER CLIP EPIX UNIV 5X34

## (undated) DEVICE — TROCAR ENDO Z THREAD KII 5X100

## (undated) DEVICE — LINER SUCTION 3000CC

## (undated) DEVICE — SUT PLAIN 4-0 SC-1 18IN

## (undated) DEVICE — SUT 3-0 VICRYL SH CR/8 18

## (undated) DEVICE — PACK EENT SURG II ECLIPSE

## (undated) DEVICE — DRAPE ABDOMINAL TIBURON 14X11

## (undated) DEVICE — DRESSING ABSRBNT ISLAND 3.6X8

## (undated) DEVICE — TROCAR ENDOPATH XCEL 5X100MM